# Patient Record
Sex: FEMALE | Race: BLACK OR AFRICAN AMERICAN | Employment: OTHER | RURAL
[De-identification: names, ages, dates, MRNs, and addresses within clinical notes are randomized per-mention and may not be internally consistent; named-entity substitution may affect disease eponyms.]

---

## 2017-01-12 ENCOUNTER — OFFICE VISIT (OUTPATIENT)
Dept: FAMILY MEDICINE CLINIC | Age: 82
End: 2017-01-12

## 2017-01-12 VITALS
RESPIRATION RATE: 18 BRPM | BODY MASS INDEX: 25.11 KG/M2 | WEIGHT: 160 LBS | HEART RATE: 89 BPM | SYSTOLIC BLOOD PRESSURE: 171 MMHG | DIASTOLIC BLOOD PRESSURE: 93 MMHG | TEMPERATURE: 97.9 F | OXYGEN SATURATION: 98 % | HEIGHT: 67 IN

## 2017-01-12 DIAGNOSIS — I10 ESSENTIAL HYPERTENSION: ICD-10-CM

## 2017-01-12 DIAGNOSIS — R30.0 DYSURIA: ICD-10-CM

## 2017-01-12 DIAGNOSIS — E08.21 DIABETES MELLITUS DUE TO UNDERLYING CONDITION WITH DIABETIC NEPHROPATHY, WITHOUT LONG-TERM CURRENT USE OF INSULIN (HCC): ICD-10-CM

## 2017-01-12 DIAGNOSIS — J30.89 NON-SEASONAL ALLERGIC RHINITIS DUE TO FUNGAL SPORES: Primary | ICD-10-CM

## 2017-01-12 RX ORDER — ETODOLAC 400 MG/1
TABLET, FILM COATED ORAL
Refills: 0 | COMMUNITY
Start: 2016-12-20 | End: 2017-02-01 | Stop reason: ALTCHOICE

## 2017-01-12 RX ORDER — CLONIDINE HYDROCHLORIDE 0.1 MG/1
0.1 TABLET ORAL 2 TIMES DAILY
Qty: 60 TAB | Refills: 5 | Status: SHIPPED | OUTPATIENT
Start: 2017-01-12 | End: 2017-02-01 | Stop reason: ALTCHOICE

## 2017-01-12 RX ORDER — AMLODIPINE BESYLATE 10 MG/1
TABLET ORAL
Qty: 30 TAB | Refills: 5 | Status: SHIPPED | OUTPATIENT
Start: 2017-01-12 | End: 2017-07-11 | Stop reason: SDUPTHER

## 2017-01-12 RX ORDER — FLUTICASONE PROPIONATE 50 MCG
SPRAY, SUSPENSION (ML) NASAL
Qty: 1 BOTTLE | Refills: 5 | Status: SHIPPED | OUTPATIENT
Start: 2017-01-12 | End: 2017-07-11 | Stop reason: ALTCHOICE

## 2017-01-12 NOTE — MR AVS SNAPSHOT
Visit Information Date & Time Provider Department Dept. Phone Encounter #  
 1/12/2017  3:40 PM Nora Rodriguez MD [facility] [phone] [id number] Upcoming Health Maintenance Date Due OSTEOPOROSIS SCREENING (DEXA) 6/30/2002 Pneumococcal 65+ Low/Medium Risk (2 of 2 - PPSV23) 12/19/2011 EYE EXAM RETINAL OR DILATED Q1 1/6/2018* MICROALBUMIN Q1 5/17/2017 HEMOGLOBIN A1C Q6M 6/16/2017 LIPID PANEL Q1 8/2/2017 FOOT EXAM Q1 10/10/2017 MEDICARE YEARLY EXAM 10/11/2017 GLAUCOMA SCREENING Q2Y 1/6/2019 DTaP/Tdap/Td series (2 - Td) 10/10/2026 *Topic was postponed. The date shown is not the original due date. Allergies as of 1/12/2017  Review Complete On: 12/16/2016 By: Liam Abbott MD  
 No Known Allergies Current Immunizations  Reviewed on 10/17/2014 Name Date Influenza High Dose Vaccine PF 10/10/2016  4:51 PM  
 Influenza Vaccine 10/17/2014, 12/31/2013 Pneumococcal Vaccine (Unspecified Type) 12/19/2006 Not reviewed this visit Vitals BP Pulse Temp Resp Height(growth percentile) Weight(growth percentile) (!) 171/93 (BP 1 Location: Left arm, BP Patient Position: Sitting) 89 97.9 °F (36.6 °C) 18 5' 7\" (1.702 m) 160 lb (72.6 kg) SpO2 BMI OB Status Smoking Status 98% 25.06 kg/m2 Hysterectomy Current Every Day Smoker Vitals History BMI and BSA Data Body Mass Index Body Surface Area 25.06 kg/m 2 1.85 m 2 Preferred Pharmacy Pharmacy Name Phone [id number] [address] [phone] Your Updated Medication List  
  
   
This list is accurate as of: 1/12/17  4:06 PM.  Always use your most recent med list.  
  
  
  
  
 ALLERGY PO Take  by mouth. amLODIPine 5 mg tablet Commonly known as:  Elrayray Balderramaer TAKE 1 TABLET BY MOUTH ONCE A DAY  
  
 ciprofloxacin HCl 500 mg tablet Commonly known as:  CIPRO Take 1 Tab by mouth two (2) times a day. cloNIDine HCl 0.1 mg tablet Commonly known as:  CATAPRES Take 1 Tab by mouth two (2) times a day. etodolac 400 mg tablet Commonly known as:  LODINE  
TAKE 1 TABLET BY MOUTH THREE TIMES DAILY WITH MEALS. lovastatin 20 mg tablet Commonly known as:  MEVACOR Take 1 Tab by mouth nightly. meloxicam 15 mg tablet Commonly known as:  MOBIC  
TAKE 1 TABLET BY MOUTH EVERY DAY WITH FOOD  
  
 metFORMIN 500 mg tablet Commonly known as:  GLUCOPHAGE  
TAKE 1 TABLET BY MOUTH EVERY DAY WITH BREAKFAST Introducing [facility] & [facility] SERVICES! Alva French introduces [employer]zi patient portal. Now you can access parts of your medical record, email your doctor's office, and request medication refills online. 1. In your internet browser, go to [email or web address] 2. Click on the First Time User? Click Here link in the Sign In box. You will see the New Member Sign Up page. 3. Enter your Shipzi Access Code exactly as it appears below. You will not need to use this code after youve completed the sign-up process. If you do not sign up before the expiration date, you must request a new code. · Shipzi Access Code: [email or web address] Expires: 4/12/2017  4:06 PM 
 
4. Enter the last four digits of your Social Security Number (xxxx) and Date of Birth (mm/dd/yyyy) as indicated and click Submit. You will be taken to the next sign-up page. 5. Create a Shipzi ID. This will be your Shipzi login ID and cannot be changed, so think of one that is secure and easy to remember. 6. Create a Shipzi password. You can change your password at any time. 7. Enter your Password Reset Question and Answer. This can be used at a later time if you forget your password. 8. Enter your e-mail address. You will receive e-mail notification when new information is available in [address] 9. Click Sign Up.  You can now view and download portions of your medical record. 10. Click the Download Summary menu link to download a portable copy of your medical information. If you have questions, please visit the Frequently Asked Questions section of the Rem[employer] website. Remember, Remicalm is NOT to be used for urgent needs. For medical emergencies, dial 911. Now available from your iPhone and Android! Please provide this summary of care documentation to your next provider. Your primary care clinician is listed as Diann Jane. If you have any questions after today's visit, please call [phone].

## 2017-01-12 NOTE — PROGRESS NOTES
Arielle Bass is a 80 y.o. female who presents with the following:  Chief Complaint   Patient presents with    Sinus Infection       Sinus Infection    The history is provided by the patient (Patient states her generic Zyrtec is not working and her head is congested and she has been sneezing and her nose itching). Pertinent negatives include no chills, no sweats, no congestion, no ear pain, no hoarse voice, no sinus pressure, no sore throat, no swollen glands, no cough, no shortness of breath, no rhinorrhea, no neck pain, no neck pain, no headaches and no chest pain. No Known Allergies    Current Outpatient Prescriptions   Medication Sig    DIPHENHYDRAMINE HCL (ALLERGY PO) Take  by mouth.  cloNIDine HCl (CATAPRES) 0.1 mg tablet Take 1 Tab by mouth two (2) times a day. Indications: Hypertension    amLODIPine (NORVASC) 10 mg tablet TAKE 1 TABLET BY MOUTH ONCE A DAY  Indications: Hypertension    fluticasone (FLONASE) 50 mcg/actuation nasal spray 1 spray each nostril twice daily for allergies    meloxicam (MOBIC) 15 mg tablet TAKE 1 TABLET BY MOUTH EVERY DAY WITH FOOD    ciprofloxacin HCl (CIPRO) 500 mg tablet Take 1 Tab by mouth two (2) times a day.  lovastatin (MEVACOR) 20 mg tablet Take 1 Tab by mouth nightly.  metFORMIN (GLUCOPHAGE) 500 mg tablet TAKE 1 TABLET BY MOUTH EVERY DAY WITH BREAKFAST    etodolac (LODINE) 400 mg tablet TAKE 1 TABLET BY MOUTH THREE TIMES DAILY WITH MEALS. No current facility-administered medications for this visit. Past Medical History   Diagnosis Date    Allergic rhinitis due to pollen     Change in bowel habits     Diabetes ([address]     Dysuria     Hemorrhoids     Hypertension     IBS (irritable bowel syndrome)     Other diseases of nasal cavity and sinuses(478.19)        Past Surgical History   Procedure Laterality Date    Hx colonoscopy  2013?     Hx hysterectomy      Hx cholecystectomy  2016       Family History   Problem Relation Age of Onset    No Known Problems Mother        Social History     Social History    Marital status:      Spouse name: N/A    Number of children: N/A    Years of education: N/A     Social History Main Topics    Smoking status: Current Every Day Smoker    Smokeless tobacco: Never Used    Alcohol use No    Drug use: None    Sexual activity: Not Asked     Other Topics Concern    None     Social History Narrative       Review of Systems   Constitutional: Negative for chills. HENT: Negative for congestion, ear pain, hoarse voice, rhinorrhea, sinus pressure and sore throat. Respiratory: Negative for cough and shortness of breath. Cardiovascular: Negative for chest pain. Genitourinary: Positive for dysuria. Negative for frequency, hematuria and urgency. Musculoskeletal: Negative for neck pain. Neurological: Negative for headaches. Visit Vitals    BP (!) 171/93 (BP 1 Location: Left arm, BP Patient Position: Sitting)    Pulse 89    Temp 97.9 °F (36.6 °C)    Resp 18    Ht 5' 7\" (1.702 m)    Wt 160 lb (72.6 kg)    SpO2 98%    BMI 25.06 kg/m2     Physical Exam   Constitutional: She is oriented to person, place, and time and well-developed, well-nourished, and in no distress. HENT:   Head: Normocephalic and atraumatic. Right Ear: External ear normal.   Left Ear: External ear normal.   Mouth/Throat: Oropharynx is clear and moist.   Her turbinates are boggy   Eyes: Conjunctivae and EOM are normal. Pupils are equal, round, and reactive to light. Right eye exhibits no discharge. Left eye exhibits no discharge. Neck: Normal range of motion. Neck supple. No tracheal deviation present. No thyromegaly present. Cardiovascular: Normal rate, regular rhythm, normal heart sounds and intact distal pulses. Exam reveals no gallop and no friction rub. No murmur heard. Pulmonary/Chest: Effort normal and breath sounds normal. No respiratory distress. She has no wheezes. She exhibits no tenderness. Abdominal: Soft. Bowel sounds are normal. She exhibits no distension and no mass. There is no tenderness. There is no rebound and no guarding. Musculoskeletal: She exhibits no edema or tenderness. Lymphadenopathy:     She has no cervical adenopathy. Neurological: She is alert and oriented to person, place, and time. She has normal reflexes. No cranial nerve deficit. She exhibits normal muscle tone. Gait normal. Coordination normal.   Skin: Skin is warm and dry. No rash noted. No erythema. No pallor. Psychiatric: Mood, memory, affect and judgment normal.         ICD-10-CM ICD-9-CM    1. Non-seasonal allergic rhinitis due to fungal spores J30.89 477.8    2. Dysuria R30.0 788.1 AMB POC URINALYSIS DIP STICK AUTO W/O MICRO   3. Essential hypertension I10 401.9 LIPID PANEL      METABOLIC PANEL, COMPREHENSIVE   4. Diabetes mellitus due to underlying condition with diabetic nephropathy, without long-term current use of insulin ([facility]) E08.21 249.40 MICROALBUMIN, UR, RAND W/ MICROALBUMIN/CREA RATIO     583.81 LIPID PANEL      HEMOGLOBIN A1C WITH EAG      METABOLIC PANEL, COMPREHENSIVE       Orders Placed This Encounter    MICROALBUMIN, UR, RAND W/ MICROALBUMIN/CREA RATIO     Standing Status:   Future     Standing Expiration Date:   7/12/2017    LIPID PANEL     Standing Status:   Future     Standing Expiration Date:   7/12/2017    HEMOGLOBIN A1C WITH EAG     Standing Status:   Future     Standing Expiration Date:   0/74/7239    METABOLIC PANEL, COMPREHENSIVE     Standing Status:   Future     Standing Expiration Date:   7/12/2017    AMB POC URINALYSIS DIP STICK AUTO W/O MICRO     Standing Status:   Future     Standing Expiration Date:   2/12/2017    etodolac (LODINE) 400 mg tablet     Sig: TAKE 1 TABLET BY MOUTH THREE TIMES DAILY WITH MEALS. Refill:  0    DIPHENHYDRAMINE HCL (ALLERGY PO)     Sig: Take  by mouth.  cloNIDine HCl (CATAPRES) 0.1 mg tablet     Sig: Take 1 Tab by mouth two (2) times a day. Indications: Hypertension     Dispense:  60 Tab     Refill:  5    amLODIPine (NORVASC) 10 mg tablet     Sig: TAKE 1 TABLET BY MOUTH ONCE A DAY  Indications: Hypertension     Dispense:  30 Tab     Refill:  5    fluticasone (FLONASE) 50 mcg/actuation nasal spray     Si spray each nostril twice daily for allergies     Dispense:  1 Bottle     Refill:  5       Follow-up Disposition: Not on Randall Gamble MD

## 2017-01-13 ENCOUNTER — LAB ONLY (OUTPATIENT)
Dept: FAMILY MEDICINE CLINIC | Age: 82
End: 2017-01-13

## 2017-01-13 DIAGNOSIS — I10 ESSENTIAL HYPERTENSION: ICD-10-CM

## 2017-01-13 DIAGNOSIS — R30.0 DYSURIA: ICD-10-CM

## 2017-01-13 DIAGNOSIS — E08.21 DIABETES MELLITUS DUE TO UNDERLYING CONDITION WITH DIABETIC NEPHROPATHY, WITHOUT LONG-TERM CURRENT USE OF INSULIN (HCC): ICD-10-CM

## 2017-01-13 LAB
BILIRUB UR QL STRIP: NEGATIVE
GLUCOSE UR-MCNC: NEGATIVE MG/DL
KETONES P FAST UR STRIP-MCNC: NEGATIVE MG/DL
PH UR STRIP: 6.5 [PH] (ref 4.6–8)
PROT UR QL STRIP: NEGATIVE MG/DL
SP GR UR STRIP: 1.01 (ref 1–1.03)
UA UROBILINOGEN AMB POC: NORMAL (ref 0.2–1)
URINALYSIS CLARITY POC: CLEAR
URINALYSIS COLOR POC: YELLOW
URINE BLOOD POC: NEGATIVE
URINE LEUKOCYTES POC: NEGATIVE
URINE NITRITES POC: NEGATIVE

## 2017-01-15 LAB
ALBUMIN SERPL-MCNC: 4.1 G/DL (ref 3.5–4.8)
ALBUMIN/CREAT UR: 13.8 MG/G CREAT (ref 0–30)
ALBUMIN/GLOB SERPL: 1.4 {RATIO} (ref 1.1–2.5)
ALP SERPL-CCNC: 88 IU/L (ref 39–117)
ALT SERPL-CCNC: 12 IU/L (ref 0–32)
AST SERPL-CCNC: 15 IU/L (ref 0–40)
BILIRUB SERPL-MCNC: 0.6 MG/DL (ref 0–1.2)
BUN SERPL-MCNC: 10 MG/DL (ref 8–27)
BUN/CREAT SERPL: 11 (ref 11–26)
CALCIUM SERPL-MCNC: 9.2 MG/DL (ref 8.7–10.3)
CHLORIDE SERPL-SCNC: 100 MMOL/L (ref 96–106)
CHOLEST SERPL-MCNC: 197 MG/DL (ref 100–199)
CO2 SERPL-SCNC: 27 MMOL/L (ref 18–29)
CREAT SERPL-MCNC: 0.87 MG/DL (ref 0.57–1)
CREAT UR-MCNC: 99.4 MG/DL
EST. AVERAGE GLUCOSE BLD GHB EST-MCNC: 157 MG/DL
GLOBULIN SER CALC-MCNC: 3 G/DL (ref 1.5–4.5)
GLUCOSE SERPL-MCNC: 143 MG/DL (ref 65–99)
HBA1C MFR BLD: 7.1 % (ref 4.8–5.6)
HDLC SERPL-MCNC: 71 MG/DL
INTERPRETATION, 910389: NORMAL
LDLC SERPL CALC-MCNC: 96 MG/DL (ref 0–99)
MICROALBUMIN UR-MCNC: 13.7 UG/ML
POTASSIUM SERPL-SCNC: 4.1 MMOL/L (ref 3.5–5.2)
PROT SERPL-MCNC: 7.1 G/DL (ref 6–8.5)
SODIUM SERPL-SCNC: 145 MMOL/L (ref 134–144)
TRIGL SERPL-MCNC: 150 MG/DL (ref 0–149)
VLDLC SERPL CALC-MCNC: 30 MG/DL (ref 5–40)

## 2017-01-17 ENCOUNTER — TELEPHONE (OUTPATIENT)
Dept: FAMILY MEDICINE CLINIC | Age: 82
End: 2017-01-17

## 2017-02-01 ENCOUNTER — OFFICE VISIT (OUTPATIENT)
Dept: FAMILY MEDICINE CLINIC | Age: 82
End: 2017-02-01

## 2017-02-01 VITALS
HEIGHT: 67 IN | DIASTOLIC BLOOD PRESSURE: 87 MMHG | BODY MASS INDEX: 25.27 KG/M2 | HEART RATE: 75 BPM | OXYGEN SATURATION: 99 % | SYSTOLIC BLOOD PRESSURE: 158 MMHG | TEMPERATURE: 97.2 F | WEIGHT: 161 LBS | RESPIRATION RATE: 18 BRPM

## 2017-02-01 DIAGNOSIS — I10 ESSENTIAL HYPERTENSION: Primary | ICD-10-CM

## 2017-02-01 DIAGNOSIS — E78.00 ELEVATED CHOLESTEROL: ICD-10-CM

## 2017-02-01 DIAGNOSIS — E11.9 TYPE 2 DIABETES MELLITUS WITHOUT COMPLICATION, WITHOUT LONG-TERM CURRENT USE OF INSULIN (HCC): ICD-10-CM

## 2017-02-01 RX ORDER — METFORMIN HYDROCHLORIDE 500 MG/1
500 TABLET ORAL 2 TIMES DAILY WITH MEALS
Qty: 60 TAB | Refills: 5 | Status: SHIPPED | OUTPATIENT
Start: 2017-02-01 | End: 2017-08-28 | Stop reason: ALTCHOICE

## 2017-02-01 RX ORDER — HYDROCHLOROTHIAZIDE 12.5 MG/1
12.5 TABLET ORAL DAILY
Qty: 30 TAB | Refills: 5 | Status: SHIPPED | OUTPATIENT
Start: 2017-02-01 | End: 2017-03-01 | Stop reason: SDUPTHER

## 2017-02-01 NOTE — ACP (ADVANCE CARE PLANNING)
Advance Care Planning (ACP) Provider Conversation Snapshot    Date of ACP Conversation: 02/01/17  Persons included in Conversation:  patient  Length of ACP Conversation in minutes:  <16 minutes (Non-Billable)    Authorized Decision Maker (if patient is incapable of making informed decisions): This person is: Other Legally Authorized Decision Maker (e.g. Next of Kin)          For Patients with Decision Making Capacity:   Values/Goals: Exploration of values, goals, and preferences if recovery is not expected, even with continued medical treatment in the event of:  Imminent death  Severe, permanent brain injury  \"In these circumstances, what matters most to you? \"  Care focused more on comfort or quality of life.     Conversation Outcomes / Follow-Up Plan:   Recommended completion of Advance Directive form after review of ACP materials and conversation with prospective healthcare agent

## 2017-02-01 NOTE — PROGRESS NOTES
Annie Sawyer is a 80 y.o. female who presents with the following:  Chief Complaint   Patient presents with    Diabetes    Hypertension       Diabetes   The history is provided by the patient (Patient was recently in the emergency room to have a blood sugar done because she could not get an appointment here and there was no one here to check her blood sugar). Pertinent negatives include no chest pain, no abdominal pain, no headaches and no shortness of breath. Hypertension    The history is provided by the patient (Patient's blood pressures been fairly well controlled until she started to reduce her medications and come off of some entirely although she had no chest pain or edema). Pertinent negatives include no chest pain, no headaches and no shortness of breath. Cholesterol Problem   The history is provided by the patient (The patient states she was having muscle pain on the lovastatin so she stopped it altogether and currently is on no medicine). Pertinent negatives include no chest pain, no abdominal pain, no headaches and no shortness of breath. No Known Allergies    Current Outpatient Prescriptions   Medication Sig    metFORMIN (GLUCOPHAGE) 500 mg tablet Take 1 Tab by mouth two (2) times daily (with meals). TAKE 1 TABLET BY MOUTH EVERY DAY WITH BREAKFAST    hydroCHLOROthiazide (HYDRODIURIL) 12.5 mg tablet Take 1 Tab by mouth daily.  amLODIPine (NORVASC) 10 mg tablet TAKE 1 TABLET BY MOUTH ONCE A DAY  Indications: Hypertension    fluticasone (FLONASE) 50 mcg/actuation nasal spray 1 spray each nostril twice daily for allergies     No current facility-administered medications for this visit.         Past Medical History   Diagnosis Date    Allergic rhinitis due to pollen     Change in bowel habits     Diabetes ([address]     Dysuria     Hemorrhoids     Hypertension     IBS (irritable bowel syndrome)     Other diseases of nasal cavity and sinuses(478.19)        Past Surgical History Procedure Laterality Date    Hx colonoscopy  2013?  Hx hysterectomy      Hx cholecystectomy  2016       Family History   Problem Relation Age of Onset    No Known Problems Mother        Social History     Social History    Marital status:      Spouse name: N/A    Number of children: N/A    Years of education: N/A     Social History Main Topics    Smoking status: Current Every Day Smoker    Smokeless tobacco: Never Used    Alcohol use No    Drug use: None    Sexual activity: Not Asked     Other Topics Concern    None     Social History Narrative       Review of Systems   Respiratory: Negative for shortness of breath. Cardiovascular: Negative for chest pain. Gastrointestinal: Negative for abdominal pain. Neurological: Negative for headaches. Visit Vitals    /87 (BP 1 Location: Left arm, BP Patient Position: Sitting)    Pulse 75    Temp 97.2 °F (36.2 °C) (Oral)    Resp 18    Ht 5' 7\" (1.702 m)    Wt 161 lb (73 kg)    SpO2 99%    BMI 25.22 kg/m2     Physical Exam   Constitutional: She is oriented to person, place, and time and well-developed, well-nourished, and in no distress. HENT:   Head: Normocephalic and atraumatic. Right Ear: External ear normal.   Left Ear: External ear normal.   Mouth/Throat: Oropharynx is clear and moist.   Eyes: Conjunctivae and EOM are normal. Pupils are equal, round, and reactive to light. Right eye exhibits no discharge. Left eye exhibits no discharge. Neck: Normal range of motion. Neck supple. No tracheal deviation present. No thyromegaly present. Cardiovascular: Normal rate, regular rhythm, normal heart sounds and intact distal pulses. Exam reveals no gallop and no friction rub. No murmur heard. Pulmonary/Chest: Effort normal and breath sounds normal. No respiratory distress. She has no wheezes. She exhibits no tenderness. Abdominal: Soft. Bowel sounds are normal. She exhibits no distension and no mass.  There is no tenderness. There is no rebound and no guarding. Musculoskeletal: She exhibits no edema or tenderness. Lymphadenopathy:     She has no cervical adenopathy. Neurological: She is alert and oriented to person, place, and time. She has normal reflexes. No cranial nerve deficit. She exhibits normal muscle tone. Gait normal. Coordination normal.   Skin: Skin is warm and dry. No rash noted. No erythema. No pallor. Psychiatric: Mood, memory, affect and judgment normal.         ICD-10-CM ICD-9-CM    1. Essential hypertension I10 401.9 hydroCHLOROthiazide (HYDRODIURIL) 12.5 mg tablet   2. Type 2 diabetes mellitus without complication, without long-term current use of insulin (HCC) E11.9 250.00 metFORMIN (GLUCOPHAGE) 500 mg tablet   3. Elevated cholesterol E78.00 272.0        Orders Placed This Encounter    metFORMIN (GLUCOPHAGE) 500 mg tablet     Sig: Take 1 Tab by mouth two (2) times daily (with meals). TAKE 1 TABLET BY MOUTH EVERY DAY WITH BREAKFAST     Dispense:  60 Tab     Refill:  5    hydroCHLOROthiazide (HYDRODIURIL) 12.5 mg tablet     Sig: Take 1 Tab by mouth daily.      Dispense:  30 Tab     Refill:  5    we will recheck the patient's blood pressure in 2 weeks and at that time may proceed with further blood work and trying to find a more suitable anticholesterol medicine    Follow-up Disposition: Not on Haylee Herring MD

## 2017-02-01 NOTE — MR AVS SNAPSHOT
Visit Information Date & Time Provider Department Dept. Phone Encounter #  
 2/1/2017  8:40 AM Michelle Antonio MD [facility] [phone] [id number] Upcoming Health Maintenance Date Due OSTEOPOROSIS SCREENING (DEXA) 6/30/2002 Pneumococcal 65+ Low/Medium Risk (2 of 2 - PPSV23) 12/19/2011 EYE EXAM RETINAL OR DILATED Q1 1/6/2018* HEMOGLOBIN A1C Q6M 7/13/2017 FOOT EXAM Q1 10/10/2017 MEDICARE YEARLY EXAM 10/11/2017 MICROALBUMIN Q1 1/13/2018 LIPID PANEL Q1 1/13/2018 GLAUCOMA SCREENING Q2Y 1/6/2019 DTaP/Tdap/Td series (2 - Td) 10/10/2026 *Topic was postponed. The date shown is not the original due date. Allergies as of 2/1/2017  Review Complete On: 2/1/2017 By: Michelle Antonio MD  
 No Known Allergies Current Immunizations  Reviewed on 10/17/2014 Name Date Influenza High Dose Vaccine PF 10/10/2016  4:51 PM  
 Influenza Vaccine 10/17/2014, 12/31/2013 Pneumococcal Vaccine (Unspecified Type) 12/19/2006 Not reviewed this visit Vitals BP Pulse Temp Resp Height(growth percentile) Weight(growth percentile) 158/87 (BP 1 Location: Left arm, BP Patient Position: Sitting) 75 97.2 °F (36.2 °C) (Oral) 18 5' 7\" (1.702 m) 161 lb (73 kg) SpO2 BMI OB Status Smoking Status 99% 25.22 kg/m2 Hysterectomy Current Every Day Smoker Vitals History BMI and BSA Data Body Mass Index Body Surface Area  
 25.22 kg/m 2 1.86 m 2 Preferred Pharmacy Pharmacy Name Phone [id number] [address] [phone] Your Updated Medication List  
  
   
This list is accurate as of: 2/1/17  9:25 AM.  Always use your most recent med list.  
  
  
  
  
 ALLERGY PO Take  by mouth. amLODIPine 10 mg tablet Commonly known as:  Kehinde Fanaris TAKE 1 TABLET BY MOUTH ONCE A DAY  Indications: Hypertension  
  
 ciprofloxacin HCl 500 mg tablet Commonly known as:  CIPRO Take 1 Tab by mouth two (2) times a day. cloNIDine HCl 0.1 mg tablet Commonly known as:  CATAPRES Take 1 Tab by mouth two (2) times a day. Indications: Hypertension  
  
 etodolac 400 mg tablet Commonly known as:  LODINE  
TAKE 1 TABLET BY MOUTH THREE TIMES DAILY WITH MEALS. fluticasone 50 mcg/actuation nasal spray Commonly known as:  FLONASE  
1 spray each nostril twice daily for allergies  
  
 lovastatin 20 mg tablet Commonly known as:  MEVACOR Take 1 Tab by mouth nightly. meloxicam 15 mg tablet Commonly known as:  MOBIC  
TAKE 1 TABLET BY MOUTH EVERY DAY WITH FOOD  
  
 metFORMIN 500 mg tablet Commonly known as:  GLUCOPHAGE  
TAKE 1 TABLET BY MOUTH EVERY DAY WITH BREAKFAST Introducing [facility]! Emi Malhotra introduces [employer] patient portal. Now you can access parts of your medical record, email your doctor's office, and request medication refills online. 1. In your internet browser, go to [email or web address] 2. Click on the First Time User? Click Here link in the Sign In box. You will see the New Member Sign Up page. 3. Enter your [employer] Access Code exactly as it appears below. You will not need to use this code after youve completed the sign-up process. If you do not sign up before the expiration date, you must request a new code. · [employer] Access Code: [email or web address] Expires: 4/12/2017  4:06 PM 
 
4. Enter the last four digits of your Social Security Number (xxxx) and Date of Birth (mm/dd/yyyy) as indicated and click Submit. You will be taken to the next sign-up page. 5. Create a [employer] ID. This will be your [employer] login ID and cannot be changed, so think of one that is secure and easy to remember. 6. Create a [employer] password. You can change your password at any time. 7. Enter your Password Reset Question and Answer. This can be used at a later time if you forget your password. 8. Enter your e-mail address. You will receive e-mail notification when new information is available in [address] 9. Click Sign Up. You can now view and download portions of your medical record. 10. Click the Download Summary menu link to download a portable copy of your medical information. If you have questions, please visit the Frequently Asked Questions section of the [employer] website. Remember, [employer] is NOT to be used for urgent needs. For medical emergencies, dial 911. Now available from your iPhone and Android! Please provide this summary of care documentation to your next provider. Your primary care clinician is listed as Leigh Sanchez. If you have any questions after today's visit, please call [phone].

## 2017-02-03 ENCOUNTER — TELEPHONE (OUTPATIENT)
Dept: FAMILY MEDICINE CLINIC | Age: 82
End: 2017-02-03

## 2017-03-01 ENCOUNTER — OFFICE VISIT (OUTPATIENT)
Dept: FAMILY MEDICINE CLINIC | Age: 82
End: 2017-03-01

## 2017-03-01 VITALS
OXYGEN SATURATION: 96 % | TEMPERATURE: 96.5 F | HEART RATE: 77 BPM | DIASTOLIC BLOOD PRESSURE: 79 MMHG | HEIGHT: 67 IN | WEIGHT: 158.6 LBS | BODY MASS INDEX: 24.89 KG/M2 | RESPIRATION RATE: 18 BRPM | SYSTOLIC BLOOD PRESSURE: 151 MMHG

## 2017-03-01 DIAGNOSIS — N30.90 CYSTITIS: ICD-10-CM

## 2017-03-01 DIAGNOSIS — I10 ESSENTIAL HYPERTENSION: ICD-10-CM

## 2017-03-01 DIAGNOSIS — R30.0 DYSURIA: Primary | ICD-10-CM

## 2017-03-01 LAB
BILIRUB UR QL STRIP: NEGATIVE
GLUCOSE UR-MCNC: NEGATIVE MG/DL
KETONES P FAST UR STRIP-MCNC: NORMAL MG/DL
PH UR STRIP: 6 [PH] (ref 4.6–8)
PROT UR QL STRIP: NEGATIVE MG/DL
SP GR UR STRIP: 1.02 (ref 1–1.03)
UA UROBILINOGEN AMB POC: NORMAL (ref 0.2–1)
URINALYSIS CLARITY POC: CLEAR
URINALYSIS COLOR POC: YELLOW
URINE BLOOD POC: NEGATIVE
URINE LEUKOCYTES POC: NORMAL
URINE NITRITES POC: NEGATIVE

## 2017-03-01 RX ORDER — HYDROCHLOROTHIAZIDE 25 MG/1
25 TABLET ORAL DAILY
Qty: 30 TAB | Refills: 5 | Status: SHIPPED | OUTPATIENT
Start: 2017-03-01 | End: 2017-05-02 | Stop reason: ALTCHOICE

## 2017-03-01 RX ORDER — CIPROFLOXACIN 250 MG/1
250 TABLET, FILM COATED ORAL EVERY 12 HOURS
Qty: 20 TAB | Refills: 0 | Status: SHIPPED | OUTPATIENT
Start: 2017-03-01 | End: 2017-05-02 | Stop reason: ALTCHOICE

## 2017-03-01 RX ORDER — CETIRIZINE HCL 10 MG
TABLET ORAL
COMMUNITY
End: 2017-05-02 | Stop reason: ALTCHOICE

## 2017-03-01 RX ORDER — MELOXICAM 15 MG/1
15 TABLET ORAL DAILY
COMMUNITY
End: 2017-05-02 | Stop reason: ALTCHOICE

## 2017-03-01 NOTE — PROGRESS NOTES
Arielle Bass is a 80 y.o. female who presents with the following:  Chief Complaint   Patient presents with    Bladder Infection    Dysuria       Bladder Infection    The history is provided by the patient (Patient complains of dysuria urgency frequency ×12 hours). Associated symptoms include frequency and urgency. Pertinent negatives include no chills, no sweats, no nausea, no vomiting, no hematuria, no hesitancy, no flank pain, no vaginal discharge, no abdominal pain and no back pain. Dysuria   Pertinent negatives include no chest pain, no abdominal pain, no headaches and no shortness of breath. Hypertension    The history is provided by the patient (Patient continues to take her medications correctly but her systolic blood pressure remains elevated). Pertinent negatives include no chest pain, no orthopnea, no palpitations, no PND, no malaise/fatigue, no headaches, no peripheral edema, no shortness of breath, no nausea and no vomiting. No Known Allergies    Current Outpatient Prescriptions   Medication Sig    cetirizine (ZYRTEC) 10 mg tablet Take  by mouth.  meloxicam (MOBIC) 15 mg tablet Take 15 mg by mouth daily.  ciprofloxacin HCl (CIPRO) 250 mg tablet Take 1 Tab by mouth every twelve (12) hours.  hydroCHLOROthiazide (HYDRODIURIL) 25 mg tablet Take 1 Tab by mouth daily.  metFORMIN (GLUCOPHAGE) 500 mg tablet Take 1 Tab by mouth two (2) times daily (with meals). TAKE 1 TABLET BY MOUTH EVERY DAY WITH BREAKFAST    amLODIPine (NORVASC) 10 mg tablet TAKE 1 TABLET BY MOUTH ONCE A DAY  Indications: Hypertension    fluticasone (FLONASE) 50 mcg/actuation nasal spray 1 spray each nostril twice daily for allergies     No current facility-administered medications for this visit.         Past Medical History:   Diagnosis Date    Allergic rhinitis due to pollen     Change in bowel habits     Diabetes ([facility] [address]     Dysuria     Hemorrhoids     Hypertension     IBS (irritable bowel syndrome)  Other diseases of nasal cavity and sinuses(478.19)        Past Surgical History:   Procedure Laterality Date    HX CHOLECYSTECTOMY  2016    HX COLONOSCOPY  2013?  HX HYSTERECTOMY         Family History   Problem Relation Age of Onset    No Known Problems Mother        Social History     Social History    Marital status:      Spouse name: N/A    Number of children: N/A    Years of education: N/A     Social History Main Topics    Smoking status: Current Every Day Smoker    Smokeless tobacco: Never Used    Alcohol use No    Drug use: None    Sexual activity: Not Asked     Other Topics Concern    None     Social History Narrative       Review of Systems   Constitutional: Negative for chills and malaise/fatigue. Respiratory: Negative for shortness of breath. Cardiovascular: Negative for chest pain, palpitations, orthopnea and PND. Gastrointestinal: Negative for abdominal pain, nausea and vomiting. Genitourinary: Positive for difficulty urinating, frequency and urgency. Negative for flank pain, hematuria, hesitancy and vaginal discharge. Musculoskeletal: Negative for back pain. Neurological: Negative for headaches. Visit Vitals    /79 (BP 1 Location: Left arm, BP Patient Position: Sitting)    Pulse 77    Temp 96.5 °F (35.8 °C) (Oral)    Resp 18    Ht 5' 7\" (1.702 m)    Wt 158 lb 9.6 oz (71.9 kg)    SpO2 96%    BMI 24.84 kg/m2     Physical Exam   Constitutional: She is oriented to person, place, and time and well-developed, well-nourished, and in no distress. HENT:   Head: Normocephalic and atraumatic. Right Ear: External ear normal.   Left Ear: External ear normal.   Mouth/Throat: Oropharynx is clear and moist.   Eyes: Conjunctivae and EOM are normal. Pupils are equal, round, and reactive to light. Right eye exhibits no discharge. Left eye exhibits no discharge. Neck: Normal range of motion. Neck supple. No tracheal deviation present. No thyromegaly present. Cardiovascular: Normal rate, regular rhythm, normal heart sounds and intact distal pulses. Exam reveals no gallop and no friction rub. No murmur heard. Pulmonary/Chest: Effort normal and breath sounds normal. No respiratory distress. She has no wheezes. She exhibits no tenderness. Abdominal: Soft. Bowel sounds are normal. She exhibits no distension and no mass. There is tenderness (over the bladder). There is no rebound and no guarding. Musculoskeletal: She exhibits no edema or tenderness. Lymphadenopathy:     She has no cervical adenopathy. Neurological: She is alert and oriented to person, place, and time. She has normal reflexes. No cranial nerve deficit. She exhibits normal muscle tone. Gait normal. Coordination normal.   Skin: Skin is warm and dry. No rash noted. No erythema. No pallor. Psychiatric: Mood, memory, affect and judgment normal.         ICD-10-CM ICD-9-CM    1. Dysuria R30.0 788.1 AMB POC URINALYSIS DIP STICK MANUAL W/O MICRO      ciprofloxacin HCl (CIPRO) 250 mg tablet   2. Cystitis N30.90 595.9 ciprofloxacin HCl (CIPRO) 250 mg tablet   3. Essential hypertension I10 401.9 hydroCHLOROthiazide (HYDRODIURIL) 25 mg tablet       Orders Placed This Encounter    AMB POC URINALYSIS DIP STICK MANUAL W/O MICRO    cetirizine (ZYRTEC) 10 mg tablet     Sig: Take  by mouth.  meloxicam (MOBIC) 15 mg tablet     Sig: Take 15 mg by mouth daily.  ciprofloxacin HCl (CIPRO) 250 mg tablet     Sig: Take 1 Tab by mouth every twelve (12) hours. Dispense:  20 Tab     Refill:  0    hydroCHLOROthiazide (HYDRODIURIL) 25 mg tablet     Sig: Take 1 Tab by mouth daily.      Dispense:  30 Tab     Refill:  5       Follow-up Disposition: Not on Cameron Hart MD

## 2017-03-01 NOTE — MR AVS SNAPSHOT
Visit Information Date & Time Provider Department Dept. Phone Encounter #  
 3/1/2017  1:00 PM [facility] [phone] [id number] Upcoming Health Maintenance Date Due OSTEOPOROSIS SCREENING (DEXA) 6/30/2002 Pneumococcal 65+ Low/Medium Risk (2 of 2 - PPSV23) 12/19/2011 EYE EXAM RETINAL OR DILATED Q1 1/6/2018* HEMOGLOBIN A1C Q6M 7/13/2017 FOOT EXAM Q1 10/10/2017 MEDICARE YEARLY EXAM 10/11/2017 MICROALBUMIN Q1 1/13/2018 LIPID PANEL Q1 1/13/2018 GLAUCOMA SCREENING Q2Y 1/6/2019 DTaP/Tdap/Td series (2 - Td) 10/10/2026 *Topic was postponed. The date shown is not the original due date. Allergies as of 3/1/2017  Review Complete On: 3/1/2017 By: Pau Hobson MD  
 No Known Allergies Current Immunizations  Reviewed on 10/17/2014 Name Date Influenza High Dose Vaccine PF 10/10/2016  4:51 PM  
 Influenza Vaccine 10/17/2014, 12/31/2013 Pneumococcal Vaccine (Unspecified Type) 12/19/2006 Not reviewed this visit You Were Diagnosed With   
  
 Codes Comments Dysuria    -  Primary ICD-10-CM: R30.0 ICD-9-CM: 789. 1 Cystitis     ICD-10-CM: N30.90 ICD-9-CM: 595.9 Essential hypertension     ICD-10-CM: I10 
ICD-9-CM: 401.9 Vitals BP  
  
  
  
  
  
 151/79 (BP 1 Location: Left arm, BP Patient Position: Sitting) Vitals History BMI and BSA Data Body Mass Index Body Surface Area  
 24.84 kg/m 2 1.84 m 2 Preferred Pharmacy Pharmacy Name Phone [id number] [address] [phone] Your Updated Medication List  
  
   
This list is accurate as of: 3/1/17  1:23 PM.  Always use your most recent med list. amLODIPine 10 mg tablet Commonly known as:  Rudene Post TAKE 1 TABLET BY MOUTH ONCE A DAY  Indications: Hypertension  
  
 fluticasone 50 mcg/actuation nasal spray Commonly known as:  FLONASE  
1 spray each nostril twice daily for allergies  
  
 hydroCHLOROthiazide 12.5 mg tablet Commonly known as:  HYDRODIURIL Take 1 Tab by mouth daily. meloxicam 15 mg tablet Commonly known as:  MOBIC Take 15 mg by mouth daily. metFORMIN 500 mg tablet Commonly known as:  GLUCOPHAGE Take 1 Tab by mouth two (2) times daily (with meals). TAKE 1 TABLET BY MOUTH EVERY DAY WITH BREAKFAST ZyrTEC 10 mg tablet Generic drug:  cetirizine Take  by mouth. We Performed the Following AMB POC URINALYSIS DIP STICK MANUAL W/O MICRO [37371 CPT(R)] Introducing [address]! [facility] introduces [employer] patient portal. Now you can access parts of your medical record, email your doctor's office, and request medication refills online. 1. In your internet browser, go to [email or web address] 2. Click on the First Time User? Click Here link in the Sign In box. You will see the New Member Sign Up page. 3. Enter your [employer] Access Code exactly as it appears below. You will not need to use this code after youve completed the sign-up process. If you do not sign up before the expiration date, you must request a new code. · [employer] Access Code: [email or web address] Expires: 4/12/2017  4:06 PM 
 
4. Enter the last four digits of your Social Security Number (xxxx) and Date of Birth (mm/dd/yyyy) as indicated and click Submit. You will be taken to the next sign-up page. 5. Create a [employer] ID. This will be your [employer] login ID and cannot be changed, so think of one that is secure and easy to remember. 6. Create a [employer] password. You can change your password at any time. 7. Enter your Password Reset Question and Answer. This can be used at a later time if you forget your password. 8. Enter your e-mail address. You will receive e-mail notification when new information is available in [address] 9. Click Sign Up. You can now view and download portions of your medical record. 10. Click the Download Summary menu link to download a portable copy of your medical information. If you have questions, please visit the Frequently Asked Questions section of the [employer] website. Remember, [employer] is NOT to be used for urgent needs. For medical emergencies, dial 911. Now available from your iPhone and Android! Please provide this summary of care documentation to your next provider. Your primary care clinician is listed as Meena Pacheco. If you have any questions after today's visit, please call [phone].

## 2017-03-03 LAB — BACTERIA UR CULT: NORMAL

## 2017-03-17 ENCOUNTER — OFFICE VISIT (OUTPATIENT)
Dept: FAMILY MEDICINE CLINIC | Age: 82
End: 2017-03-17

## 2017-03-17 VITALS
BODY MASS INDEX: 24.33 KG/M2 | DIASTOLIC BLOOD PRESSURE: 101 MMHG | HEIGHT: 67 IN | SYSTOLIC BLOOD PRESSURE: 191 MMHG | RESPIRATION RATE: 18 BRPM | OXYGEN SATURATION: 99 % | HEART RATE: 96 BPM | TEMPERATURE: 97.5 F | WEIGHT: 155 LBS

## 2017-03-17 DIAGNOSIS — I10 ESSENTIAL HYPERTENSION: Primary | ICD-10-CM

## 2017-03-17 DIAGNOSIS — R42 VERTIGO: ICD-10-CM

## 2017-03-17 RX ORDER — LISINOPRIL 40 MG/1
40 TABLET ORAL DAILY
Qty: 30 TAB | Refills: 5 | Status: SHIPPED | OUTPATIENT
Start: 2017-03-17 | End: 2017-07-11 | Stop reason: HOSPADM

## 2017-03-17 RX ORDER — MECLIZINE HYDROCHLORIDE 25 MG/1
25 TABLET ORAL
Qty: 30 TAB | Refills: 0 | Status: SHIPPED | OUTPATIENT
Start: 2017-03-17 | End: 2017-03-27

## 2017-03-17 NOTE — MR AVS SNAPSHOT
Visit Information Date & Time Provider Department Dept. Phone Encounter #  
 3/17/2017  2:20 PM Marcos Worthy MD [facility] Primary Care [phone] [id number] Your Appointments 3/22/2017 10:20 AM  
Follow Up with Marcos Worthy MD  
[place] [facility] Primary [facility]) Appt Note: 3 WEEK F/U  
 [address] [facility] [phone]  
  
   
 [address] [facility] [phone] Upcoming Health Maintenance Date Due OSTEOPOROSIS SCREENING (DEXA) 6/30/2002 Pneumococcal 65+ Low/Medium Risk (2 of 2 - PPSV23) 12/19/2011 EYE EXAM RETINAL OR DILATED Q1 1/6/2018* HEMOGLOBIN A1C Q6M 7/13/2017 FOOT EXAM Q1 10/10/2017 MEDICARE YEARLY EXAM 10/11/2017 MICROALBUMIN Q1 1/13/2018 LIPID PANEL Q1 1/13/2018 GLAUCOMA SCREENING Q2Y 1/6/2019 DTaP/Tdap/Td series (2 - Td) 10/10/2026 *Topic was postponed. The date shown is not the original due date. Allergies as of 3/17/2017  Review Complete On: 3/1/2017 By: Marcos Worthy MD  
 No Known Allergies Current Immunizations  Reviewed on 10/17/2014 Name Date Influenza High Dose Vaccine PF 10/10/2016  4:51 PM  
 Influenza Vaccine 10/17/2014, 12/31/2013 Pneumococcal Vaccine (Unspecified Type) 12/19/2006 Not reviewed this visit Vitals BP Pulse Temp Resp Height(growth percentile) Weight(growth percentile) (!) 191/101 (BP 1 Location: Left arm, BP Patient Position: Sitting) 96 97.5 °F (36.4 °C) 18 5' 7\" (1.702 m) 155 lb (70.3 kg) SpO2 BMI OB Status Smoking Status 99% 24.28 kg/m2 Hysterectomy Current Every Day Smoker Vitals History BMI and BSA Data Body Mass Index Body Surface Area  
 24.28 kg/m 2 1.82 m 2 Preferred Pharmacy Pharmacy Name Phone [id number] [address] [facility] [phone] Your Updated Medication List  
  
   
 This list is accurate as of: 3/17/17  2:50 PM.  Always use your most recent med list. amLODIPine 10 mg tablet Commonly known as:  Emma Baumann TAKE 1 TABLET BY MOUTH ONCE A DAY  Indications: Hypertension  
  
 ciprofloxacin HCl 250 mg tablet Commonly known as:  CIPRO Take 1 Tab by mouth every twelve (12) hours. fluticasone 50 mcg/actuation nasal spray Commonly known as:  FLONASE  
1 spray each nostril twice daily for allergies  
  
 hydroCHLOROthiazide 25 mg tablet Commonly known as:  HYDRODIURIL Take 1 Tab by mouth daily. meloxicam 15 mg tablet Commonly known as:  MOBIC Take 15 mg by mouth daily. metFORMIN 500 mg tablet Commonly known as:  GLUCOPHAGE Take 1 Tab by mouth two (2) times daily (with meals). TAKE 1 TABLET BY MOUTH EVERY DAY WITH BREAKFAST ZyrTEC 10 mg tablet Generic drug:  cetirizine Take  by mouth. Introducing [facility]! [employer] introduces [employer] patient portal. Now you can access parts of your medical record, email your doctor's office, and request medication refills online. 1. In your internet browser, go to [email or web address] 2. Click on the First Time User? Click Here link in the Sign In box. You will see the New Member Sign Up page. 3. Enter your [employer] Access Code exactly as it appears below. You will not need to use this code after youve completed the sign-up process. If you do not sign up before the expiration date, you must request a new code. · [employer] Access Code: [email or web address] Expires: 4/12/2017  5:06 PM 
 
4. Enter the last four digits of your Social Security Number (xxxx) and Date of Birth (mm/dd/yyyy) as indicated and click Submit. You will be taken to the next sign-up page. 5. Create a [employer] ID. This will be your [employer] login ID and cannot be changed, so think of one that is secure and easy to remember. 6. Create a [employer] password. You can change your password at any time. 7. Enter your Password Reset Question and Answer. This can be used at a later time if you forget your password. 8. Enter your e-mail address. You will receive e-mail notification when new information is available in [address] 9. Click Sign Up. You can now view and download portions of your medical record. 10. Click the Download Summary menu link to download a portable copy of your medical information. If you have questions, please visit the Frequently Asked Questions section of the [employer] website. Remember, [employer] is NOT to be used for urgent needs. For medical emergencies, dial 911. Now available from your iPhone and Android! Please provide this summary of care documentation to your next provider. Your primary care clinician is listed as Joy Ramirez. If you have any questions after today's visit, please call [phone].

## 2017-03-17 NOTE — PROGRESS NOTES
Bessie Ambrocio is a 80 y.o. female who presents with the following:  Chief Complaint   Patient presents with    Hypertension       Hypertension    The history is provided by the patient (Patient is coming in because her blood pressure is elevated but she never picked up the hydrochlorothiazide that was sent in for her on 3/1/2017 nor did she  the ciprofloxacin that was sitting on the same date for her urinary tract infection). Pertinent negatives include no chest pain, no orthopnea, no palpitations, no PND, no anxiety, no confusion, no malaise/fatigue, no blurred vision, no headaches, no peripheral edema, no dizziness and no shortness of breath. Associated symptoms comments: The patient has been having vertiginous episodes for the last 3 days. No Known Allergies    Current Outpatient Prescriptions   Medication Sig    lisinopril (PRINIVIL, ZESTRIL) 40 mg tablet Take 1 Tab by mouth daily. Indications: hypertension    meclizine (ANTIVERT) 25 mg tablet Take 1 Tab by mouth three (3) times daily as needed for up to 10 days. Indications: VERTIGO    cetirizine (ZYRTEC) 10 mg tablet Take  by mouth.  meloxicam (MOBIC) 15 mg tablet Take 15 mg by mouth daily.  ciprofloxacin HCl (CIPRO) 250 mg tablet Take 1 Tab by mouth every twelve (12) hours.  hydroCHLOROthiazide (HYDRODIURIL) 25 mg tablet Take 1 Tab by mouth daily.  metFORMIN (GLUCOPHAGE) 500 mg tablet Take 1 Tab by mouth two (2) times daily (with meals). TAKE 1 TABLET BY MOUTH EVERY DAY WITH BREAKFAST    amLODIPine (NORVASC) 10 mg tablet TAKE 1 TABLET BY MOUTH ONCE A DAY  Indications: Hypertension    fluticasone (FLONASE) 50 mcg/actuation nasal spray 1 spray each nostril twice daily for allergies     No current facility-administered medications for this visit.         Past Medical History:   Diagnosis Date    Allergic rhinitis due to pollen     Change in bowel habits     Diabetes ([address]     Dysuria     Hemorrhoids     Hypertension     IBS (irritable bowel syndrome)     Other diseases of nasal cavity and sinuses(478.19)        Past Surgical History:   Procedure Laterality Date    HX CHOLECYSTECTOMY  2016    HX COLONOSCOPY  2013?  HX HYSTERECTOMY         Family History   Problem Relation Age of Onset    No Known Problems Mother        Social History     Social History    Marital status:      Spouse name: N/A    Number of children: N/A    Years of education: N/A     Social History Main Topics    Smoking status: Current Every Day Smoker    Smokeless tobacco: Never Used    Alcohol use No    Drug use: Not on file    Sexual activity: Not on file     Other Topics Concern    Not on file     Social History Narrative       Review of Systems   Constitutional: Negative for malaise/fatigue. Eyes: Negative for blurred vision. Respiratory: Negative for shortness of breath. Cardiovascular: Negative for chest pain, palpitations, orthopnea and PND. Neurological: Negative for dizziness and headaches. Psychiatric/Behavioral: Negative for confusion. Visit Vitals    BP (!) 191/101 (BP 1 Location: Left arm, BP Patient Position: Sitting)    Pulse 96    Temp 97.5 °F (36.4 °C)    Resp 18    Ht 5' 7\" (1.702 m)    Wt 155 lb (70.3 kg)    SpO2 99%    BMI 24.28 kg/m2     Physical Exam   Constitutional: She is oriented to person, place, and time and well-developed, well-nourished, and in no distress. HENT:   Head: Normocephalic and atraumatic. Right Ear: External ear normal.   Left Ear: External ear normal.   Mouth/Throat: Oropharynx is clear and moist.   Eyes: Conjunctivae and EOM are normal. Pupils are equal, round, and reactive to light. Right eye exhibits no discharge. Left eye exhibits no discharge. Neck: Normal range of motion. Neck supple. No tracheal deviation present. No thyromegaly present. Cardiovascular: Normal rate, regular rhythm, normal heart sounds and intact distal pulses.   Exam reveals no gallop and no friction rub. No murmur heard. Pulmonary/Chest: Effort normal and breath sounds normal. No respiratory distress. She has no wheezes. She exhibits no tenderness. Abdominal: Soft. Bowel sounds are normal. She exhibits no distension and no mass. There is no tenderness. There is no rebound and no guarding. Musculoskeletal: She exhibits no edema or tenderness. Lymphadenopathy:     She has no cervical adenopathy. Neurological: She is alert and oriented to person, place, and time. She has normal reflexes. No cranial nerve deficit. She exhibits normal muscle tone. Gait normal. Coordination normal. GCS score is 15. Skin: Skin is warm and dry. No rash noted. No erythema. No pallor. Psychiatric: Mood, memory, affect and judgment normal.         ICD-10-CM ICD-9-CM    1. Essential hypertension I10 401.9 lisinopril (PRINIVIL, ZESTRIL) 40 mg tablet   2. Vertigo R42 780.4 meclizine (ANTIVERT) 25 mg tablet    the patient was asked to  her ciprofloxacin and hydrochlorothiazide that had been called in earlier. Patient was told I would not switch her metformin to something else at this time. Patient was asked to continue her amlodipine and if she was having trouble with her allergies to continue the fluticasone    Orders Placed This Encounter    lisinopril (PRINIVIL, ZESTRIL) 40 mg tablet     Sig: Take 1 Tab by mouth daily. Indications: hypertension     Dispense:  30 Tab     Refill:  5    meclizine (ANTIVERT) 25 mg tablet     Sig: Take 1 Tab by mouth three (3) times daily as needed for up to 10 days.  Indications: VERTIGO     Dispense:  30 Tab     Refill:  0       Follow-up Disposition: Not on Deysi Dietz MD

## 2017-05-02 ENCOUNTER — OFFICE VISIT (OUTPATIENT)
Dept: FAMILY MEDICINE CLINIC | Age: 82
End: 2017-05-02

## 2017-05-02 VITALS
WEIGHT: 151.5 LBS | DIASTOLIC BLOOD PRESSURE: 87 MMHG | RESPIRATION RATE: 18 BRPM | BODY MASS INDEX: 23.78 KG/M2 | HEIGHT: 67 IN | HEART RATE: 76 BPM | SYSTOLIC BLOOD PRESSURE: 150 MMHG | OXYGEN SATURATION: 98 %

## 2017-05-02 DIAGNOSIS — I10 ESSENTIAL HYPERTENSION: ICD-10-CM

## 2017-05-02 DIAGNOSIS — E11.9 WELL CONTROLLED TYPE 2 DIABETES MELLITUS (HCC): Primary | ICD-10-CM

## 2017-05-02 DIAGNOSIS — M15.9 GENERALIZED OA: ICD-10-CM

## 2017-05-02 DIAGNOSIS — E78.00 ELEVATED CHOLESTEROL: ICD-10-CM

## 2017-05-02 NOTE — PROGRESS NOTES
HISTORY OF PRESENT ILLNESS  Lin Nicole is a 80 y.o. female. Hypertension    Pertinent negatives include no chest pain, no palpitations, no PND, no malaise/fatigue, no blurred vision, no headaches, no dizziness and no shortness of breath. Diabetes   Pertinent negatives include no chest pain, no abdominal pain, no headaches and no shortness of breath. Cholesterol Problem   Pertinent negatives include no chest pain, no abdominal pain, no headaches and no shortness of breath. She is here for a follow up visit. She is on Amlodipine and Lisinopril. She is not taking the HCTZ. BP has gotten much better since her last visit. No CP or SOB. DM- on Metformin. Does not check her blood sugars very often. Follows a diet fairly well. Hyperlipidemia- not on a statin at this time. OA- pain in back and knee- seeing Ortho    Review of Systems   Constitutional: Negative for fever and malaise/fatigue. Eyes: Negative for blurred vision. Respiratory: Negative for cough and shortness of breath. Cardiovascular: Negative for chest pain, palpitations and PND. Gastrointestinal: Negative for abdominal pain and heartburn. Musculoskeletal: Positive for back pain and joint pain. Neurological: Negative for dizziness and headaches. Past Medical History:   Diagnosis Date    Allergic rhinitis due to pollen     Change in bowel habits     Diabetes ([address]     Dysuria     Hemorrhoids     Hypertension     IBS (irritable bowel syndrome)     Other diseases of nasal cavity and sinuses      Past Surgical History:   Procedure Laterality Date    HX CHOLECYSTECTOMY  2016    HX COLONOSCOPY  2013?  HX HYSTERECTOMY       No Known Allergies  Blood pressure 150/87, pulse 76, resp. rate 18, height 5' 7\" (1.702 m), weight 151 lb 8 oz (68.7 kg), SpO2 98 %. Physical Exam   Constitutional: She is oriented to person, place, and time. She appears well-developed and well-nourished. No distress.    HENT:   Head: Normocephalic and atraumatic. Nose: Nose normal.   Mouth/Throat: Oropharynx is clear and moist.   Eyes: Conjunctivae are normal.   Neck: Neck supple. Cardiovascular: Normal rate, regular rhythm and normal heart sounds. Pulmonary/Chest: Effort normal and breath sounds normal. No respiratory distress. Abdominal: Soft. Musculoskeletal: She exhibits no edema. Lymphadenopathy:     She has no cervical adenopathy. Neurological: She is alert and oriented to person, place, and time. Skin: Skin is warm and dry. Psychiatric: She has a normal mood and affect. Nursing note and vitals reviewed.       ASSESSMENT and PLAN  DM   Check labs   Continue Metformin   HTN   Check labs   Continue meds   RTO 3 months  Hyperlipidemia   Low fat diet  OA   Follow up with ortho as directed

## 2017-05-02 NOTE — MR AVS SNAPSHOT
Visit Information Date & Time Provider Department Dept. Phone Encounter #  
 5/2/2017  5:00 PM Mary Martin MD [place] [address] Primary Care [phone] [id number] Upcoming Health Maintenance Date Due OSTEOPOROSIS SCREENING (DEXA) 6/30/2002 Pneumococcal 65+ Low/Medium Risk (2 of 2 - PPSV23) 12/19/2011 EYE EXAM RETINAL OR DILATED Q1 1/6/2018* HEMOGLOBIN A1C Q6M 7/13/2017 INFLUENZA AGE 9 TO ADULT 8/1/2017 FOOT EXAM Q1 10/10/2017 MEDICARE YEARLY EXAM 10/11/2017 MICROALBUMIN Q1 1/13/2018 LIPID PANEL Q1 1/13/2018 GLAUCOMA SCREENING Q2Y 1/6/2019 DTaP/Tdap/Td series (2 - Td) 10/10/2026 *Topic was postponed. The date shown is not the original due date. Allergies as of 5/2/2017  Review Complete On: 5/2/2017 By: Mary Martin MD  
 No Known Allergies Current Immunizations  Reviewed on 10/17/2014 Name Date Influenza High Dose Vaccine PF 10/10/2016  4:51 PM  
 Influenza Vaccine 10/17/2014, 12/31/2013 Pneumococcal Vaccine (Unspecified Type) 12/19/2006 Not reviewed this visit You Were Diagnosed With   
  
 Codes Comments Well controlled [facility] 2 [facility] mellitus ([facility]    -  Primary ICD-10-CM: E11.9 ICD-9-CM: 250.00 Essential hypertension     ICD-10-CM: I10 
ICD-9-CM: 401.9 Elevated cholesterol     ICD-10-CM: E78.00 ICD-9-CM: 272.0 Generalized OA     ICD-10-CM: M15.9 ICD-9-CM: 715.00 Vitals BP Pulse Resp Height(growth percentile) Weight(growth percentile) SpO2  
 150/87 76 18 5' 7\" (1.702 m) 151 lb 8 oz (68.7 kg) 98% BMI OB Status Smoking Status 23.73 kg/m2 Hysterectomy Current Every Day Smoker Vitals History BMI and BSA Data Body Mass Index Body Surface Area  
 23.73 kg/m 2 1.8 m 2 Preferred Pharmacy [facility] Phone [id number] [address] [place] [phone] Your Updated Medication List  
  
   
 This list is accurate as of: 5/2/17  5:21 PM.  Always use your most recent med list. amLODIPine 10 mg tablet Commonly known as:  Corinne Lees TAKE 1 TABLET BY MOUTH ONCE A DAY  Indications: Hypertension  
  
 fluticasone 50 mcg/actuation nasal spray Commonly known as:  FLONASE  
1 spray each nostril twice daily for allergies  
  
 lisinopril 40 mg tablet Commonly known as:  Priscila Constantin Take 1 Tab by mouth daily. Indications: hypertension  
  
 metFORMIN 500 mg tablet Commonly known as:  GLUCOPHAGE Take 1 Tab by mouth two (2) times daily (with meals). TAKE 1 TABLET BY MOUTH EVERY DAY WITH BREAKFAST We Performed the Following CBC WITH AUTOMATED DIFF [78326 CPT(R)] COLLECTION VENOUS BLOOD,VENIPUNCTURE [id number] CPT(R)] HEMOGLOBIN A1C WITH EAG [41473 CPT(R)] METABOLIC PANEL, COMPREHENSIVE [32447 CPT(R)] Introducing [facility] & HEALTH SERVICES! Sayra Elliott introduces [email or web address] patient portal. Now you can access parts of your medical record, email your doctor's office, and request medication refills online. 1. In your internet browser, go to [email or web address] 2. Click on the First Time User? Click Here link in the Sign In box. You will see the New Member Sign Up page. 3. Enter your [email or web address] Access Code exactly as it appears below. You will not need to use this code after youve completed the sign-up process. If you do not sign up before the expiration date, you must request a new code. · [email or web address] Access Code: [email or web address] Expires: 7/31/2017  5:21 PM 
 
4. Enter the last four digits of your Social Security Number (xxxx) and Date of Birth (mm/dd/yyyy) as indicated and click Submit. You will be taken to the next sign-up page. 5. Create a Iunikat ID. This will be your [email or web address] login ID and cannot be changed, so think of one that is secure and easy to remember. 6. Create a Iunikat password. You can change your password at any time. 7. Enter your Password Reset Question and Answer. This can be used at a later time if you forget your password. 8. Enter your e-mail address. You will receive e-mail notification when new information is available in [address] 9. Click Sign Up. You can now view and download portions of your medical record. 10. Click the Download Summary menu link to download a portable copy of your medical information. If you have questions, please visit the Frequently Asked Questions section of the [email or web address] website. Remember, [email or web address].fm is NOT to be used for urgent needs. For medical emergencies, dial 911. Now available from your iPhone and Android! Please provide this summary of care documentation to your next provider. Your primary care clinician is listed as José Antonio Wiley. If you have any questions after today's visit, please call [phone].

## 2017-05-03 LAB
ALBUMIN SERPL-MCNC: 4.1 G/DL (ref 3.5–4.8)
ALBUMIN/GLOB SERPL: 1.4 {RATIO} (ref 1.2–2.2)
ALP SERPL-CCNC: 70 IU/L (ref 39–117)
ALT SERPL-CCNC: 9 IU/L (ref 0–32)
AST SERPL-CCNC: 14 IU/L (ref 0–40)
BASOPHILS # BLD AUTO: 0 X10E3/UL (ref 0–0.2)
BASOPHILS NFR BLD AUTO: 0 %
BILIRUB SERPL-MCNC: 0.5 MG/DL (ref 0–1.2)
BUN SERPL-MCNC: 16 MG/DL (ref 8–27)
BUN/CREAT SERPL: 16 (ref 12–28)
CALCIUM SERPL-MCNC: 9.1 MG/DL (ref 8.7–10.3)
CHLORIDE SERPL-SCNC: 101 MMOL/L (ref 96–106)
CO2 SERPL-SCNC: 27 MMOL/L (ref 18–29)
CREAT SERPL-MCNC: 1 MG/DL (ref 0.57–1)
EOSINOPHIL # BLD AUTO: 0.1 X10E3/UL (ref 0–0.4)
EOSINOPHIL NFR BLD AUTO: 1 %
ERYTHROCYTE [DISTWIDTH] IN BLOOD BY AUTOMATED COUNT: 14.4 % (ref 12.3–15.4)
EST. AVERAGE GLUCOSE BLD GHB EST-MCNC: 146 MG/DL
GLOBULIN SER CALC-MCNC: 2.9 G/DL (ref 1.5–4.5)
GLUCOSE SERPL-MCNC: 121 MG/DL (ref 65–99)
HBA1C MFR BLD: 6.7 % (ref 4.8–5.6)
HCT VFR BLD AUTO: 40.5 % (ref 34–46.6)
HGB BLD-MCNC: 12.9 G/DL (ref 11.1–15.9)
IMM GRANULOCYTES # BLD: 0 X10E3/UL (ref 0–0.1)
IMM GRANULOCYTES NFR BLD: 0 %
INTERPRETATION: NORMAL
LYMPHOCYTES # BLD AUTO: 2.4 X10E3/UL (ref 0.7–3.1)
LYMPHOCYTES NFR BLD AUTO: 33 %
MCH RBC QN AUTO: 29.7 PG (ref 26.6–33)
MCHC RBC AUTO-ENTMCNC: 31.9 G/DL (ref 31.5–35.7)
MCV RBC AUTO: 93 FL (ref 79–97)
MONOCYTES # BLD AUTO: 0.5 X10E3/UL (ref 0.1–0.9)
MONOCYTES NFR BLD AUTO: 7 %
NEUTROPHILS # BLD AUTO: 4.2 X10E3/UL (ref 1.4–7)
NEUTROPHILS NFR BLD AUTO: 59 %
PLATELET # BLD AUTO: 370 X10E3/UL (ref 150–379)
POTASSIUM SERPL-SCNC: 3.9 MMOL/L (ref 3.5–5.2)
PROT SERPL-MCNC: 7 G/DL (ref 6–8.5)
RBC # BLD AUTO: 4.34 X10E6/UL (ref 3.77–5.28)
SODIUM SERPL-SCNC: 144 MMOL/L (ref 134–144)
WBC # BLD AUTO: 7.2 X10E3/UL (ref 3.4–10.8)

## 2017-07-11 ENCOUNTER — OFFICE VISIT (OUTPATIENT)
Dept: FAMILY MEDICINE CLINIC | Age: 82
End: 2017-07-11

## 2017-07-11 VITALS
OXYGEN SATURATION: 97 % | RESPIRATION RATE: 18 BRPM | HEART RATE: 104 BPM | SYSTOLIC BLOOD PRESSURE: 139 MMHG | BODY MASS INDEX: 22.91 KG/M2 | WEIGHT: 146 LBS | TEMPERATURE: 97 F | HEIGHT: 67 IN | DIASTOLIC BLOOD PRESSURE: 80 MMHG

## 2017-07-11 DIAGNOSIS — I10 ESSENTIAL HYPERTENSION: ICD-10-CM

## 2017-07-11 DIAGNOSIS — R35.0 FREQUENT URINATION: Primary | ICD-10-CM

## 2017-07-11 LAB
BILIRUB UR QL STRIP: NEGATIVE
GLUCOSE UR-MCNC: NEGATIVE MG/DL
KETONES P FAST UR STRIP-MCNC: NEGATIVE MG/DL
PH UR STRIP: 5.5 [PH] (ref 4.6–8)
PROT UR QL STRIP: NEGATIVE MG/DL
SP GR UR STRIP: 1.01 (ref 1–1.03)
UA UROBILINOGEN AMB POC: NORMAL (ref 0.2–1)
URINALYSIS CLARITY POC: NORMAL
URINALYSIS COLOR POC: YELLOW
URINE BLOOD POC: NEGATIVE
URINE LEUKOCYTES POC: NEGATIVE
URINE NITRITES POC: NEGATIVE

## 2017-07-11 RX ORDER — AMLODIPINE BESYLATE 5 MG/1
TABLET ORAL
Qty: 30 TAB | Refills: 5 | Status: SHIPPED | OUTPATIENT
Start: 2017-07-11 | End: 2017-09-05 | Stop reason: SDUPTHER

## 2017-07-11 RX ORDER — HYDROCHLOROTHIAZIDE 25 MG/1
25 TABLET ORAL DAILY
COMMUNITY
End: 2017-08-28 | Stop reason: ALTCHOICE

## 2017-07-11 RX ORDER — CETIRIZINE HCL 10 MG
TABLET ORAL
COMMUNITY
End: 2017-08-28

## 2017-07-11 NOTE — MR AVS SNAPSHOT
Visit Information Date & Time Provider Department Dept. Phone Encounter #  
 7/11/2017  9:40 AM Rhys Whipple MD [facility] [id number] Your Appointments 7/24/2017  9:20 AM  
Follow Up with Rhys Whipple MD  
[place] [facility]) Appt Note: 3 month f/u; 3 month f/u r/s $1CP$0PB yd 07/03/17; 3 month f/u r/s $1CP$0PB yd 07/03/17  
 [address] [place] [address] [phone]  
  
   
 [address] [place] [address] [phone] Upcoming Health Maintenance Date Due OSTEOPOROSIS SCREENING (DEXA) 6/30/2002 Pneumococcal 65+ Low/Medium Risk (2 of 2 - PPSV23) 12/19/2011 EYE EXAM RETINAL OR DILATED Q1 1/6/2018* INFLUENZA AGE 9 TO ADULT 8/1/2017 FOOT EXAM Q1 10/10/2017 MEDICARE YEARLY EXAM 10/11/2017 HEMOGLOBIN A1C Q6M 11/2/2017 MICROALBUMIN Q1 1/13/2018 LIPID PANEL Q1 1/13/2018 GLAUCOMA SCREENING Q2Y 1/6/2019 DTaP/Tdap/Td series (2 - Td) 10/10/2026 *Topic was postponed. The date shown is not the original due date. Allergies as of 7/11/2017  Review Complete On: 7/11/2017 By: Rhys Whipple MD  
 No Known Allergies Current Immunizations  Reviewed on 10/17/2014 Name Date Influenza High Dose Vaccine PF 10/10/2016  4:51 PM  
 Influenza Vaccine 10/17/2014, 12/31/2013 Pneumococcal Vaccine (Unspecified Type) 12/19/2006 Not reviewed this visit You Were Diagnosed With   
  
 Codes Comments Frequent urination    -  Primary ICD-10-CM: R35.0 ICD-9-CM: 788.41 Essential hypertension     ICD-10-CM: I10 
ICD-9-CM: 401.9 Vitals BP Pulse Temp Resp Height(growth percentile) Weight(growth percentile) 139/80 (!) 104 97 °F (36.1 °C) (Oral) 18 5' 7\" (1.702 m) 146 lb (66.2 kg) SpO2 BMI OB Status Smoking Status 97% 22.87 kg/m2 Hysterectomy Current Every Day Smoker Vitals History BMI and BSA Data Body Mass Index Body Surface Area  
 22.87 kg/m 2 1.77 m 2 Preferred Pharmacy Pharmacy Name Phone [id number] [address] [place] [phone] Your Updated Medication List  
  
   
This list is accurate as of: 7/11/17 11:19 AM.  Always use your most recent med list. amLODIPine 5 mg tablet Commonly known as:  Remonia Grates TAKE 1 TABLET BY MOUTH ONCE A DAY  Indications: hypertension  
  
 hydroCHLOROthiazide 25 mg tablet Commonly known as:  HYDRODIURIL Take 25 mg by mouth daily. metFORMIN 500 mg tablet Commonly known as:  GLUCOPHAGE Take 1 Tab by mouth two (2) times daily (with meals). TAKE 1 TABLET BY MOUTH EVERY DAY WITH BREAKFAST ZyrTEC 10 mg tablet Generic drug:  cetirizine Take  by mouth. Prescriptions Sent to Pharmacy Refills  
 amLODIPine (NORVASC) 5 mg tablet 5 Sig: TAKE 1 TABLET BY MOUTH ONCE A DAY  Indications: hypertension Class: Normal  
 Pharmacy: [id number] [place] Ph #: [phone] We Performed the Following AMB POC URINALYSIS DIP STICK AUTO W/O MICRO [06671 CPT(R)] Comments: AMB POC URINALYSIS DIP STICK AUTO W/O Patient Instructions Stop Hydrochlorathiazide Start Amlodipine 5mg daily for blood pressure Return to the office in 4 weeks for blood pressure check Introducing [facility] & HEALTH SERVICES! [facility] introduces Singulex patient portal. Now you can access parts of your medical record, email your doctor's office, and request medication refills online. 1. In your internet browser, go to [email or web address] 2. Click on the First Time User? Click Here link in the Sign In box. You will see the New Member Sign Up page. 3. Enter your Singulex Access Code exactly as it appears below. You will not need to use this code after youve completed the sign-up process.  If you do not sign up before the expiration date, you must request a new code. · [employer] Access Code: [email or web address] Expires: 7/31/2017  5:21 PM 
 
4. Enter the last four digits of your Social Security Number (xxxx) and Date of Birth (mm/dd/yyyy) as indicated and click Submit. You will be taken to the next sign-up page. 5. Create a [employer] ID. This will be your [employer] login ID and cannot be changed, so think of one that is secure and easy to remember. 6. Create a [employer] password. You can change your password at any time. 7. Enter your Password Reset Question and Answer. This can be used at a later time if you forget your password. 8. Enter your e-mail address. You will receive e-mail notification when new information is available in [address] 9. Click Sign Up. You can now view and download portions of your medical record. 10. Click the Download Summary menu link to download a portable copy of your medical information. If you have questions, please visit the Frequently Asked Questions section of the [employer] website. Remember, [employer] is NOT to be used for urgent needs. For medical emergencies, dial 911. Now available from your iPhone and Android! Please provide this summary of care documentation to your next provider. Your primary care clinician is listed as Alden Mcfadden. If you have any questions after today's visit, please call [phone].

## 2017-07-11 NOTE — PATIENT INSTRUCTIONS
Stop Hydrochlorathiazide    Start Amlodipine 5mg daily for blood pressure    Return to the office in 4 weeks for blood pressure check

## 2017-07-11 NOTE — PROGRESS NOTES
HISTORY OF PRESENT ILLNESS  Renetta Fay is a [de-identified] y.o. female. HPI  She is here with c/o urinary frequency. Started about a month ago. At times will urinate every 30-60 minutes or so. No dysuria but feels some pressure after passing her urine. Getting up 6-7 times at night to urinate. She is on HCTZ. Is not taking Lisinopril or Amlodipine. Was prescribed Clonidine but did not take this. Blood sugars are running pretty good. Review of Systems   Constitutional: Negative for fever. Respiratory: Negative for cough and shortness of breath. Cardiovascular: Negative for chest pain and palpitations. Gastrointestinal: Negative for abdominal pain. Genitourinary: Positive for dysuria and frequency. Neurological: Negative for headaches. Past Medical History:   Diagnosis Date    Allergic rhinitis due to pollen     Change in bowel habits     Diabetes ([address]     Dysuria     Hemorrhoids     Hypertension     IBS (irritable bowel syndrome)     Other diseases of nasal cavity and sinuses      Past Surgical History:   Procedure Laterality Date    HX CHOLECYSTECTOMY  2016    HX COLONOSCOPY  2013?  HX HYSTERECTOMY       No Known Allergies  Blood pressure 139/80, pulse (!) 104, temperature 97 °F (36.1 °C), temperature source Oral, resp. rate 18, height 5' 7\" (1.702 m), weight 146 lb (66.2 kg), SpO2 97 %. Physical Exam   Constitutional: She is oriented to person, place, and time. She appears well-developed and well-nourished. No distress. HENT:   Head: Normocephalic and atraumatic. Mouth/Throat: Oropharynx is clear and moist.   Neck: Neck supple. Cardiovascular: Normal rate, regular rhythm and normal heart sounds. Pulmonary/Chest: Effort normal and breath sounds normal. No respiratory distress. Abdominal: Soft. Musculoskeletal: She exhibits no edema. Lymphadenopathy:     She has no cervical adenopathy. Neurological: She is alert and oriented to person, place, and time.    Skin: Skin is warm.   Psychiatric: She has a normal mood and affect. Nursing note and vitals reviewed.     UA negative  ASSESSMENT and PLAN  Urinary Frequency   Stop HCTZ   Check UA and culture= will treat if positive   Call if no improvement  HTN   Start Amlodipine 5mg daily   RTO 4 weeks for BP check

## 2017-07-24 ENCOUNTER — OFFICE VISIT (OUTPATIENT)
Dept: FAMILY MEDICINE CLINIC | Age: 82
End: 2017-07-24

## 2017-07-24 VITALS
SYSTOLIC BLOOD PRESSURE: 120 MMHG | BODY MASS INDEX: 24.17 KG/M2 | RESPIRATION RATE: 18 BRPM | HEART RATE: 70 BPM | HEIGHT: 67 IN | DIASTOLIC BLOOD PRESSURE: 70 MMHG | WEIGHT: 154 LBS | OXYGEN SATURATION: 99 %

## 2017-07-24 DIAGNOSIS — E78.00 ELEVATED CHOLESTEROL: ICD-10-CM

## 2017-07-24 DIAGNOSIS — I10 ESSENTIAL HYPERTENSION: ICD-10-CM

## 2017-07-24 DIAGNOSIS — E11.9 WELL CONTROLLED TYPE 2 DIABETES MELLITUS (HCC): Primary | ICD-10-CM

## 2017-07-24 RX ORDER — ETODOLAC 400 MG/1
TABLET, FILM COATED ORAL
COMMUNITY
End: 2017-09-05

## 2017-07-24 NOTE — PROGRESS NOTES
HISTORY OF PRESENT ILLNESS  Miquel Purcell is a [de-identified] y.o. female. Hypertension    Pertinent negatives include no chest pain, no palpitations, no PND, no malaise/fatigue, no blurred vision, no headaches, no dizziness and no shortness of breath. Diabetes   Pertinent negatives include no chest pain, no abdominal pain, no headaches and no shortness of breath. Cholesterol Problem   Pertinent negatives include no chest pain, no abdominal pain, no headaches and no shortness of breath. She is here for a follow up visit. She is on Amlodipine and Lisinopril. She is not taking the HCTZ. Maida Modi No CP or SOB. Increased stress on the way here. DM- on Metformin. Does not check her blood sugars very often. Follows a diet fairly well. Hyperlipidemia- not on a statin at this time. OA- pain in back and knee- seeing Ortho    Review of Systems   Constitutional: Negative for fever and malaise/fatigue. Eyes: Negative for blurred vision. Respiratory: Negative for cough and shortness of breath. Cardiovascular: Negative for chest pain, palpitations and PND. Gastrointestinal: Negative for abdominal pain and heartburn. Musculoskeletal: Positive for back pain and joint pain. Neurological: Negative for dizziness and headaches. Past Medical History:   Diagnosis Date    Allergic rhinitis due to pollen     Change in bowel habits     Diabetes ([address]     Dysuria     Hemorrhoids     Hypertension     IBS (irritable bowel syndrome)     Other diseases of nasal cavity and sinuses      Past Surgical History:   Procedure Laterality Date    HX CHOLECYSTECTOMY  2016    HX COLONOSCOPY  2013?  HX HYSTERECTOMY       No Known Allergies     Blood pressure 120/70, pulse 70, resp. rate 18, height 5' 7\" (1.702 m), weight 154 lb (69.9 kg), SpO2 99 %. Physical Exam   Constitutional: She is oriented to person, place, and time. She appears well-developed and well-nourished. No distress.    HENT:   Head: Normocephalic and atraumatic. Nose: Nose normal.   Mouth/Throat: Oropharynx is clear and moist.   Eyes: Conjunctivae are normal.   Neck: Neck supple. Cardiovascular: Normal rate, regular rhythm and normal heart sounds. Pulmonary/Chest: Effort normal and breath sounds normal. No respiratory distress. Abdominal: Soft. Musculoskeletal: She exhibits no edema. Lymphadenopathy:     She has no cervical adenopathy. Neurological: She is alert and oriented to person, place, and time. Skin: Skin is warm and dry. Psychiatric: She has a normal mood and affect. Nursing note and vitals reviewed.       ASSESSMENT and PLAN  DM   Check labs- future orders placed   Continue Metformin    Monitor BS at home  HTN   Check labs- future orders placed   Continue meds   RTO 3 months  Hyperlipidemia   Low fat diet   Check lipids- future orders placed

## 2017-07-24 NOTE — MR AVS SNAPSHOT
Visit Information Date & Time Provider Department Dept. Phone Encounter #  
 7/24/2017  9:20 AM Rosalva Cisse MD [facility] [phone] [id number] Follow-up Instructions Return in about 4 months (around 11/24/2017). Upcoming Health Maintenance Date Due OSTEOPOROSIS SCREENING (DEXA) 6/30/2002 Pneumococcal 65+ Low/Medium Risk (2 of 2 - PPSV23) 12/19/2011 EYE EXAM RETINAL OR DILATED Q1 1/6/2018* INFLUENZA AGE 9 TO ADULT 8/1/2017 FOOT EXAM Q1 10/10/2017 MEDICARE YEARLY EXAM 10/11/2017 HEMOGLOBIN A1C Q6M 11/2/2017 MICROALBUMIN Q1 1/13/2018 LIPID PANEL Q1 1/13/2018 GLAUCOMA SCREENING Q2Y 1/6/2019 DTaP/Tdap/Td series (2 - Td) 10/10/2026 *Topic was postponed. The date shown is not the original due date. Allergies as of 7/24/2017  Review Complete On: 7/24/2017 By: Rosalva Cisse MD  
 No Known Allergies Current Immunizations  Reviewed on 10/17/2014 Name Date Influenza High Dose Vaccine PF 10/10/2016  4:51 PM  
 Influenza Vaccine 10/17/2014, 12/31/2013 Pneumococcal Vaccine (Unspecified Type) 12/19/2006 Not reviewed this visit You Were Diagnosed With   
  
 Codes Comments Well controlled type 2 diabetes mellitus ([facility]    -  Primary ICD-10-CM: E11.9 ICD-9-CM: 250.00 Essential hypertension     ICD-10-CM: I10 
ICD-9-CM: 401.9 Elevated cholesterol     ICD-10-CM: E78.00 ICD-9-CM: 272.0 Vitals BP Pulse Resp Height(growth percentile) Weight(growth percentile) SpO2  
 120/70 70 18 5' 7\" (1.702 m) 154 lb (69.9 kg) 99% BMI OB Status Smoking Status 24.12 kg/m2 Hysterectomy Current Every Day Smoker Vitals History BMI and BSA Data Body Mass Index Body Surface Area  
 24.12 kg/m 2 1.82 m 2 Preferred Pharmacy [id number] [place] [phone] Your Updated Medication List  
  
   
This list is accurate as of: 7/24/17  9:52 AM.  Always use your most recent med list. amLODIPine 5 mg tablet Commonly known as:  Unknown [place] TAKE 1 TABLET BY MOUTH ONCE A DAY  Indications: hypertension  
  
 etodolac 400 mg tablet Commonly known as:  LODINE Take  by mouth Before breakfast, lunch, and dinner. hydroCHLOROthiazide 25 mg tablet Commonly known as:  HYDRODIURIL Take 25 mg by mouth daily. metFORMIN 500 mg tablet Commonly known as:  GLUCOPHAGE Take 1 Tab by mouth two (2) times daily (with meals). TAKE 1 TABLET BY MOUTH EVERY DAY WITH BREAKFAST ZyrTEC 10 mg tablet Generic drug:  cetirizine Take  by mouth. Follow-up Instructions Return in about 4 months (around 11/24/2017). To-Do List   
 08/02/2017 Lab:  HEMOGLOBIN A1C WITH EAG   
  
 08/02/2017 Lab:  LIPID PANEL   
  
 08/02/2017 Lab:  METABOLIC PANEL, COMPREHENSIVE   
  
 08/02/2017 Lab:  TSH 3RD GENERATION Introducing [facility] & [facility] SERVICES! Brigid Jean introduces LocaMap patient portal. Now you can access parts of your medical record, email your doctor's office, and request medication refills online. 1. In your internet browser, go to [email or web address] 2. Click on the First Time User? Click Here link in the Sign In box. You will see the New Member Sign Up page. 3. Enter your LocaMap Access Code exactly as it appears below. You will not need to use this code after youve completed the sign-up process. If you do not sign up before the expiration date, you must request a new code. · LocaMap Access Code: [email or web address] Expires: 7/31/2017  5:21 PM 
 
4. Enter the last four digits of your Social Security Number (xxxx) and Date of Birth (mm/dd/yyyy) as indicated and click Submit. You will be taken to the next sign-up page. 5. Create a LocaMap ID.  This will be your LocaMap login ID and cannot be changed, so think of one that is secure and easy to remember. 6. Create a CannaBuild password. You can change your password at any time. 7. Enter your Password Reset Question and Answer. This can be used at a later time if you forget your password. 8. Enter your e-mail address. You will receive e-mail notification when new information is available in [address] 9. Click Sign Up. You can now view and download portions of your medical record. 10. Click the Download Summary menu link to download a portable copy of your medical information. If you have questions, please visit the Frequently Asked Questions section of the CannaBuild website. Remember, CannaBuild is NOT to be used for urgent needs. For medical emergencies, dial 911. Now available from your iPhone and Android! Please provide this summary of care documentation to your next provider. Your primary care clinician is listed as Padmini Luong. If you have any questions after today's visit, please call [phone].

## 2017-08-14 ENCOUNTER — LAB ONLY (OUTPATIENT)
Dept: FAMILY MEDICINE CLINIC | Age: 82
End: 2017-08-14

## 2017-08-14 DIAGNOSIS — E78.00 ELEVATED CHOLESTEROL: ICD-10-CM

## 2017-08-14 DIAGNOSIS — I10 ESSENTIAL HYPERTENSION: ICD-10-CM

## 2017-08-14 DIAGNOSIS — E11.9 WELL CONTROLLED TYPE 2 DIABETES MELLITUS (HCC): ICD-10-CM

## 2017-08-15 LAB
ALBUMIN SERPL-MCNC: 4.2 G/DL (ref 3.5–4.7)
ALBUMIN/GLOB SERPL: 1.4 {RATIO} (ref 1.2–2.2)
ALP SERPL-CCNC: 84 IU/L (ref 39–117)
ALT SERPL-CCNC: 7 IU/L (ref 0–32)
AST SERPL-CCNC: 16 IU/L (ref 0–40)
BILIRUB SERPL-MCNC: 0.4 MG/DL (ref 0–1.2)
BUN SERPL-MCNC: 17 MG/DL (ref 8–27)
BUN/CREAT SERPL: 16 (ref 12–28)
CALCIUM SERPL-MCNC: 9.3 MG/DL (ref 8.7–10.3)
CHLORIDE SERPL-SCNC: 102 MMOL/L (ref 96–106)
CHOLEST SERPL-MCNC: 195 MG/DL (ref 100–199)
CO2 SERPL-SCNC: 28 MMOL/L (ref 18–29)
CREAT SERPL-MCNC: 1.07 MG/DL (ref 0.57–1)
EST. AVERAGE GLUCOSE BLD GHB EST-MCNC: 148 MG/DL
GLOBULIN SER CALC-MCNC: 3 G/DL (ref 1.5–4.5)
GLUCOSE SERPL-MCNC: 115 MG/DL (ref 65–99)
HBA1C MFR BLD: 6.8 % (ref 4.8–5.6)
HDLC SERPL-MCNC: 81 MG/DL
INTERPRETATION, 910389: NORMAL
INTERPRETATION: NORMAL
LDLC SERPL CALC-MCNC: 102 MG/DL (ref 0–99)
PDF IMAGE, 910387: NORMAL
POTASSIUM SERPL-SCNC: 4 MMOL/L (ref 3.5–5.2)
PROT SERPL-MCNC: 7.2 G/DL (ref 6–8.5)
SODIUM SERPL-SCNC: 145 MMOL/L (ref 134–144)
TRIGL SERPL-MCNC: 61 MG/DL (ref 0–149)
TSH SERPL DL<=0.005 MIU/L-ACNC: 0.89 UIU/ML (ref 0.45–4.5)
VLDLC SERPL CALC-MCNC: 12 MG/DL (ref 5–40)

## 2017-08-25 ENCOUNTER — OFFICE VISIT (OUTPATIENT)
Dept: FAMILY MEDICINE CLINIC | Age: 82
End: 2017-08-25

## 2017-08-25 VITALS
HEART RATE: 74 BPM | BODY MASS INDEX: 22.82 KG/M2 | SYSTOLIC BLOOD PRESSURE: 154 MMHG | WEIGHT: 145.4 LBS | DIASTOLIC BLOOD PRESSURE: 86 MMHG | TEMPERATURE: 98 F | OXYGEN SATURATION: 97 % | HEIGHT: 67 IN | RESPIRATION RATE: 18 BRPM

## 2017-08-25 DIAGNOSIS — E11.9 TYPE 2 DIABETES MELLITUS WITHOUT COMPLICATION, WITHOUT LONG-TERM CURRENT USE OF INSULIN (HCC): ICD-10-CM

## 2017-08-25 RX ORDER — METFORMIN HYDROCHLORIDE 500 MG/1
500 TABLET ORAL 2 TIMES DAILY WITH MEALS
Qty: 60 TAB | Refills: 5 | Status: CANCELLED | OUTPATIENT
Start: 2017-08-25

## 2017-08-28 ENCOUNTER — OFFICE VISIT (OUTPATIENT)
Dept: FAMILY MEDICINE CLINIC | Age: 82
End: 2017-08-28

## 2017-08-28 VITALS
BODY MASS INDEX: 24.03 KG/M2 | HEIGHT: 66 IN | TEMPERATURE: 97.2 F | SYSTOLIC BLOOD PRESSURE: 145 MMHG | RESPIRATION RATE: 18 BRPM | OXYGEN SATURATION: 98 % | HEART RATE: 82 BPM | DIASTOLIC BLOOD PRESSURE: 84 MMHG | WEIGHT: 149.5 LBS

## 2017-08-28 DIAGNOSIS — E11.9 WELL CONTROLLED TYPE 2 DIABETES MELLITUS (HCC): ICD-10-CM

## 2017-08-28 DIAGNOSIS — I10 ESSENTIAL HYPERTENSION: ICD-10-CM

## 2017-08-28 DIAGNOSIS — J01.00 ACUTE MAXILLARY SINUSITIS, RECURRENCE NOT SPECIFIED: Primary | ICD-10-CM

## 2017-08-28 LAB — GLUCOSE POC: 143 MG/DL

## 2017-08-28 RX ORDER — AMOXICILLIN AND CLAVULANATE POTASSIUM 875; 125 MG/1; MG/1
1 TABLET, FILM COATED ORAL 2 TIMES DAILY
Qty: 20 TAB | Refills: 0
Start: 2017-08-28 | End: 2017-10-28

## 2017-08-28 RX ORDER — HYDROCHLOROTHIAZIDE 25 MG/1
25 TABLET ORAL DAILY
Qty: 90 TAB | Refills: 0 | Status: SHIPPED | OUTPATIENT
Start: 2017-08-28 | End: 2017-09-05 | Stop reason: SDUPTHER

## 2017-08-28 NOTE — MR AVS SNAPSHOT
Visit Information Date & Time Provider Department Dept. Phone Encounter #  
 8/28/2017  6:00 PM Kayy Sanchez MD [email or web address] [phone] [id number] Follow-up Instructions Return if symptoms worsen or fail to improve. Upcoming Health Maintenance Date Due OSTEOPOROSIS SCREENING (DEXA) 6/30/2002 Pneumococcal 65+ Low/Medium Risk (2 of 2 - PPSV23) 12/19/2011 INFLUENZA AGE 9 TO ADULT 10/31/2017* EYE EXAM RETINAL OR DILATED Q1 1/6/2018* FOOT EXAM Q1 10/10/2017 MEDICARE YEARLY EXAM 10/11/2017 MICROALBUMIN Q1 1/13/2018 HEMOGLOBIN A1C Q6M 2/14/2018 LIPID PANEL Q1 8/14/2018 GLAUCOMA SCREENING Q2Y 1/6/2019 DTaP/Tdap/Td series (2 - Td) 10/10/2026 *Topic was postponed. The date shown is not the original due date. Allergies as of 8/28/2017  Review Complete On: 8/28/2017 By: Kayy Sanchez MD  
 No Known Allergies Current Immunizations  Reviewed on 10/17/2014 Name Date Influenza High Dose Vaccine PF 10/10/2016  4:51 PM  
 Influenza Vaccine 10/17/2014, 12/31/2013 Pneumococcal Vaccine (Unspecified Type) 12/19/2006 Not reviewed this visit You Were Diagnosed With   
  
 Codes Comments Acute maxillary sinusitis, recurrence not specified    -  Primary ICD-10-CM: J01.00 ICD-9-CM: 461.0 Essential hypertension     ICD-10-CM: I10 
ICD-9-CM: 401.9 Well controlled type 2 diabetes mellitus ([facility]     ICD-10-CM: E11.9 ICD-9-CM: 250.00 Vitals BP Pulse Temp Resp Height(growth percentile) Weight(growth percentile) 145/84 (BP 1 Location: Right arm) 82 97.2 °F (36.2 °C) (Oral) 18 5' 6\" (1.676 m) 149 lb 8 oz (67.8 kg) SpO2 BMI OB Status Smoking Status 98% 24.13 kg/m2 Hysterectomy Current Every Day Smoker Vitals History BMI and BSA Data Body Mass Index Body Surface Area  
 24.13 kg/m 2 1.78 m 2 Preferred Pharmacy Pharmacy Name Phone [facility]/PHARMACY #[id number], [address] [place] [phone] Your Updated Medication List  
  
   
This list is accurate as of: 8/28/17  6:47 PM.  Always use your most recent med list. amLODIPine 5 mg tablet Commonly known as:  Harlon Doyne TAKE 1 TABLET BY MOUTH ONCE A DAY  Indications: hypertension  
  
 amoxicillin-clavulanate 875-125 mg per tablet Commonly known as:  AUGMENTIN Take 1 Tab by mouth two (2) times a day. etodolac 400 mg tablet Commonly known as:  LODINE Take  by mouth Before breakfast, lunch, and dinner. hydroCHLOROthiazide 25 mg tablet Commonly known as:  HYDRODIURIL Take 1 Tab by mouth daily. loratadine-pseudoephedrine  mg per tablet Commonly known as:  LORATADINE-D Take 1 Tab by mouth daily. Prescriptions Sent to Pharmacy Refills  
 hydroCHLOROthiazide (HYDRODIURIL) 25 mg tablet 0 Sig: Take 1 Tab by mouth daily. Class: Normal  
 Pharmacy: [facility]/pharmacy #[id number] [address]den Espinosa, [address] [facility] [place] Ph #: [phone] Route: Oral  
  
We Performed the Following AMB POC GLUCOSE BLOOD, BY GLUCOSE MONITORING DEVICE [79315 CPT(R)] Follow-up Instructions Return if symptoms worsen or fail to improve. Introducing [facility] & HEALTH SERVICES! Margarita Wing introduces [employer] patient portal. Now you can access parts of your medical record, email your doctor's office, and request medication refills online. 1. In your internet browser, go to [email or web address] "[email or web address]grian [email or web address] 2. Click on the First Time User? Click Here link in the Sign In box. You will see the New Member Sign Up page. 3. Enter your [employer] Access Code exactly as it appears below. You will not need to use this code after youve completed the sign-up process. If you do not sign up before the expiration date, you must request a new code.  
 
· [employer] Access Code: [email or web address] 
 Expires: 11/26/2017  8:45 AM 
 
4. Enter the last four digits of your Social Security Number (xxxx) and Date of Birth (mm/dd/yyyy) as indicated and click Submit. You will be taken to the next sign-up page. 5. Create a uberlife ID. This will be your uberlife login ID and cannot be changed, so think of one that is secure and easy to remember. 6. Create a uberlife password. You can change your password at any time. 7. Enter your Password Reset Question and Answer. This can be used at a later time if you forget your password. 8. Enter your e-mail address. You will receive e-mail notification when new information is available in [address] 9. Click Sign Up. You can now view and download portions of your medical record. 10. Click the Download Summary menu link to download a portable copy of your medical information. If you have questions, please visit the Frequently Asked Questions section of the uberlife website. Remember, [employer] is NOT to be used for urgent needs. For medical emergencies, dial 911. Now available from your iPhone and Android! Please provide this summary of care documentation to your next provider. Your primary care clinician is listed as Genevieve Felipe. If you have any questions after today's visit, please call [phone].

## 2017-08-28 NOTE — PROGRESS NOTES
HISTORY OF PRESENT ILLNESS  Yo Conrad is a [de-identified] y.o. female. HPI  She is here with c/o sinus drainage and pain. Started a couple of weeks ago but worsened over the weekend. No fever. Non productive cough. She was seen in the ER this morning as she did not want to wait for her appt this evening. Prescribed Augmentin and Claritin D. She would like to have her blood sugar checked this evening. Doing well with her diet. HTN- has not been taking her HCTZ. Has only been taking Amlodipine. Review of Systems   Constitutional: Positive for malaise/fatigue. Negative for fever. HENT: Positive for congestion, sinus pain and sore throat. Eyes: Negative for blurred vision. Respiratory: Positive for cough. Negative for shortness of breath. Cardiovascular: Negative for chest pain, palpitations and PND. Gastrointestinal: Negative for abdominal pain and heartburn. Musculoskeletal: Negative for back pain and joint pain. Neurological: Negative for dizziness and headaches. Past Medical History:   Diagnosis Date    Allergic rhinitis due to pollen     Change in bowel habits     Diabetes ([address]     Dysuria     Hemorrhoids     Hypertension     IBS (irritable bowel syndrome)     Other diseases of nasal cavity and sinuses      Past Surgical History:   Procedure Laterality Date    HX CHOLECYSTECTOMY  2016    HX COLONOSCOPY  2013?  HX HYSTERECTOMY       No Known Allergies  Blood pressure 145/84, pulse 82, temperature 97.2 °F (36.2 °C), temperature source Oral, resp. rate 18, height 5' 6\" (1.676 m), weight 149 lb 8 oz (67.8 kg), SpO2 98 %. Physical Exam   Constitutional: She appears well-developed and well-nourished. No distress. HENT:   Head: Normocephalic. Right Ear: External ear normal.   Left Ear: External ear normal.   Throat red without exudate  Nose with swollen turbinates  Sinuses tender max   Neck: Neck supple.    Cardiovascular: Normal rate, regular rhythm and normal heart sounds. Pulmonary/Chest: Effort normal and breath sounds normal. No respiratory distress. Lymphadenopathy:     She has no cervical adenopathy. Neurological: She is alert. Skin: Skin is warm. Psychiatric: She has a normal mood and affect. Vitals reviewed.     Results for orders placed or performed in visit on 08/28/17   AMB POC GLUCOSE BLOOD, BY GLUCOSE MONITORING DEVICE   Result Value Ref Range    Glucose  mg/dL       ASSESSMENT and PLAN  Sinusitis max acute   Complete course of antibiotic prescribed by Dr Bonny Saavedra- she has had this filled   Claritin D as needed  HTN   Restart HCTZ   Continue Amlodipine   Low sodium diet  DM   Discussed diet   Check glucose and monitor at home   RTO as directed

## 2017-08-29 NOTE — PROGRESS NOTES
Please review result of glucose. Patient has not had UA since July. Please confirm. I have not notified patient of anything yet at this time or sent any medications.

## 2017-09-05 ENCOUNTER — OFFICE VISIT (OUTPATIENT)
Dept: FAMILY MEDICINE CLINIC | Age: 82
End: 2017-09-05

## 2017-09-05 ENCOUNTER — TELEPHONE (OUTPATIENT)
Dept: FAMILY MEDICINE CLINIC | Age: 82
End: 2017-09-05

## 2017-09-05 VITALS
BODY MASS INDEX: 25.2 KG/M2 | DIASTOLIC BLOOD PRESSURE: 82 MMHG | OXYGEN SATURATION: 96 % | HEIGHT: 64 IN | SYSTOLIC BLOOD PRESSURE: 148 MMHG | HEART RATE: 73 BPM | WEIGHT: 147.6 LBS | TEMPERATURE: 96.7 F | RESPIRATION RATE: 18 BRPM

## 2017-09-05 DIAGNOSIS — E11.9 WELL CONTROLLED TYPE 2 DIABETES MELLITUS (HCC): ICD-10-CM

## 2017-09-05 DIAGNOSIS — I10 ESSENTIAL HYPERTENSION: Primary | ICD-10-CM

## 2017-09-05 DIAGNOSIS — R04.2 HEMOPTYSIS: ICD-10-CM

## 2017-09-05 DIAGNOSIS — Z91.09 ENVIRONMENTAL ALLERGIES: ICD-10-CM

## 2017-09-05 RX ORDER — AMLODIPINE BESYLATE 5 MG/1
TABLET ORAL
Qty: 30 TAB | Refills: 5 | Status: SHIPPED | OUTPATIENT
Start: 2017-09-05 | End: 2018-05-10 | Stop reason: SDUPTHER

## 2017-09-05 RX ORDER — METFORMIN HYDROCHLORIDE 500 MG/1
TABLET ORAL
Refills: 5 | COMMUNITY
Start: 2017-09-02 | End: 2017-09-05 | Stop reason: SINTOL

## 2017-09-05 RX ORDER — METFORMIN HYDROCHLORIDE 500 MG/1
TABLET ORAL
Qty: 30 TAB | Refills: 5 | Status: SHIPPED | OUTPATIENT
Start: 2017-09-05 | End: 2017-09-05 | Stop reason: SINTOL

## 2017-09-05 RX ORDER — CETIRIZINE HCL 10 MG
10 TABLET ORAL DAILY
Qty: 30 TAB | Refills: 5 | Status: SHIPPED | OUTPATIENT
Start: 2017-09-05 | End: 2017-12-05 | Stop reason: CLARIF

## 2017-09-05 RX ORDER — HYDROCHLOROTHIAZIDE 12.5 MG/1
12.5 TABLET ORAL DAILY
Qty: 90 TAB | Refills: 0
Start: 2017-09-05 | End: 2018-06-21

## 2017-09-05 NOTE — PROGRESS NOTES
HISTORY OF PRESENT ILLNESS  Jeaneth Grace is a [de-identified] y.o. female. HPI  She was seen last week with sinusitis and placed on antibiotics. She had an episode where she coughed up some blood yesterday and this concerned her so she was seen in the ER this am. She had several medication bottles with her and was confused on what she should be taking and the ER physician called us to see if we could see her today. She is taking Metformin for diabetes but her appetite is poor since starting. No diarrhea. Would like to change medications. A1c was good at her last visit. Review of Systems   Constitutional: Positive for malaise/fatigue. Negative for fever. HENT: Positive for congestion and sinus pain. Negative for sore throat. Eyes: Negative for blurred vision. Respiratory: Positive for cough and hemoptysis. Negative for shortness of breath. Cardiovascular: Negative for chest pain, palpitations and PND. Gastrointestinal: Negative for abdominal pain and heartburn. Musculoskeletal: Negative for back pain and joint pain. Neurological: Negative for dizziness and headaches. Past Medical History:   Diagnosis Date    Allergic rhinitis due to pollen     Change in bowel habits     Diabetes ([address]     Dysuria     Hemorrhoids     Hypertension     IBS (irritable bowel syndrome)     Other diseases of nasal cavity and sinuses      Past Surgical History:   Procedure Laterality Date    HX CHOLECYSTECTOMY  2016    HX COLONOSCOPY  2013?  HX HYSTERECTOMY       No Known Allergies  Blood pressure 148/82, pulse 73, temperature 96.7 °F (35.9 °C), temperature source Oral, resp. rate 18, height 5' 4\" (1.626 m), weight 147 lb 9.6 oz (67 kg), SpO2 96 %. Physical Exam   Constitutional: She appears well-developed and well-nourished. No distress. HENT:   Head: Normocephalic.    Right Ear: External ear normal.   Left Ear: External ear normal.   Throat red without exudate  Nose with swollen turbinates     Neck: Neck supple. Cardiovascular: Normal rate, regular rhythm and normal heart sounds. Pulmonary/Chest: Effort normal and breath sounds normal. No respiratory distress. Lymphadenopathy:     She has no cervical adenopathy. Neurological: She is alert. Skin: Skin is warm. Psychiatric: She has a normal mood and affect. Vitals reviewed.       ASSESSMENT and PLAN  HTN   Continue Amlodipine 5mg daily and HCTZ 12.5mg daily  Diabetes with side effects to Metformin   Stop metformin   Start Januvia 100mg daily  Allergies with 1 episode hemoptysis   Continue Zyrtec   CXR- slip given    New med list given to patient- she will give to daughter and they will fill a pill box weekly  RTO as needed

## 2017-09-05 NOTE — TELEPHONE ENCOUNTER
Patient's daughter called trying to get her medication list together to help her out. She has a question regarding the antibiotic also.

## 2017-09-05 NOTE — MR AVS SNAPSHOT
Visit Information Date & Time Provider Department Dept. Phone Encounter #  
 9/5/2017  5:40 PM Cindy Reyes MD [facility] Primary Care [phone] [id number] Follow-up Instructions Return if symptoms worsen or fail to improve. Your Appointments 9/5/2017  5:40 PM  
Follow Up with Cindy Reyes MD  
[facility] BE[facility]IOR[facility] MEDICINE Primary Care [address] [facility]) Appt Note: f/u er  
 [address] [address] [phone]  
  
   
 [address] [place] [address] [phone] Upcoming Health Maintenance Date Due OSTEOPOROSIS SCREENING (DEXA) 6/30/2002 Pneumococcal 65+ Low/Medium Risk (2 of 2 - PPSV23) 12/19/2011 INFLUENZA AGE 9 TO ADULT 10/31/2017* EYE EXAM RETINAL OR DILATED Q1 1/6/2018* FOOT EXAM Q1 10/10/2017 MEDICARE YEARLY EXAM 10/11/2017 MICROALBUMIN Q1 1/13/2018 HEMOGLOBIN A1C Q6M 2/14/2018 LIPID PANEL Q1 8/14/2018 GLAUCOMA SCREENING Q2Y 1/6/2019 DTaP/Tdap/Td series (2 - Td) 10/10/2026 *Topic was postponed. The date shown is not the original due date. Allergies as of 9/5/2017  Review Complete On: 9/5/2017 By: Cindy Reyes MD  
 No Known Allergies Current Immunizations  Reviewed on 10/17/2014 Name Date Influenza High Dose Vaccine PF 10/10/2016  4:51 PM  
 Influenza Vaccine 10/17/2014, 12/31/2013 Pneumococcal Vaccine (Unspecified Type) 12/19/2006 Not reviewed this visit You Were Diagnosed With   
  
 Codes Comments Essential hypertension    -  Primary ICD-10-CM: I10 
ICD-9-CM: 401.9 Environmental allergies     ICD-10-CM: Z91.09 
ICD-9-CM: V15.09 Well controlled type 2 diabetes mellitus ([facility]     ICD-10-CM: E11.9 ICD-9-CM: 250.00 Hemoptysis     ICD-10-CM: R04.2 ICD-9-CM: 786.30 Vitals BP Pulse Temp Resp Height(growth percentile) Weight(growth percentile)  148/82 (BP 1 Location: Left arm, BP Patient Position: Sitting) 73 96.7 °F (35.9 °C) (Oral) 18 5' 4\" (1.626 m) 147 lb 9.6 oz (67 kg) SpO2 BMI OB Status Smoking Status 96% 25.34 kg/m2 Hysterectomy Current Every Day Smoker BMI and BSA Data Body Mass Index Body Surface Area  
 25.34 kg/m 2 1.74 m 2 Preferred Pharmacy Pharmacy Name Phone [facility]/PHARMACY [facility][id number] [place] [address] [place] [phone] Your Updated Medication List  
  
   
This list is accurate as of: 9/5/17  9:35 AM.  Always use your most recent med list. amLODIPine 5 mg tablet Commonly known as:  Sherrye Acron TAKE 1 TABLET BY MOUTH ONCE A DAY  Indications: hypertension  
  
 amoxicillin-clavulanate 875-125 mg per tablet Commonly known as:  AUGMENTIN Take 1 Tab by mouth two (2) times a day. cetirizine 10 mg tablet Commonly known as:  ZYRTEC Take 1 Tab by mouth daily. hydroCHLOROthiazide 12.5 mg tablet Commonly known as:  HYDRODIURIL Take 1 Tab by mouth daily. SITagliptin 100 mg tablet Commonly known as:  Del Couch Take 1 Tab by mouth daily. Prescriptions Sent to Pharmacy Refills  
 amLODIPine (NORVASC) 5 mg tablet 5 Sig: TAKE 1 TABLET BY MOUTH ONCE A DAY  Indications: hypertension Class: Normal  
 Pharmacy: [facility]/pharmacy #[id number] [place] [address] [place] Ph #: [phone] SITagliptin (JANUVIA) 100 mg tablet 5 Sig: Take 1 Tab by mouth daily. Class: Normal  
 Pharmacy: [facility]/pharmacy #[id number] [place] [address] [place] Ph #: [phone] Route: Oral  
 cetirizine (ZYRTEC) 10 mg tablet 5 Sig: Take 1 Tab by mouth daily. Class: Normal  
 Pharmacy: [facility]/pharmacy #[id number] [address] [place] [address] [place] Ph #: [phone] Route: Oral  
  
Follow-up Instructions Return if symptoms worsen or fail to improve. To-Do List   
 09/05/2017 Imaging:  XR CHEST PA LAT Patient Instructions New Medication List 
 
Hydrochlorothiazide 12.5mg in the morning for blood pressure Amlodpine 5mg in the evening for blood pressure Zyrtec 10mg daily for allergies Januvia 100mg daily for your blood sugar Finish antibiotic Introducing [facility] & HEALTH SERVICES! Paramon Joni introduces [employer] patient portal. Now you can access parts of your medical record, email your doctor's office, and request medication refills online. 1. In your internet browser, go to [email or web address] 2. Click on the First Time User? Click Here link in the Sign In box. You will see the New Member Sign Up page. 3. Enter your [employer] Access Code exactly as it appears below. You will not need to use this code after youve completed the sign-up process. If you do not sign up before the expiration date, you must request a new code. · [employer] Access Code: [email or web address] Expires: 11/26/2017  8:45 AM 
 
4. Enter the last four digits of your Social Security Number (xxxx) and Date of Birth (mm/dd/yyyy) as indicated and click Submit. You will be taken to the next sign-up page. 5. Create a [employer] ID. This will be your [employer] login ID and cannot be changed, so think of one that is secure and easy to remember. 6. Create a [employer] password. You can change your password at any time. 7. Enter your Password Reset Question and Answer. This can be used at a later time if you forget your password. 8. Enter your e-mail address. You will receive e-mail notification when new information is available in [address] 9. Click Sign Up. You can now view and download portions of your medical record. 10. Click the Download Summary menu link to download a portable copy of your medical information. If you have questions, please visit the Frequently Asked Questions section of the [employer] website. Remember, [employer] is NOT to be used for urgent needs. For medical emergencies, dial 911. Now available from your iPhone and Android! Please provide this summary of care documentation to your next provider. Your primary care clinician is listed as Kayy Gearing. If you have any questions after today's visit, please call [phone].

## 2017-09-05 NOTE — PATIENT INSTRUCTIONS
New Medication List    Hydrochlorothiazide 12.5mg in the morning for blood pressure  Amlodpine 5mg in the evening for blood pressure  Zyrtec 10mg daily for allergies  Januvia 100mg daily for your blood sugar    Finish antibiotic

## 2017-09-05 NOTE — TELEPHONE ENCOUNTER
Spoke with daughter Ms Scarlett Rich to let her know mother must sign paperwork to put her on disclosure

## 2017-09-11 ENCOUNTER — OFFICE VISIT (OUTPATIENT)
Dept: FAMILY MEDICINE CLINIC | Age: 82
End: 2017-09-11

## 2017-09-11 VITALS
HEART RATE: 84 BPM | HEIGHT: 64 IN | BODY MASS INDEX: 25.61 KG/M2 | RESPIRATION RATE: 18 BRPM | WEIGHT: 150 LBS | SYSTOLIC BLOOD PRESSURE: 144 MMHG | OXYGEN SATURATION: 97 % | DIASTOLIC BLOOD PRESSURE: 82 MMHG

## 2017-09-11 DIAGNOSIS — E11.9 WELL CONTROLLED TYPE 2 DIABETES MELLITUS (HCC): Primary | ICD-10-CM

## 2017-09-11 DIAGNOSIS — I10 ESSENTIAL HYPERTENSION: ICD-10-CM

## 2017-09-11 NOTE — MR AVS SNAPSHOT
Visit Information Date & Time Provider Department Dept. Phone Encounter #  
 9/11/2017  2:20 PM Johanny Almaraz MD [facility] [phone] [id number] Follow-up Instructions Return in about 2 months (around 11/11/2017). Upcoming Health Maintenance Date Due OSTEOPOROSIS SCREENING (DEXA) 6/30/2002 Pneumococcal 65+ Low/Medium Risk (2 of 2 - PPSV23) 12/19/2011 FOOT EXAM Q1 10/10/2017 EYE EXAM RETINAL OR DILATED Q1 1/6/2018* MEDICARE YEARLY EXAM 10/11/2017 MICROALBUMIN Q1 1/13/2018 HEMOGLOBIN A1C Q6M 2/14/2018 LIPID PANEL Q1 8/14/2018 GLAUCOMA SCREENING Q2Y 1/6/2019 DTaP/Tdap/Td series (2 - Td) 10/10/2026 *Topic was postponed. The date shown is not the original due date. Allergies as of 9/11/2017  Review Complete On: 9/11/2017 By: Johanny Almaraz MD  
 No Known Allergies Current Immunizations  Reviewed on 10/17/2014 Name Date Influenza High Dose Vaccine PF 10/10/2016  4:51 PM  
 Influenza Vaccine 10/17/2014, 12/31/2013 Pneumococcal Vaccine (Unspecified Type) 12/19/2006 Not reviewed this visit You Were Diagnosed With   
  
 Codes Comments Well controlled type 2 diabetes mellitus ([facility]    -  Primary ICD-10-CM: E11.9 ICD-9-CM: 250.00 Essential hypertension     ICD-10-CM: I10 
ICD-9-CM: 401.9 Vitals BP Pulse Resp Height(growth percentile) Weight(growth percentile) SpO2  
 144/82 84 18 5' 4\" (1.626 m) 150 lb (68 kg) 97% BMI OB Status Smoking Status 25.75 kg/m2 Hysterectomy Current Every Day Smoker Vitals History BMI and BSA Data Body Mass Index Body Surface Area 25.75 kg/m 2 1.75 m 2 Preferred Pharmacy Pharmacy Name Phone [id number] [address] [place] [phone] Your Updated Medication List  
  
   
This list is accurate as of: 9/11/17  3:04 PM.  Always use your most recent med list.  
 amLODIPine 5 mg tablet Commonly known as:  Remonia Grates TAKE 1 TABLET BY MOUTH ONCE A DAY  Indications: hypertension  
  
 amoxicillin-clavulanate 875-125 mg per tablet Commonly known as:  AUGMENTIN Take 1 Tab by mouth two (2) times a day. cetirizine 10 mg tablet Commonly known as:  ZYRTEC Take 1 Tab by mouth daily. hydroCHLOROthiazide 12.5 mg tablet Commonly known as:  HYDRODIURIL Take 1 Tab by mouth daily. SITagliptin 100 mg tablet Commonly known as:  [place] Take 1 Tab by mouth daily. Follow-up Instructions Return in about 2 months (around 11/11/2017). Patient Instructions Med List  9/11/17 1. HCTZ for her blood pressure 12.5mg daily 2. Amlodipine 5mg daily for blood pressure 3. Januvia 100mg daily for blood sugar 4. Zyrtec 10mg daily for allergies Introducing [facility] & HEALTH SERVICES! Samina Beasley introduces [employer] patient portal. Now you can access parts of your medical record, email your doctor's office, and request medication refills online. 1. In your internet browser, go to [email or web address] 2. Click on the First Time User? Click Here link in the Sign In box. You will see the New Member Sign Up page. 3. Enter your [employer] Access Code exactly as it appears below. You will not need to use this code after youve completed the sign-up process. If you do not sign up before the expiration date, you must request a new code. · [employer] Access Code: [email or web address] Expires: 11/26/2017  8:45 AM 
 
4. Enter the last four digits of your Social Security Number (xxxx) and Date of Birth (mm/dd/yyyy) as indicated and click Submit. You will be taken to the next sign-up page. 5. Create a [employer] ID. This will be your [employer] login ID and cannot be changed, so think of one that is secure and easy to remember. 6. Create a [employer] password. You can change your password at any time. 7. Enter your Password Reset Question and Answer. This can be used at a later time if you forget your password. 8. Enter your e-mail address. You will receive e-mail notification when new information is available in [address] 9. Click Sign Up. You can now view and download portions of your medical record. 10. Click the Download Summary menu link to download a portable copy of your medical information. If you have questions, please visit the Frequently Asked Questions section of the [employer] website. Remember, [employer] is NOT to be used for urgent needs. For medical emergencies, dial 911. Now available from your iPhone and Android! Please provide this summary of care documentation to your next provider. Your primary care clinician is listed as Jordi Hi. If you have any questions after today's visit, please call [phone].

## 2017-09-11 NOTE — PATIENT INSTRUCTIONS
Med List  9/11/17   1. HCTZ for her blood pressure 12.5mg daily   2. Amlodipine 5mg daily for blood pressure   3. Januvia 100mg daily for blood sugar   4.  Zyrtec 10mg daily for allergies

## 2017-09-11 NOTE — PROGRESS NOTES
HISTORY OF PRESENT ILLNESS  Braulio Caceres is a [de-identified] y.o. female. HPI  She is here with her daughter to review her meds. She is confused about what she is to take. Forgot to take her meds this am.   Review of Systems   Constitutional: Positive for malaise/fatigue. Negative for fever. HENT: Negative for congestion, sinus pain and sore throat. Eyes: Negative for blurred vision. Respiratory: Negative for cough, hemoptysis and shortness of breath. Cardiovascular: Negative for chest pain, palpitations and PND. Gastrointestinal: Negative for abdominal pain and heartburn. Musculoskeletal: Negative for back pain and joint pain. Neurological: Negative for dizziness and headaches. Psychiatric/Behavioral: Positive for memory loss. Past Medical History:   Diagnosis Date    Allergic rhinitis due to pollen     Change in bowel habits     Diabetes ([address]     Dysuria     Hemorrhoids     Hypertension     IBS (irritable bowel syndrome)     Other diseases of nasal cavity and sinuses      Past Surgical History:   Procedure Laterality Date    HX CHOLECYSTECTOMY  2016    HX COLONOSCOPY  2013?  HX HYSTERECTOMY       No Known Allergies  Blood pressure 144/82, pulse 84, resp. rate 18, height 5' 4\" (1.626 m), weight 150 lb (68 kg), SpO2 97 %. Physical Exam   Constitutional: She appears well-developed and well-nourished. No distress. HENT:   Head: Normocephalic. Right Ear: External ear normal.   Left Ear: External ear normal.   Nose: Nose normal.   Mouth/Throat: Oropharynx is clear and moist.   Neck: Neck supple. Cardiovascular: Normal rate, regular rhythm and normal heart sounds. Pulmonary/Chest: Effort normal and breath sounds normal. No respiratory distress. Lymphadenopathy:     She has no cervical adenopathy. Neurological: She is alert. Skin: Skin is warm. Psychiatric: She has a normal mood and affect. Vitals reviewed.       ASSESSMENT and PLAN  HTN  DM   Went over med list with patient and daughter   Her daughter will start filling a med box for her   RTO in November for labs

## 2017-09-13 ENCOUNTER — TELEPHONE (OUTPATIENT)
Dept: FAMILY MEDICINE CLINIC | Age: 82
End: 2017-09-13

## 2017-09-13 RX ORDER — TRIAMCINOLONE ACETONIDE 55 UG/1
1 SPRAY, METERED NASAL 2 TIMES DAILY
Qty: 1 BOTTLE | Refills: 2 | Status: SHIPPED | OUTPATIENT
Start: 2017-09-13 | End: 2018-10-31

## 2017-09-13 NOTE — TELEPHONE ENCOUNTER
Returned patient's phone call and she states that she takes zyrtec and feels that she needs something stronger for her allergies or to try something different. Would you like for her to try something else before sending her to allergist at this time?

## 2017-09-13 NOTE — TELEPHONE ENCOUNTER
----- Message from Terrajoule sent at 9/13/2017  9:02 AM EDT -----  Regarding: Dr. Obed Nunez telephone  Pt. Is requesting a call back regarding a possible referral for Allergies.  Pt best contact [phone]

## 2017-09-18 RX ORDER — HYDROXYZINE 25 MG/1
25 TABLET, FILM COATED ORAL
Qty: 30 TAB | Refills: 1 | Status: SHIPPED | OUTPATIENT
Start: 2017-09-18 | End: 2017-09-28

## 2017-10-11 ENCOUNTER — OFFICE VISIT (OUTPATIENT)
Dept: FAMILY MEDICINE CLINIC | Age: 82
End: 2017-10-11

## 2017-10-11 VITALS
DIASTOLIC BLOOD PRESSURE: 64 MMHG | SYSTOLIC BLOOD PRESSURE: 126 MMHG | RESPIRATION RATE: 18 BRPM | WEIGHT: 138 LBS | HEART RATE: 77 BPM | HEIGHT: 66 IN | TEMPERATURE: 97 F | BODY MASS INDEX: 22.18 KG/M2 | OXYGEN SATURATION: 98 %

## 2017-10-11 DIAGNOSIS — E87.6 HYPOKALEMIA: ICD-10-CM

## 2017-10-11 DIAGNOSIS — Z71.89 ADVANCE CARE PLANNING: ICD-10-CM

## 2017-10-11 DIAGNOSIS — F32.1 MODERATE SINGLE CURRENT EPISODE OF MAJOR DEPRESSIVE DISORDER (HCC): ICD-10-CM

## 2017-10-11 DIAGNOSIS — Z91.09 ENVIRONMENTAL ALLERGIES: ICD-10-CM

## 2017-10-11 DIAGNOSIS — R53.83 FATIGUE, UNSPECIFIED TYPE: ICD-10-CM

## 2017-10-11 DIAGNOSIS — H61.21 IMPACTED CERUMEN OF RIGHT EAR: ICD-10-CM

## 2017-10-11 DIAGNOSIS — I10 ESSENTIAL HYPERTENSION: ICD-10-CM

## 2017-10-11 DIAGNOSIS — Z23 IMMUNIZATION DUE: ICD-10-CM

## 2017-10-11 DIAGNOSIS — Z23 ENCOUNTER FOR IMMUNIZATION: ICD-10-CM

## 2017-10-11 DIAGNOSIS — E11.9 WELL CONTROLLED TYPE 2 DIABETES MELLITUS (HCC): Primary | ICD-10-CM

## 2017-10-11 RX ORDER — SERTRALINE HYDROCHLORIDE 50 MG/1
50 TABLET, FILM COATED ORAL DAILY
Qty: 30 TAB | Refills: 3 | Status: SHIPPED | OUTPATIENT
Start: 2017-10-11 | End: 2017-11-14 | Stop reason: SDUPTHER

## 2017-10-11 NOTE — PROGRESS NOTES
This is a Subsequent Medicare Annual Wellness Exam (AWV) (Performed 12 months after IPPE or effective date of Medicare Part B enrollment, Once in a lifetime)    I have reviewed the patient's medical history in detail and updated the computerized patient record. History     Past Medical History:   Diagnosis Date    Allergic rhinitis due to pollen     Change in bowel habits     Diabetes ([address]     Dysuria     Hemorrhoids     Hypertension     IBS (irritable bowel syndrome)     Other diseases of nasal cavity and sinuses       Past Surgical History:   Procedure Laterality Date    HX CHOLECYSTECTOMY  2016    HX COLONOSCOPY  2013?  HX HYSTERECTOMY       Current Outpatient Prescriptions   Medication Sig Dispense Refill    ondansetron hcl (ZOFRAN, AS HYDROCHLORIDE,) 4 mg tablet Take 1 Tab by mouth every eight (8) hours as needed for Nausea. 20 Tab 0    triamcinolone (NASACORT) 55 mcg nasal inhaler 1 [place] by Both Nostrils route two (2) times a day. Indications: ALLERGIC RHINITIS, ALLERGIC RHINITIS PREVENTION 1 Bottle 2    hydroCHLOROthiazide (HYDRODIURIL) 12.5 mg tablet Take 1 Tab by mouth daily. 90 Tab 0    amLODIPine (NORVASC) 5 mg tablet TAKE 1 TABLET BY MOUTH ONCE A DAY  Indications: hypertension 30 Tab 5    SITagliptin (JANUVIA) 100 mg tablet Take 1 Tab by mouth daily. 30 Tab 5    cetirizine (ZYRTEC) 10 mg tablet Take 1 Tab by mouth daily. 30 Tab 5    amoxicillin-clavulanate (AUGMENTIN) 875-125 mg per tablet Take 1 Tab by mouth two (2) times a day.  20 Tab 0     No Known Allergies  Family History   Problem Relation Age of Onset    No Known Problems Mother      Social History   Substance Use Topics    Smoking status: Current Every Day Smoker     Packs/day: 0.25    Smokeless tobacco: Never Used    Alcohol use No     Patient Active Problem List   Diagnosis Code    Environmental allergies Z91.09    Elevated cholesterol E78.00    Essential hypertension I5    Well controlled type 2 diabetes mellitus ([facility] E11.9       Depression Risk Factor Screening:     PHQ over the last two weeks 9/11/2017   PHQ Not Done -   Little interest or pleasure in doing things Not at all   Feeling down, depressed or hopeless Not at all   Total Score PHQ 2 0     Alcohol Risk Factor Screening: You do not drink alcohol or very rarely. Functional Ability and Level of Safety:   Hearing Loss  Hearing is good. Activities of Daily Living  The home contains: no safety equipment. Patient does total self care    Fall RiskFall Risk Assessment, last 12 mths 10/11/2017   Able to walk? Yes   Fall in past 12 months? No   Fall with injury? -   Number of falls in past 12 months -   Fall Risk Score -       Abuse Screen  Patient is not abused    Cognitive Screening   Evaluation of Cognitive Function:  Has your family/caregiver stated any concerns about your memory: no  Normal, Clock Drawing test    Patient Care Team   Patient Care Team:  Vashti Smith MD as PCP - General (Family Practice)  Sylvia Khalil MD (General Surgery)  Antoni Brand MD (Family Practice)    Assessment/Plan   Education and counseling provided:  Are appropriate based on today's review and evaluation  End-of-Life planning (with patient's consent)  Pneumococcal Vaccine  Influenza Vaccine  Screening Mammography     Advance Care Planning (ACP) Provider Note - Comprehensive     Date of ACP Conversation: 10/11/17  Persons included in Conversation:  patient and family  Length of ACP Conversation in minutes:  <16 minutes (Non-Billable)    Authorized Decision Maker (if patient is incapable of making informed decisions): This person is:   Other Legally Authorized Decision Maker (e.g. Next of Kin)          General ACP for ALL Patients with Decision Making Capacity:   Importance of advance care planning, including choosing a healthcare agent to communicate patient's healthcare decisions if patient lost the ability to make decisions, such as after a sudden illness or accident  Understanding of the healthcare agent role was assessed and information provided    Interventions Provided:  Recommended completion of Advance Directive form after review of ACP materials and conversation with prospective healthcare agent   Recommended communicating the plan and making copies for the healthcare agent, personal physician, and others as appropriate (e.g., health system)

## 2017-10-11 NOTE — PROGRESS NOTES
HISTORY OF PRESENT ILLNESS  Yahaira Sargent is a [de-identified] y.o. female. HPI  She is here for several things. HTN- taking meds and doing well. BP has been good. Hypokalemia on her last labs. Needs a recheck. Allergies- her house had a leak in the attic and mold was found. She has been at her daughters and her allergies have improved. Her daughter has used bleach and gotten most of the mold removed. She will be going back to her home soon. She has had more symptoms of depression over the last few weeks, Not sleeping well. Trouble getting to sleep and then staying asleep. Fatigued during the day. Not enjoying her usual activities. Feels more sad and feels like crying but she doesn't. Appetite has been down and she has been losing some weight. She is trying to make herself get out and go to touch and other activities. No suicidal thoughts or plans. Needs flu shot and prevnar. Having more wax in her ears. Feels clogged up. Review of Systems   Constitutional: Positive for malaise/fatigue. Negative for fever. HENT: Positive for congestion. Negative for sinus pain and sore throat. Eyes: Negative for blurred vision. Respiratory: Negative for cough, hemoptysis and shortness of breath. Cardiovascular: Negative for chest pain, palpitations and PND. Gastrointestinal: Negative for abdominal pain, heartburn and nausea. Musculoskeletal: Positive for back pain. Negative for joint pain and myalgias. Neurological: Negative for dizziness and headaches. Psychiatric/Behavioral: Positive for depression and memory loss. The patient is nervous/anxious and has insomnia.       Past Medical History:   Diagnosis Date    Allergic rhinitis due to pollen     Change in bowel habits     Diabetes ([address]     Dysuria     Hemorrhoids     Hypertension     IBS (irritable bowel syndrome)     Other diseases of nasal cavity and sinuses      Past Surgical History:   Procedure Laterality Date    HX CHOLECYSTECTOMY 2016    HX COLONOSCOPY  2013?  HX HYSTERECTOMY       No Known Allergies  Blood pressure 126/64, pulse 77, temperature 97 °F (36.1 °C), resp. rate 18, height 5' 6\" (1.676 m), weight 138 lb (62.6 kg), SpO2 98 %. Physical Exam   Constitutional: She is oriented to person, place, and time. She appears well-developed and well-nourished. No distress. HENT:   Head: Normocephalic. Left Ear: External ear normal.   Nose: Nose normal.   Mouth/Throat: Oropharynx is clear and moist.   Right TM occluded with wax. After lavage with H2O2 and H2O and cerumen spoon TM visualized with good LR and LM. Neck: Neck supple. Cardiovascular: Normal rate, regular rhythm and normal heart sounds. Pulmonary/Chest: Effort normal and breath sounds normal. No respiratory distress. Abdominal: Soft. Lymphadenopathy:     She has no cervical adenopathy. Neurological: She is alert and oriented to person, place, and time. Skin: Skin is warm. Psychiatric: She has a normal mood and affect.        ASSESSMENT and PLAN  HTN   Continue present meds   Low sodium diet  DM   Discussed diet and exercise  Hypokalemia   Check CMP  Depression   Start Zoloft 50mg daily   Discussed ways to help with depression- try to get out at least once every day   RTO 4 weeks for recheck  Allergies   Continue antihistamines as needed  Immun   Flu and Prevnar  Cerumen Impaction   Ear lavage  Impacted cerumen   Ear lavage done with good results

## 2017-10-11 NOTE — MR AVS SNAPSHOT
Visit Information Date & Time Provider Department Dept. Phone Encounter #  
 10/11/2017  1:20 PM Diann Jane MD [place] [address] Primary Care [phone] [id number] Upcoming Health Maintenance Date Due  
 EYE EXAM RETINAL OR DILATED Q1 1/6/2018* MICROALBUMIN Q1 1/13/2018 HEMOGLOBIN A1C Q6M 2/14/2018 LIPID PANEL Q1 8/14/2018 FOOT EXAM Q1 10/11/2018 MEDICARE YEARLY EXAM 10/12/2018 GLAUCOMA SCREENING Q2Y 1/6/2019 DTaP/Tdap/Td series (2 - Td) 10/10/2026 *Topic was postponed. The date shown is not the original due date. Allergies as of 10/11/2017  Review Complete On: 10/11/2017 By: Diann Jane MD  
 No Known Allergies Current Immunizations  Reviewed on 10/17/2014 Name Date Influenza High Dose Vaccine PF 10/11/2017, 10/10/2016  4:51 PM  
 Influenza Vaccine 10/17/2014, 12/31/2013 Pneumococcal Conjugate (PCV-13) 10/11/2017 Pneumococcal Vaccine (Unspecified Type) 12/19/2006 Not reviewed this visit You Were Diagnosed With   
  
 Codes Comments Well controlled type 2 diabetes mellitus ([facility]    -  Primary ICD-10-CM: E11.9 ICD-9-CM: 250.00 Essential hypertension     ICD-10-CM: I10 
ICD-9-CM: 401.9 Environmental allergies     ICD-10-CM: Z91.09 
ICD-9-CM: V15.09 Fatigue, unspecified type     ICD-10-CM: R53.83 ICD-9-CM: 780.79 Moderate single current episode of major depressive disorder (HCC)     ICD-10-CM: F32.1 ICD-9-CM: 296.22 Immunization due     ICD-10-CM: Z23 ICD-9-CM: V05.9 Hypokalemia     ICD-10-CM: E87.6 ICD-9-CM: 276.8 Encounter for immunization     ICD-10-CM: H75 ICD-9-CM: V03.89 Impacted cerumen of right ear     ICD-10-CM: H61.21 ICD-9-CM: 380.4 Vitals BP Pulse Temp Resp Height(growth percentile) Weight(growth percentile) 126/64 77 97 °F (36.1 °C) 18 5' 6\" (1.676 m) 138 lb (62.6 kg) SpO2 BMI OB Status Smoking Status 98% 22.27 kg/m2 Hysterectomy Current Every Day Smoker Vitals History BMI and BSA Data Body Mass Index Body Surface Area  
 22.27 kg/m 2 1.71 m 2 Preferred Pharmacy Pharmacy Name Phone [facility]/PHARMACY #[id number] [place] [phone] Your Updated Medication List  
  
   
This list is accurate as of: 10/11/17  2:54 PM.  Always use your most recent med list. amLODIPine 5 mg tablet Commonly known as:  Yanet Blade TAKE 1 TABLET BY MOUTH ONCE A DAY  Indications: hypertension  
  
 amoxicillin-clavulanate 875-125 mg per tablet Commonly known as:  AUGMENTIN Take 1 Tab by mouth two (2) times a day. cetirizine 10 mg tablet Commonly known as:  ZYRTEC Take 1 Tab by mouth daily. hydroCHLOROthiazide 12.5 mg tablet Commonly known as:  HYDRODIURIL Take 1 Tab by mouth daily. ondansetron hcl 4 mg tablet Commonly known as:  ZOFRAN (AS HYDROCHLORIDE) Take 1 Tab by mouth every eight (8) hours as needed for Nausea. sertraline 50 mg tablet Commonly known as:  ZOLOFT Take 1 Tab by mouth daily. SITagliptin 100 mg tablet Commonly known as:  Sonia Racer Take 1 Tab by mouth daily. triamcinolone 55 mcg nasal inhaler Commonly known as:  NASACORT  
1 [place] by Both Nostrils route two (2) times a day. Indications: ALLERGIC RHINITIS, ALLERGIC RHINITIS PREVENTION Prescriptions Sent to Pharmacy Refills  
 sertraline (ZOLOFT) 50 mg tablet 3 Sig: Take 1 Tab by mouth daily. Class: Normal  
 Pharmacy: [facility]/pharmacy #[id number] [address] [place] Ph #: [phone] Route: Oral  
  
We Performed the Following ADMIN INFLUENZA VIRUS VAC [ [facility]] ADMIN PNEUMOCOCCAL VACCINE [ [facility]] COLLECTION VENOUS BLOOD,VENIPUNCTURE [id number] CPT(R)] INFLUENZA VIRUS VACCINE, HIGH DOSE SEASONAL, PRESERVATIVE FREE [89049 CPT(R)] METABOLIC PANEL, COMPREHENSIVE [55635 CPT(R)] PNEUMOCOCCAL CONJ VACCINE 13 VALENT IM [id number] CPT(R)] REMOVE IMPACTED EAR WAX [21945 CPT(R)] Patient Instructions Medicare Wellness Visit, Female The best way to live healthy is to have a healthy lifestyle by eating a well-balanced diet, exercising regularly, limiting alcohol and stopping smoking. Regular physical exams and screening tests are another way to keep healthy. Preventive exams provided by your health care provider can find health problems before they become diseases or illnesses. Preventive services including immunizations, screening tests, monitoring and exams can help you take care of your own health. All people over age 72 should have a pneumovax  and and a prevnar shot to prevent pneumonia. These are once in a lifetime unless you and your provider decide differently. All people over 65 should have a yearly flu shot and a tetanus vaccine every 10 years. A bone mass density to screen for osteoporosis or thinning of the bones should be done every 2 years after 65. Screening for diabetes mellitus with a blood sugar test should be done every year. Glaucoma is a disease of the eye due to increased ocular pressure that can lead to blindness and it should be done every year by an eye professional. 
 
Cardiovascular screening tests that check for elevated lipids (fatty part of blood) which can lead to heart disease and strokes should be done every 5 years. Colorectal screening that evaluates for blood or polyps in your colon should be done yearly as a stool test or every five years as a flexible sigmoidoscope or every 10 years as a colonoscopy up to age 76. Breast cancer screening with a mammogram is recommended biennially  for women age 54-69.  
 
Screening for cervical cancer with a pap smear and pelvic exam is recommended for women after age 72 years every 2 years up to age 79 or when the provider and patient decide to stop. If there is a history of cervical abnormalities or other increased risk for cancer then the test is recommended yearly. Hepatitis C screening is also recommended for anyone born between [birth year] through [address]. A shingles vaccine is also recommended once in a lifetime after age 61. Your Medicare Wellness Exam is recommended annually. Here is a list of your current Health Maintenance items with a due date: There are no preventive care reminders to display for this patient. Introducing [facility] & HEALTH SERVICES! Jayleen Castellanos introduces [employer] patient portal. Now you can access parts of your medical record, email your doctor's office, and request medication refills online. 1. In your internet browser, go to [email or web address] 2. Click on the First Time User? Click Here link in the Sign In box. You will see the New Member Sign Up page. 3. Enter your Line[employer] Access Code exactly as it appears below. You will not need to use this code after youve completed the sign-up process. If you do not sign up before the expiration date, you must request a new code. · Line[employer] Access Code: [email or web address] Expires: 11/26/2017  8:45 AM 
 
4. Enter the last four digits of your Social Security Number (xxxx) and Date of Birth (mm/dd/yyyy) as indicated and click Submit. You will be taken to the next sign-up page. 5. Create a Linekong ID. This will be your Linekong login ID and cannot be changed, so think of one that is secure and easy to remember. 6. Create a Linekong password. You can change your password at any time. 7. Enter your Password Reset Question and Answer. This can be used at a later time if you forget your password. 8. Enter your e-mail address. You will receive e-mail notification when new information is available in [address] 9. Click Sign Up. You can now view and download portions of your medical record. 10. Click the Download Summary menu link to download a portable copy of your medical information. If you have questions, please visit the Frequently Asked Questions section of the [employer] website. Remember, [employer] is NOT to be used for urgent needs. For medical emergencies, dial 911. Now available from your iPhone and Android! Please provide this summary of care documentation to your next provider. Your primary care clinician is listed as Sanya Hannah. If you have any questions after today's visit, please call [phone].

## 2017-10-12 LAB
ALBUMIN SERPL-MCNC: 3.9 G/DL (ref 3.5–4.7)
ALBUMIN/GLOB SERPL: 1.3 {RATIO} (ref 1.2–2.2)
ALP SERPL-CCNC: 85 IU/L (ref 39–117)
ALT SERPL-CCNC: 12 IU/L (ref 0–32)
AST SERPL-CCNC: 19 IU/L (ref 0–40)
BILIRUB SERPL-MCNC: 0.3 MG/DL (ref 0–1.2)
BUN SERPL-MCNC: 18 MG/DL (ref 8–27)
BUN/CREAT SERPL: 16 (ref 12–28)
CALCIUM SERPL-MCNC: 9.4 MG/DL (ref 8.7–10.3)
CHLORIDE SERPL-SCNC: 100 MMOL/L (ref 96–106)
CO2 SERPL-SCNC: 29 MMOL/L (ref 18–29)
CREAT SERPL-MCNC: 1.11 MG/DL (ref 0.57–1)
GLOBULIN SER CALC-MCNC: 3 G/DL (ref 1.5–4.5)
GLUCOSE SERPL-MCNC: 170 MG/DL (ref 65–99)
INTERPRETATION: NORMAL
POTASSIUM SERPL-SCNC: 4.1 MMOL/L (ref 3.5–5.2)
PROT SERPL-MCNC: 6.9 G/DL (ref 6–8.5)
SODIUM SERPL-SCNC: 144 MMOL/L (ref 134–144)

## 2017-11-14 ENCOUNTER — OFFICE VISIT (OUTPATIENT)
Dept: FAMILY MEDICINE CLINIC | Age: 82
End: 2017-11-14

## 2017-11-14 VITALS
SYSTOLIC BLOOD PRESSURE: 133 MMHG | BODY MASS INDEX: 23.36 KG/M2 | RESPIRATION RATE: 18 BRPM | TEMPERATURE: 97.3 F | HEIGHT: 66 IN | OXYGEN SATURATION: 96 % | HEART RATE: 75 BPM | DIASTOLIC BLOOD PRESSURE: 70 MMHG | WEIGHT: 145.38 LBS

## 2017-11-14 DIAGNOSIS — F32.1 MODERATE SINGLE CURRENT EPISODE OF MAJOR DEPRESSIVE DISORDER (HCC): Primary | ICD-10-CM

## 2017-11-14 DIAGNOSIS — E11.9 WELL CONTROLLED TYPE 2 DIABETES MELLITUS (HCC): ICD-10-CM

## 2017-11-14 DIAGNOSIS — I10 ESSENTIAL HYPERTENSION: ICD-10-CM

## 2017-11-14 RX ORDER — SERTRALINE HYDROCHLORIDE 50 MG/1
50 TABLET, FILM COATED ORAL 2 TIMES DAILY
Qty: 60 TAB | Refills: 3 | Status: SHIPPED | OUTPATIENT
Start: 2017-11-14 | End: 2018-02-27 | Stop reason: ALTCHOICE

## 2017-11-14 NOTE — MR AVS SNAPSHOT
Visit Information Date & Time Provider Department Dept. Phone Encounter #  
 11/14/2017  4:20 PM Francesca Lang MD [facility] Primary Care [phone] [id number] Upcoming Health Maintenance Date Due  
 EYE EXAM RETINAL OR DILATED Q1 1/6/2018* MICROALBUMIN Q1 1/13/2018 HEMOGLOBIN A1C Q6M 2/14/2018 LIPID PANEL Q1 8/14/2018 FOOT EXAM Q1 10/11/2018 MEDICARE YEARLY EXAM 10/12/2018 GLAUCOMA SCREENING Q2Y 1/6/2019 DTaP/Tdap/Td series (2 - Td) 10/10/2026 *Topic was postponed. The date shown is not the original due date. Allergies as of 11/14/2017  Review Complete On: 11/14/2017 By: Francesca Lang MD  
 No Known Allergies Current Immunizations  Reviewed on 10/17/2014 Name Date Influenza High Dose Vaccine PF 10/11/2017, 10/10/2016  4:51 PM  
 Influenza Vaccine 10/17/2014, 12/31/2013 Pneumococcal Conjugate (PCV-13) 10/11/2017 Pneumococcal Vaccine (Unspecified Type) 12/19/2006 Not reviewed this visit You Were Diagnosed With   
  
 Codes Comments Moderate single current episode of major depressive disorder ([facility]    -  Primary ICD-10-CM: F32.1 ICD-9-CM: 296.22 Well controlled type 2 diabetes mellitus ([facility]     ICD-10-CM: E11.9 ICD-9-CM: 250.00 Essential hypertension     ICD-10-CM: I10 
ICD-9-CM: 401.9 Vitals BP Pulse Temp Resp Height(growth percentile) Weight(growth percentile) 133/70 (BP 1 Location: Left arm) 75 97.3 °F (36.3 °C) (Oral) 18 5' 6\" (1.676 m) 145 lb 6 oz (65.9 kg) SpO2 BMI OB Status Smoking Status 96% 23.46 kg/m2 Hysterectomy Current Every Day Smoker Vitals History BMI and BSA Data Body Mass Index Body Surface Area  
 23.46 kg/m 2 1.75 m 2 Preferred Pharmacy Pharmacy Name Phone [id number] Lozada [facility] [address] [place] [phone] Your Updated Medication List  
  
   
 This list is accurate as of: 11/14/17  5:10 PM.  Always use your most recent med list. amLODIPine 5 mg tablet Commonly known as:  Busby Fanti TAKE 1 TABLET BY MOUTH ONCE A DAY  Indications: hypertension  
  
 cetirizine 10 mg tablet Commonly known as:  ZYRTEC Take 1 Tab by mouth daily. hydroCHLOROthiazide 12.5 mg tablet Commonly known as:  HYDRODIURIL Take 1 Tab by mouth daily. sertraline 50 mg tablet Commonly known as:  ZOLOFT Take 1 Tab by mouth two (2) times a day. SITagliptin 100 mg tablet Commonly known as:  Hever Norlander Take 1 Tab by mouth daily. triamcinolone 55 mcg nasal inhaler Commonly known as:  NASACORT  
1 [place] by Both Nostrils route two (2) times a day. Indications: ALLERGIC RHINITIS, ALLERGIC RHINITIS PREVENTION Prescriptions Sent to Pharmacy Refills  
 sertraline (ZOLOFT) 50 mg tablet 3 Sig: Take 1 Tab by mouth two (2) times a day. Class: Normal  
 Pharmacy: [facility]/pharmacy #[id number] Amanda Aldana, [address] Ph #: [phone] Route: Oral  
  
We Performed the Following COLLECTION VENOUS BLOOD,VENIPUNCTURE [id number] CPT(R)] HEMOGLOBIN A1C WITH EAG [63510 CPT(R)] Patient Instructions Change Sertraline to 50mg twice a day (blue pill) Introducing [facility] & HEALTH SERVICES! [facility] introduces [employer] patient portal. Now you can access parts of your medical record, email your doctor's office, and request medication refills online. 1. In your internet browser, go to [email or web address] 2. Click on the First Time User? Click Here link in the Sign In box. You will see the New Member Sign Up page. 3. Enter your [employer] Access Code exactly as it appears below. You will not need to use this code after youve completed the sign-up process. If you do not sign up before the expiration date, you must request a new code.  
 
· [employer] Access Code: [email or web address] 
 Expires: 11/26/2017  7:45 AM 
 
4. Enter the last four digits of your Social Security Number (xxxx) and Date of Birth (mm/dd/yyyy) as indicated and click Submit. You will be taken to the next sign-up page. 5. Create a Signal Data ID. This will be your [employer] login ID and cannot be changed, so think of one that is secure and easy to remember. 6. Create a [employer] Data password. You can change your password at any time. 7. Enter your Password Reset Question and Answer. This can be used at a later time if you forget your password. 8. Enter your e-mail address. You will receive e-mail notification when new information is available in [address] 9. Click Sign Up. You can now view and download portions of your medical record. 10. Click the Download Summary menu link to download a portable copy of your medical information. If you have questions, please visit the Frequently Asked Questions section of the [employer] website. Remember, [employer] is NOT to be used for urgent needs. For medical emergencies, dial 911. Now available from your iPhone and Android! Please provide this summary of care documentation to your next provider. Your primary care clinician is listed as Vashti Smith. If you have any questions after today's visit, please call [phone].

## 2017-11-16 LAB
EST. AVERAGE GLUCOSE BLD GHB EST-MCNC: 134 MG/DL
HBA1C MFR BLD: 6.3 % (ref 4.8–5.6)

## 2017-12-04 ENCOUNTER — TELEPHONE (OUTPATIENT)
Dept: FAMILY MEDICINE CLINIC | Age: 82
End: 2017-12-04

## 2017-12-04 NOTE — TELEPHONE ENCOUNTER
Patient called stating she has a lot of drainage going on and when she wakes up every morning her eyes are crusted over. She takes zyrtec everyday as well as a nasal spray two times a day. She would like to know what you recommend?

## 2017-12-04 NOTE — TELEPHONE ENCOUNTER
----- Message from Royal Osei sent at 12/4/2017  1:37 PM EST -----  Regarding: Dr. Shaheed Mejia  The patient is requesting a call back to discuss allergies that she has been experiencing. (t)[phone]

## 2017-12-05 RX ORDER — MINERAL OIL
180 ENEMA (ML) RECTAL DAILY
Qty: 90 TAB | Refills: 1 | Status: SHIPPED | OUTPATIENT
Start: 2017-12-05 | End: 2018-05-21 | Stop reason: SDUPTHER

## 2017-12-18 ENCOUNTER — OFFICE VISIT (OUTPATIENT)
Dept: FAMILY MEDICINE CLINIC | Age: 82
End: 2017-12-18

## 2017-12-18 VITALS
HEART RATE: 72 BPM | WEIGHT: 145 LBS | BODY MASS INDEX: 23.3 KG/M2 | TEMPERATURE: 96.3 F | HEIGHT: 66 IN | SYSTOLIC BLOOD PRESSURE: 138 MMHG | OXYGEN SATURATION: 98 % | DIASTOLIC BLOOD PRESSURE: 81 MMHG | RESPIRATION RATE: 18 BRPM

## 2017-12-18 DIAGNOSIS — F32.1 MODERATE SINGLE CURRENT EPISODE OF MAJOR DEPRESSIVE DISORDER (HCC): Primary | ICD-10-CM

## 2017-12-18 DIAGNOSIS — E11.9 WELL CONTROLLED TYPE 2 DIABETES MELLITUS (HCC): ICD-10-CM

## 2017-12-18 DIAGNOSIS — I10 ESSENTIAL HYPERTENSION: ICD-10-CM

## 2017-12-18 DIAGNOSIS — Z91.09 ENVIRONMENTAL ALLERGIES: ICD-10-CM

## 2017-12-18 LAB — GLUCOSE POC: 135 MG/DL

## 2017-12-18 RX ORDER — IPRATROPIUM BROMIDE 42 UG/1
2 SPRAY, METERED NASAL
Qty: 15 ML | Refills: 3 | Status: SHIPPED | OUTPATIENT
Start: 2017-12-18 | End: 2019-09-16 | Stop reason: ALTCHOICE

## 2017-12-18 NOTE — PROGRESS NOTES
HISTORY OF PRESENT ILLNESS  Roma Landin is a [de-identified] y.o. female. Allergies   Pertinent negatives include no chest pain, no abdominal pain and no shortness of breath. HTN- taking meds and doing well. BP has been good. Allergies- her house had a leak in the attic and mold was found. She was  at her daughters and her allergies had improved. Her daughter has used bleach and gotten most of the mold removed. She has gone back to her home and has been there for a month. She is c/o nasal stuffiness alternating with clear drainage, has to clear her throat a lot, and facial pressure. Occasionally will have a cough. We sent in Rx for Allegra which she has been taking for the last 2 weeks. She is also using Nasacort as needed. She had more symptoms of depression and has been taking Zoloft. Feeling better. DM- taking Januvia. No hypoglycemia. A1c in Nov was 6.3      Review of Systems   Constitutional: Positive for malaise/fatigue. Negative for fever. HENT: Positive for congestion and sore throat. Negative for ear pain and sinus pain. Eyes: Negative for blurred vision. Respiratory: Negative for cough, hemoptysis, sputum production and shortness of breath. Cardiovascular: Negative for chest pain, palpitations and PND. Gastrointestinal: Negative for abdominal pain, heartburn and nausea. Musculoskeletal: Positive for back pain. Negative for joint pain and myalgias. Neurological: Negative for dizziness. Psychiatric/Behavioral: Positive for memory loss. Negative for depression. The patient is not nervous/anxious and does not have insomnia.       Past Medical History:   Diagnosis Date    Allergic rhinitis due to pollen     Change in bowel habits     Diabetes ([address]     Dysuria     Hemorrhoids     Hypertension     IBS (irritable bowel syndrome)     Other diseases of nasal cavity and sinuses(478.19)      Past Surgical History:   Procedure Laterality Date    HX CHOLECYSTECTOMY  2016    HX COLONOSCOPY  2013?  HX HYSTERECTOMY       No Known Allergies    Blood pressure 138/81, pulse 72, temperature 96.3 °F (35.7 °C), temperature source Oral, resp. rate 18, height 5' 6\" (1.676 m), weight 145 lb (65.8 kg), SpO2 98 %. Physical Exam   Constitutional: She is oriented to person, place, and time. She appears well-developed and well-nourished. No distress. HENT:   Head: Normocephalic. Right Ear: External ear normal.   Left Ear: External ear normal.   Mouth/Throat: Oropharynx is clear and moist.   Nose with clear mucous and swollen turbinates   Eyes: Conjunctivae are normal.   Neck: Neck supple. Cardiovascular: Normal rate, regular rhythm and normal heart sounds. Pulmonary/Chest: Effort normal and breath sounds normal. No respiratory distress. Abdominal: Soft. Lymphadenopathy:     She has no cervical adenopathy. Neurological: She is alert and oriented to person, place, and time. Skin: Skin is warm. Psychiatric: She has a normal mood and affect.      Glucose 135  ASSESSMENT and PLAN  HTN   Continue present meds   Low sodium diet   Continue meds  Depression   Continue Zoloft 50mg daily   RTO 2 months  Allergies   Continue Allegra 180mg daily   Add Atrovent nasal spray  DM   Continue januvia   Blood sugar 135 this am

## 2017-12-18 NOTE — MR AVS SNAPSHOT
Visit Information Date & Time Provider Department Dept. Phone Encounter #  
 12/18/2017  7:20 AM Corrine Rowe MD [facility] BEHAVIORAL MEDICINE Primary Care [phone] [id number] Follow-up Instructions Return in about 2 months (around 2/18/2018). Your Appointments 2/19/2018  7:40 AM  
Follow Up with Corrine Rowe MD  
[facility] BEHAVIORAL MEDICINE Primary Care [facility]) Appt Note: 2 month f/u; 2 month f/u  
 [facility] [place] [id number] [phone]  
  
   
 [address] [place] [id number] Upcoming Health Maintenance Date Due MICROALBUMIN Q1 1/13/2018 EYE EXAM RETINAL OR DILATED Q1 1/6/2018* HEMOGLOBIN A1C Q6M 5/14/2018 LIPID PANEL Q1 8/14/2018 FOOT EXAM Q1 10/11/2018 MEDICARE YEARLY EXAM 10/12/2018 GLAUCOMA SCREENING Q2Y 1/6/2019 DTaP/Tdap/Td series (2 - Td) 10/10/2026 *Topic was postponed. The date shown is not the original due date. Allergies as of 12/18/2017  Review Complete On: 12/18/2017 By: Corrine Rowe MD  
 No Known Allergies Current Immunizations  Reviewed on 10/17/2014 Name Date Influenza High Dose Vaccine PF 10/11/2017, 10/10/2016  4:51 PM  
 Influenza Vaccine 10/17/2014, 12/31/2013 Pneumococcal Conjugate (PCV-13) 10/11/2017 Pneumococcal Vaccine (Unspecified Type) 12/19/2006 Not reviewed this visit You Were Diagnosed With   
  
 Codes Comments Moderate single current episode of major depressive disorder ([facility]    -  Primary ICD-10-CM: F32.1 ICD-9-CM: 296.22 Essential hypertension     ICD-10-CM: I10 
ICD-9-CM: 401.9 Environmental allergies     ICD-10-CM: Z91.09 
ICD-9-CM: V15.09 Well controlled type 2 diabetes mellitus ([facility]     ICD-10-CM: E11.9 ICD-9-CM: 250.00 Vitals BP Pulse Temp Resp Height(growth percentile) Weight(growth percentile)  138/81 (BP 1 Location: Left arm, BP Patient Position: Sitting) 72 96.3 °F (35.7 °C) (Oral) 18 5' 6\" (1.676 m) 145 lb (65.8 kg) SpO2 BMI OB Status Smoking Status 98% 23.4 kg/m2 Hysterectomy Current Every Day Smoker Vitals History BMI and BSA Data Body Mass Index Body Surface Area  
 23.4 kg/m 2 1.75 m 2 Preferred Pharmacy Pharmacy Name Phone [facility]/PHARMACY #[id number] [address] [place] [phone] Your Updated Medication List  
  
   
This list is accurate as of: 17  7:47 AM.  Always use your most recent med list. amLODIPine 5 mg tablet Commonly known as:  Homer Blade TAKE 1 TABLET BY MOUTH ONCE A DAY  Indications: hypertension  
  
 fexofenadine 180 mg tablet Commonly known as:  Donya Neighbors Take 1 Tab by mouth daily. hydroCHLOROthiazide 12.5 mg tablet Commonly known as:  HYDRODIURIL Take 1 Tab by mouth daily. ipratropium 42 mcg (0.06 %) nasal spray Commonly known as:  ATROVENT  
2 Sprays by Both Nostrils route four (4) times daily as needed for Rhinitis. sertraline 50 mg tablet Commonly known as:  ZOLOFT Take 1 Tab by mouth two (2) times a day. SITagliptin 100 mg tablet Commonly known as:  Sonia Racer Take 1 Tab by mouth daily. triamcinolone 55 mcg nasal inhaler Commonly known as:  NASACORT  
1 [place] by Both Nostrils route two (2) times a day. Indications: ALLERGIC RHINITIS, ALLERGIC RHINITIS PREVENTION Prescriptions Sent to Pharmacy Refills  
 ipratropium (ATROVENT) 42 mcg (0.06 %) nasal spray 3 Si Sprays by Both Nostrils route four (4) times daily as needed for Rhinitis. Class: Normal  
 Pharmacy: [facility]/pharmacy #[id number] Serge[address] Davies [address] [place] Ph #: [phone] Route: Both Nostrils We Performed the Following AMB POC GLUCOSE BLOOD, BY GLUCOSE MONITORING DEVICE [55341 CPT(R)] COLLECTION CAPILLARY BLOOD SPECIMEN [69820 CPT(R)] Follow-up Instructions Return in about 2 months (around 2/18/2018). Introducing [facility] & HEALTH SERVICES! Jayleen Castellanos introduces [employer] patient portal. Now you can access parts of your medical record, email your doctor's office, and request medication refills online. 1. In your internet browser, go to [email or web address] 2. Click on the First Time User? Click Here link in the Sign In box. You will see the New Member Sign Up page. 3. Enter your [employer] Access Code exactly as it appears below. You will not need to use this code after youve completed the sign-up process. If you do not sign up before the expiration date, you must request a new code. · [employer] Access Code: [email or web address] Expires: 3/18/2018  7:47 AM 
 
4. Enter the last four digits of your Social Security Number (xxxx) and Date of Birth (mm/dd/yyyy) as indicated and click Submit. You will be taken to the next sign-up page. 5. Create a [employer] ID. This will be your [employer] login ID and cannot be changed, so think of one that is secure and easy to remember. 6. Create a [employer] password. You can change your password at any time. 7. Enter your Password Reset Question and Answer. This can be used at a later time if you forget your password. 8. Enter your e-mail address. You will receive e-mail notification when new information is available in [address] 9. Click Sign Up. You can now view and download portions of your medical record. 10. Click the Download Summary menu link to download a portable copy of your medical information. If you have questions, please visit the Frequently Asked Questions section of the [employer] website. Remember, [employer] is NOT to be used for urgent needs. For medical emergencies, dial 911. Now available from your iPhone and Android! Please provide this summary of care documentation to your next provider. Your primary care clinician is listed as Marine Prayer.  If you have any questions after today's visit, please call [phone].

## 2017-12-29 NOTE — PROGRESS NOTES
HISTORY OF PRESENT ILLNESS  Tien Manzanares is a [de-identified] y.o. female. HPI  She is having more symptoms of depression. Not sleeping well. Trouble getting to sleep and then staying asleep. Fatigued during the day. Not enjoying her usual activities. Feels more sad and feels like crying but she doesn't. Appetite has been down and she has been losing some weight. She is trying to make herself get out and go to touch and other activities. No suicidal thoughts or plans. She has been taking Sertraline 50mg for the past 4 weeks. Upset with her daughters as they are not getting along. Occasional thoughts of harming herself but no plan. Would not do that to her family. DM- on Januvia now. No hypoglycemia. Tolerating medications. HTN- stable- taking meds. Avoids sodium. Review of Systems   Constitutional: Positive for malaise/fatigue. Negative for fever. HENT: Negative for congestion, sinus pain and sore throat. Eyes: Negative for blurred vision. Respiratory: Negative for cough, hemoptysis and shortness of breath. Cardiovascular: Negative for chest pain, palpitations and PND. Gastrointestinal: Negative for abdominal pain, heartburn and nausea. Musculoskeletal: Negative for back pain, joint pain and myalgias. Neurological: Negative for dizziness and headaches. Psychiatric/Behavioral: Positive for depression and memory loss. The patient is nervous/anxious and has insomnia. Past Medical History:   Diagnosis Date    Allergic rhinitis due to pollen     Change in bowel habits     Diabetes ([address]     Dysuria     Hemorrhoids     Hypertension     IBS (irritable bowel syndrome)     Other diseases of nasal cavity and sinuses(478.19)      Past Surgical History:   Procedure Laterality Date    HX CHOLECYSTECTOMY  2016    HX COLONOSCOPY  2013?  HX HYSTERECTOMY       No Known Allergies  Blood pressure 133/70, pulse 75, temperature 97.3 °F (36.3 °C), temperature source Oral, resp.  rate 18, height In the next few weeks you may receive a Press Ganey survey regarding your most recent clinic visit with us.  Please take a few moments out of your day to accurately evaluate your visit.  We strive to provide you with the best medical care and hope you are happy with our services 100% of the time.  We consider any rating lower than a 9/10 unacceptable. If you believe you would rate our clinic less than this please let us know how we can improve.  Again, thank you for your time and we look forward to your next visit.           We want to give the best care possible. If you receive a Press Ganey survey from our office, please take the time to fill out the survey and return it in the envelope provided. Your feedback helps us know how we are doing and we really appreciate it. Thank you.     5' 6\" (1.676 m), weight 145 lb 6 oz (65.9 kg), SpO2 96 %. Physical Exam   Constitutional: She is oriented to person, place, and time. She appears well-developed and well-nourished. No distress. HENT:   Head: Normocephalic. Nose: Nose normal.   Mouth/Throat: Oropharynx is clear and moist.   Neck: Neck supple. Cardiovascular: Normal rate, regular rhythm and normal heart sounds. Pulmonary/Chest: Effort normal and breath sounds normal. No respiratory distress. Lymphadenopathy:     She has no cervical adenopathy. Neurological: She is alert and oriented to person, place, and time. Skin: Skin is warm. Psychiatric:   Good eye contact. Converses well.         ASSESSMENT and PLAN  Depression   Increase Sertraline to 50mg BID   Discussed ways to deal with family dynamics   RTO 2 months  DM   Check A1c   Discussed diet and exercise  HTN   Avoid sodium   Continue present meds

## 2018-02-27 ENCOUNTER — OFFICE VISIT (OUTPATIENT)
Dept: FAMILY MEDICINE CLINIC | Age: 83
End: 2018-02-27

## 2018-02-27 VITALS
SYSTOLIC BLOOD PRESSURE: 140 MMHG | RESPIRATION RATE: 18 BRPM | DIASTOLIC BLOOD PRESSURE: 82 MMHG | OXYGEN SATURATION: 98 % | HEART RATE: 76 BPM | WEIGHT: 161.5 LBS | HEIGHT: 66 IN | BODY MASS INDEX: 25.96 KG/M2

## 2018-02-27 DIAGNOSIS — E11.9 WELL CONTROLLED TYPE 2 DIABETES MELLITUS (HCC): ICD-10-CM

## 2018-02-27 DIAGNOSIS — E11.21 TYPE 2 DIABETES WITH NEPHROPATHY (HCC): Primary | ICD-10-CM

## 2018-02-27 DIAGNOSIS — F32.1 MODERATE SINGLE CURRENT EPISODE OF MAJOR DEPRESSIVE DISORDER (HCC): ICD-10-CM

## 2018-02-27 DIAGNOSIS — I10 ESSENTIAL HYPERTENSION: ICD-10-CM

## 2018-02-27 DIAGNOSIS — Z91.09 ENVIRONMENTAL ALLERGIES: ICD-10-CM

## 2018-02-27 NOTE — MR AVS SNAPSHOT
[address] [phone] Patient: Marc Galo MRN: [id number] Visit Information Date & Time Provider Department Dept. Phone Encounter #  
 2/27/2018  2:20 PM Silva De La Fuente MD [address] [place] [address] [id number] Follow-up Instructions Return in about 3 months (around 5/27/2018). Follow-up and Disposition History Upcoming Health Maintenance Date Due  
 EYE EXAM RETINAL OR DILATED Q1 6/30/1945 MICROALBUMIN Q1 1/13/2018 HEMOGLOBIN A1C Q6M 5/14/2018 LIPID PANEL Q1 8/14/2018 FOOT EXAM Q1 10/11/2018 MEDICARE YEARLY EXAM 10/12/2018 GLAUCOMA SCREENING Q2Y 1/6/2019 DTaP/Tdap/Td series (2 - Td) 10/10/2026 Allergies as of 2/27/2018  Review Complete On: 2/27/2018 By: Silva De La Fuente MD  
 No Known Allergies Current Immunizations  Reviewed on 10/17/2014 Name Date Influenza High Dose Vaccine PF 10/11/2017, 10/10/2016  4:51 PM  
 Influenza Vaccine 10/17/2014, 12/31/2013 Pneumococcal Conjugate (PCV-13) 10/11/2017 Pneumococcal Vaccine (Unspecified Type) 12/19/2006 Not reviewed this visit You Were Diagnosed With   
  
 Codes Comments Type 2 diabetes with nephropathy (HCC)    -  Primary ICD-10-CM: E11.21 
ICD-9-CM: 250.40, 583.81 Moderate single current episode of major depressive disorder (HCC)     ICD-10-CM: F32.1 ICD-9-CM: 296.22 Well controlled type 2 diabetes mellitus ([facility]     ICD-10-CM: E11.9 ICD-9-CM: 250.00 Essential hypertension     ICD-10-CM: I10 
ICD-9-CM: 401.9 Environmental allergies     ICD-10-CM: Z91.09 
ICD-9-CM: V15.09 Vitals BP Pulse Resp Height(growth percentile) Weight(growth percentile) SpO2  
 140/82 (BP 1 Location: Left arm) 76 18 5' 6\" (1.676 m) 161 lb 8 oz (73.3 kg) 98% BMI OB Status Smoking Status 26.07 kg/m2 Hysterectomy Current Every Day Smoker Vitals History BMI and BSA Data Body Mass Index Body Surface Area 26.07 kg/m 2 1.85 m 2 Preferred Pharmacy Pharmacy Name Phone Gabrielle Lynch, [facility] [phone] Your Updated Medication List  
  
   
This list is accurate as of 2/27/18  2:40 PM.  Always use your most recent med list. amLODIPine 5 mg tablet Commonly known as:  Rosalina Ndiaye TAKE 1 TABLET BY MOUTH ONCE A DAY  Indications: hypertension  
  
 fexofenadine 180 mg tablet Commonly known as:  Clarene Stalls Take 1 Tab by mouth daily. hydroCHLOROthiazide 12.5 mg tablet Commonly known as:  HYDRODIURIL Take 1 Tab by mouth daily. ipratropium 42 mcg (0.06 %) nasal spray Commonly known as:  ATROVENT  
2 Sprays by Both Nostrils route four (4) times daily as needed for Rhinitis. SITagliptin 100 mg tablet Commonly known as:  Marrianne Orange Take 1 Tab by mouth daily. triamcinolone 55 mcg nasal inhaler Commonly known as:  NASACORT  
1 [place] by Both Nostrils route two (2) times a day. Indications: ALLERGIC RHINITIS, ALLERGIC RHINITIS PREVENTION We Performed the Following CBC WITH AUTOMATED DIFF [15680 CPT(R)] COLLECTION VENOUS BLOOD,VENIPUNCTURE [id number] CPT(R)] HEMOGLOBIN A1C WITH EAG [06612 CPT(R)] METABOLIC PANEL, COMPREHENSIVE [42420 CPT(R)] MICROALB/CREAT RATIO, TIMED UR [id number] CPT(R)] Follow-up Instructions Return in about 3 months (around 5/27/2018). Introducing [facility] & HEALTH SERVICES! [employer] introduces [employer] patient portal. Now you can access parts of your medical record, email your doctor's office, and request medication refills online. 1. In your internet browser, go to [email or web address] 2. Click on the First Time User? Click Here link in the Sign In box. You will see the New Member Sign Up page. 3. Enter your [employer] Access Code exactly as it appears below. You will not need to use this code after youve completed the sign-up process. If you do not sign up before the expiration date, you must request a new code. · [employer] Access Code: [email or web address] Expires: 3/18/2018  7:47 AM 
 
4. Enter the last four digits of your Social Security Number (xxxx) and Date of Birth (mm/dd/yyyy) as indicated and click Submit. You will be taken to the next sign-up page. 5. Create a [employer] ID. This will be your [employer] login ID and cannot be changed, so think of one that is secure and easy to remember. 6. Create a [employer] password. You can change your password at any time. 7. Enter your Password Reset Question and Answer. This can be used at a later time if you forget your password. 8. Enter your e-mail address. You will receive e-mail notification when new information is available in [address] 9. Click Sign Up. You can now view and download portions of your medical record. 10. Click the Download Summary menu link to download a portable copy of your medical information. If you have questions, please visit the Frequently Asked Questions section of the [employer] website. Remember, [employer] is NOT to be used for urgent needs. For medical emergencies, dial 911. Now available from your iPhone and Android! Please provide this summary of care documentation to your next provider. Your primary care clinician is listed as Nicolasa Miller. If you have any questions after today's visit, please call [phone].

## 2018-02-27 NOTE — PROGRESS NOTES
HISTORY OF PRESENT ILLNESS  Saranya Worthington is a [de-identified] y.o. female. Diabetes   Pertinent negatives include no chest pain, no abdominal pain and no shortness of breath. Hypertension    Associated symptoms include malaise/fatigue. Pertinent negatives include no chest pain, no palpitations, no PND, no blurred vision, no dizziness, no shortness of breath and no nausea. Cholesterol Problem   Pertinent negatives include no chest pain, no abdominal pain and no shortness of breath. Allergies   Pertinent negatives include no chest pain, no abdominal pain and no shortness of breath. HTN- taking meds and doing well. BP has been good. Avoids sodium. Allergies- she is feeling well on the Allegra. She had more symptoms of depression and had been taking Zoloft. Feeling better. Stopped taking it a month ago. Getting out with friends more. DM- taking Januvia. No hypoglycemia. A1c in Nov was 6.3      Review of Systems   Constitutional: Positive for malaise/fatigue. Negative for fever. HENT: Negative for congestion, ear pain, sinus pain and sore throat. Eyes: Negative for blurred vision. Respiratory: Negative for cough, hemoptysis, sputum production and shortness of breath. Cardiovascular: Negative for chest pain, palpitations and PND. Gastrointestinal: Negative for abdominal pain, heartburn and nausea. Musculoskeletal: Positive for back pain. Negative for joint pain and myalgias. Neurological: Negative for dizziness. Psychiatric/Behavioral: Positive for memory loss. Negative for depression. The patient is not nervous/anxious and does not have insomnia.       Past Medical History:   Diagnosis Date    Allergic rhinitis due to pollen     Change in bowel habits     Diabetes ([address]     Dysuria     Hemorrhoids     Hypertension     IBS (irritable bowel syndrome)     Other diseases of nasal cavity and sinuses(478.19)      Past Surgical History:   Procedure Laterality Date    HX CHOLECYSTECTOMY 2016    HX COLONOSCOPY  2013?  HX HYSTERECTOMY       No Known Allergies    Blood pressure 140/82, pulse 76, resp. rate 18, height 5' 6\" (1.676 m), weight 161 lb 8 oz (73.3 kg), SpO2 98 %. Physical Exam   Constitutional: She is oriented to person, place, and time. She appears well-developed and well-nourished. No distress. HENT:   Head: Normocephalic. Right Ear: External ear normal.   Left Ear: External ear normal.   Mouth/Throat: Oropharynx is clear and moist.   Nose with clear mucous   Eyes: Conjunctivae are normal.   Neck: Neck supple. Cardiovascular: Normal rate, regular rhythm and normal heart sounds. Pulmonary/Chest: Effort normal and breath sounds normal. No respiratory distress. Abdominal: Soft. Lymphadenopathy:     She has no cervical adenopathy. Neurological: She is alert and oriented to person, place, and time. Skin: Skin is warm. Psychiatric: She has a normal mood and affect.        ASSESSMENT and PLAN  HTN   Continue present meds   Low sodium diet   Continue meds  Depression-stable   Will stay off meds for now   RTO if any problems  Allergies   Continue Allegra 180mg daily  DM   Continue januvia   Check labs and microalb

## 2018-03-01 LAB
ALBUMIN 24H UR-MRATE: NORMAL MG/DAY
ALBUMIN ?TM UR-MRATE: NORMAL UG/MIN (ref 0–20)
ALBUMIN SERPL-MCNC: 4.3 G/DL (ref 3.5–4.7)
ALBUMIN/CREAT UR: 4.1 UG/MG CREAT (ref 0–30)
ALBUMIN/GLOB SERPL: 1.5 {RATIO} (ref 1.2–2.2)
ALP SERPL-CCNC: 81 IU/L (ref 39–117)
ALT SERPL-CCNC: 9 IU/L (ref 0–32)
AST SERPL-CCNC: 16 IU/L (ref 0–40)
BASOPHILS # BLD AUTO: 0 X10E3/UL (ref 0–0.2)
BASOPHILS NFR BLD AUTO: 0 %
BILIRUB SERPL-MCNC: 0.5 MG/DL (ref 0–1.2)
BUN SERPL-MCNC: 19 MG/DL (ref 8–27)
BUN/CREAT SERPL: 21 (ref 12–28)
CALCIUM SERPL-MCNC: 9.6 MG/DL (ref 8.7–10.3)
CHLORIDE SERPL-SCNC: 99 MMOL/L (ref 96–106)
CO2 SERPL-SCNC: 29 MMOL/L (ref 18–29)
CREAT SERPL-MCNC: 0.91 MG/DL (ref 0.57–1)
CREAT UR-MCNC: 135.3 MG/DL
EOSINOPHIL # BLD AUTO: 0.1 X10E3/UL (ref 0–0.4)
EOSINOPHIL NFR BLD AUTO: 1 %
ERYTHROCYTE [DISTWIDTH] IN BLOOD BY AUTOMATED COUNT: 13.6 % (ref 12.3–15.4)
EST. AVERAGE GLUCOSE BLD GHB EST-MCNC: 134 MG/DL
GFR SERPLBLD CREATININE-BSD FMLA CKD-EPI: 60 ML/MIN/1.73
GFR SERPLBLD CREATININE-BSD FMLA CKD-EPI: 69 ML/MIN/1.73
GLOBULIN SER CALC-MCNC: 2.8 G/DL (ref 1.5–4.5)
GLUCOSE SERPL-MCNC: 79 MG/DL (ref 65–99)
HBA1C MFR BLD: 6.3 % (ref 4.8–5.6)
HCT VFR BLD AUTO: 40.3 % (ref 34–46.6)
HGB BLD-MCNC: 13.4 G/DL (ref 11.1–15.9)
IMM GRANULOCYTES # BLD: 0 X10E3/UL (ref 0–0.1)
IMM GRANULOCYTES NFR BLD: 0 %
LYMPHOCYTES # BLD AUTO: 2.8 X10E3/UL (ref 0.7–3.1)
LYMPHOCYTES NFR BLD AUTO: 34 %
MCH RBC QN AUTO: 30.4 PG (ref 26.6–33)
MCHC RBC AUTO-ENTMCNC: 33.3 G/DL (ref 31.5–35.7)
MCV RBC AUTO: 91 FL (ref 79–97)
MICROALBUMIN UR-MCNC: 5.5 UG/ML
MONOCYTES # BLD AUTO: 0.5 X10E3/UL (ref 0.1–0.9)
MONOCYTES NFR BLD AUTO: 6 %
NEUTROPHILS # BLD AUTO: 4.8 X10E3/UL (ref 1.4–7)
NEUTROPHILS NFR BLD AUTO: 59 %
PLATELET # BLD AUTO: 299 X10E3/UL (ref 150–379)
POTASSIUM SERPL-SCNC: 3.9 MMOL/L (ref 3.5–5.2)
PROT SERPL-MCNC: 7.1 G/DL (ref 6–8.5)
RBC # BLD AUTO: 4.41 X10E6/UL (ref 3.77–5.28)
SODIUM SERPL-SCNC: 143 MMOL/L (ref 134–144)
WBC # BLD AUTO: 8.1 X10E3/UL (ref 3.4–10.8)

## 2018-03-11 RX ORDER — SITAGLIPTIN 100 MG/1
TABLET, FILM COATED ORAL
Qty: 30 TAB | Refills: 5 | Status: SHIPPED | OUTPATIENT
Start: 2018-03-11 | End: 2018-05-10 | Stop reason: SDUPTHER

## 2018-03-27 ENCOUNTER — OFFICE VISIT (OUTPATIENT)
Dept: FAMILY MEDICINE CLINIC | Age: 83
End: 2018-03-27

## 2018-03-27 VITALS
TEMPERATURE: 97.9 F | HEART RATE: 86 BPM | OXYGEN SATURATION: 97 % | HEIGHT: 66 IN | WEIGHT: 159.13 LBS | DIASTOLIC BLOOD PRESSURE: 85 MMHG | SYSTOLIC BLOOD PRESSURE: 144 MMHG | RESPIRATION RATE: 18 BRPM | BODY MASS INDEX: 25.57 KG/M2

## 2018-03-27 DIAGNOSIS — J32.1 SINUSITIS CHRONIC, FRONTAL: Primary | ICD-10-CM

## 2018-03-27 DIAGNOSIS — M15.9 GENERALIZED OA: ICD-10-CM

## 2018-03-27 RX ORDER — CEFDINIR 300 MG/1
300 CAPSULE ORAL 2 TIMES DAILY
Qty: 20 CAP | Refills: 0 | Status: SHIPPED | OUTPATIENT
Start: 2018-03-27 | End: 2018-04-06

## 2018-03-27 RX ORDER — PETROLATUM,WHITE/LANOLIN
500 OINTMENT (GRAM) TOPICAL 3 TIMES DAILY
COMMUNITY
End: 2018-06-11

## 2018-03-27 NOTE — PROGRESS NOTES
Subjective:     Chief Complaint   Patient presents with    Medication Evaluation       Nithin Garrett is a [de-identified] y.o. female who is here today to ask if she can start taking OTC Glucosamine for her OA along with an OTC Dariel, Skin, and Nails supplement. She was advised about a potential interaction between the Zoloft and Glucosamine that can cause anti-platelet activity but overall this is a very small risk and it is ok for her to take it. Otherwise, her only complaint is some sinus pain with thick yellow mucus and chills she's had for almost 2 weeks now. Says she gets sinus infections frequently, especially in March. Has not taken anything for it. Denies SOB, fatigue, or CP. Afebrile today. Patient Active Problem List   Diagnosis Code    Environmental allergies Z91.09    Elevated cholesterol E78.00    Essential hypertension I5    Well controlled type 2 diabetes mellitus ([address] E11.9    Moderate single current episode of major depressive disorder ([address] F32.1    Type 2 diabetes with nephropathy ([address] E11.21       Past Medical History:   Diagnosis Date    Allergic rhinitis due to pollen     Change in bowel habits     Diabetes ([address]     Dysuria     Hemorrhoids     Hypertension     IBS (irritable bowel syndrome)     Other diseases of nasal cavity and sinuses(478.19)          Current Outpatient Prescriptions:     cefdinir (OMNICEF) 300 mg capsule, Take 1 Cap by mouth two (2) times a day for 10 days. Indications: sinusitis, Disp: 20 Cap, Rfl: 0    JANUVIA 100 mg tablet, TAKE 1 TABLET BY MOUTH EVERY DAY, Disp: 30 Tab, Rfl: 5    ipratropium (ATROVENT) 42 mcg (0.06 %) nasal spray, 2 Sprays by Both Nostrils route four (4) times daily as needed for Rhinitis., Disp: 15 mL, Rfl: 3    fexofenadine (ALLEGRA) 180 mg tablet, Take 1 Tab by mouth daily. , Disp: 90 Tab, Rfl: 1    triamcinolone (NASACORT) 55 mcg nasal inhaler, 1 [place] by Both Nostrils route two (2) times a day.  Indications: ALLERGIC RHINITIS, ALLERGIC RHINITIS PREVENTION, Disp: 1 Bottle, Rfl: 2    hydroCHLOROthiazide (HYDRODIURIL) 12.5 mg tablet, Take 1 Tab by mouth daily. , Disp: 90 Tab, Rfl: 0    amLODIPine (NORVASC) 5 mg tablet, TAKE 1 TABLET BY MOUTH ONCE A DAY  Indications: hypertension, Disp: 30 Tab, Rfl: 5    No Known Allergies    Past Surgical History:   Procedure Laterality Date    HX CHOLECYSTECTOMY  2016    HX COLONOSCOPY  2013?  HX HYSTERECTOMY         History   Smoking Status    Current Every Day Smoker    Packs/day: 0.25   Smokeless Tobacco    Never Used       Social History     Social History    Marital status:      Spouse name: N/A    Number of children: N/A    Years of education: N/A     Social History Main Topics    Smoking status: Current Every Day Smoker     Packs/day: 0.25    Smokeless tobacco: Never Used    Alcohol use No    Drug use: No    Sexual activity: Not Asked     Other Topics Concern    None     Social History Narrative       Family History   Problem Relation Age of Onset    No Known Problems Mother        ROS:  Gen: reports chills, denies fever or fatigue  HEENT:reports nasal congestion and rhinorrhea with thick, yellow mucus. Denies H/A, sneezing, or sore throat  Resp: denies dypsnea, cough, wheezing  CV: denies chest pain, pressure, or palpitations  Extremeties: denies edema, pallor, or cyanosis  Musculoskeletal: reports generalized achy joints   Skin: denies rashes or new lesions   Psych: denies anxiety, depression, brad, or other changes in mood      Objective:     Visit Vitals    /85 (BP 1 Location: Left arm)    Pulse 86    Temp 97.9 °F (36.6 °C) (Oral)    Resp 18    Ht 5' 6\" (1.676 m)    Wt 159 lb 2 oz (72.2 kg)    SpO2 97%    BMI 25.68 kg/m2     Body mass index is 25.68 kg/(m^2). General: Alert and oriented. No acute distress. Well nourished. HEENT :  Ears:TMs normal. Canals clear. Eyes: Pupils equal, round, reactive to light and accommodation.  Extra ocular movements intact. Nose: Patent. Nasal mucosa pink, edematous, without yellow drainage in her tissue. Mouth: Buccal mucosa, tongue, and pharynx are without lesions, erythema, or drainage   Neck: Supple with FROM. No lymphadenopathy  Lungs: All lobes clear to auscultation bilaterally   Heart :RRR, S1 and S2 normal intensity, no extra heart sounds  Extremities: Non-edematous, no pallor or cyanosis. Bilat. radial pulses 2+  Musculoskeletal: No acute joint pain, heat, erythema, or swelling. Neuro: Cranial nerves grossly normal.  MPsych: Mood and thought content appropriate for situation. Dressed appropriately and with good hygiene. Skin: Warm, dry, and intact. No lesions or discoloration. No results found for this visit on 03/27/18. Assessment/ Plan:     1. Sinusitis chronic, frontal  -Start cefdinir (OMNICEF) 300 mg capsule; Take 1 Cap by mouth two (2) times a day for 10 days. Indications: sinusitis  Dispense: 20 Cap; Refill: 0  -Be sure to complete your antibiotic, even if you are feeling well.  -Contact healthcare provider for any unwanted side effects.  -Get plenty of rest and drink plenty of fluids. Return to office for any worsening symptoms or if symptoms persist.      2. Generalized OA  -OK to take Glucosamine as directed   -Monitor for bruising, bleeding gums, or other abnormal s/s bleeding.  -May take Tylenol for additional relief. -RTO if pain or stiffness worsens         Orders Placed This Encounter    cefdinir (OMNICEF) 300 mg capsule     Sig: Take 1 Cap by mouth two (2) times a day for 10 days. Indications: sinusitis     Dispense:  20 Cap     Refill:  0         Verbal and written instructions (see AVS) provided.  Patient expresses understanding of diagnosis and treatment plan. Health Maintenance Due   Topic Date Due    EYE EXAM RETINAL OR DILATED Q1  06/30/1945           Abimael Delgado.  Kevin Yancey MD

## 2018-04-17 ENCOUNTER — TELEPHONE (OUTPATIENT)
Dept: FAMILY MEDICINE CLINIC | Age: 83
End: 2018-04-17

## 2018-04-17 NOTE — TELEPHONE ENCOUNTER
----- Message from Jeanna Goddard sent at 4/17/2018  7:19 AM EDT -----  Regarding: DR HOYOS/ TELEPHONE  Pt would like to speak with Provider or Nurse regarding Rx she has been taking.   Best Contact: [phone]

## 2018-04-17 NOTE — TELEPHONE ENCOUNTER
Called and spoke with patient. She states that she was not feeling too good and was resting and would call back if she had questions at a later time.

## 2018-05-11 RX ORDER — MECLIZINE HYDROCHLORIDE 25 MG/1
25 TABLET ORAL
Qty: 20 TAB | Refills: 0 | Status: SHIPPED | OUTPATIENT
Start: 2018-05-11 | End: 2018-06-11

## 2018-05-11 RX ORDER — AMLODIPINE BESYLATE 5 MG/1
TABLET ORAL
Qty: 30 TAB | Refills: 5 | Status: SHIPPED | OUTPATIENT
Start: 2018-05-11 | End: 2018-05-15 | Stop reason: SDUPTHER

## 2018-05-15 RX ORDER — AMLODIPINE BESYLATE 5 MG/1
TABLET ORAL
Qty: 90 TAB | Refills: 1 | Status: SHIPPED | OUTPATIENT
Start: 2018-05-15 | End: 2018-09-10 | Stop reason: SDUPTHER

## 2018-05-21 RX ORDER — MINERAL OIL
180 ENEMA (ML) RECTAL DAILY
Qty: 90 TAB | Refills: 1 | Status: SHIPPED | OUTPATIENT
Start: 2018-05-21 | End: 2018-06-11

## 2018-06-11 ENCOUNTER — OFFICE VISIT (OUTPATIENT)
Dept: FAMILY MEDICINE CLINIC | Age: 83
End: 2018-06-11

## 2018-06-11 VITALS
OXYGEN SATURATION: 98 % | DIASTOLIC BLOOD PRESSURE: 89 MMHG | BODY MASS INDEX: 25.61 KG/M2 | HEART RATE: 79 BPM | HEIGHT: 66 IN | WEIGHT: 159.38 LBS | RESPIRATION RATE: 18 BRPM | SYSTOLIC BLOOD PRESSURE: 168 MMHG

## 2018-06-11 DIAGNOSIS — Z91.09 ENVIRONMENTAL ALLERGIES: ICD-10-CM

## 2018-06-11 DIAGNOSIS — I10 ESSENTIAL HYPERTENSION: ICD-10-CM

## 2018-06-11 DIAGNOSIS — E11.9 WELL CONTROLLED TYPE 2 DIABETES MELLITUS (HCC): Primary | ICD-10-CM

## 2018-06-11 DIAGNOSIS — E78.00 ELEVATED CHOLESTEROL: ICD-10-CM

## 2018-06-11 DIAGNOSIS — E87.6 HYPOKALEMIA: ICD-10-CM

## 2018-06-11 RX ORDER — MINERAL OIL
180 ENEMA (ML) RECTAL DAILY
Qty: 90 TAB | Refills: 1 | Status: SHIPPED | OUTPATIENT
Start: 2018-06-11 | End: 2018-07-24 | Stop reason: SDUPTHER

## 2018-06-11 NOTE — PROGRESS NOTES
HISTORY OF PRESENT ILLNESS  Radha Smart is a [de-identified] y.o. female. HPI  She was seen in the ER on 4/18 after a fall. She hit her head. No LOC. A CT of the head was negative. She has not had any further episodes of dizziness and has not had any falls. She moved out of her house due to mold. Had this re mediated and she has been back in her home about 3 months. Feels better than she has in a long time. She is on Allegra. Taking Amlodipine for her BP. Tolerating well . Her potassium was down in the ER. Has not had rechecked. Has history of high cholesterol. Not on meds. On Januvia for her DM. Not checking her sugars at home. No hypoglycemia. Does not want to check her sugar at home. Review of Systems   Constitutional: Positive for malaise/fatigue. Negative for fever. HENT: Negative for congestion, ear pain, sinus pain and sore throat. Eyes: Negative for blurred vision. Respiratory: Negative for cough, sputum production, shortness of breath and wheezing. Cardiovascular: Negative for chest pain, palpitations and PND. Gastrointestinal: Negative for abdominal pain, heartburn and nausea. Musculoskeletal: Positive for back pain. Negative for joint pain and myalgias. Neurological: Negative for dizziness and headaches. Psychiatric/Behavioral: Positive for memory loss. Negative for depression. The patient is not nervous/anxious and does not have insomnia. Past Medical History:   Diagnosis Date    Allergic rhinitis due to pollen     Change in bowel habits     Diabetes ([address]     Dysuria     Hemorrhoids     Hypertension     IBS (irritable bowel syndrome)     Other diseases of nasal cavity and sinuses(478.19)      Past Surgical History:   Procedure Laterality Date    HX CHOLECYSTECTOMY  2016    HX COLONOSCOPY  2013?  HX HYSTERECTOMY       No Known Allergies  Blood pressure 168/89, pulse 79, resp.  rate 18, height 5' 6\" (1.676 m), weight 159 lb 6 oz (72.3 kg), SpO2 98 %.    Physical Exam   Constitutional: She appears well-developed and well-nourished. No distress. HENT:   Head: Normocephalic. Right Ear: External ear normal.   Left Ear: External ear normal.   Nose: Nose normal.   Mouth/Throat: Oropharynx is clear and moist.   Eyes: Conjunctivae are normal.   Neck: Neck supple. Cardiovascular: Normal rate, regular rhythm and normal heart sounds. Pulmonary/Chest: Effort normal and breath sounds normal. No respiratory distress. Lymphadenopathy:     She has no cervical adenopathy. Neurological: She is alert. Skin: Skin is warm. Psychiatric: She has a normal mood and affect. ASSESSMENT and PLAN  Diagnoses and all orders for this visit:    1. Well controlled type 2 diabetes mellitus ([facility]  -     METABOLIC PANEL, COMPREHENSIVE  -     HEMOGLOBIN A1C WITH EAG  -     TSH 3RD GENERATION    2. Environmental allergies    3. Essential hypertension  -     METABOLIC PANEL, COMPREHENSIVE  -     MAGNESIUM    4. Hypokalemia  -     METABOLIC PANEL, COMPREHENSIVE  -     MAGNESIUM    5. Elevated cholesterol  -     TSH 3RD GENERATION    Other orders  -     fexofenadine (ALLEGRA) 180 mg tablet; Take 1 Tab by mouth daily. Low sodium diet  Will call with labs later this week.    Prasanth Mckeon sent in to [facility]

## 2018-06-11 NOTE — MR AVS SNAPSHOT
[address] [phone] Patient: Jazmine Luna MRN: [id number] Visit Information Date & Time Provider Department Dept. Phone Encounter #  
 6/11/2018  3:20 PM Jimmy Palm MD [address] [place] [address] [id number] Follow-up Instructions Return in about 4 months (around 10/11/2018). Upcoming Health Maintenance Date Due  
 EYE EXAM RETINAL OR DILATED Q1 6/30/1945 Influenza Age 5 to Adult 8/1/2018 LIPID PANEL Q1 8/14/2018 HEMOGLOBIN A1C Q6M 8/27/2018 FOOT EXAM Q1 10/11/2018 MEDICARE YEARLY EXAM 10/12/2018 GLAUCOMA SCREENING Q2Y 1/6/2019 MICROALBUMIN Q1 2/27/2019 DTaP/Tdap/Td series (2 - Td) 10/10/2026 Allergies as of 6/11/2018  Review Complete On: 6/11/2018 By: Jimmy Palm MD  
 No Known Allergies Current Immunizations  Reviewed on 10/17/2014 Name Date Influenza High Dose Vaccine PF 10/11/2017, 10/10/2016  4:51 PM  
 Influenza Vaccine 10/17/2014, 12/31/2013 Pneumococcal Conjugate (PCV-13) 10/11/2017 Pneumococcal Vaccine (Unspecified Type) 12/19/2006 Not reviewed this visit You Were Diagnosed With   
  
 Codes Comments Well controlled type 2 diabetes mellitus ([facility]    -  Primary ICD-10-CM: E11.9 ICD-9-CM: 250.00 Environmental allergies     ICD-10-CM: Z91.09 
ICD-9-CM: V15.09 Essential hypertension     ICD-10-CM: I10 
ICD-9-CM: 401.9 Hypokalemia     ICD-10-CM: E87.6 ICD-9-CM: 276.8 Elevated cholesterol     ICD-10-CM: E78.00 ICD-9-CM: 272.0 Vitals BP Pulse Resp Height(growth percentile) Weight(growth percentile) SpO2  
 168/89 (BP 1 Location: Left arm) 79 18 5' 6\" (1.676 m) 159 lb 6 oz (72.3 kg) 98% BMI OB Status Smoking Status 25.72 kg/m2 Hysterectomy Current Every Day Smoker Vitals History BMI and BSA Data Body Mass Index Body Surface Area 25.72 kg/m 2 1.83 m 2 Preferred Pharmacy Pharmacy Name Phone [address] [facility], [address] [address] [place] [address] [address] Etta Romo Your Updated Medication List  
  
   
This list is accurate as of 6/11/18  4:09 PM.  Always use your most recent med list. amLODIPine 5 mg tablet Commonly known as:  [place] Alexa TAKE 1 TABLET BY MOUTH ONCE A DAY  Indications: hypertension  
  
 fexofenadine 180 mg tablet Commonly known as:  [place] Елена Take 1 Tab by mouth daily. hydroCHLOROthiazide 12.5 mg tablet Commonly known as:  HYDRODIURIL Take 1 Tab by mouth daily. ipratropium 42 mcg (0.06 %) nasal spray Commonly known as:  ATROVENT  
2 Sprays by Both Nostrils route four (4) times daily as needed for Rhinitis. SITagliptin 100 mg tablet Commonly known as:  [place] Peasant TAKE 1 TABLET BY MOUTH EVERY DAY  
  
 triamcinolone 55 mcg nasal inhaler Commonly known as:  NASACORT  
1 [place] by Both Nostrils route two (2) times a day. Indications: ALLERGIC RHINITIS, ALLERGIC RHINITIS PREVENTION Prescriptions Sent to Pharmacy Refills  
 fexofenadine (ALLEGRA) 180 mg tablet 1 Sig: Take 1 Tab by mouth daily. Class: Normal  
 Pharmacy: [facility]  #: [phone] Route: Oral  
  
We Performed the Following COLLECTION VENOUS BLOOD,VENIPUNCTURE K5438152 CPT(R)] HEMOGLOBIN A1C WITH EAG [93592 CPT(R)] MAGNESIUM [id number] CPT(R)] METABOLIC PANEL, COMPREHENSIVE [94632 CPT(R)] TSH 3RD GENERATION [31017 CPT(R)] Follow-up Instructions Return in about 4 months (around 10/11/2018). To-Do List   
 06/29/2018 9:30 AM  
  Appointment with [address] [place] [address] at [address] [place] [address] ([phone]) EXAM INSTRUCTIONS Do not wear deodorant, lotion, or powders to your appointment. GENERAL INSTRUCTIONS - Bring a list of all medications you are currently taking, including over the counter medications. - Only patients will be allowed in the exam room. This includes children. - Children under the age of 15 may not be left unattended. - [facility] patients must have an armband and be accompanied by a caregiver or family member. - If you have a hand-written prescription for this exam, you must bring it with you on the day of your exam. - Bring all relevant prior images from any facility outside of [address] [place] [address] with you on the day of your exam.  Failure to provide images may delay reading by Radiologist.  If you have questions or need to reschedule or cancel your appointment, call [phone].  
  
 10/08/2018 9:40 AM  
  Appointment with Kaylene Nino MD at [address] [address] ([phone]) Introducing [facility] SERVICES! [address] [place] [address] introduces [employer] patient portal. Now you can access parts of your medical record, email your doctor's office, and request medication refills online. 1. In your internet browser, go to [email or web address]-[email or web address] [email or web address] Groupt 2. Click on the First Time User? Click Here link in the Sign In box. You will see the New Member Sign Up page. 3. Enter your [employer] Access Code exactly as it appears below. You will not need to use this code after youve completed the sign-up process. If you do not sign up before the expiration date, you must request a new code. · [employer] Access Code: [email or web address] Expires: 6/25/2018  2:53 PM 
 
4. Enter the last four digits of your Social Security Number (xxxx) and Date of Birth (mm/dd/yyyy) as indicated and click Submit. You will be taken to the next sign-up page. 5. Create a [employer] ID. This will be your [employer] login ID and cannot be changed, so think of one that is secure and easy to remember. 6. Create a [facility] password. You can change your password at any time. 7. Enter your Password Reset Question and Answer. This can be used at a later time if you forget your password. 8. Enter your e-mail address. You will receive e-mail notification when new information is available in [address] 9. Click Sign Up. You can now view and download portions of your medical record. 10. Click the Download Summary menu link to download a portable copy of your medical information. If you have questions, please visit the Frequently Asked Questions section of the [employer] website. Remember, [facility] is NOT to be used for urgent needs. For medical emergencies, dial 911. Now available from your iPhone and Android! Please provide this summary of care documentation to your next provider. Your primary care clinician is listed as Rashid Ceballos. If you have any questions after today's visit, please call [phone].

## 2018-06-11 NOTE — PROGRESS NOTES
1. Have you been to the ER, urgent care clinic since your last visit? Hospitalized since your last visit? 2. Have you seen or consulted any other health care providers outside of the [facility] since your last visit? Include any pap smears or colon screening.  No

## 2018-06-12 LAB
ALBUMIN SERPL-MCNC: 4.1 G/DL (ref 3.5–4.7)
ALBUMIN/GLOB SERPL: 1.4 {RATIO} (ref 1.2–2.2)
ALP SERPL-CCNC: 86 IU/L (ref 39–117)
ALT SERPL-CCNC: 8 IU/L (ref 0–32)
AST SERPL-CCNC: 15 IU/L (ref 0–40)
BILIRUB SERPL-MCNC: 0.5 MG/DL (ref 0–1.2)
BUN SERPL-MCNC: 16 MG/DL (ref 8–27)
BUN/CREAT SERPL: 13 (ref 12–28)
CALCIUM SERPL-MCNC: 9.3 MG/DL (ref 8.7–10.3)
CHLORIDE SERPL-SCNC: 104 MMOL/L (ref 96–106)
CO2 SERPL-SCNC: 29 MMOL/L (ref 20–29)
CREAT SERPL-MCNC: 1.22 MG/DL (ref 0.57–1)
EST. AVERAGE GLUCOSE BLD GHB EST-MCNC: 134 MG/DL
GFR SERPLBLD CREATININE-BSD FMLA CKD-EPI: 42 ML/MIN/1.73
GFR SERPLBLD CREATININE-BSD FMLA CKD-EPI: 48 ML/MIN/1.73
GLOBULIN SER CALC-MCNC: 2.9 G/DL (ref 1.5–4.5)
GLUCOSE SERPL-MCNC: 113 MG/DL (ref 65–99)
HBA1C MFR BLD: 6.3 % (ref 4.8–5.6)
INTERPRETATION: NORMAL
MAGNESIUM SERPL-MCNC: 2 MG/DL (ref 1.6–2.3)
POTASSIUM SERPL-SCNC: 3.7 MMOL/L (ref 3.5–5.2)
PROT SERPL-MCNC: 7 G/DL (ref 6–8.5)
SODIUM SERPL-SCNC: 146 MMOL/L (ref 134–144)
TSH SERPL DL<=0.005 MIU/L-ACNC: 0.93 UIU/ML (ref 0.45–4.5)

## 2018-07-16 RX ORDER — OMEGA-3-ACID ETHYL ESTERS 1 G/1
2 CAPSULE, LIQUID FILLED ORAL 2 TIMES DAILY
COMMUNITY
Start: 2018-06-13 | End: 2018-07-16 | Stop reason: SDUPTHER

## 2018-07-16 RX ORDER — OMEGA-3-ACID ETHYL ESTERS 1 G/1
2 CAPSULE, LIQUID FILLED ORAL 2 TIMES DAILY
Qty: 360 CAP | Refills: 1 | Status: SHIPPED | OUTPATIENT
Start: 2018-07-16 | End: 2018-12-31 | Stop reason: SDUPTHER

## 2018-07-24 ENCOUNTER — OFFICE VISIT (OUTPATIENT)
Dept: FAMILY MEDICINE CLINIC | Age: 83
End: 2018-07-24

## 2018-07-24 VITALS
DIASTOLIC BLOOD PRESSURE: 92 MMHG | TEMPERATURE: 97.3 F | SYSTOLIC BLOOD PRESSURE: 159 MMHG | BODY MASS INDEX: 25.13 KG/M2 | OXYGEN SATURATION: 96 % | HEIGHT: 66 IN | HEART RATE: 85 BPM | WEIGHT: 156.38 LBS

## 2018-07-24 DIAGNOSIS — R19.7 DIARRHEA, UNSPECIFIED TYPE: ICD-10-CM

## 2018-07-24 DIAGNOSIS — R35.0 URINARY FREQUENCY: ICD-10-CM

## 2018-07-24 DIAGNOSIS — R10.32 LLQ ABDOMINAL PAIN: Primary | ICD-10-CM

## 2018-07-24 DIAGNOSIS — Z91.09 ENVIRONMENTAL ALLERGIES: ICD-10-CM

## 2018-07-24 LAB
BILIRUB UR QL STRIP: NEGATIVE
GLUCOSE UR-MCNC: NEGATIVE MG/DL
KETONES P FAST UR STRIP-MCNC: NEGATIVE MG/DL
PH UR STRIP: 5.5 [PH] (ref 4.6–8)
PROT UR QL STRIP: NEGATIVE
SP GR UR STRIP: 1.02 (ref 1–1.03)
UA UROBILINOGEN AMB POC: NORMAL (ref 0.2–1)
URINALYSIS CLARITY POC: CLEAR
URINALYSIS COLOR POC: NORMAL
URINE BLOOD POC: NEGATIVE
URINE LEUKOCYTES POC: NEGATIVE
URINE NITRITES POC: NEGATIVE

## 2018-07-24 RX ORDER — MINERAL OIL
180 ENEMA (ML) RECTAL DAILY
Qty: 30 TAB | Refills: 3 | Status: SHIPPED | OUTPATIENT
Start: 2018-07-24 | End: 2018-08-13 | Stop reason: ALTCHOICE

## 2018-07-24 NOTE — PROGRESS NOTES
Subjective:     Chief Complaint   Patient presents with    Abdominal Pain     LLQ x 3 days     Urinary Frequency     Intermittent     Urinary Odor    Nasal Congestion     Clear mucous        Christa Gordon is a 82 y.o. female who presents today with c/o LLQ abdominal pain that started 3 days ago after she ate some fresh tomatoes with seeds. Se has a hx of diverticulosis/diverticulitis and knows she is not supposed to eat seeds but thinks this may have caused the pain. Has also had some gas and bloating. She took 2 tylenol pills today and now the pain is gone. Was also having some urinary frequency but denies hematuria or dysuria. No vaginal discharge or bleeding. No N/V or fever/chills. Had some mild diarrhea over the weekend, no blood. She is also requesting that her Allegra be called into the [facility]. She takes this for her allergies. Was taking Zyrtec but says it is too expensive. BP is high today but pt says she forgot to take her BP medication. She took it just now with a glass of water. Patient Active Problem List   Diagnosis Code    Environmental allergies Z91.09    Elevated cholesterol E78.00    Essential hypertension I5    Well controlled type 2 diabetes mellitus ([address] E11.9    Moderate single current episode of major depressive disorder ([address] F32.1    Type 2 diabetes with nephropathy ([address] E11.21       Past Medical History:   Diagnosis Date    Allergic rhinitis due to pollen     Change in bowel habits     Diabetes ([facility]     Dysuria     Hemorrhoids     Hypertension     IBS (irritable bowel syndrome)     Menopause     Other diseases of nasal cavity and sinuses(478.19)          Current Outpatient Prescriptions:     omega-3 acid ethyl esters (LOVAZA) 1 gram capsule, Take 2 Caps by mouth two (2) times a day., Disp: 360 Cap, Rfl: 1    guaiFENesin-codeine (ROBITUSSIN AC) 100-10 mg/5 mL solution, Take 5 mL by mouth three (3) times daily as needed for Cough. Max Daily Amount: 15 mL., Disp: 60 mL, Rfl: 0    fexofenadine (ALLEGRA) 180 mg tablet, Take 1 Tab by mouth daily. , Disp: 90 Tab, Rfl: 1    SITagliptin (JANUVIA) 100 mg tablet, TAKE 1 TABLET BY MOUTH EVERY DAY, Disp: 90 Tab, Rfl: 1    amLODIPine (NORVASC) 5 mg tablet, TAKE 1 TABLET BY MOUTH ONCE A DAY  Indications: hypertension, Disp: 90 Tab, Rfl: 1    ipratropium (ATROVENT) 42 mcg (0.06 %) nasal spray, 2 Sprays by Both Nostrils route four (4) times daily as needed for Rhinitis., Disp: 15 mL, Rfl: 3    triamcinolone (NASACORT) 55 mcg nasal inhaler, 1 [place] by Both Nostrils route two (2) times a day. Indications: ALLERGIC RHINITIS, ALLERGIC RHINITIS PREVENTION, Disp: 1 Bottle, Rfl: 2    No Known Allergies    Past Surgical History:   Procedure Laterality Date    HX CHOLECYSTECTOMY  2016    HX COLONOSCOPY  2013?  HX HYSTERECTOMY         History   Smoking Status    Current Every Day Smoker    Packs/day: 0.25   Smokeless Tobacco    Never Used       Social History     Social History    Marital status:      Spouse name: N/A    Number of children: N/A    Years of education: N/A     Social History Main Topics    Smoking status: Current Every Day Smoker     Packs/day: 0.25    Smokeless tobacco: Never Used    Alcohol use No    Drug use: No    Sexual activity: Not Asked     Other Topics Concern    None     Social History Narrative       Family History   Problem Relation Age of Onset    No Known Problems Mother        ROS:  Gen: denies fever, chills, or fatigue  HEENT:denies H/A, nasal congestion, runny nose, or sore throat  Resp: denies dyspnea, cough, or wheezing  CV: denies chest pain, pressure, or palpitations  Extremeties: denies edema, pallor, or cyanosis  GI[de-identified] + LLQ abdominal pain, gas, bloating, and diarrhea over the weekend (has now resolved) No nausea, vomiting, or constipation. No melena or hematochezia. : + urinary frequency. denies dysuria, hematuria, or urinary urgency.  No vaginal discharge or bleeding  Musculoskeletal: no joint pain, stiffness, or muscle cramps  Skin: denies rashes or new lesions     Objective:     Visit Vitals    BP (!) 159/92 (BP 1 Location: Left arm, BP Patient Position: Sitting)  Comment: States has not tooked her BP Meds today    Pulse 85    Temp 97.3 °F (36.3 °C) (Oral)    Ht 5' 6\" (1.676 m)    Wt 156 lb 6 oz (70.9 kg)    SpO2 96%    BMI 25.24 kg/m2     Body mass index is 25.24 kg/(m^2). General: Alert and oriented. No acute distress. Well nourished. Lungs: Breathing even and unlabored. All lobes clear to auscultation bilaterally   Heart :RRR, S1 and S2 normal intensity, no extra heart sounds  Extremities: Non-edematous  Abdomen: Soft and non-distended. Bowel sounds active. No tenderness to palpation, no masses. Back: FROM, no tenderness to palpation. No CVA tenderness. Neuro: Cranial nerves grossly normal.  Mental status: Cognition, concentration, memory, and speech intact. Skin: Warm, dry, and intact. No lesions or discoloration. Results for orders placed or performed in visit on 07/24/18   AMB POC URINALYSIS DIP STICK AUTO W/O MICRO   Result Value Ref Range    Color (UA POC) Dark Yellow     Clarity (UA POC) Clear     Glucose (UA POC) Negative Negative    Bilirubin (UA POC) Negative Negative    Ketones (UA POC) Negative Negative    Specific gravity (UA POC) 1.020 1.001 - 1.035    Blood (UA POC) Negative Negative    pH (UA POC) 5.5 4.6 - 8.0    Protein (UA POC) Negative Negative    Urobilinogen (UA POC) 0.2 mg/dL 0.2 - 1    Nitrites (UA POC) Negative Negative    Leukocyte esterase (UA POC) Negative Negative       Assessment/ Plan:     1. Urinary frequency  - AMB POC URINALYSIS DIP STICK AUTO W/O MICRO- negative    2. LLQ abdominal pain/ diarrhea  Says pain and diarrhea are gone. Instructed to call me if abdominal pain returns or if fever/chills develop- Will order a CT scan to check for diverticulitis. Avoid nuts and seeds in diet.   Drink plenty of water  F/U prn    3. Environmental Allergies  Allegra 180mg po daily- refilled, sent to [facility] delivery pharmacy      Orders Placed This Encounter    AMB POC URINALYSIS DIP STICK AUTO W/O MICRO     AMB POC URINALYSIS DIP STICK AUTO W/O         Verbal and written instructions (see AVS) provided.  Patient expresses understanding of diagnosis and treatment plan. Health Maintenance Due   Topic Date Due    EYE EXAM RETINAL OR DILATED Q1  06/30/1945    LIPID PANEL Q1  08/14/2018         Follow-up Disposition: Not on File      Lillian Suarez NP

## 2018-08-13 ENCOUNTER — OFFICE VISIT (OUTPATIENT)
Dept: FAMILY MEDICINE CLINIC | Age: 83
End: 2018-08-13

## 2018-08-13 VITALS
TEMPERATURE: 97.7 F | RESPIRATION RATE: 18 BRPM | WEIGHT: 155.38 LBS | BODY MASS INDEX: 24.97 KG/M2 | DIASTOLIC BLOOD PRESSURE: 88 MMHG | HEART RATE: 110 BPM | OXYGEN SATURATION: 97 % | HEIGHT: 66 IN | SYSTOLIC BLOOD PRESSURE: 140 MMHG

## 2018-08-13 DIAGNOSIS — Z91.09 ENVIRONMENTAL ALLERGIES: ICD-10-CM

## 2018-08-13 DIAGNOSIS — E11.21 TYPE 2 DIABETES WITH NEPHROPATHY (HCC): ICD-10-CM

## 2018-08-13 DIAGNOSIS — K57.32 DIVERTICULITIS OF LARGE INTESTINE WITHOUT PERFORATION OR ABSCESS WITHOUT BLEEDING: Primary | ICD-10-CM

## 2018-08-13 DIAGNOSIS — I10 ESSENTIAL HYPERTENSION: ICD-10-CM

## 2018-08-13 RX ORDER — LEVOCETIRIZINE DIHYDROCHLORIDE 5 MG/1
5 TABLET, FILM COATED ORAL DAILY
Qty: 30 TAB | Refills: 3 | Status: SHIPPED | OUTPATIENT
Start: 2018-08-13 | End: 2018-10-31

## 2018-08-13 RX ORDER — CETIRIZINE HCL 10 MG
10 TABLET ORAL DAILY
Qty: 90 TAB | Refills: 1 | Status: SHIPPED | OUTPATIENT
Start: 2018-08-13 | End: 2018-08-13 | Stop reason: ALTCHOICE

## 2018-08-13 NOTE — PROGRESS NOTES
Elly Grissom is a 82 y.o. female who presents to the office today with the following:  Chief Complaint   Patient presents with   [facility] Follow Up     abd pain       HPI:  She was seen in the ER yesterday with c/o LLQ abd pain. Labs were normal. CT scan ok. Started on Augmentin. She is c/o poor appetite with some nausea. No fever or chills. Feeling a little better today. Allergy meds not working any longer. Would like to try something other than Zyrtec. Patient Active Problem List   Diagnosis Code    Environmental allergies Z91.09    Elevated cholesterol E78.00    Essential hypertension I5    Well controlled type 2 diabetes mellitus ([address] E11.9    Moderate single current episode of major depressive disorder ([address] F32.1    Type 2 diabetes with nephropathy (HCC) E11.21       No Known Allergies    Current Outpatient Prescriptions   Medication Sig    cetirizine (ZYRTEC) 10 mg tablet Take 1 Tab by mouth daily.  amoxicillin-clavulanate (AUGMENTIN) 875-125 mg per tablet Take 1 Tab by mouth two (2) times a day for 7 days.  omega-3 acid ethyl esters (LOVAZA) 1 gram capsule Take 2 Caps by mouth two (2) times a day.  SITagliptin (JANUVIA) 100 mg tablet TAKE 1 TABLET BY MOUTH EVERY DAY    amLODIPine (NORVASC) 5 mg tablet TAKE 1 TABLET BY MOUTH ONCE A DAY  Indications: hypertension    ipratropium (ATROVENT) 42 mcg (0.06 %) nasal spray 2 Sprays by Both Nostrils route four (4) times daily as needed for Rhinitis.  triamcinolone (NASACORT) 55 mcg nasal inhaler 1 Joseph by Both Nostrils route two (2) times a day. Indications: ALLERGIC RHINITIS, ALLERGIC RHINITIS PREVENTION     No current facility-administered medications for this visit.         Past Medical History:   Diagnosis Date    Allergic rhinitis due to pollen     Change in bowel habits     Diabetes ([address]     Dysuria     Hemorrhoids     Hypertension     IBS (irritable bowel syndrome)     Menopause     Other diseases of nasal cavity and sinuses(478.19)        Past Surgical History:   Procedure Laterality Date    HX CHOLECYSTECTOMY  2016    HX COLONOSCOPY  2013?     HX HYSTERECTOMY         Social History     Social History    Marital status:      Spouse name: N/A    Number of children: N/A    Years of education: N/A     Social History Main Topics    Smoking status: Current Every Day Smoker     Packs/day: 0.25    Smokeless tobacco: Never Used    Alcohol use No    Drug use: No    Sexual activity: Not Asked     Other Topics Concern    None     Social History Narrative       History   Smoking Status    Current Every Day Smoker    Packs/day: 0.25   Smokeless Tobacco    Never Used       Family History   Problem Relation Age of Onset    No Known Problems Mother        Vitals:    08/13/18 1527   BP: 140/88   BP 1 Location: Left arm   Pulse: (!) 110   Resp: 18   Temp: 97.7 °F (36.5 °C)   TempSrc: Oral   SpO2: 97%   Weight: 155 lb 6 oz (70.5 kg)   Height: 5' 6\" (1.676 m)       PHQ over the last two weeks 11/14/2017   PHQ Not Done -   Little interest or pleasure in doing things Several days   Feeling down, depressed, irritable, or hopeless More than half the days   Total Score PHQ 2 3   Trouble falling or staying asleep, or sleeping too much Several days   Feeling tired or having little energy Several days   Poor appetite, weight loss, or overeating More than half the days   Feeling bad about yourself - or that you are a failure or have let yourself or your family down Several days   Trouble concentrating on things such as school, work, reading, or watching TV Several days   Moving or speaking so slowly that other people could have noticed; or the opposite being so fidgety that others notice More than half the days   Thoughts of being better off dead, or hurting yourself in some way Several days   PHQ 9 Score 12   How difficult have these problems made it for you to do your work, take care of your home and get along with others Somewhat difficult       ROS:  Review of Systems   Constitutional: Positive for malaise/fatigue and weight loss. Negative for fever. HENT: Negative for congestion, ear pain, sinus pain and sore throat. Eyes: Negative for blurred vision. Respiratory: Negative for cough, sputum production, shortness of breath and wheezing. Cardiovascular: Negative for chest pain, palpitations and PND. Gastrointestinal: Positive for abdominal pain and nausea. Negative for constipation, diarrhea and heartburn. Musculoskeletal: Positive for back pain. Negative for joint pain and myalgias. Neurological: Negative for dizziness and headaches. Psychiatric/Behavioral: Positive for memory loss. Negative for depression. The patient is not nervous/anxious and does not have insomnia. Physical Exam:  Visit Vitals    /88 (BP 1 Location: Left arm)    Pulse (!) 110    Temp 97.7 °F (36.5 °C) (Oral)    Resp 18    Ht 5' 6\" (1.676 m)    Wt 155 lb 6 oz (70.5 kg)    SpO2 97%    BMI 25.08 kg/m2     Physical Exam   Constitutional: She is oriented to person, place, and time and well-developed, well-nourished, and in no distress. HENT:   Head: Normocephalic and atraumatic. Nose: Nose normal.   Mouth/Throat: Oropharynx is clear and moist.   Eyes: Conjunctivae are normal.   Neck: Neck supple. Cardiovascular: Normal rate and regular rhythm. Exam reveals friction rub. Pulmonary/Chest: Effort normal and breath sounds normal. No respiratory distress. Abdominal: Soft. Bowel sounds are normal. There is no tenderness. There is no rebound and no guarding. Musculoskeletal: She exhibits no edema. Lymphadenopathy:     She has no cervical adenopathy. Neurological: She is alert and oriented to person, place, and time. Gait normal.   Skin: Skin is warm and dry.    Psychiatric: Affect normal.       Assessment/Plan:  Results for orders placed or performed during the hospital encounter of 08/12/18   CBC WITH AUTOMATED DIFF   Result Value Ref Range    WBC 7.2 3.6 - 11.0 K/uL    RBC 4.47 3.80 - 5.20 M/uL    HGB 13.6 11.5 - 16.0 g/dL    HCT 41.5 35.0 - 47.0 %    MCV 92.8 80.0 - 99.0 FL    MCH 30.4 26.0 - 34.0 PG    MCHC 32.8 30.0 - 36.5 g/dL    RDW 12.7 11.5 - 14.5 %    PLATELET 373 921 - 725 K/uL    MPV 9.4 8.9 - 12.9 FL    NRBC 0.0 0  WBC    ABSOLUTE NRBC 0.00 0.00 - 0.01 K/uL    NEUTROPHILS 60 32 - 75 %    LYMPHOCYTES 28 12 - 49 %    MONOCYTES 8 5 - 13 %    EOSINOPHILS 2 0 - 7 %    BASOPHILS 1 0 - 1 %    IMMATURE GRANULOCYTES 0 0.0 - 0.5 %    ABS. NEUTROPHILS 4.4 1.8 - 8.0 K/UL    ABS. LYMPHOCYTES 2.1 0.8 - 3.5 K/UL    ABS. MONOCYTES 0.6 0.0 - 1.0 K/UL    ABS. EOSINOPHILS 0.2 0.0 - 0.4 K/UL    ABS. BASOPHILS 0.1 0.0 - 0.1 K/UL    ABS. IMM. GRANS. 0.0 0.00 - 0.04 K/UL    DF AUTOMATED     METABOLIC PANEL, COMPREHENSIVE   Result Value Ref Range    Sodium 143 136 - 145 mmol/L    Potassium 3.7 3.5 - 5.1 mmol/L    Chloride 105 97 - 108 mmol/L    CO2 34 (H) 21 - 32 mmol/L    Anion gap 4 (L) 5 - 15 mmol/L    Glucose 105 (H) 65 - 100 mg/dL    BUN 12 6 - 20 MG/DL    Creatinine 1.21 (H) 0.55 - 1.02 MG/DL    BUN/Creatinine ratio 10 (L) 12 - 20      GFR est AA 52 (L) >60 ml/min/1.73m2    GFR est non-AA 43 (L) >60 ml/min/1.73m2    Calcium 8.8 8.5 - 10.1 MG/DL    Bilirubin, total 0.9 0.2 - 1.0 MG/DL    ALT (SGPT) 18 12 - 78 U/L    AST (SGOT) 20 15 - 37 U/L    Alk.  phosphatase 81 45 - 117 U/L    Protein, total 7.3 6.4 - 8.2 g/dL    Albumin 3.4 (L) 3.5 - 5.0 g/dL    Globulin 3.9 2.0 - 4.0 g/dL    A-G Ratio 0.9 (L) 1.1 - 2.2     URINALYSIS W/ RFLX MICROSCOPIC   Result Value Ref Range    Color YELLOW/STRAW      Appearance CLEAR CLEAR      Specific gravity 1.015 1.003 - 1.030      pH (UA) 7.5 5.0 - 8.0      Protein NEGATIVE  NEG mg/dL    Glucose NEGATIVE  NEG mg/dL    Ketone NEGATIVE  NEG mg/dL    Bilirubin NEGATIVE  NEG      Blood NEGATIVE  NEG      Urobilinogen 0.2 0.2 - 1.0 EU/dL    Nitrites NEGATIVE  NEG      Leukocyte Esterase NEGATIVE NEG     LIPASE   Result Value Ref Range    Lipase 128 73 - 393 U/L       1. Diverticulitis of large intestine without perforation or abscess without bleeding  - Complete course of Augmentin  - Carroll soft diet  - RTO if no improvement or worsening symptoms  - Reviewed labs, CT results, and ER notes from yesterday    2. Type 2 diabetes with nephropathy (HCC)  - Continue Januvia  - Monitor blood sugars at home    3. Essential hypertension  - Continue present meds  - Avoid sodium    4. Allergies  - Stop Zyrtec and change to Xyzal daily       Follow-up Disposition: Not on File    Patient has been advised to contact practice or seek care if condition persists or worsens.      Lindsey Pan MD

## 2018-08-13 NOTE — PROGRESS NOTES
1. Have you been to the ER, urgent care clinic since your last visit? Hospitalized since your last visit? Yes When: 8-12-18 [facility] ER    2. Have you seen or consulted any other health care providers outside of the [address] [place] [address] since your last visit? Include any pap smears or colon screening.  No

## 2018-08-20 ENCOUNTER — TELEPHONE (OUTPATIENT)
Dept: FAMILY MEDICINE CLINIC | Age: 83
End: 2018-08-20

## 2018-08-20 NOTE — TELEPHONE ENCOUNTER
----- Message from Sapphire Collins sent at 8/20/2018  3:22 PM EDT -----  Regarding: Dr. Pauline Han  Pt is requesting a call back regarding, a referral needed for an ongoing issue pt is having with her bowel movements. Please call pt to discuss options.   Best contact number is [phone]

## 2018-08-20 NOTE — TELEPHONE ENCOUNTER
Pt would like referral for itching/burning when she has bowel movement and gas?  It is not getting any better she was just in the ER last week she says

## 2018-09-04 ENCOUNTER — DOCUMENTATION ONLY (OUTPATIENT)
Dept: FAMILY MEDICINE CLINIC | Age: 83
End: 2018-09-04

## 2018-09-28 PROBLEM — F32.9 MDD (MAJOR DEPRESSIVE DISORDER): Status: ACTIVE | Noted: 2018-09-28

## 2018-10-23 PROBLEM — F33.2 MAJOR DEPRESSIVE DISORDER, RECURRENT EPISODE, SEVERE (HCC): Status: ACTIVE | Noted: 2018-09-28

## 2019-01-30 RX ORDER — LEVOCETIRIZINE DIHYDROCHLORIDE 5 MG/1
TABLET, FILM COATED ORAL
Qty: 30 TAB | Refills: 3 | Status: SHIPPED | OUTPATIENT
Start: 2019-01-30 | End: 2019-03-25 | Stop reason: SDUPTHER

## 2019-02-25 RX ORDER — HYDROCHLOROTHIAZIDE 25 MG/1
TABLET ORAL
Qty: 90 TAB | Refills: 0 | Status: SHIPPED | OUTPATIENT
Start: 2019-02-25 | End: 2019-03-05 | Stop reason: ALTCHOICE

## 2019-04-09 PROBLEM — F32.A DEPRESSION: Status: ACTIVE | Noted: 2019-04-09

## 2019-04-10 PROBLEM — F32.A DEPRESSION: Status: RESOLVED | Noted: 2019-04-09 | Resolved: 2019-04-10

## 2019-05-22 RX ORDER — HYDROCHLOROTHIAZIDE 25 MG/1
TABLET ORAL
Qty: 90 TAB | Refills: 0 | Status: SHIPPED | OUTPATIENT
Start: 2019-05-22 | End: 2019-09-16 | Stop reason: SDUPTHER

## 2019-09-08 PROBLEM — R10.13 EPIGASTRIC PAIN: Status: ACTIVE | Noted: 2019-09-08

## 2019-09-08 PROBLEM — E87.20 LACTIC ACIDEMIA: Status: ACTIVE | Noted: 2019-09-08

## 2019-09-08 PROBLEM — R11.2 NAUSEA AND VOMITING: Status: ACTIVE | Noted: 2019-09-08

## 2019-09-09 PROBLEM — E11.21 TYPE 2 DIABETES WITH NEPHROPATHY (HCC): Chronic | Status: ACTIVE | Noted: 2018-02-27

## 2019-09-09 PROBLEM — I10 ESSENTIAL HYPERTENSION: Chronic | Status: ACTIVE | Noted: 2017-03-17

## 2020-02-20 PROBLEM — Z72.0 TOBACCO USE: Status: ACTIVE | Noted: 2020-02-20

## 2020-02-20 PROBLEM — M15.9 PRIMARY OSTEOARTHRITIS INVOLVING MULTIPLE JOINTS: Status: ACTIVE | Noted: 2020-02-20

## 2020-02-23 PROBLEM — N18.30 STAGE 3 CHRONIC KIDNEY DISEASE (HCC): Status: ACTIVE | Noted: 2020-02-23

## 2020-04-06 ENCOUNTER — PATIENT OUTREACH (OUTPATIENT)
Dept: CARDIOLOGY CLINIC | Age: 85
End: 2020-04-06

## 2020-04-06 NOTE — PROGRESS NOTES
Called pt to follow up on ED visit to [facility] on 4/3/2020. Unable to reach pt but spoke with pt's daughter Elías Bullard who reports that pt is doing okay and has scheduled a follow up appt with PCP. Patient contacted regarding recent discharge and COVID-19 risk   Care Transition Nurse/ Ambulatory Care Manager contacted the family by telephone to perform post discharge assessment. Verified name and  with family as identifiers. Patient has following risk factors of: ED visit to [facility] on 4/3/2020. CTN/ACM reviewed discharge instructions, medical action plan and red flags related to discharge diagnosis. Reviewed and educated them on any new and changed medications related to discharge diagnosis. Advised obtaining a 90-day supply of all daily and as-needed medications. Education provided regarding infection prevention, and signs and symptoms of COVID-19 and when to seek medical attention with family who verbalized understanding. Discussed exposure protocols and quarantine from [address] you at higher risk for severe illness  and given an opportunity for questions and concerns. The family agrees to contact the COVID-19 hotline [phone] or PCP office for questions related to their healthcare. CTN/ACM provided contact information for future reference. From CDC: Are you at higher risk for severe illness?  Wash your hands often.  Avoid close contact (6 feet, which is about two arm lengths) with people who are sick.  Put distance between yourself and other people if COVID-19 is spreading in your community.  Clean and disinfect frequently touched surfaces.  Avoid all cruise travel and non-essential air travel.  Call your healthcare professional if you have concerns about COVID-19 and your underlying condition or if you are sick.     For more information on steps you can take to protect yourself, see CDC's How to Protect Yourself     Patient/family/caregiver given information for [employer] and agrees to enroll not offered today  Patient's preferred e-mail:    Based on Loop alert triggers, patient will be contacted by nurse care manager for worsening symptoms. Plan for follow-up call in 14 days based on severity of symptoms and risk factors    Unable to provide education to pt but provided information to pt's daughter who states that she will contact pt concerning full VM. Pt's daughter states appreciation for the information.

## 2020-04-20 ENCOUNTER — PATIENT OUTREACH (OUTPATIENT)
Dept: CASE MANAGEMENT | Age: 85
End: 2020-04-20

## 2020-04-20 NOTE — PROGRESS NOTES
Patient resolved from Transition of Care episode on 4/20/2020  Patient/family has been provided the following resources and education related to COVID-19:                         Signs, symptoms and red flags related to COVID-19            CDC exposure and quarantine guidelines            [employer] exposure contact - [phone]            Contact for their local Department of Health                 Patient currently reports that the following symptoms have improved:  no new/worsening symptoms per patient's daughter. No further outreach scheduled with this CTN/ACM. Episode of Care resolved. Patient has this CTN/ACM contact information if future needs arise.

## 2020-05-05 PROBLEM — H25.9 AGE-RELATED CATARACT OF BOTH EYES: Chronic | Status: ACTIVE | Noted: 2020-05-05

## 2020-06-30 PROBLEM — I63.9 CVA (CEREBRAL VASCULAR ACCIDENT) (HCC): Status: ACTIVE | Noted: 2020-06-30

## 2020-06-30 PROBLEM — F17.210 CIGARETTE SMOKER: Chronic | Status: ACTIVE | Noted: 2020-06-30

## 2020-06-30 PROBLEM — N30.90 CYSTITIS: Status: ACTIVE | Noted: 2020-06-30

## 2020-07-03 ENCOUNTER — PATIENT OUTREACH (OUTPATIENT)
Dept: CASE MANAGEMENT | Age: 85
End: 2020-07-03

## 2020-07-03 NOTE — PROGRESS NOTES
Called pt who states \"I am not feeling well today, could you call back later today? \"  I offered to try to call her later today or tomorrow and pt states that is fine. Verified  with pt. Will call back.

## 2020-07-14 ENCOUNTER — HOSPITAL ENCOUNTER (INPATIENT)
Age: 85
LOS: 3 days | Discharge: HOME | DRG: 378 | End: 2020-07-17
Attending: GENERAL ACUTE CARE HOSPITAL | Admitting: INTERNAL MEDICINE
Payer: MEDICARE

## 2020-07-14 ENCOUNTER — APPOINTMENT (OUTPATIENT)
Dept: CT IMAGING | Age: 85
DRG: 378 | End: 2020-07-14
Attending: INTERNAL MEDICINE
Payer: MEDICARE

## 2020-07-14 PROBLEM — K92.2 GIB (GASTROINTESTINAL BLEEDING): Status: ACTIVE | Noted: 2020-07-14

## 2020-07-14 LAB
ANION GAP SERPL CALC-SCNC: 3 MMOL/L (ref 5–15)
BUN SERPL-MCNC: 13 MG/DL (ref 6–20)
BUN/CREAT SERPL: 13 (ref 12–20)
CALCIUM SERPL-MCNC: 7.9 MG/DL (ref 8.5–10.1)
CHLORIDE SERPL-SCNC: 114 MMOL/L (ref 97–108)
CO2 SERPL-SCNC: 26 MMOL/L (ref 21–32)
CREAT SERPL-MCNC: 1 MG/DL (ref 0.55–1.02)
ERYTHROCYTE [DISTWIDTH] IN BLOOD BY AUTOMATED COUNT: 12.6 % (ref 11.5–14.5)
GLUCOSE BLD STRIP.AUTO-MCNC: 120 MG/DL (ref 65–100)
GLUCOSE BLD STRIP.AUTO-MCNC: 128 MG/DL (ref 65–100)
GLUCOSE BLD STRIP.AUTO-MCNC: 89 MG/DL (ref 65–100)
GLUCOSE SERPL-MCNC: 124 MG/DL (ref 65–100)
HCT VFR BLD AUTO: 28.9 % (ref 35–47)
HCT VFR BLD AUTO: 29 % (ref 35–47)
HCT VFR BLD AUTO: 30.9 % (ref 35–47)
HCT VFR BLD AUTO: 32.6 % (ref 35–47)
HGB BLD-MCNC: 10 G/DL (ref 11.5–16)
HGB BLD-MCNC: 10.7 G/DL (ref 11.5–16)
HGB BLD-MCNC: 9.2 G/DL (ref 11.5–16)
HGB BLD-MCNC: 9.3 G/DL (ref 11.5–16)
MCH RBC QN AUTO: 31 PG (ref 26–34)
MCHC RBC AUTO-ENTMCNC: 32.8 G/DL (ref 30–36.5)
MCV RBC AUTO: 94.5 FL (ref 80–99)
NRBC # BLD: 0 K/UL (ref 0–0.01)
NRBC BLD-RTO: 0 PER 100 WBC
PLATELET # BLD AUTO: 249 K/UL (ref 150–400)
PMV BLD AUTO: 10.5 FL (ref 8.9–12.9)
POTASSIUM SERPL-SCNC: 4.2 MMOL/L (ref 3.5–5.1)
RBC # BLD AUTO: 3.45 M/UL (ref 3.8–5.2)
SERVICE CMNT-IMP: ABNORMAL
SERVICE CMNT-IMP: ABNORMAL
SERVICE CMNT-IMP: NORMAL
SODIUM SERPL-SCNC: 143 MMOL/L (ref 136–145)
WBC # BLD AUTO: 11.5 K/UL (ref 3.6–11)

## 2020-07-14 PROCEDURE — 74011000250 HC RX REV CODE- 250: Performed by: PHYSICIAN ASSISTANT

## 2020-07-14 PROCEDURE — 80048 BASIC METABOLIC PNL TOTAL CA: CPT

## 2020-07-14 PROCEDURE — 85027 COMPLETE CBC AUTOMATED: CPT

## 2020-07-14 PROCEDURE — 74011250636 HC RX REV CODE- 250/636: Performed by: INTERNAL MEDICINE

## 2020-07-14 PROCEDURE — 74176 CT ABD & PELVIS W/O CONTRAST: CPT

## 2020-07-14 PROCEDURE — 74011000258 HC RX REV CODE- 258: Performed by: INTERNAL MEDICINE

## 2020-07-14 PROCEDURE — 36415 COLL VENOUS BLD VENIPUNCTURE: CPT

## 2020-07-14 PROCEDURE — 85018 HEMOGLOBIN: CPT

## 2020-07-14 PROCEDURE — 65660000000 HC RM CCU STEPDOWN

## 2020-07-14 PROCEDURE — 82962 GLUCOSE BLOOD TEST: CPT

## 2020-07-14 PROCEDURE — C9113 INJ PANTOPRAZOLE SODIUM, VIA: HCPCS | Performed by: INTERNAL MEDICINE

## 2020-07-14 RX ORDER — ACETAMINOPHEN 650 MG/1
650 SUPPOSITORY RECTAL
Status: DISCONTINUED | OUTPATIENT
Start: 2020-07-14 | End: 2020-07-17 | Stop reason: HOSPADM

## 2020-07-14 RX ORDER — INSULIN LISPRO 100 [IU]/ML
INJECTION, SOLUTION INTRAVENOUS; SUBCUTANEOUS
Status: DISCONTINUED | OUTPATIENT
Start: 2020-07-14 | End: 2020-07-17 | Stop reason: HOSPADM

## 2020-07-14 RX ORDER — SODIUM CHLORIDE 0.9 % (FLUSH) 0.9 %
5-40 SYRINGE (ML) INJECTION EVERY 8 HOURS
Status: DISCONTINUED | OUTPATIENT
Start: 2020-07-14 | End: 2020-07-17 | Stop reason: HOSPADM

## 2020-07-14 RX ORDER — ACETAMINOPHEN 325 MG/1
650 TABLET ORAL
Status: DISCONTINUED | OUTPATIENT
Start: 2020-07-14 | End: 2020-07-17 | Stop reason: HOSPADM

## 2020-07-14 RX ORDER — ONDANSETRON 2 MG/ML
4 INJECTION INTRAMUSCULAR; INTRAVENOUS
Status: DISCONTINUED | OUTPATIENT
Start: 2020-07-14 | End: 2020-07-17 | Stop reason: HOSPADM

## 2020-07-14 RX ORDER — PROMETHAZINE HYDROCHLORIDE 25 MG/1
12.5 TABLET ORAL
Status: DISCONTINUED | OUTPATIENT
Start: 2020-07-14 | End: 2020-07-17 | Stop reason: HOSPADM

## 2020-07-14 RX ORDER — MAGNESIUM SULFATE 100 %
4 CRYSTALS MISCELLANEOUS AS NEEDED
Status: DISCONTINUED | OUTPATIENT
Start: 2020-07-14 | End: 2020-07-17 | Stop reason: HOSPADM

## 2020-07-14 RX ORDER — DEXTROSE MONOHYDRATE AND SODIUM CHLORIDE 5; .9 G/100ML; G/100ML
75 INJECTION, SOLUTION INTRAVENOUS CONTINUOUS
Status: DISCONTINUED | OUTPATIENT
Start: 2020-07-14 | End: 2020-07-15

## 2020-07-14 RX ORDER — DEXTROSE 50 % IN WATER (D50W) INTRAVENOUS SYRINGE
25-50 AS NEEDED
Status: DISCONTINUED | OUTPATIENT
Start: 2020-07-14 | End: 2020-07-17 | Stop reason: HOSPADM

## 2020-07-14 RX ORDER — POLYETHYLENE GLYCOL 3350, SODIUM SULFATE ANHYDROUS, SODIUM BICARBONATE, SODIUM CHLORIDE, POTASSIUM CHLORIDE 236; 22.74; 6.74; 5.86; 2.97 G/4L; G/4L; G/4L; G/4L; G/4L
4000 POWDER, FOR SOLUTION ORAL ONCE
Status: DISCONTINUED | OUTPATIENT
Start: 2020-07-14 | End: 2020-07-14 | Stop reason: CLARIF

## 2020-07-14 RX ORDER — SODIUM CHLORIDE 0.9 % (FLUSH) 0.9 %
5-40 SYRINGE (ML) INJECTION AS NEEDED
Status: DISCONTINUED | OUTPATIENT
Start: 2020-07-14 | End: 2020-07-17 | Stop reason: HOSPADM

## 2020-07-14 RX ORDER — POLYETHYLENE GLYCOL 3350 17 G/17G
17 POWDER, FOR SOLUTION ORAL DAILY PRN
Status: DISCONTINUED | OUTPATIENT
Start: 2020-07-14 | End: 2020-07-17 | Stop reason: HOSPADM

## 2020-07-14 RX ORDER — PANTOPRAZOLE SODIUM 40 MG/10ML
40 INJECTION, POWDER, LYOPHILIZED, FOR SOLUTION INTRAVENOUS EVERY 12 HOURS
Status: DISCONTINUED | OUTPATIENT
Start: 2020-07-14 | End: 2020-07-17 | Stop reason: HOSPADM

## 2020-07-14 RX ADMIN — PIPERACILLIN AND TAZOBACTAM 3.38 G: 3; .375 INJECTION, POWDER, LYOPHILIZED, FOR SOLUTION INTRAVENOUS at 21:33

## 2020-07-14 RX ADMIN — Medication 10 ML: at 21:32

## 2020-07-14 RX ADMIN — PIPERACILLIN AND TAZOBACTAM 3.38 G: 3; .375 INJECTION, POWDER, LYOPHILIZED, FOR SOLUTION INTRAVENOUS at 06:17

## 2020-07-14 RX ADMIN — POLYETHYLENE GLYCOL-3350 AND ELECTROLYTES 4000 ML: 236; 6.74; 5.86; 2.97; 22.74 POWDER, FOR SOLUTION ORAL at 18:00

## 2020-07-14 RX ADMIN — DEXTROSE MONOHYDRATE AND SODIUM CHLORIDE 75 ML/HR: 5; .9 INJECTION, SOLUTION INTRAVENOUS at 04:32

## 2020-07-14 RX ADMIN — Medication 10 ML: at 06:17

## 2020-07-14 RX ADMIN — PANTOPRAZOLE SODIUM 40 MG: 40 INJECTION, POWDER, FOR SOLUTION INTRAVENOUS at 10:13

## 2020-07-14 RX ADMIN — Medication 10 ML: at 14:00

## 2020-07-14 RX ADMIN — PIPERACILLIN AND TAZOBACTAM 3.38 G: 3; .375 INJECTION, POWDER, LYOPHILIZED, FOR SOLUTION INTRAVENOUS at 16:13

## 2020-07-14 RX ADMIN — PANTOPRAZOLE SODIUM 40 MG: 40 INJECTION, POWDER, FOR SOLUTION INTRAVENOUS at 21:34

## 2020-07-14 NOTE — CONSULTS
Gastroenterology Consult  Binh Yan PA-C for Dr. Manjit Abernathy)     Referring Physician: Dr. Adore Harris Date: 7/14/2020     Subjective:     Chief Complaint: bleeding    History of Present Illness: Srikanth Parra is a 82 y.o. female who is seen in consultation for GIB. She was transferred here from an outside hospital.  The bleeding started last night. She had at least 4 large maroon BM's. Her last one was at 0100 this AM.  She denies abd pain but has tenderness across her lower abd. CT abd/pelvis without contrast was notable for diverticulosis without diverticulitis. Lactic acid level was normal.  Hgb dropped from 13.4 to 10.0. She denies light headedness or dizziness or SOB. Bun 13, crt 1.00. She was started on full dose ASA recently following hospitalization for possible stroke. No other blood thinners or NSAIDs. Has been taking famotidine since 7/3 as prescribed by the ER when CT was negative for diverticulitis but not on a PPI. She reports a remote colonoscopy in [facility] but doesn't remember the details and the report is not available. No prior EGD. No significant weight loss. No significant GI FH. Patient provides the history and is unsure about some of the details. Past Medical History:   Diagnosis Date    Allergic rhinitis due to pollen     BPV (benign positional vertigo)     Change in bowel habits     Depression     Diabetes ([address]     Dysuria     Hemorrhoids     Hypertension     IBS (irritable bowel syndrome)     Menopause     Other diseases of nasal cavity and sinuses(478.19)     Vertigo      Past Surgical History:   Procedure Laterality Date    HX CHOLECYSTECTOMY  2016    HX COLONOSCOPY  2013?     HX HYSTERECTOMY        Family History   Problem Relation Age of Onset    No Known Problems Mother      Social History     Tobacco Use    Smoking status: Current Every Day Smoker     Packs/day: 0.25    Smokeless tobacco: Never Used   Substance Use Topics    Alcohol use: No      No Known Allergies  Current Facility-Administered Medications   Medication Dose Route Frequency    sodium chloride (NS) flush 5-40 mL  5-40 mL IntraVENous Q8H    sodium chloride (NS) flush 5-40 mL  5-40 mL IntraVENous PRN    acetaminophen (TYLENOL) tablet 650 mg  650 mg Oral Q6H PRN    Or    acetaminophen (TYLENOL) suppository 650 mg  650 mg Rectal Q6H PRN    polyethylene glycol (MIRALAX) packet 17 g  17 g Oral DAILY PRN    promethazine (PHENERGAN) tablet 12.5 mg  12.5 mg Oral Q6H PRN    Or    ondansetron (ZOFRAN) injection 4 mg  4 mg IntraVENous Q6H PRN    pantoprazole (PROTONIX) injection 40 mg  40 mg IntraVENous Q12H    dextrose 5% and 0.9% NaCl infusion  75 mL/hr IntraVENous CONTINUOUS    piperacillin-tazobactam (ZOSYN) 3.375 g in 0.9% sodium chloride (MBP/ADV) 100 mL  3.375 g IntraVENous Q8H    glucose chewable tablet 16 g  4 Tab Oral PRN    dextrose (D50W) injection syrg 12.5-25 g  25-50 mL IntraVENous PRN    glucagon (GLUCAGEN) injection 1 mg  1 mg IntraMUSCular PRN    insulin lispro (HUMALOG) injection   SubCUTAneous AC&HS    peg 3350-electrolytes (COLYTE) 4000 mL  4,000 mL Oral ONCE        Review of Systems:  A detailed review of systems was performed as follows:  Constitutional:  Negative  Eyes:  No ocular sensitivity to the sun, blurred vision or double vision. ENMT:  No nose or sinus problems. Respiratory: No coughing, wheezing or sob  Cardiac:  No chest pain, exertional chest pain or palpitations  Gastrointestinal:  See history of the present illness  :   No pain with urination or hematuria  Musculoskeletal:  +arthritis   Endocrine:  No thyroid disease or diabetes  Psychiatric: No depression or feeling blue  Integumentary:  No skin rash or sensitivity to the sun.   Neurologic:  +possible stroke  Heme-Lymphatic:  No history of anemia, no unexplained lumps or bumps      Objective:     Physical Exam:  Visit Vitals  /66   Pulse 60   Temp 98.8 °F (37.1 °C) Resp 16   SpO2 98%        Gen: pleasant elderly bf in nad  Skin:  Extremities and face reveal no rashes. HEENT: Sclerae anicteric. Cardiovascular: Regular rate and rhythm. No murmurs, gallops, or rubs. Respiratory:  Comfortable breathing with no accessory muscle use. Clear breath sounds with no wheezes, rales, or rhonchi. GI:  Abdomen nondistended. +BS, soft, Moderate tenderness across lower abd most notable in RLQ  Rectal:  Deferred  Musculoskeletal:  No pitting edema of the lower legs. Neurological:  Gross memory appears impaired. Patient is alert and oriented. Psychiatric:  Mood appears appropriate with judgement intact. Lymphatic:  No cervical or supraclavicular adenopathy. Lab/Data Review:  CT Results (most recent):  Results from Hospital Encounter encounter on 07/14/20   CT ABD PELV WO CONT    Narrative INDICATION: pain    COMPARISON: July 3, 2020    TECHNIQUE:   Noncontrast thin axial images were obtained through the abdomen and pelvis. Coronal and sagittal reconstructions were generated. CT dose reduction was  achieved through use of a standardized protocol tailored for this examination  and automatic exposure control for dose modulation. FINDINGS:   LUNG BASES: No abnormality. LIVER: No mass or biliary dilatation. GALLBLADDER: Surgically absent. SPLEEN: No enlargement or lesion. PANCREAS: No mass or ductal dilatation. ADRENALS: No mass. KIDNEYS: No nephrolithiasis or hydronephrosis. Unchanged appearance of the  kidneys. GI TRACT:  No bowel obstruction. Colonic diverticulosis without diverticulitis. PERITONEUM: No free air or free fluid. APPENDIX: Unremarkable. RETROPERITONEUM: No aortic aneurysm. LYMPH NODES:  None enlarged. ADDITIONAL COMMENTS: N/A.    URINARY BLADDER: Unremarkable. REPRODUCTIVE ORGANS: Hysterectomy. LYMPH NODES:  None enlarged. FREE FLUID:  None.   BONES: Degenerative changes throughout the lumbar spine greatest at L4-5 where  there is severe spinal canal stenosis. There is also significant spinal canal  stenosis T12-L1 and L1-2. No destructive bone lesion. ADDITIONAL COMMENTS: N/A. Impression IMPRESSION:    1. No nephrolithiasis or hydronephrosis. Unchanged appearance of the kidneys. 2. Normal appendix. 3. Colonic diverticulosis without diverticulitis. Results for Praveen Catalan (MRN [id number]) as of 7/14/2020 16:06   Ref. Range 7/3/2020 13:43 7/13/2020 21:20 7/14/2020 04:36 7/14/2020 11:43   HGB Latest Ref Range: 11.5 - 16.0 g/dL 14.0 13.4 10.7 (L) 10.0 (L)   HCT Latest Ref Range: 35.0 - 47.0 % 42.6 41.2 32.6 (L) 30.9 (L)     Lab Results   Component Value Date/Time    Sodium 143 07/14/2020 04:36 AM    Potassium 4.2 07/14/2020 04:36 AM    Chloride 114 (H) 07/14/2020 04:36 AM    CO2 26 07/14/2020 04:36 AM    Anion gap 3 (L) 07/14/2020 04:36 AM    Glucose 124 (H) 07/14/2020 04:36 AM    Glucose 104 (H) 04/10/2019 06:52 AM    BUN 13 07/14/2020 04:36 AM    Creatinine 1.00 07/14/2020 04:36 AM    BUN/Creatinine ratio 13 07/14/2020 04:36 AM    GFR est AA >60 07/14/2020 04:36 AM    GFR est non-AA 53 (L) 07/14/2020 04:36 AM    Calcium 7.9 (L) 07/14/2020 04:36 AM    Bilirubin, total 0.5 07/13/2020 09:20 PM    Alk. phosphatase 104 07/13/2020 09:20 PM    Protein, total 7.6 07/13/2020 09:20 PM    Albumin 3.5 07/13/2020 09:20 PM    Globulin 4.1 (H) 07/13/2020 09:20 PM    A-G Ratio 0.9 (L) 07/13/2020 09:20 PM    ALT (SGPT) 19 07/13/2020 09:20 PM    AST (SGOT) 19 07/13/2020 09:20 PM         Assessment/Plan:     Active Problems:    GIB (gastrointestinal bleeding) (7/14/2020)     It is unclear from the history whether this is an UGIB or a LGIB. Fortunately she has not had further clinical bleeding since early this am at around 0100. She is stable and hgb=10.0. I recommend we proceed with EGD/Colon tomorrow to further evaluate and patient is in agreement. Continue BID PPI and agree with empiric antibiotics due to lower abd tenderness.   Low threshold to repeat CT WITH contrast.  I reviewed risks/benefits of procedures with patient and she is willing to proceed. CLD now and NPO after MN. Golytely bowel prep this afternoon. The patient was counseled at length about the risks of emiliano Covid-19 during their perioperative period and any recovery window from their procedure. The patient was made aware that emiliano Covid-19  may worsen their prognosis for recovering from their procedure and lend to a higher morbidity and/or mortality risk. All material risks, benefits, and reasonable alternatives including postponing the procedure were discussed. The patient does  wish to proceed with the procedure at this time.       Delwin Opitz, PA  07/14/20  4:16 PM

## 2020-07-14 NOTE — CDMP QUERY
Dr Adrienne Tracey:      Patient admitted with GI bleed, noted to have 'Renal insufficiency.' If possible, please clarify & document the acuity of renal insufficiency in progress notes and discharge summary if you are evaluating and/or treating any of the following:      »  Acute     »  Chronic CKD (please document stage if known)     »  Acute on chronic     »  Other (please specify)____________________     »  Clinically unable to determine     »  Unknown     Risk Factors:85yoF w/ DM, HTN, Gi bleed w/ vomiting  Clinical Indicators: BUN/Creat of  14/1.34  GFR 46  - 13/1.0 GFR 60   7/2/20 BL 7/3 1,12  GFR 56; [facility] ED - pt orthostatic  given 1L IVF bolus then 100/hr      Treatment: IVF bolus, IVF @ 75ml/hr, monitor renal function     Thank you,   [facility], [address] [facility], [address]   [id number]

## 2020-07-14 NOTE — PROGRESS NOTES
Reason for Readmission:    GIB- previous admission was due to a stroke. RUR Score/Risk Level:  18      PCP: First and Last name:  Markell Issa NP   Name of Practice: [facility]   Are you a current patient: Yes/No: Yes   Approximate date of last visit: Last week   Can you participate in a virtual visit with your PCP:     Is a Care Conference indicated:       Did you attend your follow up appointment (s): If not, why not:         Resources/supports as identified by patient/family:  2 daughters, son, and [address] facing patient (as identified by patient/family and CM): Finances/Medication cost?  No issues identified     Transportation   No issues indentified     Support system or lack thereof? Living arrangements? Lives alone in a one story home w/2 ANT         Self-care/ADLs/Cognition? Independent         Current Advanced Directive/Advance Care Plan:             Plan for utilizing home health:  Not at this time              Transition of Care Plan:    Based on readmission, the patient's previous Plan of Care   has been evaluated and/or modified. The current Transition of Care Plan is:   Prior to admission pt was independent with ADL/IADL. Patient depends on daughters and [address] for transportation. Patient denies DME use and past HH/Rehab. Patients states that her son lives down the road and checks on her daily. Patient also states that [address] also brings her dinner every Tuesday and Thursday. Patient does not have any needs or concerns at this time. CM will continue to follow. PCP- Markell Issa NP  Pharmacy-[facility] in [address] Management Interventions  PCP Verified by CM: Yes(Lillian Mcintosh NP)  Mode of Transport at Discharge:  Other (see comment)(daughter)  Transition of Care Consult (CM Consult): Discharge Planning  Discharge Durable Medical Equipment: No(No DME use)  Physical Therapy Consult: No  Occupational Therapy Consult: No  Speech Therapy Consult: No  Current Support Network: Lives Alone, Family Lives Nearby(Lives in a one story home with 2 ANT)  Confirm Follow Up Transport: Other (see comment)([employer] agency and daughters)  Discharge Location  Discharge Placement: (Anticipate home)      Corrinne Butler  Ext [phone]    RUDY Plan:     *home   *daughter to transport at d/c

## 2020-07-14 NOTE — PROGRESS NOTES
General Surgery End of Shift Nursing Note    Bedside shift change report given to Linh Dalal  (oncoming nurse) by Kenroy Verma (offgoing nurse). Report included the following information SBAR, Kardex, Intake/Output, MAR and Recent Results.       Darylene Lenis

## 2020-07-14 NOTE — PROGRESS NOTES
Chart reviewed , pt seen and examined   Admitted for lower GI bleed   Agree with H&P   Plan for EGD/colo tomorrow am  Non billable round

## 2020-07-14 NOTE — PROGRESS NOTES
GI consult called to [facility] [address], spoke with Yifan Trent who will inform Dr Penny Powell and Satinder Early.

## 2020-07-14 NOTE — H&P
Hospitalist Admission Note    NAME: Victor Manuel Brandt   :  1935   MRN:  [id number]     Date/Time:  2020 3:50 AM    Patient PCP: Winsome Duran, NP  ______________________________________________________________________  Given the patient's current clinical presentation, I have a high level of concern for decompensation if discharged from the emergency department. Complex decision making was performed, which includes reviewing the patient's available past medical records, laboratory results, and x-ray films. My assessment of this patient's clinical condition and my plan of care is as follows. Assessment / Plan:  GI bleed most likely diverticular bleed with possible diverticulitis  -Admit patient to telemetry-keep patient n.p.o.  -IV fluid  -Start patient on IV antibiotic Zosyn  -Follow-up abdominal CT scan  - GI consultation    Renal insufficiency  -Start patient on IV fluids  -Monitoring closely    DM  -Hold oral hypoglycemic agent-start patient on scheduled insulin    Hypertension  - Hold antihypertensive medication    Hyperlipidemia  - Continue Lipitor    Code Status: FULl   Surrogate Decision Maker:    DVT Prophylaxis: SCD  GI Prophylaxis: not indicated          Subjective:   CHIEF COMPLAINT: rectal bleeding     HISTORY OF PRESENT ILLNESS:     82years old female with past medical history significant for diverticulosis, depression, DM, hypertension, hyperlipidemia was transferred from [facility] for evaluation of bloody vomiting associated with left lower quadrant abdominal pain, patient was on oral antibiotic for treatment of diverticulitis for the past 10 days patient denies any fever any chills denies any nausea any vomiting, blood work was done and show elevated white blood cell count 11.2. We were asked to admit for work up and evaluation of the above problems.      Past Medical History:   Diagnosis Date    Allergic rhinitis due to pollen     BPV (benign positional vertigo)     Change in bowel habits     Depression     Diabetes ([address]     Dysuria     Hemorrhoids     Hypertension     IBS (irritable bowel syndrome)     Menopause     Other diseases of nasal cavity and sinuses(478.19)     Vertigo         Past Surgical History:   Procedure Laterality Date    HX CHOLECYSTECTOMY  2016    HX COLONOSCOPY  2013?  HX HYSTERECTOMY         Social History     Tobacco Use    Smoking status: Current Every Day Smoker     Packs/day: 0.25    Smokeless tobacco: Never Used   Substance Use Topics    Alcohol use: No        Family History   Problem Relation Age of Onset    No Known Problems Mother      No Known Allergies     Prior to Admission medications    Medication Sig Start Date End Date Taking? Authorizing Provider   lisinopriL (PRINIVIL, ZESTRIL) 10 mg tablet Take 1 Tab by mouth daily. 7/9/20   Marissa VILLA NP   Januvia 100 mg tablet TAKE 1 TABLET BY MOUTH EVERY DAY 7/9/20   Marissa VILLA NP   famotidine (Pepcid) 20 mg tablet Take 1 Tab by mouth two (2) times a day for 10 days. 7/3/20 7/13/20  Domenic Batres MD   aspirin (ASPIRIN) 325 mg tablet Take 1 Tab by mouth daily. 7/2/20   Boris Heimlich, MD   atorvastatin (LIPITOR) 40 mg tablet Take 1 Tab by mouth nightly. 7/2/20   Boris Heimlich, MD   metoprolol succinate (TOPROL-XL) 25 mg XL tablet TAKE 1 TABLET BY MOUTH EVERY DAY 6/7/20   Marissa VILLA NP   meclizine (ANTIVERT) 25 mg tablet TAKE 1 TAB BY MOUTH THREE (3) TIMES DAILY AS NEEDED FOR DIZZINESS. 3/27/20   Marissa VILLA NP   guaiFENesin ER (MUCINEX) 600 mg ER tablet Take 1 Tab by mouth two (2) times daily as needed for Congestion. 2/20/20   Marissa VILLA NP   mirtazapine (REMERON) 7.5 mg tablet Take 1 Tab by mouth nightly. 2/11/20   Reynold Velasco MD   venlafaxine-SR [facility] 150 mg capsule Take 1 Cap by mouth daily (with breakfast).  11/8/19   Reynold Velasco MD   levocetirizine (XYZAL) 5 mg tablet Take 1 pill daily as needed for allergy symptoms 9/16/19   Jovi VILLA NP       REVIEW OF SYSTEMS:     I am not able to complete the review of systems because: The patient is intubated and sedated    The patient has altered mental status due to his acute medical problems    The patient has baseline aphasia from prior stroke(s)    The patient has baseline dementia and is not reliable historian    The patient is in acute medical distress and unable to provide information           Total of 12 systems reviewed as follows:       POSITIVE= underlined text  Negative = text not underlined  General:  fever, chills, sweats, generalized weakness, weight loss/gain,      loss of appetite   Eyes:    blurred vision, eye pain, loss of vision, double vision  ENT:    rhinorrhea, pharyngitis   Respiratory:   cough, sputum production, SOB, BARRON, wheezing, pleuritic pain   Cardiology:   chest pain, palpitations, orthopnea, PND, edema, syncope   Gastrointestinal:  abdominal pain , N/V, diarrhea, dysphagia, constipation, bleeding   Genitourinary:  frequency, urgency, dysuria, hematuria, incontinence   Muskuloskeletal :  arthralgia, myalgia, back pain  Hematology:  easy bruising, nose or gum bleeding, lymphadenopathy   Dermatological: rash, ulceration, pruritis, color change / jaundice  Endocrine:   hot flashes or polydipsia   Neurological:  headache, dizziness, confusion, focal weakness, paresthesia,     Speech difficulties, memory loss, gait difficulty  Psychological: Feelings of anxiety, depression, agitation    Objective:   VITALS:    Visit Vitals  /50   Pulse 74   Temp 98.1 °F (36.7 °C)   Resp 20   SpO2 100%       PHYSICAL EXAM:    General:    Alert, cooperative, no distress, appears stated age. HEENT: Atraumatic, anicteric sclerae, pink conjunctivae     No oral ulcers, mucosa moist, throat clear, dentition fair  Neck:  Supple, symmetrical,  thyroid: non tender  Lungs:   Clear to auscultation bilaterally. No Wheezing or Rhonchi.  No rales.  Chest wall:  No tenderness  No Accessory muscle use. Heart:   Regular  rhythm,  No  murmur   No edema  Abdomen:   Soft, LL tenderness . Not distended. Bowel sounds normal  Extremities: No cyanosis. No clubbing,      Skin turgor normal, Capillary refill normal, Radial dial pulse 2+  Skin:     Not pale. Not Jaundiced  No rashes   Psych:  Good insight. Not depressed. Not anxious or agitated. Neurologic: EOMs intact. No facial asymmetry. No aphasia or slurred speech. Symmetrical strength, Sensation grossly intact. Alert and oriented X 4.     _______________________________________________________________________  Care Plan discussed with:    Comments   Patient y    Family      RN y    Care Manager                    Consultant:      _______________________________________________________________________  Expected  Disposition:   Home with Family y   HH/PT/OT/RN    SNF/LTC    [facility]    ________________________________________________________________________  TOTAL TIME:    61 Minutes    Critical Care Provided     Minutes non procedure based      Comments    y Reviewed previous records   >50% of visit spent in counseling and coordination of care y Discussion with patient and/or family and questions answered       ________________________________________________________________________  Signed: Jian Chandler MD    Procedures: see electronic medical records for all procedures/Xrays and details which were not copied into this note but were reviewed prior to creation of Plan.     LAB DATA REVIEWED:    Recent Results (from the past 24 hour(s))   CBC WITH AUTOMATED DIFF    Collection Time: 07/13/20  9:20 PM   Result Value Ref Range    WBC 11.2 (H) 3.6 - 11.0 K/uL    RBC 4.38 3.80 - 5.20 M/uL    HGB 13.4 11.5 - 16.0 g/dL    HCT 41.2 35.0 - 47.0 %    MCV 94.1 80.0 - 99.0 FL    MCH 30.6 26.0 - 34.0 PG    MCHC 32.5 30.0 - 36.5 g/dL    RDW 12.4 11.5 - 14.5 %    PLATELET 470 432 - 513 K/uL    MPV 9.8 8.9 - 12.9 FL NRBC 0.0 0  WBC    ABSOLUTE NRBC 0.00 0.00 - 0.01 K/uL    NEUTROPHILS 63 32 - 75 %    LYMPHOCYTES 28 12 - 49 %    MONOCYTES 6 5 - 13 %    EOSINOPHILS 2 0 - 7 %    BASOPHILS 0 0 - 1 %    IMMATURE GRANULOCYTES 1 (H) 0.0 - 0.5 %    ABS. NEUTROPHILS 7.1 1.8 - 8.0 K/UL    ABS. LYMPHOCYTES 3.1 0.8 - 3.5 K/UL    ABS. MONOCYTES 0.7 0.0 - 1.0 K/UL    ABS. EOSINOPHILS 0.2 0.0 - 0.4 K/UL    ABS. BASOPHILS 0.0 0.0 - 0.1 K/UL    ABS. IMM. GRANS. 0.1 (H) 0.00 - 0.04 K/UL    DF AUTOMATED     METABOLIC PANEL, COMPREHENSIVE    Collection Time: 07/13/20  9:20 PM   Result Value Ref Range    Sodium 145 136 - 145 mmol/L    Potassium 4.2 3.5 - 5.1 mmol/L    Chloride 107 97 - 108 mmol/L    CO2 30 21 - 32 mmol/L    Anion gap 8 5 - 15 mmol/L    Glucose 135 (H) 65 - 100 mg/dL    BUN 14 6 - 20 MG/DL    Creatinine 1.34 (H) 0.55 - 1.02 MG/DL    BUN/Creatinine ratio 10 (L) 12 - 20      GFR est AA 46 (L) >60 ml/min/1.73m2    GFR est non-AA 38 (L) >60 ml/min/1.73m2    Calcium 9.1 8.5 - 10.1 MG/DL    Bilirubin, total 0.5 0.2 - 1.0 MG/DL    ALT (SGPT) 19 12 - 78 U/L    AST (SGOT) 19 15 - 37 U/L    Alk.  phosphatase 104 45 - 117 U/L    Protein, total 7.6 6.4 - 8.2 g/dL    Albumin 3.5 3.5 - 5.0 g/dL    Globulin 4.1 (H) 2.0 - 4.0 g/dL    A-G Ratio 0.9 (L) 1.1 - 2.2     LACTIC ACID    Collection Time: 07/13/20  9:55 PM   Result Value Ref Range    Lactic acid 0.6 0.4 - 2.0 MMOL/L

## 2020-07-14 NOTE — PROGRESS NOTES
Spiritual Care Assessment/Progress Note  [facility]      NAME: Amara Lawrence      MRN: [id number]  AGE: 82 y.o.  SEX: female  Latter-day Affiliation: Rastafari   Language: English     7/14/2020     Total Time (in minutes): 30     Spiritual Assessment begun in [facility] through conversation with:         [x]Patient        [] Family    [] Friend(s)        Reason for Consult: Initial/Spiritual assessment, patient floor     Spiritual beliefs: (Please include comment if needed)     [x] Identifies with a ry tradition:         [x] Supported by a ry community:            [] Claims no spiritual orientation:           [] Seeking spiritual identity:                [] Adheres to an individual form of spirituality:           [] Not able to assess:                           Identified resources for coping:      [x] Prayer                               [] Music                  [] Guided Imagery     [x] Family/friends                 [] Pet visits     [] Devotional reading                         [] Unknown     [] Other:                                               Interventions offered during this visit: (See comments for more details)    Patient Interventions: Affirmation of emotions/emotional suffering, Affirmation of ry, Iconic (affirming the presence of God/Higher Power), Prayer (actual), Prayer (assurance of)           Plan of Care:     [] Support spiritual and/or cultural needs    [] Support AMD and/or advance care planning process      [] Support grieving process   [] Coordinate Rites and/or Rituals    [] Coordination with community clergy   [x] No spiritual needs identified at this time   [] Detailed Plan of Care below (See Comments)  [] Make referral to Music Therapy  [] Make referral to Pet Therapy     [] Make referral to Addiction services  [] Make referral to [facility]  [] Make referral to Spiritual Care Partner  [] No future visits requested        [] Follow up visits as needed     Comments: The patient was visited on the [facility] unit to make a spiritual assessment. The patient did not have visitors present. The patient was resting in bed and after introduction patient was receptive to the visit. The patient shared information about her family history. She [place] The patient shared information about the culture of the environment that she experienced. The patient discussed her life after high school and the experience of living in [place]. The patient spent a considerable amount of time discussing her ry, beliefs, and spiritual life. Provided a listening presence and encouraged the patient to share her story. The patient shared more than once that she is at peace and that she is constantly speaking Peace Be Still into her life. The patient agreed to have prayer together. The patient showed no indication of having any spiritual stress. Advised of  availability. Chaplains will follow as able and/or needed. Rev.  Cayetano Guillory MDiv   For  Assistance Page [phone] ([phone])

## 2020-07-14 NOTE — PROGRESS NOTES
Pharmacy Automatic Renal Dosing Protocol - Antimicrobials    Indication for Antimicrobials: possible diverticulitis     Current Regimen of Each Antimicrobial:   Piperacillin/tazobactam 3.375g IV q8h, start ; day 1     Previous Antimicrobial Therapy:   Per H&P, was on oral antibiotic x10 days prior to admission, no record in [facility]      Significant Cultures:   none    Radiology / Imaging results: (X-ray, CT scan or MRI):    CT-scan of abdomen and pelvis   1. No nephrolithiasis or hydronephrosis. Unchanged appearance of the kidneys. 2. Normal appendix. 3. Colonic diverticulosis without diverticulitis. Paralysis, amputations, malnutrition: none noted    Labs:  Recent Labs     20  0436 20  2120   CREA 1.00 1.34*   BUN 13 14   WBC 11.5* 11.2*     Temp (24hrs), Av.6 °F (37 °C), Min:98.1 °F (36.7 °C), Max:98.8 °F (37.1 °C)    Creatinine Clearance (mL/min) or Dialysis: 39.9 mL/min (IBW)    Impression/Plan:   Scr improved from admission  Continue current dose of zosyn; appropriate for indication/renal function   Antimicrobial stop date pending     Pharmacy will follow daily and adjust medications as appropriate for renal function and/or serum levels.     Thank you,  DANNY Michael

## 2020-07-15 ENCOUNTER — ANESTHESIA EVENT (OUTPATIENT)
Dept: ENDOSCOPY | Age: 85
DRG: 378 | End: 2020-07-15
Payer: MEDICARE

## 2020-07-15 ENCOUNTER — ANESTHESIA (OUTPATIENT)
Dept: ENDOSCOPY | Age: 85
DRG: 378 | End: 2020-07-15
Payer: MEDICARE

## 2020-07-15 LAB
ANION GAP SERPL CALC-SCNC: 5 MMOL/L (ref 5–15)
BUN SERPL-MCNC: 8 MG/DL (ref 6–20)
BUN/CREAT SERPL: 8 (ref 12–20)
CALCIUM SERPL-MCNC: 7.4 MG/DL (ref 8.5–10.1)
CHLORIDE SERPL-SCNC: 112 MMOL/L (ref 97–108)
CO2 SERPL-SCNC: 28 MMOL/L (ref 21–32)
CREAT SERPL-MCNC: 1.02 MG/DL (ref 0.55–1.02)
ERYTHROCYTE [DISTWIDTH] IN BLOOD BY AUTOMATED COUNT: 12.7 % (ref 11.5–14.5)
GLUCOSE BLD STRIP.AUTO-MCNC: 106 MG/DL (ref 65–100)
GLUCOSE BLD STRIP.AUTO-MCNC: 109 MG/DL (ref 65–100)
GLUCOSE BLD STRIP.AUTO-MCNC: 126 MG/DL (ref 65–100)
GLUCOSE BLD STRIP.AUTO-MCNC: 141 MG/DL (ref 65–100)
GLUCOSE BLD STRIP.AUTO-MCNC: 148 MG/DL (ref 65–100)
GLUCOSE SERPL-MCNC: 109 MG/DL (ref 65–100)
HCT VFR BLD AUTO: 25.3 % (ref 35–47)
HCT VFR BLD AUTO: 27.1 % (ref 35–47)
HGB BLD-MCNC: 8.4 G/DL (ref 11.5–16)
HGB BLD-MCNC: 9 G/DL (ref 11.5–16)
MCH RBC QN AUTO: 31.3 PG (ref 26–34)
MCHC RBC AUTO-ENTMCNC: 33.2 G/DL (ref 30–36.5)
MCV RBC AUTO: 94.4 FL (ref 80–99)
NRBC # BLD: 0 K/UL (ref 0–0.01)
NRBC BLD-RTO: 0 PER 100 WBC
PLATELET # BLD AUTO: 218 K/UL (ref 150–400)
PMV BLD AUTO: 10.2 FL (ref 8.9–12.9)
POTASSIUM SERPL-SCNC: 3.5 MMOL/L (ref 3.5–5.1)
RBC # BLD AUTO: 2.68 M/UL (ref 3.8–5.2)
SERVICE CMNT-IMP: ABNORMAL
SODIUM SERPL-SCNC: 145 MMOL/L (ref 136–145)
WBC # BLD AUTO: 8.7 K/UL (ref 3.6–11)

## 2020-07-15 PROCEDURE — 74011000250 HC RX REV CODE- 250: Performed by: ANESTHESIOLOGY

## 2020-07-15 PROCEDURE — 74011000258 HC RX REV CODE- 258: Performed by: INTERNAL MEDICINE

## 2020-07-15 PROCEDURE — 80048 BASIC METABOLIC PNL TOTAL CA: CPT

## 2020-07-15 PROCEDURE — 76060000031 HC ANESTHESIA FIRST 0.5 HR: Performed by: SPECIALIST

## 2020-07-15 PROCEDURE — 0DBN8ZX EXCISION OF SIGMOID COLON, VIA NATURAL OR ARTIFICIAL OPENING ENDOSCOPIC, DIAGNOSTIC: ICD-10-PCS | Performed by: SPECIALIST

## 2020-07-15 PROCEDURE — 77030013992 HC SNR POLYP ENDOSC BSC -B: Performed by: SPECIALIST

## 2020-07-15 PROCEDURE — 88305 TISSUE EXAM BY PATHOLOGIST: CPT

## 2020-07-15 PROCEDURE — 36415 COLL VENOUS BLD VENIPUNCTURE: CPT

## 2020-07-15 PROCEDURE — C9113 INJ PANTOPRAZOLE SODIUM, VIA: HCPCS | Performed by: INTERNAL MEDICINE

## 2020-07-15 PROCEDURE — 74011250636 HC RX REV CODE- 250/636: Performed by: ANESTHESIOLOGY

## 2020-07-15 PROCEDURE — 74011250637 HC RX REV CODE- 250/637: Performed by: INTERNAL MEDICINE

## 2020-07-15 PROCEDURE — 76040000019: Performed by: SPECIALIST

## 2020-07-15 PROCEDURE — 0DJ08ZZ INSPECTION OF UPPER INTESTINAL TRACT, VIA NATURAL OR ARTIFICIAL OPENING ENDOSCOPIC: ICD-10-PCS | Performed by: SPECIALIST

## 2020-07-15 PROCEDURE — 85018 HEMOGLOBIN: CPT

## 2020-07-15 PROCEDURE — 85027 COMPLETE CBC AUTOMATED: CPT

## 2020-07-15 PROCEDURE — 65660000000 HC RM CCU STEPDOWN

## 2020-07-15 PROCEDURE — 74011250636 HC RX REV CODE- 250/636: Performed by: SPECIALIST

## 2020-07-15 PROCEDURE — 74011250636 HC RX REV CODE- 250/636: Performed by: INTERNAL MEDICINE

## 2020-07-15 PROCEDURE — 82962 GLUCOSE BLOOD TEST: CPT

## 2020-07-15 RX ORDER — METOPROLOL SUCCINATE 25 MG/1
25 TABLET, EXTENDED RELEASE ORAL DAILY
Status: DISCONTINUED | OUTPATIENT
Start: 2020-07-15 | End: 2020-07-17 | Stop reason: HOSPADM

## 2020-07-15 RX ORDER — LIDOCAINE HYDROCHLORIDE 20 MG/ML
INJECTION, SOLUTION EPIDURAL; INFILTRATION; INTRACAUDAL; PERINEURAL AS NEEDED
Status: DISCONTINUED | OUTPATIENT
Start: 2020-07-15 | End: 2020-07-15 | Stop reason: HOSPADM

## 2020-07-15 RX ORDER — SODIUM CHLORIDE 0.9 % (FLUSH) 0.9 %
5-40 SYRINGE (ML) INJECTION EVERY 8 HOURS
Status: DISCONTINUED | OUTPATIENT
Start: 2020-07-15 | End: 2020-07-17 | Stop reason: HOSPADM

## 2020-07-15 RX ORDER — DEXTROMETHORPHAN/PSEUDOEPHED 2.5-7.5/.8
1.2 DROPS ORAL
Status: DISCONTINUED | OUTPATIENT
Start: 2020-07-15 | End: 2020-07-15 | Stop reason: HOSPADM

## 2020-07-15 RX ORDER — ATROPINE SULFATE 0.1 MG/ML
0.5 INJECTION INTRAVENOUS
Status: DISCONTINUED | OUTPATIENT
Start: 2020-07-15 | End: 2020-07-15 | Stop reason: HOSPADM

## 2020-07-15 RX ORDER — SODIUM CHLORIDE 9 MG/ML
100 INJECTION, SOLUTION INTRAVENOUS CONTINUOUS
Status: DISPENSED | OUTPATIENT
Start: 2020-07-15 | End: 2020-07-15

## 2020-07-15 RX ORDER — PROPOFOL 10 MG/ML
INJECTION, EMULSION INTRAVENOUS AS NEEDED
Status: DISCONTINUED | OUTPATIENT
Start: 2020-07-15 | End: 2020-07-15 | Stop reason: HOSPADM

## 2020-07-15 RX ORDER — MIDAZOLAM HYDROCHLORIDE 1 MG/ML
.25-5 INJECTION, SOLUTION INTRAMUSCULAR; INTRAVENOUS
Status: DISCONTINUED | OUTPATIENT
Start: 2020-07-15 | End: 2020-07-15 | Stop reason: HOSPADM

## 2020-07-15 RX ORDER — ATORVASTATIN CALCIUM 40 MG/1
40 TABLET, FILM COATED ORAL
Status: DISCONTINUED | OUTPATIENT
Start: 2020-07-15 | End: 2020-07-17 | Stop reason: HOSPADM

## 2020-07-15 RX ORDER — MIRTAZAPINE 15 MG/1
7.5 TABLET, FILM COATED ORAL
Status: DISCONTINUED | OUTPATIENT
Start: 2020-07-15 | End: 2020-07-17 | Stop reason: HOSPADM

## 2020-07-15 RX ORDER — FLUMAZENIL 0.1 MG/ML
0.2 INJECTION INTRAVENOUS
Status: DISCONTINUED | OUTPATIENT
Start: 2020-07-15 | End: 2020-07-15 | Stop reason: HOSPADM

## 2020-07-15 RX ORDER — SODIUM CHLORIDE 0.9 % (FLUSH) 0.9 %
5-40 SYRINGE (ML) INJECTION AS NEEDED
Status: DISCONTINUED | OUTPATIENT
Start: 2020-07-15 | End: 2020-07-17 | Stop reason: HOSPADM

## 2020-07-15 RX ORDER — EPHEDRINE SULFATE/0.9% NACL/PF 50 MG/5 ML
SYRINGE (ML) INTRAVENOUS AS NEEDED
Status: DISCONTINUED | OUTPATIENT
Start: 2020-07-15 | End: 2020-07-15 | Stop reason: HOSPADM

## 2020-07-15 RX ORDER — NALOXONE HYDROCHLORIDE 0.4 MG/ML
0.4 INJECTION, SOLUTION INTRAMUSCULAR; INTRAVENOUS; SUBCUTANEOUS
Status: DISCONTINUED | OUTPATIENT
Start: 2020-07-15 | End: 2020-07-15 | Stop reason: HOSPADM

## 2020-07-15 RX ADMIN — DEXTROSE MONOHYDRATE AND SODIUM CHLORIDE 75 ML/HR: 5; .9 INJECTION, SOLUTION INTRAVENOUS at 01:56

## 2020-07-15 RX ADMIN — Medication 10 ML: at 14:05

## 2020-07-15 RX ADMIN — SODIUM CHLORIDE 100 ML/HR: 900 INJECTION, SOLUTION INTRAVENOUS at 12:33

## 2020-07-15 RX ADMIN — PIPERACILLIN AND TAZOBACTAM 3.38 G: 3; .375 INJECTION, POWDER, LYOPHILIZED, FOR SOLUTION INTRAVENOUS at 15:21

## 2020-07-15 RX ADMIN — PIPERACILLIN AND TAZOBACTAM 3.38 G: 3; .375 INJECTION, POWDER, LYOPHILIZED, FOR SOLUTION INTRAVENOUS at 05:12

## 2020-07-15 RX ADMIN — PANTOPRAZOLE SODIUM 40 MG: 40 INJECTION, POWDER, FOR SOLUTION INTRAVENOUS at 21:38

## 2020-07-15 RX ADMIN — PIPERACILLIN AND TAZOBACTAM 3.38 G: 3; .375 INJECTION, POWDER, LYOPHILIZED, FOR SOLUTION INTRAVENOUS at 21:43

## 2020-07-15 RX ADMIN — PANTOPRAZOLE SODIUM 40 MG: 40 INJECTION, POWDER, FOR SOLUTION INTRAVENOUS at 10:17

## 2020-07-15 RX ADMIN — PROPOFOL 200 MG: 10 INJECTION, EMULSION INTRAVENOUS at 12:59

## 2020-07-15 RX ADMIN — LIDOCAINE HYDROCHLORIDE 100 MG: 20 INJECTION, SOLUTION EPIDURAL; INFILTRATION; INTRACAUDAL; PERINEURAL at 12:36

## 2020-07-15 RX ADMIN — MIRTAZAPINE 7.5 MG: 15 TABLET, FILM COATED ORAL at 21:39

## 2020-07-15 RX ADMIN — ATORVASTATIN CALCIUM 40 MG: 40 TABLET, FILM COATED ORAL at 21:39

## 2020-07-15 RX ADMIN — MIRTAZAPINE 7.5 MG: 15 TABLET, FILM COATED ORAL at 01:46

## 2020-07-15 RX ADMIN — ATORVASTATIN CALCIUM 40 MG: 40 TABLET, FILM COATED ORAL at 01:46

## 2020-07-15 RX ADMIN — Medication 10 ML: at 21:38

## 2020-07-15 RX ADMIN — Medication 10 MG: at 12:43

## 2020-07-15 RX ADMIN — METOPROLOL SUCCINATE 25 MG: 25 TABLET, FILM COATED, EXTENDED RELEASE ORAL at 18:25

## 2020-07-15 RX ADMIN — Medication 10 ML: at 05:12

## 2020-07-15 NOTE — PROGRESS NOTES
General Surgery End of Shift Nursing Note    Bedside shift change report given to Adrian Arana  (oncoming nurse) by Demarcus Puente  (offgoing nurse). Report included the following information SBAR, Kardex, Intake/Output, MAR and Recent Results.         Rosita Correa

## 2020-07-15 NOTE — PROGRESS NOTES
[address], RN called for report. They will get consent from pt    1115- pt transported to endoscopy    1326 - Spoke to [address] to received report. Pt will return from PACU soon. 1400 - pt back on unit, vitals obtained, dual skin assessment completed    1525- left message for PICC team to place PIV in pt secondary to having continuous fluids that are running and antibiotics. General Surgery End of Shift Nursing Note    Bedside shift change report given to Radha Kellogg RN (oncoming nurse) by Nithya Diallo RN (offgoing nurse). Report included the following information SBAR, Kardex and MAR. Shift worked:   [id number]   Summary of shift:    Pt had an EGD/colonocopy today and did well during procedure. Pt has been resting and sleeping since coming back to unit. She has not need insulin coverage this shift. Pt's daughter called several times for updates. Spoke briefly with her; however, due to being in the middle of pt care was unable to update her completely. Called daughter and left a message around 80. Issues for physician to address:   None at this time. Number times ambulated in hallway past shift: 0    Number of times OOB to chair past shift: 0    Pain Management:  Current medication: See MAR  Patient states pain is manageable on current pain medication: YES    GI:    Current diet:  DIET GI LITE (POST SURGICAL)    Tolerating current diet: YES  Passing flatus: YES  Last Bowel Movement: yesterday    Respiratory:    Incentive Spirometer at bedside: YES  Head of bed elevated    Patient Safety:    Falls Score: 1  Bed Alarm On? No  Sitter?  No    Yossi Obrien

## 2020-07-15 NOTE — PROCEDURES
Esophagogastroduodenoscopy    Indications:  Hematochezia  Blood loss anemia    Medications:  See anesthesia form    Assistants:  Andre Fernandez      Post procedure diagnosis:    Hiatal hernia    Description of Procedure:    Prior to the procedure its objectives, risks, consequences and alternatives were discussed with the patient who then elected to proceed. The Olympus video endoscope was inserted under direct vision into the mouth and then into the esophagus. The esophagus looked normal.    The z-line was located at 39 cm. A one cm hiatal hernia was present with the diaphragm pinch at 40 cm. There were no diagnostic abnormalities of the body, fundus, antrum, cardia and incisura of the stomach. This included direct and retroflexion examination. The first and second portion of the duodenum appeared normal.        Complications: There were no apparent complications and the patient tolerated the procedure well.         Implants:  none    Estimated Blood Loss:  none  Specimens Removed:  none  Impressions:  Negative exam except small hiatal hernia  No blood in the upper gut and no source of bleeding seen      Signed By: Mary So MD                        July 15, 2020     1:01 PM

## 2020-07-15 NOTE — PROGRESS NOTES
General Surgery End of Shift Nursing Note    Bedside shift change report given to Faye (oncoming nurse) by Ki Tatum (offgoing nurse). Report included the following information SBAR, Kardex and MAR. Shift worked:   7a-7p   Summary of shift:    Uneventful shift. Pt started go litely this afternoon in preparation for colonoscopy tomorrow. Pt is having dark red liquid stools. Issues for physician to address:   none     Number times ambulated in hallway past shift: 0    Number of times OOB to chair past shift: 0    Pain Management:  Current medication: see mar  Patient states pain is manageable on current pain medication: YES    GI:    Current diet:  DIET CLEAR LIQUID  DIET NPO    Tolerating current diet: YES  Passing flatus: YES  Last Bowel Movement: today   Appearance: liquid dark red    Respiratory:    Incentive Spirometer at bedside: YES  Patient instructed on use: YES    Patient Safety:    Falls Score: 1  Bed Alarm On? No  Sitter?  No    Corean Medicus

## 2020-07-15 NOTE — PROGRESS NOTES
Hospitalist Progress Note    NAME: Indy Mejias   :  1935   MRN:  [id number]       Assessment / Plan:  GI bleed most likely diverticular bleed with possible diverticulitis  S/p EGD/colo on 07/15  CT scan showed :   . No nephrolithiasis or hydronephrosis. Unchanged appearance of the kidneys. 2. Normal appendix. 3. Colonic diverticulosis without diverticulitis. Started on zosyn , H&H showed dropped in hb by 3 pts  EGD wnl , colonoscopy showed diverticulosis and sigmoid polyp     yoko resolved   -S/p IVF      DM  -Hold oral hypoglycemic agent-c/w SSI     Hypertension  - resume BB , hold lisinopril    Hyperlipidemia  - Continue Lipitor     Code Status: FULL    Surrogate Decision Maker:     DVT Prophylaxis: SCD  GI Prophylaxis: not indicated     There is no height or weight on file to calculate BMI. Subjective:     Chief Complaint / Reason for Physician Visit  FU GI bleed . No further bleeding . Seen before EGD/colonoscopy . Discussed with RN events overnight. Review of Systems:  Symptom Y/N Comments  Symptom Y/N Comments   Fever/Chills n   Chest Pain n    Poor Appetite    Edema     Cough    Abdominal Pain     Sputum    Joint Pain     SOB/BARRON n   Pruritis/Rash     Nausea/vomit n   Tolerating PT/OT     Diarrhea    Tolerating Diet  npo   Constipation    Other       Could NOT obtain due to:      Objective:     VITALS:   Last 24hrs VS reviewed since prior progress note.  Most recent are:  Patient Vitals for the past 24 hrs:   Temp Pulse Resp BP SpO2   07/15/20 1613 97.7 °F (36.5 °C) 64 16 152/70 100 %   07/15/20 1449 97.4 °F (36.3 °C) 67 16 145/64 97 %   07/15/20 1403 97.8 °F (36.6 °C) 64 18 141/76 98 %   07/15/20 1336 -- 67 18 141/83 97 %   07/15/20 1333 -- 66 12 131/83 97 %   07/15/20 1330 -- 64 14 133/69 98 %   07/15/20 1325 -- 73 18 132/80 98 %   07/15/20 1323 -- 75 21 118/57 97 %   07/15/20 1320 -- 70 20 127/68 --   07/15/20 1317 -- 80 13 140/69 97 %   07/15/20 1314 97.9 °F (36.6 °C) 77 18 124/50 97 %   07/15/20 1305 -- 73 20 117/44 99 %   07/15/20 1259 -- 72 21 117/53 100 %   07/15/20 1210 -- 61 21 155/76 96 %   07/15/20 1134 97.8 °F (36.6 °C) 66 18 153/67 99 %   07/15/20 0738 98.1 °F (36.7 °C) (!) 52 16 124/53 99 %   07/15/20 0242 98.6 °F (37 °C) 61 16 139/56 100 %   07/14/20 2347 98.4 °F (36.9 °C) 66 16 158/70 98 %   07/14/20 2004 98.9 °F (37.2 °C) 72 16 158/83 96 %       Intake/Output Summary (Last 24 hours) at 7/15/2020 1650  Last data filed at 7/15/2020 1548  Gross per 24 hour   Intake 3955 ml   Output --   Net 3955 ml        PHYSICAL EXAM:  General: WD, WN. Alert, cooperative, no acute distress    EENT:  EOMI. Anicteric sclerae. MMM  Resp:  CTA bilaterally, no wheezing or rales. No accessory muscle use  CV:  Regular  rhythm,  No edema  GI:  Soft, Non distended, Non tender.  +Bowel sounds  Neurologic:  Alert and oriented X 3, normal speech,   Psych:   Good insight. Not anxious nor agitated  Skin:  No rashes. No jaundice    Reviewed most current lab test results and cultures  YES  Reviewed most current radiology test results   YES  Review and summation of old records today    NO  Reviewed patient's current orders and MAR    YES  PMH/ reviewed - no change compared to H&P  ________________________________________________________________________  Care Plan discussed with:    Comments   Patient x    Family      RN x    Care Manager     Consultant                        Multidiciplinary team rounds were held today with , nursing, pharmacist and clinical coordinator. Patient's plan of care was discussed; medications were reviewed and discharge planning was addressed.      ________________________________________________________________________  Total NON critical care TIME:  35  Minutes    Total CRITICAL CARE TIME Spent:   Minutes non procedure based      Comments   >50% of visit spent in counseling and coordination of care x ________________________________________________________________________  Maicol Gomes MD     Procedures: see electronic medical records for all procedures/Xrays and details which were not copied into this note but were reviewed prior to creation of Plan. LABS:  I reviewed today's most current labs and imaging studies. Pertinent labs include:  Recent Labs     07/15/20  1023 07/15/20  0508 07/14/20  2319  07/14/20  0436 07/13/20 2120   WBC  --  8.7  --   --  11.5* 11.2*   HGB 9.0* 8.4* 9.3*   < > 10.7* 13.4   HCT 27.1* 25.3* 29.0*   < > 32.6* 41.2   PLT  --  218  --   --  249 306    < > = values in this interval not displayed.      Recent Labs     07/15/20  0508 07/14/20  0436 07/13/20 2120    143 145   K 3.5 4.2 4.2   * 114* 107   CO2 28 26 30   * 124* 135*   BUN 8 13 14   CREA 1.02 1.00 1.34*   CA 7.4* 7.9* 9.1   ALB  --   --  3.5   TBILI  --   --  0.5   ALT  --   --  19       Signed: Maicol Gomes MD

## 2020-07-15 NOTE — ROUTINE PROCESS
Janice Allan  1935  [id number]    Situation:  Verbal report received from: Evelio Walters RN  Procedure: Procedure(s):  ESOPHAGOGASTRODUODENOSCOPY (EGD)  COLONOSCOPY  ENDOSCOPIC POLYPECTOMY    Background:    Preoperative diagnosis: Acute blood loss anemia [D62]  Postoperative diagnosis: EGD -  hiatal hernia  colon - diverticulosis, sigmoid polyp, rectal prolapse    :  Dr. Robles Jeffers  Assistant(s): Endoscopy Technician-1: Dania Navarro  Endoscopy RN-1: Sima Dewitt RN    Specimens:   ID Type Source Tests Collected by Time Destination   1 : Sigmoid polyp Preservative Sigmoid  Danis Jarquin MD 7/15/2020 1254 Pathology     H. Pylori  no    Assessment:  Intra-procedure medications   Anesthesia gave intra-procedure sedation and medications, see anesthesia flow sheet yes    Intravenous fluids: NS@ KVO     Vital signs stable    Abdominal assessment: round and soft     Recommendation:  Return to floor  Family or Friend   Permission to share finding with family or friend

## 2020-07-15 NOTE — PROGRESS NOTES
Pain decreased  Bleeding gone  Prep went well    Bs+ abdomen tender llq  Lab Results   Component Value Date/Time    WBC 8.7 07/15/2020 05:08 AM    HGB 9.0 (L) 07/15/2020 10:23 AM    HCT 27.1 (L) 07/15/2020 10:23 AM    PLATELET 780 49/29/6735 05:08 AM    MCV 94.4 07/15/2020 05:08 AM     Lab Results   Component Value Date/Time    Sodium 145 07/15/2020 05:08 AM    Potassium 3.5 07/15/2020 05:08 AM    Chloride 112 (H) 07/15/2020 05:08 AM    CO2 28 07/15/2020 05:08 AM    Anion gap 5 07/15/2020 05:08 AM    Glucose 109 (H) 07/15/2020 05:08 AM    Glucose 104 (H) 04/10/2019 06:52 AM    BUN 8 07/15/2020 05:08 AM    Creatinine 1.02 07/15/2020 05:08 AM    BUN/Creatinine ratio 8 (L) 07/15/2020 05:08 AM    GFR est AA >60 07/15/2020 05:08 AM    GFR est non-AA 52 (L) 07/15/2020 05:08 AM    Calcium 7.4 (L) 07/15/2020 05:08 AM    Bilirubin, total 0.5 07/13/2020 09:20 PM    Alk.  phosphatase 104 07/13/2020 09:20 PM    Protein, total 7.6 07/13/2020 09:20 PM    Albumin 3.5 07/13/2020 09:20 PM    Globulin 4.1 (H) 07/13/2020 09:20 PM    A-G Ratio 0.9 (L) 07/13/2020 09:20 PM    ALT (SGPT) 19 07/13/2020 09:20 PM    AST (SGOT) 19 07/13/2020 09:20 PM       Proceed with egd colonoscopy     Stacy Degroot MD  7/15/2020

## 2020-07-15 NOTE — PROGRESS NOTES
Endoscope was pre-cleaned at the bedside immediately following procedure by Katrinka Curling    Medications     Medication Rate/Dose/Volume Action Route Date Time   Administering User Audit    lidocaine (PF) 2% (mg) 100 mg Given IntraVENous 07/15/20  1236  Ben Ha DO     propofol 10 mg/mL (mg) 200 mg Given IntraVENous 07/15/20  1259  Ben Jurado DO      Comment:  titrated over case        ePHEDrine (MISTOLE) 50 mg/mL injection (mg) 10 mg Given IntraVENous 07/15/20  1243  Julio Cesar Ha 30, DO     0.9% sodium chloride infusion (mL) 100 mL/hr New Bag IntraVENous 07/15/20  1233  Fabián Phipps, RN edited    Dosing weight:  73.1 kg 300 mL Anesthesia Volume Adjustment IntraVENous  1249  Patsy Ha DO      50 mL Anesthesia Volume Adjustment IntraVENous  1256  Talia MOTA [place]

## 2020-07-15 NOTE — H&P
Pre-endoscopy H and P    The patient was seen and examined in the endoscopy suite. The airway was assessed and docuemented. The problem list, past medical history, and medications were reviewed.      Patient Active Problem List   Diagnosis Code    Environmental allergies Z91.09    Elevated cholesterol E78.00    Essential hypertension I5    Well controlled type 2 diabetes mellitus ([facility] E11.9    Type 2 diabetes with nephropathy ([facility] E11.21    Major depressive disorder, recurrent episode, severe ([facility] F33.2    Intractable nausea and vomiting R11.2    Epigastric pain R10.13    Lactic acidemia E87.2    Tobacco use Z72.0    Primary osteoarthritis involving multiple joints M89.49    Stage 3 chronic kidney disease (HCC) N18.3    Age-related cataract of both eyes H25.9    CVA (cerebral vascular accident) ([facility] I63.9    Cystitis N30.90    Cigarette smoker F17.210    GIB (gastrointestinal bleeding) K92.2     Social History     Socioeconomic History    Marital status:      Spouse name: Not on file    Number of children: Not on file    Years of education: Not on file    Highest education level: Not on file   Occupational History    Not on file   Social Needs    Financial resource strain: Not on file    Food insecurity     Worry: Not on file     Inability: Not on file    Transportation needs     Medical: Not on file     Non-medical: Not on file   Tobacco Use    Smoking status: Current Every Day Smoker     Packs/day: 0.25    Smokeless tobacco: Never Used   Substance and Sexual Activity    Alcohol use: No    Drug use: No    Sexual activity: Not on file   Lifestyle    Physical activity     Days per week: Not on file     Minutes per session: Not on file    Stress: Not on file   Relationships    Social connections     Talks on phone: Not on file     Gets together: Not on file     Attends Taoist service: Not on file     Active member of club or organization: Not on file     Attends meetings of clubs or organizations: Not on file     Relationship status: Not on file    Intimate partner violence     Fear of current or ex partner: Not on file     Emotionally abused: Not on file     Physically abused: Not on file     Forced sexual activity: Not on file   Other Topics Concern    Not on file   Social History Narrative    Not on file     Past Medical History:   Diagnosis Date    Allergic rhinitis due to pollen     BPV (benign positional vertigo)     Change in bowel habits     Depression     Diabetes ([address]     Dysuria     Hemorrhoids     Hypertension     IBS (irritable bowel syndrome)     Menopause     Other diseases of nasal cavity and sinuses(478.19)     Vertigo      The patient has a family history of na    Prior to Admission Medications   Prescriptions Last Dose Informant Patient Reported? Taking? Januvia 100 mg tablet   No No   Sig: TAKE 1 TABLET BY MOUTH EVERY DAY   aspirin (ASPIRIN) 325 mg tablet   No No   Sig: Take 1 Tab by mouth daily. atorvastatin (LIPITOR) 40 mg tablet   No No   Sig: Take 1 Tab by mouth nightly. famotidine (Pepcid) 20 mg tablet   No No   Sig: Take 1 Tab by mouth two (2) times a day for 10 days. guaiFENesin ER (MUCINEX) 600 mg ER tablet   No No   Sig: Take 1 Tab by mouth two (2) times daily as needed for Congestion. levocetirizine (XYZAL) 5 mg tablet   No No   Sig: Take 1 pill daily as needed for allergy symptoms   lisinopriL (PRINIVIL, ZESTRIL) 10 mg tablet   No No   Sig: Take 1 Tab by mouth daily. meclizine (ANTIVERT) 25 mg tablet   No No   Sig: TAKE 1 TAB BY MOUTH THREE (3) TIMES DAILY AS NEEDED FOR DIZZINESS.   metoprolol succinate (TOPROL-XL) 25 mg XL tablet   No No   Sig: TAKE 1 TABLET BY MOUTH EVERY DAY   mirtazapine (REMERON) 7.5 mg tablet   No No   Sig: Take 1 Tab by mouth nightly. venlafaxine-SR (EFFEXOR-XR) 150 mg capsule   No No   Sig: Take 1 Cap by mouth daily (with breakfast).       Facility-Administered Medications: None The review of systems is:  negative for shortness of breath or chest pain      The heart, lungs, and mental status were satisfactory for the administration of anesthesia sedation and for the procedure. I discussed with the patient the objectives, risks, consequences and alternatives to the procedure. The patient was counseled at length about the risks of emiliano Covid-19 during their perioperative period and any recovery window from their procedure. The patient was made aware that emiliano Covid-19  may worsen their prognosis for recovering from their procedure and lend to a higher morbidity and/or mortality risk. All material risks, benefits, and reasonable alternatives including postponing the procedure were discussed. The patient does  wish to proceed with the procedure at this time.         Clifford Cleveland MD  7/15/2020  12:35 PM

## 2020-07-15 NOTE — PERIOP NOTES
TRANSFER - OUT REPORT:    Verbal report given to [facility] on Naomie Graft  being transferred to 213(unit) for routine progression of care       Report consisted of patients Situation, Background, Assessment and   Recommendations(SBAR). Information from the following report(s) Procedure Summary was reviewed with the receiving nurse. Opportunity for questions and clarification was provided.

## 2020-07-15 NOTE — PHYSICIAN ADVISORY
Letter of Status Determination:   Recommend hospitalization status upgraded from   INPATIENT  to INPATIENT  Status     Pt Name:  Cyndi Edmondson   MR#   [facility] # [id number] /  [id number]  Payor: [facility] / Plan: Fabrice Pak [facility] / Product Type: Medicare /    [facility]#  [id number]   Room and Hospital  EPST/01  @ [facility]   Hospitalization date  7/14/2020  2:39 AM   Current Attending Physician  Tamika Correa MD   Principal diagnosis  GIB (gastrointestinal bleeding) [K92.2]     Clinicals  82 y.o. y.o  female hospitalized with above diagnosis   Assessment / Plan:  GI bleed most likely diverticular bleed with possible diverticulitis  -Admit patient to telemetry-keep patient n.p.o.  -IV fluid  -Start patient on IV antibiotic Zosyn  -Follow-up abdominal CT scan  - GI consultation       82years old female with past medical history significant for diverticulosis, depression, DM, hypertension, hyperlipidemia was transferred from [facility] for evaluation of bloody vomiting associated with left lower quadrant abdominal pain, patient was on oral antibiotic for treatment of diverticulitis for the past 10 days    [place] (MCG) criteria   Does  NOT apply    STATUS DETERMINATION      This patient is at high risk of adverse events and deterioration based on documented clinical data, comorbid conditions and current acute care course. Ms. Cyndi Edmondson is expected to meet Inpatient Admission status criteria in accordance with CMS regulation Section 43 .3. Specifically, due to medical necessity the patient's stay is expected to exceed Two Midnights. It is our recommendation that this patient's hospitalization status should be upgraded from  INPATIENT to INPATIENT status. The final decision of the patient's hospitalization status depends on the attending physician's judgment.     Additional comments     Payor: VA MEDICARE / Plan: VA MEDICARE PART A & B / Product Type: Medicare /           Palmira Dunn [facility]  T: [phone]    [email or web address]

## 2020-07-15 NOTE — PROGRESS NOTES
Comprehensive Nutrition Assessment    Type and Reason for Visit: Initial, Positive nutrition screen    Nutrition Recommendations/Plan:   Advance to a Consistent carb diet as tolerated     Nutrition Assessment:     Patient medically noted for GI Bleed; s/p EGD and colonoscopy today. PMH HTN, DM, depression, CKD, CVA, IBS, and diverticulosis. Patient had recently returned to her room and had just made a phone call at time of attempted visit. Chart review shows no significant weight changes recently; patient denied weight loss per GI note. Diet advanced to GI lite. Will monitor diet tolerance and PO intake. Estimated Daily Nutrient Needs:  Energy (kcal):  1562 kcal (BMR (1202) x 1.3AF  Protein (g):  73g (1.0 g/kg bw)       Fluid (ml/day):  1550    Nutrition Related Findings:  BM 7/15, [phone]    Medications: Atorvastatin, Humalog, Remeron, Protonix    Wounds:    None       Current Nutrition Therapies:   DIET GI LITE (POST SURGICAL)    Anthropometric Measures:  · Height: 66\"    · Current Body Wt:  73.1 kg (161 lb 2.5 oz)   · BMI Categories:  Overweight (BMI 25.0-29. 9)       Nutrition Diagnosis:   No nutritional diagnosis at this time    Nutrition Interventions:   Food and/or Nutrient Delivery: Continue current diet  Nutrition Education and Counseling: No recommendations at this time  Coordination of Nutrition Care: No recommendation at this time    Goals:  PO intake >50% of meals next 3-5 days       Nutrition Monitoring and Evaluation:   Behavioral-Environmental Outcomes:    Food/Nutrient Intake Outcomes: Diet advancement/tolerance, Food and nutrient intake  Physical Signs/Symptoms Outcomes: GI status, Weight    Discharge Planning:    (Consistent carb diet)     Electronically signed by Karen Yap RD on 7/15/2020 at 2:32 PM    Contact: Ext [phone], Pager [phone]

## 2020-07-15 NOTE — ANESTHESIA POSTPROCEDURE EVALUATION
Procedure(s):  ESOPHAGOGASTRODUODENOSCOPY (EGD)  COLONOSCOPY  ENDOSCOPIC POLYPECTOMY. total IV anesthesia    Anesthesia Post Evaluation        Patient location during evaluation: PACU  Note status: Adequate. Level of consciousness: responsive to verbal stimuli and sleepy but conscious  Pain management: satisfactory to patient  Airway patency: patent  Anesthetic complications: no  Cardiovascular status: acceptable  Respiratory status: acceptable  Hydration status: acceptable  Comments: +Post-Anesthesia Evaluation and Assessment    Patient: Maria Del Carmen Das MRN: [id number]  SSN: [id number]   YOB: 1935  Age: 82 y.o. Sex: female      Cardiovascular Function/Vital Signs    /76   Pulse 64   Temp 36.6 °C (97.8 °F)   Resp 18   SpO2 98%   Breastfeeding No     Patient is status post Procedure(s):  ESOPHAGOGASTRODUODENOSCOPY (EGD)  COLONOSCOPY  ENDOSCOPIC POLYPECTOMY. Nausea/Vomiting: Controlled. Postoperative hydration reviewed and adequate. Pain:  Pain Scale 1: Numeric (0 - 10) (07/15/20 1330)  Pain Intensity 1: 0 (07/15/20 1330)   Managed. Neurological Status: At baseline. Mental Status and Level of Consciousness: Arousable. Pulmonary Status:   O2 Device: Room air (07/15/20 1336)   Adequate oxygenation and airway patent. Complications related to anesthesia: None    Post-anesthesia assessment completed. No concerns.     Signed By: Charles Rose DO    7/15/2020  Post anesthesia nausea and vomiting:  controlled      INITIAL Post-op Vital signs:   Vitals Value Taken Time   /76 7/15/2020  2:03 PM   Temp 36.6 °C (97.8 °F) 7/15/2020  2:03 PM   Pulse 64 7/15/2020  2:03 PM   Resp 18 7/15/2020  2:03 PM   SpO2 98 % 7/15/2020  2:03 PM

## 2020-07-15 NOTE — PROCEDURES
Colonoscopy    Indications: acute blood loss anemia  hematochezia    Pre-operative Diagnosis: hematochezia  Acute blood loss anemia    Assistant(s):  see chart   [address] [facility]    Medications:  See anesthesia form    Post-operative Diagnosis:   colon - diverticulosis, sigmoid polyp            Procedure Details   Prior to the procedure its objectives, risks, consequences and alternatives were discussed with the patient who then elected to proceed. All questions were answered. Digital Rectal Exam:  Decreased anal tone     The Olympus videocolonoscope was inserted in the rectum and advanced to the cecum. The cecum was identified by typical landmarks. The colonoscope was slowly and carefully withdrawn as the mucosa was inspected. Diverticula were present throughout. No other abnormalities were noted. Retroflexion in the rectum was negative. Upon removal of the scope there was evidence of prolapsing rectal mucosa. Photos to document the ileocecal valve, appendiceal orifice and retroflexion exam were obtained.       The preparation was adequate      Estimated Blood Loss:  none    Specimens:  none    Findings:  No blood, no bleeding source seen  Diverticula  Rectal prolapse  Decreased anal tone    Complications:  none    Implants:  none    Repeat colonoscopy is recommended in:  Jhonatan Taylor MD  1:03 PM  7/15/2020

## 2020-07-15 NOTE — ANESTHESIA PREPROCEDURE EVALUATION
Relevant Problems   No relevant active problems       Anesthetic History     PONV          Review of Systems / Medical History  Patient summary reviewed, nursing notes reviewed and pertinent labs reviewed    Pulmonary          Smoker      Comments: Last smoked 2 days ago   Neuro/Psych       CVA: no residual symptoms  Psychiatric history     Cardiovascular    Hypertension: well controlled            Pertinent negatives: No past MI, CAD, angina and CHF    Comments: Echo 2020 Normal cavity size and systolic function (ejection fraction normal). Upper normal wall thickness. Estimated left ventricular ejection fraction is 55 - 60%. Visually measured ejection fraction. Mild (grade 1) left ventricular diastolic dysfunction. · Trace mitral valve regurgitation is present.    GI/Hepatic/Renal             Pertinent negatives: No liver disease and renal disease   Endo/Other    Diabetes: well controlled, type 2    Arthritis     Other Findings            Physical Exam    Airway  Mallampati: III  TM Distance: 4 - 6 cm  Neck ROM: normal range of motion   Mouth opening: Normal     Cardiovascular  Regular rate and rhythm,  S1 and S2 normal,  no murmur, click, rub, or gallop  Rhythm: regular  Rate: normal         Dental         Pulmonary  Breath sounds clear to auscultation               Abdominal  GI exam deferred       Other Findings            Anesthetic Plan    ASA: 3  Anesthesia type: total IV anesthesia          Induction: Intravenous  Anesthetic plan and risks discussed with: Patient

## 2020-07-15 NOTE — PROGRESS NOTES
I called Yordan Zeng at 5 0  (daughter) and got no answer    See egd and colonoscopy reports    Ok to eat      Suspect diverticular hemorrhage.       Dc plans per hospitalist    Marleny Ortiz MD  1:00 PM  7/15/2020

## 2020-07-16 ENCOUNTER — PATIENT OUTREACH (OUTPATIENT)
Dept: CASE MANAGEMENT | Age: 85
End: 2020-07-16

## 2020-07-16 LAB
ANION GAP SERPL CALC-SCNC: 6 MMOL/L (ref 5–15)
BUN SERPL-MCNC: 6 MG/DL (ref 6–20)
BUN/CREAT SERPL: 7 (ref 12–20)
CALCIUM SERPL-MCNC: 8 MG/DL (ref 8.5–10.1)
CHLORIDE SERPL-SCNC: 111 MMOL/L (ref 97–108)
CO2 SERPL-SCNC: 27 MMOL/L (ref 21–32)
CREAT SERPL-MCNC: 0.92 MG/DL (ref 0.55–1.02)
ERYTHROCYTE [DISTWIDTH] IN BLOOD BY AUTOMATED COUNT: 12.6 % (ref 11.5–14.5)
GLUCOSE BLD STRIP.AUTO-MCNC: 102 MG/DL (ref 65–100)
GLUCOSE BLD STRIP.AUTO-MCNC: 114 MG/DL (ref 65–100)
GLUCOSE BLD STRIP.AUTO-MCNC: 115 MG/DL (ref 65–100)
GLUCOSE BLD STRIP.AUTO-MCNC: 131 MG/DL (ref 65–100)
GLUCOSE BLD STRIP.AUTO-MCNC: 138 MG/DL (ref 65–100)
GLUCOSE SERPL-MCNC: 95 MG/DL (ref 65–100)
HCT VFR BLD AUTO: 26.3 % (ref 35–47)
HCT VFR BLD AUTO: 26.8 % (ref 35–47)
HGB BLD-MCNC: 8.5 G/DL (ref 11.5–16)
HGB BLD-MCNC: 8.9 G/DL (ref 11.5–16)
MCH RBC QN AUTO: 31.1 PG (ref 26–34)
MCHC RBC AUTO-ENTMCNC: 32.3 G/DL (ref 30–36.5)
MCV RBC AUTO: 96.3 FL (ref 80–99)
NRBC # BLD: 0 K/UL (ref 0–0.01)
NRBC BLD-RTO: 0 PER 100 WBC
PLATELET # BLD AUTO: 225 K/UL (ref 150–400)
PMV BLD AUTO: 10 FL (ref 8.9–12.9)
POTASSIUM SERPL-SCNC: 3.4 MMOL/L (ref 3.5–5.1)
RBC # BLD AUTO: 2.73 M/UL (ref 3.8–5.2)
SERVICE CMNT-IMP: ABNORMAL
SODIUM SERPL-SCNC: 144 MMOL/L (ref 136–145)
WBC # BLD AUTO: 7.6 K/UL (ref 3.6–11)

## 2020-07-16 PROCEDURE — C9113 INJ PANTOPRAZOLE SODIUM, VIA: HCPCS | Performed by: INTERNAL MEDICINE

## 2020-07-16 PROCEDURE — 74011250636 HC RX REV CODE- 250/636: Performed by: INTERNAL MEDICINE

## 2020-07-16 PROCEDURE — 74011000258 HC RX REV CODE- 258: Performed by: INTERNAL MEDICINE

## 2020-07-16 PROCEDURE — 85018 HEMOGLOBIN: CPT

## 2020-07-16 PROCEDURE — 36415 COLL VENOUS BLD VENIPUNCTURE: CPT

## 2020-07-16 PROCEDURE — 74011250637 HC RX REV CODE- 250/637: Performed by: INTERNAL MEDICINE

## 2020-07-16 PROCEDURE — 85027 COMPLETE CBC AUTOMATED: CPT

## 2020-07-16 PROCEDURE — 82962 GLUCOSE BLOOD TEST: CPT

## 2020-07-16 PROCEDURE — 65660000000 HC RM CCU STEPDOWN

## 2020-07-16 PROCEDURE — 94760 N-INVAS EAR/PLS OXIMETRY 1: CPT

## 2020-07-16 PROCEDURE — 80048 BASIC METABOLIC PNL TOTAL CA: CPT

## 2020-07-16 RX ORDER — POTASSIUM CHLORIDE 750 MG/1
40 TABLET, FILM COATED, EXTENDED RELEASE ORAL ONCE
Status: COMPLETED | OUTPATIENT
Start: 2020-07-16 | End: 2020-07-16

## 2020-07-16 RX ORDER — LISINOPRIL 10 MG/1
10 TABLET ORAL DAILY
Status: DISCONTINUED | OUTPATIENT
Start: 2020-07-17 | End: 2020-07-17 | Stop reason: HOSPADM

## 2020-07-16 RX ADMIN — METOPROLOL SUCCINATE 25 MG: 25 TABLET, FILM COATED, EXTENDED RELEASE ORAL at 09:32

## 2020-07-16 RX ADMIN — Medication 10 ML: at 22:08

## 2020-07-16 RX ADMIN — Medication 10 ML: at 22:09

## 2020-07-16 RX ADMIN — Medication 10 ML: at 05:22

## 2020-07-16 RX ADMIN — PANTOPRAZOLE SODIUM 40 MG: 40 INJECTION, POWDER, FOR SOLUTION INTRAVENOUS at 09:32

## 2020-07-16 RX ADMIN — PIPERACILLIN AND TAZOBACTAM 3.38 G: 3; .375 INJECTION, POWDER, LYOPHILIZED, FOR SOLUTION INTRAVENOUS at 05:20

## 2020-07-16 RX ADMIN — PANTOPRAZOLE SODIUM 40 MG: 40 INJECTION, POWDER, FOR SOLUTION INTRAVENOUS at 22:08

## 2020-07-16 RX ADMIN — PIPERACILLIN AND TAZOBACTAM 3.38 G: 3; .375 INJECTION, POWDER, LYOPHILIZED, FOR SOLUTION INTRAVENOUS at 15:00

## 2020-07-16 RX ADMIN — POTASSIUM CHLORIDE 40 MEQ: 750 TABLET, FILM COATED, EXTENDED RELEASE ORAL at 15:00

## 2020-07-16 RX ADMIN — ATORVASTATIN CALCIUM 40 MG: 40 TABLET, FILM COATED ORAL at 22:06

## 2020-07-16 RX ADMIN — Medication 10 ML: at 14:00

## 2020-07-16 RX ADMIN — MIRTAZAPINE 7.5 MG: 15 TABLET, FILM COATED ORAL at 22:06

## 2020-07-16 RX ADMIN — PIPERACILLIN AND TAZOBACTAM 3.38 G: 3; .375 INJECTION, POWDER, LYOPHILIZED, FOR SOLUTION INTRAVENOUS at 22:05

## 2020-07-16 NOTE — PROGRESS NOTES
Gastroenterology Progress Note    7/16/2020    Admit Date: 7/14/2020    Subjective: Follow up for: GI bleed      No blood SD today but said there was some after procedures yesterday. Patient was seen in rounds by me today. At this time, the patient is resting comfortably. There is no abdominal pain.   hgb is    Ref.  Range 7/14/2020 17:14 7/14/2020 23:19 7/15/2020 05:08 7/15/2020 10:23 7/16/2020 05:42   HGB Latest Ref Range: 11.5 - 16.0 g/dL 9.2 (L) 9.3 (L) 8.4 (L) 9.0 (L) 8.5 (L)   HCT Latest Ref Range: 35.0 - 47.0 % 28.9 (L) 29.0 (L) 25.3 (L) 27.1 (L) 26.3 (L)           Current Facility-Administered Medications   Medication Dose Route Frequency    mirtazapine (REMERON) tablet 7.5 mg  7.5 mg Oral QHS    atorvastatin (LIPITOR) tablet 40 mg  40 mg Oral QHS    sodium chloride (NS) flush 5-40 mL  5-40 mL IntraVENous Q8H    sodium chloride (NS) flush 5-40 mL  5-40 mL IntraVENous PRN    metoprolol succinate (TOPROL-XL) XL tablet 25 mg  25 mg Oral DAILY    sodium chloride (NS) flush 5-40 mL  5-40 mL IntraVENous Q8H    sodium chloride (NS) flush 5-40 mL  5-40 mL IntraVENous PRN    acetaminophen (TYLENOL) tablet 650 mg  650 mg Oral Q6H PRN    Or    acetaminophen (TYLENOL) suppository 650 mg  650 mg Rectal Q6H PRN    polyethylene glycol (MIRALAX) packet 17 g  17 g Oral DAILY PRN    promethazine (PHENERGAN) tablet 12.5 mg  12.5 mg Oral Q6H PRN    Or    ondansetron (ZOFRAN) injection 4 mg  4 mg IntraVENous Q6H PRN    pantoprazole (PROTONIX) injection 40 mg  40 mg IntraVENous Q12H    piperacillin-tazobactam (ZOSYN) 3.375 g in 0.9% sodium chloride (MBP/ADV) 100 mL  3.375 g IntraVENous Q8H    glucose chewable tablet 16 g  4 Tab Oral PRN    dextrose (D50W) injection syrg 12.5-25 g  25-50 mL IntraVENous PRN    glucagon (GLUCAGEN) injection 1 mg  1 mg IntraMUSCular PRN    insulin lispro (HUMALOG) injection   SubCUTAneous AC&HS        Objective:     Blood pressure 170/82, pulse 60, temperature 98.4 °F (36.9 °C), resp. rate 16, SpO2 98 %, not currently breastfeeding. No intake/output data recorded.     07/14 1901 - 07/16 0700  In: 3955 [P.O.:2060; I.V.:1895]  Out: -         Physical Examination:       General:AAO x 3,   HEENT:  EOMI, MMM  Chest:  CTA,   Heart: S1, S2, RRR  GI: Soft, NT, ND + bowel sounds  CNS: CNs grossly normal.    Data Review    Recent Results (from the past 24 hour(s))   GLUCOSE, POC    Collection Time: 07/15/20  8:40 AM   Result Value Ref Range    Glucose (POC) 106 (H) 65 - 100 mg/dL    Performed by [facility] PCT    HGB & HCT    Collection Time: 07/15/20 10:23 AM   Result Value Ref Range    HGB 9.0 (L) 11.5 - 16.0 g/dL    HCT 27.1 (L) 35.0 - 47.0 %   GLUCOSE, POC    Collection Time: 07/15/20 11:37 AM   Result Value Ref Range    Glucose (POC) 109 (H) 65 - 100 mg/dL    Performed by [facility] PCT    GLUCOSE, POC    Collection Time: 07/15/20  2:55 PM   Result Value Ref Range    Glucose (POC) 126 (H) 65 - 100 mg/dL    Performed by [facility] PCT    GLUCOSE, POC    Collection Time: 07/15/20  5:37 PM   Result Value Ref Range    Glucose (POC) 141 (H) 65 - 100 mg/dL    Performed by [facility] PCT    GLUCOSE, POC    Collection Time: 07/15/20  7:20 PM   Result Value Ref Range    Glucose (POC) 148 (H) 65 - 100 mg/dL    Performed by [facility] PCT    METABOLIC PANEL, BASIC    Collection Time: 07/16/20  5:42 AM   Result Value Ref Range    Sodium 144 136 - 145 mmol/L    Potassium 3.4 (L) 3.5 - 5.1 mmol/L    Chloride 111 (H) 97 - 108 mmol/L    CO2 27 21 - 32 mmol/L    Anion gap 6 5 - 15 mmol/L    Glucose 95 65 - 100 mg/dL    BUN 6 6 - 20 MG/DL    Creatinine 0.92 0.55 - 1.02 MG/DL    BUN/Creatinine ratio 7 (L) 12 - 20      GFR est AA >60 >60 ml/min/1.73m2    GFR est non-AA 58 (L) >60 ml/min/1.73m2    Calcium 8.0 (L) 8.5 - 10.1 MG/DL   CBC W/O DIFF    Collection Time: 07/16/20  5:42 AM   Result Value Ref Range    WBC 7.6 3.6 - 11.0 K/uL    RBC 2.73 (L) 3.80 - 5.20 M/uL    HGB 8.5 (L) 11.5 - 16.0 g/dL    HCT 26.3 (L) 35.0 - 47.0 %    MCV 96.3 80.0 - 99.0 FL    MCH 31.1 26.0 - 34.0 PG    MCHC 32.3 30.0 - 36.5 g/dL    RDW 12.6 11.5 - 14.5 %    PLATELET 863 900 - 383 K/uL    MPV 10.0 8.9 - 12.9 FL    NRBC 0.0 0  WBC    ABSOLUTE NRBC 0.00 0.00 - 0.01 K/uL   GLUCOSE, POC    Collection Time: 07/16/20  7:02 AM   Result Value Ref Range    Glucose (POC) 102 (H) 65 - 100 mg/dL    Performed by Weston Anthony      Recent Labs     07/16/20  0542 07/15/20  1023 07/15/20  0508   WBC 7.6  --  8.7   HGB 8.5* 9.0* 8.4*   HCT 26.3* 27.1* 25.3*     --  218     Recent Labs     07/16/20  0542 07/15/20  0508 07/14/20  0436    145 143   K 3.4* 3.5 4.2   * 112* 114*   CO2 27 28 26   BUN 6 8 13   CREA 0.92 1.02 1.00   GLU 95 109* 124*   CA 8.0* 7.4* 7.9*     Recent Labs     07/13/20  2120      TP 7.6   ALB 3.5   GLOB 4.1*     No results for input(s): INR, PTP, APTT, INREXT in the last 72 hours. No results for input(s): FE, TIBC, PSAT, FERR in the last 72 hours. Lab Results   Component Value Date/Time    Folate 23.4 (H) 09/29/2018 09:00 AM      No results for input(s): PH, PCO2, PO2 in the last 72 hours. No results for input(s): CPK, CKNDX, TROIQ in the last 72 hours.     No lab exists for component: CPKMB  Lab Results   Component Value Date/Time    Cholesterol, total 162 07/01/2020 05:00 AM    HDL Cholesterol 54 07/01/2020 05:00 AM    LDL, calculated 86.6 07/01/2020 05:00 AM    Triglyceride 107 07/01/2020 05:00 AM    CHOL/HDL Ratio 3.0 07/01/2020 05:00 AM     No components found for: [place]  Lab Results   Component Value Date/Time    Color YELLOW/STRAW 07/03/2020 01:53 PM    Appearance CLEAR 07/03/2020 01:53 PM    Specific gravity 1.007 07/03/2020 01:53 PM    Specific gravity 1.025 09/07/2019 04:03 PM    pH (UA) 6.0 07/03/2020 01:53 PM    Protein Negative 07/03/2020 01:53 PM    Glucose Negative 07/03/2020 01:53 PM    Ketone Negative 07/03/2020 01:53 PM    Bilirubin Negative 07/03/2020 01:53 PM    Urobilinogen 0.2 07/03/2020 01:53 PM    Nitrites Negative 07/03/2020 01:53 PM    Leukocyte Esterase Negative 07/03/2020 01:53 PM    Epithelial cells MODERATE (A) 07/03/2020 01:53 PM    Bacteria Negative 07/03/2020 01:53 PM    WBC 0-4 07/03/2020 01:53 PM    RBC 0-5 07/03/2020 01:53 PM        ROS: -CP, SOB, Dysuria, palpitations, cough. Assessment:    Active Problems:    GIB (gastrointestinal bleeding) (7/14/2020)             Plan/Discussion:     1. Keep her on a GI lite diet and follow hgb. 2. If there is recurring bleed get CTA and make her go on a CLD/NPO. 3. Bleeding is thought to be diverticular related. 4. If her hgb is stable and BMs become normal she can hopefully go home tomorrow. Signed By: Shea Stokes.  Taran Lees MD    7/16/2020  8:09 AM

## 2020-07-16 NOTE — PROGRESS NOTES
Pharmacy Automatic Renal Dosing Protocol - Antimicrobials    Indication for Antimicrobials: possible diverticulitis     Current Regimen of Each Antimicrobial:   Piperacillin/tazobactam 3.375g IV q8h, start ; day 3    Previous Antimicrobial Therapy:   Per H&P, was on oral antibiotic x10 days prior to admission, no record in [facility]    Significant Cultures:   none    Radiology / Imaging results: (X-ray, CT scan or MRI):    CT-scan of abdomen and pelvis   1. No nephrolithiasis or hydronephrosis. Unchanged appearance of the kidneys. 2. Normal appendix. 3. Colonic diverticulosis without diverticulitis. Paralysis, amputations, malnutrition: none noted    Labs:  Recent Labs     20  0542 07/15/20  0508 20  0436   CREA 0.92 1.02 1.00   BUN 6 8 13   WBC 7.6 8.7 11.5*     Temp (24hrs), Av.1 °F (36.7 °C), Min:97.4 °F (36.3 °C), Max:98.4 °F (36.9 °C)    Creatinine Clearance (mL/min) or Dialysis: 43.4 mL/min (IBW)    Impression/Plan:   Scr stable  Continue current dose of zosyn; appropriate for indication/renal function   Antimicrobial stop date pending     Pharmacy will follow daily and adjust medications as appropriate for renal function and/or serum levels.     Thank you,  DANNY Echeverria

## 2020-07-16 NOTE — PROGRESS NOTES
Hospitalist Progress Note    NAME: Srikanth Parra   :  1935   MRN:  [id number]       Assessment / Plan:  GI bleed most likely diverticular bleed with possible diverticulitis  S/p EGD/colo on 07/15  CT scan showed :   . No nephrolithiasis or hydronephrosis. Unchanged appearance of the kidneys. 2. Normal appendix. 3. Colonic diverticulosis without diverticulitis. Started on zosyn , H&H showed dropped in hb by 3 pts  EGD wnl , colonoscopy showed diverticulosis and sigmoid polyp   Pt reported an episode of bloody BM after colonoscopy , stools still soft     yoko resolved   -S/p IVF      DM  -Hold oral hypoglycemic agent-c/w SSI     Hypertension  - resume BB , and  lisinopril    Hyperlipidemia  - Continue Lipitor     Code Status: FULL    Surrogate Decision Maker:     DVT Prophylaxis: SCD  GI Prophylaxis: not indicated     There is no height or weight on file to calculate BMI. Subjective:     Chief Complaint / Reason for Physician Visit  FU GI bleed . Reported blloddy stools after colonoscopy  Discussed with RN events overnight. Review of Systems:  Symptom Y/N Comments  Symptom Y/N Comments   Fever/Chills n   Chest Pain n    Poor Appetite    Edema     Cough    Abdominal Pain     Sputum    Joint Pain     SOB/BARRON n   Pruritis/Rash     Nausea/vomit n   Tolerating PT/OT     Diarrhea    Tolerating Diet y    Constipation    Other       Could NOT obtain due to:      Objective:     VITALS:   Last 24hrs VS reviewed since prior progress note.  Most recent are:  Patient Vitals for the past 24 hrs:   Temp Pulse Resp BP SpO2   20 1158 98.3 °F (36.8 °C) (!) 52 16 132/67 99 %   20 0725 98.4 °F (36.9 °C) 60 16 170/82 98 %   20 0318 98.4 °F (36.9 °C) (!) 59 16 155/69 99 %   07/15/20 2331 98.3 °F (36.8 °C) 70 17 125/65 95 %   07/15/20 1914 98 °F (36.7 °C) 68 16 166/72 100 %   07/15/20 1807 98 °F (36.7 °C) 64 16 147/68 100 %   07/15/20 1701 98.4 °F (36.9 °C) 66 16 158/72 99 %   07/15/20 1613 97.7 °F (36.5 °C) 64 16 152/70 100 %   07/15/20 1449 97.4 °F (36.3 °C) 67 16 145/64 97 %       Intake/Output Summary (Last 24 hours) at 7/16/2020 1419  Last data filed at 7/16/2020 1342  Gross per 24 hour   Intake 686.25 ml   Output --   Net 686.25 ml        PHYSICAL EXAM:  General: WD, WN. Alert, cooperative, no acute distress    EENT:  EOMI. Anicteric sclerae. MMM  Resp:  CTA bilaterally, no wheezing or rales. No accessory muscle use  CV:  Regular  rhythm,  No edema  GI:  Soft, Non distended, Non tender.  +Bowel sounds  Neurologic:  Alert and oriented X 3, normal speech,   Psych:   Good insight. Not anxious nor agitated  Skin:  No rashes. No jaundice    Reviewed most current lab test results and cultures  YES  Reviewed most current radiology test results   YES  Review and summation of old records today    NO  Reviewed patient's current orders and MAR    YES  PMH/ reviewed - no change compared to H&P  ________________________________________________________________________  Care Plan discussed with:    Comments   Patient x    Family      RN x    Care Manager     Consultant                        Multidiciplinary team rounds were held today with , nursing, pharmacist and clinical coordinator. Patient's plan of care was discussed; medications were reviewed and discharge planning was addressed. ________________________________________________________________________  Total NON critical care TIME:  35  Minutes    Total CRITICAL CARE TIME Spent:   Minutes non procedure based      Comments   >50% of visit spent in counseling and coordination of care x    ________________________________________________________________________  Bernadine Ramirez MD     Procedures: see electronic medical records for all procedures/Xrays and details which were not copied into this note but were reviewed prior to creation of Plan. LABS:  I reviewed today's most current labs and imaging studies.   Pertinent labs include:  Recent Labs     07/16/20  0542 07/15/20  1023 07/15/20  0508  07/14/20  0436   WBC 7.6  --  8.7  --  11.5*   HGB 8.5* 9.0* 8.4*   < > 10.7*   HCT 26.3* 27.1* 25.3*   < > 32.6*     --  218  --  249    < > = values in this interval not displayed.      Recent Labs     07/16/20  0542 07/15/20  0508 07/14/20 0436 07/13/20 2120    145 143 145   K 3.4* 3.5 4.2 4.2   * 112* 114* 107   CO2 27 28 26 30   GLU 95 109* 124* 135*   BUN 6 8 13 14   CREA 0.92 1.02 1.00 1.34*   CA 8.0* 7.4* 7.9* 9.1   ALB  --   --   --  3.5   TBILI  --   --   --  0.5   ALT  --   --   --  19       Signed: Kyra Morley MD

## 2020-07-16 NOTE — PROGRESS NOTES
1344 - Offered to get pt in the chair and she refused    1500 - Offered to get pt in the chair and she refused    General Surgery End of Shift Nursing Note    Bedside shift change report given to Deyanira George RN (oncoming nurse) by Tricia Baeza RN (offgoing nurse). Report included the following information SBAR, Kardex and MAR. Shift worked:   [id number]     Summary of shift:    Pt has been sleeping. She refused to get out of bed in into the chair. She did ambulate to the bedside commode. Several of pt's family member called. They were reminded that nursing did not have permission to discuss pt's status and that the pt would be informed of their phone calls. Pt was informed that she needed to update the family members that she wanted to know her status. Issues for physician to address:   None at this time     Number times ambulated in hallway past shift: 0    Number of times OOB to chair past shift: 0    Pain Management:  Current medication: See MAR  Patient states pain is manageable on current pain medication: YES    GI:    Current diet:  DIET GI LITE (POST SURGICAL)    Tolerating current diet: YES  Passing flatus: YES  Last Bowel Movement: today   Appearance: loose    Respiratory:  Head of bed elevated    Patient Safety:    Falls Score: 1  Bed Alarm On? No  Sitter?  No    [place]

## 2020-07-17 VITALS
RESPIRATION RATE: 16 BRPM | HEART RATE: 75 BPM | TEMPERATURE: 98.7 F | SYSTOLIC BLOOD PRESSURE: 127 MMHG | OXYGEN SATURATION: 100 % | DIASTOLIC BLOOD PRESSURE: 53 MMHG

## 2020-07-17 LAB
GLUCOSE BLD STRIP.AUTO-MCNC: 141 MG/DL (ref 65–100)
GLUCOSE BLD STRIP.AUTO-MCNC: 86 MG/DL (ref 65–100)
HCT VFR BLD AUTO: 26.9 % (ref 35–47)
HGB BLD-MCNC: 8.8 G/DL (ref 11.5–16)
SERVICE CMNT-IMP: ABNORMAL
SERVICE CMNT-IMP: NORMAL

## 2020-07-17 PROCEDURE — 82962 GLUCOSE BLOOD TEST: CPT

## 2020-07-17 PROCEDURE — 36415 COLL VENOUS BLD VENIPUNCTURE: CPT

## 2020-07-17 PROCEDURE — C9113 INJ PANTOPRAZOLE SODIUM, VIA: HCPCS | Performed by: INTERNAL MEDICINE

## 2020-07-17 PROCEDURE — 74011250636 HC RX REV CODE- 250/636: Performed by: INTERNAL MEDICINE

## 2020-07-17 PROCEDURE — 85018 HEMOGLOBIN: CPT

## 2020-07-17 PROCEDURE — 74011000258 HC RX REV CODE- 258: Performed by: INTERNAL MEDICINE

## 2020-07-17 PROCEDURE — 94760 N-INVAS EAR/PLS OXIMETRY 1: CPT

## 2020-07-17 PROCEDURE — 74011250637 HC RX REV CODE- 250/637: Performed by: INTERNAL MEDICINE

## 2020-07-17 RX ORDER — PANTOPRAZOLE SODIUM 40 MG/1
40 TABLET, DELAYED RELEASE ORAL 2 TIMES DAILY
Qty: 60 TAB | Refills: 0 | Status: SHIPPED | OUTPATIENT
Start: 2020-07-17 | End: 2020-07-23 | Stop reason: SDUPTHER

## 2020-07-17 RX ORDER — AMOXICILLIN AND CLAVULANATE POTASSIUM 875; 125 MG/1; MG/1
1 TABLET, FILM COATED ORAL EVERY 12 HOURS
Qty: 10 TAB | Refills: 0 | Status: SHIPPED | OUTPATIENT
Start: 2020-07-17 | End: 2020-07-17

## 2020-07-17 RX ADMIN — PIPERACILLIN AND TAZOBACTAM 3.38 G: 3; .375 INJECTION, POWDER, LYOPHILIZED, FOR SOLUTION INTRAVENOUS at 06:16

## 2020-07-17 RX ADMIN — METOPROLOL SUCCINATE 25 MG: 25 TABLET, FILM COATED, EXTENDED RELEASE ORAL at 09:18

## 2020-07-17 RX ADMIN — PANTOPRAZOLE SODIUM 40 MG: 40 INJECTION, POWDER, FOR SOLUTION INTRAVENOUS at 09:18

## 2020-07-17 RX ADMIN — Medication 10 ML: at 06:16

## 2020-07-17 RX ADMIN — Medication 10 ML: at 06:15

## 2020-07-17 RX ADMIN — LISINOPRIL 10 MG: 10 TABLET ORAL at 09:18

## 2020-07-17 NOTE — DISCHARGE SUMMARY
Hospitalist Discharge Summary     Patient ID:  Og Lopez  [id number]  48 y.o.  1935 7/14/2020    PCP on record: Fany Lee NP    Admit date: 7/14/2020  Discharge date and time: 7/17/2020    DISCHARGE DIAGNOSIS:  GI bleed most likely diverticular bleed with possible diverticulitis  S/p EGD/colo on 07/15  yoko resolved   DM  Hypertension  Hyperlipidemia      CONSULTATIONS:  IP CONSULT TO GASTROENTEROLOGY    Excerpted HPI from H&P of Jessica Dsouza MD:    82years old female with past medical history significant for diverticulosis, depression, DM, hypertension, hyperlipidemia was transferred from [facility] for evaluation of bloody vomiting associated with left lower quadrant abdominal pain, patient was on oral antibiotic for treatment of diverticulitis for the past 10 days patient denies any fever any chills denies any nausea any vomiting, blood work was done and show elevated white blood cell count 11.2. We were asked to admit for work up and evaluation of the above problems  ______________________________________________________________________  DISCHARGE SUMMARY/HOSPITAL COURSE:  for full details see H&P, daily progress notes, labs, consult notes. GI bleed most likely diverticular bleed with possible diverticulitis  S/p EGD/colo on 07/15  CT scan showed :   . No nephrolithiasis or hydronephrosis. Unchanged appearance of the kidneys. 2. Normal appendix.   3. Colonic diverticulosis without diverticulitis.     S/p  zosyn , H&H showed dropped in hb by 3 pts  EGD wnl , colonoscopy showed diverticulosis and sigmoid polyp   Pt reported an episode of bloody BM after colonoscopy , stools still soft   No further bleeding , hb stable around 8.8g     yoko resolved   -S/p IVF      DM  Resume home meds      Hypertension  - resume BB , and  lisinopril    Hyperlipidemia  - Continue Lipitor     ______________________________________________________________________  Patient seen and examined by me on discharge day. Pertinent Findings:  Gen:    Not in distress  Chest: Clear lungs  CVS:   Regular rhythm. No edema  Abd:  Soft, not distended, not tender  Neuro:  Alert, orientedx4  _______________________________________________________________________  DISCHARGE MEDICATIONS:   Current Discharge Medication List      START taking these medications    Details   pantoprazole (Protonix) 40 mg tablet Take 1 Tab by mouth two (2) times a day for 30 days. Qty: 60 Tab, Refills: 0         CONTINUE these medications which have NOT CHANGED    Details   lisinopriL (PRINIVIL, ZESTRIL) 10 mg tablet Take 1 Tab by mouth daily. Qty: 30 Tab, Refills: 1      Januvia 100 mg tablet TAKE 1 TABLET BY MOUTH EVERY DAY  Qty: 90 Tab, Refills: 0      atorvastatin (LIPITOR) 40 mg tablet Take 1 Tab by mouth nightly. Qty: 90 Tab, Refills: 1      metoprolol succinate (TOPROL-XL) 25 mg XL tablet TAKE 1 TABLET BY MOUTH EVERY DAY  Qty: 90 Tab, Refills: 1      meclizine (ANTIVERT) 25 mg tablet TAKE 1 TAB BY MOUTH THREE (3) TIMES DAILY AS NEEDED FOR DIZZINESS. Qty: 20 Tab, Refills: 0      guaiFENesin ER (MUCINEX) 600 mg ER tablet Take 1 Tab by mouth two (2) times daily as needed for Congestion. Qty: 60 Tab, Refills: 0    Associated Diagnoses: Chest congestion      mirtazapine (REMERON) 7.5 mg tablet Take 1 Tab by mouth nightly. Qty: 90 Tab, Refills: 1      venlafaxine-SR (EFFEXOR-XR) 150 mg capsule Take 1 Cap by mouth daily (with breakfast). Qty: 30 Cap, Refills: 5      levocetirizine (XYZAL) 5 mg tablet Take 1 pill daily as needed for allergy symptoms  Qty: 30 Tab, Refills: 2    Associated Diagnoses: Environmental and seasonal allergies         STOP taking these medications       aspirin (ASPIRIN) 325 mg tablet Comments:   Reason for Stopping:                 Patient Follow Up Instructions: Activity: Activity as tolerated  Diet: Cardiac Diet  Wound Care: None needed    Follow-up with PCP in 7 days.   Follow-up tests/labs Follow-up Information     Follow up With Specialties Details Why Contact Rochelle Barbosa NP Nurse Practitioner Schedule an appointment as soon as possible for a visit  [address] [facility]  [phone]          ________________________________________________________________    Risk of deterioration: Low    Condition at Discharge:  Stable  __________________________________________________________________    Disposition  Home with family, no needs    ____________________________________________________________________    Code Status: Full Code  ___________________________________________________________________      Total time in minutes spent coordinating this discharge (includes going over instructions, follow-up, prescriptions, and preparing report for sign off to her PCP) :  40 minutes    Signed:  Jair Pereira MD

## 2020-07-17 NOTE — PROGRESS NOTES
General Surgery End of Shift Nursing Note    Bedside shift change report given to Hue Nickerson (oncoming nurse) by Marianna Gómez (offgoing nurse). Report included the following information SBAR, Kardex, Intake/Output, MAR and Cardiac Rhythm NSR. Shift worked:   [id number]   Summary of shift:    Patient denied any pain. Stable overnight. BS wnl. No insulin coverage. IV antibiotics given   Issues for physician to address:        Number times ambulated in hallway past shift: 0    Number of times OOB to chair past shift: 1    Pain Management:  Current medication : patient denied any pain. GI:    Current diet:  DIET GI LITE (POST SURGICAL)    Tolerating current diet: YES  Passing flatus: YES  Last Bowel Movement: yesterday   Appearance:     Respiratory:    Incentive Spirometer at bedside: NO  Patient instructed on use: NO    Patient Safety:    Falls Score: 1  Bed Alarm On? No  Sitter?  No    Satinder Eastman

## 2020-07-17 NOTE — PROGRESS NOTES
RUDY Plan:                 *home              *daughter to transport at d/c      *IM letter signed & placed on chart     Patient is discharging home today without any needs or concerns. Follow-up appointment is on the AVS    Care Management Interventions  PCP Verified by CM: Yes(Lillian Mcintosh NP)  Mode of Transport at Discharge:  Other (see comment)(daughter)  Transition of Care Consult (CM Consult): Discharge Planning  Discharge Durable Medical Equipment: No(No DME use)  Physical Therapy Consult: No  Occupational Therapy Consult: No  Speech Therapy Consult: No  Current Support Network: Lives Alone, Family Lives Nearby(Lives in a one story home with 2 ANT)  Confirm Follow Up Transport: Other (see comment)([place] agency and daughters)  Discharge Location  Discharge Placement: Home      Mary Anne Even  Ext [phone]

## 2020-07-17 NOTE — ROUTINE PROCESS
The following appointments have been successfully scheduled:    Date/time Thursday, July 23, 2020 11:30 AM  Patient Joey Sevilla 1935 (14YP F) #[id number]  Department [facility] SPECIALTY [facility] OFFICE  Appointment type Transitional Care  Provider Erna Solis

## 2020-07-17 NOTE — DISCHARGE INSTRUCTIONS
DISCHARGE DIAGNOSIS:  GI bleed most likely diverticular bleed with possible diverticulitis  S/p EGD/colo on 07/15  yoko resolved   DM  Hypertension  Hyperlipidemia    MEDICATIONS:  · It is important that you take the medication exactly as they are prescribed. · Keep your medication in the bottles provided by the pharmacist and keep a list of the medication names, dosages, and times to be taken in your wallet. · Do not take other medications without consulting your doctor. Pain Management: per above medications    What to do at [address]:  Cardiac Diet    Recommended activity: Activity as tolerated    If you have questions regarding the hospital related prescriptions or hospital related issues please call [address] [place] [address] at . You can always direct your questions to your primary care doctor if you are unable to reach your hospital physician; your PCP works as an extension of your hospital doctor just like your hospital doctor is an extension of your PCP for your time at the hospital [facility], [facility]). If you experience any of the following symptoms then please call your primary care physician or return to the emergency room if you cannot get hold of your doctor:  Fever, chills, nausea, vomiting, diarrhea, change in mentation, falling, bleeding, shortness of breath  Patient Education        Learning About Diverticulosis and Diverticulitis  What are diverticulosis and diverticulitis? In diverticulosis and diverticulitis, pouches called diverticula form in the wall of the large intestine, or colon. · In diverticulosis, the pouches do not cause any pain or other symptoms. · In diverticulitis, the pouches get inflamed or infected and cause symptoms. Doctors aren't sure what causes these pouches in the colon. But they think that a low-fiber diet may play a role. Without fiber to add bulk to the stool, the colon has to work harder than normal to push the stool forward.  The pressure from this may cause pouches to form in weak spots along the colon. Some people with diverticulosis get diverticulitis. But experts don't know why this happens. What are the symptoms? · In diverticulosis, most people don't have symptoms. But pouches sometimes bleed. · In diverticulitis, symptoms may last from a few hours to a week or more. They include:  ? Belly pain. This is usually in the lower left side. It is sometimes worse when you move. This is the most common symptom. ? Fever and chills. ? Bloating and gas. ? Diarrhea or constipation. ? Nausea and sometimes vomiting.  ? Not feeling like eating. How can you prevent diverticulitis? You may be able to lower your chance of getting diverticulitis. You can do this by taking steps to prevent constipation. · Eat fruits, vegetables, beans, and whole grains every day. These foods are high in fiber. · Drink plenty of fluids. (Drink enough so that your urine is light yellow or clear like water.) If you have kidney, heart, or liver disease and have to limit fluids, talk with your doctor before you increase the amount of fluids you drink. · Get at least 30 minutes of exercise on most days of the week. Walking is a good choice. You also may want to do other activities, such as running, swimming, cycling, or playing tennis or team sports. · Take a fiber supplement, such as Citrucel or Metamucil, every day if needed. Read and follow all instructions on the label. · Schedule time each day for a bowel movement. Having a daily routine may help. Take your time and do not strain when having a bowel movement. Some people avoid nuts, seeds, berries, and popcorn. They believe that these foods might get trapped in the diverticula and cause pain. But there is no proof that these foods cause diverticulitis or make it worse. How are these problems treated? · The best way to treat diverticulosis is to avoid constipation.   · Treatment for diverticulitis includes antibiotics. It often includes a change in your diet. You may need only liquids at first. Your doctor may suggest pain medicines for pain or belly cramps. In some cases, surgery may be needed. Follow-up care is a key part of your treatment and safety. Be sure to make and go to all appointments, and call your doctor if you are having problems. It's also a good idea to know your test results and keep a list of the medicines you take. Where can you learn more? Go to http://www.gray.com/  Enter O784 in the search box to learn more about \"Learning About Diverticulosis and Diverticulitis. \"  Current as of: August 12, 2019               Content Version: 12.5  © 1656-4064 Healthwise, Incorporated. Care instructions adapted under license by [employer] (which disclaims liability or warranty for this information). If you have questions about a medical condition or this instruction, always ask your healthcare professional. [address] any warranty or liability for your use of this information.

## 2020-07-17 NOTE — PROGRESS NOTES
Gastroenterology Progress Note    7/17/2020    Admit Date: 7/14/2020    Subjective: Follow up for: GI bleed      Had 1 episode of blood per rectum after colonoscopy but none since. Had a brown semi formed BM this AM.    Feeling better and tolerating GI lite diet. Is to be discharged home today.  hgb is   Results for Ilia Smalls (MRN [id number]) as of 7/17/2020 09:11   Ref.  Range 7/15/2020 05:08 7/15/2020 10:23 7/16/2020 05:42 7/16/2020 18:02 7/17/2020 01:18   HGB Latest Ref Range: 11.5 - 16.0 g/dL 8.4 (L) 9.0 (L) 8.5 (L) 8.9 (L) 8.8 (L)   HCT Latest Ref Range: 35.0 - 47.0 % 25.3 (L) 27.1 (L) 26.3 (L) 26.8 (L) 26.9 (L)           Current Facility-Administered Medications   Medication Dose Route Frequency    lisinopriL (PRINIVIL, ZESTRIL) tablet 10 mg  10 mg Oral DAILY    mirtazapine (REMERON) tablet 7.5 mg  7.5 mg Oral QHS    atorvastatin (LIPITOR) tablet 40 mg  40 mg Oral QHS    sodium chloride (NS) flush 5-40 mL  5-40 mL IntraVENous Q8H    sodium chloride (NS) flush 5-40 mL  5-40 mL IntraVENous PRN    metoprolol succinate (TOPROL-XL) XL tablet 25 mg  25 mg Oral DAILY    sodium chloride (NS) flush 5-40 mL  5-40 mL IntraVENous Q8H    sodium chloride (NS) flush 5-40 mL  5-40 mL IntraVENous PRN    acetaminophen (TYLENOL) tablet 650 mg  650 mg Oral Q6H PRN    Or    acetaminophen (TYLENOL) suppository 650 mg  650 mg Rectal Q6H PRN    polyethylene glycol (MIRALAX) packet 17 g  17 g Oral DAILY PRN    promethazine (PHENERGAN) tablet 12.5 mg  12.5 mg Oral Q6H PRN    Or    ondansetron (ZOFRAN) injection 4 mg  4 mg IntraVENous Q6H PRN    pantoprazole (PROTONIX) injection 40 mg  40 mg IntraVENous Q12H    piperacillin-tazobactam (ZOSYN) 3.375 g in 0.9% sodium chloride (MBP/ADV) 100 mL  3.375 g IntraVENous Q8H    glucose chewable tablet 16 g  4 Tab Oral PRN    dextrose (D50W) injection syrg 12.5-25 g  25-50 mL IntraVENous PRN    glucagon (GLUCAGEN) injection 1 mg  1 mg IntraMUSCular PRN    insulin lispro (HUMALOG) injection   SubCUTAneous AC&HS        Objective:     Blood pressure 131/63, pulse 62, temperature 98.1 °F (36.7 °C), resp. rate 16, SpO2 99 %, not currently breastfeeding.    07/17 0701 - 07/17 1900  In: 240 [P.O.:240]  Out: -     07/15 1901 - 07/17 0700  In: 360 [P.O.:260; I.V.:100]  Out: -         Physical Examination:       General:AAO x 3,   HEENT:  EOMI, MMM  Chest:  CTA,   Heart: S1, S2, RRR  GI: Soft, mild RLQ tenderness no guarding or rebounding, ND + bowel sounds  CNS: CNs grossly normal.    Data Review    Recent Results (from the past 24 hour(s))   GLUCOSE, POC    Collection Time: 07/16/20 12:02 PM   Result Value Ref Range    Glucose (POC) 115 (H) 65 - 100 mg/dL    Performed by [facility] PCT    GLUCOSE, POC    Collection Time: 07/16/20  3:20 PM   Result Value Ref Range    Glucose (POC) 131 (H) 65 - 100 mg/dL    Performed by [email or web address]    HGB & HCT    Collection Time: 07/16/20  6:02 PM   Result Value Ref Range    HGB 8.9 (L) 11.5 - 16.0 g/dL    HCT 26.8 (L) 35.0 - 47.0 %   GLUCOSE, POC    Collection Time: 07/16/20  9:00 PM   Result Value Ref Range    Glucose (POC) 138 (H) 65 - 100 mg/dL    Performed by Miladis Arrington (PCT)    GLUCOSE, POC    Collection Time: 07/16/20 10:18 PM   Result Value Ref Range    Glucose (POC) 114 (H) 65 - 100 mg/dL    Performed by Kirsten Smith (IRASEMA RN)    HGB & HCT    Collection Time: 07/17/20  1:18 AM   Result Value Ref Range    HGB 8.8 (L) 11.5 - 16.0 g/dL    HCT 26.9 (L) 35.0 - 47.0 %   GLUCOSE, POC    Collection Time: 07/17/20  6:48 AM   Result Value Ref Range    Glucose (POC) 86 65 - 100 mg/dL    Performed by Miladis Arrington (PCT)      Recent Labs     07/17/20  0118 07/16/20  1802 07/16/20  0542  07/15/20  0508   WBC  --   --  7.6  --  8.7   HGB 8.8* 8.9* 8.5*   < > 8.4*   HCT 26.9* 26.8* 26.3*   < > 25.3*   PLT  --   --  225  --  218    < > = values in this interval not displayed.      Recent Labs     07/16/20  0542 07/15/20  0508    145   K 3.4* 3.5   * 112*   CO2 27 28   BUN 6 8   CREA 0.92 1.02   GLU 95 109*   CA 8.0* 7.4*     No results for input(s): AP, TBIL, TP, ALB, GLOB, GGT, AML, LPSE in the last 72 hours. No lab exists for component: SGOT, GPT, AMYP, HLPSE  No results for input(s): INR, PTP, APTT, INREXT, INREXT in the last 72 hours. No results for input(s): FE, TIBC, PSAT, FERR in the last 72 hours. Lab Results   Component Value Date/Time    Folate 23.4 (H) 09/29/2018 09:00 AM      No results for input(s): PH, PCO2, PO2 in the last 72 hours. No results for input(s): CPK, CKNDX, TROIQ in the last 72 hours. No lab exists for component: CPKMB  Lab Results   Component Value Date/Time    Cholesterol, total 162 07/01/2020 05:00 AM    HDL Cholesterol 54 07/01/2020 05:00 AM    LDL, calculated 86.6 07/01/2020 05:00 AM    Triglyceride 107 07/01/2020 05:00 AM    CHOL/HDL Ratio 3.0 07/01/2020 05:00 AM     No components found for: [place]  Lab Results   Component Value Date/Time    Color YELLOW/STRAW 07/03/2020 01:53 PM    Appearance CLEAR 07/03/2020 01:53 PM    Specific gravity 1.007 07/03/2020 01:53 PM    Specific gravity 1.025 09/07/2019 04:03 PM    pH (UA) 6.0 07/03/2020 01:53 PM    Protein Negative 07/03/2020 01:53 PM    Glucose Negative 07/03/2020 01:53 PM    Ketone Negative 07/03/2020 01:53 PM    Bilirubin Negative 07/03/2020 01:53 PM    Urobilinogen 0.2 07/03/2020 01:53 PM    Nitrites Negative 07/03/2020 01:53 PM    Leukocyte Esterase Negative 07/03/2020 01:53 PM    Epithelial cells MODERATE (A) 07/03/2020 01:53 PM    Bacteria Negative 07/03/2020 01:53 PM    WBC 0-4 07/03/2020 01:53 PM    RBC 0-5 07/03/2020 01:53 PM        ROS: -CP, SOB, Dysuria, palpitations, cough. Assessment:    Active Problems:    GIB (gastrointestinal bleeding) (7/14/2020)             Plan/Discussion:     1. Bleeding is thought to be diverticular related and seems to have resolved  2.  Agree with discharge plans per hospitalist.  3.  She lives in [place] and prefers to follow up with her PCP. Call with questions or concerns.          7/17/2020    Santa Boast, PA  9:19 AM

## 2020-07-17 NOTE — PROGRESS NOTES
Problem: Falls - Risk of  Goal: *Absence of Falls  Description: Document [facility] Fall Risk and appropriate interventions in the flowsheet.   Outcome: Progressing Towards Goal  Note: Fall Risk Interventions:            Medication Interventions: Patient to call before getting OOB, Teach patient to arise slowly    Elimination Interventions: Call light in reach

## 2020-07-17 NOTE — PROGRESS NOTES
Neurology Note    Patient ID:  Viviane Manzanares  [id number]  79 y.o.  1935      Date of Consultation:  July 21, 2020    Referring Physician: Dr. Naty Bremudez    Reason for Consultation: Stroke care    Subjective: I have been hospitalized       History of Present Illness:   Viviane Manzanares is a 82 y.o. female with a history of diabetes, hypertension, irritable bowel syndrome who developed with right lower extremity weakness. There I\was also a concern of a urinary tract infection, and unsteady gait. She went to [facility] and was found evidence of an old stroke but no acute stroke. Nirmala Avila She was started on aspirin. After discharge in early July, she was readmitted to the hospital with a GI bleed which was most likely felt to be related to diverticulitis. Her aspirin was stopped. Since that time, she does notice GI discomfort. She has not noticed any new numbness, tingling, or weakness. She has been stable in regards to her neurological health and has been taking her medication. Past Medical History:   Diagnosis Date    Allergic rhinitis due to pollen     BPV (benign positional vertigo)     Change in bowel habits     Depression     Diabetes ([address]     Dysuria     Hemorrhoids     Hypertension     IBS (irritable bowel syndrome)     Menopause     Other diseases of nasal cavity and sinuses(478.19)     Vertigo         Past Surgical History:   Procedure Laterality Date    COLONOSCOPY N/A 7/15/2020    COLONOSCOPY performed by Loni [facility] at [facility] ENDOSCOPY    HX CHOLECYSTECTOMY  2016    HX COLONOSCOPY  2013?     HX HYSTERECTOMY      UPPER GI ENDOSCOPY,DIAGNOSIS  7/15/2020             Family History   Problem Relation Age of Onset    No Known Problems Mother         Social History     Tobacco Use    Smoking status: Current Every Day Smoker     Packs/day: 0.25    Smokeless tobacco: Never Used   Substance Use Topics    Alcohol use: No        No Known Allergies Prior to Admission medications    Medication Sig Start Date End Date Taking? Authorizing Provider   pantoprazole (Protonix) 40 mg tablet Take 1 Tab by mouth two (2) times a day for 30 days. 7/17/20 8/16/20 Yes Ca Pizano MD   lisinopriL (PRINIVIL, ZESTRIL) 10 mg tablet Take 1 Tab by mouth daily. 7/9/20  Yes hCidi VILLA NP   Januvia 100 mg tablet TAKE 1 TABLET BY MOUTH EVERY DAY 7/9/20  Yes Chidi VILLA NP   atorvastatin (LIPITOR) 40 mg tablet Take 1 Tab by mouth nightly. 7/2/20  Yes Lenin Daivdson MD   metoprolol succinate (TOPROL-XL) 25 mg XL tablet TAKE 1 TABLET BY MOUTH EVERY DAY 6/7/20  Yes Chidi VILLA NP   meclizine (ANTIVERT) 25 mg tablet TAKE 1 TAB BY MOUTH THREE (3) TIMES DAILY AS NEEDED FOR DIZZINESS. 3/27/20  Yes Chidi VILLA NP   guaiFENesin ER (MUCINEX) 600 mg ER tablet Take 1 Tab by mouth two (2) times daily as needed for Congestion. 2/20/20  Yes Chidi VILLA NP   mirtazapine (REMERON) 7.5 mg tablet Take 1 Tab by mouth nightly. 2/11/20  Yes Diann Alonso MD   venlafaxine-SR [facility]) 150 mg capsule Take 1 Cap by mouth daily (with breakfast).  11/8/19  Yes Diann Alonso MD   levocetirizine (XYZAL) 5 mg tablet Take 1 pill daily as needed for allergy symptoms 9/16/19  Yes Nathaniel Winchester NP       Review of Systems:    General, constitutional: muscle pain  Eyes, vision: negative  Ears, nose, throat: negative  Cardiovascular, heart: negative  Respiratory: negative  Gastrointestinal: stomach pain  Genitourinary: negative  Musculoskeletal: negative  Skin and integumentary: negative  Psychiatric: depressoin, fatigue  Endocrine: negative  Neurological: negative, except for HPI  Hematologic/lymphatic: negative  Allergy/immunology: negative      Objective:     Visit Vitals  /60   Pulse 72   Ht 5' 6\" (1.676 m)   Wt 161 lb (73 kg)   SpO2 98%   BMI 25.99 kg/m²       Physical Exam:    General:  appears well nourished in no acute distress  Neck: no carotid bruits  Lungs: clear to auscultation  Heart:  no murmurs, regular rate  Lower extremity: peripheral pulses palpable and no edema  Skin: intact. Mildly arthritic joints    Neurological exam:    Awake, alert, oriented to person, place and time. Slow to respond at times, but appropriate. Recent and remote memory were normal  Attention and concentration were intact  Language was intact. There was no aphasia  Speech: no dysarthria  Fund of knowledge was preserved    Cranial nerves:   II-XII were tested    Perrrla  Fundoscopic examination revealed venous pulsations and no clear abnormalities  Visual fields were full  Eomi, no evidence of nystagmus  Facial sensation:  normal and symmetric  Facial motor: normal and symmetric  Hearing intact  SCM strength intact  Tongue: midline without fasciculations    Motor: Tone normal    Pronator drift was absent  No evidence of fasciculations    Strength testing:   deltoid triceps biceps Wrist ext. Wrist flex. intrinsics Hip flex. Hip ext. Knee ext. Knee flex Dorsi flex Plantar flex   Right 5 5 5 5 5 5 5 5 5 5 5 5   Left 5 5 5 5 5 5 5 5 5 5 5 5         Sensory:  Upper extremity: intact to pp, light touch, and vibration > 10 seconds  Lower extremity: intact to pp, light touch. Vibration was 5 seconds in her toes and 7 seconds at her ankles    Reflexes:    Right Left  Biceps  2 2  Triceps 2 2  Brachiorad. 2 2  Patella  2 2  Achilles 1 1    Plantar response:  flexor bilaterally      Cerebellar testing:  no tremor apparent, finger/nose and nora were intact    Romberg: absent    Gait: steady.   Heel, toe, and tandem gait were normal    Labs:     Lab Results   Component Value Date/Time    Hemoglobin A1c 6.8 (H) 07/01/2020 05:00 AM    Sodium 144 07/16/2020 05:42 AM    Potassium 3.4 (L) 07/16/2020 05:42 AM    Chloride 111 (H) 07/16/2020 05:42 AM    Glucose 95 07/16/2020 05:42 AM    Glucose 104 (H) 04/10/2019 06:52 AM    BUN 6 07/16/2020 05:42 AM    Creatinine 0.92 07/16/2020 05:42 AM Calcium 8.0 (L) 07/16/2020 05:42 AM    WBC 7.6 07/16/2020 05:42 AM    HCT 26.9 (L) 07/17/2020 01:18 AM    HGB 8.8 (L) 07/17/2020 01:18 AM    PLATELET 527 29/23/8141 05:42 AM       Imaging:    Results from Hospital Encounter encounter on 06/30/20   MRI BRAIN WO CONT    Narrative EXAM:  MRI BRAIN WO CONT    INDICATION:    CVA. Vision distortion. COMPARISON:  CT head 6/30/2020. CONTRAST: None. TECHNIQUE:    Multiplanar multisequence acquisition without contrast of the brain. FINDINGS:  The ventricles are normal in size and position. Scattered periventricular deep  white matter T2/FLAIR hyperintensities, confluent in regions, consistent with  moderate chronic microvascular ischemic disease. Chronic infarct in the right  posterior cerebellum. Small chronic microhemorrhage in the right basal ganglia. There is no acute infarct, hemorrhage, extra-axial fluid collection, or mass  effect. There is no cerebellar tonsillar herniation. Expected arterial  flow-voids are present. The paranasal sinuses, mastoid air cells, and middle ears are clear. The orbital  contents are within normal limits. No significant osseous or scalp lesions are  identified. Advanced degenerative disc disease at C3-C4. Impression IMPRESSION:   1. No acute intracranial abnormality. Chronic infarct in the right posterior  cerebellum. 2. Moderate chronic microvascular ischemic disease. Results from [place] encounter on 07/14/20   CT ABD PELV WO CONT    Narrative INDICATION: pain    COMPARISON: July 3, 2020    TECHNIQUE:   Noncontrast thin axial images were obtained through the abdomen and pelvis. Coronal and sagittal reconstructions were generated. CT dose reduction was  achieved through use of a standardized protocol tailored for this examination  and automatic exposure control for dose modulation. FINDINGS:   LUNG BASES: No abnormality. LIVER: No mass or biliary dilatation.   GALLBLADDER: Surgically absent. SPLEEN: No enlargement or lesion. PANCREAS: No mass or ductal dilatation. ADRENALS: No mass. KIDNEYS: No nephrolithiasis or hydronephrosis. Unchanged appearance of the  kidneys. GI TRACT:  No bowel obstruction. Colonic diverticulosis without diverticulitis. PERITONEUM: No free air or free fluid. APPENDIX: Unremarkable. RETROPERITONEUM: No aortic aneurysm. LYMPH NODES:  None enlarged. ADDITIONAL COMMENTS: N/A.    URINARY BLADDER: Unremarkable. REPRODUCTIVE ORGANS: Hysterectomy. LYMPH NODES:  None enlarged. FREE FLUID:  None. BONES: Degenerative changes throughout the lumbar spine greatest at L4-5 where  there is severe spinal canal stenosis. There is also significant spinal canal  stenosis T12-L1 and L1-2. No destructive bone lesion. ADDITIONAL COMMENTS: N/A. Impression IMPRESSION:    1. No nephrolithiasis or hydronephrosis. Unchanged appearance of the kidneys. 2. Normal appendix. 3. Colonic diverticulosis without diverticulitis. LDL 86  Hemoglobin A1c 6.8    I did independently review her brain MRI from July 1, 2020. There was mild atrophy and chronic micro-ischemic changes. There was an old stroke in the right posterior cerebellum. There is also small chronic microhemorrhages in the right basal ganglia. Carotid Doppler from July 2020 revealed no flow-limiting stenosis. Assessment and Plan:    The patient is a pleasant 26-year-old female with multiple medical problems as noted below who presents to the neurology office for consultation of her recent stroke. Stroke care:  Risk factors include hypertension, dyslipidemia, diabetes, smoking. Patient currently cannot take an aspirin due to a recent GI bleed and has not been cleared for antiplatelet therapy yet. She will follow up with GI in regards to when antiplatelets can be resumed. Hypertension: Continue aggressive control with goal systolic blood pressure under 140    dyslipidemia: Updated LDL was 86.   Continue aggressive control with atorvastatin    Diabetes: Update hemoglobin A1c 6.8. Continue aggressive control. Smoking: We did spend a good bit of time discussing the importance of smoking cessation. Especially in light of her not being able to take an aspirin. This is a risk factor which she can work on modifying. I provided stroke education today in regards to risk factors for stroke and lifestyle modifications to help minimize the risk of future stroke. This included medication compliance, regular follow up with primary care physician,  and healthy lifestyle habits (nutrition/exercise)      Coronavirus pandemic:  I did discuss with the patient at length the importance of social distancing and proper hygiene especially during these times. The patient is at a higher risk of having a more severe course of the disease and needs to follow all CDC recommendations. The patient acknowledges. Patient Active Problem List   Diagnosis Code    Environmental allergies Z91.09    Elevated cholesterol E78.00    Essential hypertension I5    Well controlled type 2 diabetes mellitus ([address] E11.9    Type 2 diabetes with nephropathy ([address] E11.21    Major depressive disorder, recurrent episode, severe ([address] F33.2    Intractable nausea and vomiting R11.2    Epigastric pain R10.13    Lactic acidemia E87.2    Tobacco use Z72.0    Primary osteoarthritis involving multiple joints M89.49    Stage 3 chronic kidney disease (HCC) N18.3    Age-related cataract of both eyes H25.9    CVA (cerebral vascular accident) ([address] I63.9    Cystitis N30.90    Cigarette smoker F17.210    GIB (gastrointestinal bleeding) K92.2               The patient should return to clinic in one year    Renewed medication: none today    I spent  60   minutes with the patient and her daughter  with over 50 % of the time counseling and coordinating the care plan in regards to the diagnosis, diagnostic testing, and treatment plan.    The patient had the ability to ask questions and all questions were answered.          Signed By:  Brandy Mojica DO FAAN    July 21, 2020

## 2020-07-17 NOTE — PROGRESS NOTES
1430 - Reviewed discharged paperwork with pt and provided the opportunity to ask questions. Pt was given prescription from her provider to get filled at her pharmacy. Pt is waiting on her ride. 0 - Pt's family member arrived and pt was taken down to the front entrance of the hospital where a family member was waiting.

## 2020-07-21 ENCOUNTER — OFFICE VISIT (OUTPATIENT)
Dept: NEUROLOGY | Age: 85
End: 2020-07-21

## 2020-07-21 VITALS
HEART RATE: 72 BPM | HEIGHT: 66 IN | BODY MASS INDEX: 25.88 KG/M2 | WEIGHT: 161 LBS | OXYGEN SATURATION: 98 % | DIASTOLIC BLOOD PRESSURE: 60 MMHG | SYSTOLIC BLOOD PRESSURE: 120 MMHG

## 2020-07-21 DIAGNOSIS — I63.312 CEREBROVASCULAR ACCIDENT (CVA) DUE TO THROMBOSIS OF LEFT MIDDLE CEREBRAL ARTERY (HCC): Primary | ICD-10-CM

## 2020-08-17 PROBLEM — F32.A DEPRESSION: Status: ACTIVE | Noted: 2020-08-17

## 2020-08-18 PROBLEM — F32.A DEPRESSION: Status: RESOLVED | Noted: 2020-08-17 | Resolved: 2020-08-18

## 2020-09-14 PROBLEM — E11.22 TYPE 2 DIABETES MELLITUS WITH STAGE 3 CHRONIC KIDNEY DISEASE, WITHOUT LONG-TERM CURRENT USE OF INSULIN (HCC): Status: ACTIVE | Noted: 2018-02-27

## 2020-09-14 PROBLEM — N18.30 TYPE 2 DIABETES MELLITUS WITH STAGE 3 CHRONIC KIDNEY DISEASE, WITHOUT LONG-TERM CURRENT USE OF INSULIN (HCC): Status: ACTIVE | Noted: 2018-02-27

## 2020-10-24 PROBLEM — Z87.19 HISTORY OF GI BLEED: Status: ACTIVE | Noted: 2020-10-24

## 2020-10-24 PROBLEM — Z86.73 HISTORY OF CVA (CEREBROVASCULAR ACCIDENT): Status: ACTIVE | Noted: 2020-10-24

## 2020-10-24 PROBLEM — F32.1 MODERATE SINGLE CURRENT EPISODE OF MAJOR DEPRESSIVE DISORDER (HCC): Status: ACTIVE | Noted: 2020-10-24

## 2020-10-25 PROBLEM — R79.89 ELEVATED SERUM CREATININE: Status: ACTIVE | Noted: 2020-10-25

## 2020-11-23 ENCOUNTER — OFFICE VISIT (OUTPATIENT)
Dept: PRIMARY CARE CLINIC | Age: 85
End: 2020-11-23

## 2020-11-23 DIAGNOSIS — Z20.822 ENCOUNTER FOR LABORATORY TESTING FOR COVID-19 VIRUS: Primary | ICD-10-CM

## 2020-11-25 LAB — SARS-COV-2, NAA: NOT DETECTED

## 2021-02-02 ENCOUNTER — OFFICE VISIT (OUTPATIENT)
Dept: CARDIOLOGY CLINIC | Age: 86
End: 2021-02-02
Payer: MEDICARE

## 2021-02-02 ENCOUNTER — PATIENT OUTREACH (OUTPATIENT)
Dept: CASE MANAGEMENT | Age: 86
End: 2021-02-02

## 2021-02-02 VITALS
TEMPERATURE: 98.1 F | BODY MASS INDEX: 26.03 KG/M2 | WEIGHT: 162 LBS | HEART RATE: 74 BPM | RESPIRATION RATE: 16 BRPM | OXYGEN SATURATION: 96 % | SYSTOLIC BLOOD PRESSURE: 152 MMHG | HEIGHT: 66 IN | DIASTOLIC BLOOD PRESSURE: 92 MMHG

## 2021-02-02 DIAGNOSIS — Z86.73 HISTORY OF CVA (CEREBROVASCULAR ACCIDENT): ICD-10-CM

## 2021-02-02 DIAGNOSIS — N18.30 STAGE 3 CHRONIC KIDNEY DISEASE, UNSPECIFIED WHETHER STAGE 3A OR 3B CKD (HCC): ICD-10-CM

## 2021-02-02 DIAGNOSIS — N18.31 TYPE 2 DIABETES MELLITUS WITH STAGE 3A CHRONIC KIDNEY DISEASE, WITHOUT LONG-TERM CURRENT USE OF INSULIN (HCC): ICD-10-CM

## 2021-02-02 DIAGNOSIS — E11.22 TYPE 2 DIABETES MELLITUS WITH STAGE 3A CHRONIC KIDNEY DISEASE, WITHOUT LONG-TERM CURRENT USE OF INSULIN (HCC): ICD-10-CM

## 2021-02-02 DIAGNOSIS — Z72.0 TOBACCO USE: ICD-10-CM

## 2021-02-02 DIAGNOSIS — I10 ESSENTIAL HYPERTENSION: ICD-10-CM

## 2021-02-02 DIAGNOSIS — Z87.19 HISTORY OF GI BLEED: ICD-10-CM

## 2021-02-02 DIAGNOSIS — R07.2 PRECORDIAL CHEST PAIN: Primary | ICD-10-CM

## 2021-02-02 DIAGNOSIS — M15.9 PRIMARY OSTEOARTHRITIS INVOLVING MULTIPLE JOINTS: ICD-10-CM

## 2021-02-02 DIAGNOSIS — R10.13 EPIGASTRIC PAIN: ICD-10-CM

## 2021-02-02 PROCEDURE — 99204 OFFICE O/P NEW MOD 45 MIN: CPT | Performed by: INTERNAL MEDICINE

## 2021-02-02 PROCEDURE — 1090F PRES/ABSN URINE INCON ASSESS: CPT | Performed by: INTERNAL MEDICINE

## 2021-02-02 PROCEDURE — G8753 SYS BP > OR = 140: HCPCS | Performed by: INTERNAL MEDICINE

## 2021-02-02 PROCEDURE — G8755 DIAS BP > OR = 90: HCPCS | Performed by: INTERNAL MEDICINE

## 2021-02-02 PROCEDURE — G8400 PT W/DXA NO RESULTS DOC: HCPCS | Performed by: INTERNAL MEDICINE

## 2021-02-02 PROCEDURE — G8427 DOCREV CUR MEDS BY ELIG CLIN: HCPCS | Performed by: INTERNAL MEDICINE

## 2021-02-02 PROCEDURE — G9717 DOC PT DX DEP/BP F/U NT REQ: HCPCS | Performed by: INTERNAL MEDICINE

## 2021-02-02 PROCEDURE — G8536 NO DOC ELDER MAL SCRN: HCPCS | Performed by: INTERNAL MEDICINE

## 2021-02-02 PROCEDURE — G8419 CALC BMI OUT NRM PARAM NOF/U: HCPCS | Performed by: INTERNAL MEDICINE

## 2021-02-02 PROCEDURE — 1101F PT FALLS ASSESS-DOCD LE1/YR: CPT | Performed by: INTERNAL MEDICINE

## 2021-02-02 RX ORDER — LISINOPRIL 20 MG/1
20 TABLET ORAL 2 TIMES DAILY
Qty: 180 TAB | Refills: 1 | Status: SHIPPED | OUTPATIENT
Start: 2021-02-02 | End: 2021-03-29

## 2021-02-02 RX ORDER — ASPIRIN 81 MG/1
81 TABLET ORAL DAILY
Qty: 90 TAB | Refills: 1
Start: 2021-02-02 | End: 2021-07-01

## 2021-02-02 RX ORDER — NITROGLYCERIN 0.4 MG/1
0.4 TABLET SUBLINGUAL
Qty: 1 BOTTLE | Refills: 1 | Status: ON HOLD | OUTPATIENT
Start: 2021-02-02 | End: 2022-02-11

## 2021-02-02 NOTE — ACP (ADVANCE CARE PLANNING)
Does patient have an Advance Directive: not on file [employer] and Curly Cruz, daughters are on her medical agent and does not want to make changes.

## 2021-02-02 NOTE — PROGRESS NOTES
Tessa Markham is a 82 y.o. female is here for ER f/u/new patient evaluation. Hx DM, hypertension, prior CVA, prior GIB 7/20--likely diverticular), major depression (s/p hospitalization in past, last 8/20 at [facility]),dyslipidemia, CKD III followed at [facility]. To ER yesterday 3:00 am with SSCP, dyspnea--resolved. Intermittent palpitations, some dizziness. No exertional sx. W/u in ER neg with trop x 2. Considered high risk given multiple co-morbidities and contact by ERMD--here for f/u and stress test scheduling. Echo 7/1/20 with LVEF 73-66, grade I diastolic, valves ok. Carotid dopplers 7/20 with mild bilat ICA plaque. .  The patient denies orthopnea, PND, LE edema, syncope, presyncope or fatigue.        Patient Active Problem List    Diagnosis Date Noted    Intractable nausea and vomiting 09/08/2019     Priority: 1 - One    Epigastric pain 09/08/2019     Priority: 2 - Two    Lactic acidemia 09/08/2019     Priority: 3 - Three    Elevated serum creatinine 10/25/2020    History of GI bleed 10/24/2020    History of CVA (cerebrovascular accident) 10/24/2020    Moderate single current episode of major depressive disorder ([address] 10/24/2020    GIB (gastrointestinal bleeding) 07/14/2020    CVA (cerebral vascular accident) ([address] 06/30/2020    Cystitis 06/30/2020    Cigarette smoker 06/30/2020    Age-related cataract of both eyes 05/05/2020    Stage 3 chronic kidney disease 02/23/2020    Tobacco use 02/20/2020    Primary osteoarthritis involving multiple joints 02/20/2020    Major depressive disorder, recurrent episode, severe ([address] 09/28/2018    Type 2 diabetes mellitus with stage 3 chronic kidney disease, without long-term current use of insulin ([address] 02/27/2018    Well controlled type 2 diabetes mellitus ([address] 07/24/2017    Essential hypertension 03/17/2017    Elevated cholesterol 08/04/2016    Environmental allergies 01/02/2014      Colleen Almonte NP  Past Medical History: Diagnosis Date    Allergic rhinitis due to pollen     BPV (benign positional vertigo)     Change in bowel habits     Depression     Diabetes ([address]     Dysuria     Hemorrhoids     Hypertension     IBS (irritable bowel syndrome)     Menopause     Other diseases of nasal cavity and sinuses(478.19)     Vertigo       Past Surgical History:   Procedure Laterality Date    COLONOSCOPY N/A 7/15/2020    COLONOSCOPY performed by Deja Colunga MD at [facility]    HX CHOLECYSTECTOMY  2016    HX COLONOSCOPY  2013?     HX HYSTERECTOMY      UPPER GI ENDOSCOPY,DIAGNOSIS  7/15/2020          No Known Allergies   Family History   Problem Relation Age of Onset    No Known Problems Mother       Social History     Socioeconomic History    Marital status:      Spouse name: Not on file    Number of children: Not on file    Years of education: Not on file    Highest education level: Not on file   Occupational History    Not on file   Social Needs    Financial resource strain: Not on file    Food insecurity     Worry: Not on file     Inability: Not on file    Transportation needs     Medical: Not on file     Non-medical: Not on file   Tobacco Use    Smoking status: Current Every Day Smoker     Packs/day: 0.25    Smokeless tobacco: Never Used   Substance and Sexual Activity    Alcohol use: No    Drug use: No    Sexual activity: Not on file   Lifestyle    Physical activity     Days per week: Not on file     Minutes per session: Not on file    Stress: Not on file   Relationships    Social connections     Talks on phone: Not on file     Gets together: Not on file     Attends Baptism service: Not on file     Active member of club or organization: Not on file     Attends meetings of clubs or organizations: Not on file     Relationship status: Not on file    Intimate partner violence     Fear of current or ex partner: Not on file     Emotionally abused: Not on file     Physically abused: Not on file Forced sexual activity: Not on file   Other Topics Concern    Not on file   Social History Narrative    Not on file      Current Outpatient Medications   Medication Sig    atorvastatin (LIPITOR) 40 mg tablet Take 1.5 Tabs by mouth nightly.  polyethylene glycol (Miralax) 17 gram/dose powder Take 17 g by mouth daily. 1 tablespoon with 8 oz of water daily    simethicone (MYLICON) 80 mg chewable tablet Take 1 Tab by mouth every six (6) hours as needed for Flatulence.  meclizine (ANTIVERT) 25 mg tablet Take  by mouth three (3) times daily as needed for Dizziness.  venlafaxine-SR (EFFEXOR-XR) 150 mg capsule Take 1 Cap by mouth daily (with breakfast). Indications: major depressive disorder    Blood-Glucose Meter monitoring kit Check glucose once daily. DX: E11.9    glucose blood VI test strips (ASCENSIA AUTODISC VI, ONE TOUCH ULTRA TEST VI) strip Check glucose once daily. DX: E11.9    lancets misc Check glucose once daily. DX: E11.9    Januvia 100 mg tablet TAKE 1 TABLET BY MOUTH EVERY DAY    montelukast (SINGULAIR) 10 mg tablet Take 1 Tab by mouth daily.  acetaminophen (TylenoL) 325 mg tablet Take  by mouth every four (4) hours as needed for Pain.  levocetirizine (XYZAL) 5 mg tablet Take 1 pill daily as needed for allergy symptoms    lisinopriL (PRINIVIL, ZESTRIL) 20 mg tablet Take 1 Tab by mouth daily.  metoprolol succinate (TOPROL-XL) 25 mg XL tablet Take 1 Tab by mouth daily. Indications: high blood pressure     No current facility-administered medications for this visit. Review of Symptoms:    CONST  No weight change. No fever, chills, sweats    ENT No visual changes, URI sx, sore throat    CV  See HPI   RESP  No cough, or sputum, wheezing. Also see HPI   GI  No abdominal pain or change in bowel habits. No heartburn or dysphagia. No melena or rectal bleeding.   No dysuria, urgency, frequency, hematuria   MSKEL  No joint pain, swelling. No muscle pain.     SKIN  No rash or lesions. NEURO  No headache, syncope, or seizure. No weakness, loss of sensation, or paresthesias. PSYCH  No low mood or depression  No anxiety. HE/LYMPH  No easy bruising, abnormal bleeding, or enlarged glands. Physical ExamPhysical Exam:    There were no vitals taken for this visit. Gen: NAD  HEENT:  PERRL, throat clear  Neck: no adenopathy, no thyromegaly, no JVD   Heart:  Regular,Nl S1S2,  no murmur, gallop or rub. Lungs:  clear  Abdomen:   Soft, non-tender, bowel sounds are active.    Extremities:  No edema  Pulse: symmetric  Neuro: A&O times 3, No focal neuro deficits    Cardiographics    ECG: from yesterday--NSR, NSST      Labs:   Lab Results   Component Value Date/Time    Sodium 141 02/01/2021 05:40 AM    Sodium 137 02/01/2021 04:45 AM    Sodium 142 01/21/2021 03:07 PM    Sodium 141 10/23/2020 04:33 PM    Sodium 142 08/17/2020 03:16 PM    Potassium 3.6 02/01/2021 05:40 AM    Potassium 6.5 (H) 02/01/2021 04:45 AM    Potassium 4.3 01/21/2021 03:07 PM    Potassium 4.2 10/23/2020 04:33 PM    Potassium 3.7 08/17/2020 03:16 PM    Chloride 102 02/01/2021 05:40 AM    Chloride 101 02/01/2021 04:45 AM    Chloride 102 01/21/2021 03:07 PM    Chloride 101 10/23/2020 04:33 PM    Chloride 104 08/17/2020 03:16 PM    CO2 30 02/01/2021 05:40 AM    CO2 28 02/01/2021 04:45 AM    CO2 25 01/21/2021 03:07 PM    CO2 24 10/23/2020 04:33 PM    CO2 27 08/17/2020 03:16 PM    Anion gap 9 02/01/2021 05:40 AM    Anion gap 8 02/01/2021 04:45 AM    Anion gap 11 08/17/2020 03:16 PM    Anion gap 6 07/16/2020 05:42 AM    Anion gap 5 07/15/2020 05:08 AM    Glucose 109 (H) 02/01/2021 05:40 AM    Glucose 104 (H) 02/01/2021 04:45 AM    Glucose 94 01/21/2021 03:07 PM    Glucose 179 (H) 10/23/2020 04:33 PM    Glucose 132 (H) 08/17/2020 03:16 PM    Glucose 104 (H) 04/10/2019 06:52 AM    BUN 11 02/01/2021 05:40 AM    BUN 11 02/01/2021 04:45 AM    BUN 15 01/21/2021 03:07 PM    BUN 16 10/23/2020 04:33 PM    BUN 10 08/17/2020 03:16 PM Creatinine 1.12 (H) 02/01/2021 05:40 AM    Creatinine 1.05 (H) 02/01/2021 04:45 AM    Creatinine 1.02 (H) 01/21/2021 03:07 PM    Creatinine 1.19 (H) 10/23/2020 04:33 PM    Creatinine 1.08 (H) 08/17/2020 03:16 PM    BUN/Creatinine ratio 10 (L) 02/01/2021 05:40 AM    BUN/Creatinine ratio 10 (L) 02/01/2021 04:45 AM    BUN/Creatinine ratio 15 01/21/2021 03:07 PM    BUN/Creatinine ratio 13 10/23/2020 04:33 PM    BUN/Creatinine ratio 9 (L) 08/17/2020 03:16 PM    GFR est AA 56 (L) 02/01/2021 05:40 AM    GFR est AA >60 02/01/2021 04:45 AM    GFR est AA 59 (L) 01/21/2021 03:07 PM    GFR est AA 49 (L) 10/23/2020 04:33 PM    GFR est AA 59 (L) 08/17/2020 03:16 PM    GFR est non-AA 46 (L) 02/01/2021 05:40 AM    GFR est non-AA 50 (L) 02/01/2021 04:45 AM    GFR est non-AA 51 (L) 01/21/2021 03:07 PM    GFR est non-AA 42 (L) 10/23/2020 04:33 PM    GFR est non-AA 48 (L) 08/17/2020 03:16 PM    Calcium 8.9 02/01/2021 05:40 AM    Calcium 8.8 02/01/2021 04:45 AM    Calcium 9.3 01/21/2021 03:07 PM    Calcium 9.5 10/23/2020 04:33 PM    Calcium 9.0 08/17/2020 03:16 PM    Bilirubin, total 0.9 02/01/2021 04:45 AM    Bilirubin, total 0.6 01/21/2021 03:07 PM    Bilirubin, total 0.5 10/23/2020 04:33 PM    Bilirubin, total 0.7 08/17/2020 03:16 PM    Bilirubin, total 0.4 07/23/2020 01:08 PM    Alk. phosphatase 104 02/01/2021 04:45 AM    Alk. phosphatase 94 01/21/2021 03:07 PM    Alk. phosphatase 93 10/23/2020 04:33 PM    Alk. phosphatase 82 08/17/2020 03:16 PM    Alk.  phosphatase 75 07/23/2020 01:08 PM    Protein, total 8.2 02/01/2021 04:45 AM    Protein, total 7.5 01/21/2021 03:07 PM    Protein, total 7.4 10/23/2020 04:33 PM    Protein, total 7.4 08/17/2020 03:16 PM    Protein, total 7.1 07/23/2020 01:08 PM    Albumin 3.5 02/01/2021 04:45 AM    Albumin 4.2 01/21/2021 03:07 PM    Albumin 4.2 10/23/2020 04:33 PM    Albumin 3.6 08/17/2020 03:16 PM    Albumin 4.2 07/23/2020 01:08 PM    Globulin 4.7 (H) 02/01/2021 04:45 AM    Globulin 3.8 08/17/2020 03:16 PM    Globulin 4.1 (H) 07/13/2020 09:20 PM    Globulin 4.2 (H) 07/03/2020 01:43 PM    Globulin 4.2 (H) 06/30/2020 04:00 PM    A-G Ratio 0.7 (L) 02/01/2021 04:45 AM    A-G Ratio 1.3 01/21/2021 03:07 PM    A-G Ratio 1.3 10/23/2020 04:33 PM    A-G Ratio 0.9 (L) 08/17/2020 03:16 PM    A-G Ratio 1.4 07/23/2020 01:08 PM    ALT (SGPT) 33 02/01/2021 04:45 AM    ALT (SGPT) 7 01/21/2021 03:07 PM    ALT (SGPT) 14 10/23/2020 04:33 PM    ALT (SGPT) 15 08/17/2020 03:16 PM    ALT (SGPT) 9 07/23/2020 01:08 PM     Lab Results   Component Value Date/Time     09/07/2019 11:20 PM     Lab Results   Component Value Date/Time    Cholesterol, total 213 (H) 01/21/2021 03:07 PM    Cholesterol, total 162 07/01/2020 05:00 AM    Cholesterol, total 193 04/10/2019 06:52 AM    Cholesterol, total 203 (H) 09/29/2018 06:45 AM    Cholesterol, total 195 08/14/2017 09:21 AM    HDL Cholesterol 64 01/21/2021 03:07 PM    HDL Cholesterol 54 07/01/2020 05:00 AM    HDL Cholesterol 62 04/10/2019 06:52 AM    HDL Cholesterol 66 09/29/2018 06:45 AM    HDL Cholesterol 81 08/14/2017 09:21 AM    LDL, calculated 124 (H) 01/21/2021 03:07 PM    LDL, calculated 86.6 07/01/2020 05:00 AM    LDL, calculated 110 (H) 04/10/2019 06:52 AM    LDL, calculated 115.4 (H) 09/29/2018 06:45 AM    LDL, calculated 102 (H) 08/14/2017 09:21 AM    LDL, calculated 96 01/13/2017 08:46 AM    Triglyceride 143 01/21/2021 03:07 PM    Triglyceride 107 07/01/2020 05:00 AM    Triglyceride 105 04/10/2019 06:52 AM    Triglyceride 108 09/29/2018 06:45 AM    Triglyceride 61 08/14/2017 09:21 AM    CHOL/HDL Ratio 3.0 07/01/2020 05:00 AM    CHOL/HDL Ratio 3.1 04/10/2019 06:52 AM    CHOL/HDL Ratio 3.1 09/29/2018 06:45 AM     No results found for this or any previous visit.     Assessment:         Patient Active Problem List    Diagnosis Date Noted    Intractable nausea and vomiting 09/08/2019     Priority: 1 - One    Epigastric pain 09/08/2019     Priority: 2 - Two    Lactic acidemia 09/08/2019     Priority: 3 - Three    Elevated serum creatinine 10/25/2020    History of GI bleed 10/24/2020    History of CVA (cerebrovascular accident) 10/24/2020    Moderate single current episode of major depressive disorder ([facility] 10/24/2020    GIB (gastrointestinal bleeding) 07/14/2020    CVA (cerebral vascular accident) ([facility] 06/30/2020    Cystitis 06/30/2020    Cigarette smoker 06/30/2020    Age-related cataract of both eyes 05/05/2020    Stage 3 chronic kidney disease 02/23/2020    Tobacco use 02/20/2020    Primary osteoarthritis involving multiple joints 02/20/2020    Major depressive disorder, recurrent episode, severe ([address] 09/28/2018    Type 2 diabetes mellitus with stage 3 chronic kidney disease, without long-term current use of insulin ([facility] 02/27/2018    Well controlled type 2 diabetes mellitus ([facility] 07/24/2017    Essential hypertension 03/17/2017    Elevated cholesterol 08/04/2016    Environmental allergies 01/02/2014      82 y.o. female is here for ER f/u/new patient evaluation. Hx DM, hypertension, prior CVA, prior GIB 7/20--likely diverticular), major depression (s/p hospitalization in past, last 8/20 at [facility]),dyslipidemia, CKD III followed at [facility]. To ER yesterday 3:00 am with SSCP, dyspnea--resolved. Intermittent palpitations, some dizziness. No exertional sx. W/u in ER neg with trop x 2. Considered high risk given multiple co-morbidities and contact by ERMD--here for f/u and stress test scheduling. Echo 7/1/20 with LVEF 80-41, grade I diastolic, valves ok. Carotid dopplers 7/20 with mild bilat ICA plaque.       Plan:     Continue current meds including metoprolol, lisinopril but INCREASE the Lisinopril to 20mg BID (BP)  ASA 81  DM meds  Statin  Sl NTG prn  To ER if acute sx  Lexiscan MPI to be done 2/4/21 6:45 am (scheduled/spoke to Jasper Palmer RN)    Svetlana Mckeon MD

## 2021-02-02 NOTE — PROGRESS NOTES
Chief Complaint   Patient presents with    New Patient     follow up from [facility]     Verified patient with two patient identifiers. Medications reviewed/approved by Dr. Moore. 1. Have you been to the ER, urgent care clinic since your last visit? Hospitalized since your last visit? new pt    2. Have you seen or consulted any other health care providers outside of the [address] [place] [address] since your last visit? Include any pap smears or colon screening.  New pt     Daughter temp 97.8

## 2021-02-08 ENCOUNTER — TELEPHONE (OUTPATIENT)
Dept: CARDIOLOGY CLINIC | Age: 86
End: 2021-02-08

## 2021-02-08 NOTE — TELEPHONE ENCOUNTER
----- Message from Hector Montano LPN sent at 7/0/5124  4:23 PM EST -----  Regarding: FW: Harsh SHANKS    ----- Message -----  From: Caitlin Roberson MD  Sent: 2/5/2021   8:41 AM EST  To: Hector Montano LPN  Subject: Harsh Palmer MPI                                     Advise stress nuclear scan normal--no blockages or ischemia, RTC 6 mos.   Thanks [facility]

## 2021-02-18 ENCOUNTER — PATIENT OUTREACH (OUTPATIENT)
Dept: CASE MANAGEMENT | Age: 86
End: 2021-02-18

## 2021-03-08 ENCOUNTER — HOSPITAL ENCOUNTER (OUTPATIENT)
Dept: BEHAVIORAL/MENTAL HEALTH | Age: 86
Discharge: HOME | End: 2021-03-08
Payer: MEDICARE

## 2021-03-08 PROCEDURE — 90853 GROUP PSYCHOTHERAPY: CPT

## 2021-03-08 NOTE — BH NOTES
This note will not be viewable in [employer] for the following reason(s). This is a Psychotherapy Note. ([address] [facility] [address] Providers Only)   [facility] Structured Outpatient Program  Group Therapy  Date of service: 3/8/2021  Start time: 1200  Stop time: 1250  Type of session: Therapy Group  Problem number: 1. Dep F 33.0    Short term goal (STG): L.  Pt will express hope for her future with the limitations and strengths that she currently has.       Intervention/techniques: Informed, Validated/Supported, Prompted/Cued, Listened/Empathized, Promoted Peer Support and Provided Feedback    Patient mental status/affect: Anxious, Congruent and Preoccupied    Patient behavior/appearance: Neatly Groomed, Attentive, Cooperative, Needed Prompting and Needy    Special patient treatment accommodations provided (describe): none    Patient response and progress towards goals: Focus of session was on self-sabotage behaviors and the need to figure out why we continue to engage in them. We spoke about reasons why people in general self-sabotage their successes and their recovery and they can include feeling fear of success and change, feeling overwhelmed, fear of the unknown, it is easier to be where we are, its too much work to change, we cave into others demands, it takes a lot of effort to care for self when we have low self-esteem, and a lack of ry in our own abilities. We spoke about how hating ourselves and who we are feels this cycle of self-sabotage and unhealthy behaviors. I shared with group the need to develop tolerance for themselves and the time it takes to change and develop alternatives to self-sabotage and what those could be. Tino Willis participated in group with prompting. She spoke about how she has recognized past behaviors of how she has gotten in her own way and how to manage these choices. She spoke about how she can see the ways to push herself around the choices that just keep her stuck.

## 2021-03-08 NOTE — BH NOTES
This note will not be viewable in [employer] for the following reason(s). This is a Psychotherapy Note. ([address] [facility] [address] Providers Only)   [facility] Outpatient Program  Group Therapy      Date of service: 3/8/2021    Start time: 1100  Stop time: 1150    Type of session: Therapy Group    Problem number: F33.0    Short term goal (STG):K Pt will identify an area (fun, family, learning, spirituality, goals, health) each week in which she made an effort to be mindful and feel connected in some way.     Intervention/techniques: Informed, Validated/Supported, Guided, Listened/Empathized and Provided Feedback    Patient mental status/affect: Calm and Congruent    Patient behavior/appearance: Neatly Groomed, Attentive, Cooperative and Enthusiastic    Special patient treatment accommodations provided (describe): none    Patient response and progress towards goals: Focus of session was based on How to [employer] from [employer]. We spoke about the different strategies and they  include; Stop all Criticism, Dont Scare Yourself, Be Gentle and Kind and Patient, Be Kind to Your Mind, Praise Yourself, Support Yourself, Be Weakley to your Negative, Take Care of Your Body, Mirror work, and Love Yourself and Do it Now.   We spoke about what strategies we can use to help build up this important skill and how to have self love. Pt was attentive today and participated in the group activity. She stated her goal for the week was to work on loving herself more instead of always giving of herself.   We discussed strategies to assist her in making progress toward this goal.  When discussing the concept of\" Support Yourself\"  pt stated that she does not have any trouble \"asking for help\"  Her children at times will say something to her about it but she is not embarassed and likes talking with others

## 2021-03-08 NOTE — BH NOTES
This note will not be viewable in [employer] for the following reason(s). This is a Psychotherapy Note. ([facility] Providers Only)   [facility]ured Outpatient Program  Group Therapy      Date of service: 3/8/2021    Start time: 1000  Stop time: 1050    Type of session: Therapy Group    Problem number: 1. Dep F 33.0    Short term goal (STG): H.  Pt will share one emotion she has been feeling over the past week, other than sadness or loneliness, and work to identify a specific trigger and well as a positive coping strategy for that emotional state.     Intervention/techniques: Informed, Validated/Supported, Modeled/Rehearsed, Prompted/Cued, Promoted Peer Support and Provided Feedback    Patient mental status/affect: Anxious, Congruent and Preoccupied    Patient behavior/appearance: Neatly Groomed, Attentive, Cooperative, Needed Prompting and Needy    Special patient treatment accommodations provided (describe): none    Patient response and progress towards goals: Focus of session was on how stress affects the body physically and how much that can impact our mood, functioning, and wellbeing based on information from [facility]. We discussed the symptoms group members experience and how it then affects how and what they do. We went over some facts about stress including how our body does not care if the stress is big or little, our body reacts the same. Stress can make smart people do stupid things, we can develop numbness to our stress, we can control how we react to stress, and the best time to react to stress is in the moment we are experiencing it. Seb Ocasio participated in group with prompting. She spoke about how she knows that she has been having a harder time maintaining her emotions as she reports that she keeps hearing that she has lost family members to the virus. We spoke about what she is feeling and how she is trying to cope.   She spoke about how she does know she has support and needs to talk about her thoughts and feelings in group.

## 2021-03-10 ENCOUNTER — TELEPHONE (OUTPATIENT)
Dept: FAMILY MEDICINE CLINIC | Age: 86
End: 2021-03-10

## 2021-03-12 ENCOUNTER — HOSPITAL ENCOUNTER (OUTPATIENT)
Dept: BEHAVIORAL/MENTAL HEALTH | Age: 86
Discharge: HOME | End: 2021-03-12
Payer: MEDICARE

## 2021-03-12 PROCEDURE — 90853 GROUP PSYCHOTHERAPY: CPT

## 2021-03-12 NOTE — BH NOTES
This note will not be viewable in [employer] for the following reason(s). This is a Psychotherapy Note. ([address] [facility] [address] Providers Only)   [facility] Outpatient Program  Group Therapy  Date of service: 3/12/2021  Start time: 1100  Stop time: 1150  Type of session: Therapy Group  Problem number: 1. Dep F 33.0    Short term goal (STG): M.  Pt will articulate and or put in writing a plan of action for coping with uncomfortable feelings that get unbalanced for her to help gain confidence for discharge planning    Intervention/techniques: Informed, Validated/Supported, Reframed, Modeled/Rehearsed, Promoted Peer Support and Provided Feedback    Patient mental status/affect: Anxious, Congruent and Preoccupied    Patient behavior/appearance: Neatly Groomed, Attentive, Cooperative and Needed Prompting    Special patient treatment accommodations provided (describe): none    Patient response and progress towards goals: Focus of session was on article 5 Steps for Managing your Emotional Triggers.   We spoke about the importance of understanding the difference between reactions and responses to emotions. I shared with group members the steps which include: accepting responsibility for your reactions, to recognize that you are having an emotional reaction as soon as it appears, determine what triggered the emotion, choose what you want to feel and what you want to do, and to actively shift your emotional state. We spoke about what kind of differences that they may notice in practicing these steps and using these ideas in their emotional states. Tiffanie Magaña participated in group with prompting. She spoke about how she has been overwhelmed with thoughts and feelings mostly due to her not feeling well physically. She spoke about how arthritis and now the allergies she experiences every year are really starting to affect her.  We spoke about how she continues to need some help and support with working to choice her reactions to her feelings.

## 2021-03-12 NOTE — BH NOTES
This note will not be viewable in [facility] for the following reason(s). This is a Psychotherapy Note. ([address], [facility] [address] Providers Only)   [facility]  Group Therapy      Date of service: 3/12/2021    Start time: 1200  Stop time: 1250    Type of session: Therapy Group    Problem number: 1 Dep. F33.0    Short term goal (STG):I .  Pt will identify her most common automatic negative thought (ANT) that she is experiencing right now that leads to increased depression and state alternative thoughts she can use.      Intervention/techniques: Informed, Validated/Supported, Guided, Listened/Empathized, Reinforced and Provided Feedback    Patient mental status/affect: Calm and Congruent    Patient behavior/appearance: Neatly Groomed, Attentive, Cooperative, Motivated, Caretaking and Enthusiastic    Special patient treatment accommodations provided (describe): none    Patient response and progress towards goals: Focus of session was based on handout of The Not To Do List.  We spoke about how we can get overwhelmed with what we have going on in our lives and then we add to it other peoples responsibilities and problems, thinking about the things that are out of our control, the stuff that drains us, and the stuff that doesnt need to get done at all or, at least, for right now. We spoke about how it is helpful to get the priorities in place for the things that we have to get done. Group members were asked to share their thoughts on their ability to be balanced and are their priorities in place right now. Patient participated in the group activity and shared her list of \"everything on my plate\". She discussed items that were out of her control. The group was in agreement about needing to \"let things go\" . They all shared ideas of how to help them all  let  go and not always feel responsible.

## 2021-03-12 NOTE — BH NOTES
This note will not be viewable in [employer] for the following reason(s). This is a Psychotherapy Note. ([address] Providers Only)   [facility]  Group Therapy      Date of service: 3/12/2021    Start time: 1000  Stop time: 1050    Type of session: Therapy Group    Problem number: 1 Dep F33.0    Short term goal (STG):N . Pt will identify one adaptive strategy (what she can change in the environment) to help support her memory issues and report to group consistent use of the strategy chosen.   Intervention/techniques: Informed, Validated/Supported, Reinforced and Provided Feedback    Patient mental status/affect: Calm and Congruent    Patient behavior/appearance: Neatly Groomed, Attentive, Cooperative, Needed Prompting and Withdrawn/Quiet    Special patient treatment accommodations provided (describe): none    Patient response and progress towards goals: Focus of session was on conflict resolution and strategies to help us keep calm in a conflict with others. I shared how conflicts can increase the production of cortisol and adrenaline and how that prepares us to either take flight or fight. We spoke about how conflicts can be over something pretty minor to values and very important issues in our lives. I shared how the increase in hormones affects us when we need to be clear minded and aware but those hormones can create a disorientation and confusion. I shared with group some strategies to help us stay calm in a conflict and they included taking deep breaths which actually interferes with the production of these hormones, focusing on body and where we can work to relax it, listen carefully and ask open ended questions, lower our voices, and work to let go and agree to disagree with others. We spoke about with whom group members can practice these strategies. Pt was attentive in group but stated she did not feel well today.   She did engage with the group and participated in the activity, She stated she feels at times she will continue to \"repeat, repeat,repeat \" with her children things that she wants them to agree with her on that she is right. She acknowledged she thinks at times they [place]" with her just so she will stop. Pt wants to work on this behavior and we discussed strategies to assist  her in more positively communicating with her children.

## 2021-03-15 ENCOUNTER — HOSPITAL ENCOUNTER (OUTPATIENT)
Dept: BEHAVIORAL/MENTAL HEALTH | Age: 86
Discharge: HOME | End: 2021-03-15
Payer: MEDICARE

## 2021-03-15 PROCEDURE — 90853 GROUP PSYCHOTHERAPY: CPT

## 2021-03-15 NOTE — BH NOTES
This note will not be viewable in [place] for the following reason(s). This is a Psychotherapy Note. ([address] [facility] [address] Providers Only)   [facility]  Group Therapy  Date of service: 3/15/2021  Start time: 1200  Stop time: 1250  Type of session: Therapy Group  Problem number: 1. Dep F 33.0    Short term goal (STG): M.  Pt will articulate and or put in writing a plan of action for coping with uncomfortable feelings that get unbalanced for her to help gain confidence for discharge planning.       Intervention/techniques: Informed, Validated/Supported, Modeled/Rehearsed, Prompted/Cued, Facilitated Disclosure and Provided Feedback    Patient mental status/affect: Anxious, Congruent, Preoccupied and Worried    Patient behavior/appearance: Neatly Groomed, Attentive, Cooperative, Needed Prompting and Needy    Special patient treatment accommodations provided (describe): none    Patient response and progress towards goals: Focus of session was based on article How to Drop the Extra (Mental) Weight) and Set Yourself Free by Dr. Kenroy Verma. We spoke about the weight we carry around in our minds, in our habits and their consequences, and in the expectations we have for ourselves and for others. We spoke about the visualization from the article of having a back pack loaded with bricks and how if that back pack is full how does it slow us down and affect our mood and attitude. We spoke about being aware of our expectations, habits, and thought processes and decide what we can take out of our back pack today to lighten our load and how this can affect our daily functioning in such a positive way. Tl Lewis participated well in group with prompting. She spoke about how she still thinks about times in the past when people were upset with her or had wrong opinions of her. She spoke about how she has \"prayed for others and I try to let it go. \"  She is able to share her thoughts and get some feedback and support from group.

## 2021-03-15 NOTE — BH NOTES
This note will not be viewable in [facility] for the following reason(s). This is a Psychotherapy Note. ([address] [facility] [address] Providers Only)   [facility]  Group Therapy  Date of service: 3/15/2021  Start time: 1000  Stop time: 1050  Type of session: Therapy Group  Problem number: 1. Dep F 33.0    Short term goal (STG): H.  Pt will share one emotion she has been feeling over the past week, other than sadness or loneliness, and work to identify a specific trigger and well as a positive coping strategy for that emotional state.     Intervention/techniques: Informed, Validated/Supported, Challenged, Reframed, Modeled/Rehearsed, Listened/Empathized, Promoted Peer Support and Provided Feedback    Patient mental status/affect: Anxious, Congruent and Preoccupied    Patient behavior/appearance: Neatly Groomed, Attentive, Cooperative and Needy    Special patient treatment accommodations provided (describe): none    Patient response and progress towards goals: Focus of session was based on information from the book The Feeling Good Handbook by Radha Camargo about the habit of procrastination. We spoke about what the benefits are of procrastinating and avoiding that lead us to keep doing it and what are the possible advantages of getting things done. We spoke about habits group members are putting of starting or stopping or simple tasks that need to get done. I shared Dr. Jessy Schmid steps to beat procrastination and the steps include: 1. A cost benefit analysis, 2. Making a plan, 3. Make the job easy, 4. Think positively, and 5. Give yourself credit. We spoke about what group members are willing to work on tackling today. Colton Schmidt participated in group with prompting. She spoke about how she had some struggles with her mood over the weekend and she continues to struggle physically.   She spoke about how she is trying to cope with it and she agreed it will help her to set some small achievable goals daily to help her improve her mood and feelings about herself.

## 2021-03-15 NOTE — BH NOTES
This [facility] will [facility] be [facility]able [facility] for the following reason(s). This is a Psychotherapy Note. ([address], [facility] [address] Providers Only)   [facility]  Group Therapy      Date of service: 3/15/2021    Start time: 1100  Stop time: 1150    Type of session: Therapy Group    Problem number: 1 Dep. F33.0    Short term goal (STG): K Pt will identify an area (fun, family, learning, spirituality, goals, health) each week in which she made an effort to be mindful and feel connected in some way.       Intervention/techniques: Informed, Listened/Empathized, Observed/Monitored and Provided Feedback    Patient mental status/affect: Anxious, Calm, Congruent, Flat and Preoccupied    Patient behavior/appearance: Neatly Groomed, Attentive, Cooperative and Withdrawn/Quiet    Special patient treatment accommodations provided (describe): none    Patient response and progress towards goals: Focus of session was based on the 4 agreements by Toby Ormond. The 4 agreements are: be impeccable with your word, dont take anything personally, dont make assumptions, and always do your best.  We spoke about these ideas and how they can impact our mental health recovery in a positive way and help us take steps forward. I asked group members to share which one they will focus on and how they think it can and will impact their daily life. Pt was attentive in group today but stated that she was not feeling well . She stated that she feels \"down in the dumps\"  She was wondering if her blood sugar was imbalanced and was going to have it checked after group. She states when she gets this way she does not like it, feels lonely and also becomes more quiet. In reference to the group lesson, she also disclosed that as she has gotten older she will take the time to \"think things over\" before she speaks.

## 2021-03-19 ENCOUNTER — HOSPITAL ENCOUNTER (OUTPATIENT)
Dept: BEHAVIORAL/MENTAL HEALTH | Age: 86
Discharge: HOME | End: 2021-03-19
Payer: MEDICARE

## 2021-03-19 PROCEDURE — 90853 GROUP PSYCHOTHERAPY: CPT

## 2021-03-19 NOTE — BH NOTES
This [facility] be viewable in [facility] for the following reason(s). This is a Psychotherapy Note. ([address] [facility] [address] Providers Only)   [facility]  Group Therapy      Date of service: 3/19/2021    Start time: 1000  Stop time: 1050    Type of session: Therapy Group    Problem number: 1 dep. F33.0    Short Term Goal: L Pt will express hope for her future with the limitations and strengths that she currently has.         Intervention/techniques: Informed, Refocused, Guided, Listened/Empathized, Clarified, Reinforced, Provided Feedback and Problem Solved    Patient mental status/affect: Anxious, Congruent, Preoccupied and Worried    Patient behavior/appearance: Neatly Groomed, Cooperative, Caretaking, Withdrawn/Quiet, Needy and Attention Seeking    Special patient treatment accommodations provided (describe): none    Patient response and progress towards goals: Focus of session was from [employer] and his tips for a happier life. He spoke about the following; live and let live and move forward and let others do the same, proceed calmly in life, Sundays should be holidays, respect and take care of nature, respect others beliefs, be giving to yourself and others, develop a sense of leisure and play, stop being negative and dont be negative about others, and work for peace. We spoke about how group members can impact their emotional well being by trying to incorporate some of these tips. Pt was attentive in group today but by the end of the group she was not feeling well. She stated that she likes to talk to people everyday as that brings her nandini. The group discussed with her how they thought she must \"give\" to to others in a special way as she has many people in her life that contact her on a daily basis. She tries to make an effort to not be negative and choose positive activities for herself.

## 2021-03-19 NOTE — BH NOTES
This note will not be viewable in [facility] for the following reason(s). This is a Psychotherapy Note. ([address], [facility] Providers Only)   [facility]  Group Therapy      Date of service: 3/19/2021    Pt did not feel well so her sister picked her up and she did not stay for 11:00 group.

## 2021-03-22 ENCOUNTER — HOSPITAL ENCOUNTER (EMERGENCY)
Age: 86
Discharge: HOME | End: 2021-03-22
Attending: EMERGENCY MEDICINE
Payer: MEDICARE

## 2021-03-22 ENCOUNTER — APPOINTMENT (OUTPATIENT)
Dept: CT IMAGING | Age: 86
End: 2021-03-22
Attending: EMERGENCY MEDICINE
Payer: MEDICARE

## 2021-03-22 VITALS
OXYGEN SATURATION: 100 % | WEIGHT: 158 LBS | DIASTOLIC BLOOD PRESSURE: 85 MMHG | HEART RATE: 57 BPM | SYSTOLIC BLOOD PRESSURE: 152 MMHG | BODY MASS INDEX: 25.5 KG/M2 | TEMPERATURE: 98.3 F | RESPIRATION RATE: 17 BRPM

## 2021-03-22 DIAGNOSIS — I10 ESSENTIAL HYPERTENSION: Primary | ICD-10-CM

## 2021-03-22 DIAGNOSIS — R42 VERTIGO: ICD-10-CM

## 2021-03-22 LAB
ALBUMIN SERPL-MCNC: 3.4 G/DL (ref 3.5–5)
ALBUMIN/GLOB SERPL: 0.9 {RATIO} (ref 1.1–2.2)
ALP SERPL-CCNC: 106 U/L (ref 45–117)
ALT SERPL-CCNC: 27 U/L (ref 12–78)
ANION GAP SERPL CALC-SCNC: 8 MMOL/L (ref 5–15)
APPEARANCE UR: CLEAR
AST SERPL-CCNC: 20 U/L (ref 15–37)
ATRIAL RATE: 55 BPM
BACTERIA URNS QL MICRO: NEGATIVE /HPF
BASOPHILS # BLD: 0 K/UL (ref 0–0.1)
BASOPHILS NFR BLD: 1 % (ref 0–1)
BILIRUB SERPL-MCNC: 0.4 MG/DL (ref 0.2–1)
BILIRUB UR QL: NEGATIVE
BUN SERPL-MCNC: 15 MG/DL (ref 6–20)
BUN/CREAT SERPL: 13 (ref 12–20)
CALCIUM SERPL-MCNC: 8.7 MG/DL (ref 8.5–10.1)
CALCULATED P AXIS, ECG09: 71 DEGREES
CALCULATED R AXIS, ECG10: 23 DEGREES
CALCULATED T AXIS, ECG11: 108 DEGREES
CHLORIDE SERPL-SCNC: 106 MMOL/L (ref 97–108)
CO2 SERPL-SCNC: 31 MMOL/L (ref 21–32)
COLOR UR: NORMAL
COMMENT, HOLDF: NORMAL
CREAT SERPL-MCNC: 1.18 MG/DL (ref 0.55–1.02)
DIAGNOSIS, 93000: NORMAL
DIFFERENTIAL METHOD BLD: ABNORMAL
EOSINOPHIL # BLD: 0.1 K/UL (ref 0–0.4)
EOSINOPHIL NFR BLD: 1 % (ref 0–7)
EPITH CASTS URNS QL MICRO: NORMAL /LPF
ERYTHROCYTE [DISTWIDTH] IN BLOOD BY AUTOMATED COUNT: 12.4 % (ref 11.5–14.5)
GLOBULIN SER CALC-MCNC: 4 G/DL (ref 2–4)
GLUCOSE SERPL-MCNC: 116 MG/DL (ref 65–100)
GLUCOSE UR STRIP.AUTO-MCNC: NEGATIVE MG/DL
HCT VFR BLD AUTO: 42.3 % (ref 35–47)
HGB BLD-MCNC: 13.8 G/DL (ref 11.5–16)
HGB UR QL STRIP: NEGATIVE
IMM GRANULOCYTES # BLD AUTO: 0.1 K/UL (ref 0–0.04)
IMM GRANULOCYTES NFR BLD AUTO: 1 % (ref 0–0.5)
KETONES UR QL STRIP.AUTO: NEGATIVE MG/DL
LEUKOCYTE ESTERASE UR QL STRIP.AUTO: NEGATIVE
LYMPHOCYTES # BLD: 2.3 K/UL (ref 0.8–3.5)
LYMPHOCYTES NFR BLD: 26 % (ref 12–49)
MCH RBC QN AUTO: 30.4 PG (ref 26–34)
MCHC RBC AUTO-ENTMCNC: 32.6 G/DL (ref 30–36.5)
MCV RBC AUTO: 93.2 FL (ref 80–99)
MONOCYTES # BLD: 0.6 K/UL (ref 0–1)
MONOCYTES NFR BLD: 7 % (ref 5–13)
NEUTS SEG # BLD: 5.6 K/UL (ref 1.8–8)
NEUTS SEG NFR BLD: 64 % (ref 32–75)
NITRITE UR QL STRIP.AUTO: NEGATIVE
NRBC # BLD: 0 K/UL (ref 0–0.01)
NRBC BLD-RTO: 0 PER 100 WBC
P-R INTERVAL, ECG05: 146 MS
PH UR STRIP: 6 [PH] (ref 5–8)
PLATELET # BLD AUTO: 294 K/UL (ref 150–400)
PMV BLD AUTO: 9.7 FL (ref 8.9–12.9)
POTASSIUM SERPL-SCNC: 3.9 MMOL/L (ref 3.5–5.1)
PROT SERPL-MCNC: 7.4 G/DL (ref 6.4–8.2)
PROT UR STRIP-MCNC: NEGATIVE MG/DL
Q-T INTERVAL, ECG07: 418 MS
QRS DURATION, ECG06: 84 MS
QTC CALCULATION (BEZET), ECG08: 399 MS
RBC # BLD AUTO: 4.54 M/UL (ref 3.8–5.2)
RBC #/AREA URNS HPF: NORMAL /HPF (ref 0–5)
SAMPLES BEING HELD,HOLD: NORMAL
SODIUM SERPL-SCNC: 145 MMOL/L (ref 136–145)
SP GR UR REFRACTOMETRY: 1.01 (ref 1–1.03)
TROPONIN I SERPL-MCNC: <0.05 NG/ML
UROBILINOGEN UR QL STRIP.AUTO: 0.2 EU/DL (ref 0.2–1)
VENTRICULAR RATE, ECG03: 55 BPM
WBC # BLD AUTO: 8.6 K/UL (ref 3.6–11)
WBC URNS QL MICRO: NORMAL /HPF (ref 0–4)

## 2021-03-22 PROCEDURE — 70450 CT HEAD/BRAIN W/O DYE: CPT

## 2021-03-22 PROCEDURE — 85025 COMPLETE CBC W/AUTO DIFF WBC: CPT

## 2021-03-22 PROCEDURE — 36415 COLL VENOUS BLD VENIPUNCTURE: CPT

## 2021-03-22 PROCEDURE — 81001 URINALYSIS AUTO W/SCOPE: CPT

## 2021-03-22 PROCEDURE — 74011250636 HC RX REV CODE- 250/636: Performed by: EMERGENCY MEDICINE

## 2021-03-22 PROCEDURE — 93005 ELECTROCARDIOGRAM TRACING: CPT

## 2021-03-22 PROCEDURE — 99284 EMERGENCY DEPT VISIT MOD MDM: CPT

## 2021-03-22 PROCEDURE — 84484 ASSAY OF TROPONIN QUANT: CPT

## 2021-03-22 PROCEDURE — 80053 COMPREHEN METABOLIC PANEL: CPT

## 2021-03-22 RX ORDER — MECLIZINE HYDROCHLORIDE 25 MG/1
25 TABLET ORAL
Status: DISCONTINUED | OUTPATIENT
Start: 2021-03-22 | End: 2021-03-22 | Stop reason: HOSPADM

## 2021-03-22 RX ORDER — METOPROLOL SUCCINATE 50 MG/1
25 TABLET, EXTENDED RELEASE ORAL DAILY
Status: DISCONTINUED | OUTPATIENT
Start: 2021-03-22 | End: 2021-03-22 | Stop reason: HOSPADM

## 2021-03-22 RX ORDER — LISINOPRIL 20 MG/1
20 TABLET ORAL
Status: DISCONTINUED | OUTPATIENT
Start: 2021-03-22 | End: 2021-03-22 | Stop reason: HOSPADM

## 2021-03-22 RX ORDER — MECLIZINE HYDROCHLORIDE 25 MG/1
25 TABLET ORAL
Status: COMPLETED | OUTPATIENT
Start: 2021-03-22 | End: 2021-03-22

## 2021-03-22 RX ADMIN — MECLIZINE HYDROCHLORIDE 25 MG: 25 TABLET ORAL at 06:57

## 2021-03-22 NOTE — ED PROVIDER NOTES
EMERGENCY DEPARTMENT HISTORY AND PHYSICAL EXAM      Date: 3/22/2021  Patient Name: Monica Robert    History of Presenting Illness     Chief Complaint   Patient presents with    Hypertension       History Provided By: Patient    HPI: Monica Robert, 82 y.o. female with PMHx significant for hypertension, depression, DM, vertigo presents ambulatory to the ED with cc of elevated blood pressure and dizziness. Patient reports she woke up early this morning and had some dizziness. She reports her blood pressure was elevated on home monitor. She does not remember the numbers. She does have a history of vertigo. She denies any chest pain or shortness of breath. She reports a room spinning sensation. She denies any vision changes. She denies any extremity weakness numbness or tingling. She denies any abdominal pain, nausea or vomiting. Currently feels better than when she woke up this morning. PMHx: Significant for vertigo, hypertension, depression, DM  PSHx: Significant for hysterectomy, cholecystectomy, colonoscopy  Social Hx: Pack-a-day smoker. Denies alcohol or drug use. There are no other complaints, changes, or physical findings at this time. PCP: Alphonso Neal NP    No current facility-administered medications on file prior to encounter. Current Outpatient Medications on File Prior to Encounter   Medication Sig Dispense Refill    scopolamine (TRANSDERM-SCOP) 1 mg over 3 days pt3d 1 Patch by TransDERmal route every seventy-two (72) hours. 10 Patch 0    atorvastatin (LIPITOR) 40 mg tablet Take 1.5 Tabs by mouth nightly. 120 Tab 1    lisinopriL (PRINIVIL, ZESTRIL) 20 mg tablet Take 1 Tab by mouth two (2) times a day. 180 Tab 1    aspirin delayed-release 81 mg tablet Take 1 Tab by mouth daily. 90 Tab 1    nitroglycerin (NITROSTAT) 0.4 mg SL tablet 1 Tab by SubLINGual route every five (5) minutes as needed for Chest Pain. Up to 3 doses.  1 Bottle 1    polyethylene glycol (Miralax) 17 gram/dose powder Take 17 g by mouth daily. 1 tablespoon with 8 oz of water daily 235 g 0    simethicone (MYLICON) 80 mg chewable tablet Take 1 Tab by mouth every six (6) hours as needed for Flatulence. 24 Tab 0    meclizine (ANTIVERT) 25 mg tablet Take  by mouth three (3) times daily as needed for Dizziness.  venlafaxine-SR (EFFEXOR-XR) 150 mg capsule Take 1 Cap by mouth daily (with breakfast). Indications: major depressive disorder 90 Cap 1    Blood-Glucose Meter monitoring kit Check glucose once daily. DX: E11.9 1 Kit 0    glucose blood VI test strips (ASCENSIA AUTODISC VI, ONE TOUCH ULTRA TEST VI) strip Check glucose once daily. DX: E11.9 100 Strip 3    lancets misc Check glucose once daily. DX: E11.9 100 Each 3    Januvia 100 mg tablet TAKE 1 TABLET BY MOUTH EVERY DAY 90 Tab 0    montelukast (SINGULAIR) 10 mg tablet Take 1 Tab by mouth daily. 90 Tab 0    acetaminophen (TylenoL) 325 mg tablet Take  by mouth every four (4) hours as needed for Pain.  levocetirizine (XYZAL) 5 mg tablet Take 1 pill daily as needed for allergy symptoms 90 Tab 2    metoprolol succinate (TOPROL-XL) 25 mg XL tablet Take 1 Tab by mouth daily. Indications: high blood pressure 90 Tab 0       Past History     Past Medical History:  Past Medical History:   Diagnosis Date    Allergic rhinitis due to pollen     BPV (benign positional vertigo)     Change in bowel habits     Depression     Diabetes ([address]     Dysuria     Hemorrhoids     Hypertension     IBS (irritable bowel syndrome)     Menopause     Other diseases of nasal cavity and sinuses(478.19)     Vertigo        Past Surgical History:  Past Surgical History:   Procedure Laterality Date    COLONOSCOPY N/A 7/15/2020    COLONOSCOPY performed by Xavi Lopez MD at [facility] ENDOSCOPY    HX CHOLECYSTECTOMY  2016    HX COLONOSCOPY  2013?     HX HYSTERECTOMY      UPPER GI ENDOSCOPY,DIAGNOSIS  7/15/2020            Family History:  Family History   Problem Relation Age of Onset    No Known Problems Mother        Social History:  Social History     Tobacco Use    Smoking status: Current Every Day Smoker     Packs/day: 0.25    Smokeless tobacco: Never Used   Substance Use Topics    Alcohol use: No    Drug use: No       Allergies:  No Known Allergies      Review of Systems   Review of Systems   Constitutional: Negative for activity change, chills and fever. HENT: Negative for congestion and sore throat. Eyes: Negative for pain and redness. Respiratory: Negative for cough, chest tightness and shortness of breath. Cardiovascular: Negative for chest pain and palpitations. Gastrointestinal: Negative for abdominal pain, diarrhea, nausea and vomiting. Genitourinary: Negative for dysuria, frequency and urgency. Musculoskeletal: Negative for back pain and neck pain. Skin: Negative for rash. Neurological: Positive for dizziness and light-headedness. Negative for syncope and headaches. Psychiatric/Behavioral: Negative for confusion. All other systems reviewed and are negative. Physical Exam   Physical Exam  Vitals signs and nursing note reviewed. Constitutional:       General: She is not in acute distress. Appearance: Normal appearance. She is well-developed. She is not diaphoretic. Comments: Calm and pleasant elderly black female. HENT:      Head: Normocephalic and atraumatic. Nose: Nose normal.   Eyes:      General: No scleral icterus. Conjunctiva/sclera: Conjunctivae normal.      Pupils: Pupils are equal, round, and reactive to light. Comments: Conjunctiva clear bilaterally; Neck:      Musculoskeletal: Normal range of motion and neck supple. Thyroid: No thyromegaly. Vascular: No JVD. Trachea: No tracheal deviation. Cardiovascular:      Rate and Rhythm: Normal rate and regular rhythm. Heart sounds: No murmur. No friction rub. No gallop.     Pulmonary:      Effort: Pulmonary effort is normal. No respiratory distress. Breath sounds: Normal breath sounds. No stridor. No wheezing or rales. Chest:      Chest wall: No tenderness. Abdominal:      General: Bowel sounds are normal. There is no distension. Palpations: Abdomen is soft. Tenderness: There is no abdominal tenderness. There is no guarding or rebound. Musculoskeletal: Normal range of motion. General: No tenderness. Skin:     General: Skin is warm and dry. Capillary Refill: Capillary refill takes less than 2 seconds. Neurological:      General: No focal deficit present. Mental Status: She is alert and oriented to person, place, and time. Cranial Nerves: No cranial nerve deficit. Motor: No abnormal muscle tone. Coordination: Coordination normal.      Deep Tendon Reflexes: Reflexes normal.      Comments: 5/5 strength throughout; no past pointing; good heel to shin; negative romberg; holds both arms extended in front of them for 10 seconds without difficulty or drift; lifts legs off of bed without difficulty; no pronator drift; good equal  strength bilaterally; motor and sensory grossly intact; no facial asymmetry; she ambulated into the treatment area with normal gait. Diagnostic Study Results     Labs -   No results found for this or any previous visit (from the past 12 hour(s)). Radiologic Studies -   CT HEAD WO CONT   Final Result         No interval change or acute abnormality        CT Results  (Last 48 hours)               03/22/21 0759  CT HEAD WO CONT Final result    Impression:          No interval change or acute abnormality       Narrative:  EXAM: CT HEAD WO CONT       INDICATION: dizziness       COMPARISON: 2/20/2021. CONTRAST: None. TECHNIQUE: Unenhanced CT of the head was performed using 5 mm images. Brain and   bone windows were generated. Coronal and sagittal reformats.  CT dose reduction   was achieved through use of a standardized protocol tailored for this   examination and automatic exposure control for dose modulation. FINDINGS:   The ventricles and sulci are normal in size, shape and configuration. . Mild low   density in the periventricular white matter with chronic encephalomalacia   inferior medial right cerebellum, unchanged. There is no intracranial   hemorrhage, extra-axial collection, or mass effect. The basilar cisterns are   open. No CT evidence of acute infarct. The bone windows demonstrate no abnormalities. The visualized portions of the   paranasal sinuses and mastoid air cells are clear. CXR Results  (Last 48 hours)    None            Medical Decision Making   I am the first provider for this patient. I reviewed the vital signs, available nursing notes, past medical history, past surgical history, family history and social history. Vital Signs-Reviewed the patient's vital signs. Patient Vitals for the past 12 hrs:   BP SpO2   03/22/21 0831 (!) 152/85 100 %   03/22/21 0826 (!) 137/59 100 %       Pulse Oximetry Analysis - 100% on RA    Cardiac Monitor:   Rate: 57 bpm  Rhythm: Sinus bradycardia      ED EKG interpretation:06:27  Rhythm: sinus bradycardia; and regular . Rate (approx.): 55; Axis: normal; MD Interval: normal; QRS interval: normal ; ST/T wave: non-specific changes; EKG grossly unchanged from February 1, 2021. Biphasic T waves present in V4 through V6. This EKG was interpreted by Larry Phillip MD,ED Provider. Records Reviewed: Nursing Notes and Old Medical Records    Provider Notes (Medical Decision Making):   DDx: Peripheral vertigo, ACS, metabolic abnormality, UTI. ED Course:   Initial assessment performed. The patients presenting problems have been discussed, and they are in agreement with the care plan formulated and outlined with them. I have encouraged them to ask questions as they arise throughout their visit.     ED Course as of Mar 22 1945   Mon Mar 22, 2021   7198 Care signed out to  [employer]. Labs pending.    [CH]   4057 Patient received in signout. On repeat evaluation she is complaining of blurry vision and dizziness with a blood pressure currently at 834 systolic. She states her normal pressures at home are in the 130s. Labs reviewed without any concerning abnormalities. Patient will go over for CT and we will give her a dose of meclizine. [JT]      ED Course User Index  [CH] Junior Jackman MD  [JT] Kenji Cerda MD     Records reviewed. Patient had a negative nuclear stress test on February 4, 2021. PROGRESS NOTE    No neuro deficits. CT head without acute findings. CBC, CMP, troponin unremarkable. EKG unchanged. Discharge home. Written by Antoinette Greenberg MD     Progress note:    Pt noted to be feeling better , ready for discharge. Updated pt and/or family on all final lab and imaging findings. Will follow up as instructed. All questions have been answered, pt voiced understanding and agreement with plan. Specific return precautions provided as well as instructions to return to the ED should sx worsen at any time. Vital signs stable for discharge. I have also put together some discharge instructions for him that include: 1) educational information regarding their diagnosis, 2) how to care for their diagnosis at home, as well a 3) list of reasons why they would want to return to the ED prior to their follow-up appointment, should their condition change. Written by Antoinette Greenberg MD        Critical Care Time:   0    Disposition:  Discharge    PLAN:  1. Discharge Medication List as of 3/22/2021  8:35 AM        2.    Follow-up Information     Follow up With Specialties Details Why Contact Info    Emeka Wells MD Cardiology, Internal Medicine Schedule an appointment as soon as possible for a visit  FOr blood pressure, exam re-check [address] Emergency Medicine  If symptoms [address]  [phone]        Return to ED if worse     Diagnosis     Clinical Impression:   1. Essential hypertension    2. Vertigo              Please note that this dictation was completed with [employer], the computer voice recognition software. Quite often unanticipated grammatical, syntax, homophones, and other interpretive errors are inadvertently transcribed by the computer software. Please disregard these errors. Please excuse any errors that have escaped final proofreading.

## 2021-03-22 NOTE — ED TRIAGE NOTES
Patient states she believes her blood pressure is elevated and wanted to come have it check.  Patient ambulatory at the time of arrival.

## 2021-03-22 NOTE — ED NOTES
I have reviewed discharge instructions with the patient. The patient verbalized understanding. Discharge medications discussed with patient. No questions at this time. Eating breakfast called son for  ride home. Discussed follow up with Dr Sergio Alfred.

## 2021-03-22 NOTE — DISCHARGE INSTRUCTIONS
You were seen in the ER for symptoms related to your elevated blood pressure. According to chart, Dr. Sherita Patrick would like you to take 20 mg of lisinopril in the morning and 20 mg of lisinopril at night and continue your 25 mg of metoprolol every morning. I would recommend taking the medication as prescribed to try to prevent elevated blood pressure events in the middle of the night.

## 2021-03-29 ENCOUNTER — HOSPITAL ENCOUNTER (OUTPATIENT)
Dept: BEHAVIORAL/MENTAL HEALTH | Age: 86
Discharge: HOME | End: 2021-03-29
Payer: MEDICARE

## 2021-03-29 PROCEDURE — 90853 GROUP PSYCHOTHERAPY: CPT

## 2021-03-29 NOTE — BH NOTES
This note will not be viewable in [facility] for the following reason(s). This is a Psychotherapy Note. ([address] [facility] [address] Providers Only)   [facility] Outpatient Program  Group Therapy  Date of service: 3/29/2021  Start time: 1200  Stop time: 1250  Type of session: Therapy Group  Problem number: 1. 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  Pt will express understanding of the need to take all medications as prescribed and explore reasons why she has not done this in the past    Intervention/techniques: Informed, Validated/Supported, Modeled/Rehearsed, Prompted/Cued, Observed/Monitored, Promoted Peer Support and Provided Feedback    Patient mental status/affect: Anxious, Congruent and Preoccupied    Patient behavior/appearance: Neatly Groomed, Attentive, Cooperative, Needed Prompting and Needy    Special patient treatment accommodations provided (describe): none    Patient response and progress towards goals: Focus of session was on article 5 Steps for Managing your Emotional Triggers.   We spoke about the importance of understanding the difference between reactions and responses to emotions. I shared with group members the steps which include: accepting responsibility for your reactions, to recognize that you are having an emotional reaction as soon as it appears, determine what triggered the emotion, choose what you want to feel and what you want to do, and to actively shift your emotional state. We spoke about what kind of differences that they may notice in practicing these steps and using these ideas in their emotional states. Isabelle Ray participated in group with prompting. She spoke about how she has always struggled with emotions and that things right now seem to be focused on her struggle with her memory and how she has been noticing things more.   We spoke about the fact that since she notices things and are bothered by her memory issues, this is an oddly good sign since her memory is functioning to a certain degree. She could see how that is a positive sign.

## 2021-03-29 NOTE — BH NOTES
This note will not be viewable in [employer] for the following reason(s). This is a Psychotherapy Note. ([address] [facility] [address] Providers Only)   [facility] Structured Outpatient Program  Group Therapy  Date of service: 3/29/2021  Start time: 1000  Stop time: 1050  Type of session: Therapy Group  Problem number: 1. Dep F 33.0    Short term goal (STG): H.  Pt will share one emotion she has been feeling over the past week, other than sadness or loneliness, and work to identify a specific trigger and well as a positive coping strategy for that emotional state.      Intervention/techniques: Informed, Validated/Supported, Modeled/Rehearsed, Listened/Empathized, Promoted Peer Support and Provided Feedback    Patient mental status/affect: Calm, Congruent and Preoccupied    Patient behavior/appearance: Neatly Groomed, Attentive, Cooperative, Needed Prompting and Withdrawn/Quiet    Special patient treatment accommodations provided (describe): none    Patient response and progress towards goals: Focus of session was on how to stop the cycle of depression. We spoke about the three parts of depression are negative or self critical thoughts, physical symptoms of fatigue, poor concentration, irritability, nervousness, and behavioral isolation or passivity or inactivity. We discussed 4 components of stopping depression which include practice alternative or balanced thinking, get involved with activities, have contact with other people, and take care of our health (rest, eat right, exercise). We spoke about a simple plan to follow to develop a better mood which includes talking back to negative thoughts, initiating pleasant activities, and having purposeful people contact. Makenzie Ybarra participated in group with prompting. She spoke about how she has been fighting feeling bad physically for over a week and she even went to the ER but she was sent home.   She spoke about how she knows that she has to keep pushing herself as much as she can and she agreed that she \"listens to the voices that tell me to keep going. \"  She spoke about how she knows the impact of being in bed all day and how that impacts her.

## 2021-03-29 NOTE — BH NOTES
This note will not be viewable in [employer] for the following reason(s). This is a Psychotherapy Note. ([address], [facility] [address] Providers Only)   [facility] Outpatient Program  Group Therapy      Date of service: 3/29/2021    Start time: 1100  Stop time: 1150    Type of session: Therapy Group    Problem number: 1Dep. F33.0    Short term goal (STG): K Pt will identify an area (fun, family, learning, spirituality, goals, health) each week in which she made an effort to be mindful and feel connected in some way.      Intervention/techniques: Informed, Validated/Supported, Refocused, Listened/Empathized, Observed/Monitored, Reinforced and Provided Feedback    Patient mental status/affect: Anxious, Congruent, Preoccupied and Worried    Patient behavior/appearance: Neatly Groomed, Attentive, Cooperative, Withdrawn/Quiet and Needy    Special patient treatment accommodations provided (describe): none    Patient response and progress towards goals: Focus of session was on identifying the difference between mindless responses and mindful responses. We spoke about the impact that either has on our moods and our functioning. We spoke about the benefits of mindfulness which included being non judging, based on facts, can distance self from thoughts, being in an approach mode, can let go, and are more calm and effective in any given moment. Pt stated that she was glad to be back to group sessions. She did go to the ER and they increased her blood pressure medication. She stated her daughter helps to give her medication. She appeared to be depressed and anxious today acknowledging that \"she guesses she will just have to deal with things everyday. \"  She did engage with other group members and the activity.

## 2021-04-02 ENCOUNTER — HOSPITAL ENCOUNTER (OUTPATIENT)
Dept: BEHAVIORAL/MENTAL HEALTH | Age: 86
Discharge: HOME | End: 2021-04-02
Payer: MEDICARE

## 2021-04-02 PROCEDURE — 90853 GROUP PSYCHOTHERAPY: CPT

## 2021-04-02 NOTE — BH NOTES
This note will not be viewable in [employer] for the following reason(s). This is a Psychotherapy Note. ([address] [facility] [address] Providers Only)   [facility] Outpatient Program  Group Therapy  Date of service: 4/2/2021  Start time: 1000  Stop time: 1050  Type of session: Therapy Group  Problem number: 1. 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 Pt will express understanding of the need to take all medications as prescribed and explore reasons why she has not done this in the past    Intervention/techniques: Informed, Validated/Supported, Challenged, Prompted/Cued, Listened/Empathized, Promoted Peer Support and Provided Feedback    Patient mental status/affect: Anxious, Congruent and Preoccupied    Patient behavior/appearance: Neatly Groomed, Attentive, Cooperative and Needed Prompting    Special patient treatment accommodations provided (describe): none    Patient response and progress towards goals: Focus of session was based on information in the book [facility] Overthinking by [email or web address] Donnelly and Bart Finley. I shared with group members the signs that a person is an overthinker which includes: insomnia, living anxiously (have to plan out and be prepared for everything), overanalyzing everything around you, fear of failure (perfectionism), second guessing self, headaches, sore muscles and stiff joints, fatigue, and cannot be present in the moment. We spoke about how these above signs then affect our day to day living and decreases our overall well-being. I shared strategies from the book that included: being self-aware and noting your thoughts, challenging your thoughts as necessary, focus on problem solving, increasing mindfulness practice, and learn how to change the channel. Colton Schmidt participated in group with prompting. She spoke about how she knows that she is an over thinker after hearing the signs and she \"has all of them. \"  She spoke about how she thinks a lot about the past and the future and continues to struggle with staying present minded focused. She spoke about how she will continue to work on her strategies for self care and she knows she needs to find ways to help distract her from the negative thoughts that still easily plague her.

## 2021-04-02 NOTE — BH NOTES
This note will not be viewable in [employer] for the following reason(s). This is a Psychotherapy Note. ([address] [facility] [address] Providers Only)   [facility] Structured Outpatient Program  Group Therapy  Date of service: 4/2/2021  Start time: 1100  Stop time: 1150  Type of session: Therapy Group    Problem number: 1. Dep F 33.0    Short term goal (STG): N.  Pt will identify one adaptive strategy (what she can change in the environment) to help support her memory issues and report to group consistent use of the strategy chosen. Intervention/techniques: Informed, Validated/Supported, Challenged, Reframed, Prompted/Cued, Listened/Empathized and Promoted Peer Support    Patient mental status/affect: Anxious, Congruent and Preoccupied    Patient behavior/appearance: Neatly Groomed, Attentive, Cooperative and Needed Prompting    Special patient treatment accommodations provided (describe): none    Patient response and progress towards goals: Focus of session was based on information from the book Life is Short. Wear your Party Pants by Luiz Montilla. I shared the information about saying yes to stress and building up resilience in order to manage and even welcome stress. We spoke about how stress can be a positive aspect of our lives and it all comes down to how we choose to manage it. I shared the common symptoms of stress, and the three tiers of managing stress. Group members were asked to share how they can view stress differently and how coping can change and have a positive impact. Maricruz Shirley participated in group with prompting. She spoke about how she has been trying to build up her health and she is currently thinking a lot about what she is eating and how much. She continues to struggle with focusing on good choices and she has been feeling more overwhelmed by her options. She was given a lot of feedback about options and her daughters do help her a great deal but she also has ideas for snacks.   She knows how important it is to manage her diabetes as well as depression through choosing the right foods.

## 2021-04-05 ENCOUNTER — HOSPITAL ENCOUNTER (OUTPATIENT)
Dept: BEHAVIORAL/MENTAL HEALTH | Age: 86
Discharge: HOME | End: 2021-04-05
Payer: MEDICARE

## 2021-04-05 PROCEDURE — 90853 GROUP PSYCHOTHERAPY: CPT

## 2021-04-05 NOTE — BH NOTES
This note will not be viewable in [email or web address] for the following reason(s). This is a Psychotherapy Note. 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 Pt will express hope for her future with the limitations and strengths that she currently has.       Intervention/techniques: Informed, Validated/Supported, Modeled/Rehearsed, Prompted/Cued, Promoted Peer Support and Provided Feedback    Patient mental status/affect: Anxious, Congruent and Preoccupied    Patient behavior/appearance: Neatly Groomed, Attentive, Cooperative and Needed Prompting    Special patient treatment accommodations provided (describe): none  Patient response and progress towards goals: Focus of session was on information about being a highly sensitive person (HSP). We discussed how a HSP reacts to minor stressors and triggers to the same degree they would react in a very dangerous situation. I shared how if the amygdala in the brain detects a threat of any kind, the hormones are released and we are feeling it both physically and mentally. We spoke about how the HSP will add negative thoughts about self and our abilities and thoughts about the past and the future in there as well. I shared 4 steps from the [address] of how to manage episodes of stress and they include staying in the moment, practice being a witness, tuning into the body, and focusing on the breath. We spoke about regulating our breathing can help us return our bodies and mind to a pre triggered state. Kourtney Harden participated in group with prompting. She spoke about how she will continue to practice these skills and work to establish healthy outlets for her moments of feeling overwhelmed with stress.

## 2021-04-05 NOTE — BH NOTES
This note will not be viewable in Appdrat for the following reason(s). This is a Psychotherapy Note. ([address], [facility] [address] Providers Only)   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  Pt will share one emotion she has been feeling over the past week, other than sadness or loneliness, and work to identify a specific trigger and well as a positive coping strategy for that emotional state. Intervention/techniques: Informed, Validated/Supported, Prompted/Cued, Listened/Empathized, Promoted Peer Support and Provided Feedback    Patient mental status/affect: Anxious, Congruent and Preoccupied    Patient behavior/appearance: Neatly Groomed, Attentive, Cooperative and Needed Prompting    Special patient treatment accommodations provided (describe): none    Patient response and progress towards goals: Focus of session was on identifying what we are doing well and what we can identify that we will benefit from continuing to do in regards to coping with our emotions and responding to them. We also spoke about what we are doing that we know is not helping in our process of recovery and is even setting us back. We spoke how we feel when we are successful in implementing a change and how we can tap into that to try and increase our motivation to continue to change. Faviola Bright participated in group with prompting. She spoke about how she has been reaching out for help more and she went over to her daughters house when \"I found myself getting up and up in emotions. \"  She spoke about how she will continue to work on eating better because she does recognize how much this has been affecting her health.

## 2021-04-05 NOTE — BH NOTES
This note will not be viewable in OpenSparkt for the following reason(s). This is a Psychotherapy Note. ([address], [facility] [address] Providers Only)   [facility] Outpatient Program  Group Therapy      Date of service: 4/5/2021    Start time: 1100  Stop time: 1150    Type of session: Therapy Group    Problem number: 1Dep. F33.0    Short term goal (STG):K  Pt will identify an area (fun, family, learning, spirituality, goals, health) each week in which she made an effort to be mindful and feel connected in some way.      Intervention/techniques: Informed, Validated/Supported, Listened/Empathized, Reinforced and Provided Feedback    Patient mental status/affect: Calm, Congruent and Elevated    Patient behavior/appearance: Neatly Groomed, Attentive, Cooperative, Needed Prompting and Enthusiastic    Special patient treatment accommodations provided (describe): none    Patient response and progress towards goals: Focus of session was on issues and emotional baggage from the past and how it may continue to be impacting our functioning today. We discussed how we can have anger towards family members and that may have been what has protected us from getting hurt again. I spoke with group about the need to accept other peoples limitations and how if we dont accept that, then we get continuously hurt and angry towards others. We spoke about how we have to move forward and that may include accepting a need to forgive others and to accept them as they are. If we cannot be around them much, then that is how they take care of themselves. Group members were asked to share something that they can let go of today from the past so they can lighten their burden one step at a time. Patient appeared to be more lively today. She stated she was feeling good and glad to be at group. She engaged with the other group members and the activity. She had a little bit of trouble understanding what is Emotional Baggage.   We discussed this further and explored a couple of her examples related to herself and also her concerns regarding her daughter.

## 2021-04-09 ENCOUNTER — HOSPITAL ENCOUNTER (OUTPATIENT)
Dept: BEHAVIORAL/MENTAL HEALTH | Age: 86
Discharge: HOME | End: 2021-04-09
Payer: MEDICARE

## 2021-04-09 PROCEDURE — 90853 GROUP PSYCHOTHERAPY: CPT

## 2021-04-09 NOTE — BH NOTES
This note will not be viewable in OrderAheadt for the following reason(s). This is a Psychotherapy Note. ([address] [facility] [address] Providers Only)   [facility] Out[facility]  Group Therapy  Date of service: 4/9/2021    Start time: 1100  Stop time: 1150    Type of session: Therapy Group    Problem number: 1Dep F33.0    Short term goal (STG): O  Pt will express understanding of the need to take all medications as prescribed and explore reasons why she has not done this in the past.      Intervention/techniques: Validated/Supported, Reflected, Reinforced and Provided Feedback    Patient mental status/affect: Calm, Congruent and Happy    Patient behavior/appearance: Neatly Groomed, Attentive, Cooperative, Motivated and Enthusiastic    Special patient treatment accommodations provided (describe): none    Patient response and progress towards goals: Focus of session was on identifying core beliefs that we may have that lead us to increased feelings of depression, anxiety, anger, and low self esteem. We spoke about the need to understand our beliefs that lead us to these painful emotions and feeling states. We spoke about how these can be challenged by working to provide pieces of evidence to the contrary to our negative core beliefs. We also spoke about how we can put this into practice when we are out in the world being challenged with negative thinking patterns. Pt was attentive and engaged with the other group members and the activity. Pt shared today a lot  about her family and reflected to fond family memories. She stated that she is having a hard time with the loneliness she experiences now on a daily basis. She has always had company in her life but now she is alone at home. She tries to push away her negative thoughts and keep positive by smiling and talking to others.   She might consider writing down her memories as she like to share them with others and she is the [facility] left\"

## 2021-04-09 NOTE — BH NOTES
This note will not be viewable in [email or web address].comt for the following reason(s). This is a Psychotherapy Note. 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 Pt will identify one adaptive strategy (what she can change in the environment) to help support her memory issues and report to group consistent use of the strategy chosen.     Intervention/techniques: Informed, Validated/Supported, Modeled/Rehearsed, Prompted/Cued, Listened/Empathized, Provided Feedback and Evaluated/Interpreted    Patient mental status/affect: Anxious, Congruent and Preoccupied    Patient behavior/appearance: Neatly Groomed, Attentive, Cooperative and Needed Prompting    Special patient treatment accommodations provided (describe): none    Patient response and progress towards goals: Focus of session was on ideas presented in the book The Language of Letting Go by Carroll. The focus was on the difference between being responsible to someone versus for someone. We discussed how we are responsible for ourselves and how we often get tangled up emotionally when we think we are responsible for others in some way. We discussed how we have to untangle our own self care from others dependent on us unnecessarily for care. We discussed the freedom that can come when we decide to only be responsible to others and how that can impact our own well being and health. I asked group members to think about who they can let of and how they can stop being the caretaker for others, when it is not necessary or appropriate, or how they can better take care of their own needs. Tino Alba participated in group with prompting. She spoke about how she knows that she has had to have boundaries with her grown kids and grand kids.   She spoke about how she does try to stay focused on the here and now. She is pursuing her options for self care and setting needed boundaries.

## 2021-04-09 NOTE — BH NOTES
This note will not be viewable in Dynamic Signalt for the following reason(s). This is a Psychotherapy Note. 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 Pt will share with group one thought per group, without prompting, that she is unsure about its effect on her or if it is logical and ask for feedback  Intervention/techniques: Informed, Validated/Supported, Modeled/Rehearsed, Prompted/Cued, Promoted Peer Support and Provided Feedback    Patient mental status/affect: Calm, Congruent and Preoccupied    Patient behavior/appearance: Neatly Groomed, Attentive and Cooperative    Special patient treatment accommodations provided (describe): none    Patient response and progress towards goals: Focus of session was on The Symptoms of Inner Peace by Frank Fraser. I shared the idea with group and that it includes the ideas of how inner peace is a disease and here is what it looks like. The symptoms included a loss of ability to worry, a loss of interest in conflict, an unmistakable ability to enjoy each moment, and frequent, overwhelming episodes of appreciation.   I asked group members to share which symptom they can work to get and how they can see it will help them have more peace in their day to day life. Radha Lazarus participated in group with prompting. She spoke about how she knows that she needs to stay focused on enjoying hte days and she agreed ti is possible to turn her thoughts away from the negative and she can focus on the positive.

## 2021-04-12 ENCOUNTER — HOSPITAL ENCOUNTER (OUTPATIENT)
Dept: BEHAVIORAL/MENTAL HEALTH | Age: 86
Discharge: HOME | End: 2021-04-12
Payer: MEDICARE

## 2021-04-12 PROCEDURE — 90853 GROUP PSYCHOTHERAPY: CPT

## 2021-04-12 NOTE — BH NOTES
This note will not be viewable in Talkdeskt for the following reason(s). This is a Psychotherapy Note. 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  Pt will share one emotion she has been feeling over the past week, other than sadness or loneliness, and work to identify a specific trigger and well as a positive coping strategy for that emotional state.       Intervention/techniques: Informed, Validated/Supported, Reflected, Reframed, Modeled/Rehearsed, Prompted/Cued, Promoted Peer Support and Provided Feedback    Patient mental status/affect: Anxious, Congruent and Preoccupied    Patient behavior/appearance: Neatly Groomed, Attentive, Cooperative and Needed Prompting    Special patient treatment accommodations provided (describe): none    Patient response and progress towards goals: Focus of session was based on information in [place] [employer].   I shared with group the concept she presents about scarcity versus sufficiency. We spoke about how we often thing about the things that we dont have and all that entails and how much that impacts us. We spoke about the impact of this on our world and even our nation right now and the benefits of focusing on sufficiency and that it means [place] experience, a context we generate, a declaration, a knowing that there is enough, and that we are enough. I asked group members to think about how they can focus more on sufficiency and how it can improve their mood and functioning. Darlin Baker was active in group and she was elevated at times but she spoke about how she knows that she can focus on the things that have been going wrong or she can focus on what she has going well.   She spoke about how she had a lot of energy to be outside right now and \"I am going to try and be careful about how much gardening I do. \"  She was able to be active in group and share positive thoughts with others.

## 2021-04-12 NOTE — BH NOTES
This note will not be viewable in Kl[employer]t for the following reason(s). This is a Psychotherapy Note. ([address], [facility] [address] Providers Only)   [facility] Outpatient Program  Group Therapy      Date of service: 4/12/2021    Start time: 1000  Stop time: 1050    Type of session: Therapy Group    Problem number: 1Dep. F33.0    Short term goal (STG):K  Pt will identify an area (fun, family, learning, spirituality, goals, health) each week in which she made an effort to be mindful and feel connected in some way.       Intervention/techniques: Informed, Reflected, Listened/Empathized, Reinforced and Provided Feedback    Patient mental status/affect: Calm and Congruent    Patient behavior/appearance: Neatly Groomed, Attentive, Cooperative, Motivated and Enthusiastic    Special patient treatment accommodations provided (describe): none    Patient response and progress towards goals: Focus of session was on information about being a highly sensitive person (HSP). We discussed how a HSP reacts to minor stressors and triggers to the same degree they would react in a very dangerous situation. I shared how if the amygdala in the brain detects a threat of any kind, the hormones are released and we are feeling it both physically and mentally. We spoke about how the HSP will add negative thoughts about self and our abilities and thoughts about the past and the future in there as well. I shared 4 steps from the [address] of how to manage episodes of stress and they include staying in the moment, practice being a witness, tuning into the body, and focusing on the breath. We spoke about regulating our breathing can help us return our bodies and mind to a pre triggered state. Pt was attentive and engaged with the other group members today. She talked about how at times she feels she is oversensitve. She like to have a \" good cry\" and pray when her emotions are out of control.   She also recognizes that she has to pay attention to her blood sugar and make sure she maintains her medicine and eating habits.

## 2021-04-12 NOTE — BH NOTES
This note will not be viewable in [facility] for the following reason(s). This is a Psychotherapy Note. ([address] [facility] [address] Providers Only)   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  Pt will articulate and or put in writing a plan of action for coping with uncomfortable feelings that get unbalanced for her to help gain confidence for discharge planning.     Intervention/techniques: Informed, Validated/Supported, Challenged, Modeled/Rehearsed, Listened/Empathized, Promoted Peer Support and Provided Feedback    Patient mental status/affect: Anxious, Congruent and Preoccupied    Patient behavior/appearance: Neatly Groomed, Attentive, Cooperative and Needed Prompting    Special patient treatment accommodations provided (describe): none    Patient response and progress towards goals: Focus of session was based on a ALISE talk by Hebert Egan titled 3 reasons you arent doing what you say you will.   We listened to the talk and spoke about the reasons described as to why we know exactly what we want and need to do to be a healthy person but we just dont do it. We spoke about the mindset that she spoke about called defensive failure. We discussed the concrete reasons we dont do what we want/need to do and what our brain tells us as well as the mindset blocks that speaker developed. We spoke about what group members can do to challenge themselves to get started on their goals and how to challenge how they get in their own way. Justin Loya participated in group with prompting. She spoke about how she knows that she wants to be a person who does not allow \"my emotions to drive me. \"  She spoke about how she continues to learn a great deal from talking things out and getting the support that she needs.   She continues to work through her feelings and she is talking about what her mind is telling her and learning how to change those thought processes.

## 2021-04-14 ENCOUNTER — HOSPITAL ENCOUNTER (OUTPATIENT)
Dept: BEHAVIORAL/MENTAL HEALTH | Age: 86
Discharge: HOME | End: 2021-04-14
Payer: MEDICARE

## 2021-04-14 PROCEDURE — 90853 GROUP PSYCHOTHERAPY: CPT

## 2021-04-14 NOTE — BH NOTES
This note will not be viewable in [facility] for the following reason(s). This is a Psychotherapy Note. ([address] [facility] [address] Providers Only) [facility] Out[facility]  Group Therapy  Date of service: 4/14/2021    Start time: 1100  Stop time: 1150    Type of session: Therapy Group    Problem number: 1Dep. F33.0    Short term goal (STG): O Pt will express understanding of the need to take all medications as prescribed and explore reasons why she has not done this in the past.      Intervention/techniques: Validated/Supported, Reinforced and Provided Feedback    Patient mental status/affect: Calm, Congruent and Happy    Patient behavior/appearance: Neatly Groomed, Attentive, Cooperative, Motivated and Enthusiastic    Special patient treatment accommodations provided (describe): none    Patient response and progress towards goals: Focus of session was on the article \"The Emotion Chart My Therapist Gave Me That I Didn't Know I Needed. \"  We spoke about how we often are not willing to identify and process emotions as they come up and we tend to suppress or avoid them. We discussed how this then leads to overreacting later to little things that occur and we are then overly emotional. We spoke about how the Wheel of Emotions can then lead to identifying broader emotions and then can help us specify the underlying emotions. Pt actively participated with the  Wheel of Emotions activity. She discussed her relationship with her  and the way in which she dealt with his drinking and staying out after work. She talked about her emotions and what strategies she tried to deal with all the hurt she was feeling. She stated it  wasn't until she gave him a \"taste of his own medicine\" that  he finally started coming home after work. .  The group discussed healthy relationships and the need for respect of self.

## 2021-04-14 NOTE — BH NOTES
This note will not be viewable in [employer] for the following reason(s). This is a Psychotherapy Note. 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  Pt will identify one adaptive strategy (what she can change in the environment) to help support her memory issues and report to group consistent use of the strategy chosen.      Intervention/techniques: Informed, Validated/Supported, Modeled/Rehearsed, Prompted/Cued, Promoted Peer Support and Provided Feedback    Patient mental status/affect: Anxious, Congruent and Preoccupied    Patient behavior/appearance: Neatly Groomed, Attentive, Cooperative and Needed Prompting    Special patient treatment accommodations provided (describe): none    Patient response and progress towards goals: Focus of session was on the article [address] [place] [address] Drive for the [employer] and [employer].   We spoke about the skills and tips which include stopping overthinking, accept what is and stop pushing, be still, stop comparing self to others, create joyful rituals, take actions that scare you, cherish friends, know your strengths and weaknesses, eat greens, reduce/eliminate animal products, exercise, dont let others walk over you, forgive yourself and forgive others, stay away from negative people, dont be a negative person, allow your feelings, breathe deep, embrace imperfections, watch less TV, unplug, and be of service to others. I asked group members to share their skill or tip they are willing to try starting today and how they think it will improve their mental health. Kelly Rona participated in group with prompting. She spoke about how she knows that she needs to find ways to cope and manage.  She was hyperverbal during this group and she spoke about how she knows that she needs to find ways to relax and unwind and \"I know I get easily worked up. \"  She was interested in group topic and shared a lot of her thoughts on what she believes can work for her.

## 2021-04-14 NOTE — BH NOTES
This note will not be viewable in [employer] for the following reason(s). This is a Psychotherapy Note. 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  Pt will express hope for her future with the limitations and strengths that she currently has.       Intervention/techniques: Informed, Validated/Supported, Modeled/Rehearsed, Prompted/Cued, Promoted Peer Support and Provided Feedback    Patient mental status/affect: Anxious, Congruent and Preoccupied    Patient behavior/appearance: Neatly Groomed, Attentive, Cooperative and Needed Prompting    Special patient treatment accommodations provided (describe): none    Patient response and progress towards goals: Focus of session was the topic of understanding our values and if we are living our lives according to those top values. We spoke about all the values that exist such as independence, fairness, honesty, fitness, emotional strength, forgiveness, acceptance, gratitude, humility, and so forth. Group members were asked to identify what their top values may be currently and analyzing what they are doing that is helping themselves live life according to what they value the most.  We discussed what changes can be made to how we act, how we will treat others, and how we will treat ourselves differently as a result of this analysis. Lauren Fong participated in group with prompting. She spoke about how she is relieved that she got her 2nd shot for Covid today. She spoke about how she knows that she wants to maintain her health the best that she can. She spoke about how she wants to focus on being able to be grateful and appreciative of what she has in her life that is positive.

## 2021-04-19 ENCOUNTER — HOSPITAL ENCOUNTER (OUTPATIENT)
Dept: BEHAVIORAL/MENTAL HEALTH | Age: 86
Discharge: HOME | End: 2021-04-19
Payer: MEDICARE

## 2021-04-19 PROCEDURE — 90853 GROUP PSYCHOTHERAPY: CPT

## 2021-04-19 NOTE — BH NOTES
This note will not be viewable in [employer] for the following reason(s). This is a Psychotherapy Note. 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  Pt will share with group one thought per group, without prompting, that she is unsure about its effect on her or if it is logical and ask for feedback    Intervention/techniques: Informed, Validated/Supported, Challenged, Reframed, Observed/Monitored, Queired/Probed and Provided Feedback    Patient mental status/affect: Anxious, Calm, Manic and Preoccupied    Patient behavior/appearance: Neatly Groomed, Attentive, Cooperative and Needed Prompting    Special patient treatment accommodations provided (describe): none    Patient response and progress towards goals: Focus of session was based on an article from [employer] on 5 skills for conflict resolution. We reviewed the five skills which are: Avoiding, Competing, Accomodating, Collaborating and Compromising. We spoke about how these all have pros and cons and we spoke about group members particular skills that they use and how they work, and what they think they may need to change. We spoke about current conflicts that they are experiencing and how they can work to solve the compromise. Roseanne Gautam participated in group with prompting. She spoke about how she has been able to work on letting go of her past issues with asserting herself. She spoke about how she always would take things personally and she has realized how unhelpful that has been for her. She spoke about who she can benefit from asserting herself right now.

## 2021-04-19 NOTE — BH NOTES
This note will not be viewable in [employer] for the following reason(s). This is a Psychotherapy Note. 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  Pt will identify one adaptive strategy (what she can change in the environment) to help support her memory issues and report to group consistent use of the strategy chosen.     Intervention/techniques: Informed, Validated/Supported, Modeled/Rehearsed, Prompted/Cued, Promoted Peer Support and Provided Feedback    Patient mental status/affect: Anxious, Congruent and Preoccupied    Patient behavior/appearance: Neatly Groomed, Attentive and Cooperative    Special patient treatment accommodations provided (describe): none    Patient response and progress towards goals: Focus of session was a review of the weekend and successes that can be identified in recognizing and managing triggers to negative feeling states. We spoke about what was done in managing triggers that led to success and what was done or not done that led to struggles in maintaining and getting through difficult moments. We also spoke about goals to work towards this week and what is reasonable to accomplish by the end of the week. Rafa Cazares participated well in group with prompting. She spoke about how she has been struggling with her mood for so long but \"I feel good today because I think that my body feels good. \"  She spoke about how she has been spending a lot of time out in the yard and this is very therapeutic for her.

## 2021-04-19 NOTE — BH NOTES
This note will not be viewable in [employer] for the following reason(s). This is a Psychotherapy Note. ([address], [facility] [address] Providers Only) [facility] Outpatient Program  Group Therapy  Date of service: 4/19/2021    Start time: 1100  Stop time: 1150    Type of session: Therapy Group    Problem number: 1Dep. F33.0    Short term goal (STG):O Pt will express understanding of the need to take all medications as prescribed and explore reasons why she has not done this in the past.      Intervention/techniques: Validated/Supported, Listened/Empathized, Reinforced and Provided Feedback    Patient mental status/affect: Calm, Congruent and Happy    Patient behavior/appearance: Neatly Groomed, Attentive, Cooperative, Motivated and Caretaking    Special patient treatment accommodations provided (describe): none    Patient response and progress towards goals: Focus of session was focused on developing understanding of the cognitive distortion of catastrophizing. We spoke about how this is the irrational thought that we believe that something is far worse than it actually is. I shared how we tend to make a catastrophe out of a current situation or we can do it to future oriented events that may or may not even happen. We identified what this pattern of thinking leads us to both emotionally and behaviorally. Group members were asked to share recent thoughts that they can label as catastrophic thinking and we discussed challenges to those thoughts   Pt stated that she was feeling 'happy\" today. She stated she was not feeling the best yesterday. We acknowledged that Sundays seem to be days when she is feeling more depressed/anxious. The group discussed what they do when they feel overwhelmed. Pt noted the importance of taking \"your medicine and eating right\" as ways to prevent getting anxious and overwhelmed.  By doing this you are able to look at situations for what they really are and better work through them

## 2021-04-21 ENCOUNTER — HOSPITAL ENCOUNTER (OUTPATIENT)
Dept: BEHAVIORAL/MENTAL HEALTH | Age: 86
Discharge: HOME | End: 2021-04-21
Payer: MEDICARE

## 2021-04-21 PROCEDURE — 90853 GROUP PSYCHOTHERAPY: CPT

## 2021-04-21 NOTE — BH NOTES
This note will not be viewable in [employer] for the following reason(s). This is a Psychotherapy Note. 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  Pt will identify an area (fun, family, learning, spirituality, goals, health) each week in which she made an effort to be mindful and feel connected in some way.       Intervention/techniques: Informed, Validated/Supported, Prompted/Cued, Listened/Empathized, Promoted Peer Support and Provided Feedback    Patient mental status/affect: Anxious, Congruent and Preoccupied    Patient behavior/appearance: Attentive, Cooperative and Needed Prompting    Special patient treatment accommodations provided (describe): none    Patient response and progress towards goals: Focus of session was on the idea of living simply. Group members addressed what may be weighing them down emotionally, physically, or materially and how it may have gotten to that point. We spoke about how living simply focuses on making choices to spend their emotional energy in ways that will get them somewhere positive and not give energy to negative people, sources, or irresolvable issues. We spoke about how living simply is also about speaking succinctly; saying only what is important and less is more as well as choosing activities carefully. Group members shared what it is that they have successfully given up in their lives that have lightened their emotional, physical, or spiritual self. Makenzie Amada participated in group with prompting and she was focused on a lot of things and she spoke about her concern about some family members.   She showed some signs of how she can get easily overwhelmed with other people and their issues and she spoke about how she knows that she can minimize her stress by reducing her focus on unecessary things.

## 2021-04-21 NOTE — BH NOTES
This note will not be viewable in [email or web address] for the following reason(s). This is a Psychotherapy Note. 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       Intervention/techniques: Informed, Validated/Supported, Modeled/Rehearsed, Prompted/Cued, Facilitated Disclosure and Provided Feedback    Patient mental status/affect: Anxious, Congruent and Preoccupied    Patient behavior/appearance: Neatly Groomed, Attentive, Cooperative and Needed Prompting    Special patient treatment accommodations provided (describe): none    Patient response and progress towards goals: Focus of session was based on article by Nanda Pedroza MA about leaving the comfort zone and trying to challenge irrational thoughts and beliefs through the [facility] of Rational Emotive Behavior Therapy. We went over what the ABCs are and I shared the concept that the therapy program focuses on challenging beliefs as this is viewed as what causes the most problems. We spoke about why people get stuck in comfort zones or unhealthy ruts. I went over that there are four zones of the comfort zone which are: the comfort zone, the fear zone, the learning zone, and the growth zone. We spoke about what beliefs keep group members stuck in unhealthy ruts and the challenges that they can put to those beliefs. We worked to establish a goal for working ourselves out of a comfort zone. Richard Ibarra participated in group with prompting. She spoke about how she knows that she has been more open to change lately and she wants to focus on trying to help others.   She spoke about what she is considering for her future once she is done with program here and she \"wants to be able to help others. \" We spoke about some options for what she can do as she does not have a car anymore to be able to go visit friends and classmates at nursing homes. She was open to ideas and strategies she can engage in.

## 2021-04-21 NOTE — BH NOTES
This note will not be viewable in [employer] for the following reason(s). This is a Psychotherapy Note. ([address], [facility] [address] Providers Only) [facility] Outpatient Program  Group Therapy  Date of service: 4/21/2021    Start time: 1100  Stop time: 1150    Type of session: Therapy Group    Problem number: 1Dep. F33.0    Short term goal (STG): H.  Pt will share one emotion she has been feeling over the past week, other than sadness or loneliness, and work to identify a specific trigger and well as a positive coping strategy for that emotional state.      Intervention/techniques: Informed, Guided, Reinforced and Provided Feedback    Patient mental status/affect: Calm and Congruent    Patient behavior/appearance: Neatly Groomed, Attentive, Cooperative, Motivated and Enthusiastic    Special patient treatment accommodations provided (describe): none    Patient response and progress towards goals: Focus of session was based on information from [address] by Dr. Carin Duane and we focused on the Willingness and Action Plan. I shared with group members the importance to focus on goals that we want to change and the values that drive our goals. I shared the need to know the action steps to accomplish the goals that they want and to be aware of the thoughts, feelings, memories, sensations, and urges that can come up when focusing on achieving this goal.  We spoke about goal habits such as breaking them down into small steps and then taking one step at a time. We spoke about the most important goal group members have for themselves right now. Pt actively participated in group and engaged with the other group members. Pt chose closet organization as her goal/action plan. She discussed how she has wanted to organize this one closet and begins to do it and then gets overwhelmed and quits. We discussed the strategies listed on the group worksheet.  She stated she wants to do it herself because she wants to be in control of what get thrown out.   She reflected on her poor childhood and stated she has a hard time giving things up as she may \"need them one day\"

## 2021-04-26 ENCOUNTER — HOSPITAL ENCOUNTER (OUTPATIENT)
Dept: BEHAVIORAL/MENTAL HEALTH | Age: 86
Discharge: HOME | End: 2021-04-26
Payer: MEDICARE

## 2021-04-26 PROCEDURE — 90853 GROUP PSYCHOTHERAPY: CPT

## 2021-04-26 NOTE — BH NOTES
This note will not be viewable in [employer] for the following reason(s). This is a Psychotherapy Note. 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  Pt will identify and rehearse the management of future situations or circumstances in which lapses could occur and get help of group members to increase confidence of this management.       Intervention/techniques: Informed, Validated/Supported, Prompted/Cued, Listened/Empathized, Promoted Peer Support and Provided Feedback    Patient mental status/affect: Anxious, Congruent and Preoccupied    Patient behavior/appearance: Neatly Groomed, Attentive, Cooperative and Needed Prompting    Special patient treatment accommodations provided (describe): none    Patient response and progress towards goals: Focus of session was based on article 9 strategies to Manage a Personal Setback.   I shared how it can be helpful to view setbacks or slips to promote growth rather than giving up on goals. I shared the strategies which included: realize its normal, dont deny it, dont beat yourself up, accept responsibility, call for help, know it wont last forever, analyze what happened, learn from it, and remember that a slip is not a fall. We spoke about what goals group members believe they have had setbacks and want to get back on track. Angel Miller participated in group with prompting. She spoke about how she has had setbacks emotionally and spiritually as she has gotten out of going to [employer] ever since the pandemic. She spoke about how and what she tells herself to talk herself out of going. We spoke about strategies that can help her now and push her to do what she needs to do and wants to do about it.

## 2021-04-26 NOTE — BH NOTES
This note will not be viewable in [employer] for the following reason(s). This is a Psychotherapy Note. ([address], [facility] [address] Providers Only) [facility] Outpatient Program  Group Therapy  Date of service: 4/26/2021    Start time: 1100  Stop time: 1150    Type of session: Therapy Group    Problem number: 1 Dep F33.0    Short term goal (STG): N Pt will identify one adaptive strategy (what she can change in the environment) to help support her memory issues and report to group consistent use of the strategy chosen.       Intervention/techniques: Validated/Supported, Listened/Empathized, Reinforced and Provided Feedback    Patient mental status/affect: Calm, Congruent and Happy    Patient behavior/appearance: Neatly Groomed, Attentive, Cooperative, Motivated and Enthusiastic    Special patient treatment accommodations provided (describe): none    Patient response and progress towards goals: Focus of session was based on the quote of Respect yourself enough to walk away from anything that no longer serves you, grows you, or makes you happy as well as the quote of You will find it necessary to let things go; simply for the reason that they are heavy.   Group members were asked to share what they need to, or who they need to walk away from for their own recovery. We spoke about the struggles of letting go and how the thoughts or emotions tend to come back. I spoke with group members that [place] go does not mean we wont have to work on letting it go over and over and that it is okay to pop back in our minds and hearts. I asked group members what or who they need to walk away from starting today. Pt stated she was \"happy\" today and things are going well. She participated in the activity and engaged with other members. Pt stated she is \"learning\" how to let things go as she gets older. This is related to her relationships and her own independence as she gets older.

## 2021-04-26 NOTE — BH NOTES
This note will not be viewable in [employer] for the following reason(s). This is a Psychotherapy Note. 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  Pt will articulate and or put in writing a plan of action for coping with uncomfortable feelings that get unbalanced for her to help gain confidence for discharge planning.       Intervention/techniques: Informed, Validated/Supported, Reframed, Modeled/Rehearsed, Listened/Empathized, Facilitated Disclosure and Provided Feedback    Patient mental status/affect: Calm, Congruent and Preoccupied    Patient behavior/appearance: Neatly Groomed, Attentive, Cooperative and Needed Prompting    Special patient treatment accommodations provided (describe): none    Patient response and progress towards goals: Focus of session was a review of the weekend and successes that can be identified in recognizing and managing triggers to negative feeling states. We spoke about what was done in managing triggers that led to success and what was done or not done that led to struggles in maintaining and getting through difficult moments. We also spoke about goals to work towards this week and what is reasonable to accomplish by the end of the week. Willow Montes participated well in group with prompting. She spoke about how she knows that she has felt better about being home alone and she believes she has become less dependent on her kids. She spoke about how someone reached out to her this weekend and picked her up and took her for a ride. She spoke about how that felt good and she was happy that she has been able to see her building more connections. She is developing her relapse prevention plan and we encourage her to write it down.

## 2021-04-28 ENCOUNTER — HOSPITAL ENCOUNTER (OUTPATIENT)
Dept: BEHAVIORAL/MENTAL HEALTH | Age: 86
Discharge: HOME | End: 2021-04-28
Payer: MEDICARE

## 2021-04-28 PROCEDURE — 90853 GROUP PSYCHOTHERAPY: CPT

## 2021-04-28 NOTE — BH NOTES
This note will not be viewable in [employer] for the following reason(s). This is a Psychotherapy Note. ([address], [facility] [address] Providers Only) [facility] Outpatient Program  Group Therapy  Date of service: 4/28/2021    Start time: 1100  Stop time: 1150    Type of session: Therapy Group    Problem number: 1Dep. F33.0    Short term goal (STG): P Pt will share with group one thought per group, without prompting, that she is unsure about its effect on her or if it is logical and ask for feedback.         Intervention/techniques: Informed, Validated/Supported, Reinforced and Provided Feedback    Patient mental status/affect: Calm and Congruent    Patient behavior/appearance: Neatly Groomed, Attentive, Cooperative and Motivated    Special patient treatment accommodations provided (describe): none    Patient response and progress towards goals: Focus of session was based on the concept of assertiveness and group members took a quiz titled Am I Assertive?   We spoke about ideas about how to develop assertiveness and what are the appropriate times to be assertive. We spoke about what it is and what it isnt. We spoke about the benefits of being assertive and how we can be more assertive and how it can contribute and pay off in our day to day life. Pt was cooperative and attentive in group. We discussed characteristics of being passive, assertive and aggressive. Pt stated that she feels at this time in her life she is more assertive than her younger self. She feels she has also learned how to get her point across in a calm manner.

## 2021-04-28 NOTE — BH NOTES
This note will not be viewable in [facility] for the following reason(s). This is a Psychotherapy Note. 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 Pt will express understanding of the need to take all medications as prescribed and explore reasons why she has not done this in the past.      Intervention/techniques: Informed, Validated/Supported, Modeled/Rehearsed, Prompted/Cued and Promoted Peer Support    Patient mental status/affect: Anxious, Congruent and Preoccupied    Patient behavior/appearance: Neatly Groomed, Attentive, Cooperative and Needed Prompting    Special patient treatment accommodations provided (describe): none    Patient response and progress towards goals: Focus of session was on identifying current problem solving approaches and what works and what does not. I shared with group the analogy of how we choose to get into a swimming pool; do we dive, which means taking on the challenge head on with no hesitation, do we slide, which means we quickly enter a situation but feel little control, or do we step, which means we proceed slowly and cautiously. I expressed to group the importance of realizing what past experiences and choices had positive consequences and how did we react and respond before we made those choices. We spoke about using these memories to lead us to make positive and effective decisions in the present and future. Marlen Cabrera participated in group with prompting. She spoke about how she knows that she benefits from getting help from her family with certain issues that arise. She spoke about how she no longer has an issue with asking for help. She is starting to experience some issues with her memory but she is aware of that. worsening

## 2021-04-28 NOTE — BH NOTES
This note will not be viewable in [employer] for the following reason(s). This is a Psychotherapy Note. 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 Pt will develop written continuing aftercare plan with focus on coping with family & other stressors once discharged and not receiving support and direction of group.       Intervention/techniques: Informed, Validated/Supported, Reframed, Listened/Empathized, Observed/Monitored, Promoted Peer Support and Provided Feedback    Patient mental status/affect: Anxious, Congruent and Preoccupied    Patient behavior/appearance: Neatly Groomed, Attentive, Cooperative and Needed Prompting    Special patient treatment accommodations provided (describe): none    Patient response and progress towards goals: Focus of session was on article by Dr Beka Taveras titled Covid Transition Anxiety.   I shared information from the article that specifically talks about how there will be struggles transitioning back to normal behaviors after being limited for over a year. I shared that this can apply as well to similar patterns of avoidance and isolation due to anxiety and self esteem issues and can be very helpful in breaking out of old patterns. We spoke about what specific issues or patterns group members want to break out of that is creating fear and anxiety and how these strategies can help. Otilio Carver participated in group with prompting. She spoke about how she knows that she has been more anxious about things overall and she recognizes that she is struggling with thoughts and feelings as she starts to do new things for herself. She is aware of the need to stay focused on what she has control over and not get emotional about because there is nothing that can be done.

## 2021-05-03 ENCOUNTER — HOSPITAL ENCOUNTER (OUTPATIENT)
Dept: BEHAVIORAL/MENTAL HEALTH | Age: 86
Discharge: HOME | End: 2021-05-03
Payer: MEDICARE

## 2021-05-03 PROCEDURE — 90853 GROUP PSYCHOTHERAPY: CPT

## 2021-05-03 NOTE — BH NOTES
This note will not be viewable in [employer] for the following reason(s). This is a Psychotherapy Note. 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 Pt will develop written continuing aftercare plan with focus on coping with family & other stressors once discharged and not receiving support and direction of group    Intervention/techniques: Informed, Validated/Supported, Modeled/Rehearsed, Prompted/Cued and Provided Feedback    Patient mental status/affect: Anxious, Congruent, Preoccupied and Worried    Patient behavior/appearance: Neatly Groomed, Attentive, Cooperative, Needed Prompting and Needy    Special patient treatment accommodations provided (describe): none    Patient response and progress towards goals: Focus of session was on the qualities and assets that we can possess in order to have a successful recovery. We focused this session on the importance of having a secure base which means that we have our basic needs met and that we have access to and utilize help when it is necessary. We spoke about how we also have to have a sense of self and positive self esteem and we spoke about specific ways in which we have grown in self esteem through the course of this treatment. We spoke about the need and importance of supportive relationships and that we benefit greatly from having others believe in us and that we have the potential to recover. We also spoke about the importance of feeling empowered to make good decisions for ourselves and can work through problems when they arise. Makenzie Amada participated in group with prompting. She spoke about how she knows that she has felt her self confidence start to rise again after so many years of it being down.   She is motivated to keep making progress on the need to find outlets for ongoing connections after she is done with group.

## 2021-05-03 NOTE — BH NOTES
This note will not be viewable in [employer] for the following reason(s). This is a Psychotherapy Note. ([address] [facility] [address] Providers Only)   [facility] Outpatient Program  Group Therapy  Date of service: 5/3/2021  Start time: 1000  Stop time: 1050  Type of session: Therapy Group  Problem number: 1. 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     Intervention/techniques: Informed, Validated/Supported, Reframed, Modeled/Rehearsed, Listened/Empathized, Observed/Monitored, Facilitated Disclosure and Provided Feedback    Patient mental status/affect: Anxious, Congruent and Preoccupied    Patient behavior/appearance: Neatly Groomed, Attentive, Cooperative and Needed Prompting    Special patient treatment accommodations provided (describe): none    Patient response and progress towards goals: Focus of session was on information from [email or web address] about the 6 different types of self care. I shared with group the types which include physical, emotional, mental, spiritual, social, and practical.  We spoke about what happens when we dont take care of ourselves in some, or all, of these ways and how impacts our health, well-being, and functioning. I asked group members to look at these areas and identify where they are doing great and which areas need some improvement. We spoke about the many different ways in which we can improve and practice self care skills. Blanca Sosago participated in group with prompting. She spoke about how she knows that she has to continue to be aware of the need to be balanced in her self care and she can tell when \"I am not paying attention to one of these areas. \"  She spoke about how it has helped her to come out and spend time with her daughters on the weekend.   She is aware of how she can continue to work on finding outlets for help.

## 2021-05-03 NOTE — BH NOTES
This note will not be viewable in [employer] for the following reason(s). This is a Psychotherapy Note. ([address] [facility] [address] Providers Only) [facility] Outpatient Program  Group Therapy  Date of service: 5/3/2021    Start time: 1100  Stop time: 1150    Type of session: Therapy Group    Problem number: 1Dep. F33.0    Short term goal (STG): N.  Pt will identify one adaptive strategy (what she can change in the environment) to help support her memory issues and report to group consistent use of the strategy chosen. Intervention/techniques: Validated/Supported, Listened/Empathized, Reinforced and Provided Feedback    Patient mental status/affect: Calm, Congruent and Happy    Patient behavior/appearance: Neatly Groomed, Attentive, Cooperative and Motivated    Special patient treatment accommodations provided (describe): none    Patient response and progress towards goals: Focus of session was a general overview of triggers, symptoms, and thoughts associated with anxiety. Group members spoke about recent experiences with anxiety and how they coped and reacted. I shared with group members positive coping thoughts to use in moments of anxiety. We spoke about the coping thoughts that include; I can handle this, my feelings wont kill me, my thoughts dont control my life, I do, I am strong enough to handle what is happening to me, I have survived other painful experiences, I can survive this one, and this is an opportunity for me to cope with my fears. We spoke about which coping strategies can be used to help group members at this time to help reduce anxiety and let the feelings and symptoms pass. Pt was attentive and engaged with the other group members  Pt stated that when she starts to feel anxious she tries to find ways to take care of herself. One of her favorite is to work in her flower garden. She is grateful that she is physically and mentally healthy enough to do this.   She also will meditate and pray.  She feels these things help her calm down and get her mind straight to deal with whatever the circumstances are that are bringing her anxious feelings,

## 2021-05-05 ENCOUNTER — HOSPITAL ENCOUNTER (OUTPATIENT)
Dept: BEHAVIORAL/MENTAL HEALTH | Age: 86
Discharge: HOME | End: 2021-05-05
Payer: MEDICARE

## 2021-05-05 PROCEDURE — 90853 GROUP PSYCHOTHERAPY: CPT

## 2021-05-05 NOTE — BH NOTES
This note will not be viewable in [employer] for the following reason(s). This is a Psychotherapy Note. ([address] [facility] [address] Providers Only)    [facility] Outpatient Program  Group Therapy  Date of service: 5/5/2021  Start time: 1000  Stop time: 1050  Type of session: Therapy Group    Problem number: 1. 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  Pt will express understanding of the need to take all medications as prescribed and explore reasons why she has not done this in the past.      Intervention/techniques: Informed, Validated/Supported, Reframed, Modeled/Rehearsed, Observed/Monitored, Promoted Peer Support, Facilitated Disclosure and Provided Feedback    Patient mental status/affect: Anxious, Congruent and Preoccupied    Patient behavior/appearance: Neatly Groomed, Attentive, Cooperative and Needed Prompting    Special patient treatment accommodations provided (describe): none    Patient response and progress towards goals: Focus of session was on some possible defusing techniques for when we are being overtaken by negative thoughts and feelings (adapted from [employer].)  We spoke about the most effective way to defuse which is to stop, step back, observe) and notice the thoughts we are having and the feelings we are experiencing. We spoke about labeling and identifying our emotions and then label our thinking patterns and how they are affecting us and affecting what we are feeling. We then spoke about mindfulness and being aware of our sensations. We also spoke about useful metaphors and visualizations that can help us get through difficult times and experiences such as picturing ourselves as a mountain and that whatever the weather or whatever is happening on the surface, we can stay strong and steady and permanent and be still inside and have peace. Isabelle Flor participated in group with prompting.   She spoke about how she knows that she has to stay focused on the here and now and she is trying to stay focused on being mindful. She spoke about how she knows that she has to work through the emotions that can come up for her when she is dealing with family members who she has a lot of concern about. She is making good progress towards goal and her mood appears to remain stable at this time.

## 2021-05-05 NOTE — BH NOTES
This note will not be viewable in [employer] for the following reason(s). This is a Psychotherapy Note. ([address] [facility] [address] Providers Only) [facility] Structured Outpatient Program  Group Therapy  Date of service: 5/5/2021    Start time: 1100  Stop time: 1150    Type of session: Therapy Group    Problem number: 1Dep. F33.0    Short term goal (STG): P Pt will share with group one thought per group, without prompting, that she is unsure about its effect on her or if it is logical and ask for feedback.     Intervention/techniques: Informed, Validated/Supported, Listened/Empathized, Reinforced and Provided Feedback    Patient mental status/affect: Calm and Congruent    Patient behavior/appearance: Neatly Groomed, Cooperative and Motivated    Special patient treatment accommodations provided (describe): none    Patient response and progress towards goals: Focus of session was on understanding the need for a healthy balance in our mood and our day to day functioning. We reviewed the handout on healthy balance from [facility] about being aware of low energy or mood signs and symptoms, well balanced and healthy signs and symptoms, and high energy or mood signs and symptoms. We went over thoughts, feelings (both emotions and physical sensations) and actions and behaviors for each level. We spoke about what group members were not aware of or didnt take note of in the past and how it can help them today maintain a healthy balance. Pt was engaged with other members and with the activity related to finding a healthy balance. Pt feels that she struggles at times to feel emotionally balanced. Living alone continues be to an adjustment for her. She feels good on Mondays when she can come to group but states when the [employer] pulls back in her driveway at home she feels sad and down. We discussed this the balancing scale and what she does to help herself cope.   Prayer, talking with people and gardening are the coping mechanisms she has found most helpful

## 2021-05-05 NOTE — BH NOTES
This note will not be viewable in [employer] for the following reason(s). This is a Psychotherapy Note. ([address] [facility] [address] Providers Only)   [facility] Outpatient Program  Group Therapy  Date of service: 5/5/2021    Start time: 1200  Stop time: 1250  Type of session: Therapy Group    Problem number: 1. 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 Pt will identify and rehearse the management of future situations or circumstances in which lapses could occur and get help of group members to increase confidence of this management    Intervention/techniques: Informed, Validated/Supported, Modeled/Rehearsed, Prompted/Cued and Facilitated Disclosure    Patient mental status/affect: Anxious, Congruent and Preoccupied    Patient behavior/appearance: Neatly Groomed, Attentive, Cooperative and Needed Prompting    Special patient treatment accommodations provided (describe): none    Patient response and progress towards goals: Focus of session was on information created by [employer] about [employer] Silences which focuses on developing silence and peace coming from your mouth, heart, eyes, ears, and mind. We spoke about how these different aspects can be focused on and we can give intention to these areas to be more positive, thoughtful, open, non-judgmental, and understanding. We spoke about how these five areas operate and how we can misuse these to create more anxiety and problems with others. I asked group members in which area can they create more openness and awareness to help decrease negative emotions and create an overall better sense of well being. Lauren Fong participated in group with prompting. She spoke about how she knows that she has to take in the good things and she could see how this can help her appreciate being in the here and now and feel what is going well for her at this time. She is building up her skills to manage her ins and outs of her mood.

## 2021-05-07 ENCOUNTER — OFFICE VISIT (OUTPATIENT)
Dept: FAMILY MEDICINE CLINIC | Age: 86
End: 2021-05-07
Payer: MEDICARE

## 2021-05-07 VITALS
OXYGEN SATURATION: 98 % | DIASTOLIC BLOOD PRESSURE: 78 MMHG | TEMPERATURE: 97.2 F | HEART RATE: 65 BPM | SYSTOLIC BLOOD PRESSURE: 140 MMHG | WEIGHT: 165 LBS | BODY MASS INDEX: 26.52 KG/M2 | HEIGHT: 66 IN | RESPIRATION RATE: 18 BRPM

## 2021-05-07 DIAGNOSIS — I10 ESSENTIAL HYPERTENSION: Primary | ICD-10-CM

## 2021-05-07 DIAGNOSIS — N18.31 TYPE 2 DIABETES MELLITUS WITH STAGE 3A CHRONIC KIDNEY DISEASE, WITHOUT LONG-TERM CURRENT USE OF INSULIN (HCC): ICD-10-CM

## 2021-05-07 DIAGNOSIS — J30.89 ENVIRONMENTAL AND SEASONAL ALLERGIES: ICD-10-CM

## 2021-05-07 DIAGNOSIS — R42 VERTIGO: ICD-10-CM

## 2021-05-07 DIAGNOSIS — E11.22 TYPE 2 DIABETES MELLITUS WITH STAGE 3A CHRONIC KIDNEY DISEASE, WITHOUT LONG-TERM CURRENT USE OF INSULIN (HCC): ICD-10-CM

## 2021-05-07 DIAGNOSIS — Z86.73 HISTORY OF CVA (CEREBROVASCULAR ACCIDENT): ICD-10-CM

## 2021-05-07 DIAGNOSIS — F33.1 MODERATE EPISODE OF RECURRENT MAJOR DEPRESSIVE DISORDER (HCC): ICD-10-CM

## 2021-05-07 DIAGNOSIS — M15.9 PRIMARY OSTEOARTHRITIS INVOLVING MULTIPLE JOINTS: ICD-10-CM

## 2021-05-07 LAB — GLUCOSE POC: 87 MG/DL

## 2021-05-07 PROCEDURE — G8754 DIAS BP LESS 90: HCPCS | Performed by: NURSE PRACTITIONER

## 2021-05-07 PROCEDURE — G9717 DOC PT DX DEP/BP F/U NT REQ: HCPCS | Performed by: NURSE PRACTITIONER

## 2021-05-07 PROCEDURE — 1090F PRES/ABSN URINE INCON ASSESS: CPT | Performed by: NURSE PRACTITIONER

## 2021-05-07 PROCEDURE — 1101F PT FALLS ASSESS-DOCD LE1/YR: CPT | Performed by: NURSE PRACTITIONER

## 2021-05-07 PROCEDURE — G8400 PT W/DXA NO RESULTS DOC: HCPCS | Performed by: NURSE PRACTITIONER

## 2021-05-07 PROCEDURE — 99214 OFFICE O/P EST MOD 30 MIN: CPT | Performed by: NURSE PRACTITIONER

## 2021-05-07 PROCEDURE — G8536 NO DOC ELDER MAL SCRN: HCPCS | Performed by: NURSE PRACTITIONER

## 2021-05-07 PROCEDURE — G8419 CALC BMI OUT NRM PARAM NOF/U: HCPCS | Performed by: NURSE PRACTITIONER

## 2021-05-07 PROCEDURE — G8427 DOCREV CUR MEDS BY ELIG CLIN: HCPCS | Performed by: NURSE PRACTITIONER

## 2021-05-07 PROCEDURE — G8753 SYS BP > OR = 140: HCPCS | Performed by: NURSE PRACTITIONER

## 2021-05-07 PROCEDURE — 82962 GLUCOSE BLOOD TEST: CPT | Performed by: NURSE PRACTITIONER

## 2021-05-07 NOTE — PROGRESS NOTES
Subjective:     CC: HTN and diabetes    Spenser Bender is a 82 y.o. female who presents today to follow up for HTN and diabetes. Her daughter John Eid manages her medications (fills her pill box) because patient gets confused about what she is supposed to be taking. John Eid is not here today and pt has no med bottles with her. HTN  BP was originally high today at 157/81 but then came down to 140/78 when rechecked manually. She is supposed to be taking Lisinopril 20mg BID and Metoprolol XL 25mg daily. She is asymptomatic. Type 2 diabetes  Lab Results   Component Value Date/Time    Hemoglobin A1c 6.3 (H) 01/21/2021 03:07 PM     On Januvia 100mg daily. Does not check BS at home. I had sent a script to [facility] at her last appt with me in January for a glucometer, test strips, and lancets but they were never picked up. She states she will check on this and pick them up today. Stage 3 CKD  Lab Results   Component Value Date/Time    Creatinine 1.18 (H) 03/22/2021 06:33 AM     Avoids NSAIDS. She is on an ACEi. She has an old CVA that was found on CT scan so takes Lipitor 20mg daily to prevent recurrence. Seasonal Allergies   She is supposed to be taking Xyzol and Singulair. States symptoms have worsened since the pollen came this spring but she has not been using her nasal spray like she should. Vertigo  She has gone to the ER 3 times in the past 3 months for vertigo. During her visit on 2/1/2021 she also c/o substernal chest pain and palpitations. Cardiac enzymes were negative and all other labs were normal.  She followed up with Dr Jenni Steele the next day and he increased her lisinopril to 20 mg twice daily. He ordered a Lexiscan MPI which was normal. Echo 7/1/2020 with LVEF 50-60%. Carotid Doppler 7/2020 with mild bilateral ICA plaque. 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 She followed up w/ NP Safia Cantrell a few days later and again had dizziness.  She reported it resolves when she focuses her eyes on an object. She also reported ears fullness and intermittent bilateral tinnitus. She has chronic sinus congestion and takes Xyzal daily. She has seen ENT in the past and did not want to go back. Todays she states she is no longer having chest pain and has not had any more episodes of dizziness. She was prescribed Meclizine to take prn. Depression  She is followed by Dr Raymond Rivero. She has occas auditory and visual hallucinations. She is supposed to be taking Effexor XR 150mg daily. States she is enjoying going to therapy twice a week.     OA, hands  Takes Tylenol prn with good relief    Patient Active Problem List   Diagnosis Code    Environmental allergies Z91.09    Elevated cholesterol E78.00    Essential hypertension I5    Well controlled type 2 diabetes mellitus ([facility] E11.9    Type 2 diabetes mellitus with stage 3 chronic kidney disease, without long-term current use of insulin ([facility]) E11.22, N18.30    Major depressive disorder, recurrent episode, severe ([address] F33.2    Intractable nausea and vomiting R11.2    Epigastric pain R10.13    Lactic acidemia E87.2    Tobacco use Z72.0    Primary osteoarthritis involving multiple joints M89.49    Stage 3 chronic kidney disease (HCC) N18.30    Age-related cataract of both eyes H25.9    CVA (cerebral vascular accident) ([address] I63.9    Cystitis N30.90    Cigarette smoker F17.210    GIB (gastrointestinal bleeding) K92.2    History of GI bleed Z87.19    History of CVA (cerebrovascular accident) Z86.73    Moderate single current episode of major depressive disorder ([facility] [address] F32.1    Elevated serum creatinine R79.89       Past Medical History:   Diagnosis Date    Allergic rhinitis due to pollen     BPV (benign positional vertigo)     Change in bowel habits     Depression     Diabetes (HCC)     Dysuria     Hemorrhoids     Hypertension     IBS (irritable bowel syndrome)     Menopause     Other diseases of nasal cavity and sinuses(478.19)     Vertigo          Current Outpatient Medications:     Januvia 100 mg tablet, TAKE 1 TABLET BY MOUTH EVERY DAY, Disp: 90 Tab, Rfl: 1    lisinopriL (PRINIVIL, ZESTRIL) 20 mg tablet, TAKE 1 TABLET BY MOUTH EVERY DAY, Disp: 90 Tab, Rfl: 2    scopolamine (TRANSDERM-SCOP) 1 mg over 3 days pt3d, 1 Patch by TransDERmal route every seventy-two (72) hours. , Disp: 10 Patch, Rfl: 0    atorvastatin (LIPITOR) 40 mg tablet, Take 1.5 Tabs by mouth nightly., Disp: 120 Tab, Rfl: 1    aspirin delayed-release 81 mg tablet, Take 1 Tab by mouth daily. , Disp: 90 Tab, Rfl: 1    nitroglycerin (NITROSTAT) 0.4 mg SL tablet, 1 Tab by SubLINGual route every five (5) minutes as needed for Chest Pain. Up to 3 doses. , Disp: 1 Bottle, Rfl: 1    polyethylene glycol (Miralax) 17 gram/dose powder, Take 17 g by mouth daily. 1 tablespoon with 8 oz of water daily, Disp: 235 g, Rfl: 0    simethicone (MYLICON) 80 mg chewable tablet, Take 1 Tab by mouth every six (6) hours as needed for Flatulence. , Disp: 24 Tab, Rfl: 0    meclizine (ANTIVERT) 25 mg tablet, Take  by mouth three (3) times daily as needed for Dizziness. , Disp: , Rfl:     venlafaxine-SR (EFFEXOR-XR) 150 mg capsule, Take 1 Cap by mouth daily (with breakfast). Indications: major depressive disorder, Disp: 90 Cap, Rfl: 1    Blood-Glucose Meter monitoring kit, Check glucose once daily. DX: E11.9, Disp: 1 Kit, Rfl: 0    glucose blood VI test strips (ASCENSIA AUTODISC VI, ONE TOUCH ULTRA TEST VI) strip, Check glucose once daily. DX: E11.9, Disp: 100 Strip, Rfl: 3    lancets misc, Check glucose once daily. DX: E11.9, Disp: 100 Each, Rfl: 3    montelukast (SINGULAIR) 10 mg tablet, Take 1 Tab by mouth daily. , Disp: 90 Tab, Rfl: 0    acetaminophen (TylenoL) 325 mg tablet, Take  by mouth every four (4) hours as needed for Pain., Disp: , Rfl:     levocetirizine (XYZAL) 5 mg tablet, Take 1 pill daily as needed for allergy symptoms, Disp: 90 Tab, Rfl: 2    metoprolol succinate (TOPROL-XL) 25 mg XL tablet, Take 1 Tab by mouth daily. Indications: high blood pressure, Disp: 90 Tab, Rfl: 0    No Known Allergies    Past Surgical History:   Procedure Laterality Date    COLONOSCOPY N/A 7/15/2020    COLONOSCOPY performed by Reinaldo Fernandes [facility] at [facility] ENDOSCOPY    HX CHOLECYSTECTOMY  2016    HX COLONOSCOPY  2013?  HX HYSTERECTOMY      UPPER GI ENDOSCOPY,DIAGNOSIS  7/15/2020            Social History     Tobacco Use   Smoking Status Current Every Day Smoker    Packs/day: 0.25   Smokeless Tobacco Never Used       Social History     Socioeconomic History    Marital status:      Spouse name: Not on file    Number of children: Not on file    Years of education: Not on file    Highest education level: Not on file   Tobacco Use    Smoking status: Current Every Day Smoker     Packs/day: 0.25    Smokeless tobacco: Never Used   Substance and Sexual Activity    Alcohol use: No    Drug use: No       Family History   Problem Relation Age of Onset    No Known Problems Mother        ROS:  Gen: denies fever, chills, or fatigue + 7 pound weight gain in 2 months  HEENT:denies H/A, vision changes, nasal congestion, or sore throat  Resp: denies dyspnea, cough, or wheezing  CV: denies chest pain, pressure, or palpitations  Extremeties: denies edema  Musculoskeletal: +chronic pain in hands due to OA- takes Tylenol  Neuro: denies numbness/tingling, +occasional vertigo. Denies unilateral weakness, facial drooping, or slurred speech   Skin: denies rashes or new lesions   Psych: +depression-stable no anxiety or brad, or other changes in mood      Objective:     Visit Vitals  BP (!) 140/78   Pulse 65   Temp 97.2 °F (36.2 °C) (Oral)   Resp 18   Ht 5' 6\" (1.676 m)   Wt 165 lb (74.8 kg)   SpO2 98%   BMI 26.63 kg/m²       General: Alert and oriented. No acute distress. Well nourished. HEENT    Eyes: Sclera white, conjunctiva clear. PERRLA. Extra ocular movements intact.    Neck: Supple with FROM.  Lungs: Breathing even and unlabored. All lobes clear to auscultation bilaterally   Heart :RRR, S1 and S2 normal intensity, no extra heart sounds  Extremities: Non-edematous  Musculo: Joints of hands swollen   Neuro: Cranial nerves grossly normal.  Psych: Mood and thought content appropriate for situation. Dressed appropriately and with good hygiene. Skin: Warm, dry, and intact. No lesions or discoloration. Assessment/ Plan:     HTN  BP stable  Cont current meds  Low-sodium diet  RTO or go to ER for any CP, SOB, dizziness, or swelling. F/U:  3 months    Depression  Stable  Cont Effexor XR 150mg daily  F/U with psych Dr Gudino Rash as scheduled  F/U here 3 months    Type 2 diabetes  POC glucose checked- 87mg/dL (she is fasting)  She states she will  her glucometer, test strips, and lancets from pharmacy and start checking blood sugar at home  Cont Januvia 100mg daily. Watch the sugar and carbs  F/U 3 months    Stage 3 CKD  Avoids NSAIDS. On ACEi. Need to keep BP and BS under tight control  F/U 3 months    Seasonal Allergies   Cont Singulair and Xyzol daily. Avoid triggers  F/U 3 months    Vertigo  Cont Meclizine prn. Verbal and written instructions (see AVS) provided.  Patient expresses understanding of diagnosis and treatment plan. Health Maintenance Due   Topic Date Due    COVID-19 Vaccine (1) Never done    Shingrix Vaccine Age 50> (1 of 2) Never done    Medicare Yearly Exam  11/11/2020               Hayes Ibrahim.  [place] Board, NP

## 2021-05-07 NOTE — PROGRESS NOTES
1. Have you been to the ER, urgent care clinic since your last visit? Hospitalized since your last visit? Yes - ER Visit - 3/22/21 - Hypertension  & 2/20/21 - Dizziness     2. Have you seen or consulted any other health care providers outside of the [address] [place] [address] since your last visit? Include any pap smears or colon screening. Yes - Dr Lynnette Castro - 5/3/21      Non-Provider Advance Care Planning (ACP) Note    Date of ACP Conversation: 5/7/2021  Persons included in Conversation: patient  Length of ACP Conversation in minutes: <16 minutes (Non-Billable)    Conversation requested by:   Provider    Authorized Decision Maker (if patient is incapable of making informed decisions): This person is:  Next of Kin by law (only applies in absence of a Healthcare Power of  or Legal Guardian)        General ACP for ALL Patients with Decision Making Capacity:    Advance Directive Conversation with Patients who have not yet planned:  Importance of advance care planning, including choosing a healthcare agent to communicate patient's healthcare decisions if patient lost the ability to make decisions, such as after a sudden illness or accident    Review of Existing Advance Directive: (Select questions covered)  \"What information were you given about medical decisions to consider before completing your advance directive? \" Conversation Starter Kit     Interventions Provided:  Provided ACP educational materials:  Conversation Starter Kit

## 2021-05-11 ENCOUNTER — APPOINTMENT (OUTPATIENT)
Dept: BEHAVIORAL/MENTAL HEALTH | Age: 86
End: 2021-05-11
Payer: MEDICARE

## 2021-05-12 ENCOUNTER — HOSPITAL ENCOUNTER (OUTPATIENT)
Dept: BEHAVIORAL/MENTAL HEALTH | Age: 86
Discharge: HOME | End: 2021-05-12
Payer: MEDICARE

## 2021-05-12 PROCEDURE — 90853 GROUP PSYCHOTHERAPY: CPT

## 2021-05-12 PROCEDURE — 99213 OFFICE O/P EST LOW 20 MIN: CPT | Performed by: PSYCHIATRY & NEUROLOGY

## 2021-05-12 NOTE — PROGRESS NOTES
SOP PROGRESS NOTE    INTERVAL HISTORY/FINDINGS:  States she's still been \"good\" affectively, and that she hasn't had any recurrence of dysphoria over the past month. She still has moments of feeling worse, but usually that's due to a physical issue and not emotional. Her N/V functioning has been fairly good and she denies having any hopelessness or SI at all. Tolerating the Effexor XR easily without any SE's noted. Remains social and affectively bright. MEDICATIONS:   Current Outpatient Medications   Medication Sig    Januvia 100 mg tablet TAKE 1 TABLET BY MOUTH EVERY DAY    lisinopriL (PRINIVIL, ZESTRIL) 20 mg tablet TAKE 1 TABLET BY MOUTH EVERY DAY    scopolamine (TRANSDERM-SCOP) 1 mg over 3 days pt3d 1 Patch by TransDERmal route every seventy-two (72) hours.  atorvastatin (LIPITOR) 40 mg tablet Take 1.5 Tabs by mouth nightly.  aspirin delayed-release 81 mg tablet Take 1 Tab by mouth daily.  nitroglycerin (NITROSTAT) 0.4 mg SL tablet 1 Tab by SubLINGual route every five (5) minutes as needed for Chest Pain. Up to 3 doses.  polyethylene glycol (Miralax) 17 gram/dose powder Take 17 g by mouth daily. 1 tablespoon with 8 oz of water daily    simethicone (MYLICON) 80 mg chewable tablet Take 1 Tab by mouth every six (6) hours as needed for Flatulence.  meclizine (ANTIVERT) 25 mg tablet Take  by mouth three (3) times daily as needed for Dizziness.  venlafaxine-SR (EFFEXOR-XR) 150 mg capsule Take 1 Cap by mouth daily (with breakfast). Indications: major depressive disorder    Blood-Glucose Meter monitoring kit Check glucose once daily. DX: E11.9    glucose blood VI test strips (ASCENSIA AUTODISC VI, ONE TOUCH ULTRA TEST VI) strip Check glucose once daily. DX: E11.9    lancets misc Check glucose once daily. DX: E11.9    montelukast (SINGULAIR) 10 mg tablet Take 1 Tab by mouth daily.  acetaminophen (TylenoL) 325 mg tablet Take  by mouth every four (4) hours as needed for Pain.     levocetirizine (XYZAL) 5 mg tablet Take 1 pill daily as needed for allergy symptoms    metoprolol succinate (TOPROL-XL) 25 mg XL tablet Take 1 Tab by mouth daily. Indications: high blood pressure     No current facility-administered medications for this encounter. MENTAL STATUS UPDATE: ([facility] [place])  Appearance:   Neat   Disheveled  Odiferous   Appropriate  Orientation:   Time  Place  Person  Speech:   Coherent  Pressured  Garbled/Slurred  Loud   Soft  Mute  Memory:   Intact  Impaired (Recent  Remote)--Mild  Severe  Mood:   Euthymic  Depressed  Anxious  Elated  Affect:    Appropriate  Inappropriate  Labile  Blunted  Flat  Thought Content:   Logical  Goal directed  Paranoid  Delusional  Illogical IOR  SI  HI  Thought Process:   Coherent/linear  Tangential  Loose/Disorganized   Hallucinations:   None  Auditory  Visual  Tactile  Olfactory  Gustatory  Insight:   Good  Fair  Impaired      Judgment:   Good  Fair  Impaired    CONTINUED NEED FOR TREATMENT: ([facility] [place])  1. Continued psychiatric symptoms causing impairment in IDL  or functioning  2. Continued non-compliance with meds resulting in decompensation  3. High level of debilitation and symptoms with inadequate support  4. Continued need for structured care to adjust medications  5. Continued presence of debilitating symptoms  6. Continued presence of risk factors    CONTINUED NEED FOR GROUP PSYCHOTHERAPY TO ADDRESS THE FOLLOWING: ([facility] [place])  Psychiatric symptom management       Psychiatric relapse prevention    Anxiety management      Self-esteem issues      Poor decision making       Recent decompensation       Safety issues      Anger management     Self care/ADL's     Med/treatment compliance      Impaired boundaries/relationships     Assertiveness      Grief/loss resolution       Guilt/shame issues     Realistic goal setting     DIAGNOSTIC IMPRESSION:  1. 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  Continue the Venlafaxine  mg daily--has 2+ months of refills (90 day)  2. Continue SOP treatment.

## 2021-05-12 NOTE — BH NOTES
This note will not be viewable in [employer] for the following reason(s). This is a Psychotherapy Note. ([address] Only) [facility]  Group Therapy  Date of service: 5/12/2021  Start time: 1200  Stop time: 1250  Type of session: Therapy Group  Problem number: 1. Dep F 33.0    Short term goal (STG): P.  Pt will share with group one thought per group, without prompting, that she is unsure about its effect on her or if it is logical and ask for feedback.     Intervention/techniques: Informed, Validated/Supported, Prompted/Cued, Listened/Empathized, Promoted Peer Support and Provided Feedback    Patient mental status/affect: Anxious, Congruent and Preoccupied    Patient behavior/appearance: Neatly Groomed, Attentive, Cooperative and Needed Prompting    Special patient treatment accommodations provided (describe): None    Patient response and progress towards goals: Focus of session was on the concept of codependency in relationships and how to break free from it. We identified and addressed the self-defeating and unhealthy behaviors we can engage in relationships and what we are willing to work on ending to help create improved and even high quality and supportive relationships. We discussed common hooks in relationships such as caretaking, controlling, discounting ourselves, and overt and covert manipulation. We discussed guilt and what we need to do to get rid of it when it is unnecessary and damaging. Group members addressed the need to let go of being right. I also addressed with group the importance of setting our own expectations for ourselves rather than live for other peoples expectations. Group members discussed what they can identify as problems in relationships and what would the benefit be of improving those relationships. Lauren Fong participated in group with prompting.   She spoke about how she has been able to [place] go of those who don't support me\" and how she can communicate those boundaries. She spoke about how she is selective about who can come over and why and she also will \"talk to anyone on the phone all day. \"  She spoke about how she knows she benefits a great deal from communication with others.

## 2021-05-12 NOTE — BH NOTES
This note will not be viewable in [employer] for the following reason(s). This is a Psychotherapy Note. ([address], [facility] Providers Only) [facility] Outpatient Program  Group Therapy  Date of service: 5/12/2021  Start time: 1000  Stop time: 1050  Type of session: Therapy Group  Problem number: 1. Dep F 33.0    Short term goal (STG): M. Pt will articulate and or put in writing a plan of action for coping with uncomfortable feelings that get unbalanced for her to help gain confidence for discharge planning.       Intervention/techniques: Informed, Validated/Supported, Reframed, Modeled/Rehearsed, Promoted Peer Support and Provided Feedback    Patient mental status/affect: Anxious, Congruent and Preoccupied    Patient behavior/appearance: Neatly Groomed, Attentive, Cooperative and Needy    Special patient treatment accommodations provided (describe): None    Patient response and progress towards goals: Focus of session was based on information from [address] [place] [address] on tips for processing changes. We spoke about our typical reactions to change and how we cope or dont cope with it. We spoke about what changes are coming up and what can be exciting about it but also scary. We spoke about the tips from the handout which included focusing on what you can control, writing out feelings, keeping up with self care, finding support, tune into the good, creating plans, and thinking of your strength and resilience. We spoke about how journaling worries and fears can help us then plan for what actions we can take. Group members worked through a change that is happening or that they can plan for to help with the transition and not lead to setbacks in their recovery. Maricruz Fern participated in group with prompting. She spoke about how she knows that she has needed help in the past \"in knowing when I needed help. \"  She spoke about how she knows that she needs to continue to build up what she can in coping skills to help her as she does plan for discharge. She stated she does not feel positive about this change at this point.

## 2021-05-12 NOTE — BH NOTES
This note will not be viewable in [employer] for the following reason(s). This is a Psychotherapy Note. ([address], [facility] Providers Only) [facility] Outpatient Program  Group Therapy  Date of service: 5/12/2021    Start time: 1100  Stop time: 1150    Type of session: Therapy Group    Problem number: 1Dep. F33.0    Short term goal (STG): N .  Pt will identify one adaptive strategy (what she can change in the environment) to help support her memory issues and report to group consistent use of the strategy chosen.       Intervention/techniques: Informed, Validated/Supported, Reflected, Reinforced and Provided Feedback    Patient mental status/affect: Calm and Congruent    Patient behavior/appearance: Attentive, Cooperative and Motivated    Special patient treatment accommodations provided (describe): none    Patient response and progress towards goals: Focus of session was based on an article from [employer] on 5 skills for conflict resolution. We reviewed the five skills which are: Avoiding, Competing, Accomodating, Collaborating and Compromising. We spoke about how these all have pros and cons and we spoke about group members particular skills that they use and how they work, and what they think they may need to change. We spoke about current conflicts that they are experiencing and how they can work to solve the compromise. Pt participated in the activity and engaged with the other group members. Pt shared with the group that during her marriage she had many conflicts over her husbands drinking. She was not able to change his behavior but she was able to let him know how she felt about it and was able to assert herself in the relationship. She stated she does not like conflict but feels she is able to \"compromise and collaborate\"  when situations happen.

## 2021-05-13 NOTE — BH NOTES
This note will not be viewable in [employer] for the following reason(s). This is a Psychotherapy Note. ([address] [facility] Providers Only) [facility] Outpatient Program  Group Therapy  Date of service: 5/14/2021    Start time: 1000  Stop time: 1050    Type of session: Therapy Group    Problem number: 1Dep F33.0    Short term goal (STG): Q Pt will develop written continuing aftercare plan with focus on coping with family & other stressors once discharged and not receiving support and direction of group    Intervention/techniques: Informed, Validated/Supported, Listened/Empathized and Reinforced    Patient mental status/affect: Calm, Congruent and Happy    Patient behavior/appearance: Neatly Groomed, Attentive, Cooperative and Motivated    Special patient treatment accommodations provided (describe): none    Patient response and progress towards goals: Focus of session was on the importance of identifying triggers that lead to our negative emotions and then allowing ourselves the opportunity to have time to work through those emotions in a positive and productive way. We spoke about how we have to do our best to foresee situations where our emotions may be triggered and have many choices for how to cope in response to those emotions. We spoke about planning and using the phrases first I will, then I will, next I will, and finally I will  We spoke about how we will usually give ourselves enough time to work through the triggers through the process of taking time to work through four coping skills that we decide to use. Pt participated and engaged with the other group members regarding triggers. She stated that there is so much in the world today that is negative. It is hard for her at times to know when to say something and how to react. She doesn't want to offend others or trigger them but it is also difficult at times not to say something.   She just wants to be \"liked by [employer] but knows that can't be so she tries to like herself and be respectful

## 2021-05-14 ENCOUNTER — HOSPITAL ENCOUNTER (OUTPATIENT)
Dept: BEHAVIORAL/MENTAL HEALTH | Age: 86
Discharge: HOME | End: 2021-05-14
Payer: MEDICARE

## 2021-05-14 PROCEDURE — 90853 GROUP PSYCHOTHERAPY: CPT

## 2021-05-14 NOTE — BH NOTES
This note will not be viewable in [employer] for the following reason(s). This is a Psychotherapy Note. ([address] [facility] [address] Providers Only) [facility] Outpatient Program  Group Therapy  Date of service: 5/14/2021    Start time: 1100  Stop time: 1150    Type of session: Therapy Group    Problem number: 1Dep F33.0    Short term goal (STG): O .  Pt will express understanding of the need to take all medications as prescribed and explore reasons why she has not done this in the past.      Intervention/techniques: Informed, Validated/Supported, Reinforced and Provided Feedback    Patient mental status/affect: Calm and Congruent    Patient behavior/appearance: Neatly Groomed, Attentive, Cooperative and Motivated    Special patient treatment accommodations provided (describe): none    Patient response and progress towards goals: Focus of session was on an article called Fear is Your Only Competitor.   The article spoke about valuable steps to overcome fear and how fear can build based on our choices. I shared how fear feeds on inaction and how the solution to that is to do something, anything to get started. We spoke about what is currently fueling our stress and worry and what can be done to start to tackle fears rather than let them build up. I also shared how fear feeds in indecision and how making a decision can help us break down fears and reduce our stress level. Pt was able to support those members today that appeared to be having an exceptionally hard group session. We acknowledged the support that everyone shows for each other. Many group members personally disclosed events from their lives today and they members are dealing with their decisions.   We reinforced confidentiality and the importance of trust .

## 2021-05-17 ENCOUNTER — HOSPITAL ENCOUNTER (OUTPATIENT)
Dept: BEHAVIORAL/MENTAL HEALTH | Age: 86
Discharge: HOME | End: 2021-05-17
Payer: MEDICARE

## 2021-05-17 PROCEDURE — 90853 GROUP PSYCHOTHERAPY: CPT

## 2021-05-17 NOTE — BH NOTES
This note will not be viewable in [employer] for the following reason(s). This is a Psychotherapy Note. ([address] [facility] [address] Providers Only) [facility]  Group Therapy  Date of service: 5/17/2021  Start time: 1200  Stop time: 1250  Type of session: Therapy Group    Problem number: 1. Dep F 33.0    Short term goal (STG): N.  Pt will identify one adaptive strategy (what she can change in the environment) to help support her memory issues and report to group consistent use of the strategy chosen.    Intervention/techniques: Informed, Validated/Supported, Modeled/Rehearsed, Prompted/Cued, Listened/Empathized, Facilitated Disclosure and Provided Feedback    Patient mental status/affect: Anxious, Congruent and Preoccupied    Patient behavior/appearance: Neatly Groomed, Attentive, Cooperative and Needed Prompting    Special patient treatment accommodations provided (describe): None    Patient response and progress towards goals: Focus of session was information on the difference between a victim mentality, an survivor mentality, and a thriver mentality. We spoke about how attitude makes a lot of difference in what we do and how we choose to cope in those times when we are thinking negative thoughts that lead to a victim mentality. I shared that victim thinking and behavior includes negative self talk, isolating behavior, overwhelmed by the past, believes everyone else is better, and places own needs last.  A survivor mentality and behavior includes seeing oneself as wounded but healing, knows that they deserve to seek help, not afraid to tell their story, and are learning healthy needs. A thriver mentality includes gratitude, proud of self care, living in the present, and protects self from unsafe others. We spoke about what actions can be taken or what thoughts could be changed to develop a thriver mentality and being. Roseanne Gautam participated in group with prompting.  She spoke about how she can tell when she is in a negative frame of mind much easier now that she has practiced being more aware. She spoke about how her mood is doing well overall and this helps her a great deal with catching those negative thoughts. She spoke about what she continues to plan to work on challenging more of.

## 2021-05-17 NOTE — BH NOTES
This note will not be viewable in [employer] for the following reason(s). This is a Psychotherapy Note. ([address] [facility] [address] Providers Only) [facility] Outpatient Program  Group Therapy  Date of service: 5/17/2021  Start time: 1000  Stop time: 1050  Type of session: Therapy Group    Problem number: 1. Dep F 33.0    Short term goal (STG): M.  Pt will articulate and or put in writing a plan of action for coping with uncomfortable feelings that get unbalanced for her to help gain confidence for discharge planning.       Intervention/techniques: Informed, Validated/Supported, Challenged, Modeled/Rehearsed, Prompted/Cued and Provided Feedback    Patient mental status/affect: Calm, Congruent and Preoccupied    Patient behavior/appearance: Neatly Groomed, Attentive, Cooperative and Needed Prompting    Special patient treatment accommodations provided (describe): None    Patient response and progress towards goals: Focus of session was on how to stop the cycle of depression. We spoke about the three parts of depression are negative or self critical thoughts, physical symptoms of fatigue, poor concentration, irritability, nervousness, and behavioral isolation or passivity or inactivity. We discussed 4 components of stopping depression which include practice alternative or balanced thinking, get involved with activities, have contact with other people, and take care of our health (rest, eat right, exercise). We spoke about a simple plan to follow to develop a better mood which includes talking back to negative thoughts, initiating pleasant activities, and having purposeful people contact. Littleshanda Dereck participated in group with prompting. She spoke about how she knows that she needs to keep on taking her medications as prescribed and she understood that it was not healthy to stop them. She spoke about how she has to think about how to choose her people and choose who she wants to be around.   She is doing well with making progress and understanding depression.

## 2021-05-17 NOTE — BH NOTES
This note will not be viewable in [employer] for the following reason(s). This is a Psychotherapy Note. ([address] [facility] [address] Providers Only) [facility] Outpatient Program  Group Therapy  Date of service: 5/17/2021    Start time: 1100  Stop time: 1150    Type of session: Therapy Group    Problem number: 1Dep. F33.0    Short term goal (STG): OPt will express understanding of the need to take all medications as prescribed and explore reasons why she has not done this in the past     Intervention/techniques: Informed, Validated/Supported, Reinforced and Provided Feedback    Patient mental status/affect: Calm, Congruent and Happy    Patient behavior/appearance: Neatly Groomed, Attentive, Cooperative and Motivated    Special patient treatment accommodations provided (describe): none    Patient response and progress towards goals: Focus of session was based on article Six Principles for Managing Impulses to Maximize Life Satisfaction and Success.   We reviewed the definition of an impulse which is an impelling force or a sudden wish or urge that prompts an unpremeditated act or feeling.   We spoke about the definition of being impulsive which is characterized by actions based on sudden desires, whims, or inclinations rather than careful thought.   We spoke about a method to try and control impulses and it is PURRRRS which stands for Pause, Use, Reflect, Reason, Respond, Revise, and Stabilize. We spoke about what our impulsive behavior is and how going through these steps may help us to slow down and how may that impact the emotional consequences to be more positive rather than negative. I shared the six principles from the article which included: know your risks, plan for your risks, count to 10 a lot, Be mindful, get corrective feedback, and remember that [place] was not built in a day. Pt participated in the group activity and engaged with the group members.   Pt stated that she has worked on controlling her impulses. She feels she is now more controlled and yet as she has gotten older she can speak what she thinks. She has a hard time admitting that she feels that one of her daughters is not always there for her and this makes her sad. They are times she feels she needs something or help and her daughter will not answer her calls. We talked about making a contact list for the future as needed.

## 2021-05-19 ENCOUNTER — HOSPITAL ENCOUNTER (OUTPATIENT)
Dept: BEHAVIORAL/MENTAL HEALTH | Age: 86
Discharge: HOME | End: 2021-05-19
Payer: MEDICARE

## 2021-05-19 PROCEDURE — 90853 GROUP PSYCHOTHERAPY: CPT

## 2021-05-19 NOTE — BH NOTES
This note will not be viewable in [employer] for the following reason(s). This is a Psychotherapy Note. ([address] [facility] [address] Providers Only) [facility] Structured Outpatient Program  Group Therapy      Date of service: 5/19/2021    Start time: 1000  Stop time: 1050    Type of session: Therapy Group    Problem number: 1. Dep F 33.0    Short term goal (STG): P.  Pt will share with group one thought per group, without prompting, that she is unsure about its effect on her or if it is logical and ask for feedback.      Intervention/techniques: Informed, Validated/Supported, Reframed, Prompted/Cued, Listened/Empathized, Promoted Peer Support and Provided Feedback    Patient mental status/affect: Calm, Congruent and Preoccupied    Patient behavior/appearance: Neatly Groomed, Attentive, Cooperative and Needed Prompting    Special patient treatment accommodations provided (describe): None    Patient response and progress towards goals: Focus of session was on ways to challenge negative thinking and to run our negative thoughts through the filter of the following three questions:  Is my thinking based on fact, is my thinking leading me to feel what I want to feel, and is my thinking leading me towards my goals? We spoke about how if we answer no to any of these questions, we need to stop and take the time to challenge that thought or thoughts. We spoke about what recent negative thoughts we can recognize and we tested our thought out to see if it was a thought we should pay attention to. We addressed how to challenge and/or distract ourselves to move away from unnecessary and unhelpful thoughts. Seb Ocasio participated in group with prompting. She spoke about how she knows that she has been more worried in the past about her relationships and worried about what people think.   She spoke about how she will continue to work on this and to continue to work on her self esteem and knowing that \"I will continue to to talk to people the way I was planning to even if they don't want to talk to me. \"  She spoke about how she continues to work on challenging her thought processes.

## 2021-05-19 NOTE — BH NOTES
This note will not be viewable in [employer] for the following reason(s). This is a Psychotherapy Note. 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 Pt will develop written continuing aftercare plan with focus on coping with family & other stressors once discharged and not receiving support and direction of group    Intervention/techniques: Informed, Guided, Challenged, Prompted/Cued, Promoted Peer Support and Provided Feedback    Patient mental status/affect: Anxious, Congruent, Preoccupied and Worried    Patient behavior/appearance: Neatly Groomed, Attentive, Cooperative and Motivated    Special patient treatment accommodations provided (describe): None    Patient response and progress towards goals: Focus of session was on understanding the difference of the impact that I want versus I need has on our energy and our focus. We spoke about the different energy we can feel if we talk about a goal or a desired change as I want to.    I asked group members to think about the strategy of changing their I need thoughts into I want to thoughts. Each group member shared what they can say they want to work towards. Kourtney Harden participated in group with prompting. She spoke about how she continues to be frustrated with certain people in her life but she knows that she has been able to build up other relationships. She spoke about how she will continue to work on being accepting of where she is and what she is doing in her life. She spoke about how she knows that she has to have specific plans to deal with the most impactful family members and friends.

## 2021-05-24 ENCOUNTER — HOSPITAL ENCOUNTER (OUTPATIENT)
Dept: BEHAVIORAL/MENTAL HEALTH | Age: 86
Discharge: HOME | End: 2021-05-24
Payer: MEDICARE

## 2021-05-24 PROCEDURE — 90853 GROUP PSYCHOTHERAPY: CPT

## 2021-05-24 NOTE — BH NOTES
[facility]  Group Therapy  Date of service: 5/14/2021  Start time: 1100  Stop time: 1150  Type of session: Therapy Group    Problem number:1Dep. F33.0    Short term goal (STG): P .   Pt will share with group one thought per group, without prompting, that she is unsure about its effect on her or if it is logical and ask for feedback.       Intervention/techniques: Informed, Validated/Supported, Refocused, Clarified, Reinforced and Provided Feedback    Patient mental status/affect: Anxious, Congruent, Flat, Preoccupied and Worried    Patient behavior/appearance: Attentive, Cooperative, Needed Prompting, Withdrawn/Quiet and Needy    Special patient treatment accommodations provided (describe): none    Patient response and progress towards goals: Focus of session was on positive self talk when feeling anger. We spoke about how we can develop the skill of helping ourselves out of moments of anger that have a tendency to then impact your day and your functioning and well being. We spoke about the usual self talk that goes with anger and how it can make things worse and asked group members to share their anger thoughts that are typical for them. I shared some ideas for self talk of managing anger such as I dont need to prove myself in this situation or as long as I keep my cool, I am in control or people are going to act the way they want to, not the way I want them to.   We spoke about how these ideas can better sooth our anger and help us refocus on healthy self care. Pt participated in the group activity on a limited basis. She engaged with the group when prompted. Pt did not feel well today as she had not taken her medicine. Other group members and staff were supportive of her and also discussed the importance of  taking her medication on a regular basis for mental and physical health.

## 2021-05-24 NOTE — BH NOTES
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  Pt will develop written continuing aftercare plan with focus on coping with family & other stressors once discharged and not receiving support and direction of group.       Intervention/techniques: Informed, Validated/Supported, Modeled/Rehearsed, Prompted/Cued, Promoted Peer Support and Provided Feedback    Patient mental status/affect: Anxious, Calm, Congruent and Preoccupied    Patient behavior/appearance: Neatly Groomed, Attentive, Cooperative and Needed Prompting    Special patient treatment accommodations provided (describe): None    Patient response and progress towards goals: Focus of session was on identifying coping skills and what categories they may fall under. We spoke about the different categories of distraction skills, grounding skills, emotional release skills, self care skills, thought challenge skills, and accessing our higher self skills. We spoke about what each of these mean and the pros and cons of each category. I asked group members to brainstorm skills for each category and how it can benefit them to use them. We also identified cons of each category and how that can lead them to choose another way to cope with certain situations. Littleshanda Dereck participated in group with prompting. She spoke about how she has not been feeling well and her sugar was high this morning. She said she has felt more depressed this weekend and she was visibly flat and withdrawn. She was given a lot of encouragement in group and support.

## 2021-05-24 NOTE — BH NOTES
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  Pt will express understanding of the need to take all medications as prescribed and explore reasons why she has not done this in the past.      Intervention/techniques: Informed, Validated/Supported, Challenged, Reframed, Observed/Monitored, Facilitated Disclosure and Provided Feedback    Patient mental status/affect: Anxious, Congruent and Preoccupied    Patient behavior/appearance: Neatly Groomed, Attentive, Cooperative and Needed Prompting    Special patient treatment accommodations provided (describe): None    Patient response and progress towards goals: Focus of session was on information from [facility] and 10 ways to decrease stress. I shared with group members the ideas which are live simply, good self care, positive thinking, being cooperative, getting involved in a good cause, living one day at a time, exercise, maintaining balance, giving credit to ourselves when credit is due, and realizing that we cant do everything. I asked group members to share one of these skills that they believed they managed well over the weekend and what used and what skill they could have used to help themselves in a positive direction. I asked group members which skill they will focus on this week to help them decreases stress as well as improve overall functioning and well being. Radha Qiu participated in group with prompting and she was visibly better and she engaged well with peers. She continues to be very vulnerable to setbacks and physical pain is a trigger for her. She continues to appear more unstable at the beginning of the week and the day but responds to the feedback and support of group.

## 2021-05-26 ENCOUNTER — HOSPITAL ENCOUNTER (OUTPATIENT)
Dept: BEHAVIORAL/MENTAL HEALTH | Age: 86
Discharge: HOME | End: 2021-05-26
Payer: MEDICARE

## 2021-05-26 PROCEDURE — 90853 GROUP PSYCHOTHERAPY: CPT

## 2021-05-26 NOTE — BH NOTES
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  Pt will articulate and or put in writing a plan of action for coping with uncomfortable feelings that get unbalanced for her to help gain confidence for discharge planning.       Intervention/techniques: Informed, Validated/Supported, Prompted/Cued, Listened/Empathized, Promoted Peer Support and Provided Feedback    Patient mental status/affect: Calm, Congruent and Preoccupied    Patient behavior/appearance: Neatly Groomed, Attentive, Cooperative and Needed Prompting    Special patient treatment accommodations provided (describe): None    Patient response and progress towards goals: Focus of session was on information about being a highly sensitive person (HSP). We discussed how a HSP reacts to minor stressors and triggers to the same degree they would react in a very dangerous situation. I shared how if the amygdala in the brain detects a threat of any kind, the hormones are released and we are feeling it both physically and mentally. We spoke about how the HSP will add negative thoughts about self and our abilities and thoughts about the past and the future in there as well. I shared 4 steps from the [address] of how to manage episodes of stress and they include staying in the moment, practice being a witness, tuning into the body, and focusing on the breath. We spoke about regulating our breathing can help us return our bodies and mind to a pre triggered state. Radha Qiu participated in group with prompting. She spoke about how she knows that she has been able to identify her emotions more clearly and that this has helped her to learn to work through them.   We spoke about how she knows that connection is reyes for her and her personality and she will always make phone calls to encourage her well being.

## 2021-05-26 NOTE — BH NOTES
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  Pt will identify one adaptive strategy (what she can change in the environment) to help support her memory issues and report to group consistent use of the strategy chosen.     Intervention/techniques: Informed, Validated/Supported, Listened/Empathized, Observed/Monitored, Promoted Peer Support and Provided Feedback    Patient mental status/affect: Anxious, Calm, Congruent and Preoccupied    Patient behavior/appearance: Neatly Groomed, Attentive, Cooperative and Needed Prompting    Special patient treatment accommodations provided (describe): None    Patient response and progress towards goals: Focus of session was based on list 20 Ways to get Mentally Tough.   We reviewed the thoughts of how to manage difficult moments. We spoke about the strategies of how to deal with difficult people, what to do when facing fear, what to do when you feel alone and lost, and how to push through when we feel all is impossible and we lose hope. We spoke about what strategies and issues spoke to group members and how they can use these ways to help them improve their day to day functioning and well-being right now. Willow Montes participated in group well with prompting. She spoke about how she knows that she will continue to work on being grateful and using her prayer strategies and getting outside as her way to cope, especially to set her up for the day. She is very open to feedback and ideas and she continues to benefit from this support.

## 2021-05-26 NOTE — BH NOTES
[facility] Outpatient Program  Group Therapy  Date of service: 5/26/2021  Start time: 1100  Stop time: 1150  Type of session: Therapy Group    Problem number: 1Dep. F33.0    Short term goal (STG): R Pt will identify and rehearse the management of future situations or circumstances in which lapses could occur and get help of group members to increase confidence of this management.       Intervention/techniques: Informed, Validated/Supported, Reinforced and Provided Feedback    Patient mental status/affect: Anxious, Congruent and Preoccupied    Patient behavior/appearance: Attentive and Cooperative    Special patient treatment accommodations provided (describe): none    Patient response and progress towards goals: Focus of session was on ways to manage dysfunctional people in our lives. The information came from [place], [place]. We spoke about the ways to [facility] with love and I shared the strategies which includes focusing on what we can control, respond dont react, respond in a new way, allow others to make their own good or bad decisions, dont give advice or tell people what to do, and dont obsess about other peoples problems. I shared as well the need to give our own expectations a reality check, walk away from an unproductive argument, and choose to not spend time with those who really trigger us. We spoke about strategies that group members can use today to help improve functioning. Pt participated in the group activity and engaged with the other group members. She contributed to the group discussion about detaching from dysfunctional people. She stated that for years she has tried to help others and feels liked she failed. That in turn makes her feel bad about herself. She is trying hard to step back from situations and remember that she can't control others and needs to take care of herself.

## 2021-06-02 ENCOUNTER — HOSPITAL ENCOUNTER (OUTPATIENT)
Dept: BEHAVIORAL/MENTAL HEALTH | Age: 86
Discharge: HOME | End: 2021-06-02
Payer: MEDICARE

## 2021-06-02 PROCEDURE — 90853 GROUP PSYCHOTHERAPY: CPT

## 2021-06-02 NOTE — BH NOTES
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 Pt will develop written continuing aftercare plan with focus on coping with family & other stressors once discharged and not receiving support and direction of group.     Intervention/techniques: Informed, Validated/Supported, Modeled/Rehearsed, Prompted/Cued, Promoted Peer Support and Provided Feedback    Patient mental status/affect: Anxious, Congruent and Preoccupied    Patient behavior/appearance: Neatly Groomed, Attentive, Cooperative and Needed Prompting    Special patient treatment accommodations provided (describe): None    Patient response and progress towards goals: Focus of session was on information from the website [employer]. It was on information on how to filter ourselves before talking. We spoke about being aware of the first thing that comes to mind, and then using our judgement to decide if our words need filtering. I shared the steps of being aware of our intention and if we are going to say this to hurt ourselves or others, focusing on what our choice is and does this really need to be said, having compassion and being understanding of others, and having empathy for others. We spoke about being aware of if we have been listening properly and are our words respectful when we say them. I asked group members to share which one of these steps can help the most in being a more effective communicator. Julius Joyner participated in group with prompting. She spoke about how she has been trying to communicate what is on her mind more directly and assertively. She shared how \"I know I need to share my thoughts in regards to what is on my mind in regards to medications and things like that. \"  She remembers the setback she had when she has stopped medication without talking to the doctor in the past.

## 2021-06-02 NOTE — BH NOTES
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  Pt will share with group one thought per group, without prompting, that she is unsure about its effect on her or if it is logical and ask for feedback.       Intervention/techniques: Informed, Validated/Supported, Modeled/Rehearsed, Listened/Empathized, Queired/Probed, Promoted Peer Support and Provided Feedback    Patient mental status/affect: Anxious, Congruent and Preoccupied    Patient behavior/appearance: Neatly Groomed, Attentive, Cooperative and Needed Prompting    Special patient treatment accommodations provided (describe): None    Patient response and progress towards goals: Focus of session was on Self Respect Essentials. We spoke about why self-respect is so important to recovery in mental health and substance abuse. We discussed the ideas of having a sense of strengths and weaknesses, that we are entitled to be treated with respect, acceptance of self, sense of self is strong enough to withstand bumps and bruises, that we can feel worthwhile even when we dont know something, and that we dont bolster our self-respect at the expense of others. Group members were asked to share their thoughts and feelings about this and how they can increase their ability to respect themselves. Carolin Krishna participated well in group and she spoke about how she knows that she needs to find ways to build up her self esteem and respect. We spoke about the ways in which she has made progress and the experience that she has made in getting stronger and better in this area.

## 2021-06-02 NOTE — BH NOTES
[facility] Outpatient Program  Group Therapy  Date of service: 6/2/2021  Start time: 1100  Stop time: 1150  Type of session: Therapy Group    Problem number: 11Dep. F33.0    Short term goal (STG): R Pt will identify and rehearse the management of future situations or circumstances in which lapses could occur and get help of group members to increase confidence of this management.       Intervention/techniques: Informed, Validated/Supported, Reinforced and Provided Feedback    Patient mental status/affect: Calm, Congruent and Preoccupied    Patient behavior/appearance: Neatly Groomed, Attentive, Cooperative, Needed Prompting and Withdrawn/Quiet    Special patient treatment accommodations provided (describe): none    Patient response and progress towards goals: Focus of session was on establishing daily routine and using tools such as a calendar and journals to help establish it. We spoke about how we benefit when we have a sense of what our priorities are and when we are going to get them done. I shared with group members a template for journaling and noting ideas for what their daily expectations are for themselves, what their expectations are from others, something to do daily for recreation and enjoyment, and what to focus on to improve self-esteem. Group members were asked to share the impact that using tools for organization may impact their self-esteem and confidence. Pt participated in the group activity but was very quiet today. She did engage with the other members when prompted. She took part in the discussion of personal expectations and expectations from others.  We also explored coping skills for dealing with people who have unrealistic expectations of us

## 2021-06-04 ENCOUNTER — HOSPITAL ENCOUNTER (OUTPATIENT)
Dept: BEHAVIORAL/MENTAL HEALTH | Age: 86
Discharge: HOME | End: 2021-06-04
Payer: MEDICARE

## 2021-06-04 PROCEDURE — 90853 GROUP PSYCHOTHERAPY: CPT

## 2021-06-04 NOTE — BH NOTES
[facility] Outpatient Program  Group Therapy  Date of service: 6/4/2021  Start time: 1100  Stop time: 1150  Type of session: Therapy Group    Problem number: 1Dep. F33.0    Short term goal (STG): M Pt will articulate and or put in writing a plan of action for coping with uncomfortable feelings that get unbalanced for her to help gain confidence for discharge planning.      Intervention/techniques: Informed, Validated/Supported, Reinforced and Provided Feedback    Patient mental status/affect: Calm and Congruent    Patient behavior/appearance: Neatly Groomed, Attentive, Cooperative, Motivated and Caretaking    Special patient treatment accommodations provided (describe): none    Patient response and progress towards goals: Focus of session was on conflict resolution and strategies to help us keep calm in a conflict with others. I shared how conflicts can increase the production of cortisol and adrenaline and how that prepares us to either take flight or fight. We spoke about how conflicts can be over something pretty minor to values and very important issues in our lives. I shared how the increase in hormones affects us when we need to be clear minded and aware but those hormones can create a disorientation and confusion. I shared with group some strategies to help us stay calm in a conflict and they included taking deep breaths which actually interferes with the production of these hormones, focusing on body and where we can work to relax it, listen carefully and ask open ended questions, lower our voices, and work to let go and agree to disagree with others. We spoke about with whom group members can practice these strategies. Pt participated in the group activity and engaged with the other group members. She contributed to the conversation related to  effective communication. She stated that she wished she had these strategies when she was  to her .  She feels she may have been able to help herself better deal with his alcoholism.   We discussed the advantages of \"growing older and wiser\" and discussed how others could learn from her experiences

## 2021-06-04 NOTE — BH NOTES
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  Pt will identify one adaptive strategy (what she can change in the environment) to help support her memory issues and report to group consistent use of the strategy chosen.     Intervention/techniques: Informed, Validated/Supported, Reframed, Modeled/Rehearsed, Promoted Peer Support and Provided Feedback    Patient mental status/affect: Anxious, Congruent and Preoccupied    Patient behavior/appearance: Neatly Groomed, Attentive, Cooperative and Needed Prompting    Special patient treatment accommodations provided (describe): None    Patient response and progress towards goals: Focus of session was based on article 10 things HSPs Find Overwhelming by Artaaron Price. I shared the authors ideas as a Highly Sensitive Person (HSP) that affect her and HSPs. The things shared were horror or upsetting visions or movies, having different piles of messes, birthday parties and large social gatherings, poorly fitting or itchy clothes, confrontation or criticism of any kind, carrying other peoples emotional baggage, hearing others argue, setting boundaries and saying no is a struggle, and decision making is very difficult. Tiffanie Magaña participated in group well with prompting. She spoke about how she knows that she needs to work on managing her reactions to others and she does see that she really gets uncomfortable with others when they are confrontational or arguing, even when it is not involving her. She spoke about how she will continue to work on managing her emotional reactions and she could see how knowing this benefits her.

## 2021-06-07 ENCOUNTER — HOSPITAL ENCOUNTER (OUTPATIENT)
Dept: BEHAVIORAL/MENTAL HEALTH | Age: 86
Discharge: HOME | End: 2021-06-07
Payer: MEDICARE

## 2021-06-07 PROCEDURE — 90853 GROUP PSYCHOTHERAPY: CPT

## 2021-06-07 NOTE — BH NOTES
[facility] Outpatient Program  Group Therapy  Date of service: 6/7/2021  Start time: 1000  Stop time: 1050  Type of session: Therapy Group    Problem number: 1Dep. F33.0    Short term goal (STG): P .   Pt will share with group one thought per group, without prompting, that she is unsure about its effect on her or if it is logical and ask for feedback.       Intervention/techniques: Informed, Validated/Supported, Listened/Empathized, Reinforced and Provided Feedback    Patient mental status/affect: Calm and Congruent    Patient behavior/appearance: Neatly Groomed, Attentive, Cooperative, Motivated and Enthusiastic    Special patient treatment accommodations provided (describe): none    Patient response and progress towards goals: Focus of session was on processing grief. We discussed the Tasks of Grief and the Tear process. This included To accept the reality of the loss, Experience the pain of the loss, Adjust to the new environment without the lost person and Reinvest in the new reality. We also explored the concept that grief can also encompass other forms of loss. For example, loss of your younger self, loss of functioning through an accident or illness and loss of an animal. Pt participated in the group activity and engaged with the other members. She related to the [facility] of Grief and was able to share some of her own experiences of dealing with grief in her life. She stated that she had to force herself to come to group but was glad she did.

## 2021-06-07 NOTE — BH NOTES
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       Intervention/techniques: Informed, Validated/Supported, Clarified, Reinforced and Provided Feedback    Patient mental status/affect: Calm and Congruent    Patient behavior/appearance: Neatly Groomed, Attentive, Cooperative and Motivated    Special patient treatment accommodations provided (describe): none    Patient response and progress towards goals: Focus of session was based on the article 10 Signs you are Starting to Heal.  We spoke about the following: you no longer dwell on negative thoughts from others, you no longer feel afraid to ask for help, you are starting to feel again, you no longer beat yourself up about everything, you experience more better days that bad ones, you are gaining more control over your life, you are regaining your appetite, you no longer rely on others to make you happy, and you look forward to the future. We spoke about where group members are and what they are noticing are positive signs of recovery. Pt participated in the discussion related to the \"Task of Mourning\"  She discussed aspects of her life with her  and how she dealt with his death. She often feels lonely living by herself and mises the companionship.

## 2021-06-07 NOTE — BH NOTES
[facility] Outpatient Program  Group Therapy  Date of service: 6/7/2021  Start time: 1200  Stop time: 1250  Type of session: Therapy Group    Problem number: 1Dep. F33.0    Short term goal (STG):N  Pt will identify and rehearse the management of future situations or circumstances in which lapses could occur and get help of group members to increase confidence of this management    Intervention/techniques: Informed, Validated/Supported, Clarified, Reinforced and Provided Feedback    Patient mental status/affect: Calm and Congruent    Patient behavior/appearance: Attentive, Cooperative and Motivated    Special patient treatment accommodations provided (describe): none    Patient response and progress towards goals: Focus of session was on The Symptoms of Inner Peace by Adithya Brewer. I shared the idea with group and that it includes the ideas of how inner peace is a disease and here is what it looks like. The symptoms included a loss of ability to worry, a loss of interest in conflict, an unmistakable ability to enjoy each moment, and frequent, overwhelming episodes of appreciation.   I asked group members to share which symptom they can work to get and how they can see it will help them have more peace in their day to day life. Pt states that she often thinks about her grandson that was killed in the car accident and many other heartaches in her life. She states she is lonely but tries to keep herself busy with her flowers and friends. She shared a pleasant memory of her . She stated that when she did not want to go to work and he would be fixing her breakfast, he would tell her to go outside, breathe the fresh air and she would feel better. Even when it was raining she should walk around and get herself going. She tries to remember this and does it to keep herself motivated.

## 2021-06-09 ENCOUNTER — HOSPITAL ENCOUNTER (OUTPATIENT)
Dept: BEHAVIORAL/MENTAL HEALTH | Age: 86
Discharge: HOME | End: 2021-06-09
Payer: MEDICARE

## 2021-06-09 PROCEDURE — 90853 GROUP PSYCHOTHERAPY: CPT

## 2021-06-09 NOTE — BH NOTES
[facility] Outpatient Program  Group Therapy    Date of Service:  6/9/2021  Start time: 1200  Stop time: 1250  Type of session: Therapy Group    Problem number:1 dep F 33.0    Short term goal (STG): O.  Pt will express understanding of the need to take all medications as prescribed and explore reasons why she has not done this in the past.      Intervention/techniques: Informed, Validated/Supported, Reframed, Modeled/Rehearsed, Promoted Peer Support, Reinforced, Facilitated Disclosure and Provided Feedback    Patient mental status/affect: Anxious, Congruent and Preoccupied    Patient behavior/appearance: Neatly Groomed, Attentive, Cooperative and Needed Prompting    Special patient treatment accommodations provided (describe): None    Patient response and progress towards goals: Focus of session was on understanding anger and find strategies for how to work through the typical phases of anger. We discussed how anger can include lesser feelings such as frustration, irritation, hurt, rage. We discussed the three phases of anger which can be viewed as the red zone, which is where are only focus needs to be on calming down. The orange zone, where our focus needs to be on thinking things through and processing our thoughts that may be increasing our anger and even stirring up more. The green zone is when we need to work through and manage the anger that we feel. We discussed strategies to use in the green zone such as expressing ourselves assertively, thinking of the other perspective, and relaxation skills. Richard Ibarra participated in group with prompting. She spoke about how she knows that she has to find the outlets for her emotions and she continued to be distracted and confused. She spoke about how she is worried about her tracey bushes because they appear to have something on them \"and these flowers mean so much to me, I think that may be weighing me down. \"  She was given support and feedback to keep pushing forward and working through the issues that are bothering her.

## 2021-06-09 NOTE — BH NOTES
[facility] Outpatient Program  Group Therapy    Date of Service: 6/9/2021  Start time: 1100  Stop time: 1150  Type of session: Therapy Group    Problem number: 1. 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  Pt will identify and rehearse the management of future situations or circumstances in which lapses could occur and get help of group members to increase confidence of this management.     Intervention/techniques: Informed, Validated/Supported, Modeled/Rehearsed, Prompted/Cued, Clarified, Reinforced and Facilitated Disclosure    Patient mental status/affect: Anxious and Preoccupied    Patient behavior/appearance: Neatly Groomed, Attentive, Cooperative, Needed Prompting and Negative    Special patient treatment accommodations provided (describe): None    Patient response and progress towards goals: Focus of session was on developing simple and practical action plans to start solving the problems that we are dealing with right now. We spoke about how we need to develop the attitude and belief of moving from being a patient to whom things are done to being a person who does things. We discussed a simple action plan of I want to _______ and then focus on the questions of who can help me and how can they help me, how will I know when I have achieved it, what are my next best steps, why is it important to me, when do I want to achieve it, and what resources do I have? We spoke about having the plan be specific, time-framed, achievable, and reviewable in order to be useful. Sai Fung spoke about feeling out of sorts about herself and she could not explain it well but 'I used to be more emotional and I would be sad and happy more but now I just feel numb. \"  She spoke about how she is not eating well and has no appetite and that she knows this is not good with her being a diabetic.   She agreed she would work on eating well, the right foods and amounts that she can tolerate, and see if this improves her mood and her ability to feel more like herself.

## 2021-06-09 NOTE — BH NOTES
[facility] Outpatient Program  Group Therapy  Date of service: 6/9/2021  Start time: 1000  Stop time: 1050  Type of session: Therapy Group    Problem number: 1Dep. F33.0    Short term goal (STG): N.  Pt will identify one adaptive strategy (what she can change in the environment) to help support her memory issues and report to group consistent use of the strategy chosen.        Intervention/techniques: Informed, Validated/Supported, Prompted/Cued, Listened/Empathized and Reinforced    Patient mental status/affect: Calm and Congruent    Patient behavior/appearance: Attentive, Cooperative and Motivated    Special patient treatment accommodations provided (describe): none    Patient response and progress towards goals: Focus of session was based on an idea developed by Mayi Rodrigues (possibility therapy) and Hever Calderon, Ph.D. (solution-focused therapy). It is called Do One Thing Different and it is supported by 12 steps motto and definition of insanity is doing the same thing over and over again and expecting different results.   I spoke with group members about the idea is to think about the things you do in a problem situation and change any part you can. We spoke about how we can change our reaction, our attitude, our tone of voice, our behavior, we can think about what others do to help themselves and what works and what has worked for us in the past.  We also spoke about picturing life and what we would feel like without this problem in it. I asked group members to identify one small thing that they can do different and how it can impact their life. Pt participated in the group activity and engaged with the other group members. She shared with the group that she has found it helpful to not quickly react with emotions to situations and take time to think before responding. This has helped her get clarity into situations and gives her time to pray about it.   She also tries to change her focus when she is feeling down and attempts to  do something positive for someone else.

## 2021-06-14 ENCOUNTER — HOSPITAL ENCOUNTER (OUTPATIENT)
Dept: BEHAVIORAL/MENTAL HEALTH | Age: 86
Discharge: HOME | End: 2021-06-14
Payer: MEDICARE

## 2021-06-14 PROCEDURE — 90853 GROUP PSYCHOTHERAPY: CPT

## 2021-06-14 NOTE — BH NOTES
[facility] Outpatient Program  Group Therapy    Date of Service: 6/14/2021  Start time: 1000  Stop time: 1050  Type of session: Therapy Group    Problem number: 1. 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  Pt will express understanding of the need to take all medications as prescribed and explore reasons why she has not done this in the past.    Intervention/techniques: Informed, Validated/Supported, Modeled/Rehearsed, Prompted/Cued, Promoted Peer Support, Facilitated Disclosure and Provided Feedback    Patient mental status/affect: Anxious, Congruent and Preoccupied    Patient behavior/appearance: Neatly Groomed, Attentive, Cooperative and Needed Prompting    Special patient treatment accommodations provided (describe): None    Patient response and progress towards goals: Focus of session was a discussion of the quote by Gabriele Lopez which is You can choose courage or you can choose comfort but you cannot choose both.   We spoke about the value in this quote and the comfort can be something hard to recognize because it can be a rut or rock bottom and it is an unhealthy place for us. We spoke about the courage that is need to overcome fears and worries to take on change and how we can overcome those fears. We spoke about how we always have the option to take steps up rather than continue down into that pit. Group members were asked to share how they can overcome fear and break out of that comfort zone. Marlen Cabrera participated in group with prompting. She spoke about how she had a positive experience this weekend as she got to meet the family of her mothers former employer who she had been a  for many years. She said it really helped her to feel good and feel positive \"about people again. \"  She continues to want to work on making good choices about eating and managing her sugar as she continues to be concerned about her poor appetite.

## 2021-06-14 NOTE — BH NOTES
[facility] Outpatient Program  Group Therapy    Date of Service: 6/14/2021  Start time: 1200  Stop time: 1250  Type of session: Therapy Group    Problem number: 1. Dep F 33.0  Short term goal (STG): S. Pt will verbalize belief in herself that she has the ability to make thought out decisions and it is a goal she is willing to work towards    Intervention/techniques: Informed, Validated/Supported, Reframed, Modeled/Rehearsed, Listened/Empathized, Promoted Peer Support and Provided Feedback    Patient mental status/affect: Anxious, Congruent and Preoccupied    Patient behavior/appearance: Neatly Groomed, Attentive, Cooperative and Needed Prompting    Special patient treatment accommodations provided (describe): None    Patient response and progress towards goals: Focus of session was on an article by Mike Farfan LCSW titled End Self Criticism and Learn Self-Acceptance.   We spoke about where the habit of self-criticism can come from and we are often repeating what we heard in younger years. We also spoke about how it can stem from unrealistic expectations due to expecting perfection, which is unachievable. We spoke about the importance of taking this on as a goal for recovery as this habit is often what can derail a person back into unnecessary anxiety and depression as a result of low self-esteem. I shared with group the strategies shared in article such as challenge the inner critic with rational questions, practice helpful self-talk, tell yourself what you needed to hear as a child, and focus on self-acceptance. We spoke about what changes group members can and are willing to make. Otilio Carver participated in group with prompting. She spoke about how she continues to be very sleepy and worn out and that was evident in group. She is going to make sure she does continue to make sure she is not overstretching herself.

## 2021-06-14 NOTE — BH NOTES
1Dep. F33.0[facility] Structured Outpatient Program  Group Therapy  Date of service: 6/14/2021  Start time: 1100  Stop time: 1150  Type of session: Therapy Group    Problem number: 1Dep. F33.0    Short term goal (STG): N Pt will identify one adaptive strategy (what she can change in the environment) to help support her memory issues and report to group consistent use of the strategy chosen.       Intervention/techniques: Informed, Validated/Supported, Prompted/Cued, Queired/Probed and Reinforced    Patient mental status/affect: Calm and Congruent    Patient behavior/appearance: Needed Prompting and Withdrawn/Quiet    Special patient treatment accommodations provided (describe): none    Patient response and progress towards goals: Focus of session was on information from the 4 As of stress relief.   I shared the idea of minor and major stress relievers include the first two As of avoid and alter. I shared that avoid means to just not deal with the needless stressors and make plans to avoid them whenever possible. We spoke about how we need to learn to say no and focus on doing the priorities every day. We also spoke about the need to alter situations to manage stress and work to change situations for the better. We spoke about how these skills include asking others to change their behavior, communicating openly, managing time more effectively and setting limits in advance. I asked group members to share their thoughts about which skills they can use to manage their stress more effectively. Pt did not participate or engage in the activity or with others today. She fell asleep for most of the session. She would respond when prompted and stated she was \"ok\" when asked.

## 2021-06-16 ENCOUNTER — HOSPITAL ENCOUNTER (OUTPATIENT)
Dept: BEHAVIORAL/MENTAL HEALTH | Age: 86
Discharge: HOME | End: 2021-06-16
Payer: MEDICARE

## 2021-06-16 PROCEDURE — 90853 GROUP PSYCHOTHERAPY: CPT

## 2021-06-16 NOTE — BH NOTES
[facility] Outpatient Program  Group Therapy    Date of Service: 6/16/2021  Start time: 1200  Stop time: 1250  Type of session: Therapy Group    Problem number: 1. Dep F 33.0    Short term goal (STG): Q. Pt will develop written continuing aftercare plan with focus on coping with family & other stressors once discharged and not receiving support and direction of group.       Intervention/techniques: Informed, Validated/Supported, Modeled/Rehearsed, Prompted/Cued and Provided Feedback    Patient mental status/affect: Anxious, Congruent and Preoccupied    Patient behavior/appearance: Neatly Groomed, Attentive, Cooperative and Needed Prompting    Special patient treatment accommodations provided (describe): None    Patient response and progress towards goals: Focus of session was from [employer] and an article titled Mindfulness of Cortisol by [employer]. We discussed what cortisol is and how our brain and body is affected when cortisol is released. We spoke about how it is released, and we go into fight or flight mode but it can be released whenever we feel threatened, even when we really are not. We spoke about the ideas of recognizing and seeing cortisol as cortisol, cortisol is not me, and practicing compassion for ourselves and treating ourselves kindly. We spoke about how it can benefit us to see the hormones and chemicals can be managed especially when we recognize we are experiencing them. Caritomono Thompson participated in group with prompting. She spoke about how she can work on this and try to think about why she may be feeling badly physically and it can be connected to stressors and triggers to st ess. She was able to understand who and what may be creating stress for her and that it is up to her to find ways to manage it.

## 2021-06-16 NOTE — BH NOTES
[facility] Outpatient Program  Group Therapy    Date of Service: 6/16/2021  Start time: 1000  Stop time: 1050  Type of session: Therapy Group    Problem number: 1. 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  Pt will identify and rehearse the management of future situations or circumstances in which lapses could occur and get help of group members to increase confidence of this management.       Intervention/techniques: Informed, Validated/Supported, Challenged, Reframed, Listened/Empathized, Promoted Peer Support and Provided Feedback    Patient mental status/affect: Anxious, Congruent and Preoccupied    Patient behavior/appearance: Neatly Groomed, Attentive, Cooperative and Needed Prompting    Special patient treatment accommodations provided (describe): None    Patient response and progress towards goals: Focus of session was based on information in the book [employer] by Godfrey Patel. I shared with group the idea of making the shift between the destructive and unhelpful thinking patterns to more positive and helpful thinking patterns. I shared the tool of the mental story map and how it can start with identifying the primary feeling right now and being aware of our thoughts that are leading to this emotion. We spoke about how you then look for themes in those thoughts such as focusing on the things that we cannot control, being focused on what we dont have, focusing on the past.  We spoke about how then we have the power and knowledge to focus on whatever needs to be changed in our own mind. Ne Cruz participated in group with prompting. She spoke about how she has a lot that she can see are triggers to her emotions but she is working on pushing herself to slow down. She spoke about how she knows that she has been learning to be able to focus on looking for what is positive even when she is feeling hurt by others.   She will continue to work on thinking about her thinking to try and catch unhealthy patterns.

## 2021-06-16 NOTE — BH NOTES
[facility] Outpatient Program  Group Therapy  Date of service: 6/16/2021  Start time: 1100  Stop time: 1150  Type of session: Therapy Group    Problem number: 1Dep. F33.0    Short term goal (STG): M . Pt will articulate and or put in writing a plan of action for coping with uncomfortable feelings that get unbalanced for her to help gain confidence for discharge planning.     Intervention/techniques: Informed, Validated/Supported, Prompted/Cued, Listened/Empathized, Reinforced and Provided Feedback    Patient mental status/affect: Calm and Congruent    Patient behavior/appearance: Attentive, Cooperative and Motivated    Special patient treatment accommodations provided (describe): none    Patient response and progress towards goals: Focus of session was based on handout of The Not To Do List.  We spoke about how we can get overwhelmed with what we have going on in our lives and then we add to it other peoples responsibilities and problems, thinking about the things that are out of our control, the stuff that drains us, and the stuff that doesnt need to get done at all or, at least, for right now. We spoke about how it is helpful to get the priorities in place for the things that we have to get done. Group members were asked to share their thoughts on their ability to be balanced and are their priorities in place right now. Pt participated and engaged with the group today. Pt was feeling better today and appeared to be more alert. We discussed items that were \"on our plate\" and items that are others responsibility. We talked about finding the balance between our own self care and helping to take care of others.

## 2021-06-23 ENCOUNTER — HOSPITAL ENCOUNTER (OUTPATIENT)
Dept: BEHAVIORAL/MENTAL HEALTH | Age: 86
Discharge: HOME | End: 2021-06-23
Payer: MEDICARE

## 2021-06-23 PROCEDURE — 99213 OFFICE O/P EST LOW 20 MIN: CPT | Performed by: PSYCHIATRY & NEUROLOGY

## 2021-06-23 PROCEDURE — 90853 GROUP PSYCHOTHERAPY: CPT

## 2021-06-23 RX ORDER — VENLAFAXINE HYDROCHLORIDE 150 MG/1
150 CAPSULE, EXTENDED RELEASE ORAL
Qty: 90 CAPSULE | Refills: 3 | Status: ON HOLD | OUTPATIENT
Start: 2021-06-23 | End: 2022-02-11

## 2021-06-23 NOTE — BH NOTES
[facility] Outpatient Program  Group Therapy    Date of Service: 6/23/2021  Start time: 1000  Stop time: 1050  Type of session: Therapy Group    Problem number: 1. Dep F 33.0    Short term goal (STG): N.  Pt will identify one adaptive strategy (what she can change in the environment) to help support her memory issues and report to group consistent use of the strategy chosen    Intervention/techniques: Informed, Validated/Supported, Prompted/Cued, Reinforced and Facilitated Disclosure    Patient mental status/affect: Anxious, Congruent and Preoccupied    Patient behavior/appearance: Neatly Groomed, Attentive, Cooperative and Needed Prompting    Special patient treatment accommodations provided (describe): None    Patient response and progress towards goals: Focus of session was on the types of people that we come across in our lives, be it strangers or family, which drains us of our energy and our positive thoughts. We spoke about the importance of learning how to combat and manage these difficult people and that when we are struggling with low self esteem, depression, fears of speaking up and protecting ourselves, and having a need to please others, we are much more vulnerable to letting these people drag us down. We spoke about how we have controllers, self absorbers, chronic victims, and criticizers. We spoke about effective ways to cope with these emotionally toxic people in our lives to be able to maintain our progress. Radhaalec Qiu participated in group with prompting. She spoke about how she knows that she has a lot of good qualities that she can see can be taken advantage of. She spoke about how she feels pretty positive about the relationships that she does have with friends and family and \"I can feel it when I am being affected negatively by others. \"

## 2021-06-23 NOTE — PROGRESS NOTES
SOP PROGRESS NOTE     INTERVAL HISTORY/FINDINGS:  States she's still been \"alright\" affectively, and that her mood has been a little worse due to several deaths in the family and 2 nephews who are having SA problems. Despite all that, she seems to be affectively stable and she's not become depressed at all. Objectively, she appears to be at her baseline. Her N/V functioning has been fair to adequate and she denies having any hopelessness or SI at all. Tolerating the Effexor XR easily without any SE's noted. Appears to be compliant with the meds.     MEDICATIONS:        Current Outpatient Medications   Medication Sig    Januvia 100 mg tablet TAKE 1 TABLET BY MOUTH EVERY DAY    lisinopriL (PRINIVIL, ZESTRIL) 20 mg tablet TAKE 1 TABLET BY MOUTH EVERY DAY    scopolamine (TRANSDERM-SCOP) 1 mg over 3 days pt3d 1 Patch by TransDERmal route every seventy-two (72) hours.  atorvastatin (LIPITOR) 40 mg tablet Take 1.5 Tabs by mouth nightly.  aspirin delayed-release 81 mg tablet Take 1 Tab by mouth daily.  nitroglycerin (NITROSTAT) 0.4 mg SL tablet 1 Tab by SubLINGual route every five (5) minutes as needed for Chest Pain. Up to 3 doses.  polyethylene glycol (Miralax) 17 gram/dose powder Take 17 g by mouth daily. 1 tablespoon with 8 oz of water daily    simethicone (MYLICON) 80 mg chewable tablet Take 1 Tab by mouth every six (6) hours as needed for Flatulence.  meclizine (ANTIVERT) 25 mg tablet Take  by mouth three (3) times daily as needed for Dizziness.  venlafaxine-SR (EFFEXOR-XR) 150 mg capsule Take 1 Cap by mouth daily (with breakfast). Indications: major depressive disorder    Blood-Glucose Meter monitoring kit Check glucose once daily. DX: E11.9    glucose blood VI test strips (ASCENSIA AUTODISC VI, ONE TOUCH ULTRA TEST VI) strip Check glucose once daily. DX: E11.9    lancets misc Check glucose once daily. DX: E11.9    montelukast (SINGULAIR) 10 mg tablet Take 1 Tab by mouth daily.     acetaminophen (TylenoL) 325 mg tablet Take  by mouth every four (4) hours as needed for Pain.  levocetirizine (XYZAL) 5 mg tablet Take 1 pill daily as needed for allergy symptoms    metoprolol succinate (TOPROL-XL) 25 mg XL tablet Take 1 Tab by mouth daily. Indications: high blood pressure      No current facility-administered medications for this encounter.          MENTAL STATUS UPDATE: (Positives in [place])  Appearance:   Neat   Disheveled  Odiferous   Appropriate  Orientation:   Time  Place  Person  Speech:   Coherent  Pressured  Garbled/Slurred  Loud   Soft  Mute  Memory:   Intact  Impaired (Recent  Remote)--Mild  Severe  Mood:   Euthymic  Depressed  Anxious  Elated  Affect:    Appropriate  Inappropriate  Labile  Blunted  Flat  Thought Content:   Logical  Goal directed  Paranoid  Delusional  Illogical IOR  SI  HI  Thought Process:   Coherent/linear  Tangential  Loose/Disorganized   Hallucinations:   None  Auditory  Visual  Tactile  Olfactory  Gustatory  Insight:   Good  Fair  Impaired      Judgment:   Good  Fair  Impaired     CONTINUED NEED FOR TREATMENT: (Positives in [place])  1. Continued psychiatric symptoms causing impairment in IDL  or functioning  2. Continued non-compliance with meds resulting in decompensation  3. High level of debilitation and symptoms with inadequate support  4. Continued need for structured care to adjust medications  5. Continued presence of debilitating symptoms  6.  Continued presence of risk factors     CONTINUED NEED FOR GROUP PSYCHOTHERAPY TO ADDRESS THE FOLLOWING: (Positives in [place])  Psychiatric symptom management       Psychiatric relapse prevention    Anxiety management      Self-esteem issues      Poor decision making       Recent decompensation       Safety issues      Anger management     Self care/ADL's     Med/treatment compliance      Impaired boundaries/relationships     Assertiveness      Grief/loss resolution       Guilt/shame issues     Realistic goal setting    DIAGNOSTIC IMPRESSION:  1. 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 Continue the Venlafaxine  mg daily--#90 with 3 refills (90 day)  2. Continue SOP treatment.

## 2021-06-23 NOTE — BH NOTES
[facility] Outpatient Program  Group Therapy  Date of service: 6/23/2021  Start time: 1100  Stop time: 1150  Type of session: Therapy Group    Problem number: 1Dep F33.0    Short term goal (STG): P.   Pt will share with group one thought per group, without prompting, that she is unsure about its effect on her or if it is logical and ask for feedback.        Intervention/techniques: Informed, Validated/Supported, Reflected, Listened/Empathized and Reinforced    Patient mental status/affect: Calm and Congruent    Patient behavior/appearance: Neatly Groomed, Attentive, Cooperative, Motivated and Withdrawn/Quiet    Special patient treatment accommodations provided (describe): none    Patient response and progress towards goals: Focus of session was on effective ways to control anxiety. We spoke about how these specific skills can have a very immediate effect on stress and anxiety and we spoke about how we can motivate to use them. We spoke about the following skills; dealing with problems on the spot, strong, confident relationships, slowing down, taking one thing at a time, coping with ruts, divide problems up, using past experiences, and building relaxation into our daily lives. We spoke about which skill we will start to incorporate into our daily use. Pt participated in the group and engaged with the other members. Pt talked about her relationship with her  and the difficulties of dealing with an alcoholic. She stated \"she made it through\" but it was difficult.   Pt showed support for another member of the group who was hurting

## 2021-06-28 ENCOUNTER — HOSPITAL ENCOUNTER (OUTPATIENT)
Dept: BEHAVIORAL/MENTAL HEALTH | Age: 86
Discharge: HOME | End: 2021-06-28
Payer: MEDICARE

## 2021-06-28 PROCEDURE — 90853 GROUP PSYCHOTHERAPY: CPT

## 2021-06-28 NOTE — BH NOTES
[facility] Outpatient Program  Group Therapy  Date of service: 6/28/2021  Start time: 1100  Stop time: 1150  Type of session: Therapy Group    Problem number: 1Dep. F33.0    Short term goal (STG): S . Pt will verbalize belief in herself that she has the ability to make thought out decisions and it is a goal she is willing to work towards.      Intervention/techniques: Informed, Validated/Supported, Listened/Empathized, Observed/Monitored and Reinforced    Patient mental status/affect: Calm and Congruent    Patient behavior/appearance: Attentive, Cooperative and Motivated    Special patient treatment accommodations provided (describe): none    Patient response and progress towards goals: Focus of session was on understanding the need for a healthy balance in our mood and our day to day functioning. We reviewed the handout on healthy balance from [facility] about being aware of low energy or mood signs and symptoms, well balanced and healthy signs and symptoms, and high energy or mood signs and symptoms. We went over thoughts, feelings (both emotions and physical sensations) and actions and behaviors for each level. We spoke about what group members were not aware of or didnt take note of in the past and how it can help them today maintain a healthy balance. Pt participated in the group activity and interacted with the other group members. Pt stated that she was in the well, healthy part of the Healthy Balance Scale. She stated she was sad this that her tracey bushes might be dying. She shared this with another member and they have offered to help her.   She states that her hess, God and friends are what enable her to stay in the well, healthy balanced category

## 2021-06-28 NOTE — BH NOTES
[facility] Outpatient Program  Group Therapy    Date of Service: 6/28/2021  Start time: 1200  Stop time: 1250  Type of session: Therapy Group    Problem number: 1. 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  Pt will identify and rehearse the management of future situations or circumstances in which lapses could occur and get help of group members to increase confidence of this management    Intervention/techniques: Informed, Validated/Supported, Prompted/Cued, Listened/Empathized, Promoted Peer Support and Provided Feedback    Patient mental status/affect: Anxious, Congruent and Preoccupied    Patient behavior/appearance: Neatly Groomed, Attentive, Cooperative and Needed Prompting    Special patient treatment accommodations provided (describe): None    Patient response and progress towards goals: Focus of session was on the qualities and assets that we can possess in order to have a successful recovery. We focused this session on the importance of having a secure base which means that we have our basic needs met and that we have access to and utilize help when it is necessary. We spoke about how we also have to have a sense of self and positive self esteem and we spoke about specific ways in which we have grown in self esteem through the course of this treatment. We spoke about the need and importance of supportive relationships and that we benefit greatly from having others believe in us and that we have the potential to recover. We also spoke about the importance of feeling empowered to make good decisions for ourselves and can work through problems when they arise. Alon Bruce participated in group with prompting. She spoke about how she knows that she has been able to build up better confidence in herself. She spoke about what she was feeling and thinking the first time she went to the hospital and \"all I could l think was that I wanted to destroy myself. \"  She was able to take in ideas and share her thoughts well with other group members.

## 2021-06-28 NOTE — BH NOTES
[facility] Outpatient Program  Group Therapy    Date of Service: 6/28/2021  Start time: 1000  Stop time: 1050  Type of session: Therapy Group    Problem number: 1. Dep F 33.0    Short term goal (STG): T. Pt will identify specific relapse triggers & develop in writing and/or articulate two possible coping strategies for each    Intervention/techniques: Informed, Validated/Supported, Modeled/Rehearsed, Prompted/Cued, Promoted Peer Support and Provided Feedback    Patient mental status/affect: Anxious, Congruent and Preoccupied    Patient behavior/appearance: Neatly Groomed, Attentive, Cooperative and Needed Prompting    Special patient treatment accommodations provided (describe): None    Patient response and progress towards goals: Focus of session was on negative self evaluation and how it impacts self esteem and then impacts what we do and how we behave. We spoke about the common ways we are negative towards ourselves is when we tell our selves we should or should not have done things, chastising ourselves for not meeting the standards that we have set for ourselves. Negative self talk also affects us in we start to call ourselves hurtful names and start to believe them, and we also make sweeping generalizations about who and what we are. We spoke about how we can challenge these thoughts by using the skill of looking for facts in what we are saying and looking for alternative views of the situation. We discussed the value in starting a thought diary because it is easier to process our thoughts out on paper than in our heads. Wilber Gusman participated in group with prompting. She spoke about how she is worried about her tracey bushes and she has found herself more depressed as they have a fungus on them and \"these bushes are my source of light and contentment. \"  She spoke about her feelings and the group shared support and encouragement as well as ideas as to what she can do.

## 2021-07-01 ENCOUNTER — APPOINTMENT (OUTPATIENT)
Dept: CT IMAGING | Age: 86
End: 2021-07-01
Attending: EMERGENCY MEDICINE
Payer: MEDICARE

## 2021-07-01 ENCOUNTER — HOSPITAL ENCOUNTER (EMERGENCY)
Age: 86
Discharge: HOME | End: 2021-07-01
Attending: EMERGENCY MEDICINE
Payer: MEDICARE

## 2021-07-01 VITALS
SYSTOLIC BLOOD PRESSURE: 147 MMHG | DIASTOLIC BLOOD PRESSURE: 79 MMHG | OXYGEN SATURATION: 99 % | RESPIRATION RATE: 18 BRPM | WEIGHT: 160 LBS | BODY MASS INDEX: 25.71 KG/M2 | HEIGHT: 66 IN | TEMPERATURE: 98.4 F | HEART RATE: 75 BPM

## 2021-07-01 DIAGNOSIS — R05.9 COUGH: ICD-10-CM

## 2021-07-01 DIAGNOSIS — G45.9 TIA (TRANSIENT ISCHEMIC ATTACK): Primary | ICD-10-CM

## 2021-07-01 LAB
ALBUMIN SERPL-MCNC: 3.5 G/DL (ref 3.5–5)
ALBUMIN/GLOB SERPL: 0.8 {RATIO} (ref 1.1–2.2)
ALP SERPL-CCNC: 89 U/L (ref 45–117)
ALT SERPL-CCNC: 20 U/L (ref 12–78)
ANION GAP SERPL CALC-SCNC: 9 MMOL/L (ref 5–15)
APPEARANCE UR: CLEAR
AST SERPL-CCNC: 18 U/L (ref 15–37)
ATRIAL RATE: 63 BPM
BACTERIA URNS QL MICRO: NEGATIVE /HPF
BASOPHILS # BLD: 0.1 K/UL (ref 0–0.1)
BASOPHILS NFR BLD: 1 % (ref 0–1)
BILIRUB SERPL-MCNC: 0.5 MG/DL (ref 0.2–1)
BILIRUB UR QL: NEGATIVE
BUN SERPL-MCNC: 12 MG/DL (ref 6–20)
BUN/CREAT SERPL: 9 (ref 12–20)
CALCIUM SERPL-MCNC: 8.8 MG/DL (ref 8.5–10.1)
CALCULATED P AXIS, ECG09: 57 DEGREES
CALCULATED R AXIS, ECG10: 4 DEGREES
CALCULATED T AXIS, ECG11: 81 DEGREES
CHLORIDE SERPL-SCNC: 102 MMOL/L (ref 97–108)
CO2 SERPL-SCNC: 29 MMOL/L (ref 21–32)
COLOR UR: ABNORMAL
COMMENT, HOLDF: NORMAL
CREAT SERPL-MCNC: 1.28 MG/DL (ref 0.55–1.02)
DIAGNOSIS, 93000: NORMAL
DIFFERENTIAL METHOD BLD: NORMAL
EOSINOPHIL # BLD: 0.1 K/UL (ref 0–0.4)
EOSINOPHIL NFR BLD: 1 % (ref 0–7)
EPITH CASTS URNS QL MICRO: ABNORMAL /LPF
ERYTHROCYTE [DISTWIDTH] IN BLOOD BY AUTOMATED COUNT: 12.2 % (ref 11.5–14.5)
GLOBULIN SER CALC-MCNC: 4.4 G/DL (ref 2–4)
GLUCOSE BLD STRIP.AUTO-MCNC: 278 MG/DL (ref 65–117)
GLUCOSE SERPL-MCNC: 260 MG/DL (ref 65–100)
GLUCOSE UR STRIP.AUTO-MCNC: NEGATIVE MG/DL
HCT VFR BLD AUTO: 42.5 % (ref 35–47)
HGB BLD-MCNC: 13.9 G/DL (ref 11.5–16)
HGB UR QL STRIP: NEGATIVE
IMM GRANULOCYTES # BLD AUTO: 0 K/UL (ref 0–0.04)
IMM GRANULOCYTES NFR BLD AUTO: 0 % (ref 0–0.5)
INR PPP: 1.2 (ref 0.9–1.1)
KETONES UR QL STRIP.AUTO: NEGATIVE MG/DL
LEUKOCYTE ESTERASE UR QL STRIP.AUTO: NEGATIVE
LYMPHOCYTES # BLD: 1.7 K/UL (ref 0.8–3.5)
LYMPHOCYTES NFR BLD: 23 % (ref 12–49)
MCH RBC QN AUTO: 30.5 PG (ref 26–34)
MCHC RBC AUTO-ENTMCNC: 32.7 G/DL (ref 30–36.5)
MCV RBC AUTO: 93.2 FL (ref 80–99)
MONOCYTES # BLD: 0.4 K/UL (ref 0–1)
MONOCYTES NFR BLD: 6 % (ref 5–13)
NEUTS SEG # BLD: 5.2 K/UL (ref 1.8–8)
NEUTS SEG NFR BLD: 69 % (ref 32–75)
NITRITE UR QL STRIP.AUTO: NEGATIVE
NRBC # BLD: 0 K/UL (ref 0–0.01)
NRBC BLD-RTO: 0 PER 100 WBC
P-R INTERVAL, ECG05: 158 MS
PH UR STRIP: 6 [PH] (ref 5–8)
PLATELET # BLD AUTO: 288 K/UL (ref 150–400)
PMV BLD AUTO: 9.7 FL (ref 8.9–12.9)
POTASSIUM SERPL-SCNC: 3.4 MMOL/L (ref 3.5–5.1)
PROT SERPL-MCNC: 7.9 G/DL (ref 6.4–8.2)
PROT UR STRIP-MCNC: NEGATIVE MG/DL
PROTHROMBIN TIME: 11.8 SEC (ref 9–11.1)
Q-T INTERVAL, ECG07: 414 MS
QRS DURATION, ECG06: 90 MS
QTC CALCULATION (BEZET), ECG08: 423 MS
RBC # BLD AUTO: 4.56 M/UL (ref 3.8–5.2)
RBC #/AREA URNS HPF: ABNORMAL /HPF (ref 0–5)
SAMPLES BEING HELD,HOLD: NORMAL
SERVICE CMNT-IMP: ABNORMAL
SODIUM SERPL-SCNC: 140 MMOL/L (ref 136–145)
SP GR UR REFRACTOMETRY: >1.035 (ref 1–1.03)
TROPONIN I SERPL-MCNC: <0.05 NG/ML
UA: UC IF INDICATED,UAUC: ABNORMAL
UROBILINOGEN UR QL STRIP.AUTO: 0.2 EU/DL (ref 0.2–1)
VENTRICULAR RATE, ECG03: 63 BPM
WBC # BLD AUTO: 7.4 K/UL (ref 3.6–11)
WBC URNS QL MICRO: ABNORMAL /HPF (ref 0–4)

## 2021-07-01 PROCEDURE — 70496 CT ANGIOGRAPHY HEAD: CPT

## 2021-07-01 PROCEDURE — 82962 GLUCOSE BLOOD TEST: CPT

## 2021-07-01 PROCEDURE — 85025 COMPLETE CBC W/AUTO DIFF WBC: CPT

## 2021-07-01 PROCEDURE — 74011250637 HC RX REV CODE- 250/637: Performed by: EMERGENCY MEDICINE

## 2021-07-01 PROCEDURE — 81001 URINALYSIS AUTO W/SCOPE: CPT

## 2021-07-01 PROCEDURE — 84484 ASSAY OF TROPONIN QUANT: CPT

## 2021-07-01 PROCEDURE — 85610 PROTHROMBIN TIME: CPT

## 2021-07-01 PROCEDURE — 36415 COLL VENOUS BLD VENIPUNCTURE: CPT

## 2021-07-01 PROCEDURE — 74011000636 HC RX REV CODE- 636: Performed by: EMERGENCY MEDICINE

## 2021-07-01 PROCEDURE — 99285 EMERGENCY DEPT VISIT HI MDM: CPT

## 2021-07-01 PROCEDURE — 80053 COMPREHEN METABOLIC PANEL: CPT

## 2021-07-01 PROCEDURE — 93005 ELECTROCARDIOGRAM TRACING: CPT

## 2021-07-01 PROCEDURE — 70450 CT HEAD/BRAIN W/O DYE: CPT

## 2021-07-01 RX ORDER — ASPIRIN 325 MG
325 TABLET ORAL DAILY
Qty: 30 TABLET | Refills: 0 | Status: SHIPPED | OUTPATIENT
Start: 2021-07-01 | End: 2021-08-05

## 2021-07-01 RX ORDER — LISINOPRIL 20 MG/1
20 TABLET ORAL
Status: COMPLETED | OUTPATIENT
Start: 2021-07-01 | End: 2021-07-01

## 2021-07-01 RX ORDER — ASPIRIN 325 MG
325 TABLET ORAL ONCE
Status: COMPLETED | OUTPATIENT
Start: 2021-07-01 | End: 2021-07-01

## 2021-07-01 RX ORDER — FLUTICASONE PROPIONATE 50 MCG
2 SPRAY, SUSPENSION (ML) NASAL DAILY
Qty: 1 BOTTLE | Refills: 0 | Status: ON HOLD | OUTPATIENT
Start: 2021-07-01 | End: 2022-02-12 | Stop reason: SDUPTHER

## 2021-07-01 RX ORDER — BENZONATATE 100 MG/1
100 CAPSULE ORAL
Status: COMPLETED | OUTPATIENT
Start: 2021-07-01 | End: 2021-07-01

## 2021-07-01 RX ADMIN — BENZONATATE 100 MG: 100 CAPSULE ORAL at 10:41

## 2021-07-01 RX ADMIN — ASPIRIN 325 MG ORAL TABLET 325 MG: 325 PILL ORAL at 10:41

## 2021-07-01 RX ADMIN — LISINOPRIL 20 MG: 20 TABLET ORAL at 11:53

## 2021-07-01 RX ADMIN — IOPAMIDOL 100 ML: 755 INJECTION, SOLUTION INTRAVENOUS at 10:25

## 2021-07-01 NOTE — ED TRIAGE NOTES
Pt complaints of not being able to finish her sentences, forgetfullness that has been going on for the past few months. Last episode was 3:30pm yesterday. Pt is forming her sentences correctly at this time.  NIH-0

## 2021-07-01 NOTE — ED PROVIDER NOTES
EMERGENCY DEPARTMENT HISTORY AND PHYSICAL EXAM          Date: 7/1/2021  Patient Name: Reginald Richards    History of Presenting Illness     Chief Complaint   Patient presents with    Aphasia       History Provided By: Patient    HPI: Reginald Richards is a 82 y.o. female, pmhx diabetes, hypertension, IBS, vertigo, depression who presents ambulatory to the ED c/o difficultly speaking    Patient explains that recently she has had some trouble intermittently with thoughts. She states she will go to say something and she cannot come up with the words. She notes that it will last a few seconds or minutes and then goes away completely. Yesterday at 330 pm episodes seem to have gotten worse and have been fairly consistent intermittently through the night and into this morning. She states right now her thoughts are clear and she denies any headache, neck pain, chest pain, nausea, vomiting, diarrhea, extremity weakness or numbness. She does states she feels off balance and just generally weak. Patient also notes that she has a tickle in her throat with a mild cough which seems to be new and not responding to her allergy medicine    PCP: Queen Jordyn NP    Allergies: None known  Social Hx: +tobacco, -vaping, -EtOH, -Illicit Drugs; There are no other complaints, changes, or physical findings at this time. Current Outpatient Medications   Medication Sig Dispense Refill    fluticasone propionate (FLONASE) 50 mcg/actuation nasal spray 2 Sprays by Both Nostrils route daily. 1 Bottle 0    aspirin (ASPIRIN) 325 mg tablet Take 1 Tablet by mouth daily. 30 Tablet 0    venlafaxine-SR (EFFEXOR-XR) 150 mg capsule Take 1 Capsule by mouth daily (with breakfast).  Indications: major depressive disorder 90 Capsule 3    Januvia 100 mg tablet TAKE 1 TABLET BY MOUTH EVERY DAY 90 Tab 1    lisinopriL (PRINIVIL, ZESTRIL) 20 mg tablet TAKE 1 TABLET BY MOUTH EVERY DAY 90 Tab 2    atorvastatin (LIPITOR) 40 mg tablet Take 1.5 Tabs by mouth nightly. 120 Tab 1    nitroglycerin (NITROSTAT) 0.4 mg SL tablet 1 Tab by SubLINGual route every five (5) minutes as needed for Chest Pain. Up to 3 doses. 1 Bottle 1    polyethylene glycol (Miralax) 17 gram/dose powder Take 17 g by mouth daily. 1 tablespoon with 8 oz of water daily 235 g 0    Blood-Glucose Meter monitoring kit Check glucose once daily. DX: E11.9 1 Kit 0    glucose blood VI test strips (ASCENSIA AUTODISC VI, ONE TOUCH ULTRA TEST VI) strip Check glucose once daily. DX: E11.9 100 Strip 3    lancets misc Check glucose once daily. DX: E11.9 100 Each 3    montelukast (SINGULAIR) 10 mg tablet Take 1 Tab by mouth daily. 90 Tab 0    acetaminophen (TylenoL) 325 mg tablet Take  by mouth every four (4) hours as needed for Pain.  levocetirizine (XYZAL) 5 mg tablet Take 1 pill daily as needed for allergy symptoms 90 Tab 2    metoprolol succinate (TOPROL-XL) 25 mg XL tablet Take 1 Tab by mouth daily. Indications: high blood pressure 90 Tab 0       Past History     Past Medical History:  Past Medical History:   Diagnosis Date    Allergic rhinitis due to pollen     BPV (benign positional vertigo)     Change in bowel habits     Depression     Diabetes ([address]     Dysuria     Hemorrhoids     Hypertension     IBS (irritable bowel syndrome)     Menopause     Other diseases of nasal cavity and sinuses(478.19)     Vertigo        Past Surgical History:  Past Surgical History:   Procedure Laterality Date    COLONOSCOPY N/A 7/15/2020    COLONOSCOPY performed by Wanda Hernandez [facility] at [facility]    HX CHOLECYSTECTOMY  2016    HX COLONOSCOPY  2013?     HX HYSTERECTOMY      UPPER GI ENDOSCOPY,DIAGNOSIS  7/15/2020            Family History:  Family History   Problem Relation Age of Onset    No Known Problems Mother        Social History:  Social History     Tobacco Use    Smoking status: Current Every Day Smoker     Packs/day: 0.25    Smokeless tobacco: Never Used   Vaping Use    Vaping Use: Never used   Substance Use Topics    Alcohol use: No    Drug use: No       Allergies:  No Known Allergies      Review of Systems   Review of Systems   Constitutional: Negative for activity change, appetite change, chills, fever and unexpected weight change. HENT: Negative for congestion. Eyes: Negative for pain and visual disturbance. Respiratory: Positive for cough (\"Dry for quite some time\"). Negative for shortness of breath. Cardiovascular: Negative for chest pain. Gastrointestinal: Negative for abdominal pain, diarrhea, nausea and vomiting. Genitourinary: Negative for dysuria. Musculoskeletal: Negative for back pain. Skin: Negative for rash. Neurological: Positive for dizziness, speech difficulty and weakness. Negative for headaches. Physical Exam   Physical Exam  Vitals and nursing note reviewed. Constitutional:       Appearance: She is well-developed. She is not diaphoretic. Comments: This is a healthy-appearing elderly female, currently in minimal acute distress except for elevated blood pressure   HENT:      Head: Normocephalic and atraumatic. Eyes:      General:         Right eye: No discharge. Left eye: No discharge. Conjunctiva/sclera: Conjunctivae normal.      Pupils: Pupils are equal, round, and reactive to light. Cardiovascular:      Rate and Rhythm: Normal rate and regular rhythm. Heart sounds: Normal heart sounds. No murmur heard. Pulmonary:      Effort: Pulmonary effort is normal. No respiratory distress. Breath sounds: Normal breath sounds. No wheezing or rales. Abdominal:      General: Bowel sounds are normal. There is no distension. Palpations: Abdomen is soft. Tenderness: There is no abdominal tenderness. Musculoskeletal:         General: Normal range of motion. Cervical back: Normal range of motion and neck supple. Skin:     General: Skin is warm and dry. Findings: No rash.    Neurological: Mental Status: She is alert and oriented to person, place, and time. Cranial Nerves: No cranial nerve deficit. Sensory: No sensory deficit. Motor: No weakness or abnormal muscle tone. Coordination: Coordination normal.      Gait: Gait normal.      Comments: Normal heel-to-shin and finger-to-nose       Diagnostic Study Results     Labs -     Recent Results (from the past 12 hour(s))   GLUCOSE, POC    Collection Time: 07/01/21  9:48 AM   Result Value Ref Range    Glucose (POC) 278 (H) 65 - 117 mg/dL    Performed by Shayy Schmidt    CBC WITH AUTOMATED DIFF    Collection Time: 07/01/21 10:08 AM   Result Value Ref Range    WBC 7.4 3.6 - 11.0 K/uL    RBC 4.56 3.80 - 5.20 M/uL    HGB 13.9 11.5 - 16.0 g/dL    HCT 42.5 35.0 - 47.0 %    MCV 93.2 80.0 - 99.0 FL    MCH 30.5 26.0 - 34.0 PG    MCHC 32.7 30.0 - 36.5 g/dL    RDW 12.2 11.5 - 14.5 %    PLATELET 669 255 - 354 K/uL    MPV 9.7 8.9 - 12.9 FL    NRBC 0.0 0  WBC    ABSOLUTE NRBC 0.00 0.00 - 0.01 K/uL    NEUTROPHILS 69 32 - 75 %    LYMPHOCYTES 23 12 - 49 %    MONOCYTES 6 5 - 13 %    EOSINOPHILS 1 0 - 7 %    BASOPHILS 1 0 - 1 %    IMMATURE GRANULOCYTES 0 0.0 - 0.5 %    ABS. NEUTROPHILS 5.2 1.8 - 8.0 K/UL    ABS. LYMPHOCYTES 1.7 0.8 - 3.5 K/UL    ABS. MONOCYTES 0.4 0.0 - 1.0 K/UL    ABS. EOSINOPHILS 0.1 0.0 - 0.4 K/UL    ABS. BASOPHILS 0.1 0.0 - 0.1 K/UL    ABS. IMM.  GRANS. 0.0 0.00 - 0.04 K/UL    DF AUTOMATED     METABOLIC PANEL, COMPREHENSIVE    Collection Time: 07/01/21 10:08 AM   Result Value Ref Range    Sodium 140 136 - 145 mmol/L    Potassium 3.4 (L) 3.5 - 5.1 mmol/L    Chloride 102 97 - 108 mmol/L    CO2 29 21 - 32 mmol/L    Anion gap 9 5 - 15 mmol/L    Glucose 260 (H) 65 - 100 mg/dL    BUN 12 6 - 20 MG/DL    Creatinine 1.28 (H) 0.55 - 1.02 MG/DL    BUN/Creatinine ratio 9 (L) 12 - 20      GFR est AA 48 (L) >60 ml/min/1.73m2    GFR est non-AA 40 (L) >60 ml/min/1.73m2    Calcium 8.8 8.5 - 10.1 MG/DL    Bilirubin, total 0.5 0.2 - 1.0 MG/DL ALT (SGPT) 20 12 - 78 U/L    AST (SGOT) 18 15 - 37 U/L    Alk. phosphatase 89 45 - 117 U/L    Protein, total 7.9 6.4 - 8.2 g/dL    Albumin 3.5 3.5 - 5.0 g/dL    Globulin 4.4 (H) 2.0 - 4.0 g/dL    A-G Ratio 0.8 (L) 1.1 - 2.2     PROTHROMBIN TIME + INR    Collection Time: 07/01/21 10:08 AM   Result Value Ref Range    INR 1.2 (H) 0.9 - 1.1      Prothrombin time 11.8 (H) 9.0 - 11.1 sec   TROPONIN I    Collection Time: 07/01/21 10:08 AM   Result Value Ref Range    Troponin-I, Qt. <0.05 <0.05 ng/mL   SAMPLES BEING HELD    Collection Time: 07/01/21 10:08 AM   Result Value Ref Range    SAMPLES BEING HELD  1 SST, 1 RED     COMMENT        Add-on orders for these samples will be processed based on acceptable specimen integrity and analyte stability, which may vary by analyte.    EKG, 12 LEAD, INITIAL    Collection Time: 07/01/21 10:41 AM   Result Value Ref Range    Ventricular Rate 63 BPM    Atrial Rate 63 BPM    P-R Interval 158 ms    QRS Duration 90 ms    Q-T Interval 414 ms    QTC Calculation (Bezet) 423 ms    Calculated P Axis 57 degrees    Calculated R Axis 4 degrees    Calculated T Axis 81 degrees    Diagnosis       Sinus rhythm with marked sinus arrhythmia  Nonspecific T wave abnormality  Abnormal ECG  When compared with ECG of 22-MAR-2021 06:27,  premature atrial complexes are no longer present  Nonspecific T wave abnormality has replaced inverted T waves in Lateral leads     URINALYSIS W/ REFLEX CULTURE    Collection Time: 07/01/21 10:57 AM    Specimen: Urine   Result Value Ref Range    Color YELLOW/STRAW      Appearance CLEAR CLEAR      Specific gravity >1.035 (H) 1.003 - 1.030    pH (UA) 6.0 5.0 - 8.0      Protein Negative NEG mg/dL    Glucose Negative NEG mg/dL    Ketone Negative NEG mg/dL    Bilirubin Negative NEG      Blood Negative NEG      Urobilinogen 0.2 0.2 - 1.0 EU/dL    Nitrites Negative NEG      Leukocyte Esterase Negative NEG      WBC 0-4 0 - 4 /hpf    RBC 0-5 0 - 5 /hpf    Epithelial cells MODERATE (A) FEW /lpf    Bacteria Negative NEG /hpf    UA:UC IF INDICATED CULTURE NOT INDICATED BY UA RESULT CNI         Radiologic Studies -   CTA CODE NEURO HEAD AND NECK W CONT   Final Result   1. No large vessel occlusion. 2. Right cervical ICA 50% stenosis. CT CODE NEURO HEAD WO CONTRAST   Final Result   No acute intracranial finding. CT Results  (Last 48 hours)               07/01/21 1019  CTA CODE NEURO HEAD AND NECK W CONT Final result    Impression:  1. No large vessel occlusion. 2. Right cervical ICA 50% stenosis. Narrative:  INDICATION:  Code Stroke        CTA Head and CTA Neck performed with helical axial imaging with bolus IV   injection of 80 mL Isovue 370 contrast with 3D post processing performed, with   sagittal and coronal MIPS provided. Postenhanced Head CT images provided. CT   dose reduction was achieved through the use of a standardized protocol tailored   for this examination and automatic exposure control for dose modulation. NECK:   Common carotid arteries show no significant stenosis. External carotid arteries are patent. ICA Stenosis Assessment (NASCET criteria) Right 50%   Left:0%       Origin of the major brachiocephalic arteries from the aortic arch show no   significant stenosis. Vertebral arteries are patent, codominant and without   significant stenosis. Thyroid and neck soft tissues are unremarkable for age. HEAD:   Intracranial circulation shows no major vessel occlusion, intraluminal thrombus,   aneurysm, AVM or evidence of irregularity suggestive of vasculitis. Images of the brain show no bleed, mass, shift, hydrocephalus, or abnormal   enhancement. 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 07/01/21 1013  CT CODE NEURO HEAD WO CONTRAST Final result    Impression:  No acute intracranial finding. Narrative:  INDICATION: Code Stroke       EXAM: CT HEAD without contrast.       COMPARISON: 3/22/2021.        TECHNIQUE: Unenhanced CT Head is performed. CT dose reduction was achieved   through use of a standardized protocol tailored for this examination and   automatic exposure control for dose modulation. FINDINGS:   No acute infarct is seen. 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 There is no   apparent mass on unenhanced imaging. There is no bleed, shift, obstructive   hydrocephalus or significant extra-axial fluid collection. Bone windows are   unremarkable. No change. CXR Results  (Last 48 hours)    None            Medical Decision Making   I am the first provider for this patient. I reviewed the vital signs, available nursing notes, past medical history, past surgical history, family history and social history. Vital Signs-Reviewed the patient's vital signs. Patient Vitals for the past 12 hrs:   Temp Pulse Resp BP SpO2   07/01/21 1153 -- 75 -- (!) 147/79 --   07/01/21 1145 -- (!) 56 18 (!) 147/79 99 %   07/01/21 1130 -- 75 17 (!) 170/85 97 %   07/01/21 1120 -- 71 24 (!) 164/86 97 %   07/01/21 1101 -- -- 22 139/88 93 %   07/01/21 1045 -- 73 23 136/66 95 %   07/01/21 1001 -- 94 25 (!) 188/88 97 %   07/01/21 0945 98.4 °F (36.9 °C) 86 16 (!) 192/120 96 %       Pulse Oximetry Analysis - 97% on RA    Cardiac Monitor:   Rate: 80bpm  Rhythm: Normal Sinus Rhythm      Records Reviewed: Nursing Notes, Old Medical Records, Previous Radiology Studies and Previous Laboratory Studies    Provider Notes (Medical Decision Making):   MDM: Elderly female presenting with difficulty speaking and complains of off balance with some generalized weakness. On exam there is no evidence of weakness she is quite strong and there is no evidence of dysmetria and patient was able to ambulate into the room. Given her stuttering symptoms however level 2 code stroke evaluation has been initiated. Patient is not a TPA candidate. ED Course:   Initial assessment performed.  The patients presenting problems have been discussed, and they are in agreement with the care plan formulated and outlined with them. I have encouraged them to ask questions as they arise throughout their visit. EKG interpretation: (Preliminary)  Rhythm: Sinus rhythm at a rate of 63 bpm with sinus arrhythmia noted; normal MA; normal QRS; normal QTC. Q waves in lead III previously noted. Prior EKG February 2021 showed lateral T wave flattening, March 2021 showed deep T wave inversions which have been replaced with superficial inversions. Otherwise, EKG appears very similar to February 20, 2021  This EKG was interpreted by ED Provider Rashida Gibbons MD    PROGRESS NOTE:  11:30 PM  Pt well and would really like to go home. CTA without any acute findings. We will change her aspirin from  and I recommend follow-up with her primary care doctor this week or next week for recheck of her symptoms and medical management. Given her dry cough I explained her that this could be her lisinopril but she states she really thinks is coming from her nose. We will try Flonase with her allergy pill but I explained if it does not get better she might want to stop her lisinopril and change to another medication but I explained that she needs to talk to her doctor about that. Discharge note:  Pt re-evaluated and noted to be feeling better, ready for discharge. Updated pt on all final lab and imaging findings. Will follow up as instructed. All questions have been answered, pt voiced understanding and agreement with plan. Specific return precautions provided as well as instructions to return to the ED should sx worsen at any time. Vital signs stable for discharge. Critical Care Time:   0      Diagnosis     Clinical Impression:   1. TIA (transient ischemic attack)    2. Cough        PLAN:  1.    Discharge Medication List as of 7/1/2021 11:46 AM      START taking these medications    Details   fluticasone propionate (FLONASE) 50 mcg/actuation nasal spray 2 Sprays by Both Nostrils route daily. , Normal, Disp-1 Bottle, R-0      aspirin (ASPIRIN) 325 mg tablet Take 1 Tablet by mouth daily. , Normal, Disp-30 Tablet, R-0         CONTINUE these medications which have NOT CHANGED    Details   venlafaxine-SR (EFFEXOR-XR) 150 mg capsule Take 1 Capsule by mouth daily (with breakfast). Indications: major depressive disorder, Normal, Disp-90 Capsule, R-3      Januvia 100 mg tablet TAKE 1 TABLET BY MOUTH EVERY DAY, Normal, Disp-90 Tab, R-1      lisinopriL (PRINIVIL, ZESTRIL) 20 mg tablet TAKE 1 TABLET BY MOUTH EVERY DAY, Normal, Disp-90 Tab, R-2      atorvastatin (LIPITOR) 40 mg tablet Take 1.5 Tabs by mouth nightly., Normal, Disp-120 Tab, R-1Don't fill now, keep on file for future use, dose increased      nitroglycerin (NITROSTAT) 0.4 mg SL tablet 1 Tab by SubLINGual route every five (5) minutes as needed for Chest Pain. Up to 3 doses. , Normal, Disp-1 Bottle, R-1      polyethylene glycol (Miralax) 17 gram/dose powder Take 17 g by mouth daily. 1 tablespoon with 8 oz of water daily, Normal, Disp-235 g, R-0      Blood-Glucose Meter monitoring kit Check glucose once daily. DX: E11.9, Normal, Disp-1 Kit, R-0      glucose blood VI test strips (ASCENSIA AUTODISC VI, ONE TOUCH ULTRA TEST VI) strip Check glucose once daily. DX: E11.9, Normal, Disp-100 Strip, R-3      lancets misc Check glucose once daily. DX: E11.9, Normal, Disp-100 Each, R-3      montelukast (SINGULAIR) 10 mg tablet Take 1 Tab by mouth daily. , Normal, Disp-90 Tab,R-0      acetaminophen (TylenoL) 325 mg tablet Take  by mouth every four (4) hours as needed for Pain., Historical Med      levocetirizine (XYZAL) 5 mg tablet Take 1 pill daily as needed for allergy symptoms, Normal, Disp-90 Tab,R-2      metoprolol succinate (TOPROL-XL) 25 mg XL tablet Take 1 Tab by mouth daily.  Indications: high blood pressure, Normal, Disp-90 Tab,R-0         STOP taking these medications       aspirin delayed-release 81 mg tablet Comments:   Reason for Stoppin.   Follow-up Information     Follow up With Specialties Details Why Contact Info    Tricia Hurley NP Nurse Practitioner Schedule an appointment as soon as possible for a visit  for symptom recheck and blood pressure recheck [id number] [facility] [address] [facility]      [address] [facility] [address] [facility]  If symptoms worsen [address] [phone]        Return to ED if worse     Disposition:  Home       Please note, this dictation was completed with [employer], the computer voice recognition software. Quite often unanticipated grammatical, syntax, homophones, and other interpretive errors are inadvertently transcribed by the computer software. Please disregard these errors. Please excuse any errors that have escaped final proof reading.

## 2021-07-07 ENCOUNTER — HOSPITAL ENCOUNTER (OUTPATIENT)
Dept: BEHAVIORAL/MENTAL HEALTH | Age: 86
Discharge: HOME | End: 2021-07-07
Payer: MEDICARE

## 2021-07-07 PROCEDURE — 90853 GROUP PSYCHOTHERAPY: CPT

## 2021-07-07 NOTE — BH NOTES
[facility] Outpatient Program  Group Therapy    Date of Service: 7/7/2021  Start time: 1200  Stop time: 1250  Type of session: Therapy Group    Problem number: 1. Dep F 33.0    Short term goal (STG): T.  Pt will identify specific relapse triggers & develop in writing and/or articulate two possible coping strategies for each. Intervention/techniques: Informed, Validated/Supported, Reframed, Modeled/Rehearsed, Listened/Empathized, Promoted Peer Support and Provided Feedback    Patient mental status/affect: Anxious, Congruent and Preoccupied    Patient behavior/appearance: Neatly Groomed, Attentive, Cooperative and Needed Prompting    Special patient treatment accommodations provided (describe): None    Patient response and progress towards goals: Focus of session was based on information in The Self Esteem Workbook by HADLEY Acuña. We spoke about how self esteem has to be built on the basis of unconditional self worth. We spoke about what that means and I shared with group Waldemar Rasmussen based on work by Football Meister. I shared that worth is infinite, eternal in all people, everyone has equal worth and it is not comparative, externals such as money, looks, achievements do not increase our self worth, worth is stable and never rejected, and worth does not have to be earned or proved. I asked group members to share their thoughts on how they may be telling themselves currently how they are not worthy or what they do causes them to think negative and debilitating self statements. We discussed how each group member can work to build their unconditional self worth so that self esteem can be built and stabilized. Keron Lloyd participated in group with prompting. She spoke about how she has struggled with seeing others she cared about sick and that is her main concern right now.   She spoke about how she knows she has been able to develop a specific plan for dealing with negative thoughts about herself and she will continue to work on challenging the irrational and unfair thoughts.

## 2021-07-07 NOTE — BH NOTES
[facility] Outpatient Program  Group Therapy    Date of Service: 7/7/2021  Start time: 1000  Stop time: 1050  Type of session: Therapy Group    Problem number: 1. Dep F 33.0    Short term goal (STG): Q.  Pt will develop written continuing aftercare plan with focus on coping with family & other stressors once discharged and not receiving support and direction of group.     Intervention/techniques: Informed, Validated/Supported, Listened/Empathized, Observed/Monitored, Facilitated Disclosure and Provided Feedback    Patient mental status/affect: Anxious, Congruent and Preoccupied    Patient behavior/appearance: Neatly Groomed, Attentive, Cooperative, Needed Prompting and Withdrawn/Quiet    Special patient treatment accommodations provided (describe): None    Patient response and progress towards goals: Focus of session was based on meditation in the book The Language of Letting Go by Carroll. We spoke about what and who we are tolerating right now and what we are noticing how we allow people to treat us. We spoke about how we often are uncomfortable with demanding the best in life and to be treated well and how we can overcome that and be reasonable in our expectations but also not settle for less than we deserve simply because we are human and are allowed to pursue good things in our lives. We spoke about how we can make it a goal to focus on how we let others treat us and not deny or minimize unacceptable behaviors for us. Ezequiel Ramos was quiet in group and she spoke about how she has a nephew in the hospital and she has been thinking of him. She was uncharacteristically quiet in group but she would answer direct questions.

## 2021-07-07 NOTE — BH NOTES
[facility] Outpatient Program  Group Therapy  Date of service: 7/7/2021  Start time: 1100  Stop time: 1150  Type of session: Therapy Group    Problem number: 1Dep. F33.0    Short term goal (STG): S Pt will verbalize belief in herself that she has the ability to make thought out decisions and it is a goal she is willing to work towards.     Intervention/techniques: Informed, Validated/Supported, Listened/Empathized, Reinforced and Facilitated Disclosure    Patient mental status/affect: Calm, Congruent and Happy    Patient behavior/appearance: Attentive, Cooperative and Motivated    Special patient treatment accommodations provided (describe): none    Patient response and progress towards goals: Focus of session was on article by Christina Vieira titled 98 things Its Time You Forgave Yourself For.   We spoke about the impact that a lack of forgiveness has on our functioning and well being. We spoke about the challenge that is added when we lack it towards ourselves. We spoke about the 14 things which are: big things you changed your mind about, the ways in which we fought through the pain, the person you could never love properly, all the ways in which you are not enough, the ways you treated your parents (or others), the way you treated yourself, the breaks you took from life, the chances you didnt take, the things you didnt say until it was too late, the disasters you didnt see coming, and whatever you are still not ready for. We spoke about what group members are going to focus on letting go of for their recovery  Pt participated in the group activity and engaged with the other members. She talked about resilience and the many painful experiences that many of the members shared today. She stated \"that at one point when she realized that she had another father(in heaven) she knew she would be alright\".   She did not expand but stated that she tries to acknowledge others and \"not forget to lift people up\"

## 2021-07-14 ENCOUNTER — HOSPITAL ENCOUNTER (OUTPATIENT)
Dept: BEHAVIORAL/MENTAL HEALTH | Age: 86
Discharge: HOME | End: 2021-07-14
Payer: MEDICARE

## 2021-07-14 PROCEDURE — 90853 GROUP PSYCHOTHERAPY: CPT

## 2021-07-14 NOTE — BH NOTES
[facility]  Group Therapy    Date of Service: 7/14/2021  Start time: 1100  Stop time: 1150  Type of session: Therapy Group    Problem number: 1. Dep F 33.0    Short term goal (STG): T.  Pt will identify specific relapse triggers & develop in writing and/or articulate two possible coping strategies for each.       Intervention/techniques: Informed, Validated/Supported, Modeled/Rehearsed, Prompted/Cued, Promoted Peer Support and Provided Feedback    Patient mental status/affect: Anxious, Congruent and Preoccupied    Patient behavior/appearance: Neatly Groomed, Attentive, Cooperative, Needed Prompting and Withdrawn/Quiet    Special patient treatment accommodations provided (describe): None    Patient response and progress towards goals: Focus of session was based on an article found on the website [employer]ir[employer] about the 7 things that you can control in life.   I shared with group those 7 things which include your breath, your self talk, your gratitude, your body language, your mental and physical fitness, your diet, and your sleep. We spoke about how lack of focus on these areas affects us and what we can do to improve control over these 7 aspects. Connie Priest continues to be quiet in group but she will answer questions. Group members were very supportive of her and encouraging of her to manage and maintain her well being right now.

## 2021-07-14 NOTE — BH NOTES
[facility] Outpatient Program  Group Therapy  Date of service: 7/14/2021  Start time: 1200  Stop time: 1250  Type of session: Therapy Group    Problem number: 1Dep. F33.0    Short term goal (STG):S  Pt will verbalize belief in herself that she has the ability to make thought out decisions and it is a goal she is willing to work towards.        Intervention/techniques: Listened/Empathized, Reinforced and Provided Feedback    Patient mental status/affect: Calm and Congruent    Patient behavior/appearance: Attentive, Cooperative and Motivated    Special patient treatment accommodations provided (describe): none    Patient response and progress towards goals: Focus of session was a general overview of triggers, symptoms, and thoughts associated with anxiety. Group members spoke about recent experiences with anxiety and how they coped and reacted. I shared with group members positive coping thoughts to use in moments of anxiety. We spoke about the coping thoughts that include; I can handle this, my feelings wont kill me, my thoughts dont control my life, I do, I am strong enough to handle what is happening to me, I have survived other painful experiences, I can survive this one, and this is an opportunity for me to cope with my fears. We spoke about which coping strategies can be used to help group members at this time to help reduce anxiety and let the feelings and symptoms pass. Pt participated in the activity and engaged with the other patients. She stated that she had a lot on her mind today . She is confused how people can tell her not to worry things when they don't know how she feels. She tries to keep busy as a strategy for not worrying constantly . She contributed to our discussion regarding anxiety and we reviewed possible coping strategies.

## 2021-07-21 ENCOUNTER — HOSPITAL ENCOUNTER (OUTPATIENT)
Dept: BEHAVIORAL/MENTAL HEALTH | Age: 86
Discharge: HOME | End: 2021-07-21
Payer: MEDICARE

## 2021-07-21 PROCEDURE — 90853 GROUP PSYCHOTHERAPY: CPT

## 2021-07-21 NOTE — BH NOTES
[facility]  Group Therapy  Date of service: 7/21/2021  Start time: 1100  Stop time: 1150  Type of session: Therapy Group    Problem number: 1Dep. F33.0    Short term goal (STG): S Pt will verbalize belief in herself that she has the ability to make thought out decisions and it is a goal she is willing to work towards.       Intervention/techniques: Informed, Validated/Supported, Prompted/Cued, Reinforced and Provided Feedback    Patient mental status/affect: Calm, Congruent and Happy    Patient behavior/appearance: Attentive, Cooperative and Motivated    Special patient treatment accommodations provided (describe): none    Patient response and progress towards goals: Focus of session was on information in regard to the 90 second rule developed by Neuroanatomist Dr. Maryan Jean. I shared the idea that chemicals hit our body and that if we allow them to process, it only takes 90 seconds for them to work through our body and then we can return to a calm state. We spoke about the things that we do that add to our stress including thinking patterns, reactions, and attitudes and how it we can develop the ability to work through the 90 seconds, we can have a better handle on our reactions and responses. We spoke about what strategies that can help us to make it through that 90 seconds. Pt participated in the activity and engaged with the other members. She stated that she feels\"really good' today. She was not experiencing arthritis pain and had a good weekend.   She would like to start volunteering now that she will only be coming to group one time a week

## 2021-07-21 NOTE — BH NOTES
[facility]  Group Therapy    Date of Service: 2021  Start time: 1200  Stop time: 1250  Type of session: Therapy Group    Problem number: 1. 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  Pt will identify and rehearse the management of future situations or circumstances in which lapses could occur and get help of group members to increase confidence of this management    Intervention/techniques: Informed, Validated/Supported, Modeled/Rehearsed, Prompted/Cued, Promoted Peer Support, Facilitated Disclosure and Provided Feedback    Patient mental status/affect: Anxious, Congruent and Preoccupied    Patient behavior/appearance: Neatly Groomed, Attentive, Cooperative and Needed Prompting    Special patient treatment accommodations provided (describe): None    Patient response and progress towards goals: Focus of session was on information from the website [facility]. It was on information on how to filter ourselves before talking. We spoke about being aware of the first thing that comes to mind, and then using our judgement to decide if our words need filtering. I shared the steps of being aware of our intention and if we are going to say this to hurt ourselves or others, focusing on what our choice is and does this really need to be said, having compassion and being understanding of others, and having empathy for others. We spoke about being aware of if we have been listening properly and are our words respectful when we say them. I asked group members to share which one of these steps can help the most in being a more effective communicator. Kamini Ward participated in group with prompting. She spoke about how she continues to think about her sister in law and how \"she told me not to invite any of our old classmates to her  and I thought about that for 7 months! \"  She spoke about how it hurt her feelings and she took it really personally as she can see that now with some perspective. She was able to share she believes she has let this go and she will hope to rebuild a relationship with her sister in law.

## 2021-07-21 NOTE — BH NOTES
[facility]  Group Therapy    Date of Service: 7/21/2021  Start time: 1000  Stop time: 1050  Type of session: Therapy Group    Problem number: 1. Dep F 33.0    Short term goal (STG): Q. Pt will develop written continuing aftercare plan with focus on coping with family & other stressors once discharged and not receiving support and direction of group.     Intervention/techniques: Informed, Validated/Supported, Modeled/Rehearsed, Prompted/Cued, Promoted Peer Support and Provided Feedback    Patient mental status/affect: Anxious, Preoccupied and Worried    Patient behavior/appearance: Neatly Groomed, Attentive, Cooperative and Needed Prompting    Special patient treatment accommodations provided (describe): None    Patient response and progress towards goals: Focus of session was based on information from guide from [address] [place] [address] with worry and anxiety amidst global uncertainty.    We spoke about the value in looking ahead but then we discussed the worry that can create. We discussed how quickly worry is escalating right now and how it is essentially in our heads. We spoke about types of worry and what we can actually do and we discussed strategies to use right now to help with worry. We spoke about making sure that we have balance in our lives with pleasure ,achievement, and closeness and connection to others. We spoke about how this can be improved right now and how to develop better balance. Lester Leiva participated in group with prompting. She spoke about how she knows that she needs to work on managing fears she has and she continues to focus on her worry about losing all of her tracey bushes. She continues to talk about how much she loves them and she is really worried about it. She has been told that she needs to wait to treat them until next year. She is worried because \"sitting out there is my relaxing spot. \"

## 2021-07-28 ENCOUNTER — HOSPITAL ENCOUNTER (OUTPATIENT)
Dept: BEHAVIORAL/MENTAL HEALTH | Age: 86
Discharge: HOME | End: 2021-07-28
Payer: MEDICARE

## 2021-07-28 PROCEDURE — 90853 GROUP PSYCHOTHERAPY: CPT

## 2021-07-28 NOTE — BH NOTES
[facility]  Group Therapy  Date of service: 7/28/2021  Start time: 1200  Stop time: 1250  Type of session: Therapy Group    Problem number: 1Dep. F33.0    Short term goal (STG): S Pt will verbalize belief in herself that she has the ability to make thought out decisions and it is a goal she is willing to work towards.       Intervention/techniques: Informed, Validated/Supported, Reinforced and Provided Feedback    Patient mental status/affect: Calm, Congruent and Happy    Patient behavior/appearance: Attentive, Cooperative and Motivated    Special patient treatment accommodations provided (describe): none    Patient response and progress towards goals: Focus of session was on emotional intelligence and the skills that we can build to increase emotional strength and competence. We spoke about the components of EI ([employer]) and they include the ability to identify and discuss emotions, ability to regulate and control emotions, ability to read, label, and interpret other peoples emotions, and the ability to sustain healthy interpersonal relationships. I addressed with group members 5 skills that they can focus on to increase EI and they include the ability to quickly reduce stress, to recognize the emotions that is being felt, the ability to connect with others using non verbals that are healthy, the ability to use humor to deal with challenges, and the ability to resolve conflicts positively and with confidence. Group members discussed which skill they can practice and improve upon in this upcoming week. Pt stated that she was feeling \"happy\" today and was looking forward to coming to group. She stated that she now has arthritis throughout her body and she is trying to adjust to the pain.   She was very supportive of another group member today by sharing her insights related to this person and her personal experiences

## 2021-07-28 NOTE — BH NOTES
[facility] Outpatient Program  Group Therapy    Date of Service: 7/28/2021  Start time: 1100  Stop time: 1150  Type of session: Therapy Group    Problem number: 1. Dep F 33.0    Short term goal (STG): T. Pt will identify specific relapse triggers & develop in writing and/or articulate two possible coping strategies for each. Intervention/techniques: Informed, Validated/Supported, Modeled/Rehearsed, Listened/Empathized and Provided Feedback    Patient mental status/affect: Anxious, Congruent and Preoccupied    Patient behavior/appearance: Neatly Groomed, Attentive, Cooperative and Needed Prompting    Special patient treatment accommodations provided (describe): None    Patient response and progress towards goals: Focus of session was information on the difference between a victim mentality, an survivor mentality, and a thriver mentality. We spoke about how attitude makes a lot of difference in what we do and how we choose to cope in those times when we are thinking negative thoughts that lead to a victim mentality. I shared that victim thinking and behavior includes negative self talk, isolating behavior, overwhelmed by the past, believes everyone else is better, and places own needs last.  A survivor mentality and behavior includes seeing oneself as wounded but healing, knows that they deserve to seek help, not afraid to tell their story, and are learning healthy needs. A thriver mentality includes gratitude, proud of self care, living in the present, and protects self from unsafe others. We spoke about what actions can be taken or what thoughts could be changed to develop a thriver mentality and being. Tez Maria participated in group with prompting. She spoke about how she knows that she needs to find ways to keep herself in thriver mode. She spoke about how she still is not always comfortable with being alone but \"I know I am never alone with my ry. \"  She spoke about how she will continue to work on being comfortable with herself. She shared how very negative she used to be in her past and she shared some things about her marriage and how unhealthy she was at that time.

## 2021-07-28 NOTE — BH NOTES
[facility] Outpatient Program  Group Therapy    Date of Service: 7/28/2021  Start time: 1000  Stop time: 1050  Type of session: Therapy Group    Problem number: 1. dep F 33.0    Short term goal (STG): Q.  Pt will develop written continuing aftercare plan with focus on coping with family & other stressors once discharged and not receiving support and direction of group.     Intervention/techniques: Informed, Validated/Supported, Modeled/Rehearsed, Prompted/Cued, Reinforced and Provided Feedback    Patient mental status/affect: Calm, Congruent and Preoccupied    Patient behavior/appearance: Neatly Groomed, Attentive, Cooperative and Needed Prompting    Special patient treatment accommodations provided (describe): None    Patient response and progress towards goals: Focus of session was on motivation and what we need to have in order to meet our goals. We spoke about how there is intrinsic motivation and extrinsic motivation. We spoke about how intrinsic is internal and that the rewards of reaching a goal or doing a positive thing for ourselves or others is the pleasurable feelings we get from doing these things. Extrinsic motivation is the benefit of the behavior that lies outside the feelings that are brought out of the behavior. These motivators can be money or rewards or positive feedback and comments. We spoke about making sure that the goals we have do have an intrinsic motivator because if it is all based on extrinsic factors, then it is easy to not reach our goals. I asked group members to share a current goal and decide what is motivating them to meet that goal.  Cee Cornejo participated in group with prompting. She spoke about how she knows that she has motivation to be as independent as she can \"for as long as I can. \"  She spoke about how she knows that she continues to have to rely on others for help but she will challenge herself to do for herself what she can do physically and emotionally.

## 2021-08-03 ENCOUNTER — OFFICE VISIT (OUTPATIENT)
Dept: FAMILY MEDICINE CLINIC | Age: 86
End: 2021-08-03
Payer: MEDICARE

## 2021-08-03 VITALS
DIASTOLIC BLOOD PRESSURE: 78 MMHG | TEMPERATURE: 96.6 F | OXYGEN SATURATION: 99 % | SYSTOLIC BLOOD PRESSURE: 127 MMHG | HEART RATE: 79 BPM | HEIGHT: 66 IN | RESPIRATION RATE: 18 BRPM | BODY MASS INDEX: 26.38 KG/M2 | WEIGHT: 164.13 LBS

## 2021-08-03 DIAGNOSIS — E11.22 TYPE 2 DIABETES MELLITUS WITH STAGE 3A CHRONIC KIDNEY DISEASE, WITHOUT LONG-TERM CURRENT USE OF INSULIN (HCC): ICD-10-CM

## 2021-08-03 DIAGNOSIS — Z00.00 MEDICARE ANNUAL WELLNESS VISIT, SUBSEQUENT: Primary | ICD-10-CM

## 2021-08-03 DIAGNOSIS — F33.1 MODERATE EPISODE OF RECURRENT MAJOR DEPRESSIVE DISORDER (HCC): ICD-10-CM

## 2021-08-03 DIAGNOSIS — I10 ESSENTIAL HYPERTENSION: ICD-10-CM

## 2021-08-03 DIAGNOSIS — Z86.73 HISTORY OF CVA (CEREBROVASCULAR ACCIDENT): ICD-10-CM

## 2021-08-03 DIAGNOSIS — G45.9 TIA (TRANSIENT ISCHEMIC ATTACK): ICD-10-CM

## 2021-08-03 DIAGNOSIS — J30.89 ENVIRONMENTAL AND SEASONAL ALLERGIES: ICD-10-CM

## 2021-08-03 DIAGNOSIS — E78.00 ELEVATED CHOLESTEROL: ICD-10-CM

## 2021-08-03 DIAGNOSIS — K21.9 GASTROESOPHAGEAL REFLUX DISEASE, UNSPECIFIED WHETHER ESOPHAGITIS PRESENT: ICD-10-CM

## 2021-08-03 DIAGNOSIS — N18.31 TYPE 2 DIABETES MELLITUS WITH STAGE 3A CHRONIC KIDNEY DISEASE, WITHOUT LONG-TERM CURRENT USE OF INSULIN (HCC): ICD-10-CM

## 2021-08-03 PROCEDURE — 36415 COLL VENOUS BLD VENIPUNCTURE: CPT | Performed by: NURSE PRACTITIONER

## 2021-08-03 PROCEDURE — G8536 NO DOC ELDER MAL SCRN: HCPCS | Performed by: NURSE PRACTITIONER

## 2021-08-03 PROCEDURE — G8419 CALC BMI OUT NRM PARAM NOF/U: HCPCS | Performed by: NURSE PRACTITIONER

## 2021-08-03 PROCEDURE — 1090F PRES/ABSN URINE INCON ASSESS: CPT | Performed by: NURSE PRACTITIONER

## 2021-08-03 PROCEDURE — G8427 DOCREV CUR MEDS BY ELIG CLIN: HCPCS | Performed by: NURSE PRACTITIONER

## 2021-08-03 PROCEDURE — 99214 OFFICE O/P EST MOD 30 MIN: CPT | Performed by: NURSE PRACTITIONER

## 2021-08-03 PROCEDURE — G9717 DOC PT DX DEP/BP F/U NT REQ: HCPCS | Performed by: NURSE PRACTITIONER

## 2021-08-03 PROCEDURE — G8752 SYS BP LESS 140: HCPCS | Performed by: NURSE PRACTITIONER

## 2021-08-03 PROCEDURE — 1101F PT FALLS ASSESS-DOCD LE1/YR: CPT | Performed by: NURSE PRACTITIONER

## 2021-08-03 PROCEDURE — G0439 PPPS, SUBSEQ VISIT: HCPCS | Performed by: NURSE PRACTITIONER

## 2021-08-03 PROCEDURE — G8754 DIAS BP LESS 90: HCPCS | Performed by: NURSE PRACTITIONER

## 2021-08-03 PROCEDURE — G8400 PT W/DXA NO RESULTS DOC: HCPCS | Performed by: NURSE PRACTITIONER

## 2021-08-03 RX ORDER — OMEPRAZOLE 20 MG/1
20 CAPSULE, DELAYED RELEASE ORAL DAILY
Qty: 90 CAPSULE | Refills: 0 | Status: SHIPPED | OUTPATIENT
Start: 2021-08-03 | End: 2021-11-06

## 2021-08-03 NOTE — PROGRESS NOTES
1. Have you been to the ER, urgent care clinic since your last visit? Hospitalized since your last visit? [facility] TIA 7/2021    2. Have you seen or consulted any other health care providers outside of the [address] [place] [address] since your last visit? Include any pap smears or colon screening. No  Chief Complaint   Patient presents with    Annual Wellness Visit    Hypertension    Diabetes    Cholesterol Problem    Cough     dry cough     Visit Vitals  /78 (BP 1 Location: Left arm, BP Patient Position: Sitting)   Pulse 79   Temp (!) 96.6 °F (35.9 °C) (Temporal)   Resp 18   Ht 5' 6\" (1.676 m)   Wt 164 lb 2 oz (74.4 kg)   SpO2 99%   BMI 26.49 kg/m²       This is the Subsequent Medicare Annual Wellness Exam, performed 12 months or more after the Initial AWV or the last Subsequent AWV    I have reviewed the patient's medical history in detail and updated the computerized patient record.        Assessment/Plan   Education and counseling provided:  Are appropriate based on today's review and evaluation       Depression Risk Factor Screening     3 most recent PHQ Screens 7/1/2021   PHQ Not Done -   Little interest or pleasure in doing things Not at all   Feeling down, depressed, irritable, or hopeless Not at all   Total Score PHQ 2 0   Trouble falling or staying asleep, or sleeping too much -   Feeling tired or having little energy -   Poor appetite, weight loss, or overeating -   Feeling bad about yourself - or that you are a failure or have let yourself or your family down -   Trouble concentrating on things such as school, work, reading, or watching TV -   Moving or speaking so slowly that other people could have noticed; or the opposite being so fidgety that others notice -   Thoughts of being better off dead, or hurting yourself in some way -   PHQ 9 Score -   How difficult have these problems made it for you to do your work, take care of your home and get along with others -       Alcohol Risk Screen    Do you average more than 1 drink per night or more than 7 drinks a week:  No    On any one occasion in the past three months have you have had more than 3 drinks containing alcohol:  No        Functional Ability and Level of Safety    Hearing: Hearing is good. Activities of Daily Living: The home contains: handrails and grab bars  Patient does total self care      Ambulation: with no difficulty     Fall Risk:  Fall Risk Assessment, last 12 mths 5/7/2021   Able to walk? Yes   Fall in past 12 months? 0   Do you feel unsteady? 0   Are you worried about falling 0   Number of falls in past 12 months -   Fall with injury? -      Abuse Screen:  Patient is not abused       Cognitive Screening    Has your family/caregiver stated any concerns about your memory: no     Cognitive Screening: Normal - Clock Drawing Test    Functional Ability:   Does the patient exhibit a steady gait? yes   How long did it take the patient to get up and walk from a sitting position? 4 sec   Is the patient self reliant?  (ie can do own laundry, meals, household chores)  yes     Does the patient handle his/her own medications?  no     Does the patient handle his/her own money? yes     Is the patients home safe (ie good lighting, handrails on stairs and bath, etc.)? yes     Did you notice or did patient express any hearing difficulties? no     Did you notice or did patient express any vision difficulties?   no     Were distance and reading eye charts used? no       Advance Care Planning:   Patient was offered the opportunity to discuss advance care planning:  yes     Does patient have an Advance Directive:  no   If no, did you provide information on Caring Connections?   yes     ADL Assessment 8/3/2021   Feeding yourself No Help Needed   Getting from bed to chair No Help Needed   Getting dressed No Help Needed   Bathing or showering No Help Needed   Walk across the room (includes cane/walker) No Help Needed   Using the telphone No Help Needed   Taking your medications Help Needed   Preparing meals No Help Needed   Managing money (expenses/bills) Help Needed   Moderately strenuous housework (laundry) Help Needed   Shopping for personal items (toiletries/medicines) Help Needed   Shopping for groceries Help Needed   Driving Help Needed   Climbing a flight of stairs No Help Needed   Getting to places beyond walking distances No Help Needed       Abuse Screening Questionnaire 8/3/2021   Do you ever feel afraid of your partner? N   Are you in a relationship with someone who physically or mentally threatens you? N   Is it safe for you to go home?  Y     Health Maintenance Due     Health Maintenance Due   Topic Date Due    Shingrix Vaccine Age 49> (1 of 2) Never done    Medicare Yearly Exam  11/11/2020    A1C test (Diabetic or Prediabetic)  07/21/2021       Patient Care Team   Patient Care Team:  Matilde De La Fuente NP as PCP - General (Nurse Practitioner)  Matilde De La Fuente NP as PCP - [facility] Empaneled Provider  Zafar Bryant MD (General Surgery)  Kely Singleton MD (Family Medicine)    History     Patient Active Problem List   Diagnosis Code    Environmental allergies Z91.09    Elevated cholesterol E78.00    Essential hypertension I5    Well controlled type 2 diabetes mellitus ([address] E11.9    Type 2 diabetes mellitus with stage 3 chronic kidney disease, without long-term current use of insulin ([address] E11.22, N18.30    Major depressive disorder, recurrent episode, severe ([address] F33.2    Intractable nausea and vomiting R11.2    Epigastric pain R10.13    Lactic acidemia E87.2    Tobacco use Z72.0    Primary osteoarthritis involving multiple joints M89.49    Stage 3 chronic kidney disease ([address] N18.30    Age-related cataract of both eyes H25.9    CVA (cerebral vascular accident) ([address] I63.9    Cystitis N30.90    Cigarette smoker F17.210    GIB (gastrointestinal bleeding) K92.2    History of GI bleed Z87.19    History of CVA (cerebrovascular accident) Z86.73    Moderate single current episode of major depressive disorder ([facility] F32.1    Elevated serum creatinine R79.89     Past Medical History:   Diagnosis Date    Allergic rhinitis due to pollen     BPV (benign positional vertigo)     Change in bowel habits     Depression     Diabetes ([facility] [address]     Dysuria     Hemorrhoids     Hypertension     IBS (irritable bowel syndrome)     Menopause     Other diseases of nasal cavity and sinuses(478.19)     Vertigo       Past Surgical History:   Procedure Laterality Date    COLONOSCOPY N/A 7/15/2020    COLONOSCOPY performed by Brittni York MD at [facility] ENDOSCOPY    HX CHOLECYSTECTOMY  2016    HX COLONOSCOPY  2013?  HX HYSTERECTOMY      UPPER GI ENDOSCOPY,DIAGNOSIS  7/15/2020          Current Outpatient Medications   Medication Sig Dispense Refill    metoprolol succinate (TOPROL-XL) 25 mg XL tablet TAKE 1 TABLET BY MOUTH EVERY DAY 90 Tablet 1    fluticasone propionate (FLONASE) 50 mcg/actuation nasal spray 2 Sprays by Both Nostrils route daily. 1 Bottle 0    aspirin (ASPIRIN) 325 mg tablet Take 1 Tablet by mouth daily. 30 Tablet 0    venlafaxine-SR (EFFEXOR-XR) 150 mg capsule Take 1 Capsule by mouth daily (with breakfast). Indications: major depressive disorder 90 Capsule 3    Januvia 100 mg tablet TAKE 1 TABLET BY MOUTH EVERY DAY 90 Tab 1    lisinopriL (PRINIVIL, ZESTRIL) 20 mg tablet TAKE 1 TABLET BY MOUTH EVERY DAY 90 Tab 2    atorvastatin (LIPITOR) 40 mg tablet Take 1.5 Tabs by mouth nightly. 120 Tab 1    nitroglycerin (NITROSTAT) 0.4 mg SL tablet 1 Tab by SubLINGual route every five (5) minutes as needed for Chest Pain. Up to 3 doses. 1 Bottle 1    polyethylene glycol (Miralax) 17 gram/dose powder Take 17 g by mouth daily. 1 tablespoon with 8 oz of water daily 235 g 0    Blood-Glucose Meter monitoring kit Check glucose once daily.  DX: E11.9 1 Kit 0    glucose blood VI test strips (ASCENSIA AUTODISC VI, ONE TOUCH ULTRA TEST VI) strip Check glucose once daily. DX: E11.9 100 Strip 3    lancets misc Check glucose once daily. DX: E11.9 100 Each 3    montelukast (SINGULAIR) 10 mg tablet Take 1 Tab by mouth daily. 90 Tab 0    acetaminophen (TylenoL) 325 mg tablet Take  by mouth every four (4) hours as needed for Pain.       levocetirizine (XYZAL) 5 mg tablet Take 1 pill daily as needed for allergy symptoms 90 Tab 2     No Known Allergies    Family History   Problem Relation Age of Onset    No Known Problems Mother      Social History     Tobacco Use    Smoking status: Current Every Day Smoker     Packs/day: 0.25    Smokeless tobacco: Never Used   Substance Use Topics    Alcohol use: No         Marcy Dueñas RN

## 2021-08-03 NOTE — PROGRESS NOTES
Subjective:     CC: AWV, HTN, diabetes, HLD, cough    Florecita Hernandez is a 82 y.o. female who presents today for an AWV. This is also a 3 month follow up for HTN, HLD, and diabetes. She also has a chronic dry cough. Her daughter [facility] is with her today. She manages pt's medications. Cerebrovascular disease  She went to the ER on 7-1-21 with c/o aphasia. CTA showed no evidence of acute stroke. Carotids were without significant stenosis. She is already on Lipitor 40mg daily so her aspirin was increased from 81 to 325mg daily. She has hx of old CVA that was found on CT scan a year or so ago. Pt has hx of GI bleed while she was taking ASA 325mg daily so we discussed the need to keep her on the ASA 81mg daily. She was also c/o a chronic dry cough. The ER physician suggested stopping Lisinopril but she stated she really thinks is coming from her nose. She was prescribed Flonase for her allergies but has not been using it. Has not picked it up from pharmacy yet. She does have hx of allergies and reports post nasal drip. But she also reports a lot of belching and wavering appetite so there could be some underlying GERD which for which she is not taking anything. HTN  BP at goal today. She is on Lisinopril 20mg daily and Metoprolol XL 25mg daily. She denies CP, SOB, dizziness, or swelling. HLD  Lab Results   Component Value Date/Time    Cholesterol, total 213 (H) 01/21/2021 03:07 PM    HDL Cholesterol 64 01/21/2021 03:07 PM    LDL, calculated 124 (H) 01/21/2021 03:07 PM    LDL, calculated 86.6 07/01/2020 05:00 AM    VLDL, calculated 25 01/21/2021 03:07 PM    VLDL, calculated 21.4 07/01/2020 05:00 AM    Triglyceride 143 01/21/2021 03:07 PM    CHOL/HDL Ratio 3.0 07/01/2020 05:00 AM   She is on Lipitor 40mg daily. Type 2 diabetes  Lab Results   Component Value Date/Time    Hemoglobin A1c 6.3 (H) 01/21/2021 03:07 PM     On Januvia 100mg daily.  Sometimes checks sugar at home, reports readings of 142, 120, 117  first thing in the morning. Stage 3 CKD  Lab Results   Component Value Date/Time    Creatinine 1.28 (H) 07/01/2021 10:08 AM     Avoids NSAIDS. Takes Tylenol. She is on an ACEi. Seasonal Allergies   She is on Xyzol and Singulair daily. Vertigo   She takes Meclizine prn. She has seen ENT in the past and did not want to go back. Depression  She is followed by Dr Renata Rogers. She is on Effexor XR 150mg daily. States she is enjoying going to therapy twice a week.     OA, hands  Takes Tylenol prn with good relief    Patient Active Problem List   Diagnosis Code    Environmental allergies Z91.09    Elevated cholesterol E78.00    Essential hypertension I5    Well controlled type 2 diabetes mellitus ([facility] [address] E11.9    Type 2 diabetes mellitus with stage 3 chronic kidney disease, without long-term current use of insulin ([facility]) E11.22, N18.30    Major depressive disorder, recurrent episode, severe ([facility] [place] [address] F33.2    Intractable nausea and vomiting R11.2    Epigastric pain R10.13    Lactic acidemia E87.2    Tobacco use Z72.0    Primary osteoarthritis involving multiple joints M89.49    Stage 3 chronic kidney disease (HCC) N18.30    Age-related cataract of both eyes H25.9    CVA (cerebral vascular accident) ([facility] [place] [address] I63.9    Cystitis N30.90    Cigarette smoker F17.210    GIB (gastrointestinal bleeding) K92.2    History of GI bleed Z87.19    History of CVA (cerebrovascular accident) Z86.73    Moderate single current episode of major depressive disorder ([facility] [place] [address] F32.1    Elevated serum creatinine R79.89       Past Medical History:   Diagnosis Date    Allergic rhinitis due to pollen     BPV (benign positional vertigo)     Change in bowel habits     Depression     Diabetes (HCC)     Dysuria     Hemorrhoids     Hypertension     IBS (irritable bowel syndrome)     Menopause     Other diseases of nasal cavity and sinuses(478.19)     Vertigo          Current Outpatient Medications:     metoprolol succinate (TOPROL-XL) 25 mg XL tablet, TAKE 1 TABLET BY MOUTH EVERY DAY, Disp: 90 Tablet, Rfl: 1    fluticasone propionate (FLONASE) 50 mcg/actuation nasal spray, 2 Sprays by Both Nostrils route daily. , Disp: 1 Bottle, Rfl: 0    aspirin (ASPIRIN) 325 mg tablet, Take 1 Tablet by mouth daily. , Disp: 30 Tablet, Rfl: 0    venlafaxine-SR (EFFEXOR-XR) 150 mg capsule, Take 1 Capsule by mouth daily (with breakfast). Indications: major depressive disorder, Disp: 90 Capsule, Rfl: 3    Januvia 100 mg tablet, TAKE 1 TABLET BY MOUTH EVERY DAY, Disp: 90 Tab, Rfl: 1    lisinopriL (PRINIVIL, ZESTRIL) 20 mg tablet, TAKE 1 TABLET BY MOUTH EVERY DAY, Disp: 90 Tab, Rfl: 2    atorvastatin (LIPITOR) 40 mg tablet, Take 1.5 Tabs by mouth nightly., Disp: 120 Tab, Rfl: 1    nitroglycerin (NITROSTAT) 0.4 mg SL tablet, 1 Tab by SubLINGual route every five (5) minutes as needed for Chest Pain. Up to 3 doses. , Disp: 1 Bottle, Rfl: 1    polyethylene glycol (Miralax) 17 gram/dose powder, Take 17 g by mouth daily. 1 tablespoon with 8 oz of water daily, Disp: 235 g, Rfl: 0    Blood-Glucose Meter monitoring kit, Check glucose once daily. DX: E11.9, Disp: 1 Kit, Rfl: 0    glucose blood VI test strips (ASCENSIA AUTODISC VI, ONE TOUCH ULTRA TEST VI) strip, Check glucose once daily. DX: E11.9, Disp: 100 Strip, Rfl: 3    lancets misc, Check glucose once daily. DX: E11.9, Disp: 100 Each, Rfl: 3    montelukast (SINGULAIR) 10 mg tablet, Take 1 Tab by mouth daily. , Disp: 90 Tab, Rfl: 0    acetaminophen (TylenoL) 325 mg tablet, Take  by mouth every four (4) hours as needed for Pain., Disp: , Rfl:     levocetirizine (XYZAL) 5 mg tablet, Take 1 pill daily as needed for allergy symptoms, Disp: 90 Tab, Rfl: 2    No Known Allergies    Past Surgical History:   Procedure Laterality Date    COLONOSCOPY N/A 7/15/2020    COLONOSCOPY performed by Juan Miguel Adams MD at [facility] ENDOSCOPY    HX CHOLECYSTECTOMY  2016    HX COLONOSCOPY  2013?     HX HYSTERECTOMY  UPPER GI ENDOSCOPY,DIAGNOSIS  7/15/2020            Social History     Tobacco Use   Smoking Status Current Every Day Smoker    Packs/day: 0.25   Smokeless Tobacco Never Used       Social History     Socioeconomic History    Marital status:      Spouse name: Not on file    Number of children: Not on file    Years of education: Not on file    Highest education level: Not on file   Tobacco Use    Smoking status: Current Every Day Smoker     Packs/day: 0.25    Smokeless tobacco: Never Used   Vaping Use    Vaping Use: Never used   Substance and Sexual Activity    Alcohol use: No    Drug use: No     Social Determinants of Health     Financial Resource Strain:     Difficulty of Paying Living Expenses:    Food Insecurity:     Worried About Running Out of Food in the Last Year:     Ran Out of Food in the Last Year:    Transportation Needs:     Lack of Transportation (Medical):  Lack of Transportation (Non-Medical):    Physical Activity:     Days of Exercise per Week:     Minutes of Exercise per Session:    Stress:     Feeling of Stress :    Social Connections:     Frequency of Communication with Friends and Family:     Frequency of Social Gatherings with Friends and Family:     Attends Jainism Services:     Active Member of Clubs or Organizations:     Attends Club or Organization Meetings:     Marital Status:        Family History   Problem Relation Age of Onset    No Known Problems Mother        ROS:  Gen: denies fever, chills, or fatigue   HEENT:denies H/A or vision changes, +nasal drainage with post nasal drip, denies ear pain or sore throat  Resp: denies dyspnea, +chronic dry cough, no wheezing  CV: denies chest pain, pressure, or palpitations  Extremeties: denies edema  GI:+ belching and wavering appetite, denies dyspepsia, dysphagia, abd pain, or N/V  Musculoskeletal: +chronic pain in hands due to OA- takes Tylenol  Neuro: denies numbness/tingling, +occasional vertigo.  Denies unilateral weakness, facial drooping, AMS, or slurred speech   Skin: denies rashes or new lesions   Psych: +depression-stable no anxiety or brad, or other changes in mood      Objective:     Visit Vitals  /78 (BP 1 Location: Left arm, BP Patient Position: Sitting)   Pulse 79   Temp (!) 96.6 °F (35.9 °C) (Temporal)   Resp 18   Ht 5' 6\" (1.676 m)   Wt 164 lb 2 oz (74.4 kg)   SpO2 99%   BMI 26.49 kg/m²         General: Alert and oriented. No acute distress. Well nourished. HEENT    Eyes: Sclera white, conjunctiva clear. PERRLA. Extra ocular movements intact. Neck: Supple with FROM. Lungs: Breathing even and unlabored. All lobes clear to auscultation bilaterally   Heart :RRR, S1 and S2 normal intensity, no extra heart sounds  Extremities: Non-edematous  Musculo: Joints of hands are swollen   Neuro: Cranial nerves grossly normal.  Psych: Mood and thought content appropriate for situation. Dressed appropriately and with good hygiene. Skin: Warm, dry, and intact. No lesions or discoloration. Assessment/ Plan:     TIA  Reduce ASA to 81mg daily due to hx of GI Bleed while taking 325mg  Cont Lipitor 40mg daily  Keep tight control over BS and BP  Go back to ER for any numbness/tingling, unilateral weakness, facial drooping, slurred speech, confusion, AMS, or dizziness  F/U 3 months    HTN  BP stable  Cont current meds  Check metabolic panel  Low-sodium diet  RTO or go to ER for any CP, SOB, dizziness, or swelling. F/U:  3 months    HLD  Need to recheck lipids but she is not fasting today  Cont Lipitor  Low fat diet  Will check at next visit    Depression  Stable  Cont Effexor XR 150mg daily  F/U with psych Dr Althea Crandall as scheduled  F/U here 3 months    Type 2 diabetes  Check A1C  Cont Januvia 100mg daily. Watch the sugar and carbs  F/U 3 months    Stage 3 CKD  Check creatinine  Avoids NSAIDS. On ACEi.   Need to keep BP and BS under tight control  F/U 3 months    Seasonal Allergies   Cont Singulair and Xyzol daily. Flonase added  Avoid triggers  F/U 3 months    GERD  Start Prilosec 20mg daily for dry cough    Vertigo  Cont Meclizine prn. Verbal and written instructions (see AVS) provided.  Patient expresses understanding of diagnosis and treatment plan. Health Maintenance Due   Topic Date Due    Shingrix Vaccine Age 49> (1 of 2) Never done    Medicare Yearly Exam  11/11/2020    A1C test (Diabetic or Prediabetic)  07/21/2021               Gurvinder Sheehan NP

## 2021-08-03 NOTE — PATIENT INSTRUCTIONS

## 2021-08-04 ENCOUNTER — HOSPITAL ENCOUNTER (OUTPATIENT)
Dept: BEHAVIORAL/MENTAL HEALTH | Age: 86
Discharge: HOME | End: 2021-08-04
Payer: MEDICARE

## 2021-08-04 LAB
ANION GAP SERPL CALC-SCNC: 2 MMOL/L (ref 5–15)
BUN SERPL-MCNC: 17 MG/DL (ref 6–20)
BUN/CREAT SERPL: 17 (ref 12–20)
CALCIUM SERPL-MCNC: 9.2 MG/DL (ref 8.5–10.1)
CHLORIDE SERPL-SCNC: 104 MMOL/L (ref 97–108)
CO2 SERPL-SCNC: 33 MMOL/L (ref 21–32)
CREAT SERPL-MCNC: 1 MG/DL (ref 0.55–1.02)
EST. AVERAGE GLUCOSE BLD GHB EST-MCNC: 148 MG/DL
GLUCOSE SERPL-MCNC: 115 MG/DL (ref 65–100)
HBA1C MFR BLD: 6.8 % (ref 4–5.6)
POTASSIUM SERPL-SCNC: 4.3 MMOL/L (ref 3.5–5.1)
SODIUM SERPL-SCNC: 139 MMOL/L (ref 136–145)

## 2021-08-04 PROCEDURE — 90853 GROUP PSYCHOTHERAPY: CPT

## 2021-08-04 NOTE — BH NOTES
[facility] Outpatient Program  Group Therapy  Date of service: 8/4/2021  Start time: 1100  Stop time: 1150  Type of session: Therapy Group    Problem number: 1Dep. F33.0    Short term goal (STG): T Pt will identify specific relapse triggers & develop in writing and/or articulate two possible coping strategies for each.       Intervention/techniques: Informed, Validated/Supported, Prompted/Cued, Listened/Empathized, Reinforced and Provided Feedback    Patient mental status/affect: Calm and Congruent    Patient behavior/appearance: Attentive, Cooperative and Motivated    Special patient treatment accommodations provided (describe): none    Patient response and progress towards goals: Focus of session was on ways to manage dysfunctional people in our lives. The information came from [employer]. We spoke about the ways to [facility] with love and I shared the strategies which includes focusing on what we can control, respond dont react, respond in a new way, allow others to make their own good or bad decisions, dont give advice or tell people what to do, and dont obsess about other peoples problems. I shared as well the need to give our own expectations a reality check, walk away from an unproductive argument, and choose to not spend time with those who really trigger us. We spoke about strategies that group members can use today to help improve functioning Pt participated in the group activity and engaged with the other group members. She contributed to the conversation regarding \"detaching with love\"  She was able to share her experiences with her  and the many times she tried to help him through his alcoholism. She feels she did everything she could but had to realize she could not stop him. She has found some peace regarding this situation but states it is still hard at times.

## 2021-08-04 NOTE — BH NOTES
[facility] Outpatient Program  Group Therapy    Date of Service: 8/4/2021  Start time: 1000  Stop time: 1050  Type of session: Therapy Group    Problem number: 1. 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  Pt will identify and rehearse the management of future situations or circumstances in which lapses could occur and get help of group members to increase confidence of this management.       Intervention/techniques: Informed, Validated/Supported, Modeled/Rehearsed, Listened/Empathized, Observed/Monitored and Provided Feedback    Patient mental status/affect: Anxious, Congruent and Preoccupied    Patient behavior/appearance: Neatly Groomed, Attentive, Cooperative and Needed Prompting    Special patient treatment accommodations provided (describe): None    Patient response and progress towards goals: Focus of session was based on the book 13 Things Mentally Strong People Dont Do by Judy Bain. We spoke about the information about how we give away our power and how that leads us to be mentally weak. We spoke about examples from the book as to how we give our power away such as feeling deeply affected by criticism from others, [facility] [address] [facility] or apologizing for things that our not our fault, changing goals based on others, and spending a lot of time complaining about others and circumstances. We spoke about how giving away our power makes us feel and how what we can do to manage this and reduce our time and energy spent on others. Salo Lee participated in group with prompting. Once she was prompted, she shared a lot about how much her sister in law and how she can be affects her. She spoke about how she will work on understanding herself better and she will work to let go of these issues that have happened in the past between the two of them. She was motivated to try and keep working on this and trying to resolve the issue.

## 2021-08-04 NOTE — BH NOTES
[facility] Outpatient Program  Group Therapy    Date of Service: 8/4/2021  Start time: 1200  Stop time: 1250  Type of session: Therapy Group    Problem number: 1. Dep F 33.0    Short term goal (STG): U.  Pt will verbalize one memory of the past that she feels regret or guilt over and verbalize her commitment to self-forgiveness in order to help build on her confidence as well.       Intervention/techniques: Informed, Validated/Supported, Observed/Monitored, Promoted Peer Support and Provided Feedback    Patient mental status/affect: Anxious, Congruent and Preoccupied    Patient behavior/appearance: Neatly Groomed, Attentive, Cooperative and Needed Prompting    Special patient treatment accommodations provided (describe): None    Patient response and progress towards goals: Focus of session was on mindfulness and mediation based on information from [employer]. We spoke about the impacts that are positive that mediation can have on us even though it is one of the most challenging practices to learn. We spoke about the benefits on the body and mind and how we can get started. We spoke about the challenges and hurdles and how to overcome them. I asked group members to share one way in which they can start to practice this daily and what their motivation is to do so. Segun Browning participated in group well with prompting. She spoke about how she does not do much meditation but she was willing to try when she heard about the health benefits.   She spoke about how she will continue to practice and she knows she can benefit from trying to clear her mind

## 2021-08-05 PROBLEM — E87.20 LACTIC ACIDEMIA: Status: RESOLVED | Noted: 2019-09-08 | Resolved: 2021-08-05

## 2021-08-05 PROBLEM — K92.2 GIB (GASTROINTESTINAL BLEEDING): Status: RESOLVED | Noted: 2020-07-14 | Resolved: 2021-08-05

## 2021-08-05 PROBLEM — R11.2 INTRACTABLE NAUSEA AND VOMITING: Status: RESOLVED | Noted: 2019-09-08 | Resolved: 2021-08-05

## 2021-08-05 RX ORDER — ATORVASTATIN CALCIUM 40 MG/1
40 TABLET, FILM COATED ORAL
Qty: 90 TABLET | Refills: 1 | Status: SHIPPED | OUTPATIENT
Start: 2021-08-05 | End: 2021-12-06 | Stop reason: DRUGHIGH

## 2021-08-05 RX ORDER — ASPIRIN 81 MG/1
81 TABLET ORAL DAILY
Qty: 90 TABLET | Refills: 1 | Status: ON HOLD
Start: 2021-08-05 | End: 2022-02-12 | Stop reason: SDUPTHER

## 2021-08-11 ENCOUNTER — HOSPITAL ENCOUNTER (OUTPATIENT)
Dept: BEHAVIORAL/MENTAL HEALTH | Age: 86
Discharge: HOME | End: 2021-08-11
Payer: MEDICARE

## 2021-08-11 PROCEDURE — 99213 OFFICE O/P EST LOW 20 MIN: CPT | Performed by: PSYCHIATRY & NEUROLOGY

## 2021-08-11 PROCEDURE — 90853 GROUP PSYCHOTHERAPY: CPT

## 2021-08-11 NOTE — BH NOTES
[facility] Outpatient Program  Group Therapy  Date of service: 8/11/2021  Start time: 1000  Stop time: 1050  Type of session: Therapy Group    Problem number: 1Dep. F33.0    Short term goal (STG): T Pt will identify specific relapse triggers & develop in writing and/or articulate two possible coping strategies for each    Intervention/techniques: Informed, Validated/Supported, Listened/Empathized and Reinforced    Patient mental status/affect: Calm and Congruent    Patient behavior/appearance: Attentive, Cooperative and Motivated    Special patient treatment accommodations provided (describe): none    Patient response and progress towards goals: Focus of session was on using a post crisis WRAP plan developed by Altagracia Trujillo. We spoke about coming out of the hospital or intensive treatment and focusing on what we want to maintain emotionally and physically as a person recovers from emotional or mental health crises. We discussed who is a healthy person to have in support system and who do we need to stay away from. We also discussed other aspects of the post crisis WRAP plan such as what needs to be done daily and being aware of signs of setbacks and identifying specific tools to use if signs and symptoms emerge. Pt participated in the group activity and engaged with the other members. She shared with the group that she had gone to Religion with a fellow group member on Sunday. It was an all white Religion but that did not bother her as the member was happy for her to go with her.

## 2021-08-11 NOTE — BH NOTES
[facility] Outpatient Program  Group Therapy    Date of Service: 8/11/2021  Start time: 1100  Stop time: 1150  Type of session: Therapy Group    Problem number: 1. 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  Pt will identify and rehearse the management of future situations or circumstances in which lapses could occur and get help of group members to increase confidence of this management.       Intervention/techniques: Informed, Validated/Supported, Listened/Empathized, Observed/Monitored, Promoted Peer Support, Facilitated Disclosure and Provided Feedback    Patient mental status/affect: Anxious, Congruent and Preoccupied    Patient behavior/appearance: Neatly Groomed, Attentive, Cooperative and Needed Prompting    Special patient treatment accommodations provided (describe): None    Patient response and progress towards goals: Focus of session was on the DBT skill of radical acceptance. I shared with group the idea of acceptance has to happen in order for a person to move forward. I shared the skills set which includes radical acceptance, turning the mind, and being willing. I shared that radical acceptance is accepting reality as it is and that life can be worth  living even when there is pain. We spoke about how pain and not accepting it leads to more suffering. I shared that it is difficult to accept the things we dont like but it is necessary. I asked group members to share one thing in their life that they have accepted that has led to a release of pain and suffering and something that they need to work on accepting in their life so they can move on. Shu participated in group well with prompting.   She spoke about how she does think she is facing problems well right now and that she has been able to move on from things that \"I can see how much I was hung up on things in the past.\"  She spoke about how she can see the benefit for her in focusing on the here and now and accepting it for what it is.

## 2021-08-11 NOTE — PROGRESS NOTES
SOP PROGRESS NOTE    INTERVAL HISTORY/FINDINGS:  States she's still been \"fair\" affectively, although most of the time she reports feeling \"good\". She has some dysphoric days triggered by 6 extended family members dying, 3 of them from [facility]. However, her grief and sadness is generally very short-lived and she objectively appears to be euthymic and affectively stable. Was able to go to [place] with a friend (recent [employer] grad) and that socialization was \"wonderful\". She realizes she needs to socialize more, if she can (no car, no TV, etc.). No med SE's and she's been compliant with it. Overall, still doing very well affectively. MEDICATIONS:  Current Outpatient Medications   Medication Sig    aspirin delayed-release 81 mg tablet Take 1 Tablet by mouth daily.  atorvastatin (LIPITOR) 40 mg tablet Take 1 Tablet by mouth nightly.  omeprazole (PRILOSEC) 20 mg capsule Take 1 Capsule by mouth daily.  metoprolol succinate (TOPROL-XL) 25 mg XL tablet TAKE 1 TABLET BY MOUTH EVERY DAY    fluticasone propionate (FLONASE) 50 mcg/actuation nasal spray 2 Sprays by Both Nostrils route daily.  venlafaxine-SR (EFFEXOR-XR) 150 mg capsule Take 1 Capsule by mouth daily (with breakfast). Indications: major depressive disorder    Januvia 100 mg tablet TAKE 1 TABLET BY MOUTH EVERY DAY    lisinopriL (PRINIVIL, ZESTRIL) 20 mg tablet TAKE 1 TABLET BY MOUTH EVERY DAY    nitroglycerin (NITROSTAT) 0.4 mg SL tablet 1 Tab by SubLINGual route every five (5) minutes as needed for Chest Pain. Up to 3 doses.  Blood-Glucose Meter monitoring kit Check glucose once daily. DX: E11.9    glucose blood VI test strips (ASCENSIA AUTODISC VI, ONE TOUCH ULTRA TEST VI) strip Check glucose once daily. DX: E11.9    lancets misc Check glucose once daily. DX: E11.9    montelukast (SINGULAIR) 10 mg tablet Take 1 Tab by mouth daily.  acetaminophen (TylenoL) 325 mg tablet Take  by mouth every four (4) hours as needed for Pain.     levocetirizine (XYZAL) 5 mg tablet Take 1 pill daily as needed for allergy symptoms     No current facility-administered medications for this encounter. MENTAL STATUS UPDATE: (Positives in [place])  Appearance:   Neat   Disheveled  Odiferous   Appropriate  Orientation:   Time  Place  Person  Speech:   Coherent  Pressured  Garbled/Slurred  Loud   Soft  Mute  Memory:   Intact  Impaired (Recent  Remote)--Mild  Severe  Mood:   Euthymic  Depressed  Anxious  Elated  Affect:    Appropriate  Inappropriate  Labile  Blunted  Flat  Thought Content:   Logical  Goal directed  Paranoid  Delusional  Illogical IOR [facility]  HI  Thought Process:   Coherent/linear  Tangential  Loose/Disorganized   Hallucinations:   None  Auditory  Visual  Tactile  Olfactory  Gustatory  Insight:   Good  Fair  Impaired      Judgment:   Good  Fair  Impaired     CONTINUED NEED FOR TREATMENT: (Positives in [place])  1. Continued psychiatric symptoms causing impairment in IDL  or functioning  2. Continued non-compliance with meds resulting in decompensation  3. High level of debilitation and symptoms with inadequate support  4. Continued need for structured care to adjust medications  5. Continued presence of debilitating symptoms  6. Continued presence of risk factors     CONTINUED NEED FOR GROUP PSYCHOTHERAPY TO ADDRESS THE FOLLOWING: (Positives in [place])  Psychiatric symptom management       Psychiatric relapse prevention    Anxiety management      Self-esteem issues      Poor decision making       Recent decompensation       Safety issues      Anger management     Self care/ADL's     Med/treatment compliance      Impaired boundaries/relationships     Assertiveness      Grief/loss resolution       Guilt/shame issues     Realistic goal setting      DIAGNOSTIC IMPRESSION:  1. 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 Continue the Venlafaxine  mg daily--has 10.5 months of refills (90 day)  2. Continue SOP treatment 1 x/week.

## 2021-08-11 NOTE — BH NOTES
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  Pt will verbalize one memory of the past that she feels regret or guilt over and verbalize her commitment to self-forgiveness in order to help build on her confidence as well    Intervention/techniques: Informed, Modeled/Rehearsed, Prompted/Cued, Promoted Peer Support and Provided Feedback    Patient mental status/affect: Anxious, Congruent and Preoccupied    Patient behavior/appearance: Neatly Groomed, Attentive, Cooperative and Needed Prompting    Special patient treatment accommodations provided (describe): None    Patient response and progress towards goals: Focus of session was on learning lifes lessons. I shared information from an article from an unknown author titled Juan Lopez is Your Assignment.   We spoke about how lessons in life are not to be gotten away from and will happen always. We spoke about how there are daily opportunities to learn lessons that we like and appreciate as well as dont like. We spoke about how there are no mistakes; only lessons and that we have to learn to do things better the next time we try. We also spoke about the most important point in that lessons will be repeated until they are learned. Group members were asked to share lessons that they struggled with accepting and where they are now in understanding the lessons they know they have learned. Randy Levi participated in group with prompting. She spoke about how she can see the progress that she has made in treatment and she is able to focus on seeing the gains she has made. She spoke about how she wants to keep working on appreciating where she is as she is often still focused on what she does not have or what she has lost.  She is able to see her opportunities for self care and acceptance.

## 2021-08-18 ENCOUNTER — HOSPITAL ENCOUNTER (OUTPATIENT)
Dept: BEHAVIORAL/MENTAL HEALTH | Age: 86
Discharge: HOME | End: 2021-08-18
Payer: MEDICARE

## 2021-08-18 PROCEDURE — 90853 GROUP PSYCHOTHERAPY: CPT

## 2021-08-18 NOTE — BH NOTES
[facility] Outpatient Program  Group Therapy  Date of service: 8/18/2021  Start time: 1100  Stop time: 1150  Type of session: Therapy Group    Problem number: 1Dep. F33.0    Short term goal (STG): R Pt will identify and rehearse the management of future situations or circumstances in which lapses could occur and get help of group members to increase confidence of this management.     Intervention/techniques: Informed, Validated/Supported, Prompted/Cued, Listened/Empathized, Observed/Monitored and Reinforced    Patient mental status/affect: Calm and Congruent    Patient behavior/appearance: Attentive, Cooperative and Motivated    Special patient treatment accommodations provided (describe): none    Patient response and progress towards goals: Focus of session was based on information from the book When Your Past is Hurting Your Present by Ry Ayoub. I shared information about the power of hope and the common symptoms and struggles when a person is led by hopeless thoughts. I shared the common Rivas Reus that can attack our hope and take it away such as: You made your bed, now you must lie in it, opportunity only knocks once, your reputation follows you the rest of your life, and there is no such thing as a second chance. We spoke about specific challenges to these negative thoughts and how to prevent those negative thoughts from turning into a lifestyle. Pt participated in the group activity and engaged with the other members. She verbalized her views on her answers for the questionnaire regarding her feelings. She stated that she tries to feel and acknowledge them, then pray and let them go. She shared she still goes through things but tries to deal with them this way instead of isolating herself.

## 2021-08-18 NOTE — BH NOTES
[facility] Outpatient Program  Group Therapy  Date of service: 8/18/2021  Start time: 1000  Stop time: 1050  Type of session: Therapy Group    Problem number: 1Dep. F33.0    Short term goal (STG): T Pt will identify specific relapse triggers & develop in writing and/or articulate two possible coping strategies for each.      Intervention/techniques: Informed, Validated/Supported, Prompted/Cued, Listened/Empathized, Reinforced and Provided Feedback    Patient mental status/affect: Calm and Congruent    Patient behavior/appearance: , Attentive, Cooperative and Motivated    Special patient treatment accommodations provided (describe): none    Patient response and progress towards goals: Focus of session was on the useful tool of creating a written crisis plan when experiencing negative and powerful emotions. We discussed the basic plan of if I feel blank, I will and discussing specifically what each person can do in response to the emotions that we are feeling. We spoke about when we are experiencing hopelessness, what can be done and who we can call if we are experiencing hopelessness or suicidal thoughts. We discussed that the 24 hour COPE line is available as well as they can always call here and report to us, whether over the phone or leaving a message to return the call, to report that they are not doing well and need some additional help. We spoke about the idea of ratting ourselves out and being the one responsible to express our feelings and ask for help when needed Pt quietly listened to another member that had just returned to group.   She was supportive and was able to share her experience with the grieving process to help the member better understand the concept of \"give it time\"  She also offered support if needed in the future

## 2021-08-23 ENCOUNTER — HOSPITAL ENCOUNTER (OUTPATIENT)
Dept: BEHAVIORAL/MENTAL HEALTH | Age: 86
Discharge: HOME | End: 2021-08-23
Payer: MEDICARE

## 2021-08-23 PROCEDURE — 90853 GROUP PSYCHOTHERAPY: CPT

## 2021-08-23 NOTE — BH NOTES
[facility] Outpatient Program  Group Therapy  Date of service: 8/23/2021  Start time: 1100  Stop time: 1150  Type of session: Therapy Group    Problem number: 1Dep. F33.0    Short term goal (STG): T  Pt will identify specific relapse triggers & develop in writing and/or articulate two possible coping strategies for each.       Intervention/techniques: Informed, Validated/Supported, Reinforced and Provided Feedback    Patient mental status/affect: Calm and Congruent    Patient behavior/appearance: Neatly Groomed, Attentive, Cooperative and Motivated    Special patient treatment accommodations provided (describe): none    Patient response and progress towards goals: Focus of session was on article by Samara Otoole titled 54 things Its Time You Forgave Yourself For.   We spoke about the impact that a lack of forgiveness has on our functioning and well being. We spoke about the challenge that is added when we lack it towards ourselves. We spoke about the 14 things which are: big things you changed your mind about, the ways in which we fought through the pain, the person you could never love properly, all the ways in which you are not enough, the ways you treated your parents (or others), the way you treated yourself, the breaks you took from life, the chances you didnt take, the things you didnt say until it was too late, the disasters you didnt see coming, and whatever you are still not ready for. We spoke about what group members are going to focus on letting go of for their recovery. Pt participated in the group activity and engaged with the other members. Pt shared that she was very happy that she got to go to a [employer] dinner Sat. Night. She was able to see friends and enjoy good food. Pt states she feels blessed to be at this point in her life and feels good about her life.

## 2021-08-23 NOTE — BH NOTES
[facility] Outpatient Program  Group Therapy    Date of Service: 8/23/2021  Start time: 1200  Stop time: 1250  Type of session: Therapy Group    Problem number: 1. 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  Pt will identify and rehearse the management of future situations or circumstances in which lapses could occur and get help of group members to increase confidence of this management.      Intervention/techniques: Informed, Validated/Supported, Modeled/Rehearsed, Prompted/Cued, Listened/Empathized, Promoted Peer Support and Provided Feedback    Patient mental status/affect: Calm, Congruent and Preoccupied    Patient behavior/appearance: Neatly Groomed, Attentive, Cooperative and Needed Prompting    Special patient treatment accommodations provided (describe): None    Patient response and progress towards goals: Focus of session was on identifying the rights that we all have as human beings and the need to keep these in mind when we may be struggling with communicating and relating to other people in our lives. We spoke about how when self esteem is low and anxiety and depression may be high, we have more internal conflict over what we feel comfortable expressing to others and we run the risk of not saying anything out of fear of rejection or being seen as selfish or wrong. We discussed the core rights that we have which include the right to say no, the right to negotiate for change, the right to ask for help and support, the right to be the final  of our own beliefs, and the right to our own experiences regardless of its different from other peoples experiences. Fede Schwartz participated in group with prompting. She spoke about how she does know that she has gotten better at asking for help. She spoke about how she will continue to work to accept help as something that is necessary for her at this time in her life and will challenge any feelings of shame that she may have.

## 2021-08-23 NOTE — BH NOTES
[facility] Outpatient Program  Group Therapy    Date of Service: 8/23/2021  Start time: 1000  Stop time: 1050  Type of session: Therapy Group    Problem number: 1. Dep F 33.0    Short term goal (STG): U.  Pt will verbalize one memory of the past that she feels regret or guilt over and verbalize her commitment to self-forgiveness in order to help build on her confidence as well.     Intervention/techniques: Informed, Prompted/Cued, Listened/Empathized and Provided Feedback    Patient mental status/affect: Anxious, Congruent and Preoccupied    Patient behavior/appearance: Neatly Groomed, Attentive, Cooperative and Needed Prompting    Special patient treatment accommodations provided (describe): None    Patient response and progress towards goals: Focus of session was on the visible and the hidden emotional threats that we are facing and trying to cope with. We spoke about the three steps to emotional management and they include awareness, plan, and action. We spoke about the importance of being aware of our hidden emotional threats or triggers since they can be more destructive and unmanageable that the visible ones. We spoke about how our hidden emotional triggers can include low self-esteem issues, unrealistic expectations, chronic worry that leads to increased vulnerability as well as focus on the past and our mistakes that we made. We spoke about how a person will be increasingly vulnerable if hidden emotional threats are not identified and managed. Ezequiel Ramos participated in group with prompting. She spoke about how she is really starting to feel better and she has her appetite back consistently at this point which makes her happy. She spoke about how she does know that she needs to work on her communication and her tendency to get easily triggered to doubt herself. She was open to ideas for coping for the long run.

## 2021-09-01 ENCOUNTER — HOSPITAL ENCOUNTER (OUTPATIENT)
Dept: BEHAVIORAL/MENTAL HEALTH | Age: 86
Discharge: HOME | End: 2021-09-01
Payer: MEDICARE

## 2021-09-01 PROCEDURE — 90853 GROUP PSYCHOTHERAPY: CPT

## 2021-09-01 NOTE — BH NOTES
[facility] Outpatient Program  Group Therapy    Date of Service: 9/1/2021  Start time: 1200  Stop time: 1250  Type of session: Therapy Group    Problem number: 1. Dep F 33.0    Short term goal (STG): T.  Pt will identify specific relapse triggers & develop in writing and/or articulate two possible coping strategies for each.     Intervention/techniques: Informed, Validated/Supported, Prompted/Cued, Observed/Monitored, Promoted Peer Support and Provided Feedback    Patient mental status/affect: Calm, Congruent and Preoccupied    Patient behavior/appearance: Neatly Groomed, Attentive, Cooperative and Needed Prompting    Special patient treatment accommodations provided (describe): None    Patient response and progress towards goals: Focus of session was on developing a healthy stress management plan for dealing with triggers to stress, anxiety, and worry. We spoke about the importance of focusing on life experiences that have strengthened me and has taught us how to manage stress. We also spoke about the importance of having a positive support network of people who can help nurture us and encourage us, we spoke about attitudes and beliefs that have helped to protect us and help us view things differently, we addressed physical self care habits and that prepare us or help us relieve tension, as well as action skills that we can use to change the situation we are in right at the moment of experiencing stress. Heide Escobar participated in group with prompting. She spoke about how she knows that she has learned a lot about what she is capable of in terms of what she can manage. She spoke about how she will continue to work on seeing her strength and what she is capable of because \"I can easily forget it and not be aware of what I have inside of me that is strong. \"

## 2021-09-01 NOTE — BH NOTES
[facility] Outpatient Program  Group Therapy  Date of service: 9/1/2021  Start time: 1100  Stop time: 1150  Type of session: Therapy Group    Problem number: 1Dep. F33.0    Short term goal (STG): U Pt will verbalize one memory of the past that she feels regret or guilt over and verbalize her commitment to self-forgiveness in order to help build on her confidence as well.       Intervention/techniques: Informed, Validated/Supported, Listened/Empathized, Clarified and Reinforced    Patient mental status/affect: Calm and Congruent    Patient behavior/appearance: Attentive, Cooperative and Motivated    Special patient treatment accommodations provided (describe): none    Patient response and progress towards goals: Focus of session was based on an article found on the website [employer] about the 7 things that you can control in life.   I shared with group those 7 things which include your breath, your self talk, your gratitude, your body language, your mental and physical fitness, your diet, and your sleep. We spoke about how lack of focus on these areas affects us and what we can do to improve control over these 7 aspects. Pt engaged with the other members and participated in the activity. We discussed the many changes of the last year and the current world situations.   She shared her thoughts of her ry and also supported another group member in need

## 2021-09-01 NOTE — BH NOTES
[facility] Outpatient Program  Group Therapy    Date of Service: 9/1/2021  Start time: 1000  Stop time: 1050  Type of session: Therapy Group    Problem number: 1. 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  Pt will identify and rehearse the management of future situations or circumstances in which lapses could occur and get help of group members to increase confidence of this management.       Intervention/techniques: Informed, Validated/Supported, Reframed, Prompted/Cued, Listened/Empathized, Facilitated Disclosure and Provided Feedback    Patient mental status/affect: Anxious, Congruent and Preoccupied    Patient behavior/appearance: Neatly Groomed, Attentive, Cooperative and Needed Prompting    Special patient treatment accommodations provided (describe): None    Patient response and progress towards goals: Focus of session was on discussing three different types of communication and identifying which ones we may engage in and the success it gives us. We spoke about what passive and aggressive communication is and how it proves to be ineffective for us to get what we need or want or to make our relationships better and healthier. We spoke about what we need to be assertive about can be broken down into different categories such as how to say no at the right time in the right way, telling people how you feel, or to get information or clarification of what you need or want. We spoke about what improvements can be made in our communication patterns to enrich our well being and our relationships. Richa Carrizales participated in group with prompting. She spoke about how she does tend to be more assertive and she has learned how to be.   She spoke about how she can be aware of her triggers and how and when she has been passive in the past.

## 2021-09-08 ENCOUNTER — APPOINTMENT (OUTPATIENT)
Dept: BEHAVIORAL/MENTAL HEALTH | Age: 86
End: 2021-09-08
Payer: MEDICARE

## 2021-09-15 ENCOUNTER — HOSPITAL ENCOUNTER (OUTPATIENT)
Dept: BEHAVIORAL/MENTAL HEALTH | Age: 86
Discharge: HOME | End: 2021-09-15
Payer: MEDICARE

## 2021-09-15 PROCEDURE — 90853 GROUP PSYCHOTHERAPY: CPT

## 2021-09-15 NOTE — BH NOTES
[facility] Outpatient Program  Group Therapy  Date of service: 9/15/2021  Start time: 1100  Stop time: 1150  Type of session: Therapy Group    Problem number: 1Dep. F33.0    Short term goal (STG): U .  Pt will verbalize one memory of the past that she feels regret or guilt over and verbalize her commitment to self-forgiveness in order to help build on her confidence as well.       Intervention/techniques: Informed, Validated/Supported, Reinforced, Provided Feedback and Evaluated/Interpreted    Patient mental status/affect: Congruent, Depressed, Flat and Worried    Patient behavior/appearance: Attentive, Cooperative, Needed Prompting and Withdrawn/Quiet    Special patient treatment accommodations provided (describe): none    Patient response and progress towards goals: Focus of session was a discussion on goal planning and was based on handout Creating the Life that you Want: Ten Steps to Accomplishing a Goal.  We spoke about those steps and being aware of the things you need to give up and avoid in order achieve the goal as well as what you need to start doing. We reviewed ideas for goals for lifetime, a 5 Year plan, and daily goals. We spoke about financial, physical, social, personal, and educational/vocational.  We went over goals that are most important right now and how to get started. Pt participated in the activity related to setting goals. She stated that she had been feeling depressed for about the last week. She has experienced multiple deaths and sicknesses with friends and family. She has not been out of the house and was not going to come to group today. She had a couple of friends call her today and tell her to get dressed and go to group. She was thankful they did and that she had come.

## 2021-09-15 NOTE — BH NOTES
[facility] Outpatient Program  Group Therapy    Date of Service: 9/15/2021  Start time: 1200  Stop time: 1250  Type of session: Therapy Group    Problem number: 1. Dep F 33.0    Short term goal (STG): S. Pt will verbalize belief in herself that she has the ability to make thought out decisions and it is a goal she is willing to work towards. Intervention/techniques: Informed, Challenged, Modeled/Rehearsed, Prompted/Cued, Promoted Peer Support and Provided Feedback    Patient mental status/affect: Anxious, Congruent and Preoccupied    Patient behavior/appearance: Neatly Groomed, Attentive, Cooperative and Needed Prompting    Special patient treatment accommodations provided (describe): None    Patient response and progress towards goals: Focus of session was on why mindfulness is important and how we always have to remember that we have a choice in how we react or respond to emotional triggers. We spoke about current stressful events that can lead us to either a distress reaction or a mindful response. I shared with group ideas from [facility] about why the mindfulness response is worth it. I shared the benefits of being non judgmental, see thoughts as mental events rather than real, distance and disengagement from thoughts rather than believing and engaging in thoughts. We spoke about the reduced pain and stress as a result of being mindful. I went over with group members three simple ways to get present including using our senses, taking ten deep breathes, and dropping anchor where you work to feel the floor underneath your feet. Bubba Andino participated in group well with prompting. She spoke about how she knows that she gets anxious and nervous. She was distracted during this group as she had not taken her blood sugar this morning and she was unsure of how she was feeling. She was well supported and encouraged by group members.

## 2021-09-15 NOTE — BH NOTES
[facility] Outpatient Program  Group Therapy    Date of Service: 9/15/2021  Start time: 1000  Stop time: 1050  Type of session: Therapy Group    Problem number: 1. Dep F 33.0    Short term goal (STG): V. Pt will identify an area (fun, family, learning, spirituality, goals, health) each week in which she made an effort to be mindful and feel connected in some way    Intervention/techniques: Informed, Validated/Supported, Modeled/Rehearsed, Listened/Empathized, Observed/Monitored, Promoted Peer Support and Provided Feedback    Patient mental status/affect: Anxious, Congruent, Flat and Preoccupied    Patient behavior/appearance: Neatly Groomed, Attentive, Cooperative and Needed Prompting    Special patient treatment accommodations provided (describe): None    Patient response and progress towards goals: Focus of session was on identifying coping skills and what categories they may fall under. We spoke about the different categories of distraction skills, grounding skills, emotional release skills, self care skills, thought challenge skills, and accessing our higher self skills. We spoke about what each of these mean and the pros and cons of each category. I asked group members to brainstorm skills for each category and how it can benefit them to use them. We also identified cons of each category and how that can lead them to choose another way to cope with certain situations. Dea Gonzales participated in group with prompting. She was quiet during group which is unusual but she spoke about \"not feeling good. \"  She shared that things have been more difficult emotionally and she spoke about losing two nephews who were like children to her. She is clearly more depressed and she expressed knowing it. She has been more withdrawn and isolated \"other than going to funerals. \"  We spoke about how she can try to build up her connections right now since that always helps her emotionally.

## 2021-09-22 ENCOUNTER — HOSPITAL ENCOUNTER (OUTPATIENT)
Dept: BEHAVIORAL/MENTAL HEALTH | Age: 86
Discharge: HOME | End: 2021-09-22
Payer: MEDICARE

## 2021-09-22 PROCEDURE — 90853 GROUP PSYCHOTHERAPY: CPT

## 2021-09-22 PROCEDURE — 99213 OFFICE O/P EST LOW 20 MIN: CPT | Performed by: PSYCHIATRY & NEUROLOGY

## 2021-09-22 NOTE — BH NOTES
[facility] Outpatient Program  Group Therapy    Date of Service: 9/22/2021  Start time: 1000  Stop time: 1050  Type of session: Therapy Group    Problem number: 1. Dep F 33.0    Short term goal (STG): T.  Pt will identify specific relapse triggers & develop in writing and/or articulate two possible coping strategies for each.     Intervention/techniques: Informed, Validated/Supported, Modeled/Rehearsed, Prompted/Cued, Promoted Peer Support and Provided Feedback    Patient mental status/affect: Calm, Congruent and Preoccupied    Patient behavior/appearance: Neatly Groomed, Attentive and Cooperative    Special patient treatment accommodations provided (describe): None    Patient response and progress towards goals: Focus of session was based on information from the book When Your Past is Hurting Your Present by Porter Mora. I shared ideas from the book about how to develop courage and be willing and able to take risks that will help build confidence, self esteem, and increased well being. We spoke about how being stuck in a rut for a long time impacts our courage to take even small risks and we spoke about making an very focused effort on taking small risks daily. We discussed how we also often overthink the things and events that feel like risks and are scary and we wish we had enjoyed the ride.   We spoke about a risk that group members were willing to commit to today to do this week. Segun Browning participated in group with prompting. She spoke about how she knows that she is doing better this week and last week, her mood was very low due to all the losses in her life. She spoke about how she knows that she wants to push herself to make better choices for her self and her mood. She was open to ideas about the small risks that she can take to help herself and how she can work to get out of unhealthy ruts that just create more problems for herself.

## 2021-09-22 NOTE — BH NOTES
[facility] Outpatient Program  Group Therapy    Date of Service: 9/22/2021  Start time: 1200  Stop time: 1250  Type of session: Therapy Group    Problem number: 1. Dep F 33.0    Short term goal (STG): V. Pt will identify an area (fun, family, learning, spirituality, goals, health) each week in which she made an effort to be mindful and feel connected in some way    Intervention/techniques: Informed, Prompted/Cued, Listened/Empathized, Promoted Peer Support and Provided Feedback    Patient mental status/affect: Anxious, Calm, Congruent and Preoccupied    Patient behavior/appearance: Neatly Groomed, Attentive, Cooperative and Needed Prompting    Special patient treatment accommodations provided (describe): None    Patient response and progress towards goals: Focus of session was on conflict resolution and strategies to help us keep calm in a conflict with others. I shared how conflicts can increase the production of cortisol and adrenaline and how that prepares us to either take flight or fight. We spoke about how conflicts can be over something pretty minor to values and very important issues in our lives. I shared how the increase in hormones affects us when we need to be clear minded and aware but those hormones can create a disorientation and confusion. I shared with group some strategies to help us stay calm in a conflict and they included taking deep breaths which actually interferes with the production of these hormones, focusing on body and where we can work to relax it, listen carefully and ask open ended questions, lower our voices, and work to let go and agree to disagree with others. We spoke about with whom group members can practice these strategies. Kevinerika Andino participated in group with prompting. She spoke about how she has some issues with her one daughter who \"can be really rough and difficult. \"  She spoke about how their conversations go with each other and we spoke about some strategies that she can do differently. She spoke about how she is open to expressing her feelings and being able to manage and maintain her own emotional state that way.

## 2021-09-22 NOTE — BH NOTES
[facility] Outpatient Program  Group Therapy  Date of service: 9/22/2021  Start time: 1100  Stop time: 1150  Type of session: Therapy Group    Problem number: 1Dep. F33.0    Short term goal (STG): S Pt will verbalize belief in herself that she has the ability to make thought out decisions and it is a goal she is willing to work towards.        Intervention/techniques: Informed, Validated/Supported, Reinforced and Provided Feedback    Patient mental status/affect: Calm, Congruent and Happy    Patient behavior/appearance: Attentive, Cooperative and Enthusiastic    Special patient treatment accommodations provided (describe): none    Patient response and progress towards goals: Focus of session was on the idea and concept of writing a letter to ourselves of forgiveness. We spoke about the issues and weight we carry around because of holding onto anger towards ourselves and how much damage this does and how we often are willing to forgive others before we forgive ourselves. I shared information on dos and donts of self forgiveness and shared some examples of which direction this can take. Group members began the process of writing a letter and sharing some ideas of how this can benefit them and help them release feelings that are adding to the mix that has led to decreased functioning. Pt participated in the group activity and engaged with the other members. She stated she was feeling good today and loves when she is able to be around others. She followed along with the reading of the forgiveness letter and stated that she liked it and felt it is a good idea to do for herself.

## 2021-09-22 NOTE — PROGRESS NOTES
SOP PROGRESS NOTE    INTERVAL HISTORY/FINDINGS:  States she's still been feeling \"fair\" affectively, and that she still has occasional days when she's slightly more dysphoric. However, she hasn't had any episodes of more severe depression and her N/V functioning has continued to be fairly good. Sleeping and eating well, and she's staying reasonably active. Objectively, she appears to be euthymic and affectively stable. No med SE's at all and she's been very compliant with it. Overall, still doing very well affectively. MEDICATIONS:  Current Outpatient Medications   Medication Sig    levocetirizine (XYZAL) 5 mg tablet TAKE 1 TABLET BY MOUTH DAILY AS NEEDED FOR ALLERGY SYMPTOMS    aspirin delayed-release 81 mg tablet Take 1 Tablet by mouth daily.  atorvastatin (LIPITOR) 40 mg tablet Take 1 Tablet by mouth nightly.  omeprazole (PRILOSEC) 20 mg capsule Take 1 Capsule by mouth daily.  metoprolol succinate (TOPROL-XL) 25 mg XL tablet TAKE 1 TABLET BY MOUTH EVERY DAY    fluticasone propionate (FLONASE) 50 mcg/actuation nasal spray 2 Sprays by Both Nostrils route daily.  venlafaxine-SR (EFFEXOR-XR) 150 mg capsule Take 1 Capsule by mouth daily (with breakfast). Indications: major depressive disorder    Januvia 100 mg tablet TAKE 1 TABLET BY MOUTH EVERY DAY    lisinopriL (PRINIVIL, ZESTRIL) 20 mg tablet TAKE 1 TABLET BY MOUTH EVERY DAY    nitroglycerin (NITROSTAT) 0.4 mg SL tablet 1 Tab by SubLINGual route every five (5) minutes as needed for Chest Pain. Up to 3 doses.  Blood-Glucose Meter monitoring kit Check glucose once daily. DX: E11.9    glucose blood VI test strips (ASCENSIA AUTODISC VI, ONE TOUCH ULTRA TEST VI) strip Check glucose once daily. DX: E11.9    lancets misc Check glucose once daily. DX: E11.9    montelukast (SINGULAIR) 10 mg tablet Take 1 Tab by mouth daily.  acetaminophen (TylenoL) 325 mg tablet Take  by mouth every four (4) hours as needed for Pain.      No current facility-administered medications for this encounter. MENTAL STATUS UPDATE: (Positives in [place])  Appearance:   Neat   Disheveled  Odiferous   Appropriate  Orientation:   Time  Place  Person  Speech:   Coherent  Pressured  Garbled/Slurred  Loud   Soft  Mute  Memory:   Intact  Impaired (Recent  Remote)--Mild  Severe  Mood:   Euthymic  Depressed  Anxious  Elated  Affect:    Appropriate  Inappropriate  Labile  Blunted  Flat  Thought Content:   Logical  Goal directed  Paranoid  Delusional  Illogical IOR [facility]  HI  Thought Process:   Coherent/linear  Tangential  Loose/Disorganized   Hallucinations:   None  Auditory  Visual  Tactile  Olfactory  Gustatory  Insight:   Good  Fair  Impaired      Judgment:   Good  Fair  Impaired     CONTINUED NEED FOR TREATMENT: (Positives in [place])  1. Continued psychiatric symptoms causing impairment in IDL  or functioning  2. Continued non-compliance with meds resulting in decompensation  3. High level of debilitation and symptoms with inadequate support  4. Continued need for structured care to adjust medications  5. Continued presence of debilitating symptoms  6. Continued presence of risk factors     CONTINUED NEED FOR GROUP PSYCHOTHERAPY TO ADDRESS THE FOLLOWING: (Positives in [place])  Psychiatric symptom management       Psychiatric relapse prevention    Anxiety management      Self-esteem issues      Poor decision making       Recent decompensation       Safety issues      Anger management     Self care/ADL's     Med/treatment compliance      Impaired boundaries/relationships     Assertiveness      Grief/loss resolution       Guilt/shame issues     Realistic goal setting      DIAGNOSTIC IMPRESSION:  1. 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 Continue the Venlafaxine  mg daily--has 9 months of refills (90 day)  2. Continue SOP treatment 1 x/week.

## 2021-09-29 ENCOUNTER — HOSPITAL ENCOUNTER (OUTPATIENT)
Dept: BEHAVIORAL/MENTAL HEALTH | Age: 86
Discharge: HOME | End: 2021-09-29
Payer: MEDICARE

## 2021-09-29 PROCEDURE — 90853 GROUP PSYCHOTHERAPY: CPT

## 2021-09-29 NOTE — BH NOTES
[facility] Outpatient Program  Group Therapy  Date of service: 9/29/2021  Start time: 1100  Stop time: 1150  Type of session: Therapy Group    Problem number: 1Dep. F33.0    Short term goal (STG): T Pt will identify specific relapse triggers & develop in writing and/or articulate two possible coping strategies for each.       Intervention/techniques: Informed, Validated/Supported, Reinforced, Facilitated Disclosure and Provided Feedback    Patient mental status/affect: Anxious, Congruent and Worried    Patient behavior/appearance: Attentive and Cooperative    Special patient treatment accommodations provided (describe): none    Patient response and progress towards goals: Focus of session was on effective ways to control anxiety. We spoke about how these specific skills can have a very immediate effect on stress and anxiety and we spoke about how we can motivate to use them. We spoke about the following skills; dealing with problems on the spot, strong, confident relationships, slowing down, taking one thing at a time, coping with ruts, divide problems up, using past experiences, and building relaxation into our daily lives. We spoke about which skill we will start to incorporate into our daily use. Pt participated in the group activity and engaged with the other members. Pt was able to express her feelings and anxiety related to a recent circumstance she experienced. The group was supportive of her and was able to help process and problem solve.

## 2021-09-29 NOTE — BH NOTES
[facility] Outpatient Program  Group Therapy    Date of Service: 9/29/2021  Start time: 1000  Stop time: 1050  Type of session: Therapy Group    Problem number: 1. Dep F 33.0    Short term goal (STG): U.  Pt will verbalize one memory of the past that she feels regret or guilt over and verbalize her commitment to self-forgiveness in order to help build on her confidence as well.     Intervention/techniques: Informed, Validated/Supported, Modeled/Rehearsed, Prompted/Cued, Promoted Peer Support and Provided Feedback    Patient mental status/affect: Anxious, Congruent and Preoccupied    Patient behavior/appearance: Neatly Groomed, Attentive, Cooperative and Needed Prompting    Special patient treatment accommodations provided (describe): None    Patient response and progress towards goals: Focus of session was based on information from [employer] to [address] [place] [address] by Dr Jim Cooley. I shared the ideas on how we look at and emotionally respond to problems in our lives. We spoke about how we have practical problems (real) and emotional ( our reaction to real problems.)  We spoke about how we have imagined problems which are fully created by us and can cause us a lot of anxiety and stress that is completely based on false beliefs. I shared that this concept is based on Stoicism which is that reality does not care about how we emotionally react to problems so we may as well focus on solutions to the real problems. We spoke about real vs imagined problems an worked on solutions to the real problems. Karla Thomas participated in group with prompting. She spoke about how she knows that she is struggling with not feeling well and it does keep occurring. She is aware that she can focus on the solutions that are healthy for her and how to solve where she is not feeling well. She spoke about how she can see how it is very helpful to focus on the real and concrete problems.

## 2021-09-29 NOTE — BH NOTES
[facility] Outpatient Program  Group Therapy    Date of Service: 9/29/2021  Start time: 1200  Stop time: 1250  Type of session: Therapy Group    Problem number: 1. Dep F 33.0    Short term goal (STG): V. Pt will identify an area (fun, family, learning, spirituality, goals, health) each week in which she made an effort to be mindful and feel connected in some way.     Intervention/techniques: Informed, Validated/Supported, Modeled/Rehearsed, Prompted/Cued, Promoted Peer Support and Provided Feedback    Patient mental status/affect: Anxious, Congruent and Preoccupied    Patient behavior/appearance: Neatly Groomed, Attentive, Cooperative and Needed Prompting    Special patient treatment accommodations provided (describe): None    Patient response and progress towards goals: Focus of session was on ideas presented in the book The Language of Letting Go by Carroll. The focus was on the difference between being responsible to someone versus for someone. We discussed how we are responsible for ourselves and how we often get tangled up emotionally when we think we are responsible for others in some way. We discussed how we have to untangle our own self care from others dependent on us unnecessarily for care. We discussed the freedom that can come when we decide to only be responsible to others and how that can impact our own well being and health. I asked group members to think about who they can let of and how they can stop being the caretaker for others, when it is not necessary or appropriate, or how they can better take care of their own needs. Yuki Wood participated in group with prompting. She spoke about how she is aware of her need to focus on herself and how she can still get easily caught up into other peoples issues. 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   We continue to work on her ability to set healthy boundaries for herself.

## 2021-10-06 ENCOUNTER — HOSPITAL ENCOUNTER (OUTPATIENT)
Dept: BEHAVIORAL/MENTAL HEALTH | Age: 86
Discharge: HOME | End: 2021-10-06
Payer: MEDICARE

## 2021-10-06 PROCEDURE — 90853 GROUP PSYCHOTHERAPY: CPT

## 2021-10-06 NOTE — BH NOTES
[facility] Outpatient Program  Group Therapy  Date of service: 10/6/2021  Start time: 1100  Stop time: 1150  Type of session: Therapy Group    Problem number: 1Dep. F33.0    Short term goal (STG): T Pt will identify specific relapse triggers & develop in writing and/or articulate two possible coping strategies for each.       Intervention/techniques: Informed, Validated/Supported, Listened/Empathized, Reinforced, Facilitated Disclosure and Provided Feedback    Patient mental status/affect: Calm and Congruent    Patient behavior/appearance: Neatly Groomed, Attentive, Cooperative and Motivated    Special patient treatment accommodations provided (describe): none    Patient response and progress towards goals: Focus of session was based on the handout Everything Is Awful and Im not Okay.   We spoke about how this handout leads us to ask questions to help us see what we can possibly do to take care of ourselves. We spoke about the questions range from physical needs to emotional and mental needs. We spoke about how this can be useful when we are in crisis and need some simple directions that we may be unable to even think about due to the crisis. We spoke about how group members can use this tool and how they may be willing to use it Pt participated in the group activity and engaged witht he other members. Pt talked about having a headache this morning and called to cancel group. Afterward she used some of her coping skills and decided she would be better off in group and scheduled to be picked up. She stated she loves group and the people and was proud of herself for \"coming on in\" and not staying home by herself. The group was very supportive of her efforts.

## 2021-10-06 NOTE — BH NOTES
[facility] Outpatient Program  Group Therapy    Date of Service: 10/6/2021  Start time: 1200  Stop time: 1250  Type of session: Therapy Group    Problem number: 1. Dep F 33.0    Short term goal (STG):  W.  Pt will verbalize understanding of the difference of a lapse and a relapse and when it may be appropriate to get additional help in the future.      Intervention/techniques: Informed, Validated/Supported, Modeled/Rehearsed, Prompted/Cued, Promoted Peer Support and Provided Feedback    Patient mental status/affect: Anxious, Congruent and Preoccupied    Patient behavior/appearance: Neatly Groomed, Attentive, Cooperative and Needed Prompting    Special patient treatment accommodations provided (describe): None    Patient response and progress towards goals: Focus of session was information from the book Anxious to Please by Deysi Giles and Galina Buchanan. I shared the idea of the key to manage anxiety has to start with awareness. Awareness is sustained attention to thoughts, feelings, body, and behavior. It is mindfulness in essence and we spoke about how difficult this practice is. We spoke about how we experience anxiety as an emotional state but it is a biochemical phenomenon in the body. We spoke about how nice people are most anxious about losing love and respect from others but that behavior is the very thing that keeps us anxious. I shared the list of ways Nice People suppress the experience of anxiety from the book and we spoke about strategies for managing those ways and doing things differently. Mir Garay participated in group with prompting. She spoke about how she has been struggling at times with being aware of her thoughts and feelings. She spoke about how she has \"come a long way\" in understanding herself and her needs. She spoke about what she will do to continue to set healthy boundaries for herself.

## 2021-10-06 NOTE — BH NOTES
[facility] Outpatient Program  Group Therapy    Date of Service: 10/6/2021  Start time: 1000  Stop time: 1050  Type of session: Therapy Group    Problem number: 1. Dep F 33.0    Short term goal (STG): V.  Pt will identify an area (fun, family, learning, spirituality, goals, health) each week in which she made an effort to be mindful and feel connected in some way.     Intervention/techniques: Informed, Validated/Supported, Prompted/Cued, Listened/Empathized and Provided Feedback    Patient mental status/affect: Anxious, Congruent, Preoccupied and Worried    Patient behavior/appearance: Neatly Groomed, Attentive, Cooperative, Needed Prompting and Withdrawn/Quiet    Special patient treatment accommodations provided (describe): None    Patient response and progress towards goals: Focus of session was based on ideas developed from [address] [place] [address],[address] of Highly Effective People.   I shared the concepts and habits that include being proactive, beginning with the end in mind, putting first things first, think win/win, seek to understand than to be understood, synergize, and sharpen the saw. We went over these concepts and how these can be developed into habits and how they can benefit group members and improve their overall functioning. Ifrah Banerjee was quiet in group and easy to engage but she was concerned about having ongoing headaches \"that feel like the are circling the top of my head. \"  She spoke about how she has been able to understand others recently and she is not as upset by other people and what they do and say. She has been able to make a lot of positive choices for herself in regards to how she interacts with others.

## 2021-10-13 ENCOUNTER — HOSPITAL ENCOUNTER (OUTPATIENT)
Dept: BEHAVIORAL/MENTAL HEALTH | Age: 86
Discharge: HOME | End: 2021-10-13
Payer: MEDICARE

## 2021-10-13 PROCEDURE — 90853 GROUP PSYCHOTHERAPY: CPT

## 2021-10-13 NOTE — BH NOTES
[facility] Outpatient Program  Group Therapy  Date of service: 10/13/2021  Start time: 1100  Stop time: 1150  Type of session: Therapy Group    Problem number: 1Dep. F33.0    Short term goal (STG): V.  Pt will identify an area (fun, family, learning, spirituality, goals, health) each week in which she made an effort to be mindful and feel connected in some way.       Intervention/techniques: Informed, Validated/Supported, Listened/Empathized, Reinforced and Provided Feedback    Patient mental status/affect: Calm and Congruent    Patient behavior/appearance: Attentive, Cooperative and Motivated    Special patient treatment accommodations provided (describe): none    Patient response and progress towards goals: Focus of session was based on the article 10 Signs you are Starting to Heal.  We spoke about the following: you no longer dwell on negative thoughts from others, you no longer feel afraid to ask for help, you are starting to feel again, you no longer beat yourself up about everything, you experience more better days that bad ones, you are gaining more control over your life, you are regaining your appetite, you no longer rely on others to make you happy, and you look forward to the future. We spoke about where group members are and what they are noticing are positive signs of recovery. Pt participated in the group activity and engaged with the other members. She stated that she has something new in her life that makes her happy. The neighbors chickens now come to her house while she sits outside and that brings her nandini and helps her to not feel so lonely. She wants to gather corn from the [place] around her to start feeding them. She also stated that she feels she has always been able to ask for help when needed and is curious regarding others opinions.

## 2021-10-13 NOTE — BH NOTES
[facility] Outpatient Program  Group Therapy    Date of Service: 10/13/2021  Start time: 1200  Stop time: 1250  Type of session: Therapy Group    Problem number: 1. Dep F 33.0    Short term goal (STG): W.  Pt will verbalize understanding of the difference of a lapse and a relapse and when it may be appropriate to get additional help in the future    Intervention/techniques: Informed, Validated/Supported, Modeled/Rehearsed, Prompted/Cued, Promoted Peer Support and Provided Feedback    Patient mental status/affect: Anxious, Congruent and Preoccupied    Patient behavior/appearance: Neatly Groomed, Attentive, Cooperative and Needed Prompting    Special patient treatment accommodations provided (describe): None    Patient response and progress towards goals: Focus of session was on mindfulness of others and how this can improve relationships in our lives. We spoke about how this can mean that we really work to pay attention to what others are saying and how to clear up any conflicts with others as peacefully but effectively as we can. We spoke about how we can offer validation of others and how this can improve relationships and can be the encouragement others need to validate us when we need to. We spoke about the importance of not making assumptions and to be our own person in any relationship. We then spoke about how we can practice being mindful of others such as letting go of focus on my own internal thoughts and be curious about others around me. Jennifer Winter participated in group with prompting. She spoke about how she knows that she is aware of the need to keep working on her relationships with her family. She was able to notice that she has a lot of improvement in her communication, especially with her one daughter. She is responsive to feedback and is aware of the progress she has been able to make.

## 2021-10-13 NOTE — BH NOTES
[facility] Outpatient Program  Group Therapy    Date of Service: 10/13/2021  Start time: 1000  Stop time: 1050  Type of session: Therapy Group    Problem number: 1. Dep F 33.0    Short term goal (STG): T. Pt will identify specific relapse triggers & develop in writing and/or articulate two possible coping strategies for each    Intervention/techniques: Informed, Validated/Supported, Prompted/Cued, Observed/Monitored and Provided Feedback    Patient mental status/affect: Anxious, Congruent and Preoccupied    Patient behavior/appearance: Neatly Groomed, Attentive, Cooperative and Needed Prompting    Special patient treatment accommodations provided (describe): None    Patient response and progress towards goals: Focus of session was on what are the behaviors, attitudes, thought patterns, and emotions that we are working on putting in our trash can or have successfully put in the trash and what we are doing instead. We spoke about the specific skills in regards to attitudes, thought patterns, behaviors and emotions that are in our tool box and we are working on increasing the use of these tools in order to increase our positive emotions and improve our overall functioning. We spoke about what specific tools or trash we are having difficulty incorporating or letting go of and how we can work to improve it. Karis Samano participated in group with prompting. She spoke about how she has been struggling at times when she is home alone and \"my thoughts just get started and I cannot seem to stop them. \"  She spoke about how she knows that things are better overall but she continues to need help with remembering what her coping strategies that work for her as well as those things that can cause her damage.

## 2021-10-20 ENCOUNTER — HOSPITAL ENCOUNTER (OUTPATIENT)
Dept: BEHAVIORAL/MENTAL HEALTH | Age: 86
Discharge: HOME | End: 2021-10-20
Payer: MEDICARE

## 2021-10-20 PROCEDURE — 90853 GROUP PSYCHOTHERAPY: CPT
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
## 2021-10-20 NOTE — BH NOTES
[facility] Outpatient Program  Group Therapy    Date of Service: 10/20/2021  Start time: 1200  Stop time: 1250  Type of session: Therapy Group    Problem number: 1. Dep F 33.0    Short term goal (STG): W.  Pt will verbalize understanding of the difference of a lapse and a relapse and when it may be appropriate to get additional help in the future    Intervention/techniques: Informed, Validated/Supported, Prompted/Cued, Listened/Empathized, Promoted Peer Support and Provided Feedback    Patient mental status/affect: Anxious, Congruent and Preoccupied    Patient behavior/appearance: Neatly Groomed, Attentive, Cooperative and Needed Prompting    Special patient treatment accommodations provided (describe): None    Patient response and progress towards goals: Focus of session was on a strategy to help us increase compassion for ourselves and others. We spoke about how we often compare ourselves to others, often unfairly, and in ways that lead us to not feel good about ourselves. We spoke about a strategy developed by Davide Rodriguez in the book Resurfacing: Techniques for Exploring Consciousness.     I shared with group members the ideas of this strategy which was the make statements to ourselves when we find ourselves comparing ourselves to others unfairly. With attention on the other person, we think : Just like me, this person is seeking some happiness for his/her life, this person is trying to avoid suffering in his/her life, this person is seeking to fulfill his/her needs, and this person is learning about life. We spoke about situations and with which people in our lives this can help us keep things in perspective. Shu participated in group well with prompting. She spoke about how she has been able to develop this skill and have more awareness of how much she has changed as a result.   She spoke about how she works hard to be positive towards others and she can benefit from focusing on being more positive towards herself.

## 2021-10-20 NOTE — BH NOTES
[facility] Outpatient Program  Group Therapy    Date of Service: 10/20/2021  Start time: 1000  Stop time: 1050  Type of session: Therapy Group    Problem number: 1. Dep F 33.0    Short term goal (STG): T. Pt will identify specific relapse triggers & develop in writing and/or articulate two possible coping strategies for each.        Intervention/techniques: Informed, Validated/Supported, Listened/Empathized, Observed/Monitored, Promoted Peer Support and Provided Feedback    Patient mental status/affect: Anxious, Calm, Congruent and Preoccupied    Patient behavior/appearance: Neatly Groomed, Attentive, Cooperative and Needed Prompting    Special patient treatment accommodations provided (describe): None    Patient response and progress towards goals: Focus of session was based on information from Psychology [address] on an overview on what keeps worry going. We spoke about the concepts which includes a persons intolerance of uncertainty, having unhealthy positive beliefs about worry, approaching worry with a negative mindset, and cognitive avoidance meaning suppressing or pushing away the anxious thoughts. Group members were asked to share what they recognize as issues for them that may be increasing and maintaining unnecessary worry that causes us damage and what their thoughts are on changing it. Keely Paul participated in group well with prompting. She spoke about how she has been \"shook up\" because she had another person she knows pass away and it has been a lot over the past few months. She spoke about how she is going to view herself in a favorable light to help her work through her emotions about this. She spoke about how she has been able to understand more about the attitude that she can have and how much it affects her outcome and her day to day choices.

## 2021-10-27 ENCOUNTER — HOSPITAL ENCOUNTER (OUTPATIENT)
Dept: BEHAVIORAL/MENTAL HEALTH | Age: 86
Discharge: HOME | End: 2021-10-27
Payer: MEDICARE

## 2021-10-27 PROCEDURE — 90853 GROUP PSYCHOTHERAPY: CPT

## 2021-10-27 NOTE — BH NOTES
[facility] Outpatient Program  Group Therapy    Date of Service: 10/27/2021  Start time: 1000  Stop time: 1050  Type of session: Therapy Group    Problem number: 1. Dep F 33.0    Short term goal (STG): W.  Pt will verbalize understanding of the difference of a lapse and a relapse and when it may be appropriate to get additional help in the future.      Intervention/techniques: Informed, Validated/Supported, Prompted/Cued, Listened/Empathized, Promoted Peer Support and Provided Feedback    Patient mental status/affect: Anxious, Congruent and Preoccupied    Patient behavior/appearance: Neatly Groomed, Attentive, Cooperative and Needed Prompting    Special patient treatment accommodations provided (describe): None    Patient response and progress towards goals: Focus of session was based on DBT skills and developing the [facility] skills of DBT. We spoke about the [facility] skills are observing, describing, and participating and they are core mindfulness skills and these skills help up determine where we are going in our lives, knowing who we are, and learning how to control what is in our minds. We spoke about how these skills lead us to be able to learn hwo to observe and accept emotions, thoughts, situations, and experiences. We spoke about the observing is key as this allows us to step back and allow for needed space and lets us not get caught up in the experience and create additional stress. We spoke about how we can use these skills at this time and work to develop those valuable core mindfulness skills. Akanksha Schneider participated in group with prompting. She spoke about how she has been struggling with losing a lot of people that she knows and she recognizes how overwhelming that can be for her. She spoke about how she is trying to maintain but \"it is hearing about someone different at least a few times a week. \"  She spoke about how she will continue to work on being able to reach out for help and be aware of the fact that she does need and benefit from help.

## 2021-10-27 NOTE — BH NOTES
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       Intervention/techniques: Informed, Validated/Supported, Reinforced and Provided Feedback    Patient mental status/affect: Calm and Congruent    Patient behavior/appearance: Attentive, Cooperative and Motivated    Special patient treatment accommodations provided (describe): none    Patient response and progress towards goals: Focus of session was based on information from [address] by Dr. Alfrieda Frankel and we focused on the Willingness and Action Plan. I shared with group members the importance to focus on goals that we want to change and the values that drive our goals. I shared the need to know the action steps to accomplish the goals that they want and to be aware of the thoughts, feelings, memories, sensations, and urges that can come up when focusing on achieving this goal.  We spoke about goal habits such as breaking them down into small steps and then taking one step at a time. We spoke about the most important goal group members have for themselves right now  Pt participated in the activity and engaged with the other members. She shared that another relative had passed away and that has greatly saddened her. Her goal is to try and get out and socialize as much as she can to keep herself healthy.   Her arthritis has slowed her down but she states she \"loves people\" and it helps her to talk to others

## 2021-10-27 NOTE — BH NOTES
[facility] Outpatient Program  Group Therapy    Date of Service: 10/27/2021  Start time: 1200  Stop time: 1250  Type of session: Therapy Group    Problem number: 1. Dep F 33.0    Short term goal (STG): T. Pt will identify specific relapse triggers & develop in writing and/or articulate two possible coping strategies for each.       Intervention/techniques: Informed, Validated/Supported, Prompted/Cued, Listened/Empathized, Promoted Peer Support and Provided Feedback    Patient mental status/affect: Anxious, Congruent and Preoccupied    Patient behavior/appearance: Neatly Groomed, Attentive, Cooperative and Needed Prompting    Special patient treatment accommodations provided (describe): None    Patient response and progress towards goals: Focus of session was based on article 9 strategies to Manage a Personal Setback.   I shared how it can be helpful to view setbacks or slips to promote growth rather than giving up on goals. I shared the strategies which included: realize its normal, dont deny it, dont beat yourself up, accept responsibility, call for help, know it wont last forever, analyze what happened, learn from it, and remember that a slip is not a fall. We spoke about what goals group members believe they have had setbacks and want to get back on track. Taiwo Caal participated in group with prompting. She spoke about how she knows that she has to find some ways to keep on track with her goals and she was able to share how she sees she has had setbacks and how she had responded in the past.  She spoke about how she will continue to work on understanding what her setbacks are and how she relates to them.

## 2021-11-03 ENCOUNTER — HOSPITAL ENCOUNTER (OUTPATIENT)
Dept: BEHAVIORAL/MENTAL HEALTH | Age: 86
Discharge: HOME | End: 2021-11-03
Payer: MEDICARE

## 2021-11-03 ENCOUNTER — CLINICAL SUPPORT (OUTPATIENT)
Dept: FAMILY MEDICINE CLINIC | Age: 86
End: 2021-11-03
Payer: MEDICARE

## 2021-11-03 DIAGNOSIS — Z23 NEEDS FLU SHOT: Primary | ICD-10-CM

## 2021-11-03 PROCEDURE — G0008 ADMIN INFLUENZA VIRUS VAC: HCPCS | Performed by: NURSE PRACTITIONER

## 2021-11-03 PROCEDURE — 90694 VACC AIIV4 NO PRSRV 0.5ML IM: CPT | Performed by: NURSE PRACTITIONER

## 2021-11-03 PROCEDURE — 90853 GROUP PSYCHOTHERAPY: CPT

## 2021-11-03 NOTE — BH NOTES
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    Short term goal (STG): Pt will verbalize what she learned in past 6 months that helps to build her confidence in her ability to manage herself, and her mental health, once discharged from program    Intervention/techniques: Informed, Validated/Supported, Listened/Empathized and Reinforced    Patient mental status/affect: Calm, Congruent and Happy    Patient behavior/appearance: Attentive, Cooperative and Motivated    Special patient treatment accommodations provided (describe): none    Patient response and progress towards goals: Focus of session was based on information from guide from [address] [place] [address] with worry and anxiety amidst global uncertainty.    We spoke about the value in looking ahead but then we discussed the worry that can create. We discussed how quickly worry is escalating right now and how it is essentially in our heads. We spoke about types of worry and what we can actually do and we discussed strategies to use right now to help with worry. We spoke about making sure that we have balance in our lives with pleasure ,achievement, and closeness and connection to others. We spoke about how this can be improved right now and how to develop better balance. Pt stated that she was not able to sleep very well last night and she was tired today. She engaged with the other members and actively listened to the discussion. She remained quiet for most of the session but did support another pt verbally when talking about \"respect\" in relationships.

## 2021-11-03 NOTE — PATIENT INSTRUCTIONS
Vaccine Information Statement    Influenza (Flu) Vaccine (Inactivated or Recombinant): What You Need to Know    Many vaccine information statements are available in Hebrew and other languages. See www.immunize.org/vis. Hojas de información sobre vacunas están disponibles en español y en muchos otros idiomas. Visite www.immunize.org/vis. 1. Why get vaccinated? Influenza vaccine can prevent influenza (flu). Flu is a contagious disease that spreads around the [facility] every year, usually between October and May. Anyone can get the flu, but it is more dangerous for some people. Infants and young children, people 72 years and older, pregnant people, and people with certain health conditions or a weakened immune system are at greatest risk of flu complications. Pneumonia, bronchitis, sinus infections, and ear infections are examples of flu-related complications. If you have a medical condition, such as heart disease, cancer, or diabetes, flu can make it worse. Flu can cause fever and chills, sore throat, muscle aches, fatigue, cough, headache, and runny or stuffy nose. Some people may have vomiting and diarrhea, though this is more common in children than adults. In an average year, thousands of people in the [facility] die from flu, and many more are hospitalized. Flu vaccine prevents millions of illnesses and flu-related visits to the doctor each year. 2. Influenza vaccines     CDC recommends everyone 6 months and older get vaccinated every flu season. Children 6 months through 6years of age may need 2 doses during a single flu season. Everyone else needs only 1 dose each flu season. It takes about 2 weeks for protection to develop after vaccination. There are many flu viruses, and they are always changing. Each year a new flu vaccine is made to protect against the influenza viruses believed to be likely to cause disease in the upcoming flu season.  Even when the vaccine doesnt exactly match these viruses, it may still provide some protection. Influenza vaccine does not cause flu. Influenza vaccine may be given at the same time as other vaccines. 3. Talk with your health care provider    Tell your vaccination provider if the person getting the vaccine:   Has had an allergic reaction after a previous dose of influenza vaccine, or has any severe, life-threatening allergies    Has ever had Guillain-Barré Syndrome (also called GBS)    In some cases, your health care provider may decide to postpone influenza vaccination until a future visit. Influenza vaccine can be administered at any time during pregnancy. People who are or will be pregnant during influenza season should receive inactivated influenza vaccine. People with minor illnesses, such as a cold, may be vaccinated. People who are moderately or severely ill should usually wait until they recover before getting influenza vaccine. Your health care provider can give you more information. 4. Risks of a vaccine reaction     Soreness, redness, and swelling where the shot is given, fever, muscle aches, and headache can happen after influenza vaccination.  There may be a very small increased risk of Guillain-Barré Syndrome (GBS) after inactivated influenza vaccine (the flu shot). Rexene Sinks children who get the flu shot along with pneumococcal vaccine (PCV13) and/or DTaP vaccine at the same time might be slightly more likely to have a seizure caused by fever. Tell your health care provider if a child who is getting flu vaccine has ever had a seizure. People sometimes faint after medical procedures, including vaccination. Tell your provider if you feel dizzy or have vision changes or ringing in the ears. As with any medicine, there is a very remote chance of a vaccine causing a severe allergic reaction, other serious injury, or death. 5. What if there is a serious problem?     An allergic reaction could occur after the vaccinated person leaves the clinic. If you see signs of a severe allergic reaction (hives, swelling of the face and throat, difficulty breathing, a fast heartbeat, dizziness, or weakness), call 9-1-1 and get the person to the nearest hospital.    For other signs that concern you, call your health care provider. Adverse reactions should be reported to the Vaccine Adverse Event Reporting System (VAERS). Your health care provider will usually file this report, or you can do it yourself. Visit the VAERS website at www.vaers. [facility].gov or call 3-132.763.2910. VAERS is only for reporting reactions, and VAERS staff members do not give medical advice. 6. The National Vaccine Injury Compensation Program    The [facility] Vaccine Injury Compensation Program (VICP) is a federal program that was created to compensate people who may have been injured by certain vaccines. Claims regarding alleged injury or death due to vaccination have a time limit for filing, which may be as short as two years. Visit the VICP website at www.[facility].gov/vaccinecompensation or call 4-832.456.3539 to learn about the program and about filing a claim. 7. How can I learn more?  Ask your health care provider.  Call your local or state health department.  Visit the website of the Food and Drug Administration (FDA) for vaccine package inserts and additional information at www.fda.gov/vaccines-blood-biologics/vaccines.  Contact the Centers for Disease Control and Prevention (CDC):  - Call 6-256.544.3016 (1-800-CDC-INFO) or  - Visit CDCs influenza website at www.cdc.gov/flu. Vaccine Information Statement   Inactivated Influenza Vaccine   8/6/2021  [address]   Department of Health and Human Services  Centers for Disease Control and Prevention    Office Use Only

## 2021-11-03 NOTE — BH NOTES
[facility] Outpatient Program  Group Therapy    Date of Service: 11/3/2021  Start time: 1000  Stop time: 1050  Type of session: Therapy Group    Problem number: 1. Dep F 33.0    Short term goal (STG): X.  Pt will share instances over each week in which she was able to step back and observe her thoughts or feelings and any success in being able to name what she was experiencing in order to develop mindfulness skill.       Intervention/techniques: Informed, Validated/Supported, Modeled/Rehearsed, Prompted/Cued, Promoted Peer Support and Provided Feedback    Patient mental status/affect: Anxious, Congruent and Preoccupied    Patient behavior/appearance: Neatly Groomed, Attentive, Cooperative and Needed Prompting    Special patient treatment accommodations provided (describe): None    Patient response and progress towards goals: Focus of session was on information from article on how complaining rewires our brain for negativity and how to break that habit. Group members were asked to share their thoughts about what it is like for them to be around someone who is consistently complaining and what that may do to them when they are consistently complaining. We spoke about how we can start to practice of not complaining by catching ourselves when we are doing it and also recognizing when others may be doing it around us. I shared how we cant really complain and be grateful at the same time, so focus on being grateful. I asked group members to share their awareness of what they may be complaining about right now the most.  Carlos Siddiqui participated in group with prompting.   She spoke about how she is struggling with some negative thoughts and issues but she does believe she is making steps to solving things rather than getting stuck on the fact that the problems exist.  She spoke about how she is hopeful that she can continue to focus on the issues that she can resolve and continue to practice asking for help as she needs it.

## 2021-11-03 NOTE — BH NOTES
[facility] Outpatient Program  Group Therapy    Date of Service: 11/3/2021  Start time: 1200  Stop time: 1250  Type of session: Therapy Group    Problem number: 1. Dep F 33.0    Short term goal (STG): W.   Pt will verbalize understanding of the difference of a lapse and a relapse and when it may be appropriate to get additional help in the future    Intervention/techniques: Informed, Reframed, Modeled/Rehearsed, Promoted Peer Support and Provided Feedback    Patient mental status/affect: Anxious, Congruent and Preoccupied    Patient behavior/appearance: Neatly Groomed, Attentive, Cooperative and Needed Prompting    Special patient treatment accommodations provided (describe): None    Patient response and progress towards goals: Focus of session was on a tool that can be very helpful in starting and working towards goals and making changes. I discussed with group members the idea of committing to a daily change but only for 30 days. This concept was developed by Randall Alcala and he describes it as 30 days to success.   I spoke about how we often talk ourselves out of reaching important goals because the idea of permanent change can be so overwhelming. The idea was discussed of how a 30 day commitment gives us an opportunity to create a healthy new habit and not overwhelm us and we can actually get started. Group members spoke about potential changes that can be made for 30 days and why those changes are important to them. Page Levy participated in group with prompting. She spoke about how she has been struggling with quitting smoking even though \"I really don't smoke a lot. \"  She spoke about how it is enough though that it bothers her and has a strong desire to work on it. We spoke about how she can look at her habits and patterns.

## 2021-11-10 ENCOUNTER — HOSPITAL ENCOUNTER (OUTPATIENT)
Dept: BEHAVIORAL/MENTAL HEALTH | Age: 86
Discharge: HOME | End: 2021-11-10
Payer: MEDICARE

## 2021-11-10 PROCEDURE — 99213 OFFICE O/P EST LOW 20 MIN: CPT | Performed by: PSYCHIATRY & NEUROLOGY

## 2021-11-10 PROCEDURE — 90853 GROUP PSYCHOTHERAPY: CPT

## 2021-11-10 NOTE — BH NOTES
[facility] Outpatient Program  Group Therapy    Date of Service: 11/10/2021  Start time: 1200  Stop time: 1250  Type of session: Therapy Group    Problem number: 1. Dep F 33.0    Short term goal (STG): Y. Pt will verbalize what she learned in past 6 months that helps to build her confidence in her ability to manage herself, and her mental health, once discharged from program    Intervention/techniques: Informed, Validated/Supported, Modeled/Rehearsed, Prompted/Cued, Promoted Peer Support and Provided Feedback    Patient mental status/affect: Anxious, Congruent and Preoccupied    Patient behavior/appearance: Neatly Groomed, Attentive, Cooperative and Needed Prompting    Special patient treatment accommodations provided (describe): None    Patient response and progress towards goals: Focus of session was on our current ways of coping with emotions and problems and is they effective or not. We spoke about how we can tell what we are doing is effective and that is that our problems get resolved and/or our emotions lessen as a result. We spoke about ineffective coping skills such as suppression or avoidance, being passive and not doing anything about it, acting out or striking back, blaming others or circumstances. We spoke about what are effective coping skills and they include confronting the issues and face reality, seeking out help or information, setting our priorities, and establishing goals. We spoke about what are we currently doing that is working and what is not working. Janelle Carrasco participated in group with prompting. She spoke about how she knows that she has some good distractions that she can turn to right now as she wants to work on managing her feelings of stress and anxiety that can continue to occur for her.   She spoke about how she will continue to work on this and establish outlets to help her cope with those feelings other than smoking especially as she has to prepare herself for the winter where she is at higher risk for decompensation.

## 2021-11-10 NOTE — PROGRESS NOTES
SOP PROGRESS NOTE    INTERVAL HISTORY/FINDINGS:  States she's still been feeling \"good\" affectively, and that she hasn't had any exacerbation of depression or mood instability at all. Her N/V functioning has continued to be fairly good and she continues to reach out to friends and family by phone if she ever starts to feel a little down or flat. Also has a couple of friends who call her regularly, so she's very pleased with that. Sleeping and eating well, and she's staying reasonably active. Objectively appears to be euthymic and affectively stable. No med SE's reported or noted at all and she's been very compliant with the Effexor XR. Overall, still doing very well affectively. MEDICATIONS:  Current Outpatient Medications   Medication Sig    omeprazole (PRILOSEC) 20 mg capsule TAKE 1 CAPSULE BY MOUTH EVERY DAY    lisinopriL (PRINIVIL, ZESTRIL) 20 mg tablet TAKE 1 TABLET BY MOUTH EVERY DAY    Januvia 100 mg tablet TAKE 1 TABLET BY MOUTH EVERY DAY    levocetirizine (XYZAL) 5 mg tablet TAKE 1 TABLET BY MOUTH DAILY AS NEEDED FOR ALLERGY SYMPTOMS    aspirin delayed-release 81 mg tablet Take 1 Tablet by mouth daily.  atorvastatin (LIPITOR) 40 mg tablet Take 1 Tablet by mouth nightly.  metoprolol succinate (TOPROL-XL) 25 mg XL tablet TAKE 1 TABLET BY MOUTH EVERY DAY    fluticasone propionate (FLONASE) 50 mcg/actuation nasal spray 2 Sprays by Both Nostrils route daily.  venlafaxine-SR (EFFEXOR-XR) 150 mg capsule Take 1 Capsule by mouth daily (with breakfast). Indications: major depressive disorder    nitroglycerin (NITROSTAT) 0.4 mg SL tablet 1 Tab by SubLINGual route every five (5) minutes as needed for Chest Pain. Up to 3 doses.  Blood-Glucose Meter monitoring kit Check glucose once daily. DX: E11.9    glucose blood VI test strips (ASCENSIA AUTODISC VI, ONE TOUCH ULTRA TEST VI) strip Check glucose once daily. DX: E11.9    lancets misc Check glucose once daily.  DX: E11.9    montelukast (SINGULAIR) 10 mg tablet Take 1 Tab by mouth daily.  acetaminophen (TylenoL) 325 mg tablet Take  by mouth every four (4) hours as needed for Pain. No current facility-administered medications for this encounter. MENTAL STATUS UPDATE: (Positives in [place])  Appearance:   Neat   Disheveled  Odiferous   Appropriate  Orientation:   Time  Place  Person  Speech:   Coherent  Pressured  Garbled/Slurred  Loud   Soft  Mute  Memory:   Intact  Impaired (Recent  Remote)--Mild  Severe  Mood:   Euthymic  Depressed  Anxious  Elated  Affect:    Appropriate  Inappropriate  Labile  Blunted  Flat  Thought Content:   Logical  Goal directed  Paranoid  Delusional  Illogical IOR [facility]  HI  Thought Process:   Coherent/linear  Tangential  Loose/Disorganized   Hallucinations:   None  Auditory  Visual  Tactile  Olfactory  Gustatory  Insight:   Good  Fair  Impaired      Judgment:   Good  Fair  Impaired    CONTINUED NEED FOR TREATMENT: (Positives in [place])  1. Continued psychiatric symptoms causing impairment in IDL  or functioning  2. Continued non-compliance with meds resulting in decompensation  3. High level of debilitation and symptoms with inadequate support  4. Continued need for structured care to adjust medications  5. Continued presence of debilitating symptoms  6. Continued presence of risk factors    CONTINUED NEED FOR GROUP PSYCHOTHERAPY TO ADDRESS THE FOLLOWING: (Positives in [place])  Psychiatric symptom management       Psychiatric relapse prevention    Anxiety management   Self-esteem issues      Poor decision making       Recent decompensation       Safety issues   Anger management     Self care/ADL's     Med/treatment compliance      Impaired boundaries/relationships   Assertiveness      Grief/loss resolution       Guilt/shame issues     Realistic goal setting    DIAGNOSTIC IMPRESSION:  1. 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 Continue the Venlafaxine  mg daily--has 7+ months of refills (90 day)  2. Continue SOP treatment 1 x/week.

## 2021-11-10 NOTE — BH NOTES
[facility] Outpatient Program  Group Therapy    Date of Service: 11/10/2021  Start time: 1000  Stop time: 1050  Type of session: Therapy Group    Problem number: 1. Dep F 33.0    Short term goal (STG): U.  Pt will verbalize one memory of the past that she feels regret or guilt over and verbalize her commitment to self-forgiveness in order to help build on her confidence as well    Intervention/techniques: Informed, Validated/Supported, Modeled/Rehearsed, Prompted/Cued, Listened/Empathized, Facilitated Disclosure and Provided Feedback    Patient mental status/affect: Anxious, Congruent and Preoccupied    Patient behavior/appearance: Neatly Groomed, Attentive, Cooperative and Needed Prompting    Special patient treatment accommodations provided (describe): None    Patient response and progress towards goals: Focus of session was on information from the website [employer] on 9 things you should be able to say about yourself.   I shared the statements of: I have an open mind, I am following my heart and intuition, I am being honest with myself, I am making a difference, I dont need anyone else to complete me, I have been brave enough to be vulnerable, I have forgiven, I have preserved through tough times, and I have no regrets. I spoke with group members, and asked them to share their thoughts, about why these statements can help us emotionally and developing a stronger sense of well being and emotional health. We spoke about which statement can help them today to improve their emotional stability. Bhanu Chen participated in group with prompting. She spoke about how she knows that she struggles with letting go of cigarettes as her way to cope with stress. She spoke about how she has to be honest with herself about why she does it and how it affects her. She shared how much she wants to work on it but does not fele strong enough at times to overcome those urges.

## 2021-11-10 NOTE — BH NOTES
[facility] Outpatient Program  Group Therapy  Date of service: 11/10/2021  Start time: 1100  Stop time: 1150  Type of session: Therapy Group    Problem number: 1Dep. F33.0    Short term goal (STG): W Pt will verbalize understanding of the difference of a lapse and a relapse and when it may be appropriate to get additional help in the future    Intervention/techniques: Informed, Validated/Supported, Listened/Empathized, Clarified, Reinforced and Provided Feedback    Patient mental status/affect: Calm and Congruent    Patient behavior/appearance: Attentive, Cooperative and Motivated    Special patient treatment accommodations provided (describe): none    Patient response and progress towards goals: Focus of session was on identifying common themes of fears that we hold and what may be causing us fear and holding us back. I discussed with group that fear can be debilitating and cause us unnecessary panic and uncertainty and is unreasonable. We discussed how we need to accept that fear is a part of every day and that we can face it reasonably and with assurance. I discussed the common themes of debilitating fear that holds us back which includes:  the unknown, rejection, being taken advantage of, the future, failure, being alone. Group members worked to identify their current fears and what may be holding them back. Common themes of fear Past is hurting your present Pt participated in the activity and engaged with the other group members. She shared that she is afraid of mice. She knows they will run away from her but when she finds one dead she gets afraid. She will have her son come over to clean it up.  We discussed possible strategies to help her overcome this fear

## 2021-11-15 ENCOUNTER — OFFICE VISIT (OUTPATIENT)
Dept: FAMILY MEDICINE CLINIC | Age: 86
End: 2021-11-15
Payer: MEDICARE

## 2021-11-15 VITALS
HEART RATE: 82 BPM | SYSTOLIC BLOOD PRESSURE: 139 MMHG | TEMPERATURE: 97 F | HEIGHT: 66 IN | BODY MASS INDEX: 27.04 KG/M2 | WEIGHT: 168.25 LBS | RESPIRATION RATE: 18 BRPM | OXYGEN SATURATION: 96 % | DIASTOLIC BLOOD PRESSURE: 80 MMHG

## 2021-11-15 DIAGNOSIS — E11.22 TYPE 2 DIABETES MELLITUS WITH STAGE 3A CHRONIC KIDNEY DISEASE, WITHOUT LONG-TERM CURRENT USE OF INSULIN (HCC): Primary | ICD-10-CM

## 2021-11-15 DIAGNOSIS — R42 VERTIGO: ICD-10-CM

## 2021-11-15 DIAGNOSIS — E78.00 ELEVATED CHOLESTEROL: ICD-10-CM

## 2021-11-15 DIAGNOSIS — I10 ESSENTIAL HYPERTENSION: ICD-10-CM

## 2021-11-15 DIAGNOSIS — N18.31 TYPE 2 DIABETES MELLITUS WITH STAGE 3A CHRONIC KIDNEY DISEASE, WITHOUT LONG-TERM CURRENT USE OF INSULIN (HCC): Primary | ICD-10-CM

## 2021-11-15 DIAGNOSIS — J30.89 ENVIRONMENTAL AND SEASONAL ALLERGIES: ICD-10-CM

## 2021-11-15 DIAGNOSIS — Z23 NEED FOR SHINGLES VACCINE: ICD-10-CM

## 2021-11-15 DIAGNOSIS — F32.1 MODERATE SINGLE CURRENT EPISODE OF MAJOR DEPRESSIVE DISORDER (HCC): ICD-10-CM

## 2021-11-15 DIAGNOSIS — N18.30 STAGE 3 CHRONIC KIDNEY DISEASE, UNSPECIFIED WHETHER STAGE 3A OR 3B CKD (HCC): ICD-10-CM

## 2021-11-15 DIAGNOSIS — Z86.73 HISTORY OF CVA (CEREBROVASCULAR ACCIDENT): ICD-10-CM

## 2021-11-15 DIAGNOSIS — M15.9 PRIMARY OSTEOARTHRITIS INVOLVING MULTIPLE JOINTS: ICD-10-CM

## 2021-11-15 LAB — GLUCOSE POC: 105 MG/DL

## 2021-11-15 PROCEDURE — 82947 ASSAY GLUCOSE BLOOD QUANT: CPT | Performed by: NURSE PRACTITIONER

## 2021-11-15 PROCEDURE — 99214 OFFICE O/P EST MOD 30 MIN: CPT | Performed by: NURSE PRACTITIONER

## 2021-11-15 RX ORDER — ZOSTER VACCINE RECOMBINANT, ADJUVANTED 50 MCG/0.5
0.5 KIT INTRAMUSCULAR ONCE
Qty: 0.5 ML | Refills: 0 | Status: SHIPPED | OUTPATIENT
Start: 2021-11-15 | End: 2021-11-15

## 2021-11-15 NOTE — PROGRESS NOTES
Subjective:     CC: 3 month F/U    Thanh Damon is a 82 y.o. female who presents today for a 3 month follow up for HTN, HLD, and diabetes. She also has a chronic dry cough. Her daughter Lucho Sherwood is with her today. She manages pt's medications. Cerebrovascular disease  She went to the ER on 7-1-21 with c/o aphasia. CTA showed no evidence of acute stroke. Carotids were without significant stenosis. She is already on Lipitor 40mg daily. She has hx of old CVA that was found on CT scan a year or so ago. Pt has hx of GI bleed while she was taking ASA 325mg daily so we discussed the need to keep her on the ASA 81mg daily. She was also c/o a chronic dry cough. The ER physician suggested stopping Lisinopril but she stated she really thinks is coming from her nose. She was prescribed Flonase for her allergies but has not been using it. Has not picked it up from pharmacy yet. She does have hx of allergies and reports post nasal drip. But she also reports a lot of belching and wavering appetite so there could be some underlying GERD which for which she is not taking anything. HTN  BP initially elevated in the office today, but when manually rechecked it was within goal.   She is on Lisinopril 20mg daily and Metoprolol XL 25mg daily. She denies CP, SOB, dizziness, or swelling. HLD  Lab Results   Component Value Date/Time    Cholesterol, total 213 (H) 01/21/2021 03:07 PM    HDL Cholesterol 64 01/21/2021 03:07 PM    LDL, calculated 124 (H) 01/21/2021 03:07 PM    LDL, calculated 86.6 07/01/2020 05:00 AM    VLDL, calculated 25 01/21/2021 03:07 PM    VLDL, calculated 21.4 07/01/2020 05:00 AM    Triglyceride 143 01/21/2021 03:07 PM    CHOL/HDL Ratio 3.0 07/01/2020 05:00 AM   She is on Lipitor 40mg daily. Order placed to check fasting lipid panel/      Type 2 diabetes  Lab Results   Component Value Date/Time    Hemoglobin A1c 6.8 (H) 08/03/2021 04:26 PM     On Januvia 100mg daily.  POC blood sugar checked and it was 105 in the office today. Stage 3 CKD  Lab Results   Component Value Date/Time    Creatinine 1.00 08/03/2021 04:26 PM     Avoids NSAIDS. Takes Tylenol. She is on an ACEi. Seasonal Allergies   She is on Xyzol and Singulair daily. Vertigo   She takes Meclizine prn. She has seen ENT in the past and did not want to go back. Depression  She is followed by Dr Loy Leonardo. She is on Effexor XR 150mg daily. States she is enjoying going to therapy twice a week.     OA, hands  Takes Tylenol prn with good relief    Patient Active Problem List   Diagnosis Code    Environmental allergies Z91.09    Elevated cholesterol E78.00    Essential hypertension I5    Well controlled type 2 diabetes mellitus ([facility] E11.9    Type 2 diabetes mellitus with stage 3 chronic kidney disease, without long-term current use of insulin ([facility]) E11.22, N18.30    Major depressive disorder, recurrent episode, severe ([facility] F33.2    Epigastric pain R10.13    Tobacco use Z72.0    Primary osteoarthritis involving multiple joints M89.49    Stage 3 chronic kidney disease (HCC) N18.30    Age-related cataract of both eyes H25.9    CVA (cerebral vascular accident) ([facility] I63.9    Cystitis N30.90    History of GI bleed Z87.19    History of CVA (cerebrovascular accident) Z80.78       Past Medical History:   Diagnosis Date    Allergic rhinitis due to pollen     BPV (benign positional vertigo)     Change in bowel habits     Depression     Diabetes (HCC)     Dysuria     Hemorrhoids     Hypertension     IBS (irritable bowel syndrome)     Menopause     Other diseases of nasal cavity and sinuses(478.19)     Vertigo          Current Outpatient Medications:     varicella-zoster recombinant, PF, (Shingrix, PF,) 50 mcg/0.5 mL susr injection, 0.5 mL by IntraMUSCular route once for 1 dose., Disp: 0.5 mL, Rfl: 0    omeprazole (PRILOSEC) 20 mg capsule, TAKE 1 CAPSULE BY MOUTH EVERY DAY, Disp: 90 Capsule, Rfl: 3    lisinopriL (PRINIVIL, ZESTRIL) 20 mg tablet, TAKE 1 TABLET BY MOUTH EVERY DAY, Disp: 90 Tablet, Rfl: 2    Januvia 100 mg tablet, TAKE 1 TABLET BY MOUTH EVERY DAY, Disp: 90 Tablet, Rfl: 1    levocetirizine (XYZAL) 5 mg tablet, TAKE 1 TABLET BY MOUTH DAILY AS NEEDED FOR ALLERGY SYMPTOMS, Disp: 90 Tablet, Rfl: 2    aspirin delayed-release 81 mg tablet, Take 1 Tablet by mouth daily. , Disp: 90 Tablet, Rfl: 1    atorvastatin (LIPITOR) 40 mg tablet, Take 1 Tablet by mouth nightly., Disp: 90 Tablet, Rfl: 1    metoprolol succinate (TOPROL-XL) 25 mg XL tablet, TAKE 1 TABLET BY MOUTH EVERY DAY, Disp: 90 Tablet, Rfl: 1    fluticasone propionate (FLONASE) 50 mcg/actuation nasal spray, 2 Sprays by Both Nostrils route daily. , Disp: 1 Bottle, Rfl: 0    venlafaxine-SR (EFFEXOR-XR) 150 mg capsule, Take 1 Capsule by mouth daily (with breakfast). Indications: major depressive disorder, Disp: 90 Capsule, Rfl: 3    nitroglycerin (NITROSTAT) 0.4 mg SL tablet, 1 Tab by SubLINGual route every five (5) minutes as needed for Chest Pain. Up to 3 doses. , Disp: 1 Bottle, Rfl: 1    Blood-Glucose Meter monitoring kit, Check glucose once daily. DX: E11.9, Disp: 1 Kit, Rfl: 0    glucose blood VI test strips (ASCENSIA AUTODISC VI, ONE TOUCH ULTRA TEST VI) strip, Check glucose once daily. DX: E11.9, Disp: 100 Strip, Rfl: 3    lancets misc, Check glucose once daily. DX: E11.9, Disp: 100 Each, Rfl: 3    montelukast (SINGULAIR) 10 mg tablet, Take 1 Tab by mouth daily. , Disp: 90 Tab, Rfl: 0    acetaminophen (TylenoL) 325 mg tablet, Take  by mouth every four (4) hours as needed for Pain., Disp: , Rfl:     No Known Allergies    Past Surgical History:   Procedure Laterality Date    COLONOSCOPY N/A 7/15/2020    COLONOSCOPY performed by Lor Chapman MD at \[facility]\"" ENDOSCOPY    HX CHOLECYSTECTOMY  2016    HX COLONOSCOPY  2013?     HX HYSTERECTOMY      UPPER GI ENDOSCOPY,DIAGNOSIS  7/15/2020            Social History     Tobacco Use   Smoking Status Current Every Day Smoker    Packs/day: 0.25   Smokeless Tobacco Never Used       Social History     Socioeconomic History    Marital status:    Tobacco Use    Smoking status: Current Every Day Smoker     Packs/day: 0.25    Smokeless tobacco: Never Used   Vaping Use    Vaping Use: Never used   Substance and Sexual Activity    Alcohol use: No    Drug use: No       Family History   Problem Relation Age of Onset    No Known Problems Mother        ROS:  Gen: denies fever, chills, or fatigue   HEENT:denies H/A or vision changes, +nasal drainage with post nasal drip, denies ear pain or sore throat  Resp: denies dyspnea, +chronic dry cough, no wheezing  CV: denies chest pain, pressure, or palpitations  Extremeties: denies edema  GI:+ belching and wavering appetite, denies dyspepsia, dysphagia, abd pain, or N/V  Musculoskeletal: +chronic pain in hands due to OA- takes Tylenol  Neuro: denies numbness/tingling, +occasional vertigo. Denies unilateral weakness, facial drooping, AMS, or slurred speech   Skin: denies rashes or new lesions   Psych: +depression-stable no anxiety or brad, or other changes in mood      Objective:     Visit Vitals  /80   Pulse 82   Temp 97 °F (36.1 °C) (Temporal)   Resp 18   Ht 5' 6\" (1.676 m)   Wt 168 lb 4 oz (76.3 kg)   SpO2 96%   BMI 27.16 kg/m²         General: Alert and oriented. No acute distress. Well nourished. HEENT    Eyes: Sclera white, conjunctiva clear. PERRLA. Extra ocular movements intact. Neck: Supple with FROM. Lungs: Breathing even and unlabored. All lobes clear to auscultation bilaterally   Heart :RRR, S1 and S2 normal intensity, no extra heart sounds  Extremities: Non-edematous  Musculo: +intermittent swelling of joints in the hands  Neuro: Cranial nerves grossly normal.  Psych: Mood and thought content appropriate for situation. Dressed appropriately and with good hygiene. Skin: Warm, dry, and intact. No lesions or discoloration.     Assessment/ Plan:     TIA  Cont ASA 81mg daily due to hx of GI Bleed while taking 325mg  Cont Lipitor 40mg daily  Keep tight control over BS and BP  Go back to ER for any numbness/tingling, unilateral weakness, facial drooping, slurred speech, confusion, AMS, or dizziness  F/U 12/6/21 with Zaheer Arrington NP per patient request    HTN  BP stable  Cont current meds  Check metabolic panel-she will make a nurse appt  Low-sodium diet  RTO or go to ER for any CP, SOB, dizziness, or swelling. F/U 12/6/21 with Zaheer Arrington NP per patient request    HLD  Need to recheck lipids but she is not fasting today-she will make a nurse appt  Cont Lipitor  Low fat diet  F/U 12/6/21 with Zaheer Arrington NP per patient request    Depression  Stable  Cont Effexor XR 150mg daily  F/U with psych Dr Nathanael Wells as scheduled  F/U here 3 months    Type 2 diabetes  Check M1M-hej will make a nurse appt  Cont Januvia 100mg daily. Watch the sugar and carbs  F/U 3 months    Stage 3 CKD  Check creatinine-she will make a nurse appt  Avoids NSAIDS. On ACEi. Need to keep BP and BS under tight control  F/U 3 months    Seasonal Allergies   Cont Singulair, Flonase and Xyzol daily. Avoid triggers  F/U 3 months    GERD  Start Prilosec 20mg daily for dry cough    Vertigo  Cont Meclizine prn. Need for shingles vaccine  Sent to pharmacy per patient request    Patient would like to see Kajal Rosales NP. She did not specify what she would like to talk to her about. Advised patient to make an appointment for the second week on December. Verbal and written instructions (see AVS) provided.  Patient expresses understanding of diagnosis and treatment plan. There are no preventive care reminders to display for this patient.             Sandee Rodriguez NP

## 2021-11-15 NOTE — PROGRESS NOTES
1. Have you been to the ER, urgent care clinic since your last visit? Hospitalized since your last visit? No    2. Have you seen or consulted any other health care providers outside of the [address] [place] [address] since your last visit? Include any pap smears or colon screening.  Yes When: behavioral health 11-10-21

## 2021-11-17 ENCOUNTER — HOSPITAL ENCOUNTER (OUTPATIENT)
Dept: BEHAVIORAL/MENTAL HEALTH | Age: 86
Discharge: HOME | End: 2021-11-17
Payer: MEDICARE

## 2021-11-17 PROCEDURE — 90853 GROUP PSYCHOTHERAPY: CPT

## 2021-11-17 NOTE — BH NOTES
[facility]  Group Therapy    Date of Service: 11/17/2021  Start time: 1200  Stop time: 1250  Type of session: Therapy Group    Problem number: 1. Dep F 33.0    Short term goal (STG): W.  Pt will verbalize understanding of the difference of a lapse and a relapse and when it may be appropriate to get additional help in the future  Intervention/techniques: Informed, Validated/Supported, Modeled/Rehearsed, Prompted/Cued, Listened/Empathized and Provided Feedback    Patient mental status/affect: Anxious, Calm and Congruent    Patient behavior/appearance: Neatly Groomed, Attentive, Cooperative and Needed Prompting    Special patient treatment accommodations provided (describe): None    Patient response and progress towards goals: Focus of session was based on information in the book Rising Strong by Ced Cespedes. I shared the ideas she presents as to how we all usually have a first draft to a story we create about any given situation or event in our lives, no matter how small or large. We spoke about what these first draft stories can create for us emotionally if we stick with those versions. I shared how the [employer] encourages us to challenge our first draft with the following questions which are: what do I need to learn and understand about the situation, the other people in the story, and myself? We spoke about how these questions will lead us to data and alternative realities of the situation and how this is a better emotional path. Alanna Matt participated in group with prompting. She spoke about how she has made some progress in understanding the current stressors and what she can do to solve the issues when they come up. She spoke about how she will continue to work on understanding herself and how she can work through problems rather than getting overwhelmed by the problems.

## 2021-11-17 NOTE — BH NOTES
[facility]  Group Therapy    Date of Service: 11/17/2021  Start time: 1000  Stop time: 1050  Type of session: Therapy Group    Problem number: 1. Dep F 33.0    Short term goal (STG): U.  Pt will verbalize one memory of the past that she feels regret or guilt over and verbalize her commitment to self-forgiveness in order to help build on her confidence as well    Intervention/techniques: Informed, Validated/Supported, Reframed, Modeled/Rehearsed, Observed/Monitored, Reinforced and Facilitated Disclosure    Patient mental status/affect: Anxious, Calm and Preoccupied    Patient behavior/appearance: Neatly Groomed, Attentive, Cooperative and Needed Prompting    Special patient treatment accommodations provided (describe): None    Patient response and progress towards goals: Focus of session was based on information from the book The Mindful Path to Self Compassion by Dr. Azul Dunne. I shared with group the way the book explains bring together mindfulness and self compassion through using [facility] your challenges.  I shared how F stands for feel the pain, A stands for accept it, C stands for compassionately respond, and E stands for expect skillful action. We spoke about how we have to use mindful awareness when feeling a lot of inner emotional turmoil and how we will benefit from focusing on behavior changes that will impact us in positive ways. I asked group members what their willingness is to use this skill and process to help themselves and how they see themselves using it. Yogi Brown participated in group with prompting. She spoke about how she is not feeling well today and she has been struggling due to some recent stressors. She continues to get easily overwhelmed with issues that come up like running out of heating oil. We spoke about how she did well in pushing through to still come here today even though she could have called out.   \"I know staying home and just being there would not have helped me. \"

## 2021-11-17 NOTE — BH NOTES
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       Intervention/techniques: Informed, Validated/Supported, Listened/Empathized and Reinforced    Patient mental status/affect: Congruent, Preoccupied and Worried    Patient behavior/appearance: Attentive and Cooperative    Special patient treatment accommodations provided (describe): none    Patient response and progress towards goals: Focus of session was based on article Six Principles for Managing Impulses to Maximize Life Satisfaction and Success.   We reviewed the definition of an impulse which is an impelling force or a sudden wish or urge that prompts an unpremeditated act or feeling.   We spoke about the definition of being impulsive which is characterized by actions based on sudden desires, whims, or inclinations rather than careful thought.   We spoke about a method to try and control impulses and it is PURRRRS which stands for Pause, Use, Reflect, Reason, Respond, Revise, and Stabilize. We spoke about what our impulsive behavior is and how going through these steps may help us to slow down and how may that impact the emotional consequences to be more positive rather than negative. I shared the six principles from the article which included: know your risks, plan for your risks, count to 10 a lot, Be mindful, get corrective feedback, and remember that [place] was not built in a day. Pt stated that she did not feel well today but decided to come to group. She is somewhat depressed and is distracted by the fact that she is out of oil heat.   We discussed options and she was able to make a few phones to assist with her situation.

## 2021-11-22 ENCOUNTER — HOSPITAL ENCOUNTER (OUTPATIENT)
Dept: BEHAVIORAL/MENTAL HEALTH | Age: 86
Discharge: HOME | End: 2021-11-22
Payer: MEDICARE

## 2021-11-22 PROCEDURE — 90853 GROUP PSYCHOTHERAPY: CPT

## 2021-11-22 NOTE — BH NOTES
[facility] Out[facility] Program  Group Therapy    Date of Service: 2021  Start time: 1000  Stop time: 1050  Type of session: Therapy Group    Problem number: 1. Dep F 33.0    Short term goal (STG): Y.  Pt will verbalize what she learned in past 6 months that helps to build her confidence in her ability to manage herself, and her mental health, once discharged from program  Intervention/techniques: Informed, Validated/Supported, Prompted/Cued, Listened/Empathized, Queired/Probed and Problem Solved    Patient mental status/affect: Anxious, Congruent and Preoccupied    Patient behavior/appearance: Neatly Groomed, Attentive, Cooperative and Needed Prompting    Special patient treatment accommodations provided (describe): None    Patient response and progress towards goals: Focus of session was on the visible and the hidden emotional threats that we are facing and trying to cope with. We spoke about the three steps to emotional management and they include awareness, plan, and action. We spoke about the importance of being aware of our hidden emotional threats or triggers since they can be more destructive and unmanageable that the visible ones. We spoke about how our hidden emotional triggers can include low self esteem issues, unrealistic expectations, chronic worry that leads to increased vulnerability as well as focus on the past and our mistakes that we made. We spoke about how a person will be increasingly vulnerable if hidden emotional threats are not identified and managed. Sharon Meyer participated in group with prompting. She spoke about how she has continued to not feel good this weekend but she did go to a  that she knew was important for her to attend. She spoke about how she is looking forward to Thanksgiving with her family and she was able to say she knew it was important for her to come to group today.

## 2021-11-22 NOTE — BH NOTES
[facility] Outpatient Program  Group Therapy    Date of Service: 11/22/2021  Start time: 1100  Stop time: 1150  Type of session: Therapy Group    Problem number: 1. Dep F 33.0    Short term goal (STG): W.  Pt will verbalize understanding of the difference of a lapse and a relapse and when it may be appropriate to get additional help in the future    Intervention/techniques: Informed, Validated/Supported, Reframed, Modeled/Rehearsed, Prompted/Cued, Facilitated Disclosure and Provided Feedback    Patient mental status/affect: Anxious, Congruent and Preoccupied    Patient behavior/appearance: Neatly Groomed, Attentive, Cooperative and Needed Prompting    Special patient treatment accommodations provided (describe): None    Patient response and progress towards goals: Focus of session was based on the quote: I choose to live by choice, not by chance, to make changes, not excuses, to be motivated, not manipulated, to be useful, not used, to excel, not to compete. I choose self esteem, not self pity, I choose to listen to my inner voice, not the random opinion of others. I choose to be me, I choose to be authentic.   I asked group members to talk about which part of the quote stood out for them and is something that they can benefit from focusing on for their own self care. Alden Jacques participated in group with prompting. She spoke about how she knows that she needs to continue to challenge herself to think positive things about herself. She spoke about how it does feel like things will \"fall apart quickly\" if she does not have the intervention of group and the encouragement. She spoke about the colder weather has been much more challenging for her and she feels very vulnerable when she is alone.

## 2021-12-01 ENCOUNTER — HOSPITAL ENCOUNTER (OUTPATIENT)
Dept: BEHAVIORAL/MENTAL HEALTH | Age: 86
Discharge: HOME | End: 2021-12-01
Payer: MEDICARE

## 2021-12-01 ENCOUNTER — TELEPHONE (OUTPATIENT)
Dept: FAMILY MEDICINE CLINIC | Age: 86
End: 2021-12-01

## 2021-12-01 PROCEDURE — 90853 GROUP PSYCHOTHERAPY: CPT

## 2021-12-01 NOTE — TELEPHONE ENCOUNTER
----- Message from Rupert Yin sent at 11/30/2021  4:18 PM EST -----  Subject: Appointment Request    Reason for Call: Routine (Patient Request) No Script    QUESTIONS  Type of Appointment? Established Patient  Reason for appointment request? No appointments available during search  Additional Information for Provider? Pt was scheduled to day with the   nurse for bloodwork and missed her appt; please contact Freddie Granado to   reschedule appt; no appts found when searched  ---------------------------------------------------------------------------  --------------  [address]  What is the best way for the office to contact you? OK to leave message on   voicemail  Preferred Call Back Phone Number? [phone]  ---------------------------------------------------------------------------  --------------  SCRIPT ANSWERS  Relationship to Patient? Other  Representative Name? Freddie Granado  Additional information verified (besides Name and Date of Birth)? Phone   Number  Specialty Confirmation? Primary Care  (Is the patient requesting to see the provider for a procedure?)? No  (Is the patient requesting to see the provider urgently  today or   tomorrow. )? No  Have you been diagnosed with, awaiting test results for, or told that you   are suspected of having COVID-19 (Coronavirus)? (If patient has tested   negative or was tested as a requirement for work, school, or travel and   not based on symptoms, answer no)? No  Within the past two weeks have you developed any of the following symptoms   (answer no if symptoms have been present longer than 2 weeks or began   more than 2 weeks ago)? Fever or Chills, Cough, Shortness of breath or   difficulty breathing, Loss of taste or smell, Sore throat, Nasal   congestion, Sneezing or runny nose, Fatigue or generalized body aches   (answer no if pain is specific to a body part e.g. back pain), Diarrhea,   Headache?  No  Have you had close contact with someone with COVID-19 in the last 14 days? No  (Service Expert  click yes below to proceed with [facility] As Usual   Scheduling)?  Yes

## 2021-12-01 NOTE — BH NOTES
[facility] Out[facility] Program  Group Therapy  Date of service: 12/1/2021  Start time: 1000  Stop time: 1050  Type of session: Therapy Group    Problem number: 1Dep. F33.0    Short term goal (STG): YPt will verbalize what she learned in past 6 months that helps to build her confidence in her ability to manage herself, and her mental health, once discharged from program  Intervention/techniques: Informed, Validated/Supported, Reinforced and Provided Feedback    Patient mental status/affect: Calm and Congruent    Patient behavior/appearance: Attentive, Cooperative and Caretaking    Special patient treatment accommodations provided (describe): none    Patient response and progress towards goals: Focus of session was developing skills to identify and manage triggers to strong moods. We discussed the most common negative and overpowering emotions that can get in our way and get us stuck or taking steps back (depression, nervousness, anxious, anger, frustration, hurt, fear). We spoke about recognizing what was going on around us that led us to feel these intense feelings and look at what we did in response to those moods. We discussed alternative ways we could have responded and discussed what skills we need to practice in order to develop resilience to minor or major stressors in our lives  Pt actively listened to the conversation in group today. She was supportive of another pt who was struggling today. She showed compassion and empathy.

## 2021-12-01 NOTE — BH NOTES
[facility] Outpatient Program  Group Therapy    Date of Service: 12/1/2021  Start time: 1200  Stop time: 1250  Type of session: Therapy Group    Problem number: 1. Dep F 33.0    Short term goal (STG): w. Pt will verbalize understanding of the difference of a lapse and a relapse and when it may be appropriate to get additional help in the future    Intervention/techniques: Informed, Validated/Supported, Modeled/Rehearsed, Prompted/Cued, Listened/Empathized and Provided Feedback    Patient mental status/affect: Anxious, Congruent and Preoccupied    Patient behavior/appearance: Neatly Groomed, Attentive, Cooperative and Needed Prompting    Special patient treatment accommodations provided (describe): None    Patient response and progress towards goals: Focus of session was on a tool that can be very helpful in starting and working towards goals and making changes. I discussed with group members the idea of committing to a daily change but only for 30 days. This concept was developed by Randall Alcala and he describes it as 30 days to success.   I spoke about how we often talk ourselves out of reaching important goals because the idea of permanent change can be so overwhelming. The idea was discussed of how a 30 day commitment gives us an opportunity to create a healthy new habit and not overwhelm us and we can actually get started. Group members spoke about potential changes that can be made for 30 days and why those changes are important to them. Page Levy spoke about how she has been frustrated with herself due to lack of progress in her goal to quit smoking. She spoke about how she does want to quit but she is not doing well with it and we discussed how not committing to the goal is affecting her self esteem. We spoke about some alternatives she can focus on to help her and she agreed she may be reaching too high in her expectations of herself right now.

## 2021-12-01 NOTE — BH NOTES
[facility] Program  Group Therapy    Date of Service: 12/1/2021  Start time: 1100  Stop time: 1150  Type of session: Therapy Group    Problem number: 1. Dep F 33.0    Short term goal (STG): Y. Pt will verbalize what she learned in past 6 months that helps to build her confidence in her ability to manage herself, and her mental health, once discharged from program    Intervention/techniques: Informed, Validated/Supported, Modeled/Rehearsed, Prompted/Cued and Facilitated Disclosure    Patient mental status/affect: Calm, Flat, Preoccupied and Worried    Patient behavior/appearance: Neatly Groomed, Attentive, Cooperative, Needed Prompting and Withdrawn/Quiet    Special patient treatment accommodations provided (describe): None    Patient response and progress towards goals: Focus of session was based on information from the [place] [id number] by Jaskaran Gonzalez, PhD.  I went over the version of understanding the ABCs of Mood.   I shared how the idea is to change the way we react to a perceived problem so you can find healthy relief quickly. The ABCs are A (affect or feelings), B (behaviors or actions) and C (cognitions or thoughts.)  We spoke about how we can use this understanding to help us challenge our thoughts about the problems and make healthy choices in regard to our behaviors. We discussed what can be worked on this week to help further progress in recovery. Jett Braun was more withdrawn today but she would engage with questions. She spoke about how she does think she needs to focus on her self care and that she can challenge negative thoughts that she is having. She spoke about how she will continue to share her thoughts in group and be given some wasy to think in alternative ways and not increase her negative feelings.

## 2021-12-03 ENCOUNTER — HOSPITAL ENCOUNTER (EMERGENCY)
Age: 86
Discharge: HOME | End: 2021-12-04
Attending: FAMILY MEDICINE
Payer: MEDICARE

## 2021-12-03 VITALS
HEART RATE: 74 BPM | HEIGHT: 66 IN | WEIGHT: 162 LBS | TEMPERATURE: 97.4 F | RESPIRATION RATE: 18 BRPM | DIASTOLIC BLOOD PRESSURE: 84 MMHG | OXYGEN SATURATION: 98 % | SYSTOLIC BLOOD PRESSURE: 162 MMHG | BODY MASS INDEX: 26.03 KG/M2

## 2021-12-03 DIAGNOSIS — G45.9 TIA (TRANSIENT ISCHEMIC ATTACK): Primary | ICD-10-CM

## 2021-12-03 DIAGNOSIS — F17.200 TOBACCO USE DISORDER: ICD-10-CM

## 2021-12-03 PROCEDURE — 99283 EMERGENCY DEPT VISIT LOW MDM: CPT

## 2021-12-04 ENCOUNTER — APPOINTMENT (OUTPATIENT)
Dept: CT IMAGING | Age: 86
End: 2021-12-04
Attending: FAMILY MEDICINE
Payer: MEDICARE

## 2021-12-04 LAB
ALBUMIN SERPL-MCNC: 3.7 G/DL (ref 3.5–5)
ALBUMIN/GLOB SERPL: 0.8 {RATIO} (ref 1.1–2.2)
ALP SERPL-CCNC: 97 U/L (ref 45–117)
ALT SERPL-CCNC: 25 U/L (ref 12–78)
ANION GAP SERPL CALC-SCNC: 7 MMOL/L (ref 5–15)
AST SERPL-CCNC: 21 U/L (ref 15–37)
ATRIAL RATE: 78 BPM
BASOPHILS # BLD: 0.1 K/UL (ref 0–0.1)
BASOPHILS NFR BLD: 1 % (ref 0–1)
BILIRUB SERPL-MCNC: 0.5 MG/DL (ref 0.2–1)
BUN SERPL-MCNC: 16 MG/DL (ref 6–20)
BUN/CREAT SERPL: 11 (ref 12–20)
CALCIUM SERPL-MCNC: 9 MG/DL (ref 8.5–10.1)
CALCULATED P AXIS, ECG09: 76 DEGREES
CALCULATED R AXIS, ECG10: 26 DEGREES
CALCULATED T AXIS, ECG11: 83 DEGREES
CHLORIDE SERPL-SCNC: 100 MMOL/L (ref 97–108)
CO2 SERPL-SCNC: 32 MMOL/L (ref 21–32)
CREAT SERPL-MCNC: 1.46 MG/DL (ref 0.55–1.02)
DIAGNOSIS, 93000: NORMAL
DIFFERENTIAL METHOD BLD: ABNORMAL
EOSINOPHIL # BLD: 0.1 K/UL (ref 0–0.4)
EOSINOPHIL NFR BLD: 1 % (ref 0–7)
ERYTHROCYTE [DISTWIDTH] IN BLOOD BY AUTOMATED COUNT: 12.3 % (ref 11.5–14.5)
GLOBULIN SER CALC-MCNC: 4.7 G/DL (ref 2–4)
GLUCOSE SERPL-MCNC: 127 MG/DL (ref 65–100)
HCT VFR BLD AUTO: 44.4 % (ref 35–47)
HGB BLD-MCNC: 14.3 G/DL (ref 11.5–16)
IMM GRANULOCYTES # BLD AUTO: 0.1 K/UL (ref 0–0.04)
IMM GRANULOCYTES NFR BLD AUTO: 1 % (ref 0–0.5)
LYMPHOCYTES # BLD: 2.3 K/UL (ref 0.8–3.5)
LYMPHOCYTES NFR BLD: 24 % (ref 12–49)
MCH RBC QN AUTO: 30.2 PG (ref 26–34)
MCHC RBC AUTO-ENTMCNC: 32.2 G/DL (ref 30–36.5)
MCV RBC AUTO: 93.7 FL (ref 80–99)
MONOCYTES # BLD: 0.9 K/UL (ref 0–1)
MONOCYTES NFR BLD: 9 % (ref 5–13)
NEUTS SEG # BLD: 6.3 K/UL (ref 1.8–8)
NEUTS SEG NFR BLD: 64 % (ref 32–75)
NRBC # BLD: 0 K/UL (ref 0–0.01)
NRBC BLD-RTO: 0 PER 100 WBC
P-R INTERVAL, ECG05: 154 MS
PLATELET # BLD AUTO: 312 K/UL (ref 150–400)
PMV BLD AUTO: 9.9 FL (ref 8.9–12.9)
POTASSIUM SERPL-SCNC: 4 MMOL/L (ref 3.5–5.1)
PROT SERPL-MCNC: 8.4 G/DL (ref 6.4–8.2)
Q-T INTERVAL, ECG07: 420 MS
QRS DURATION, ECG06: 84 MS
QTC CALCULATION (BEZET), ECG08: 478 MS
RBC # BLD AUTO: 4.74 M/UL (ref 3.8–5.2)
SODIUM SERPL-SCNC: 139 MMOL/L (ref 136–145)
VENTRICULAR RATE, ECG03: 78 BPM
WBC # BLD AUTO: 9.7 K/UL (ref 3.6–11)

## 2021-12-04 PROCEDURE — 93005 ELECTROCARDIOGRAM TRACING: CPT

## 2021-12-04 PROCEDURE — 70450 CT HEAD/BRAIN W/O DYE: CPT

## 2021-12-04 PROCEDURE — 36415 COLL VENOUS BLD VENIPUNCTURE: CPT

## 2021-12-04 PROCEDURE — 80053 COMPREHEN METABOLIC PANEL: CPT

## 2021-12-04 PROCEDURE — 85025 COMPLETE CBC W/AUTO DIFF WBC: CPT

## 2021-12-04 NOTE — ED NOTES
Patient states she understands discharge instructions and will follow up if needed. Patient denies pain.

## 2021-12-04 NOTE — ED PROVIDER NOTES
EMERGENCY DEPARTMENT HISTORY AND PHYSICAL EXAM          Date: 12/3/2021  Patient Name: Brett Garcia    History of Presenting Illness     Chief Complaint   Patient presents with    Aphasia       History Provided By: Patient    HPI: Brett Garcia is a 82 y.o. female, pmhx depression, GERD, DMII, htn, tobacco use, who presents to the ED c/o difficulty speaking that occurred about an hour ago. She was talking to her friend when she had an episode in which she was not able to form the words. She reports that the episode lasted for about an hour, intermittently. There were three episodes, each 2 minutes. No trouble with unilateral weakness or dizziness. She did have a frontal headache. She took nothing for the pain. She had an episode similar to this several months ago, but it did not last for this long; it only lasted for a few seconds, once. PCP: Manisha Gonzales NP    Allergies: NKDA  Social Hx: 1/2 tobacco, - vaping, - EtOH, - Illicit Drugs; Lives locally. There are no other complaints, changes, or physical findings at this time. Current Outpatient Medications   Medication Sig Dispense Refill    omeprazole (PRILOSEC) 20 mg capsule TAKE 1 CAPSULE BY MOUTH EVERY DAY 90 Capsule 3    lisinopriL (PRINIVIL, ZESTRIL) 20 mg tablet TAKE 1 TABLET BY MOUTH EVERY DAY 90 Tablet 2    Januvia 100 mg tablet TAKE 1 TABLET BY MOUTH EVERY DAY 90 Tablet 1    levocetirizine (XYZAL) 5 mg tablet TAKE 1 TABLET BY MOUTH DAILY AS NEEDED FOR ALLERGY SYMPTOMS 90 Tablet 2    aspirin delayed-release 81 mg tablet Take 1 Tablet by mouth daily. 90 Tablet 1    atorvastatin (LIPITOR) 40 mg tablet Take 1 Tablet by mouth nightly. 90 Tablet 1    metoprolol succinate (TOPROL-XL) 25 mg XL tablet TAKE 1 TABLET BY MOUTH EVERY DAY 90 Tablet 1    fluticasone propionate (FLONASE) 50 mcg/actuation nasal spray 2 Sprays by Both Nostrils route daily.  1 Bottle 0    venlafaxine-SR (EFFEXOR-XR) 150 mg capsule Take 1 Capsule by mouth daily (with breakfast). Indications: major depressive disorder 90 Capsule 3    nitroglycerin (NITROSTAT) 0.4 mg SL tablet 1 Tab by SubLINGual route every five (5) minutes as needed for Chest Pain. Up to 3 doses. 1 Bottle 1    Blood-Glucose Meter monitoring kit Check glucose once daily. DX: E11.9 1 Kit 0    glucose blood VI test strips (ASCENSIA AUTODISC VI, ONE TOUCH ULTRA TEST VI) strip Check glucose once daily. DX: E11.9 100 Strip 3    lancets misc Check glucose once daily. DX: E11.9 100 Each 3    montelukast (SINGULAIR) 10 mg tablet Take 1 Tab by mouth daily. 90 Tab 0    acetaminophen (TylenoL) 325 mg tablet Take  by mouth every four (4) hours as needed for Pain. Past History     Past Medical History:  Past Medical History:   Diagnosis Date    Allergic rhinitis due to pollen     BPV (benign positional vertigo)     Change in bowel habits     Depression     Diabetes ([address]     Dysuria     Hemorrhoids     Hypertension     IBS (irritable bowel syndrome)     Menopause     Other diseases of nasal cavity and sinuses(478.19)     Vertigo        Past Surgical History:  Past Surgical History:   Procedure Laterality Date    COLONOSCOPY N/A 7/15/2020    COL[facility] performed by [facility] at [facility] END[facility]COPY    HX CHOLECYSTECTOMY  2016    HX COLONOSCOPY  2013?  HX HYSTERECTOMY      UPPER GI ENDOSCOPY,DIAGNOSIS  7/15/2020            Family History:  Family History   Problem Relation Age of Onset    No Known Problems Mother        Social History:  Social History     Tobacco Use    Smoking status: Current Every Day Smoker     Packs/day: 0.25    Smokeless tobacco: Never Used   Vaping Use    Vaping Use: Never used   Substance Use Topics    Alcohol use: No    Drug use: No       Allergies:  No Known Allergies      Review of Systems   Review of Systems   Constitutional: Negative for appetite change and fever. HENT: Negative for congestion, rhinorrhea and sore throat.     Respiratory: Negative for cough, chest tightness and shortness of breath. Cardiovascular: Negative for chest pain and leg swelling. Gastrointestinal: Negative for abdominal pain, constipation, diarrhea and vomiting. Genitourinary: Negative for difficulty urinating and dysuria. Musculoskeletal: Negative for back pain and neck pain. Skin: Negative for rash. Neurological: Negative for dizziness, weakness, light-headedness, numbness and headaches. Hematological: Does not bruise/bleed easily. Physical Exam   Physical Exam  Vitals reviewed. Constitutional:       General: She is not in acute distress. Appearance: She is well-developed. She is not diaphoretic. HENT:      Head: Normocephalic and atraumatic. Right Ear: External ear normal.      Left Ear: External ear normal.      Nose: Nose normal.   Eyes:      General: No scleral icterus. Right eye: No discharge. Left eye: No discharge. Conjunctiva/sclera: Conjunctivae normal.      Pupils: Pupils are equal, round, and reactive to light. Neck:      Thyroid: No thyromegaly. Trachea: No tracheal deviation. Cardiovascular:      Rate and Rhythm: Normal rate and regular rhythm. Heart sounds: Normal heart sounds. No murmur heard. No friction rub. No gallop. Pulmonary:      Effort: Pulmonary effort is normal. No respiratory distress. Breath sounds: Normal breath sounds. No wheezing or rales. Chest:      Chest wall: No tenderness. Abdominal:      General: Bowel sounds are normal. There is no distension. Palpations: Abdomen is soft. Tenderness: There is no abdominal tenderness. There is no guarding or rebound. Musculoskeletal:         General: No tenderness or deformity. Normal range of motion. Cervical back: Normal range of motion and neck supple. Skin:     General: Skin is warm and dry. Neurological:      General: No focal deficit present.       Mental Status: She is alert and oriented to person, place, and time. Cranial Nerves: No cranial nerve deficit. Sensory: No sensory deficit. Motor: No weakness. Coordination: Coordination normal.      Gait: Gait normal.      Deep Tendon Reflexes: Reflexes are normal and symmetric. Reflexes normal.      Comments: , Biceps, Triceps, Hip flexors, Quad, Hamstr 5/5 bilat. Finger to nose normal bilat. NIHSS 0. Diagnostic Study Results     Labs -     Recent Results (from the past 12 hour(s))   CBC WITH AUTOMATED DIFF    Collection Time: 12/04/21  1:25 AM   Result Value Ref Range    WBC 9.7 3.6 - 11.0 K/uL    RBC 4.74 3.80 - 5.20 M/uL    HGB 14.3 11.5 - 16.0 g/dL    HCT 44.4 35.0 - 47.0 %    MCV 93.7 80.0 - 99.0 FL    MCH 30.2 26.0 - 34.0 PG    MCHC 32.2 30.0 - 36.5 g/dL    RDW 12.3 11.5 - 14.5 %    PLATELET 289 286 - 542 K/uL    MPV 9.9 8.9 - 12.9 FL    NRBC 0.0 0  WBC    ABSOLUTE NRBC 0.00 0.00 - 0.01 K/uL    NEUTROPHILS 64 32 - 75 %    LYMPHOCYTES 24 12 - 49 %    MONOCYTES 9 5 - 13 %    EOSINOPHILS 1 0 - 7 %    BASOPHILS 1 0 - 1 %    IMMATURE GRANULOCYTES 1 (H) 0.0 - 0.5 %    ABS. NEUTROPHILS 6.3 1.8 - 8.0 K/UL    ABS. LYMPHOCYTES 2.3 0.8 - 3.5 K/UL    ABS. MONOCYTES 0.9 0.0 - 1.0 K/UL    ABS. EOSINOPHILS 0.1 0.0 - 0.4 K/UL    ABS. BASOPHILS 0.1 0.0 - 0.1 K/UL    ABS. IMM. GRANS. 0.1 (H) 0.00 - 0.04 K/UL    DF AUTOMATED     METABOLIC PANEL, COMPREHENSIVE    Collection Time: 12/04/21  1:25 AM   Result Value Ref Range    Sodium 139 136 - 145 mmol/L    Potassium 4.0 3.5 - 5.1 mmol/L    Chloride 100 97 - 108 mmol/L    CO2 32 21 - 32 mmol/L    Anion gap 7 5 - 15 mmol/L    Glucose 127 (H) 65 - 100 mg/dL    BUN 16 6 - 20 MG/DL    Creatinine 1.46 (H) 0.55 - 1.02 MG/DL    BUN/Creatinine ratio 11 (L) 12 - 20      GFR est AA 41 (L) >60 ml/min/1.73m2    GFR est non-AA 34 (L) >60 ml/min/1.73m2    Calcium 9.0 8.5 - 10.1 MG/DL    Bilirubin, total 0.5 0.2 - 1.0 MG/DL    ALT (SGPT) 25 12 - 78 U/L    AST (SGOT) 21 15 - 37 U/L    Alk.  phosphatase 97 45 - 117 U/L    Protein, total 8.4 (H) 6.4 - 8.2 g/dL    Albumin 3.7 3.5 - 5.0 g/dL    Globulin 4.7 (H) 2.0 - 4.0 g/dL    A-G Ratio 0.8 (L) 1.1 - 2.2         Radiologic Studies -   CT HEAD WO CONT   Final Result   No acute intracranial abnormality. CT Results  (Last 48 hours)               12/04/21 0136  CT HEAD WO CONT Final result    Impression:  No acute intracranial abnormality. Narrative:  EXAM:  CT HEAD WO CONT       INDICATION: Aphasia       COMPARISON: CT 7/1/2021       TECHNIQUE: Axial noncontrast head CT from foramen magnum to vertex. Coronal and   sagittal reformatted images were obtained. CT dose reduction was achieved   through use of a standardized protocol tailored for this examination and   automatic exposure control for dose modulation. FINDINGS:  There is diffuse age-related parenchymal volume loss. The ventricles   and sulci are age-appropriate without hydrocephalus. There is no mass effect or   midline shift. There is no intracranial hemorrhage or extra-axial fluid   collection. Scattered foci of low attenuation in the periventricular white   matter represent stable chronic microvascular ischemic changes. There is a   stable chronic right cerebellar infarct. There is no new abnormal parenchymal   attenuation. The basal cisterns are patent. The osseous structures are intact. The visualized paranasal sinuses and mastoid   air cells are clear. CXR Results  (Last 48 hours)    None            Medical Decision Making   I am the first provider for this patient. I reviewed the vital signs, available nursing notes, past medical history, past surgical history, family history and social history. Vital Signs-Reviewed the patient's vital signs.   Patient Vitals for the past 12 hrs:   Temp Pulse Resp BP SpO2   12/03/21 2340 97.4 °F (36.3 °C) 74 18 (!) 162/84 98 %       Pulse Oximetry Analysis - 98 % on RA      Records Reviewed: Nursing Notes, Old Medical Records, Previous electrocardiograms, Previous Radiology Studies and Previous Laboratory Studies    Provider Notes (Medical Decision Making):   MDM: Pt with a h/o TIA, full workup done 5 months ago, including CTA of head and neck, showing right ICA 50%, left 0. Neurologically normal on arrival. Will observe in the ED for the next 2-3 hours. ED Course:   Initial assessment performed. The patients presenting problems have been discussed, and they are in agreement with the care plan formulated and outlined with them. I have encouraged them to ask questions as they arise throughout their visit. EKG interpretation: (Preliminary)  Rhythm: NSR, HR 78, No ST changes. This EKG was interpreted by ED Provider Dr Beni Richardson MD    12:44 AM   Spent 3 minutes discussing the risks of smoking and the benefits of smoking cessation as well as the long term sequelae of smoking with the pt who verbalized her understanding. Reviewed strategies for success, including gradually decreasing the number of cigarettes smoked a day. PROGRESS NOTE:  Neurologically stable throughout her ED course. No further episodes of aphasia or confusion for the 3 hours that she was observed. Discharge note:  Pt re-evaluated and noted to be feeling better, ready for discharge. Updated pt on all final lab findings. Will follow up as instructed. All questions have been answered, pt voiced understanding and agreement with plan. Specific return precautions provided as well as instructions to return to the ED should sx worsen at any time. Vital signs stable for discharge. Critical Care Time:   0      Diagnosis     Clinical Impression:   1. TIA (transient ischemic attack)    2. Tobacco use disorder        PLAN:  1. Discharge home  2.   Stop smoking  Current Discharge Medication List          Follow-up Information     Follow up With Specialties Details Why Contact Rochelle Simms NP Nurse Practitioner In 3 days  [address] [facility]  [phone]          Return to ED if worse     Disposition:  Home

## 2021-12-04 NOTE — ED TRIAGE NOTES
Patient states that she was having dinner and speaking with a friend patient states she lost her train of thought and wording. Patient dines any pain or chief complaint at this time.

## 2021-12-06 ENCOUNTER — OFFICE VISIT (OUTPATIENT)
Dept: FAMILY MEDICINE CLINIC | Age: 86
End: 2021-12-06
Payer: MEDICARE

## 2021-12-06 VITALS
DIASTOLIC BLOOD PRESSURE: 68 MMHG | OXYGEN SATURATION: 98 % | SYSTOLIC BLOOD PRESSURE: 138 MMHG | HEART RATE: 70 BPM | BODY MASS INDEX: 26.03 KG/M2 | RESPIRATION RATE: 18 BRPM | TEMPERATURE: 97 F | WEIGHT: 162 LBS | HEIGHT: 66 IN

## 2021-12-06 DIAGNOSIS — E11.22 TYPE 2 DIABETES MELLITUS WITH STAGE 3A CHRONIC KIDNEY DISEASE, WITHOUT LONG-TERM CURRENT USE OF INSULIN (HCC): ICD-10-CM

## 2021-12-06 DIAGNOSIS — E78.00 ELEVATED CHOLESTEROL: ICD-10-CM

## 2021-12-06 DIAGNOSIS — G45.9 TIA (TRANSIENT ISCHEMIC ATTACK): Primary | ICD-10-CM

## 2021-12-06 DIAGNOSIS — Z72.0 TOBACCO USE: ICD-10-CM

## 2021-12-06 DIAGNOSIS — I10 ESSENTIAL HYPERTENSION: ICD-10-CM

## 2021-12-06 DIAGNOSIS — N18.31 TYPE 2 DIABETES MELLITUS WITH STAGE 3A CHRONIC KIDNEY DISEASE, WITHOUT LONG-TERM CURRENT USE OF INSULIN (HCC): ICD-10-CM

## 2021-12-06 PROCEDURE — 99214 OFFICE O/P EST MOD 30 MIN: CPT | Performed by: NURSE PRACTITIONER

## 2021-12-06 RX ORDER — ATORVASTATIN CALCIUM 80 MG/1
80 TABLET, FILM COATED ORAL DAILY
Qty: 90 TABLET | Refills: 0 | Status: SHIPPED | OUTPATIENT
Start: 2021-12-06 | End: 2022-03-01 | Stop reason: SDUPTHER

## 2021-12-06 NOTE — PROGRESS NOTES
Subjective:     CC: ER visit F/U (TIA)    Zane Metha is a 82 y.o. female, accompanied by her daughter Sharon Lyons, who presents today to follow up for an ER visit that took place on 12-3-21 for a TIA. She presented to ED with c/o difficulty speaking that occurred about an hour prior. She was talking to her friend when she had an episode in which she was not able to form the words. She reports that the episode lasted for about an hour, intermittently. There were three episodes, each 2 minutes. No trouble with unilateral weakness or dizziness. She did have a frontal headache.       She had a CT of brain that showed the following:  Scattered foci of low attenuation in the periventricular white   matter represent stable chronic microvascular ischemic changes. There is a   stable chronic right cerebellar infarct. She was observed for 3 hours and then discharged home. Since then she has no more symptoms. She has a hx of cerebrovascular disease  She had a similar aphasic episode which brought her to the ER on 7-1-21. She is on Lipitor 40mg daily. Pt has hx of GI bleed while she was taking ASA 325mg daily so we discussed the need to keep her on the ASA 81mg daily. We discussed the risk factors for stroke and TIA including her:     HTN  BP initially elevated in the office today at 171/93, but when manually rechecked it was within goal at 138/68. She checks it at home occasionally and will get readings in the 120's. She forgot to take her meds this morning. Does not miss doses often. She is on Lisinopril 20mg daily and Metoprolol XL 25mg daily. She denies CP, SOB, dizziness, or swelling.      HLD  Lab Results   Component Value Date/Time    Cholesterol, total 213 (H) 01/21/2021 03:07 PM    HDL Cholesterol 64 01/21/2021 03:07 PM    LDL, calculated 124 (H) 01/21/2021 03:07 PM    LDL, calculated 86.6 07/01/2020 05:00 AM    VLDL, calculated 25 01/21/2021 03:07 PM    VLDL, calculated 21.4 07/01/2020 05:00 AM Triglyceride 143 01/21/2021 03:07 PM    CHOL/HDL Ratio 3.0 07/01/2020 05:00 AM     She is on Lipitor 40mg daily. Will increase to 80mg today. Type 2 diabetes  Lab Results   Component Value Date/Time    Hemoglobin A1c 6.8 (H) 08/03/2021 04:26 PM     On Januvia 100mg daily. Home BS never over 200. Tobacco use  She smokes about 1/4 pack per day. Is going to try to quit.       Patient Active Problem List   Diagnosis Code    Environmental allergies Z91.09    Elevated cholesterol E78.00    Essential hypertension I5    Well controlled type 2 diabetes mellitus ([facility] [address] E11.9    Type 2 diabetes mellitus with stage 3 chronic kidney disease, without long-term current use of insulin ([facility]) E11.22, N18.30    Major depressive disorder, recurrent episode, severe ([facility] [address] F33.2    Epigastric pain R10.13    Tobacco use Z72.0    Primary osteoarthritis involving multiple joints M89.49    Stage 3 chronic kidney disease (HCC) N18.30    Age-related cataract of both eyes H25.9    CVA (cerebral vascular accident) ([facility] [address] I63.9    Cystitis N30.90    History of GI bleed Z87.19    History of CVA (cerebrovascular accident) Z80.78       Past Medical History:   Diagnosis Date    Allergic rhinitis due to pollen     BPV (benign positional vertigo)     Change in bowel habits     Depression     Diabetes ([facility])     Dysuria     Hemorrhoids     Hypertension     IBS (irritable bowel syndrome)     Menopause     Other diseases of nasal cavity and sinuses(478.19)     Vertigo          Current Outpatient Medications:     omeprazole (PRILOSEC) 20 mg capsule, TAKE 1 CAPSULE BY MOUTH EVERY DAY, Disp: 90 Capsule, Rfl: 3    lisinopriL (PRINIVIL, ZESTRIL) 20 mg tablet, TAKE 1 TABLET BY MOUTH EVERY DAY, Disp: 90 Tablet, Rfl: 2    Januvia 100 mg tablet, TAKE 1 TABLET BY MOUTH EVERY DAY, Disp: 90 Tablet, Rfl: 1    levocetirizine (XYZAL) 5 mg tablet, TAKE 1 TABLET BY MOUTH DAILY AS NEEDED FOR ALLERGY SYMPTOMS, Disp: 90 Tablet, Rfl: 2   aspirin delayed-release 81 mg tablet, Take 1 Tablet by mouth daily. , Disp: 90 Tablet, Rfl: 1    atorvastatin (LIPITOR) 40 mg tablet, Take 1 Tablet by mouth nightly., Disp: 90 Tablet, Rfl: 1    metoprolol succinate (TOPROL-XL) 25 mg XL tablet, TAKE 1 TABLET BY MOUTH EVERY DAY, Disp: 90 Tablet, Rfl: 1    fluticasone propionate (FLONASE) 50 mcg/actuation nasal spray, 2 Sprays by Both Nostrils route daily. , Disp: 1 Bottle, Rfl: 0    venlafaxine-SR (EFFEXOR-XR) 150 mg capsule, Take 1 Capsule by mouth daily (with breakfast). Indications: major depressive disorder, Disp: 90 Capsule, Rfl: 3    nitroglycerin (NITROSTAT) 0.4 mg SL tablet, 1 Tab by SubLINGual route every five (5) minutes as needed for Chest Pain. Up to 3 doses. , Disp: 1 Bottle, Rfl: 1    Blood-Glucose Meter monitoring kit, Check glucose once daily. DX: E11.9, Disp: 1 Kit, Rfl: 0    glucose blood VI test strips (ASCENSIA AUTODISC VI, ONE TOUCH ULTRA TEST VI) strip, Check glucose once daily. DX: E11.9, Disp: 100 Strip, Rfl: 3    lancets misc, Check glucose once daily. DX: E11.9, Disp: 100 Each, Rfl: 3    montelukast (SINGULAIR) 10 mg tablet, Take 1 Tab by mouth daily. , Disp: 90 Tab, Rfl: 0    acetaminophen (TylenoL) 325 mg tablet, Take  by mouth every four (4) hours as needed for Pain., Disp: , Rfl:     No Known Allergies    Past Surgical History:   Procedure Laterality Date    COLONOSCOPY N/A 7/15/2020    [facility] Lizbeth Conrad MD at [facility] ENDOSCOPY    HX CHOLECYSTECTOMY  2016    HX COLONOSCOPY  2013?     HX HYSTERECTOMY      UPPER GI ENDOSCOPY,DIAGNOSIS  7/15/2020            Social History     Tobacco Use   Smoking Status Current Every Day Smoker    Packs/day: 0.25   Smokeless Tobacco Never Used       Social History     Socioeconomic History    Marital status:    Tobacco Use    Smoking status: Current Every Day Smoker     Packs/day: 0.25    Smokeless tobacco: Never Used   Vaping Use    Vaping Use: Never used   Substance and Sexual Activity    Alcohol use: No    Drug use: No       Family History   Problem Relation Age of Onset    No Known Problems Mother        ROS:  Gen: denies fever, chills, or fatigue   HEENT:denies H/A or vision changes  Resp: denies dyspnea  CV: denies chest pain, pressure, or palpitations  Extremeties: denies edema  Neuro: denies numbness/tingling, +occasional vertigo. Denies unilateral weakness, facial drooping, AMS, or slurred speech   Skin: denies rashes or new lesions   Psych: +depression-stable no anxiety or brad, or other changes in mood      Objective:     Visit Vitals  /68   Pulse 70   Temp 97 °F (36.1 °C) (Temporal)   Resp 18   Ht 5' 6\" (1.676 m)   Wt 162 lb (73.5 kg)   SpO2 98%   BMI 26.15 kg/m²       General: Alert and oriented. No acute distress. Well nourished. HEENT    Eyes: Sclera white, conjunctiva clear. PERRLA. Extra ocular movements intact. Neck: Supple with FROM. Lungs: Breathing even and unlabored. All lobes clear to auscultation bilaterally   Heart :RRR, S1 and S2 normal intensity, no extra heart sounds  Extremities: Non-edematous  Musculo: +intermittent swelling of joints in the hands  Neuro: Cranial nerves grossly normal. Answers questions appropriately. Normal gait. Moves all extremities freely. Psych: Mood and thought content appropriate for situation. Dressed appropriately and with good hygiene. Skin: Warm, dry, and intact. No lesions or discoloration. Assessment/ Plan:     TIA  Cont ASA 81mg daily due to hx of GI Bleed while taking 325mg  Increase Lipitor from 40 to 80mg daily- call for any side effects such as muscle cramping or weakness  Keep tight control over BS and BP  Go back to ER for any numbness/tingling, unilateral weakness, facial drooping, slurred speech, confusion, AMS, or dizziness  F/U 3 months    HTN  BP stable  Cont current meds  Low-sodium diet  RTO or go to ER for any CP, SOB, dizziness, or swelling.   F/U 3 months    HLD  Need to recheck lipids but she is not fasting today-will recheck at next appt  Increase Lipitor to 80mg  Low fat diet  F/U 3 months    Type 2 diabetes  Home BS at goal  Cont Januvia 100mg daily. Watch the sugar and carbs  F/U 3 months, will recheck A1C    Tobacco use  Recommend quitting smoking to reduce risk of stroke    Verbal and written instructions (see AVS) provided.  Patient expresses understanding of diagnosis and treatment plan. There are no preventive care reminders to display for this patient.             Justin Martini NP

## 2021-12-06 NOTE — PATIENT INSTRUCTIONS
Transient Ischemic Attack: Care Instructions  Overview     A transient ischemic attack (TIA) is when blood flow to a part of your brain is blocked for a short time. A TIA is like a stroke but usually lasts only a few minutes. A TIA does not cause lasting brain damage. Any vision problems, slurred speech, or other symptoms usually go away in 10 to 20 minutes. But they may last for up to 24 hours. TIAs are often warning signs of a stroke. Some people who have a TIA may have a stroke in the future. A stroke can cause symptoms like those of a TIA. But a stroke causes lasting damage to your brain. You can take steps to help prevent a stroke. One thing you can do is get early treatment. If you have other new symptoms, or if your symptoms do not get better, go back to the emergency room or call your doctor right away. Getting treatment right away may prevent long-term brain damage caused by a stroke. Follow-up care is a key part of your treatment and safety. Be sure to make and go to all appointments, and call your doctor if you are having problems. It's also a good idea to know your test results and keep a list of the medicines you take. How can you care for yourself at home? Medicines    · Be safe with medicines. Take your medicines exactly as prescribed. Call your doctor if you think you are having a problem with your medicine.     · If you take a blood thinner, such as aspirin, be sure you get instructions about how to take your medicine safely. Blood thinners can cause serious bleeding problems.     · Call your doctor if you are not able to take your medicines for any reason.     · Do not take any over-the-counter medicines or herbal products without talking to your doctor first.     · If you take birth control pills or hormone therapy, talk to your doctor. Ask if these treatments are right for you. Lifestyle changes    · Do not smoke.  If you need help quitting, talk to your doctor about stop-smoking programs and medicines.     · Be active. If your doctor recommends it, get more exercise. Walking is a good choice. Bit by bit, increase the amount you walk every day. Try for at least 30 minutes on most days of the week. You also may want to swim, bike, or do other activities.     · Eat heart-healthy foods. These include fruits, vegetables, high-fiber foods, lean meats, beans, peas, nuts, seeds, and soy products, and foods that are low in sodium, saturated fat, and trans fat.     · Stay at a healthy weight. Lose weight if you need to.     · Limit alcohol to 2 drinks a day for men and 1 drink a day for women. Staying healthy    · Manage other health problems such as diabetes, high blood pressure, and high cholesterol.     · Get the flu vaccine every year. When should you call for help? Call 911 anytime you think you may need emergency care. For example, call if:    · You have new or worse symptoms of a stroke. These may include:  ? Sudden numbness, tingling, weakness, or loss of movement in your face, arm, or leg, especially on only one side of your body. ? Sudden vision changes. ? Sudden trouble speaking. ? Sudden confusion or trouble understanding simple statements. ? Sudden problems with walking or balance. ? A sudden, severe headache that is different from past headaches. Call 911 even if these symptoms go away in a few minutes.     · You feel like you are having another TIA. Watch closely for changes in your health, and be sure to contact your doctor if you have any problems. Where can you learn more? Go to http://www.[employer]com/  Enter I231 in the search box to learn more about \"Transient Ischemic Attack: Care Instructions. \"  Current as of: July 6, 2021               Content Version: 13.0  © 8239-9718 [employer]. Care instructions adapted under license by [employer] (which disclaims liability or warranty for this information).  If you have questions about a medical condition or this instruction, always ask your healthcare professional. [place] [address] any warranty or liability for your use of this information.

## 2021-12-08 ENCOUNTER — HOSPITAL ENCOUNTER (OUTPATIENT)
Dept: BEHAVIORAL/MENTAL HEALTH | Age: 86
Discharge: HOME | End: 2021-12-08
Payer: MEDICARE

## 2021-12-08 PROCEDURE — 90853 GROUP PSYCHOTHERAPY: CPT

## 2021-12-08 NOTE — BH NOTES
[facility] Outpatient Program  Group Therapy    Date of Service: 12/8/2021  Start time: 1100  Stop time: 1150  Type of session: Therapy Group    Problem number: 1. Dep F 33.0  Short term goal (STG): W. Pt will verbalize understanding of the difference of a lapse and a relapse and when it may be appropriate to get additional help in the future.       Intervention/techniques: Informed, Validated/Supported, Modeled/Rehearsed, Prompted/Cued, Promoted Peer Support and Provided Feedback    Patient mental status/affect: Anxious, Congruent and Preoccupied    Patient behavior/appearance: Neatly Groomed, Attentive, Cooperative and Needed Prompting    Special patient treatment accommodations provided (describe): None    Patient response and progress towards goals: Focus of session was on information from [facility] and 10 ways to decrease stress. I shared with group members the ideas which are live simply, good self care, positive thinking, being cooperative, getting involved in a good cause, living one day at a time, exercise, maintaining balance, giving credit to ourselves when credit is due, and realizing that we cant do everything. I asked group members to share one of these skills that they believed they managed well over the past week and what used and what skill they could have used to help themselves in a positive direction. I asked group members which skill they will focus on this week to help them decreases stress as well as improve overall functioning and well being. Raciel Quinn participated in group with prompting. She spoke about how she has been worried about events that led her to ER this past few days. She said she \"lost my voice\" and she spoke about how she has had that happen a couple of times in the past.  She spoke about how she is going to work on \"seeing the positive and trying to fill my mind with positive images. \"  She is expressing her concerns well and she wants to take steps to take care of herself.

## 2021-12-08 NOTE — BH NOTES
[facility] Outpatient Program  Group Therapy  Date of service: 12/8/2021  Start time: 1200  Stop time: 1250  Type of session: Therapy Group    Problem number: 1Dep. F33.0    Short term goal (STG): Y    Intervention/techniques: Informed, Validated/Supported and Reinforced    Patient mental status/affect: Calm and Congruent    Patient behavior/appearance: Attentive, Cooperative and Motivated    Special patient treatment accommodations provided (describe): none    Patient response and progress towards goals: Focus of session was on The Symptoms of Inner Peace by Radha Cadena. I shared the idea with group and that it includes the ideas of how inner peace is a disease and here is what it looks like. The symptoms included a loss of ability to worry, a loss of interest in conflict, an unmistakable ability to enjoy each moment, and frequent, overwhelming episodes of appreciation.   I asked group members to share which symptom they can work to get and how they can see it will help them have more peace in their day to day life Pt shared that she was unable to go back to sleep last night after waking up at 4:30. She did things around the house and figured out she had drank a pepsi before bed. She stated her stomach started to hurt and she was thinking about not going to group. She decided to come and her stomach feels better. She stated that she tries to remember that there have been times she has felt worse and to just keep going.

## 2021-12-08 NOTE — BH NOTES
[facility] Outpatient Program  Group Therapy  Date of service: 12/8/2021  Start time: 1000  Stop time: 1050  Type of session: Therapy Group    Problem number: 1Dep. F33.0    Short term goal (STG): XPt will share instances over each week in which she was able to step back and observe her thoughts or feelings and any success in being able to name what she was experiencing in order to develop mindfulness skill     Intervention/techniques: Informed, Validated/Supported, Listened/Empathized and Reinforced    Patient mental status/affect: Anxious, Congruent, Preoccupied and Worried    Patient behavior/appearance: Attentive and Withdrawn/Quiet    Special patient treatment accommodations provided (describe): none    Patient response and progress towards goals: Focus of session was on the DBT distress tolerance skill DISTRACT. I shared with group what this acronym stands for which is Do something else, Images, Senses, Think, Remember, Accept, Create, and Take opposite action. We spoke about how these are effective skills for challenging ourselves when we are feeling overwhelmed emotionally. I asked group members which skill they are willing to make the attempt to utilize in order to help themselves manage difficult moments. Pt stated at the end of the group that she had gone to the hospital 2 days ago because she had another episode of not being able to talk. They ran various test but could not make a diagnosis. She stated the nurse talked to her about if she is smoking more than a pack a week that could have something to to with this.  She states she is going to try and stop as she does not like not being able to use the phone to talk with people since she is alone

## 2021-12-15 ENCOUNTER — HOSPITAL ENCOUNTER (OUTPATIENT)
Dept: BEHAVIORAL/MENTAL HEALTH | Age: 86
Discharge: HOME | End: 2021-12-15
Payer: MEDICARE

## 2021-12-15 PROCEDURE — 99213 OFFICE O/P EST LOW 20 MIN: CPT | Performed by: PSYCHIATRY & NEUROLOGY

## 2021-12-15 PROCEDURE — 90853 GROUP PSYCHOTHERAPY: CPT

## 2021-12-15 NOTE — BH NOTES
[facility] Outpatient Program  Group Therapy  Date of service: 12/15/2021  Start time: 1100  Stop time: 1150  Type of session: Therapy Group    Problem number: 1Dep. F33.0    Short term goal (STG): X Pt will share instances over each week in which she was able to step back and observe her thoughts or feelings and any success in being able to name what she was experiencing in order to develop mindfulness skill.       Intervention/techniques: Informed, Validated/Supported and Listened/Empathized    Patient mental status/affect: Calm and Congruent    Patient behavior/appearance: Attentive, Cooperative and Motivated    Special patient treatment accommodations provided (describe): none    Patient response and progress towards goals: Focus of session was based on an article found on the website [email or web address] about the 7 things that you can control in life.   I shared with group those 7 things which include your breath, your self talk, your gratitude, your body language, your mental and physical fitness, your diet, and your sleep. We spoke about how lack of focus on these areas affects us and what we can do to improve control over these 7 aspects. Pt participated in the group activity and engaged with the other members. She has been trying to quit smoking and shared an incident with the group. She received news of a death of a friend last night and she wanted to call her daughter and tell her and have her bring her a cigarette. She thought about it and decided not to and was proud of her actions.   She stated she felt is she started it would happen again and acknowledged how hard it is to stop

## 2021-12-15 NOTE — PROGRESS NOTES
SOP PROGRESS NOTE    INTERVAL HISTORY/FINDINGS: States she's still been feeling \"pretty good\" affectively, and that she still hasn't had any exacerbation of depression or mood instability over the last month. Her N/V functioning has continued to be fairly good--only has some tiredness every so often, but not regularly. She continues to reach out to friends and family by phone, especially if she ever starts to feel a little down or flat. Sleeping and eating well, and she's still staying fairly active. No med SE's reported or noted at all and she's been very compliant with the Effexor XR. Overall, still doing very well affectively. MEDICATIONS:  Current Outpatient Medications   Medication Sig    atorvastatin (LIPITOR) 80 mg tablet Take 1 Tablet by mouth daily.  omeprazole (PRILOSEC) 20 mg capsule TAKE 1 CAPSULE BY MOUTH EVERY DAY    lisinopriL (PRINIVIL, ZESTRIL) 20 mg tablet TAKE 1 TABLET BY MOUTH EVERY DAY    Januvia 100 mg tablet TAKE 1 TABLET BY MOUTH EVERY DAY    levocetirizine (XYZAL) 5 mg tablet TAKE 1 TABLET BY MOUTH DAILY AS NEEDED FOR ALLERGY SYMPTOMS    aspirin delayed-release 81 mg tablet Take 1 Tablet by mouth daily.  metoprolol succinate (TOPROL-XL) 25 mg XL tablet TAKE 1 TABLET BY MOUTH EVERY DAY    fluticasone propionate (FLONASE) 50 mcg/actuation nasal spray 2 Sprays by Both Nostrils route daily.  venlafaxine-SR (EFFEXOR-XR) 150 mg capsule Take 1 Capsule by mouth daily (with breakfast). Indications: major depressive disorder    nitroglycerin (NITROSTAT) 0.4 mg SL tablet 1 Tab by SubLINGual route every five (5) minutes as needed for Chest Pain. Up to 3 doses.  Blood-Glucose Meter monitoring kit Check glucose once daily. DX: E11.9    glucose blood VI test strips (ASCENSIA AUTODISC VI, ONE TOUCH ULTRA TEST VI) strip Check glucose once daily. DX: E11.9    lancets misc Check glucose once daily. DX: E11.9    montelukast (SINGULAIR) 10 mg tablet Take 1 Tab by mouth daily.     acetaminophen (TylenoL) 325 mg tablet Take  by mouth every four (4) hours as needed for Pain. No current facility-administered medications for this encounter. MENTAL STATUS UPDATE: (Positives in [place])  Appearance:   Neat   Disheveled  Odiferous   Appropriate  Orientation:   Time  Place  Person  Speech:   Coherent  Pressured  Garbled/Slurred  Loud   Soft  Mute  Memory:   Intact  Impaired (Recent  Remote)--Mild  Severe  Mood:   Euthymic  Depressed  Anxious  Elated  Affect:    Appropriate  Inappropriate  Labile  Blunted  Flat  Thought Content:   Logical  Goal directed  Paranoid  Delusional  Illogical IOR [facility]  HI  Thought Process:   Coherent/linear  Tangential  Loose/Disorganized   Hallucinations:   None  Auditory  Visual  Tactile  Olfactory  Gustatory  Insight:   Good  Fair  Impaired      Judgment:   Good  Fair  Impaired     CONTINUED NEED FOR TREATMENT: (Positives in [place])  1. Continued psychiatric symptoms causing impairment in IDL  or functioning  2. Continued non-compliance with meds resulting in decompensation  3. High level of debilitation and symptoms with inadequate support  4. Continued need for structured care to adjust medications  5. Continued presence of debilitating symptoms  6. Continued presence of risk factors     CONTINUED NEED FOR GROUP PSYCHOTHERAPY TO ADDRESS THE FOLLOWING: (Positives in [place])  Psychiatric symptom management       Psychiatric relapse prevention    Anxiety management   Self-esteem issues      Poor decision making       Recent decompensation       Safety issues   Anger management     Self care/ADL's     Med/treatment compliance      Impaired boundaries/relationships   Assertiveness      Grief/loss resolution       Guilt/shame issues     Realistic goal setting     DIAGNOSTIC IMPRESSION:  1. 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 Continue the Venlafaxine  mg daily--has 6+ months of refills (90 day)  2. Continue SOP treatment 1 x/week.

## 2021-12-15 NOTE — BH NOTES
[facility] Outpatient Program  Group Therapy    Date of Service:  12/15/2021   Start time: 1200  Stop time: 1250  Type of session: Therapy Group    Problem number: 1. Dep F 33.0    Short term goal (STG): Y.  Pt will verbalize what she learned in past 6 months that helps to build her confidence in her ability to manage herself, and her mental health, once discharged from program.    Intervention/techniques: Informed, Validated/Supported, Prompted/Cued, Listened/Empathized, Promoted Peer Support and Provided Feedback    Patient mental status/affect: Anxious, Congruent and Preoccupied    Patient behavior/appearance: Neatly Groomed, Attentive, Cooperative and Needed Prompting    Special patient treatment accommodations provided (describe): None    Patient response and progress towards goals: Focus of session was on Tips for Emotional Resilience by Jayson Gtz MS, LPC. We went over what resilience is and that is the ability to adapt to stressful situations or crises. I shared the tips with group members which includes: focus on what you can control, use events as learning experiences, alter your perceptions, limit the hostility factor, strive for goodness, not perfection, develop compassion, develop good self care habits, dont isolate, look for humor in things, and develop mindfulness. We spoke about the tips group members are using and what they can benefit from using. Jett Braun participated in group with prompting. She spoke about how she has been struggling with maintaining. She did say she has not been smoking over the past week and she is able to experience a growth in confidence as a result. She was encouraged well by other group members.

## 2021-12-15 NOTE — BH NOTES
[facility] Outpatient Program  Group Therapy    Date of Service: 12/15/2021  Start time: 1000  Stop time: 1050  Type of session: Therapy Group    Problem number: 1. Dep F 33.0    Short term goal (STG): W.  Pt will verbalize understanding of the difference of a lapse and a relapse and when it may be appropriate to get additional help in the future    Intervention/techniques: Informed, Validated/Supported, Prompted/Cued, Listened/Empathized, Promoted Peer Support and Provided Feedback    Patient mental status/affect: Anxious, Congruent and Preoccupied    Patient behavior/appearance: Neatly Groomed, Attentive, Cooperative and Needed Prompting    Special patient treatment accommodations provided (describe): None    Patient response and progress towards goals: Focus of session was information on the difference between a victim mentality, an survivor mentality, and a thriver mentality. We spoke about how attitude makes a lot of difference in what we do and how we choose to cope in those times when we are thinking negative thoughts that lead to a victim mentality. I shared that victim thinking and behavior includes negative self talk, isolating behavior, overwhelmed by the past, believes everyone else is better, and places own needs last.  A survivor mentality and behavior includes seeing oneself as wounded but healing, knows that they deserve to seek help, not afraid to tell their story, and are learning healthy needs. A thriver mentality includes gratitude, proud of self care, living in the present, and protects self from unsafe others. We spoke about what actions can be taken or what thoughts could be changed to develop a thriver mentality and being. Taiwo Caal participated in group with prompting. She spoke about how she knows that her self talk can be different and she is willing to work on focusing on how she is talking to herself and the impact it has on her.   'I can think about if I would say this to anyone else and recognize that I wouldn't talk to anyone the way I sometimes talk to myself. \"

## 2021-12-20 ENCOUNTER — HOSPITAL ENCOUNTER (OUTPATIENT)
Dept: BEHAVIORAL/MENTAL HEALTH | Age: 86
Discharge: HOME | End: 2021-12-20
Payer: MEDICARE

## 2021-12-20 PROCEDURE — 90853 GROUP PSYCHOTHERAPY: CPT

## 2021-12-20 NOTE — BH NOTES
[facility] Outpatient Program  Group Therapy    Date of Service: 12/20/2021  Start time: 1100  Stop time: 1150  Type of session: Therapy Group    Problem number: 1. Dep F 33.0    Short term goal (STG): Y.  Pt will verbalize what she learned in past 6 months that helps to build her confidence in her ability to manage herself, and her mental health, once discharged from program.    Intervention/techniques: Informed, Validated/Supported, Modeled/Rehearsed, Observed/Monitored, Promoted Peer Support and Provided Feedback    Patient mental status/affect: Anxious, Congruent and Preoccupied    Patient behavior/appearance: Neatly Groomed, Attentive, Cooperative and Needy    Special patient treatment accommodations provided (describe): None    Patient response and progress towards goals: Focus of session was based on article 5 Crucial Coping Skills That Take Time in Sobriety.   I spoke with group members about how all these coping strategies apply to being in mental health recovery as well as working to make positive changes in our lives. The skills we spoke about were: Letting go of a victim mentality, learning to deal with resentment, taking ownership of mistakes, being honest, and practicing gratitude.   We spoke about the aspects of these skills and why they are more difficult to develop. We spoke about which skill group members are willing to work to develop. Radha Robledo participated in group with prompting. She was off topic a lot but she was talking about things that she was struggling with. She spoke about her granddaughter who she has worries about since her granddaughter has stated she is a lesbian. We spoke about how pt can have a conversation with her about her life and her choices without it appearing she is judging her for being who she is.

## 2021-12-20 NOTE — BH NOTES
[facility] Outpatient Program  Group Therapy  Date of service: 12/20/2021  Start time: 1200  Stop time: 1250  Type of session: Therapy Group    Problem number: 1Dep. F33.0    Short term goal (STG): X Pt will share instances over each week in which she was able to step back and observe her thoughts or feelings and any success in being able to name what she was experiencing in order to develop mindfulness skill.       Intervention/techniques: Informed, Validated/Supported, Listened/Empathized and Reinforced    Patient mental status/affect: Anxious, Congruent and Worried    Patient behavior/appearance: Attentive and Cooperative    Special patient treatment accommodations provided (describe): none    Patient response and progress towards goals: Focus of session was on effective ways to control anxiety. We spoke about how these specific skills can have a very immediate effect on stress and anxiety and we spoke about how we can motivate to use them. We spoke about the following skills; dealing with problems on the spot, strong, confident relationships, slowing down, taking one thing at a time, coping with ruts, divide problems up, using past experiences, and building relaxation into our daily lives. We spoke about which skill we will start to incorporate into our daily use Pt participated in the group discussion and engaged with the other members. She stated that her knee has been hurting her. She has arthritis and it is swollen. She was not going to come today but decided to and was glad she did. She is making plans to keep herself comforted over [place] during the times that she will be alone.

## 2021-12-20 NOTE — BH NOTES
[facility] Outpatient Program  Group Therapy    Date of Service: 12/20/2021  Start time: 1000  Stop time: 1050  Type of session: Therapy Group    Problem number: 1. Dep F 33.0    Short term goal (STG): W.  Pt will verbalize understanding of the difference of a lapse and a relapse and when it may be appropriate to get additional help in the future.      Intervention/techniques: Informed, Validated/Supported, Prompted/Cued, Observed/Monitored and Provided Feedback    Patient mental status/affect: Calm, Congruent, Flat and Preoccupied    Patient behavior/appearance: Neatly Groomed, Attentive, Cooperative, Needed Prompting and Withdrawn/Quiet    Special patient treatment accommodations provided (describe): None    Patient response and progress towards goals: Focus of session was focused on developing understanding of the cognitive distortion of catastrophizing. We spoke about how this is the irrational thought that we believe that something is far worse than it actually is. I shared how we tend to make a catastrophe out of a current situation, or we can do it to future oriented events that may or may not even happen. We identified what this pattern of thinking leads us to both emotionally and behaviorally. Group members were asked to share recent thoughts that they can label as catastrophic thinking and we discussed challenges to those thoughts. Alanna Matt participated in group with prompting. She was very quiet today talking about her leg hurting. She did share she went to [employer] to go shopping with her daughter and that it went well but she was very tired as a result. She was attentive to others in group.

## 2021-12-27 ENCOUNTER — HOSPITAL ENCOUNTER (OUTPATIENT)
Dept: BEHAVIORAL/MENTAL HEALTH | Age: 86
Discharge: HOME | End: 2021-12-27
Payer: MEDICARE

## 2021-12-27 PROCEDURE — 90853 GROUP PSYCHOTHERAPY: CPT

## 2021-12-27 NOTE — BH NOTES
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  Pt will verbalize what she learned in past 6 months that helps to build her confidence in her ability to manage herself, and her mental health, once discharged from program.      Intervention/techniques: Informed, Reframed, Modeled/Rehearsed, Listened/Empathized, Observed/Monitored, Promoted Peer Support and Provided Feedback    Patient mental status/affect: Anxious, Congruent and Preoccupied    Patient behavior/appearance: Neatly Groomed, Attentive and Cooperative    Special patient treatment accommodations provided (describe): None    Patient response and progress towards goals: Focus of session was on identifying the rights that we all have as human beings and the need to keep these in mind when we may be struggling with communicating and relating to other people in our lives. We spoke about how when self esteem is low and anxiety and depression may be high, we have more internal conflict over what we feel comfortable expressing to others and we run the risk of not saying anything out of fear of rejection or being seen as selfish or wrong. We discussed the core rights that we have which include the right to say no, the right to negotiate for change, the right to ask for help and support, the right to be the final  of our own beliefs, and the right to our own experiences regardless of its different from other peoples experiences. Sharmila Dickerson participated in group with prompting. She spoke about how she does know and understand the impact that \"always saying yes to things and constantly bending over backwards helps her and also harms her. She was able to express understanding in what she has the right to stand up for in her life right now.

## 2021-12-27 NOTE — BH NOTES
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  Pt will verbalize understanding of the difference of a lapse and a relapse and when it may be appropriate to get additional help in the future.      Intervention/techniques: Informed, Validated/Supported, Modeled/Rehearsed, Prompted/Cued, Promoted Peer Support and Provided Feedback    Patient mental status/affect: Anxious, Calm, Congruent and Preoccupied    Patient behavior/appearance: Neatly Groomed, Attentive, Cooperative and Needed Prompting    Special patient treatment accommodations provided (describe): None    Patient response and progress towards goals: Focus of session was on identifying and challenging unhelpful and negative thinking patterns. We discussed the importance of accepting the facts of situations and not what we are interpreting from them. I discussed with group members thinking processes that include self-blame which is accepting responsibility for all that is wrong, whether it is our fault or not. We also reviewed the thinking patterns of all or nothing thinking, jumping to conclusions, overgeneralizing, making assumptions, and labeling. Group members worked to identify the thought pattern that they get caught up in and how the thinking can be disputed and challenge what is not true and replace it with truthful and rational thoughts. Karla Thomas participated in group well with prompting. She spoke about how she knows that she needs to challenge her thinking patterns and be more open to other ways of thinking. She was able to engage well in group and share her thoughts with peers in group.

## 2021-12-28 ENCOUNTER — APPOINTMENT (OUTPATIENT)
Dept: CT IMAGING | Age: 86
End: 2021-12-28
Attending: EMERGENCY MEDICINE
Payer: MEDICARE

## 2021-12-28 ENCOUNTER — HOSPITAL ENCOUNTER (EMERGENCY)
Age: 86
Discharge: HOME | End: 2021-12-28
Attending: EMERGENCY MEDICINE
Payer: MEDICARE

## 2021-12-28 VITALS
TEMPERATURE: 98.5 F | SYSTOLIC BLOOD PRESSURE: 160 MMHG | HEIGHT: 66 IN | DIASTOLIC BLOOD PRESSURE: 96 MMHG | BODY MASS INDEX: 26.52 KG/M2 | RESPIRATION RATE: 16 BRPM | HEART RATE: 85 BPM | WEIGHT: 165 LBS | OXYGEN SATURATION: 94 %

## 2021-12-28 DIAGNOSIS — S00.83XA CONTUSION OF FACE, INITIAL ENCOUNTER: ICD-10-CM
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
## 2021-12-28 PROCEDURE — 70486 CT MAXILLOFACIAL W/O DYE: CPT

## 2021-12-28 PROCEDURE — 99283 EMERGENCY DEPT VISIT LOW MDM: CPT

## 2021-12-28 PROCEDURE — 70450 CT HEAD/BRAIN W/O DYE: CPT

## 2021-12-28 NOTE — ED PROVIDER NOTES
EMERGENCY DEPARTMENT HISTORY AND PHYSICAL EXAM      Date: 12/28/2021  Patient Name: Gwyndolyn Nissen    History of Presenting Illness     Chief Complaint   Patient presents with    Fall     fell out of the bed does not remember the fall. She was able to get up and went to the bathroom and then back to bed. She called her cousin who called [phone]. EMS came to evalaute the patient and she opted to have her family drive her here. Has a cut on her upper right inside lip. No neck or back pain . Also has right sided \"misery\" after the fall. Ambulates well       History Provided By: Patient    HPI: Gwyndolyn Nissen, 82 y.o. female with PMHx significant for DM, IBS, hypertension presents ambulatory to the ED with cc of fall. Patient reports she woke up this morning in the middle of a bad dream and rolled out of bed and hit her right face on the corner of the dresser. She reports mild pain and swelling in her right maxilla area and also had a small laceration in her right upper lip. No bleeding. No other injuries. No extremity injuries. She denies any neck pain. No loss of consciousness. She is otherwise without complaints. Pain is a dull ache. Denies any eye injury or changes in vision. No nausea or vomiting. PMHx: Significant for DM, IBS, hypertension  PSHx: Significant for hysterectomy, cholecystectomy  Social Hx: Quarter pack a day smoker. Denies drug or alcohol use. There are no other complaints, changes, or physical findings at this time. PCP: Maci Seals NP    No current facility-administered medications on file prior to encounter. Current Outpatient Medications on File Prior to Encounter   Medication Sig Dispense Refill    atorvastatin (LIPITOR) 80 mg tablet Take 1 Tablet by mouth daily.  90 Tablet 0    omeprazole (PRILOSEC) 20 mg capsule TAKE 1 CAPSULE BY MOUTH EVERY DAY 90 Capsule 3    lisinopriL (PRINIVIL, ZESTRIL) 20 mg tablet TAKE 1 TABLET BY MOUTH EVERY DAY 90 Tablet 2  Januvia 100 mg tablet TAKE 1 TABLET BY MOUTH EVERY DAY 90 Tablet 1    levocetirizine (XYZAL) 5 mg tablet TAKE 1 TABLET BY MOUTH DAILY AS NEEDED FOR ALLERGY SYMPTOMS 90 Tablet 2    aspirin delayed-release 81 mg tablet Take 1 Tablet by mouth daily. 90 Tablet 1    metoprolol succinate (TOPROL-XL) 25 mg XL tablet TAKE 1 TABLET BY MOUTH EVERY DAY 90 Tablet 1    fluticasone propionate (FLONASE) 50 mcg/actuation nasal spray 2 Sprays by Both Nostrils route daily. 1 Bottle 0    venlafaxine-SR (EFFEXOR-XR) 150 mg capsule Take 1 Capsule by mouth daily (with breakfast). Indications: major depressive disorder 90 Capsule 3    nitroglycerin (NITROSTAT) 0.4 mg SL tablet 1 Tab by SubLINGual route every five (5) minutes as needed for Chest Pain. Up to 3 doses. 1 Bottle 1    Blood-Glucose Meter monitoring kit Check glucose once daily. DX: E11.9 1 Kit 0    glucose blood VI test strips (ASCENSIA AUTODISC VI, ONE TOUCH ULTRA TEST VI) strip Check glucose once daily. DX: E11.9 100 Strip 3    lancets misc Check glucose once daily. DX: E11.9 100 Each 3    montelukast (SINGULAIR) 10 mg tablet Take 1 Tab by mouth daily. 90 Tab 0    acetaminophen (TylenoL) 325 mg tablet Take  by mouth every four (4) hours as needed for Pain. Past History     Past Medical History:  Past Medical History:   Diagnosis Date    Allergic rhinitis due to pollen     BPV (benign positional vertigo)     Change in bowel habits     Depression     Diabetes ([address]     Dysuria     Hemorrhoids     Hypertension     IBS (irritable bowel syndrome)     Menopause     Other diseases of nasal cavity and sinuses(478.19)     Vertigo        Past Surgical History:  Past Surgical History:   Procedure Laterality Date    COLONOSCOPY N/A 7/15/2020    COL[facility]COPY    HX CHOLECYSTECTOMY  2016    HX COLONOSCOPY  2013?     HX HYSTERECTOMY      UPPER GI ENDOSCOPY,DIAGNOSIS  7/15/2020            Family History:  Family History   Problem Relation Age of Onset    No Known Problems Mother        Social History:  Social History     Tobacco Use    Smoking status: Current Every Day Smoker     Packs/day: 0.25    Smokeless tobacco: Never Used   Vaping Use    Vaping Use: Never used   Substance Use Topics    Alcohol use: No    Drug use: No       Allergies:  No Known Allergies      Review of Systems   Review of Systems   Constitutional: Negative for activity change, chills and fever. HENT: Negative for congestion and sore throat. Eyes: Negative for pain and redness. Respiratory: Negative for cough, chest tightness and shortness of breath. Cardiovascular: Negative for chest pain and palpitations. Gastrointestinal: Negative for abdominal pain, diarrhea, nausea and vomiting. Genitourinary: Negative for dysuria, frequency and urgency. Musculoskeletal: Negative for back pain and neck pain. Skin: Negative for rash. Neurological: Positive for headaches. Negative for syncope and light-headedness. Psychiatric/Behavioral: Negative for confusion. All other systems reviewed and are negative. Physical Exam   Physical Exam  Vitals and nursing note reviewed. Constitutional:       General: She is not in acute distress. Appearance: She is well-developed. She is not diaphoretic. Comments: Calm and pleasant female. HENT:      Head: Normocephalic. Comments: Mild swelling and tenderness of right maxilla area. Skin intact. No evidence of entrapment upward or downward gaze. No malocclusion. No mandibular tenderness. Mall 5 mm laceration in the inner right upper lip. No bleeding or drainage. No loose teeth. Nose: Nose normal.      Mouth/Throat:      Pharynx: No oropharyngeal exudate. Eyes:      General: No scleral icterus. Conjunctiva/sclera: Conjunctivae normal.      Pupils: Pupils are equal, round, and reactive to light. Neck:      Thyroid: No thyromegaly.       Vascular: No JVD.      Trachea: No tracheal deviation. Cardiovascular:      Rate and Rhythm: Normal rate and regular rhythm. Heart sounds: No murmur heard. No friction rub. No gallop. Pulmonary:      Effort: Pulmonary effort is normal. No respiratory distress. Breath sounds: Normal breath sounds. No stridor. No wheezing or rales. Abdominal:      General: Bowel sounds are normal. There is no distension. Palpations: Abdomen is soft. Tenderness: There is no abdominal tenderness. There is no guarding or rebound. Musculoskeletal:         General: Normal range of motion. Cervical back: Normal range of motion and neck supple. Comments: All active range of motion in bilateral upper and lower extremities without pain. 5 strength throughout. Lymphadenopathy:      Cervical: No cervical adenopathy. Skin:     General: Skin is warm and dry. Capillary Refill: Capillary refill takes less than 2 seconds. Findings: No erythema or rash. Neurological:      General: No focal deficit present. Mental Status: She is alert and oriented to person, place, and time. Cranial Nerves: No cranial nerve deficit. Motor: No abnormal muscle tone. Coordination: Coordination normal.   Psychiatric:         Mood and Affect: Mood normal.         Behavior: Behavior normal.             Diagnostic Study Results     Labs -   No results found for this or any previous visit (from the past 12 hour(s)). Radiologic Studies -   CT HEAD WO CONT   Final Result   No acute intracranial hemorrhage, mass or infarct. CT MAXILLOFACIAL WO CONT   Final Result   Mild right preseptal soft tissue swelling. CT Results  (Last 48 hours)               12/28/21 1524  CT HEAD WO CONT Final result    Impression:  No acute intracranial hemorrhage, mass or infarct. Narrative:  INDICATION: fall out of bed. Exam: Noncontrast CT of the brain is performed with 5 mm collimation.        CT dose reduction was achieved with the use of the standardized protocol   tailored for this examination and automatic exposure control for dose   modulation. FINDINGS: There is no acute intracranial hemorrhage, mass, mass effect or   herniation. Ventricular system is normal. There are stable periventricular white   matter hypodensities consistent with chronic microvascular ischemic changes. Right cerebellar encephalomalacia is unchanged. There is no evidence of acute   territorial infarct. The mastoid air cells are well pneumatized. The visualized   paranasal sinuses are normal.           12/28/21 1524  CT MAXILLOFACIAL WO CONT Final result    Impression:  Mild right preseptal soft tissue swelling. Narrative:  MAXILLOFACIAL CT  WITHOUT CONTRAST: 12/28/2021 3:24 PM       INDICATION: Right facial trauma. COMPARISON: None. PROCEDURE: CT was performed from the mandible through the frontal region without   contrast. Sagittal and coronal reconstructions were performed. CT dose reduction   was achieved through use of a standardized protocol tailored for this   examination and automatic exposure control for dose modulation. FINDINGS: There is mild, right, preseptal soft tissue swelling, superior eyelid. The orbits are normal. No facial fracture. The sinuses are clear. .               CXR Results  (Last 48 hours)    None            Medical Decision Making   I am the first provider for this patient. I reviewed the vital signs, available nursing notes, past medical history, past surgical history, family history and social history. Vital Signs-Reviewed the patient's vital signs. No data found. Records Reviewed: Nursing notes reviewed    Provider Notes (Medical Decision Making):   DDX: Laceration, facial fracture. ICH. ED Course:   Initial assessment performed.  The patients presenting problems have been discussed, and they are in agreement with the care plan formulated and outlined with them. I have encouraged them to ask questions as they arise throughout their visit. PROGRESS NOTE    Pt reevaluated. The max face with nasal fracture. No septal hematoma. CT head without acute findings. Laceration did not require repair. Discharge home. Written by Juan Daniel Sotelo MD     Progress note:    Pt noted to be feeling better and ready for discharge. Updated pt and/or family on all final lab and/or  imaging findings. Will follow up as instructed. All questions have been answered, pt voiced understanding and agreement with plan. Specific return precautions provided as well as instructions to return to the ED should sx worsen at any time. Vital signs stable for discharge. I have also put together some discharge instructions for them that include: 1) educational information regarding their diagnosis, 2) how to care for their diagnosis at home, as well a 3) list of reasons why they would want to return to the ED prior to their follow-up appointment, should their condition change. Written by Juan Daniel Sotelo MD        Critical Care Time:   0    Disposition:  Discharge    PLAN:  1. Discharge Medication List as of 12/28/2021  4:04 PM        2. Follow-up Information     Follow up With Specialties Details Why Contact Rochelle Almazan NP Nurse Practitioner Schedule an appointment as soon as possible for a visit in 3 days  [address] [place] [address] Emergency Medicine Go in 1 day If symptoms worsen [facility]  [phone]        Return to ED if worse     Diagnosis     Clinical Impression:   1. Fall, initial encounter    2. Contusion of face, initial encounter              Please note that this dictation was completed with [employer], the computer voice recognition software.   Quite often unanticipated grammatical, syntax, homophones, and other interpretive errors are inadvertently transcribed by the computer software. Please disregard these errors. Please excuse any errors that have escaped final proofreading.

## 2022-01-04 NOTE — TELEPHONE ENCOUNTER
Called and spoke with patient and went over the directions on the bottle of the meds. Patient states understanding.
Patient wants to discuss her meds.
no

## 2022-01-10 ENCOUNTER — TELEPHONE (OUTPATIENT)
Dept: FAMILY MEDICINE CLINIC | Age: 87
End: 2022-01-10

## 2022-01-10 RX ORDER — LISINOPRIL 20 MG/1
20 TABLET ORAL 2 TIMES DAILY
Qty: 180 TABLET | Refills: 3 | Status: SHIPPED | OUTPATIENT
Start: 2022-01-10

## 2022-01-19 ENCOUNTER — HOSPITAL ENCOUNTER (OUTPATIENT)
Dept: BEHAVIORAL/MENTAL HEALTH | Age: 87
Discharge: HOME | End: 2022-01-19
Payer: MEDICARE

## 2022-01-19 PROCEDURE — 90853 GROUP PSYCHOTHERAPY: CPT
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
## 2022-01-19 NOTE — BH NOTES
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 Pt will identify an activity that has a high likelihood for pleasure and mastery to build into her relapse prevention plan and take steps to beginning the activity as a habit    Intervention/techniques: Informed, Validated/Supported, Modeled/Rehearsed, Prompted/Cued, Promoted Peer Support and Provided Feedback    Patient mental status/affect: Anxious, Congruent and Preoccupied    Patient behavior/appearance: Neatly Groomed, Attentive, Cooperative and Needed Prompting    Special patient treatment accommodations provided (describe): None    Patient response and progress towards goals: Focus of session was information and ideas for how to create self-discipline. We went over a useful affirmation about discipline and how having self-discipline allows us to have trust in ourselves to be able to respond well in any situation. We spoke about what life enhancing habits and routines make sense for us to do and help us build a solid daily foundation to help guide our choices. I shared some ideas and had group members share theirs as to how we can create more discipline for ourselves and what tools can work. We spoke about who we can rely on in our lives to help us in our path and help remind us about what we are doing and why. Jose Juan Wilhelm participated in group with prompting. She was off topic a lot and she shared memories from her childhood and she became tearful talking about certain things. She spoke about how it upsets her a great deal to see so much racial strife and \"I don't get caught up in it. I can see the value in people and I am benny I just don't think about race. \"
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
## 2022-01-25 ENCOUNTER — NURSE TRIAGE (OUTPATIENT)
Dept: OTHER | Facility: CLINIC | Age: 87
End: 2022-01-25

## 2022-01-25 NOTE — TELEPHONE ENCOUNTER
No contact made. Reason for Disposition   No answer. First attempt to contact caller. Follow-up call scheduled within 15 minutes.     Protocols used: NO CONTACT OR DUPLICATE CONTACT CALL-ADULT-[place]

## 2022-01-27 ENCOUNTER — HOSPITAL ENCOUNTER (EMERGENCY)
Age: 87
Discharge: HOME | End: 2022-01-27
Attending: EMERGENCY MEDICINE
Payer: MEDICARE

## 2022-01-27 ENCOUNTER — APPOINTMENT (OUTPATIENT)
Dept: GENERAL RADIOLOGY | Age: 87
End: 2022-01-27
Attending: EMERGENCY MEDICINE
Payer: MEDICARE

## 2022-01-27 ENCOUNTER — APPOINTMENT (OUTPATIENT)
Dept: CT IMAGING | Age: 87
End: 2022-01-27
Attending: EMERGENCY MEDICINE
Payer: MEDICARE

## 2022-01-27 VITALS
OXYGEN SATURATION: 94 % | BODY MASS INDEX: 26.52 KG/M2 | SYSTOLIC BLOOD PRESSURE: 149 MMHG | HEIGHT: 66 IN | DIASTOLIC BLOOD PRESSURE: 68 MMHG | HEART RATE: 77 BPM | WEIGHT: 165 LBS | RESPIRATION RATE: 20 BRPM | TEMPERATURE: 98.2 F

## 2022-01-27 DIAGNOSIS — K57.92 ACUTE DIVERTICULITIS OF INTESTINE: Primary | ICD-10-CM

## 2022-01-27 DIAGNOSIS — R10.84 ABDOMINAL PAIN, GENERALIZED: ICD-10-CM

## 2022-01-27 LAB
ALBUMIN SERPL-MCNC: 3.4 G/DL (ref 3.5–5)
ALBUMIN/GLOB SERPL: 0.7 {RATIO} (ref 1.1–2.2)
ALP SERPL-CCNC: 94 U/L (ref 45–117)
ALT SERPL-CCNC: 46 U/L (ref 12–78)
ANION GAP SERPL CALC-SCNC: 10 MMOL/L (ref 5–15)
APPEARANCE UR: CLEAR
AST SERPL-CCNC: 31 U/L (ref 15–37)
BACTERIA URNS QL MICRO: NEGATIVE /HPF
BASOPHILS # BLD: 0 K/UL (ref 0–0.1)
BASOPHILS NFR BLD: 0 % (ref 0–1)
BILIRUB SERPL-MCNC: 1.2 MG/DL (ref 0.2–1)
BILIRUB UR QL: NEGATIVE
BUN SERPL-MCNC: 13 MG/DL (ref 6–20)
BUN/CREAT SERPL: 12 (ref 12–20)
CALCIUM SERPL-MCNC: 8.9 MG/DL (ref 8.5–10.1)
CHLORIDE SERPL-SCNC: 99 MMOL/L (ref 97–108)
CO2 SERPL-SCNC: 30 MMOL/L (ref 21–32)
COLOR UR: ABNORMAL
CREAT SERPL-MCNC: 1.11 MG/DL (ref 0.55–1.02)
DIFFERENTIAL METHOD BLD: ABNORMAL
EOSINOPHIL # BLD: 0.1 K/UL (ref 0–0.4)
EOSINOPHIL NFR BLD: 0 % (ref 0–7)
EPITH CASTS URNS QL MICRO: ABNORMAL /LPF
ERYTHROCYTE [DISTWIDTH] IN BLOOD BY AUTOMATED COUNT: 12.4 % (ref 11.5–14.5)
FLUAV RNA SPEC QL NAA+PROBE: NOT DETECTED
FLUBV RNA SPEC QL NAA+PROBE: NOT DETECTED
GLOBULIN SER CALC-MCNC: 4.8 G/DL (ref 2–4)
GLUCOSE SERPL-MCNC: 163 MG/DL (ref 65–100)
GLUCOSE UR STRIP.AUTO-MCNC: NEGATIVE MG/DL
HCT VFR BLD AUTO: 42.5 % (ref 35–47)
HGB BLD-MCNC: 13.7 G/DL (ref 11.5–16)
HGB UR QL STRIP: ABNORMAL
IMM GRANULOCYTES # BLD AUTO: 0.1 K/UL (ref 0–0.04)
IMM GRANULOCYTES NFR BLD AUTO: 1 % (ref 0–0.5)
KETONES UR QL STRIP.AUTO: NEGATIVE MG/DL
LACTATE SERPL-SCNC: 1.6 MMOL/L (ref 0.4–2)
LEUKOCYTE ESTERASE UR QL STRIP.AUTO: ABNORMAL
LYMPHOCYTES # BLD: 2.1 K/UL (ref 0.8–3.5)
LYMPHOCYTES NFR BLD: 14 % (ref 12–49)
MCH RBC QN AUTO: 30.2 PG (ref 26–34)
MCHC RBC AUTO-ENTMCNC: 32.2 G/DL (ref 30–36.5)
MCV RBC AUTO: 93.8 FL (ref 80–99)
MONOCYTES # BLD: 1 K/UL (ref 0–1)
MONOCYTES NFR BLD: 7 % (ref 5–13)
NEUTS SEG # BLD: 11.3 K/UL (ref 1.8–8)
NEUTS SEG NFR BLD: 78 % (ref 32–75)
NITRITE UR QL STRIP.AUTO: NEGATIVE
NRBC # BLD: 0 K/UL (ref 0–0.01)
NRBC BLD-RTO: 0 PER 100 WBC
PH UR STRIP: 6 [PH] (ref 5–8)
PLATELET # BLD AUTO: 285 K/UL (ref 150–400)
PMV BLD AUTO: 10.1 FL (ref 8.9–12.9)
POTASSIUM SERPL-SCNC: 3.8 MMOL/L (ref 3.5–5.1)
PROT SERPL-MCNC: 8.2 G/DL (ref 6.4–8.2)
PROT UR STRIP-MCNC: NEGATIVE MG/DL
RBC # BLD AUTO: 4.53 M/UL (ref 3.8–5.2)
RBC #/AREA URNS HPF: ABNORMAL /HPF (ref 0–5)
SARS-COV-2, COV2: NOT DETECTED
SODIUM SERPL-SCNC: 139 MMOL/L (ref 136–145)
SP GR UR REFRACTOMETRY: 1.01 (ref 1–1.03)
TROPONIN-HIGH SENSITIVITY: 10 NG/L (ref 0–51)
UA: UC IF INDICATED,UAUC: ABNORMAL
UROBILINOGEN UR QL STRIP.AUTO: 0.2 EU/DL (ref 0.2–1)
WBC # BLD AUTO: 14.6 K/UL (ref 3.6–11)
WBC URNS QL MICRO: ABNORMAL /HPF (ref 0–4)

## 2022-01-27 PROCEDURE — 36415 COLL VENOUS BLD VENIPUNCTURE: CPT

## 2022-01-27 PROCEDURE — 83605 ASSAY OF LACTIC ACID: CPT

## 2022-01-27 PROCEDURE — 87040 BLOOD CULTURE FOR BACTERIA: CPT

## 2022-01-27 PROCEDURE — 99285 EMERGENCY DEPT VISIT HI MDM: CPT

## 2022-01-27 PROCEDURE — 81001 URINALYSIS AUTO W/SCOPE: CPT

## 2022-01-27 PROCEDURE — 85025 COMPLETE CBC W/AUTO DIFF WBC: CPT

## 2022-01-27 PROCEDURE — 80053 COMPREHEN METABOLIC PANEL: CPT

## 2022-01-27 PROCEDURE — 84484 ASSAY OF TROPONIN QUANT: CPT

## 2022-01-27 PROCEDURE — 74176 CT ABD & PELVIS W/O CONTRAST: CPT

## 2022-01-27 PROCEDURE — 71045 X-RAY EXAM CHEST 1 VIEW: CPT

## 2022-01-27 PROCEDURE — 74011250637 HC RX REV CODE- 250/637: Performed by: EMERGENCY MEDICINE

## 2022-01-27 PROCEDURE — 87636 SARSCOV2 & INF A&B AMP PRB: CPT

## 2022-01-27 PROCEDURE — 93005 ELECTROCARDIOGRAM TRACING: CPT

## 2022-01-27 RX ORDER — SODIUM CHLORIDE 0.9 % (FLUSH) 0.9 %
5-10 SYRINGE (ML) INJECTION AS NEEDED
Status: DISCONTINUED | OUTPATIENT
Start: 2022-01-27 | End: 2022-01-27 | Stop reason: HOSPADM

## 2022-01-27 RX ORDER — CIPROFLOXACIN 500 MG/1
500 TABLET ORAL 2 TIMES DAILY
Qty: 20 TABLET | Refills: 0 | Status: SHIPPED | OUTPATIENT
Start: 2022-01-27 | End: 2022-02-06

## 2022-01-27 RX ORDER — METRONIDAZOLE 500 MG/1
500 TABLET ORAL
Status: COMPLETED | OUTPATIENT
Start: 2022-01-27 | End: 2022-01-27

## 2022-01-27 RX ORDER — CIPROFLOXACIN 500 MG/1
500 TABLET ORAL
Status: COMPLETED | OUTPATIENT
Start: 2022-01-27 | End: 2022-01-27

## 2022-01-27 RX ORDER — METRONIDAZOLE 500 MG/1
500 TABLET ORAL 2 TIMES DAILY
Qty: 20 TABLET | Refills: 0 | Status: ON HOLD | OUTPATIENT
Start: 2022-01-27 | End: 2022-02-11

## 2022-01-27 RX ADMIN — CIPROFLOXACIN 500 MG: 500 TABLET, FILM COATED ORAL at 15:57

## 2022-01-27 RX ADMIN — METRONIDAZOLE 500 MG: 500 TABLET ORAL at 15:57

## 2022-01-27 NOTE — ED PROVIDER NOTES
EMERGENCY DEPARTMENT HISTORY AND PHYSICAL EXAM      Date: 1/27/2022  Patient Name: Geoffrey Rivers    History of Presenting Illness     Chief Complaint   Patient presents with    Abdominal Pain     abd pain started last night. Upper pain No nausea or vomiting. Last BM yesterday. Also has frequnecy of urination off an on. History Provided By: Patient and EMS    HPI: Geoffrey Rivers, 82 y.o. female with PMHx significant for DM, hypertension, IBS, presents via EMS to the ED with cc of abdominal pain. Patient reports he has had waxing and waning abdominal pain for the past 3 days. Pain is diffuse throughout her abdomen. Pain is worse with movement. Reports some nausea but denies any vomiting. She has had subjective fevers and chills. She took some Rolaids without any improvement prior abdominal surgeries consist of hysterectomy and cholecystectomy. She denies any urinary frequency, dysuria or hematuria. She denies any chest pain or shortness of breath. No cough. Pain is a dull ache with occasional sharp component if she moves. PMHx: Significant for DM, hypertension, IBS  PSHx: Significant for hysterectomy, cholecystectomy  Social Hx:     There are no other complaints, changes, or physical findings at this time. PCP: Zaid Zavala NP    No current facility-administered medications on file prior to encounter. Current Outpatient Medications on File Prior to Encounter   Medication Sig Dispense Refill    metoprolol succinate (TOPROL-XL) 25 mg XL tablet TAKE 1 TABLET BY MOUTH EVERY DAY 90 Tablet 1    lisinopriL (PRINIVIL, ZESTRIL) 20 mg tablet Take 1 Tablet by mouth two (2) times a day. 180 Tablet 3    atorvastatin (LIPITOR) 80 mg tablet Take 1 Tablet by mouth daily.  90 Tablet 0    omeprazole (PRILOSEC) 20 mg capsule TAKE 1 CAPSULE BY MOUTH EVERY DAY 90 Capsule 3    Januvia 100 mg tablet TAKE 1 TABLET BY MOUTH EVERY DAY 90 Tablet 1    levocetirizine (XYZAL) 5 mg tablet TAKE 1 TABLET BY MOUTH DAILY AS NEEDED FOR ALLERGY SYMPTOMS 90 Tablet 2    aspirin delayed-release 81 mg tablet Take 1 Tablet by mouth daily. 90 Tablet 1    fluticasone propionate (FLONASE) 50 mcg/actuation nasal spray 2 Sprays by Both Nostrils route daily. 1 Bottle 0    venlafaxine-SR (EFFEXOR-XR) 150 mg capsule Take 1 Capsule by mouth daily (with breakfast). Indications: major depressive disorder 90 Capsule 3    nitroglycerin (NITROSTAT) 0.4 mg SL tablet 1 Tab by SubLINGual route every five (5) minutes as needed for Chest Pain. Up to 3 doses. 1 Bottle 1    Blood-Glucose Meter monitoring kit Check glucose once daily. DX: E11.9 1 Kit 0    glucose blood VI test strips (ASCENSIA AUTODISC VI, ONE TOUCH ULTRA TEST VI) strip Check glucose once daily. DX: E11.9 100 Strip 3    lancets misc Check glucose once daily. DX: E11.9 100 Each 3    montelukast (SINGULAIR) 10 mg tablet Take 1 Tab by mouth daily. 90 Tab 0    acetaminophen (TylenoL) 325 mg tablet Take  by mouth every four (4) hours as needed for Pain. Past History     Past Medical History:  Past Medical History:   Diagnosis Date    Allergic rhinitis due to pollen     BPV (benign positional vertigo)     Change in bowel habits     Depression     Diabetes ([address]     Dysuria     Hemorrhoids     Hypertension     IBS (irritable bowel syndrome)     Menopause     Other diseases of nasal cavity and sinuses(478.19)     Vertigo        Past Surgical History:  Past Surgical History:   Procedure Laterality Date    COLONOSCOPY N/A 7/15/2020    COLONOSCOPY performed by Toñito Felipe MD at [facility] ENDOSCOPY    HX CHOLECYSTECTOMY  2016    HX COLONOSCOPY  2013?     HX HYSTERECTOMY      UPPER GI ENDOSCOPY,DIAGNOSIS  7/15/2020            Family History:  Family History   Problem Relation Age of Onset    No Known Problems Mother        Social History:  Social History     Tobacco Use    Smoking status: Current Every Day Smoker     Packs/day: 0.25    Smokeless tobacco: Never Used   Vaping Use    Vaping Use: Never used   Substance Use Topics    Alcohol use: No    Drug use: No       Allergies:  No Known Allergies      Review of Systems   Review of Systems   Constitutional: Positive for chills and fever. Negative for activity change. HENT: Negative for congestion and sore throat. Eyes: Negative for pain and redness. Respiratory: Negative for cough, chest tightness and shortness of breath. Cardiovascular: Negative for chest pain and palpitations. Gastrointestinal: Positive for abdominal pain and nausea. Negative for diarrhea and vomiting. Genitourinary: Negative for dysuria, frequency and urgency. Musculoskeletal: Negative for back pain and neck pain. Skin: Negative for rash. Neurological: Negative for syncope, light-headedness and headaches. Psychiatric/Behavioral: Negative for confusion. All other systems reviewed and are negative. Physical Exam   Physical Exam  Vitals and nursing note reviewed. Constitutional:       General: She is not in acute distress. Appearance: She is well-developed. She is not diaphoretic. Comments: Calm and pleasant elderly black female. HENT:      Head: Normocephalic. Nose: Nose normal.      Mouth/Throat:      Pharynx: No oropharyngeal exudate. Eyes:      General: No scleral icterus. Conjunctiva/sclera: Conjunctivae normal.      Pupils: Pupils are equal, round, and reactive to light. Neck:      Thyroid: No thyromegaly. Vascular: No JVD. Trachea: No tracheal deviation. Cardiovascular:      Rate and Rhythm: Normal rate and regular rhythm. Heart sounds: No murmur heard. No friction rub. No gallop. Pulmonary:      Effort: Pulmonary effort is normal. No respiratory distress. Breath sounds: Normal breath sounds. No stridor. No wheezing or rales. Abdominal:      General: Bowel sounds are normal. There is no distension. Palpations: Abdomen is soft. Tenderness:  There is no abdominal tenderness. There is no guarding or rebound. Musculoskeletal:         General: Normal range of motion. Cervical back: Normal range of motion and neck supple. Lymphadenopathy:      Cervical: No cervical adenopathy. Skin:     General: Skin is warm and dry. Findings: No erythema or rash. Neurological:      Mental Status: She is alert and oriented to person, place, and time. Cranial Nerves: No cranial nerve deficit. Motor: No abnormal muscle tone. Coordination: Coordination normal.   Psychiatric:         Behavior: Behavior normal.             Diagnostic Study Results     Labs -     Recent Results (from the past 12 hour(s))   LACTIC ACID    Collection Time: 01/27/22  1:20 PM   Result Value Ref Range    Lactic acid 1.6 0.4 - 2.0 MMOL/L   METABOLIC PANEL, COMPREHENSIVE    Collection Time: 01/27/22  1:20 PM   Result Value Ref Range    Sodium 139 136 - 145 mmol/L    Potassium 3.8 3.5 - 5.1 mmol/L    Chloride 99 97 - 108 mmol/L    CO2 30 21 - 32 mmol/L    Anion gap 10 5 - 15 mmol/L    Glucose 163 (H) 65 - 100 mg/dL    BUN 13 6 - 20 MG/DL    Creatinine 1.11 (H) 0.55 - 1.02 MG/DL    BUN/Creatinine ratio 12 12 - 20      GFR est AA 57 (L) >60 ml/min/1.73m2    GFR est non-AA 47 (L) >60 ml/min/1.73m2    Calcium 8.9 8.5 - 10.1 MG/DL    Bilirubin, total 1.2 (H) 0.2 - 1.0 MG/DL    ALT (SGPT) 46 12 - 78 U/L    AST (SGOT) 31 15 - 37 U/L    Alk.  phosphatase 94 45 - 117 U/L    Protein, total 8.2 6.4 - 8.2 g/dL    Albumin 3.4 (L) 3.5 - 5.0 g/dL    Globulin 4.8 (H) 2.0 - 4.0 g/dL    A-G Ratio 0.7 (L) 1.1 - 2.2     CBC WITH AUTOMATED DIFF    Collection Time: 01/27/22  1:20 PM   Result Value Ref Range    WBC 14.6 (H) 3.6 - 11.0 K/uL    RBC 4.53 3.80 - 5.20 M/uL    HGB 13.7 11.5 - 16.0 g/dL    HCT 42.5 35.0 - 47.0 %    MCV 93.8 80.0 - 99.0 FL    MCH 30.2 26.0 - 34.0 PG    MCHC 32.2 30.0 - 36.5 g/dL    RDW 12.4 11.5 - 14.5 %    PLATELET 983 844 - 114 K/uL    MPV 10.1 8.9 - 12.9 FL    NRBC 0.0 0 PER 100 WBC    ABSOLUTE NRBC 0.00 0.00 - 0.01 K/uL    NEUTROPHILS 78 (H) 32 - 75 %    LYMPHOCYTES 14 12 - 49 %    MONOCYTES 7 5 - 13 %    EOSINOPHILS 0 0 - 7 %    BASOPHILS 0 0 - 1 %    IMMATURE GRANULOCYTES 1 (H) 0.0 - 0.5 %    ABS. NEUTROPHILS 11.3 (H) 1.8 - 8.0 K/UL    ABS. LYMPHOCYTES 2.1 0.8 - 3.5 K/UL    ABS. MONOCYTES 1.0 0.0 - 1.0 K/UL    ABS. EOSINOPHILS 0.1 0.0 - 0.4 K/UL    ABS. BASOPHILS 0.0 0.0 - 0.1 K/UL    ABS. IMM.  GRANS. 0.1 (H) 0.00 - 0.04 K/UL    DF AUTOMATED     TROPONIN-HIGH SENSITIVITY    Collection Time: 01/27/22  1:20 PM   Result Value Ref Range    Troponin-High Sensitivity 10 0 - 51 ng/L   EKG, 12 LEAD, INITIAL    Collection Time: 01/27/22  2:02 PM   Result Value Ref Range    Ventricular Rate 68 BPM    Atrial Rate 68 BPM    P-R Interval 158 ms    QRS Duration 84 ms    Q-T Interval 394 ms    QTC Calculation (Bezet) 418 ms    Calculated P Axis 52 degrees    Calculated R Axis 3 degrees    Calculated T Axis 93 degrees    Diagnosis       Sinus rhythm with occasional premature ventricular complexes  Inferior infarct , age undetermined  Abnormal ECG  When compared with ECG of 04-DEC-2021 01:18,  premature ventricular complexes are now present  Inferior infarct is now present  Inverted T waves have replaced nonspecific T wave abnormality in Anterior   leads  QT has shortened     URINALYSIS W/ REFLEX CULTURE    Collection Time: 01/27/22  2:10 PM    Specimen: Urine   Result Value Ref Range    Color YELLOW/STRAW      Appearance CLEAR CLEAR      Specific gravity 1.008 1.003 - 1.030      pH (UA) 6.0 5.0 - 8.0      Protein Negative NEG mg/dL    Glucose Negative NEG mg/dL    Ketone Negative NEG mg/dL    Bilirubin Negative NEG      Blood TRACE (A) NEG      Urobilinogen 0.2 0.2 - 1.0 EU/dL    Nitrites Negative NEG      Leukocyte Esterase SMALL (A) NEG      WBC 0-4 0 - 4 /hpf    RBC 0-5 0 - 5 /hpf    Epithelial cells FEW FEW /lpf    Bacteria Negative NEG /hpf    UA:UC IF INDICATED CULTURE NOT INDICATED BY UA RESULT CNI     COVID-19 WITH INFLUENZA A/B    Collection Time: 01/27/22  2:16 PM   Result Value Ref Range    SARS-CoV-2 Not detected NOTD      Influenza A by PCR Not detected NOTD      Influenza B by PCR Not detected NOTD         Radiologic Studies -   CT ABD PELV WO CONT   Final Result   1. Evidence of colonic diverticulosis. Evidence of sigmoid diverticulitis. No   evidence of intestinal obstruction or free intraperitoneal air. 2. Presence of small, fat-containing inguinal hernias bilaterally. 3. Presence of a small hiatal hernia. 4. Surgical absence of the gallbladder. 5. Surgical absence of the uterus. 6. Normal appearance of the appendix. 7. Evidence of degenerative change involving the lumbar spine. XR CHEST PORT   Final Result   Clear lungs. CT Results  (Last 48 hours)               01/27/22 1507  CT ABD PELV WO CONT Final result    Impression:  1. Evidence of colonic diverticulosis. Evidence of sigmoid diverticulitis. No   evidence of intestinal obstruction or free intraperitoneal air. 2. Presence of small, fat-containing inguinal hernias bilaterally. 3. Presence of a small hiatal hernia. 4. Surgical absence of the gallbladder. 5. Surgical absence of the uterus. 6. Normal appearance of the appendix. 7. Evidence of degenerative change involving the lumbar spine. Narrative:  EXAM: CT ABD PELV WO CONT       INDICATION: abd pain, fever       COMPARISON: CT examination of the abdomen and pelvis dated 2/1/2021       CONTRAST:  None. TECHNIQUE:    Thin axial images were obtained through the abdomen and pelvis. Coronal and   sagittal reformats were generated. Oral contrast was not administered. CT dose   reduction was achieved through use of a standardized protocol tailored for this   examination and automatic exposure control for dose modulation.         The absence of intravenous contrast material reduces the sensitivity for   evaluation of the vasculature and solid organs. FINDINGS:    LOWER THORAX: No significant abnormality in the incidentally imaged lower chest.   LIVER: No mass. BILIARY TREE: Surgical absence of the gallbladder. . CBD is not dilated. SPLEEN: within normal limits. PANCREAS: No focal abnormality. ADRENALS: Unremarkable. KIDNEYS/URETERS: No calculus or hydronephrosis. STOMACH: Presence of a small hiatal hernia (see axial image 10). SMALL BOWEL: No dilatation or wall thickening. COLON: Evidence of colonic diverticulosis. There is evidence of inflammatory   change involving the sigmoid colon. This is indicative of diverticulitis (see   axial image 53, coronal image 28 and sagittal image 41. APPENDIX: Normal in appearance (see axial image 46   PERITONEUM: No ascites or pneumoperitoneum. RETROPERITONEUM: No lymphadenopathy or aortic aneurysm. REPRODUCTIVE ORGANS: Previous hysterectomy. URINARY BLADDER: No mass or calculus. BONES: No destructive bone lesion. Evidence of moderate degenerative change   involving the lumbar spine. Very prominent posterior osteophyte formation is   noted at the L4-5 level. There is impression upon anterior aspect of the thecal   sac at this level (see sagittal image 36). A longitudinally oriented band of   calcification is situated posterior to the T12-L1 level (see sagittal image 38). ABDOMINAL WALL: As seen on axial image 17, small, fat-containing inguinal   hernias are noted bilaterally. There has been no change in the appearance of   these small hernias since the previous examination. ADDITIONAL COMMENTS: N/A               CXR Results  (Last 48 hours)               01/27/22 1314  XR CHEST PORT Final result    Impression:  Clear lungs. Narrative:  PORTABLE CHEST RADIOGRAPH/S: 1/27/2022 1:14 PM       INDICATION: Pneumonia. COMPARISON: 2/1/2021, 8/17/2020. TECHNIQUE: Portable frontal upright radiograph/s of the chest.       FINDINGS:    The lungs are clear.  The central airways are patent. No pneumothorax or pleural   effusion. Medical Decision Making   I am the first provider for this patient. I reviewed the vital signs, available nursing notes, past medical history, past surgical history, family history and social history. Vital Signs-Reviewed the patient's vital signs. Patient Vitals for the past 12 hrs:   Temp Pulse Resp BP SpO2   01/27/22 1612 98.2 °F (36.8 °C) 77 20 (!) 149/68 94 %   01/27/22 1601 -- 88 18 (!) 148/82 --   01/27/22 1424 98.3 °F (36.8 °C) 74 16 (!) 144/89 99 %   01/27/22 1249 (!) 100.9 °F (38.3 °C) 99 16 132/76 99 %     ED EKG interpretation: 2:02 PM  Rhythm: normal sinus rhythm; and regular . Rate (approx.): 68; Axis: normal; CA Interval: normal; QRS interval: normal ; ST/T wave: non-specific changes; This EKG was interpreted by Sebastián Hagan MD,ED Provider. Records Reviewed: Nursing notes reviewed    Provider Notes (Medical Decision Making):   DDX: Diverticulitis, UTI    ED Course:   Initial assessment performed. The patients presenting problems have been discussed, and they are in agreement with the care plan formulated and outlined with them. I have encouraged them to ask questions as they arise throughout their visit. PROGRESS NOTE    Pt reevaluated. CT abdomen pelvis with sigmoid diverticulitis without perforation or abscess. WBC elevated at 14.6. Patient well-appearing and afebrile. Abdomen soft. Lear chest x-ray. Gave first dose of p.o. Cipro and Flagyl here. Will discharge with Cipro and Flagyl x10 days. Patient tolerating p.o. without difficulty. Written by Sebastián Hagan MD     Progress note:    Pt noted to be feeling better and ready for discharge. Updated pt and/or family on all final lab and/or  imaging findings. Will follow up as instructed. All questions have been answered, pt voiced understanding and agreement with plan.        Abx were prescribed, pt advised that diarrhea and rash are possible side effects of the medications. Specific return precautions provided as well as instructions to return to the ED should sx worsen at any time. Vital signs stable for discharge. I have also put together some discharge instructions for them that include: 1) educational information regarding their diagnosis, 2) how to care for their diagnosis at home, as well a 3) list of reasons why they would want to return to the ED prior to their follow-up appointment, should their condition change. Written by Sebastián Hagan MD        Critical Care Time:   0    Disposition:  Discharge    PLAN:  1. Discharge Medication List as of 1/27/2022  3:54 PM      START taking these medications    Details   ciprofloxacin HCl (Cipro) 500 mg tablet Take 1 Tablet by mouth two (2) times a day for 10 days. , Normal, Disp-20 Tablet, R-0      metroNIDAZOLE (FlagyL) 500 mg tablet Take 1 Tablet by mouth two (2) times a day., Normal, Disp-20 Tablet, R-0         CONTINUE these medications which have NOT CHANGED    Details   metoprolol succinate (TOPROL-XL) 25 mg XL tablet TAKE 1 TABLET BY MOUTH EVERY DAY, Normal, Disp-90 Tablet, R-1      lisinopriL (PRINIVIL, ZESTRIL) 20 mg tablet Take 1 Tablet by mouth two (2) times a day., Normal, Disp-180 Tablet, R-3      atorvastatin (LIPITOR) 80 mg tablet Take 1 Tablet by mouth daily. , Normal, Disp-90 Tablet, R-0      omeprazole (PRILOSEC) 20 mg capsule TAKE 1 CAPSULE BY MOUTH EVERY DAY, Normal, Disp-90 Capsule, R-3      Januvia 100 mg tablet TAKE 1 TABLET BY MOUTH EVERY DAY, Normal, Disp-90 Tablet, R-1      levocetirizine (XYZAL) 5 mg tablet TAKE 1 TABLET BY MOUTH DAILY AS NEEDED FOR ALLERGY SYMPTOMS, Normal, Disp-90 Tablet, R-2      aspirin delayed-release 81 mg tablet Take 1 Tablet by mouth daily. , No Print, Disp-90 Tablet, R-1      fluticasone propionate (FLONASE) 50 mcg/actuation nasal spray 2 Sprays by Both Nostrils route daily. , Normal, Disp-1 Bottle, R-0      venlafaxine-SR (EFFEXOR-XR) 150 mg capsule Take 1 Capsule by mouth daily (with breakfast). Indications: major depressive disorder, Normal, Disp-90 Capsule, R-3      nitroglycerin (NITROSTAT) 0.4 mg SL tablet 1 Tab by SubLINGual route every five (5) minutes as needed for Chest Pain. Up to 3 doses. , Normal, Disp-1 Bottle, R-1      Blood-Glucose Meter monitoring kit Check glucose once daily. DX: E11.9, Normal, Disp-1 Kit, R-0      glucose blood VI test strips (ASCENSIA AUTODISC VI, ONE TOUCH ULTRA TEST VI) strip Check glucose once daily. DX: E11.9, Normal, Disp-100 Strip, R-3      lancets misc Check glucose once daily. DX: E11.9, Normal, Disp-100 Each, R-3      montelukast (SINGULAIR) 10 mg tablet Take 1 Tab by mouth daily. , Normal, Disp-90 Tab,R-0      acetaminophen (TylenoL) 325 mg tablet Take  by mouth every four (4) hours as needed for Pain., Historical Med           2. Follow-up Information     Follow up With Specialties Details Why Contact Info    Obdulio Delgado NP Nurse Practitioner Schedule an appointment as soon as possible for a visit in 1 week  [address] [address] [address] [place] [address].      [address] [place] [address] Emergency Medicine Go in 1 day If symptoms worsen [facility]  [phone]        Return to ED if worse     Diagnosis     Clinical Impression:   1. Acute diverticulitis of intestine    2. Abdominal pain, generalized              Please note that this dictation was completed with [employer], the computer voice recognition software. Quite often unanticipated grammatical, syntax, homophones, and other interpretive errors are inadvertently transcribed by the computer software. Please disregard these errors. Please excuse any errors that have escaped final proofreading.

## 2022-01-27 NOTE — ED NOTES
D/C instructions provided and reviewed with patient. Opportunity provided for discussion. All questions answered. Nothing further at this time.  [facility]

## 2022-02-01 LAB
ATRIAL RATE: 68 BPM
CALCULATED P AXIS, ECG09: 52 DEGREES
CALCULATED R AXIS, ECG10: 3 DEGREES
CALCULATED T AXIS, ECG11: 93 DEGREES
DIAGNOSIS, 93000: NORMAL
P-R INTERVAL, ECG05: 158 MS
Q-T INTERVAL, ECG07: 394 MS
QRS DURATION, ECG06: 84 MS
QTC CALCULATION (BEZET), ECG08: 418 MS
VENTRICULAR RATE, ECG03: 68 BPM

## 2022-02-02 LAB
BACTERIA SPEC CULT: NORMAL
BACTERIA SPEC CULT: NORMAL
SERVICE CMNT-IMP: NORMAL
SERVICE CMNT-IMP: NORMAL

## 2022-02-07 ENCOUNTER — VIRTUAL VISIT (OUTPATIENT)
Dept: FAMILY MEDICINE CLINIC | Age: 87
End: 2022-02-07
Payer: MEDICARE

## 2022-02-07 ENCOUNTER — TELEPHONE (OUTPATIENT)
Dept: FAMILY MEDICINE CLINIC | Age: 87
End: 2022-02-07

## 2022-02-07 DIAGNOSIS — K57.92 DIVERTICULITIS: ICD-10-CM

## 2022-02-07 DIAGNOSIS — J40 BRONCHITIS: Primary | ICD-10-CM

## 2022-02-07 PROCEDURE — G2025 DIS SITE TELE SVCS RHC/FQHC: HCPCS | Performed by: NURSE PRACTITIONER

## 2022-02-07 RX ORDER — PREDNISONE 10 MG/1
TABLET ORAL
Qty: 21 TABLET | Refills: 0 | Status: ON HOLD | OUTPATIENT
Start: 2022-02-07 | End: 2022-02-11

## 2022-02-07 NOTE — PROGRESS NOTES
Patricia Solis is a 82 y.o. female evaluated via telephone on 2/7/2022. Consent:  She and/or health care decision maker is aware that that she may receive a bill for this telephone service, depending on her insurance coverage, and has provided verbal consent to proceed: Yes    CC: cough    HPI  Ms. Thomas Gauthier is a 83yo female who presents today via telephone call with c/o an intermittent productive cough. She is coughing up green mucus. She denies CP or SOB. Denies fever or chills. She used to wheeze back when she was smoking but she quit 3 months ago. Denies stuffy nose or headaches but she did notice some swelling of the earlobe over the past few days. This is getting better. She has been tested for Covid already, it was neg. She does have hx of allergies and takes Xyzol and Singulair daily with Flonase. Of note she was seen in the ER 1-27-22 and dx'd with diverticulitis. Tx'd with Cipro and Flagyl. Abdominal pain is much better now. PLAN    Cough  Suspect bronchitis  Start prednisone 10mg- take 6 pills today then take 1 less pill every day until gone (#21, 0R)  Increase fluids to thin mucus  Cont Singulair and other allergy meds  Cont to avoid smoking. Advised to call us at the end of the week if symptoms worsen or do not improve. Diverticulitis  Resolving  Advised to finish antibx    Documentation:  I communicated with the patient and/or health care decision maker about the plan of care as noted above. I affirm this is a Patient Initiated Episode with an Established Patient who has not had a related appointment within my department in the past 7 days or scheduled within the next 24 hours.     Total Time: minutes: 11-20 minutes    Note: not billable if this call serves to triage the patient into an appointment for the relevant concern      Cristopher Jordan NP

## 2022-02-07 NOTE — PROGRESS NOTES
1. Have you been to the ER, urgent care clinic since your last visit? Hospitalized since your last visit? Yes When: ER 1-27-22    2. Have you seen or consulted any other health care providers outside of the [address] [place] [address] since your last visit? Include any pap smears or colon screening.  Yes When: [facility] 2-2-22

## 2022-02-07 NOTE — TELEPHONE ENCOUNTER
Per pt request, please call her daughter [facility] and have her schedule a routine check up in 1 month.

## 2022-02-10 ENCOUNTER — HOSPITAL ENCOUNTER (OUTPATIENT)
Age: 87
Setting detail: OBSERVATION
Discharge: HOME HEALTH | End: 2022-02-12
Attending: EMERGENCY MEDICINE | Admitting: STUDENT IN AN ORGANIZED HEALTH CARE EDUCATION/TRAINING PROGRAM
Payer: MEDICARE

## 2022-02-10 ENCOUNTER — APPOINTMENT (OUTPATIENT)
Dept: CT IMAGING | Age: 87
End: 2022-02-10
Attending: EMERGENCY MEDICINE
Payer: MEDICARE

## 2022-02-10 ENCOUNTER — APPOINTMENT (OUTPATIENT)
Dept: GENERAL RADIOLOGY | Age: 87
End: 2022-02-10
Attending: EMERGENCY MEDICINE
Payer: MEDICARE

## 2022-02-10 DIAGNOSIS — G45.9 TIA (TRANSIENT ISCHEMIC ATTACK): Primary | ICD-10-CM

## 2022-02-10 LAB
ALBUMIN SERPL-MCNC: 3.2 G/DL (ref 3.5–5)
ALBUMIN/GLOB SERPL: 0.8 {RATIO} (ref 1.1–2.2)
ALP SERPL-CCNC: 84 U/L (ref 45–117)
ALT SERPL-CCNC: 27 U/L (ref 12–78)
ANION GAP SERPL CALC-SCNC: 8 MMOL/L (ref 5–15)
AST SERPL-CCNC: 36 U/L (ref 15–37)
BASOPHILS # BLD: 0.1 K/UL (ref 0–0.1)
BASOPHILS NFR BLD: 1 % (ref 0–1)
BILIRUB SERPL-MCNC: 0.6 MG/DL (ref 0.2–1)
BUN SERPL-MCNC: 14 MG/DL (ref 6–20)
BUN/CREAT SERPL: 15 (ref 12–20)
CALCIUM SERPL-MCNC: 8.6 MG/DL (ref 8.5–10.1)
CHLORIDE SERPL-SCNC: 104 MMOL/L (ref 97–108)
CO2 SERPL-SCNC: 30 MMOL/L (ref 21–32)
CREAT SERPL-MCNC: 0.96 MG/DL (ref 0.55–1.02)
DIFFERENTIAL METHOD BLD: ABNORMAL
EOSINOPHIL # BLD: 0.2 K/UL (ref 0–0.4)
EOSINOPHIL NFR BLD: 3 % (ref 0–7)
ERYTHROCYTE [DISTWIDTH] IN BLOOD BY AUTOMATED COUNT: 12.4 % (ref 11.5–14.5)
GLOBULIN SER CALC-MCNC: 4.2 G/DL (ref 2–4)
GLUCOSE BLD STRIP.AUTO-MCNC: 121 MG/DL (ref 65–117)
GLUCOSE SERPL-MCNC: 124 MG/DL (ref 65–100)
HCT VFR BLD AUTO: 42.3 % (ref 35–47)
HGB BLD-MCNC: 13.9 G/DL (ref 11.5–16)
IMM GRANULOCYTES # BLD AUTO: 0.1 K/UL (ref 0–0.04)
IMM GRANULOCYTES NFR BLD AUTO: 1 % (ref 0–0.5)
INR PPP: 1 (ref 0.9–1.1)
LYMPHOCYTES # BLD: 2.5 K/UL (ref 0.8–3.5)
LYMPHOCYTES NFR BLD: 29 % (ref 12–49)
MCH RBC QN AUTO: 30.3 PG (ref 26–34)
MCHC RBC AUTO-ENTMCNC: 32.9 G/DL (ref 30–36.5)
MCV RBC AUTO: 92.2 FL (ref 80–99)
MONOCYTES # BLD: 0.6 K/UL (ref 0–1)
MONOCYTES NFR BLD: 7 % (ref 5–13)
NEUTS SEG # BLD: 5.3 K/UL (ref 1.8–8)
NEUTS SEG NFR BLD: 59 % (ref 32–75)
NRBC # BLD: 0 K/UL (ref 0–0.01)
NRBC BLD-RTO: 0 PER 100 WBC
PLATELET # BLD AUTO: 365 K/UL (ref 150–400)
PMV BLD AUTO: 9.5 FL (ref 8.9–12.9)
POTASSIUM SERPL-SCNC: 5.1 MMOL/L (ref 3.5–5.1)
PROT SERPL-MCNC: 7.4 G/DL (ref 6.4–8.2)
PROTHROMBIN TIME: 10.1 SEC (ref 9–11.1)
RBC # BLD AUTO: 4.59 M/UL (ref 3.8–5.2)
SERVICE CMNT-IMP: ABNORMAL
SODIUM SERPL-SCNC: 142 MMOL/L (ref 136–145)
TROPONIN-HIGH SENSITIVITY: 9 NG/L (ref 0–51)
WBC # BLD AUTO: 8.8 K/UL (ref 3.6–11)

## 2022-02-10 PROCEDURE — 85610 PROTHROMBIN TIME: CPT

## 2022-02-10 PROCEDURE — 36415 COLL VENOUS BLD VENIPUNCTURE: CPT

## 2022-02-10 PROCEDURE — 85025 COMPLETE CBC W/AUTO DIFF WBC: CPT

## 2022-02-10 PROCEDURE — 70450 CT HEAD/BRAIN W/O DYE: CPT

## 2022-02-10 PROCEDURE — 70496 CT ANGIOGRAPHY HEAD: CPT

## 2022-02-10 PROCEDURE — 80053 COMPREHEN METABOLIC PANEL: CPT

## 2022-02-10 PROCEDURE — 84484 ASSAY OF TROPONIN QUANT: CPT

## 2022-02-10 PROCEDURE — 99284 EMERGENCY DEPT VISIT MOD MDM: CPT

## 2022-02-10 PROCEDURE — 82962 GLUCOSE BLOOD TEST: CPT

## 2022-02-10 PROCEDURE — 71045 X-RAY EXAM CHEST 1 VIEW: CPT

## 2022-02-10 PROCEDURE — 93005 ELECTROCARDIOGRAM TRACING: CPT

## 2022-02-10 PROCEDURE — 94762 N-INVAS EAR/PLS OXIMTRY CONT: CPT

## 2022-02-10 PROCEDURE — 74011000636 HC RX REV CODE- 636: Performed by: EMERGENCY MEDICINE

## 2022-02-11 ENCOUNTER — APPOINTMENT (OUTPATIENT)
Dept: MRI IMAGING | Age: 87
End: 2022-02-11
Attending: INTERNAL MEDICINE
Payer: MEDICARE

## 2022-02-11 ENCOUNTER — APPOINTMENT (OUTPATIENT)
Dept: ULTRASOUND IMAGING | Age: 87
End: 2022-02-11
Attending: EMERGENCY MEDICINE
Payer: MEDICARE

## 2022-02-11 PROBLEM — J30.9 ALLERGIC RHINITIS: Chronic | Status: ACTIVE | Noted: 2022-02-11

## 2022-02-11 LAB
APPEARANCE UR: CLEAR
ATRIAL RATE: 71 BPM
BACTERIA URNS QL MICRO: ABNORMAL /HPF
BILIRUB UR QL: NEGATIVE
CALCULATED P AXIS, ECG09: 72 DEGREES
CALCULATED R AXIS, ECG10: 32 DEGREES
CALCULATED T AXIS, ECG11: 90 DEGREES
COLOR UR: ABNORMAL
DIAGNOSIS, 93000: NORMAL
ECHO AO ROOT DIAM: 3 CM
ECHO AO ROOT INDEX: 1.63 CM/M2
ECHO AV PEAK GRADIENT: 5 MMHG
ECHO AV PEAK VELOCITY: 1.1 M/S
ECHO EST RA PRESSURE: 10 MMHG
ECHO LA DIAMETER INDEX: 1.68 CM/M2
ECHO LA DIAMETER: 3.1 CM
ECHO LA TO AORTIC ROOT RATIO: 1.03
ECHO LV E' SEPTAL VELOCITY: 6 CM/S
ECHO LV FRACTIONAL SHORTENING: 34 % (ref 28–44)
ECHO LV INTERNAL DIMENSION DIASTOLE INDEX: 1.9 CM/M2
ECHO LV INTERNAL DIMENSION DIASTOLIC: 3.5 CM (ref 3.9–5.3)
ECHO LV INTERNAL DIMENSION SYSTOLIC INDEX: 1.25 CM/M2
ECHO LV INTERNAL DIMENSION SYSTOLIC: 2.3 CM
ECHO LV IVSD: 1.1 CM (ref 0.6–0.9)
ECHO LV MASS 2D: 119 G (ref 67–162)
ECHO LV MASS INDEX 2D: 64.7 G/M2 (ref 43–95)
ECHO LV POSTERIOR WALL DIASTOLIC: 1.1 CM (ref 0.6–0.9)
ECHO LV RELATIVE WALL THICKNESS RATIO: 0.63
ECHO LVOT AREA: 3.1 CM2
ECHO LVOT DIAM: 2 CM
ECHO MV A VELOCITY: 1 M/S
ECHO MV E DECELERATION TIME (DT): 294.7 MS
ECHO MV E VELOCITY: 0.62 M/S
ECHO MV E/A RATIO: 0.62
ECHO MV E/E' SEPTAL: 10.33
ECHO RIGHT VENTRICULAR SYSTOLIC PRESSURE (RVSP): 31 MMHG
ECHO TV REGURGITANT MAX VELOCITY: 2.27 M/S
ECHO TV REGURGITANT PEAK GRADIENT: 21 MMHG
EPITH CASTS URNS QL MICRO: ABNORMAL /LPF
FLUAV RNA SPEC QL NAA+PROBE: NOT DETECTED
FLUBV RNA SPEC QL NAA+PROBE: NOT DETECTED
GLUCOSE BLD STRIP.AUTO-MCNC: 120 MG/DL (ref 65–117)
GLUCOSE BLD STRIP.AUTO-MCNC: 157 MG/DL (ref 65–117)
GLUCOSE BLD STRIP.AUTO-MCNC: 212 MG/DL (ref 65–117)
GLUCOSE BLD STRIP.AUTO-MCNC: 89 MG/DL (ref 65–117)
GLUCOSE UR STRIP.AUTO-MCNC: NEGATIVE MG/DL
HGB UR QL STRIP: NEGATIVE
KETONES UR QL STRIP.AUTO: NEGATIVE MG/DL
LEUKOCYTE ESTERASE UR QL STRIP.AUTO: NEGATIVE
NITRITE UR QL STRIP.AUTO: NEGATIVE
P-R INTERVAL, ECG05: 158 MS
PH UR STRIP: 6.5 [PH] (ref 5–8)
PROT UR STRIP-MCNC: NEGATIVE MG/DL
Q-T INTERVAL, ECG07: 404 MS
QRS DURATION, ECG06: 80 MS
QTC CALCULATION (BEZET), ECG08: 439 MS
RBC #/AREA URNS HPF: ABNORMAL /HPF (ref 0–5)
SARS-COV-2, COV2: NOT DETECTED
SERVICE CMNT-IMP: ABNORMAL
SERVICE CMNT-IMP: NORMAL
SP GR UR REFRACTOMETRY: <1.005 (ref 1–1.03)
UA: UC IF INDICATED,UAUC: ABNORMAL
UROBILINOGEN UR QL STRIP.AUTO: 0.2 EU/DL (ref 0.2–1)
VENTRICULAR RATE, ECG03: 71 BPM
WBC URNS QL MICRO: ABNORMAL /HPF (ref 0–4)

## 2022-02-11 PROCEDURE — 93306 TTE W/DOPPLER COMPLETE: CPT

## 2022-02-11 PROCEDURE — 92610 EVALUATE SWALLOWING FUNCTION: CPT

## 2022-02-11 PROCEDURE — 74011000250 HC RX REV CODE- 250: Performed by: INTERNAL MEDICINE

## 2022-02-11 PROCEDURE — 97110 THERAPEUTIC EXERCISES: CPT

## 2022-02-11 PROCEDURE — G0378 HOSPITAL OBSERVATION PER HR: HCPCS

## 2022-02-11 PROCEDURE — 99220 PR INITIAL OBSERVATION CARE/DAY 70 MINUTES: CPT | Performed by: PSYCHIATRY & NEUROLOGY

## 2022-02-11 PROCEDURE — 87636 SARSCOV2 & INF A&B AMP PRB: CPT

## 2022-02-11 PROCEDURE — 94762 N-INVAS EAR/PLS OXIMTRY CONT: CPT

## 2022-02-11 PROCEDURE — 96372 THER/PROPH/DIAG INJ SC/IM: CPT

## 2022-02-11 PROCEDURE — 82962 GLUCOSE BLOOD TEST: CPT

## 2022-02-11 PROCEDURE — 93306 TTE W/DOPPLER COMPLETE: CPT | Performed by: INTERNAL MEDICINE

## 2022-02-11 PROCEDURE — 81001 URINALYSIS AUTO W/SCOPE: CPT

## 2022-02-11 PROCEDURE — 74011250637 HC RX REV CODE- 250/637: Performed by: INTERNAL MEDICINE

## 2022-02-11 PROCEDURE — 74011000636 HC RX REV CODE- 636: Performed by: EMERGENCY MEDICINE

## 2022-02-11 PROCEDURE — 74011250637 HC RX REV CODE- 250/637: Performed by: EMERGENCY MEDICINE

## 2022-02-11 PROCEDURE — 94760 N-INVAS EAR/PLS OXIMETRY 1: CPT

## 2022-02-11 PROCEDURE — 97165 OT EVAL LOW COMPLEX 30 MIN: CPT

## 2022-02-11 PROCEDURE — 74011250636 HC RX REV CODE- 250/636: Performed by: INTERNAL MEDICINE

## 2022-02-11 PROCEDURE — 97161 PT EVAL LOW COMPLEX 20 MIN: CPT

## 2022-02-11 PROCEDURE — 70551 MRI BRAIN STEM W/O DYE: CPT

## 2022-02-11 RX ORDER — LISINOPRIL 20 MG/1
20 TABLET ORAL 2 TIMES DAILY
Status: DISCONTINUED | OUTPATIENT
Start: 2022-02-11 | End: 2022-02-12 | Stop reason: HOSPADM

## 2022-02-11 RX ORDER — METOPROLOL SUCCINATE 25 MG/1
25 TABLET, EXTENDED RELEASE ORAL DAILY
Status: DISCONTINUED | OUTPATIENT
Start: 2022-02-11 | End: 2022-02-12

## 2022-02-11 RX ORDER — NITROGLYCERIN 0.4 MG/1
0.4 TABLET SUBLINGUAL
Status: DISCONTINUED | OUTPATIENT
Start: 2022-02-11 | End: 2022-02-12 | Stop reason: HOSPADM

## 2022-02-11 RX ORDER — GUAIFENESIN 100 MG/5ML
162 LIQUID (ML) ORAL
Status: COMPLETED | OUTPATIENT
Start: 2022-02-11 | End: 2022-02-11

## 2022-02-11 RX ORDER — ENOXAPARIN SODIUM 100 MG/ML
40 INJECTION SUBCUTANEOUS DAILY
Status: DISCONTINUED | OUTPATIENT
Start: 2022-02-11 | End: 2022-02-12 | Stop reason: HOSPADM

## 2022-02-11 RX ORDER — ATORVASTATIN CALCIUM 40 MG/1
80 TABLET, FILM COATED ORAL DAILY
Status: DISCONTINUED | OUTPATIENT
Start: 2022-02-11 | End: 2022-02-12 | Stop reason: HOSPADM

## 2022-02-11 RX ORDER — GUAIFENESIN 100 MG/5ML
81 LIQUID (ML) ORAL DAILY
Status: DISCONTINUED | OUTPATIENT
Start: 2022-02-11 | End: 2022-02-11

## 2022-02-11 RX ORDER — LEVOCETIRIZINE DIHYDROCHLORIDE 5 MG/1
5 TABLET, FILM COATED ORAL DAILY
Status: DISCONTINUED | OUTPATIENT
Start: 2022-02-11 | End: 2022-02-11

## 2022-02-11 RX ORDER — ACETAMINOPHEN 325 MG/1
650 TABLET ORAL
Status: DISCONTINUED | OUTPATIENT
Start: 2022-02-11 | End: 2022-02-12 | Stop reason: HOSPADM

## 2022-02-11 RX ORDER — POLYETHYLENE GLYCOL 3350 17 G/17G
17 POWDER, FOR SOLUTION ORAL DAILY PRN
Status: DISCONTINUED | OUTPATIENT
Start: 2022-02-11 | End: 2022-02-12 | Stop reason: HOSPADM

## 2022-02-11 RX ORDER — ALOGLIPTIN 6.25 MG/1
12.5 TABLET, FILM COATED ORAL DAILY
Status: DISCONTINUED | OUTPATIENT
Start: 2022-02-11 | End: 2022-02-12 | Stop reason: HOSPADM

## 2022-02-11 RX ORDER — SODIUM CHLORIDE 0.9 % (FLUSH) 0.9 %
5-40 SYRINGE (ML) INJECTION EVERY 8 HOURS
Status: DISCONTINUED | OUTPATIENT
Start: 2022-02-11 | End: 2022-02-12 | Stop reason: HOSPADM

## 2022-02-11 RX ORDER — VENLAFAXINE HYDROCHLORIDE 150 MG/1
150 CAPSULE, EXTENDED RELEASE ORAL
Status: DISCONTINUED | OUTPATIENT
Start: 2022-02-11 | End: 2022-02-12 | Stop reason: HOSPADM

## 2022-02-11 RX ORDER — ONDANSETRON 2 MG/ML
4 INJECTION INTRAMUSCULAR; INTRAVENOUS
Status: DISCONTINUED | OUTPATIENT
Start: 2022-02-11 | End: 2022-02-11

## 2022-02-11 RX ORDER — VENLAFAXINE HYDROCHLORIDE 150 MG/1
150 CAPSULE, EXTENDED RELEASE ORAL DAILY
COMMUNITY
End: 2022-05-27 | Stop reason: SDUPTHER

## 2022-02-11 RX ORDER — SODIUM CHLORIDE 0.9 % (FLUSH) 0.9 %
5-40 SYRINGE (ML) INJECTION AS NEEDED
Status: DISCONTINUED | OUTPATIENT
Start: 2022-02-11 | End: 2022-02-12 | Stop reason: HOSPADM

## 2022-02-11 RX ORDER — ASPIRIN 81 MG/1
81 TABLET ORAL 2 TIMES DAILY
Status: DISCONTINUED | OUTPATIENT
Start: 2022-02-11 | End: 2022-02-12 | Stop reason: HOSPADM

## 2022-02-11 RX ORDER — CETIRIZINE HCL 10 MG
10 TABLET ORAL DAILY
Status: DISCONTINUED | OUTPATIENT
Start: 2022-02-11 | End: 2022-02-12 | Stop reason: HOSPADM

## 2022-02-11 RX ADMIN — SODIUM CHLORIDE, PRESERVATIVE FREE 10 ML: 5 INJECTION INTRAVENOUS at 22:00

## 2022-02-11 RX ADMIN — ASPIRIN 81 MG CHEWABLE TABLET 162 MG: 81 TABLET CHEWABLE at 00:33

## 2022-02-11 RX ADMIN — VENLAFAXINE HYDROCHLORIDE 150 MG: 150 CAPSULE, EXTENDED RELEASE ORAL at 09:55

## 2022-02-11 RX ADMIN — ASPIRIN 81 MG: 81 TABLET, COATED ORAL at 17:37

## 2022-02-11 RX ADMIN — SODIUM CHLORIDE, PRESERVATIVE FREE 5 ML: 5 INJECTION INTRAVENOUS at 17:40

## 2022-02-11 RX ADMIN — IOPAMIDOL 100 ML: 755 INJECTION, SOLUTION INTRAVENOUS at 00:02

## 2022-02-11 RX ADMIN — ASPIRIN 81 MG: 81 TABLET, COATED ORAL at 09:54

## 2022-02-11 RX ADMIN — LISINOPRIL 20 MG: 20 TABLET ORAL at 09:53

## 2022-02-11 RX ADMIN — ATORVASTATIN CALCIUM 80 MG: 40 TABLET, FILM COATED ORAL at 09:53

## 2022-02-11 RX ADMIN — LISINOPRIL 20 MG: 20 TABLET ORAL at 17:37

## 2022-02-11 RX ADMIN — ENOXAPARIN SODIUM 40 MG: 100 INJECTION SUBCUTANEOUS at 09:53

## 2022-02-11 RX ADMIN — CETIRIZINE HYDROCHLORIDE 10 MG: 10 TABLET, FILM COATED ORAL at 09:53

## 2022-02-11 RX ADMIN — ALOGLIPTIN 12.5 MG: 6.25 TABLET, FILM COATED ORAL at 09:53

## 2022-02-11 RX ADMIN — SODIUM CHLORIDE, PRESERVATIVE FREE 5 ML: 5 INJECTION INTRAVENOUS at 08:00

## 2022-02-11 RX ADMIN — METOPROLOL SUCCINATE 25 MG: 25 TABLET, EXTENDED RELEASE ORAL at 09:54

## 2022-02-11 NOTE — PROGRESS NOTES
Neuro assessment preformed early at 0800 by this RN, in anticipation of patient being  RENATO for scheduled MRI. See flowsheet for full assessment.

## 2022-02-11 NOTE — PROGRESS NOTES
PHYSICAL THERAPY EVALUATION/DISCHARGE  Patient: Whitley Najera (49 y.o. female)  Date: 2/11/2022  Primary Diagnosis: TIA (transient ischemic attack) [G45.9]       Precautions:  DNR,Fall       ASSESSMENT  Based on the objective data described below, the patient presents with B LE AROM and strength that is currently at or near her baseline status. Pt was able to demonstrate bed mobility, multiple functional transfers and ambulation both on level surfaces and up/down steps w/o assistance and no AD was used. Subsequently, today's session will be an evaluation only and she does not currently warrant additional f/u from a PT standpoint and would be functionally appropriate for a d/c back home w/ family support. Functional Outcome Measure: The patient scored 100/100 on the Barthel Index outcome measure which is indicative of no self care impairment. Other factors to consider for discharge: anxiety regarding being home alone     Further skilled acute physical therapy is not indicated at this time. PLAN :  Recommendation for discharge: (in order for the patient to meet his/her long term goals)  No skilled physical therapy/ follow up rehabilitation needs identified at this time. This discharge recommendation:  Has not yet been discussed the attending provider and/or case management    IF patient discharges home will need the following DME: Pt noted that she uses a borrowed spc PRN but not regularly       SUBJECTIVE:   Patient stated I'm feeling better, I just get anxious a lot.     OBJECTIVE DATA SUMMARY:   HISTORY:    Past Medical History:   Diagnosis Date    Allergic rhinitis due to pollen     BPV (benign positional vertigo)     Change in bowel habits     Depression     Diabetes (HCC)     Dysuria     Hemorrhoids     Hypertension     IBS (irritable bowel syndrome)     Menopause     Other diseases of nasal cavity and sinuses(478.19)     Vertigo      Past Surgical History:   Procedure Laterality Date    COLONOSCOPY N/A 7/15/2020    COLONOSCOPY [facility] Gino [facility]z [facility]OSCOPY    HX CHOLECYSTECTOMY  2016    HX COLONOSCOPY  2013?  HX HYSTERECTOMY      UPPER GI ENDOSCOPY,DIAGNOSIS  7/15/2020            Prior level of function: At baseline, pt is completely independent but noted that she occasionally uses her friend's borrowed spc  Personal factors and/or comorbidities impacting plan of care: anxiety, R hip arthritis    Home Situation  Home Environment: Private residence  # Steps to Enter:  (4)  Rails to Enter: Yes  One/Two Story Residence: One story  Living Alone: Yes  Support Systems: Child(roque)  Patient Expects to be Discharged to[de-identified] Home  Current DME Used/Available at Home: Glucometer,Cane, straight  Tub or Shower Type: Tub/Shower combination    EXAMINATION/PRESENTATION/DECISION MAKING:   Critical Behavior:  Neurologic State: Alert  Orientation Level: Appropriate for age,Oriented to person,Oriented to place,Oriented to situation,Oriented to time  Cognition: Appropriate decision making,Follows commands  Safety/Judgement: Awareness of environment  Hearing: Auditory  Auditory Impairment: Hard of hearing, bilateral     Range Of Motion:  AROM: Within functional limits  RLE Assessment (WDL): Within defined limits   LLE Assessment (WDL): Within defined limits     Strength:    Strength:  Within functional limits     RLE Assessment (WDL): Within defined limits  LLE Assessment (WDL): Within defined limits     Tone & Sensation:   Grossly intact to light touch     RLE Assessment (WDL): Within defined limits     LLE Assessment (WDL): Within defined limits      Coordination:  Coordination: Within functional limits  Vision:   Corrective Lenses: Reading glasses  Functional Mobility:  Bed Mobility:  Rolling: Independent  Supine to Sit: Independent  Sit to Supine: Independent  Scooting: Independent  Transfers:  Sit to Stand: Independent  Stand to Sit: Independent        Bed to Chair: Independent    Balance:   Sitting: Intact  Standing: Intact  Ambulation/Gait Training:  Distance (ft):  (>200' continuously; no AD)  Assistive Device: Gait belt  Ambulation - Level of Assistance: Stand-by assistance     Gait Description (WDL): Within defined limits  Speed/Vivian: Delayed      Stairs:  Number of Stairs Trained:  (up/down 5 steps w/ HR and SBA)      Rail Use: Both    Therapeutic Exercises:   As noted pt ambulated in hallway on level surfaces and then up/down steps w/ UE support. Pt was able to ambulate >200' continuously w/ SBA only; no LOB, no SOB. While walking she was challenged w/ vertical and horizontal head turning activities, stop/start activities, 360 deg turning and picking up objects from the floor. Seated rest break needed upon completion. Functional Measure:  As noted Barthel Index 100/100. Physical Therapy Evaluation Charge Determination   History Examination Presentation Decision-Making   LOW Complexity : Zero comorbidities / personal factors that will impact the outcome / POC MEDIUM Complexity : 3 Standardized tests and measures addressing body structure, function, activity limitation and / or participation in recreation  LOW Complexity : Stable, uncomplicated  LOW Complexity : FOTO score of       Based on the above components, the patient evaluation is determined to be of the following complexity level: LOW     Pain Rating:  Generally denied but as session progressed she did note minor chronic soreness in R hip due to OA    Activity Tolerance:   Good      After treatment patient left in no apparent distress:   Sitting in chair and Call bell within reach    COMMUNICATION/EDUCATION:   The patients plan of care was discussed with: Occupational therapist, Registered nurse and Rehabilitation technician. Patient/family have participated as able in goal setting and plan of care.     Thank you for this referral.  Kristi Greenwood PT   Time Calculation: 30 mins

## 2022-02-11 NOTE — ROUTINE PROCESS
COVID test ordered for admission, report received from [facility] in ED, awaiting results to M/S for proper room assignment.

## 2022-02-11 NOTE — ED PROVIDER NOTES
EMERGENCY DEPARTMENT HISTORY AND PHYSICAL EXAM      Date: 2/10/2022  Patient Name: Josue Degroot    History of Presenting Illness     Chief Complaint   Patient presents with    Aphasia       History Provided By: Patient    HPI:   The history is provided by the patient. Aphasia   This is a new problem. The current episode started 1 to 2 hours ago. The problem has been resolved. There has been no fever. Pertinent negatives include no vomiting, no congestion, no headaches, no shortness of breath, no stiff neck and no cough. She has tried nothing for the symptoms. The treatment provided no relief. Josue Degroot, 82 y.o. female  presents to the ED with cc of difficulty with her words, patient reports she had an episode of approximately 5 minutes where she could not say the words that she wanted to say. Symptoms have resolved. This occurred around 10 PM.  She denies any other symptoms associated with this. She denies headache visual changes double vision loss of vision numbness tingling weakness in the upper or lower extremities denies any facial droop. Patient was a long non-smoker but she did call her daughter when symptoms occurred. She has high blood pressure diabetes, she does not take any blood thinners she does take aspirin. She denies any prior history of strokes. There are no other complaints, changes, or physical findings at this time. PCP: Nieves Mackey NP    No current facility-administered medications on file prior to encounter. Current Outpatient Medications on File Prior to Encounter   Medication Sig Dispense Refill    metoprolol succinate (TOPROL-XL) 25 mg XL tablet TAKE 1 TABLET BY MOUTH EVERY DAY 90 Tablet 1    lisinopriL (PRINIVIL, ZESTRIL) 20 mg tablet Take 1 Tablet by mouth two (2) times a day. 180 Tablet 3    atorvastatin (LIPITOR) 80 mg tablet Take 1 Tablet by mouth daily.  90 Tablet 0    Januvia 100 mg tablet TAKE 1 TABLET BY MOUTH EVERY DAY 90 Tablet 1    levocetirizine (XYZAL) 5 mg tablet TAKE 1 TABLET BY MOUTH DAILY AS NEEDED FOR ALLERGY SYMPTOMS 90 Tablet 2    aspirin delayed-release 81 mg tablet Take 1 Tablet by mouth daily. (Patient taking differently: Take 81 mg by mouth daily. Pt takes 1 in AM and 1 at PM) 90 Tablet 1    fluticasone propionate (FLONASE) 50 mcg/actuation nasal spray 2 Sprays by Both Nostrils route daily. 1 Bottle 0    venlafaxine-SR (EFFEXOR-XR) 150 mg capsule Take 1 Capsule by mouth daily (with breakfast). Indications: major depressive disorder 90 Capsule 3    Blood-Glucose Meter monitoring kit Check glucose once daily. DX: E11.9 1 Kit 0    glucose blood VI test strips (ASCENSIA AUTODISC VI, ONE TOUCH ULTRA TEST VI) strip Check glucose once daily. DX: E11.9 100 Strip 3    lancets misc Check glucose once daily. DX: E11.9 100 Each 3    acetaminophen (TylenoL) 325 mg tablet Take  by mouth every four (4) hours as needed for Pain.  predniSONE (STERAPRED DS) 10 mg dose pack See administration instruction per 10mg dose pack (Patient not taking: Reported on 2/11/2022) 21 Tablet 0    metroNIDAZOLE (FlagyL) 500 mg tablet Take 1 Tablet by mouth two (2) times a day. 20 Tablet 0    omeprazole (PRILOSEC) 20 mg capsule TAKE 1 CAPSULE BY MOUTH EVERY DAY (Patient not taking: Reported on 2/11/2022) 90 Capsule 3    nitroglycerin (NITROSTAT) 0.4 mg SL tablet 1 Tab by SubLINGual route every five (5) minutes as needed for Chest Pain. Up to 3 doses. (Patient not taking: Reported on 2/11/2022) 1 Bottle 1    montelukast (SINGULAIR) 10 mg tablet Take 1 Tab by mouth daily.  (Patient not taking: Reported on 2/11/2022) 90 Tab 0       Past History     Past Medical History:  Past Medical History:   Diagnosis Date    Allergic rhinitis due to pollen     BPV (benign positional vertigo)     Change in bowel habits     Depression     Diabetes (HCC)     Dysuria     Hemorrhoids     Hypertension     IBS (irritable bowel syndrome)     Menopause     Other diseases of nasal cavity and sinuses(478.19)     Vertigo        Past Surgical History:  Past Surgical History:   Procedure Laterality Date    COLONOSCOPY N/A 7/15/2020    [facility]COPY    HX CHOLECYSTECTOMY  2016    HX COLONOSCOPY  2013?  HX HYSTERECTOMY      UPPER GI ENDOSCOPY,DIAGNOSIS  7/15/2020            Family History:  Family History   Problem Relation Age of Onset    No Known Problems Mother        Social History:  Social History     Tobacco Use    Smoking status: Current Every Day Smoker     Packs/day: 0.25    Smokeless tobacco: Never Used   Vaping Use    Vaping Use: Never used   Substance Use Topics    Alcohol use: No    Drug use: No       Allergies:  No Known Allergies      Review of Systems   Review of Systems   Constitutional: Negative. Negative for chills and fever. HENT: Negative. Negative for congestion and sore throat. Eyes: Negative. Negative for discharge and redness. Respiratory: Negative. Negative for cough and shortness of breath. Cardiovascular: Negative. Negative for chest pain and palpitations. Gastrointestinal: Negative. Negative for abdominal pain, nausea and vomiting. Endocrine: Negative. Negative for polydipsia and polyuria. Genitourinary: Negative. Negative for dysuria, flank pain and frequency. Musculoskeletal: Negative. Negative for arthralgias and back pain. Skin: Negative. Negative for rash and wound. Allergic/Immunologic: Negative. Neurological: Positive for speech difficulty. Negative for dizziness, tremors, seizures, facial asymmetry, light-headedness, numbness and headaches. Hematological: Negative. Negative for adenopathy. Does not bruise/bleed easily. Psychiatric/Behavioral: Negative. Negative for confusion. The patient is not nervous/anxious. All other systems reviewed and are negative. Physical Exam   Physical Exam  Vitals and nursing note reviewed.    Constitutional: General: She is not in acute distress. Appearance: Normal appearance. She is well-developed and normal weight. She is not ill-appearing, toxic-appearing or diaphoretic. HENT:      Head: Normocephalic and atraumatic. Nose: Nose normal.      Mouth/Throat:      Mouth: Mucous membranes are moist.      Pharynx: Oropharynx is clear. Eyes:      Extraocular Movements: Extraocular movements intact. Conjunctiva/sclera: Conjunctivae normal.      Pupils: Pupils are equal, round, and reactive to light. Cardiovascular:      Rate and Rhythm: Normal rate and regular rhythm. Pulses: Normal pulses. Pulmonary:      Effort: Pulmonary effort is normal. No respiratory distress. Abdominal:      General: There is no distension. Palpations: Abdomen is soft. Musculoskeletal:         General: No swelling or tenderness. Normal range of motion. Cervical back: Normal range of motion and neck supple. No rigidity. No muscular tenderness. Skin:     General: Skin is warm and dry. Capillary Refill: Capillary refill takes less than 2 seconds. Findings: No erythema or rash. Neurological:      General: No focal deficit present. Mental Status: She is alert and oriented to person, place, and time. Mental status is at baseline. Cranial Nerves: Cranial nerves are intact. No cranial nerve deficit. Sensory: Sensation is intact. No sensory deficit. Motor: Motor function is intact. No weakness. Coordination: Coordination is intact. Coordination normal.      Gait: Gait is intact. Psychiatric:         Mood and Affect: Mood normal.         Behavior: Behavior normal.         Thought Content:  Thought content normal.         Judgment: Judgment normal.         Diagnostic Study Results     Labs -     Recent Results (from the past 12 hour(s))   GLUCOSE, POC    Collection Time: 02/10/22 10:39 PM   Result Value Ref Range    Glucose (POC) 121 (H) 65 - 117 mg/dL    Performed by Wily Woods (PCT)    CBC WITH AUTOMATED DIFF    Collection Time: 02/10/22 11:05 PM   Result Value Ref Range    WBC 8.8 3.6 - 11.0 K/uL    RBC 4.59 3.80 - 5.20 M/uL    HGB 13.9 11.5 - 16.0 g/dL    HCT 42.3 35.0 - 47.0 %    MCV 92.2 80.0 - 99.0 FL    MCH 30.3 26.0 - 34.0 PG    MCHC 32.9 30.0 - 36.5 g/dL    RDW 12.4 11.5 - 14.5 %    PLATELET 473 536 - 962 K/uL    MPV 9.5 8.9 - 12.9 FL    NRBC 0.0 0  WBC    ABSOLUTE NRBC 0.00 0.00 - 0.01 K/uL    NEUTROPHILS 59 32 - 75 %    LYMPHOCYTES 29 12 - 49 %    MONOCYTES 7 5 - 13 %    EOSINOPHILS 3 0 - 7 %    BASOPHILS 1 0 - 1 %    IMMATURE GRANULOCYTES 1 (H) 0.0 - 0.5 %    ABS. NEUTROPHILS 5.3 1.8 - 8.0 K/UL    ABS. LYMPHOCYTES 2.5 0.8 - 3.5 K/UL    ABS. MONOCYTES 0.6 0.0 - 1.0 K/UL    ABS. EOSINOPHILS 0.2 0.0 - 0.4 K/UL    ABS. BASOPHILS 0.1 0.0 - 0.1 K/UL    ABS. IMM. GRANS. 0.1 (H) 0.00 - 0.04 K/UL    DF AUTOMATED     METABOLIC PANEL, COMPREHENSIVE    Collection Time: 02/10/22 11:05 PM   Result Value Ref Range    Sodium 142 136 - 145 mmol/L    Potassium 5.1 3.5 - 5.1 mmol/L    Chloride 104 97 - 108 mmol/L    CO2 30 21 - 32 mmol/L    Anion gap 8 5 - 15 mmol/L    Glucose 124 (H) 65 - 100 mg/dL    BUN 14 6 - 20 MG/DL    Creatinine 0.96 0.55 - 1.02 MG/DL    BUN/Creatinine ratio 15 12 - 20      GFR est AA >60 >60 ml/min/1.73m2    GFR est non-AA 55 (L) >60 ml/min/1.73m2    Calcium 8.6 8.5 - 10.1 MG/DL    Bilirubin, total 0.6 0.2 - 1.0 MG/DL    ALT (SGPT) 27 12 - 78 U/L    AST (SGOT) 36 15 - 37 U/L    Alk.  phosphatase 84 45 - 117 U/L    Protein, total 7.4 6.4 - 8.2 g/dL    Albumin 3.2 (L) 3.5 - 5.0 g/dL    Globulin 4.2 (H) 2.0 - 4.0 g/dL    A-G Ratio 0.8 (L) 1.1 - 2.2     PROTHROMBIN TIME + INR    Collection Time: 02/10/22 11:05 PM   Result Value Ref Range    INR 1.0 0.9 - 1.1      Prothrombin time 10.1 9.0 - 11.1 sec   TROPONIN-HIGH SENSITIVITY    Collection Time: 02/10/22 11:05 PM   Result Value Ref Range    Troponin-High Sensitivity 9 0 - 51 ng/L   EKG, 12 LEAD, INITIAL    Collection Time: 02/10/22 11:17 PM   Result Value Ref Range    Ventricular Rate 71 BPM    Atrial Rate 71 BPM    P-R Interval 158 ms    QRS Duration 80 ms    Q-T Interval 404 ms    QTC Calculation (Bezet) 439 ms    Calculated P Axis 72 degrees    Calculated R Axis 32 degrees    Calculated T Axis 90 degrees    Diagnosis       Normal sinus rhythm with sinus arrhythmia  Low voltage QRS  Nonspecific ST and T wave abnormality  Abnormal ECG  When compared with ECG of 27-JAN-2022 14:02,  premature ventricular complexes are no longer present  Nonspecific T wave abnormality has replaced inverted T waves in Anterior   leads     URINALYSIS W/ REFLEX CULTURE    Collection Time: 02/11/22 12:30 AM    Specimen: Urine   Result Value Ref Range    Color YELLOW/STRAW      Appearance CLEAR CLEAR      Specific gravity <1.005 1.003 - 1.030    pH (UA) 6.5 5.0 - 8.0      Protein Negative NEG mg/dL    Glucose Negative NEG mg/dL    Ketone Negative NEG mg/dL    Bilirubin Negative NEG      Blood Negative NEG      Urobilinogen 0.2 0.2 - 1.0 EU/dL    Nitrites Negative NEG      Leukocyte Esterase Negative NEG      WBC 0-4 0 - 4 /hpf    RBC 0-5 0 - 5 /hpf    Epithelial cells FEW FEW /lpf    Bacteria 1+ (A) NEG /hpf    UA:UC IF INDICATED CULTURE NOT INDICATED BY UA RESULT CNI     COVID-19 WITH INFLUENZA A/B    Collection Time: 02/11/22  1:35 AM   Result Value Ref Range    SARS-CoV-2 Not detected NOTD      Influenza A by PCR Not detected NOTD      Influenza B by PCR Not detected NOTD         Radiologic Studies -   CTA CODE NEURO HEAD AND NECK W CONT         XR CHEST PORT   Final Result   No acute process. CT CODE NEURO HEAD WO CONTRAST   Final Result   No acute intracranial abnormality. 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 MRI BRAIN WO CONT    (Results Pending)     CT Results  (Last 48 hours)               02/11/22 0001  CTA CODE NEURO HEAD AND NECK W CONT Preliminary result    Narrative:  *PRELIMINARY REPORT*       No acute large vessel occlusion. Cerebral perfusion imaging not performed. Preliminary report was provided by Dr. Jia Kaufman, the on-call radiologist, at 12:22   AM       Final report to follow. *END PRELIMINARY REPORT*                                       02/10/22 2249  CT CODE NEURO HEAD WO CONTRAST Final result    Impression:  No acute intracranial abnormality. 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 Narrative:  HEAD CT WITHOUT CONTRAST: 2/10/2022 10:49 PM       INDICATION: Code Stroke       COMPARISON: None. PROCEDURE: Axial images of the head were obtained without contrast. Coronal and   sagittal reformats were performed. CT dose reduction was achieved through use of   a standardized protocol tailored for this examination and automatic exposure   control for dose modulation. FINDINGS: There is an old infarction in the inferoposterior, right cerebellum. Periventricular white matter hypodensity is nonspecific, but consistent with   chronic small vessel ischemic disease. The ventricles and sulci are appropriate   in size and configuration for age. No ill-defined loss of gray-white   differentiation to suggest late acute or early subacute infarction. No mass   effect or intracranial hemorrhage. CXR Results  (Last 48 hours)               02/10/22 5495  XR CHEST PORT Final result    Impression:  No acute process. Narrative:  INDICATION: CVA       EXAM:  AP CHEST RADIOGRAPH       COMPARISON: January 27, 2022       FINDINGS:       AP portable view of the chest demonstrates normal heart size. Right upper   paratracheal fullness at the thoracic inlet could be due to tortuous vessels   seen on prior CT. There is no edema, effusion, consolidation, or pneumothorax. The osseous structures are unremarkable. Medical Decision Making   I am the first provider for this patient.     I reviewed the vital signs, available nursing notes, past medical history, past surgical history, family history and social history. Vital Signs-Reviewed the patient's vital signs. Patient Vitals for the past 12 hrs:   Temp Pulse Resp BP SpO2   02/11/22 0256 98.3 °F (36.8 °C) 64 18 (!) 166/82 --   02/11/22 0211 98.3 °F (36.8 °C) 76 18 (!) 147/81 95 %   02/11/22 0030 -- 68 18 (!) 168/89 97 %   02/10/22 2330 -- 71 18 (!) 165/93 98 %   02/10/22 2235 98 °F (36.7 °C) 81 20 (!) 150/80 98 %           EKG interpretation: (Preliminary)  Rhythm: normal sinus rhythm;  regular . Rate (approx.): 71; Blocks: none; Ectopy: noneAxis: normal; P wave: normal; QRS interval: normal ; ST/T wave: non-specific changes; in  Lead: ; Other findings: .        Records Reviewed: Nursing Notes, Old Medical Records, Previous Radiology Studies and Previous Laboratory Studies    Provider Notes (Medical Decision Making):   Patient presents with what sounds like an expressive aphasia symptoms of all resolved, will discuss with teleneurologist and perform stroke work-up. ED Course:   Initial assessment performed. The patients presenting problems have been discussed, and they are in agreement with the care plan formulated and outlined with them. I have encouraged them to ask questions as they arise throughout their visit. ED Course as of 02/11/22 0409   Thu Feb 10, 2022   2244 Spoke with Dr. Medina Both teleneurology, patient not a TPA candidate, recommend admission for TIA work-up. [MF]   Fri Feb 11, 2022   Chelly Bones Results with patient questions answered, patient continues to have no symptoms no recurrence of speech difficulty. Updated patient on plan for admission for further evaluation. [MF]   0037 Confirmed patients wishes to be a DNR. [MF]   0102 Discussed case with Dr. Sloane Flores agrees with admission.  []      ED Course User Index  [MF] [place] MD         Medications Given in the ED:    Medications   acetaminophen (TYLENOL) tablet 650 mg (has no administration in time range)   ondansetron (ZOFRAN) injection 4 mg (has no administration in time range) aspirin chewable tablet 81 mg (has no administration in time range)   lisinopriL (PRINIVIL, ZESTRIL) tablet 20 mg (has no administration in time range)   metoprolol succinate (TOPROL-XL) XL tablet 25 mg (has no administration in time range)   atorvastatin (LIPITOR) tablet 80 mg (has no administration in time range)   iopamidoL (ISOVUE-370) 76 % injection 100 mL (100 mL IntraVENous Given 2/11/22 0002)   aspirin chewable tablet 162 mg (162 mg Oral Given 2/11/22 0033)           12:39 AM    Presents with expressive aphasia x5 minutes symptoms resolved, nonfocal exam on arrival.  Head CT revealed evidence of an old stroke, CTA was negative for LVO. Laboratory studies unremarkable, will admit for TIA work-up MRI and further evaluation. I have reviewed all pertinent and currently available diagnostic test results for this visit including, but not limited to, labs, xrays, and EKGs. I have reviewed all pertinent and currently available medical records. My plan of care and further evaluation and/or disposition is based on these results, as well as the initial, and subsequent, history and physical exam, as well as any additional complaints during the visit. Sinduh Armstrong MD        Procedures        Disposition:  Admitted            Diagnosis     Clinical Impression:   1. TIA (transient ischemic attack)        Attestations: Sindhu Armstrong MD    Please note that this dictation was completed with [employer], the computer voice recognition software. Quite often unanticipated grammatical, syntax, homophones, and other interpretive errors are inadvertently transcribed by the computer software. Please disregard these errors. Please excuse any errors that have escaped final proofreading. Thank you.

## 2022-02-11 NOTE — PROGRESS NOTES
Bedside and Verbal shift change report given to WILFRID Rey RN (oncoming nurse) by Billie Wright RN (offgoing nurse). Report included the following information SBAR, Kardex, ED Summary, MAR, Accordion and Recent Results.

## 2022-02-11 NOTE — PROGRESS NOTES
HPI    The patient is a pleasant 82year old female who presented to hospital after having developed a 2 minute episode of speech difficulty with talking on the phone with her friend. She felt that her speech was slurred. She reports that she did have two prior episodes that were similar to this in the past year. No pain associated with this. No change in vision, hearing, numbness or tingling. No chest pain. She does have ch ronic generalized weakness associated with her arthritis, but no focality. No recurrence of events. Past medical history:   Depression  Diabetes  Htn    Past surgical history:   Cholecystectomy    Family history:   No history of a stroke at a young age    NKDA    Pertinent medication:  Daily aspirin  Metoprolol  Lipitor  Diabetes medications    Social history:   + smoking    Examinati on:  Examination via telemedicine. Some limitations in detailed testing due to telemedicine modality  Awake, alert, oriented to person, place and time. No dysarthria or aphasia  Naming intact. Good recall    Eomi, no facial weakness or sensory deficit  Tongue midline    No pronator drift  No fasciculations  Strength appears full  Can raise arms about her head symmetric 5 times without difficulty  Can hold  each leg off of bed for 5 seconds without drift    Sensation:   Intact to light touch throughout, however distal decrease bilaterally    Finger tapping and rapid alternating movements were normal.  Good finger to nose. Gait: not assessed this am at the bedside. Laboratory:   I did independently review the brain mri from 2/11/2022. No acute stroke. Chronic cerebella infarct. Significant chronic small vessel  ischemic disease  CTA with no large vessel flow limiting stenosis      Assessment and plan: This is a pleasant 82year old female with transient slurred speech. Her neurological examination via telemedicine with no focal deficits    Transient neurological deficit.   Brain mri with no evidence of an acute stroke. This is most consistent with a TIA  Risk factors include htn, diabetes, htn, smoking. Continue aspiri n 81 mg. Add plavix. 75 mg daily. I discussed this with the patient today. Htn: aggressive control with goal sbp < 140  Dyslipidemia: continue aggressive statin therapy. Goal LDL < 70. Updated ldl is pending  Diabetes:  continue aggressive glycemic control  CTA with no flow limiting stenosis  Echo pending  Smoking cessation education was provided today. I provided stroke education to the patient today  <BR >Probable distal neuropathy:   This can be followed up in the neurology clinic after discharge. She may need a Emg/ncs. Most likely related to her diabetes. There are no additional neurology recommendations at this time. The patient should follow up in the neurology clinic in 3-4 weeks time. If questions arise, please contact me.        I spent 70 minutes on the day of the visit with this acutely ill patient reviewing  medical records, obtaining a history and examination, discussing treatment plan with the patient, and collaborating with nursing and primary  physicians

## 2022-02-11 NOTE — PROGRESS NOTES
Pharmacy Medication Reconciliation     The patient was interviewed regarding current PTA medication list, use and drug allergies. Clarified PTA med list with patient    The patient was questioned regarding use of any:  inhalers, topical products, over the counter medications, herbal medications, vitamin products or ophthalmic/nasal/otic medication use. Allergies were verified. Allergy Update: none    Recommendations/Findings: The following amendments were made to the patient's active medication list on file [facility]:   1) Additions:none    2) Deletions: singulair, omeprazole, nitroglycerin, metronidazole, prednisone    3) Changes: flonase    Counseling provided includes side effects/adverse drug reaction: yes    Drug Interactions and duplicate therapies include: n/a    PTA medication list was corrected to the following:     Prior to Admission Medications   Prescriptions Last Dose Informant Patient Reported? Taking? Januvia 100 mg tablet 2/10/2022 at Unknown time  No Yes   Sig: TAKE 1 TABLET BY MOUTH EVERY DAY   acetaminophen (TylenoL) 325 mg tablet 2/10/2022 at Unknown time  Yes Yes   Sig: Take  by mouth every four (4) hours as needed for Pain. aspirin delayed-release 81 mg tablet 2/10/2022 at Unknown time  No Yes   Sig: Take 1 Tablet by mouth daily. Patient taking differently: Take 81 mg by mouth two (2) times a day. Pt takes 1 in AM and 1 at PM   atorvastatin (LIPITOR) 80 mg tablet 2/10/2022 at Unknown time  No Yes   Sig: Take 1 Tablet by mouth daily. fluticasone propionate (FLONASE) 50 mcg/actuation nasal spray 2022 at Unknown time  No Yes   Si Sprays by Both Nostrils route daily. Patient taking differently: 2 Sprays by Both Nostrils route as needed.  ONLY AS NEEDED   levocetirizine (XYZAL) 5 mg tablet 2/10/2022 at Unknown time  No Yes   Sig: TAKE 1 TABLET BY MOUTH DAILY AS NEEDED FOR ALLERGY SYMPTOMS   lisinopriL (PRINIVIL, ZESTRIL) 20 mg tablet 2/10/2022 at Unknown time  No Yes Sig: Take 1 Tablet by mouth two (2) times a day. metoprolol succinate (TOPROL-XL) 25 mg XL tablet 2/10/2022 at Unknown time  No Yes   Sig: TAKE 1 TABLET BY MOUTH EVERY DAY   omeprazole (PRILOSEC) 20 mg capsule Not Taking at Unknown time  No No   Sig: TAKE 1 CAPSULE BY MOUTH EVERY DAY   Patient not taking: Reported on 2/11/2022   venlafaxine-SR (EFFEXOR-XR) 150 mg capsule   Yes Yes   Sig: Take 150 mg by mouth daily.       Facility-Administered Medications: None        Thank you,  Sg Fernández, PHARMD

## 2022-02-11 NOTE — PROGRESS NOTES
Problem: Falls - Risk of  Goal: *Absence of Falls  Description: Document Laura Lunsford Fall Risk and appropriate interventions in the flowsheet.   Outcome: Progressing Towards Goal  Note: Fall Risk Interventions:                      History of Falls Interventions: Bed/chair exit alarm,Door open when patient unattended         Problem: Patient Education: Go to Patient Education Activity  Goal: Patient/Family Education  Outcome: Progressing Towards Goal     Problem: Patient Education: Go to Patient Education Activity  Goal: Patient/Family Education  Outcome: Progressing Towards Goal     Problem: TIA/CVA Stroke: 0-24 hours  Goal: Activity/Safety  Outcome: Progressing Towards Goal  Goal: Diagnostic Test/Procedures  Outcome: Progressing Towards Goal  Goal: Psychosocial  Outcome: Progressing Towards Goal  Goal: *Ability to perform ADLs and demonstrates progressive mobility and function  Outcome: Progressing Towards Goal

## 2022-02-11 NOTE — PROGRESS NOTES
Problem: Falls - Risk of  Goal: *Absence of Falls  Description: Document Laura Lunsford Fall Risk and appropriate interventions in the flowsheet. Outcome: Progressing Towards Goal  Note: Fall Risk Interventions:                      History of Falls Interventions: Bed/chair exit alarm,Door open when patient unattended         Problem: Patient Education: Go to Patient Education Activity  Goal: Patient/Family Education  Outcome: Progressing Towards Goal     Problem: Patient Education: Go to Patient Education Activity  Goal: Patient/Family Education  Outcome: Progressing Towards Goal     Problem: TIA/CVA Stroke: 0-24 hours  Goal: Activity/Safety  Outcome: Progressing Towards Goal  Goal: Diagnostic Test/Procedures  Outcome: Progressing Towards Goal  Goal: Psychosocial  Outcome: Progressing Towards Goal  Goal: *Ability to perform ADLs and demonstrates progressive mobility and function  Outcome: Progressing Towards Goal     Problem: Diabetes Self-Management  Goal: *Disease process and treatment process  Description: Define diabetes and identify own type of diabetes; list 3 options for treating diabetes. Outcome: Progressing Towards Goal  Goal: *Incorporating physical activity into lifestyle  Description: State effect of exercise on blood glucose levels. Outcome: Progressing Towards Goal  Goal: *Developing strategies to promote health/change behavior  Description: Define the ABC's of diabetes; identify appropriate screenings, schedule and personal plan for screenings. Outcome: Progressing Towards Goal  Goal: *Using medications safely  Description: State effect of diabetes medications on diabetes; name diabetes medication taking, action and side effects. Outcome: Progressing Towards Goal  Goal: *Monitoring blood glucose, interpreting and using results  Description: Identify recommended blood glucose targets  and personal targets.   Outcome: Progressing Towards Goal  Goal: *Prevention, detection, treatment of acute complications  Description: List symptoms of hyper- and hypoglycemia; describe how to treat low blood sugar and actions for lowering  high blood glucose level.   Outcome: Progressing Towards Goal

## 2022-02-11 NOTE — PROGRESS NOTES
Pt to floor @ [phone] via stretcher. [facility] completed with Viviana Velasquez RN.

## 2022-02-11 NOTE — PROGRESS NOTES
Problem: Falls - Risk of  Goal: *Absence of Falls  Description: Document Mae Samano Fall Risk and appropriate interventions in the flowsheet.   Outcome: Progressing Towards Goal  Note: Fall Risk Interventions:  Mobility Interventions: Bed/chair exit alarm,Patient to call before getting OOB         Medication Interventions: Bed/chair exit alarm,Patient to call before getting OOB         History of Falls Interventions: Door open when patient unattended         Problem: Patient Education: Go to Patient Education Activity  Goal: Patient/Family Education  Outcome: Progressing Towards Goal     Problem: Patient Education: Go to Patient Education Activity  Goal: Patient/Family Education  Outcome: Progressing Towards Goal     Problem: TIA/CVA Stroke: 0-24 hours  Goal: Off Pathway (Use only if patient is Off Pathway)  Outcome: Progressing Towards Goal  Goal: Activity/Safety  Outcome: Progressing Towards Goal  Goal: Consults, if ordered  Outcome: Progressing Towards Goal     Problem: Ischemic Stroke: Discharge Outcomes  Goal: *Verbalizes anxiety and depression are reduced or absent  Outcome: Progressing Towards Goal

## 2022-02-11 NOTE — PROGRESS NOTES
Daughter [facility]) called for update on pt. Pt gave nurse verbal consent to give daughter update.

## 2022-02-11 NOTE — PROGRESS NOTES
IDR Team; MD, occupational therapy, Care Manager, , Nursing Supervisor, Pharmacy and Dietician, met to review patient's plan of care. Discussed goals, interventions, barriers and progress.      Team will continue to monitor progress and report any concerns to the physician and care management as indicated.     Transition of Care Plan: Per MD, patient had 5 min episode of not getting words out. Has echo, MRI and neuro consult ordered. SLP says she is swallowing well so she is signing off.

## 2022-02-11 NOTE — PROGRESS NOTES
OCCUPATIONAL THERAPY EVALUATION/DISCHARGE  Patient: Leydi Gonzalez (72 y.o. female)  Date: 2/11/2022  Primary Diagnosis: TIA (transient ischemic attack) [G45.9]       Precautions:     ASSESSMENT  Based on the objective data described below, the patient presents with suspected TIA. Uses no mobility aid at home but has a cane. She is independent at home with self care and IADL tasks. .    Current Level of Function (ADLs/self-care): Pt has anxiety. She demonstrated independence in bed mobility, transfers, self care tasks at toilet, sink and demonstrated flexibility and ability to reach feet, get socks on. Independent in self care. Functional Outcome Measure: The patient scored 100  on the barthel index outcome measure which is indicative of no impairment. Other factors to consider for discharge: none     PLAN :  Recommend with staff: monitor for safety    Recommendation for discharge: (in order for the patient to meet his/her long term goals)  No skilled occupational therapy/ follow up rehabilitation needs identified at this time. This discharge recommendation:  Has been made in collaboration with the attending provider and/or case management    IF patient discharges home will need the following DME: none       SUBJECTIVE:   Patient stated I feel nervous alot.     OBJECTIVE DATA SUMMARY:   HISTORY:   Past Medical History:   Diagnosis Date    Allergic rhinitis due to pollen     BPV (benign positional vertigo)     Change in bowel habits     Depression     Diabetes ([address]     Dysuria     Hemorrhoids     Hypertension     IBS (irritable bowel syndrome)     Menopause     Other diseases of nasal cavity and sinuses(478.19)     Vertigo      Past Surgical History:   Procedure Laterality Date    COLONOSCOPY N/A 7/15/2020    COLONOSCOPY performed [facility]y Salgado [facility] at [facility] ENDOSCOPY    HX CHOLECYSTECTOMY  2016    HX COLONOSCOPY  2013?     HX HYSTERECTOMY      UPPER GI ENDOSCOPY,DIAGNOSIS  7/15/2020 Prior Level of Function/Environment/Context: independent ADL/IADL  Expanded or extensive additional review of patient history:   Home Situation  Home Environment: Private residence  # Steps to Enter: 4 (BACK DOOR has steps too but no rail)  Rails to [employer]: Yes  One/Two Story Residence: One story  Living Alone: Yes  Support Systems: Child(roque) (Patient lives alone but has supportive family)  Patient Expects to be Discharged to[de-identified] Home with home health  Current DME Used/Available at Home: Glucometer,Cane, straight  Tub or Shower Type: Tub/Shower combination    Hand dominance: Right    EXAMINATION OF PERFORMANCE DEFICITS:  Cognitive/Behavioral Status:  Neurologic State: Alert  Orientation Level: Oriented to person;Oriented to place;Oriented to situation; Other (Comment) (Oriented to month/year (not day))  Cognition: Follows commands  Perception: Appears intact  Perseveration: Perseverates during conversation  Safety/Judgement: Awareness of environment    Skin: intact    Edema: none observed    Hearing: Auditory  Auditory Impairment: Hard of hearing, bilateral    Vision/Perceptual:              Corrective Lenses: Reading glasses    Range of Motion:  WLF BUE        Strength:  5/5 shoulders/elbows bilaterally       Coordination:     Fine Motor Skills-Upper: Left Intact; Right Intact    Gross Motor Skills-Upper: Left Intact; Right Intact    Tone & Sensation:  normal       Balance:  Sitting: Intact  Standing: Intact    Functional Mobility and Transfers for ADLs:  Bed Mobility:  Rolling: Independent  Supine to Sit: Independent  Sit to Supine: Independent  Scooting: Independent    Transfers:  Sit to Stand: Independent  Stand to Sit: Independent  Bed to Chair: Independent  Bathroom Mobility: Independent  Toilet Transfer : Independent    ADL Assessment:  Feeding: Independent    Oral Facial Hygiene/Grooming: Independent    Bathing: Independent    Upper Body Dressing: Independent    Lower Body Dressing: Independent    Toileting: Independent                ADL Intervention and task modifications:  Feeding  Feeding Assistance: Independent    Grooming  Washing Hands: Independent      Lower Body Dressing Assistance  Socks: Independent    Toileting  Toileting Assistance: Independent  Bladder Hygiene: Independent  Clothing Management: Independent    Cognitive Retraining  Safety/Judgement: Awareness of environment      Functional Measure:    Barthel Index:  Bathin  Bladder: 10  Bowels: 10  Groomin  Dressing: 10  Feeding: 10  Mobility: 15  Stairs: 10  Toilet Use: 10  Transfer (Bed to Chair and Back): 15  Total: 100/100      The Barthel ADL Index: Guidelines  1. The index should be used as a record of what a patient does, not as a record of what a patient could do. 2. The main aim is to establish degree of independence from any help, physical or verbal, however minor and for whatever reason. 3. The need for supervision renders the patient not independent. 4. A patient's performance should be established using the best available evidence. Asking the patient, friends/relatives and nurses are the usual sources, but direct observation and common sense are also important. However direct testing is not needed. 5. Usually the patient's performance over the preceding 24-48 hours is important, but occasionally longer periods will be relevant. 6. Middle categories imply that the patient supplies over 50 per cent of the effort. 7. Use of aids to be independent is allowed. Score Interpretation (from [address] [address])    Independent   60-79 Minimally independent   40-59 Partially dependent   20-39 Very dependent   <20 Totally dependent     -Blank Almaguer., Barthel, DBearW. (1965). Functional evaluation: the Barthel Index. [address] [facility] ([address] (1997). The Barthel activities of daily living index: self-reporting versus actual performance in the old (> or = 75 years).  Journal of [facility] Society 45(7), 14 [facility], [employer]., [facility]., Rene Pittman. (). Measuring the change in disability after inpatient rehabilitation; comparison of the responsiveness of the Barthel Index and Functional [place] Measure. Journal of Neurology, Neurosurgery, and Psychiatry, 66(4), 735-416. KATIE Guthrie, TOBY June, & Yola Knox M.A. (2004) Assessment of post-stroke quality of life in cost-effectiveness studies: The usefulness of the Barthel Index and the EuroQoL-5D.  Quality of Life Research, 15, 312-13         Occupational Therapy Evaluation Charge Determination   History Examination Decision-Making   LOW Complexity : Brief history review  LOW Complexity : 1-3 performance deficits relating to physical, cognitive , or psychosocial skils that result in activity limitations and / or participation restrictions  LOW Complexity : No comorbidities that affect functional and no verbal or physical assistance needed to complete eval tasks       Based on the above components, the patient evaluation is determined to be of the following complexity level: LOW   Pain Ratin/10    Activity Tolerance:   Good    After treatment patient left in no apparent distress:    Sitting in chair, Call bell within reach and Bed / chair alarm activated    COMMUNICATION/EDUCATION:   The patients plan of care was discussed with: Physical therapist.     Thank you for this referral.  Lisa Esteves OT  Time Calculation: 20 mins

## 2022-02-11 NOTE — H&P
[facility]   Admission History & Physical        2/11/2022 7:35 AM  Patient: Noe Merrill 1935  PCP: Jamaica Palm NP    HISTORY  Chief Complaint:   Chief Complaint   Patient presents with    Aphasia       HPI: 82 y.o. female presenting for admission to [facility] for further evaluation and treatment for TIA (transient ischemic attack). She  has a past medical history of Allergic rhinitis due to pollen, BPV (benign positional vertigo), Change in bowel habits, Depression, Diabetes ([address] Dysuria, Hemorrhoids, Hypertension, IBS (irritable bowel syndrome), Menopause, Other diseases of nasal cavity and sinuses(478.19), and Vertigo. .     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 He was talking on the phone with a friend and suddenly was not able to produce words. She denied any visual complaint or focal weakness. She reports a history of a previous stroke seen on CT scan, however she never experienced any stroke symptoms. She does report a history of hypertension and hyperlipidemia on medical treatment. She has a history of anxiety and depression with fair control on current treatment with Effexor XR. She has been taking aspirin 81 mg twice daily for her circulation. I am she feels she is taking excessive amounts of blood pressure medication. She came to the ER promptly with concern over her symptoms. The symptoms have resolved by the time of presentation. She underwent CT and CTA which did not reveal any evidence acute stroke, however did confirm the previous cerebellar stroke on the right side. This morning she denies any problems with weakness, loss of feeling, visual disturbance, or speech deficit.     Past Medical History:  Past Medical History:   Diagnosis Date    Allergic rhinitis due to pollen     BPV (benign positional vertigo)     Change in bowel habits     Depression     Diabetes ([address]  Dysuria     Hemorrhoids     Hypertension     IBS (irritable bowel syndrome)     Menopause     Other diseases of nasal cavity and sinuses(478.19)     Vertigo        Past Surgical History:  Past Surgical History:   Procedure Laterality Date    COLONOSCOPY N/A 7/15/2020    COLONOSCOPY performed by Johny Figueroa MD at [facility] ENDOSCOPY    HX CHOLECYSTECTOMY  2016    HX COLONOSCOPY  2013?  HX HYSTERECTOMY      UPPER GI ENDOSCOPY,DIAGNOSIS  7/15/2020            Medication:  Prior to Admission medications    Medication Sig Start Date End Date Taking? Authorizing Provider   metoprolol succinate (TOPROL-XL) 25 mg XL tablet TAKE 1 TABLET BY MOUTH EVERY DAY 1/23/22  Yes Denilson VILLA NP   lisinopriL (PRINIVIL, ZESTRIL) 20 mg tablet Take 1 Tablet by mouth two (2) times a day. 1/10/22  Yes Denilson VILLA NP   atorvastatin (LIPITOR) 80 mg tablet Take 1 Tablet by mouth daily. 12/6/21  Yes Denilson VILLA NP   Januvia 100 mg tablet TAKE 1 TABLET BY MOUTH EVERY DAY 10/19/21  Yes Denilson VILLA NP   levocetirizine (XYZAL) 5 mg tablet TAKE 1 TABLET BY MOUTH DAILY AS NEEDED FOR ALLERGY SYMPTOMS 9/17/21  Yes Denilson VILLA NP   aspirin delayed-release 81 mg tablet Take 1 Tablet by mouth daily. Patient taking differently: Take 81 mg by mouth daily. Pt takes 1 in AM and 1 at PM 8/5/21  Yes Gonzalo Mcintosh NP   fluticasone propionate (FLONASE) 50 mcg/actuation nasal spray 2 Sprays by Both Nostrils route daily. 7/1/21  Yes Mj Springer MD   venlafaxine-SR [facility] 150 mg capsule Take 1 Capsule by mouth daily (with breakfast). Indications: major depressive disorder 6/23/21  Yes Fabiana Funk MD   Blood-Glucose Meter monitoring kit Check glucose once daily. DX: E11.9 1/21/21  Yes Gonzalo Mcintosh NP   glucose blood VI test strips (ASCENSIA AUTODISC VI, ONE TOUCH ULTRA TEST VI) strip Check glucose once daily. DX: E11.9 1/21/21  Yes Denilson VILLA NP   lancets misc Check glucose once daily. DX: E11.9 1/21/21  Yes Pola VILLA NP   acetaminophen (TylenoL) 325 mg tablet Take  by mouth every four (4) hours as needed for Pain. Yes Provider, Historical   predniSONE (STERAPRED DS) 10 mg dose pack See administration instruction per 10mg dose pack  Patient not taking: Reported on 2/11/2022 2/7/22   Jin Reid NP   metroNIDAZOLE (FlagyL) 500 mg tablet Take 1 Tablet by mouth two (2) times a day. 1/27/22   Pattie Marie MD   omeprazole (PRILOSEC) 20 mg capsule TAKE 1 CAPSULE BY MOUTH EVERY DAY  Patient not taking: Reported on 2/11/2022 11/6/21   Pola VILLA NP   nitroglycerin (NITROSTAT) 0.4 mg SL tablet 1 Tab by SubLINGual route every five (5) minutes as needed for Chest Pain. Up to 3 doses. Patient not taking: Reported on 2/11/2022 2/2/21   Kristen Terry MD   montelukast (SINGULAIR) 10 mg tablet Take 1 Tab by mouth daily.   Patient not taking: Reported on 2/11/2022 11/10/20   Jin Reid NP       Allergies:  No Known Allergies    Social History:  Social History     Tobacco Use    Smoking status: Current Every Day Smoker     Packs/day: 0.25    Smokeless tobacco: Never Used   Vaping Use    Vaping Use: Never used   Substance Use Topics    Alcohol use: No    Drug use: No       Family History:  Family History   Problem Relation Age of Onset    No Known Problems Mother        ROS:  Total of 12 systems reviewed as follows:  POSITIVE= bolded text  Negative = text not bolded       General:  fever, chills, sweats, generalized weakness, weight loss/gain, loss of appetite   Eyes:    blurred vision, eye pain, loss of vision, double vision  ENT:    rhinorrhea, pharyngitis   Respiratory:  cough, sputum production, SOB, BARRON, wheezing, pleuritic pain   Cardiology:   chest pain, palpitations, orthopnea, PND, edema, syncope   Gastrointestinal:  abdominal pain , N/V, diarrhea, dysphagia, constipation, bleeding   Genitourinary:  frequency, urgency, dysuria, hematuria, incontinence, prostatism Muskuloskeletal: arthralgia, myalgia, back pain  Hematology:   easy bruising, nose or gum bleeding, lymphadenopathy   Dermatological: rash, ulceration, pruritis, color change / jaundice  Endocrine:   hot flashes or polydipsia   Neurological:  headache, dizziness, confusion, focal weakness, paresthesia, speech difficulties, memory loss, gait difficulty  Psychological: feelings of anxiety, depression, agitation      PHYSICAL EXAM:  Patient Vitals for the past 24 hrs:   Temp Pulse Resp BP SpO2   02/11/22 0700 97.6 °F (36.4 °C) 60 18 (!) 160/78 98 %   02/11/22 0501 97.9 °F (36.6 °C) 62 18 (!) 164/80 100 %   02/11/22 0256 98.3 °F (36.8 °C) 64 18 (!) 166/82 --   02/11/22 0211 98.3 °F (36.8 °C) 76 18 (!) 147/81 95 %   02/11/22 0030 -- 68 18 (!) 168/89 97 %   02/10/22 2330 -- 71 18 (!) 165/93 98 %   02/10/22 2235 98 °F (36.7 °C) 81 20 (!) 150/80 98 %       General:    Alert, cooperative, no distress, appears stated age. HEENT: Atraumatic, anicteric sclerae, pink conjunctivae     No oral ulcers, mucosa moist, throat clear, dentition fair  Neck:  Supple, symmetrical;   thyroid non tender  Lungs:   Clear to auscultation bilaterally. No wheezing or rhonchi. No rales. Chest wall:  No tenderness. No accessory muscle use. Heart:   Regular rhythm. No  murmur. No edema  Abdomen:   Soft, non-tender. Not distended. Bowel sounds normal  Extremities: No cyanosis. No clubbing      Capillary refill normal,  Radial pulse 2+,  DP  1+  Skin:     Not pale. Not jaundiced. No rashes   Psych:  Not depressed. Not anxious or agitated. Neurologic: EOMs intact. No facial asymmetry. No aphasia or slurred speech. Symmetrical strength, Sensation grossly intact.  Alert and oriented    Lab Data Reviewed:    Recent Results (from the past 24 hour(s))   GLUCOSE, POC    Collection Time: 02/10/22 10:39 PM   Result Value Ref Range    Glucose (POC) 121 (H) 65 - 117 mg/dL    Performed by [employer]a Gaming (PCT)    CBC WITH AUTOMATED DIFF Collection Time: 02/10/22 11:05 PM   Result Value Ref Range    WBC 8.8 3.6 - 11.0 K/uL    RBC 4.59 3.80 - 5.20 M/uL    HGB 13.9 11.5 - 16.0 g/dL    HCT 42.3 35.0 - 47.0 %    MCV 92.2 80.0 - 99.0 FL    MCH 30.3 26.0 - 34.0 PG    MCHC 32.9 30.0 - 36.5 g/dL    RDW 12.4 11.5 - 14.5 %    PLATELET 765 528 - 725 K/uL    MPV 9.5 8.9 - 12.9 FL    NRBC 0.0 0  WBC    ABSOLUTE NRBC 0.00 0.00 - 0.01 K/uL    NEUTROPHILS 59 32 - 75 %    LYMPHOCYTES 29 12 - 49 %    MONOCYTES 7 5 - 13 %    EOSINOPHILS 3 0 - 7 %    BASOPHILS 1 0 - 1 %    IMMATURE GRANULOCYTES 1 (H) 0.0 - 0.5 %    ABS. NEUTROPHILS 5.3 1.8 - 8.0 K/UL    ABS. LYMPHOCYTES 2.5 0.8 - 3.5 K/UL    ABS. MONOCYTES 0.6 0.0 - 1.0 K/UL    ABS. EOSINOPHILS 0.2 0.0 - 0.4 K/UL    ABS. BASOPHILS 0.1 0.0 - 0.1 K/UL    ABS. IMM. GRANS. 0.1 (H) 0.00 - 0.04 K/UL    DF AUTOMATED     METABOLIC PANEL, COMPREHENSIVE    Collection Time: 02/10/22 11:05 PM   Result Value Ref Range    Sodium 142 136 - 145 mmol/L    Potassium 5.1 3.5 - 5.1 mmol/L    Chloride 104 97 - 108 mmol/L    CO2 30 21 - 32 mmol/L    Anion gap 8 5 - 15 mmol/L    Glucose 124 (H) 65 - 100 mg/dL    BUN 14 6 - 20 MG/DL    Creatinine 0.96 0.55 - 1.02 MG/DL    BUN/Creatinine ratio 15 12 - 20      GFR est AA >60 >60 ml/min/1.73m2    GFR est non-AA 55 (L) >60 ml/min/1.73m2    Calcium 8.6 8.5 - 10.1 MG/DL    Bilirubin, total 0.6 0.2 - 1.0 MG/DL    ALT (SGPT) 27 12 - 78 U/L    AST (SGOT) 36 15 - 37 U/L    Alk.  phosphatase 84 45 - 117 U/L    Protein, total 7.4 6.4 - 8.2 g/dL    Albumin 3.2 (L) 3.5 - 5.0 g/dL    Globulin 4.2 (H) 2.0 - 4.0 g/dL    A-G Ratio 0.8 (L) 1.1 - 2.2     PROTHROMBIN TIME + INR    Collection Time: 02/10/22 11:05 PM   Result Value Ref Range    INR 1.0 0.9 - 1.1      Prothrombin time 10.1 9.0 - 11.1 sec   TROPONIN-HIGH SENSITIVITY    Collection Time: 02/10/22 11:05 PM   Result Value Ref Range    Troponin-High Sensitivity 9 0 - 51 ng/L   EKG, 12 LEAD, INITIAL    Collection Time: 02/10/22 11:17 PM   Result Value Ref Range    Ventricular Rate 71 BPM    Atrial Rate 71 BPM    P-R Interval 158 ms    QRS Duration 80 ms    Q-T Interval 404 ms    QTC Calculation (Bezet) 439 ms    Calculated P Axis 72 degrees    Calculated R Axis 32 degrees    Calculated T Axis 90 degrees    Diagnosis       Normal sinus rhythm with sinus arrhythmia  Low voltage QRS  Nonspecific ST and T wave abnormality  Abnormal ECG  When compared with ECG of 27-JAN-2022 14:02,  premature ventricular complexes are no longer present  Nonspecific T wave abnormality has replaced inverted T waves in Anterior   leads     URINALYSIS W/ REFLEX CULTURE    Collection Time: 02/11/22 12:30 AM    Specimen: Urine   Result Value Ref Range    Color YELLOW/STRAW      Appearance CLEAR CLEAR      Specific gravity <1.005 1.003 - 1.030    pH (UA) 6.5 5.0 - 8.0      Protein Negative NEG mg/dL    Glucose Negative NEG mg/dL    Ketone Negative NEG mg/dL    Bilirubin Negative NEG      Blood Negative NEG      Urobilinogen 0.2 0.2 - 1.0 EU/dL    Nitrites Negative NEG      Leukocyte Esterase Negative NEG      WBC 0-4 0 - 4 /hpf    RBC 0-5 0 - 5 /hpf    Epithelial cells FEW FEW /lpf    Bacteria 1+ (A) NEG /hpf    UA:UC IF INDICATED CULTURE NOT INDICATED BY UA RESULT CNI     COVID-19 WITH INFLUENZA A/B    Collection Time: 02/11/22  1:35 AM   Result Value Ref Range    SARS-CoV-2 Not detected NOTD      Influenza A by PCR Not detected NOTD      Influenza B by PCR Not detected NOTD     GLUCOSE, POC    Collection Time: 02/11/22  6:31 AM   Result Value Ref Range    Glucose (POC) 120 (H) 65 - 117 mg/dL    Performed by Albertina Acuña        EKG: NSR 72 bpm, SA, Low Voltage, NSSTTWC     Radiology:  CTA CODE NEURO HEAD AND NECK W CONT         XR CHEST PORT   Final Result   No acute process. CT CODE NEURO HEAD WO CONTRAST   Final Result   No acute intracranial abnormality. 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       MRI BRAIN WO CONT    (Results Pending)     CONSULTS:  Dr. Mila Jones Neurology pending    Care Plan discussed with:   Patient x    Family     RN x         Consultant      Expected  Disposition:   Home with Family x   HH/PT/OT/RN    SNF/LTC    [place]      TOTAL TIME:  60 Minutes      Comments    x Reviewed previous records   >50% of visit spent in counseling and coordination of care x Discussion with patient and/or family and questions answered       _______________________________________________________  Given the patient's current clinical presentation, I have a high level of concern for decompensation if discharged from the emergency department. Complex decision making was performed, which includes reviewing the patient's available past medical records, laboratory results, and x-ray films. My assessment of this patient's clinical condition and my plan of care is as follows.     ASSESSMENT / PLAN    Principal Problem:    TIA (transient ischemic attack) (12/6/2021)  Active Problems:    History of CVA (cerebrovascular accident) (10/24/2020)  Patient admitted for a transient episode of expressive aphasia last evening  CT and CTA head and neck not reveal any acute stroke  Further work-up today with echo and MRI  Continue ASA 81 mg twice daily, Lipitor 80 mg daily, treatment of blood pressure and diabetes  Blood sugar 4 times daily before meals and at bedtime, hemoglobin A1c  PT OT and speech therapy  Discharge planning  Neurology evaluation by Dr. Jodee Mendoza      Well controlled type 2 diabetes mellitus ([facility] [address] (7/24/2017)  Blood glucose 4 times daily before meals and at bedtime  She was treated with Januvia 100 mg daily prior to admission-pharmacy has substituted Alogliptin 12.5 mg      Elevated cholesterol (8/4/2016)  Assess lipid profile  Maintain Lipitor 80 mg each evening      Essential hypertension (3/17/2017)  Follow pressure control-systolics less than 044  Maintain lisinopril 20 mg twice daily, Toprol-XL 25 mg daily      Tobacco use (2/20/2020)  Strong encouragement for abstinence  Of for NicoDerm patch if necessary      Stage 3 chronic kidney disease ([facility] [address] (2/23/2020)  Stable, follow      Allergic rhinitis (2/11/2022)  Maintain Xyzal on discharge-pharmacy on giving Zyrtec       Depression  Continue Effexor  mg daily      SAFETY:   Code Status:DNR  DVT prophylaxis:Lovenox  Stress Ulcer prophylaxis: Not Indicated  Bladder catheter:no  Family Contact Info:  Primary Emergency Contact: [address] [facility], [place] Phone: [phone]: [facility] Room 120/01  Disposition: TBD, likely home when stable  Admission status:  Observation    -Tentative plan of care discussed with patient / family, who demonstrated understanding and is in agreement to the above  -Case was reviewed with the ED Provider, Angy Correa, [address], MD  [facility] Hospitalist  [phone]

## 2022-02-11 NOTE — PROGRESS NOTES
Care Management Interventions  PCP Verified by CM: Yes Chidi Qiu, NADER)  Last Visit to PCP: 02/07/22  Palliative Care Criteria Met (RRAT>21 & CHF Dx)?: No (No MD order for Palliative Care)  Transition of Care Consult (CM Consult): [address]: No  Reason Outside [place]: Out of service area  Discharge Durable Medical Equipment: No  Physical Therapy Consult: Yes  Occupational Therapy Consult: Yes  Speech Therapy Consult: Yes  Support Systems: Child(roque) (Patient lives alone but has supportive family)  Confirm Follow Up Transport: Family  [employer] Information Provided?: No  Discharge Location  Patient Expects to be Discharged to[de-identified] Home with home health     Met with the patient to review and discuss plan of care and disposition plans. Patient lives at home alone but have family coming in and out at times. She is independent with ADLs/IADLs and manages ok at home. She goes to [facility] once a week for group therapy. Patient did state that she was not feeling well and when asked why she stated that she was scared and nervous but could not explain why. Patient does have a history of depression. Patient has a cane to use at home if needed. Patient is admitted to Observation status. [facility] Observation Notification notice explained to the patient. She verbalizes understanding. Copy to her and copy placed in her chart. Care Management letter with contact information given to the patient and encouraged her to call if needed. Patient already has an Advanced Directive naming her Primary and [address].     Reason for Admission:  TIA                     RUR Score:       NA  Observation              Plan for utilizing home health:    Yes      PCP: First and Last name:  Juany Cee NP    Name of Practice: [facility]  Are you a current patient: Yes/No: YES  Approximate date of last visit: 2/7/22  Can you participate in a virtual visit with your PCP: NO                    Current Advanced Directive/Advance Care Plan: DNR      Healthcare Decision Maker:   Click here to complete [address] including selection of the Healthcare Decision Maker Relationship (ie \"Primary\")             Primary Decision MakerWinsaroj Reyes - [phone]    Secondary Decision Maker: Wendie Reyes - [phone]                  Transition of Care Plan:   Home with home health    Fran 13 (ACP) Conversation      Date of Conversation: 2/10/2022  Conducted with: Patient with Decision Making Capacity    Healthcare Decision Maker:     Primary Decision Maker: Robinson Reyes - [phone]    Secondary Decision Maker: Wendie Reyes - [phone]  Click here to complete [address] including selection of the Healthcare Decision Maker Relationship (ie \"Primary\")      Today we documented Decision Maker(s) consistent with Legal Next of Kin hierarchy. Content/Action Overview: Has ACP document(s) on file - reflects the patient's care preferences  Reviewed DNR/DNI and patient elects Full Code (Attempt Resuscitation) Full code elected because there is no DDNR on the chart to substantiate the DNR Order.   Topics discussed: NA  Additional Comments: NA     Length of Voluntary ACP Conversation in minutes:  20 minutes    Syed Willson

## 2022-02-12 VITALS
TEMPERATURE: 98.2 F | HEIGHT: 66 IN | HEART RATE: 72 BPM | WEIGHT: 164 LBS | RESPIRATION RATE: 18 BRPM | BODY MASS INDEX: 26.36 KG/M2 | SYSTOLIC BLOOD PRESSURE: 128 MMHG | OXYGEN SATURATION: 96 % | DIASTOLIC BLOOD PRESSURE: 74 MMHG

## 2022-02-12 LAB
ANION GAP SERPL CALC-SCNC: 9 MMOL/L (ref 5–15)
BASOPHILS # BLD: 0.1 K/UL (ref 0–0.1)
BASOPHILS NFR BLD: 1 % (ref 0–1)
BUN SERPL-MCNC: 13 MG/DL (ref 6–20)
BUN/CREAT SERPL: 13 (ref 12–20)
CALCIUM SERPL-MCNC: 8.4 MG/DL (ref 8.5–10.1)
CHLORIDE SERPL-SCNC: 106 MMOL/L (ref 97–108)
CHOLEST SERPL-MCNC: 107 MG/DL
CO2 SERPL-SCNC: 30 MMOL/L (ref 21–32)
CREAT SERPL-MCNC: 1.02 MG/DL (ref 0.55–1.02)
DIFFERENTIAL METHOD BLD: ABNORMAL
EOSINOPHIL # BLD: 0.3 K/UL (ref 0–0.4)
EOSINOPHIL NFR BLD: 4 % (ref 0–7)
ERYTHROCYTE [DISTWIDTH] IN BLOOD BY AUTOMATED COUNT: 12.5 % (ref 11.5–14.5)
GLUCOSE BLD STRIP.AUTO-MCNC: 106 MG/DL (ref 65–117)
GLUCOSE SERPL-MCNC: 96 MG/DL (ref 65–100)
HCT VFR BLD AUTO: 43.6 % (ref 35–47)
HDLC SERPL-MCNC: 56 MG/DL
HDLC SERPL: 1.9 {RATIO} (ref 0–5)
HGB BLD-MCNC: 13.7 G/DL (ref 11.5–16)
IMM GRANULOCYTES # BLD AUTO: 0.1 K/UL (ref 0–0.04)
IMM GRANULOCYTES NFR BLD AUTO: 1 % (ref 0–0.5)
LDLC SERPL CALC-MCNC: 38 MG/DL (ref 0–100)
LYMPHOCYTES # BLD: 2.6 K/UL (ref 0.8–3.5)
LYMPHOCYTES NFR BLD: 30 % (ref 12–49)
MAGNESIUM SERPL-MCNC: 2 MG/DL (ref 1.6–2.4)
MCH RBC QN AUTO: 29.8 PG (ref 26–34)
MCHC RBC AUTO-ENTMCNC: 31.4 G/DL (ref 30–36.5)
MCV RBC AUTO: 95 FL (ref 80–99)
MONOCYTES # BLD: 0.8 K/UL (ref 0–1)
MONOCYTES NFR BLD: 8 % (ref 5–13)
NEUTS SEG # BLD: 5.1 K/UL (ref 1.8–8)
NEUTS SEG NFR BLD: 56 % (ref 32–75)
NRBC # BLD: 0 K/UL (ref 0–0.01)
NRBC BLD-RTO: 0 PER 100 WBC
PLATELET # BLD AUTO: 367 K/UL (ref 150–400)
PMV BLD AUTO: 9.9 FL (ref 8.9–12.9)
POTASSIUM SERPL-SCNC: 4.1 MMOL/L (ref 3.5–5.1)
RBC # BLD AUTO: 4.59 M/UL (ref 3.8–5.2)
SERVICE CMNT-IMP: NORMAL
SODIUM SERPL-SCNC: 145 MMOL/L (ref 136–145)
TRIGL SERPL-MCNC: 65 MG/DL (ref ?–150)
VLDLC SERPL CALC-MCNC: 13 MG/DL
WBC # BLD AUTO: 9 K/UL (ref 3.6–11)

## 2022-02-12 PROCEDURE — 80061 LIPID PANEL: CPT

## 2022-02-12 PROCEDURE — 94760 N-INVAS EAR/PLS OXIMETRY 1: CPT

## 2022-02-12 PROCEDURE — 74011250636 HC RX REV CODE- 250/636: Performed by: INTERNAL MEDICINE

## 2022-02-12 PROCEDURE — 83735 ASSAY OF MAGNESIUM: CPT

## 2022-02-12 PROCEDURE — 74011000250 HC RX REV CODE- 250: Performed by: INTERNAL MEDICINE

## 2022-02-12 PROCEDURE — 80048 BASIC METABOLIC PNL TOTAL CA: CPT

## 2022-02-12 PROCEDURE — G0378 HOSPITAL OBSERVATION PER HR: HCPCS

## 2022-02-12 PROCEDURE — 74011250637 HC RX REV CODE- 250/637: Performed by: INTERNAL MEDICINE

## 2022-02-12 PROCEDURE — 36415 COLL VENOUS BLD VENIPUNCTURE: CPT

## 2022-02-12 PROCEDURE — 82962 GLUCOSE BLOOD TEST: CPT

## 2022-02-12 PROCEDURE — 85025 COMPLETE CBC W/AUTO DIFF WBC: CPT

## 2022-02-12 RX ORDER — CLOPIDOGREL BISULFATE 75 MG/1
75 TABLET ORAL DAILY
Qty: 30 TABLET | Refills: 0 | Status: SHIPPED | OUTPATIENT
Start: 2022-02-13 | End: 2022-03-09

## 2022-02-12 RX ORDER — ASPIRIN 81 MG/1
81 TABLET ORAL DAILY
Qty: 90 TABLET | Refills: 1 | Status: SHIPPED | OUTPATIENT
Start: 2022-02-12

## 2022-02-12 RX ORDER — METOPROLOL SUCCINATE 50 MG/1
50 TABLET, EXTENDED RELEASE ORAL DAILY
Qty: 30 TABLET | Refills: 0 | Status: SHIPPED | OUTPATIENT
Start: 2022-02-13 | End: 2022-03-14

## 2022-02-12 RX ORDER — METOPROLOL SUCCINATE 50 MG/1
50 TABLET, EXTENDED RELEASE ORAL DAILY
Status: DISCONTINUED | OUTPATIENT
Start: 2022-02-12 | End: 2022-02-12 | Stop reason: HOSPADM

## 2022-02-12 RX ORDER — CLOPIDOGREL BISULFATE 75 MG/1
75 TABLET ORAL DAILY
Status: DISCONTINUED | OUTPATIENT
Start: 2022-02-12 | End: 2022-02-12 | Stop reason: HOSPADM

## 2022-02-12 RX ORDER — FLUTICASONE PROPIONATE 50 MCG
2 SPRAY, SUSPENSION (ML) NASAL AS NEEDED
Qty: 1 EACH | Refills: 0 | Status: SHIPPED
Start: 2022-02-12

## 2022-02-12 RX ADMIN — CETIRIZINE HYDROCHLORIDE 10 MG: 10 TABLET, FILM COATED ORAL at 08:21

## 2022-02-12 RX ADMIN — SODIUM CHLORIDE, PRESERVATIVE FREE 10 ML: 5 INJECTION INTRAVENOUS at 08:21

## 2022-02-12 RX ADMIN — VENLAFAXINE HYDROCHLORIDE 150 MG: 150 CAPSULE, EXTENDED RELEASE ORAL at 08:21

## 2022-02-12 RX ADMIN — CLOPIDOGREL BISULFATE 75 MG: 75 TABLET ORAL at 08:20

## 2022-02-12 RX ADMIN — ENOXAPARIN SODIUM 40 MG: 100 INJECTION SUBCUTANEOUS at 08:20

## 2022-02-12 RX ADMIN — ASPIRIN 81 MG: 81 TABLET, COATED ORAL at 08:20

## 2022-02-12 RX ADMIN — ALOGLIPTIN 12.5 MG: 6.25 TABLET, FILM COATED ORAL at 08:21

## 2022-02-12 RX ADMIN — LISINOPRIL 20 MG: 20 TABLET ORAL at 08:20

## 2022-02-12 RX ADMIN — ATORVASTATIN CALCIUM 80 MG: 40 TABLET, FILM COATED ORAL at 08:21

## 2022-02-12 RX ADMIN — METOPROLOL SUCCINATE 50 MG: 50 TABLET, EXTENDED RELEASE ORAL at 08:21

## 2022-02-12 NOTE — PROGRESS NOTES
Problem: Falls - Risk of  Goal: *Absence of Falls  Description: Document Sumanth Counts Fall Risk and appropriate interventions in the flowsheet.   Outcome: Progressing Towards Goal  Note: Fall Risk Interventions:  Mobility Interventions: Bed/chair exit alarm,Patient to call before getting OOB         Medication Interventions: Bed/chair exit alarm,Patient to call before getting OOB         History of Falls Interventions: Bed/chair exit alarm,Door open when patient unattended         Problem: Patient Education: Go to Patient Education Activity  Goal: Patient/Family Education  Outcome: Progressing Towards Goal     Problem: Patient Education: Go to Patient Education Activity  Goal: Patient/Family Education  Outcome: Progressing Towards Goal     Problem: TIA/CVA Stroke: Day 2 Until Discharge  Goal: Activity/Safety  Outcome: Progressing Towards Goal  Goal: Diagnostic Test/Procedures  Outcome: Progressing Towards Goal  Goal: Nutrition/Diet  Outcome: Progressing Towards Goal  Goal: Medications  Outcome: Progressing Towards Goal  Goal: Respiratory  Outcome: Progressing Towards Goal  Goal: Treatments/Interventions/Procedures  Outcome: Progressing Towards Goal  Goal: Psychosocial  Outcome: Progressing Towards Goal  Goal: *Verbalizes anxiety and depression are reduced or absent  Outcome: Progressing Towards Goal  Goal: *Absence of aspiration  Outcome: Progressing Towards Goal  Goal: *Absence of deep venous thrombosis signs and symptoms(Stroke Metric)  Outcome: Progressing Towards Goal  Goal: *Optimal pain control at patient's stated goal  Outcome: Progressing Towards Goal  Goal: *Tolerating diet  Outcome: Progressing Towards Goal  Goal: *Ability to perform ADLs and demonstrates progressive mobility and function  Outcome: Progressing Towards Goal  Goal: *Stroke education continued(Stroke Metric)  Outcome: Progressing Towards Goal     Problem: Diabetes Self-Management  Goal: *Disease process and treatment process  Description: Define diabetes and identify own type of diabetes; list 3 options for treating diabetes. Outcome: Progressing Towards Goal  Goal: *Incorporating nutritional management into lifestyle  Description: Describe effect of type, amount and timing of food on blood glucose; list 3 methods for planning meals. Outcome: Progressing Towards Goal  Goal: *Incorporating physical activity into lifestyle  Description: State effect of exercise on blood glucose levels. Outcome: Progressing Towards Goal  Goal: *Developing strategies to promote health/change behavior  Description: Define the ABC's of diabetes; identify appropriate screenings, schedule and personal plan for screenings. Outcome: Progressing Towards Goal  Goal: *Using medications safely  Description: State effect of diabetes medications on diabetes; name diabetes medication taking, action and side effects. Outcome: Progressing Towards Goal  Goal: *Monitoring blood glucose, interpreting and using results  Description: Identify recommended blood glucose targets  and personal targets. Outcome: Progressing Towards Goal  Goal: *Prevention, detection, treatment of acute complications  Description: List symptoms of hyper- and hypoglycemia; describe how to treat low blood sugar and actions for lowering  high blood glucose level. Outcome: Progressing Towards Goal  Goal: *Developing strategies to address psychosocial issues  Description: Describe feelings about living with diabetes; identify support needed and support network  Outcome: Progressing Towards Goal     Problem: Patient Education: Go to Patient Education Activity  Goal: Patient/Family Education  Outcome: Progressing Towards Goal     Problem: Pressure Injury - Risk of  Goal: *Prevention of pressure injury  Description: Document Malachi Scale and appropriate interventions in the flowsheet.   Outcome: Progressing Towards Goal  Note: Pressure Injury Interventions:  Sensory Interventions: Assess changes in LOC    Moisture Interventions: Absorbent underpads    Activity Interventions: Increase time out of bed    Mobility Interventions: PT/OT evaluation    Nutrition Interventions: Document food/fluid/supplement intake                     Problem: Patient Education: Go to Patient Education Activity  Goal: Patient/Family Education  Outcome: Progressing Towards Goal     Problem: TIA/CVA Stroke: 0-24 hours  Goal: *Rehab consulted(Stroke Metric)  Outcome: Resolved/Not Met     Problem: Diabetes Self-Management  Goal: *Insulin pump training  Outcome: Resolved/Not Met     Problem: TIA/CVA Stroke: 0-24 hours  Goal: Activity/Safety  Outcome: Resolved/Met  Goal: Consults, if ordered  Outcome: Resolved/Met  Goal: Diagnostic Test/Procedures  Outcome: Resolved/Met  Goal: Nutrition/Diet  Outcome: Resolved/Met  Goal: Medications  Outcome: Resolved/Met  Goal: Respiratory  Outcome: Resolved/Met  Goal: Treatments/Interventions/Procedures  Outcome: Resolved/Met  Goal: Psychosocial  Outcome: Resolved/Met  Goal: *Hemodynamically stable  Outcome: Resolved/Met  Goal: *Neurologically stable  Description: Absence of additional neurological deficits    Outcome: Resolved/Met  Goal: *Verbalizes anxiety and depression are reduced or absent  Outcome: Resolved/Met  Goal: *Absence of Signs of Aspiration on Current Diet  Outcome: Resolved/Met  Goal: *Absence of deep venous thrombosis signs and symptoms(Stroke Metric)  Outcome: Resolved/Met  Goal: *Ability to perform ADLs and demonstrates progressive mobility and function  Outcome: Resolved/Met  Goal: *Stroke education started(Stroke Metric)  Outcome: Resolved/Met  Goal: *Dysphagia screen performed(Stroke Metric)  Outcome: Resolved/Met

## 2022-02-12 NOTE — PROGRESS NOTES
Bedside and Verbal shift change report given to WILFRID Avila RN (oncoming nurse) by Shelby De Los Santos RN (offgoing nurse). Report included the following information SBAR, Kardex, MAR, Accordion and Recent Results.

## 2022-02-12 NOTE — PROGRESS NOTES
Bedside and Verbal shift change report given to Elfego Hall RN (oncoming nurse) by [place] [employer] [place]. Belkis RN (offgoing nurse). Report included the following information SBAR and Kardex.

## 2022-02-12 NOTE — DISCHARGE INSTRUCTIONS
Hospitalist Recommendations    Reduce Aspirin 81mg to once daily  Add Plavix 75mg daily (another blood thinner)  Advance Toprol XL to 50mg daily    Continue Lisinopril, Xyzal, Effexor XR, Januvia, and Lipitor    Need to get better BP control,  Need systolic BP <263    PA MD OCTAVIO

## 2022-02-12 NOTE — DISCHARGE SUMMARY
[facility] Hospitalist Discharge Summary    Patient ID:  Juan Jiménez  [id number]  82 y.o.  1935    PCP on record: Andre De La Fuente NP    Admit date: 2/10/2022  Discharge date and time: 2/12/2022     DISCHARGE DIAGNOSIS:    Principal Problem:    TIA (transient ischemic attack) (12/6/2021)    Active Problems:    History of CVA (cerebrovascular accident) (10/24/2020)    Well controlled type 2 diabetes mellitus ([facility] (7/24/2017)    Elevated cholesterol (8/4/2016)    Essential hypertension (3/17/2017)    Tobacco use (2/20/2020)    Stage 3 chronic kidney disease ([facility] [address] (2/23/2020)    Allergic rhinitis (2/11/2022)    CONSULTATIONS:  IP CONSULT TO NEUROLOGY    Transient neurological deficit. Brain mri with no evidence of an acute stroke. This is most consistent with a TIA  Risk factors include htn, diabetes, htn, smoking. Continue aspiri n 81 mg. Add plavix. 75 mg daily. I discussed this with the patient today. Htn: aggressive control with goal sbp < 140  Dyslipidemia: continue aggressive statin therapy. Goal LDL < 70. Updated ldl is pending  Diabetes:  continue aggressive glycemic control  CTA with no flow limiting stenosis  Echo pending  Smoking cessation education was provided today. I provided stroke education to the patient today  <BR >Probable distal neuropathy:   This can be followed up in the neurology clinic after discharge. She may need a Emg/ncs. Most likely related to her diabetes.     There are no additional neurology recommendations at this time. The patient should follow up in the neurology clinic in 3-4 weeks time. Hever Orozco DO       Excerpted HPI from H&P of Casimiro Lora MD:  82 y.o. female presenting for admission to [facility] for further evaluation and treatment for TIA (transient ischemic attack).   She  has a past medical history of Allergic rhinitis due to pollen, BPV (benign positional vertigo), Change in bowel habits, Depression, Diabetes ([address] Dysuria, Hemorrhoids, Hypertension, IBS (irritable bowel syndrome), Menopause, Other diseases of nasal cavity and sinuses(478.19), and Vertigo. .      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 He was talking on the phone with a friend and suddenly was not able to produce words. She denied any visual complaint or focal weakness. She reports a history of a previous stroke seen on CT scan, however she never experienced any stroke symptoms. She does report a history of hypertension and hyperlipidemia on medical treatment. She has a history of anxiety and depression with fair control on current treatment with Effexor XR. She has been taking aspirin 81 mg twice daily for her circulation. I am she feels she is taking excessive amounts of blood pressure medication.     She came to the ER promptly with concern over her symptoms. The symptoms have resolved by the time of presentation. She underwent CT and CTA which did not reveal any evidence acute stroke, however did confirm the previous cerebellar stroke on the right side. This morning she denies any problems with weakness, loss of feeling, visual disturbance, or speech deficit.    ______________________________________________________________________  DISCHARGE SUMMARY/HOSPITAL COURSE:  for full details see H&P, daily progress notes, labs, consult notes.      Principal Problem:    TIA (transient ischemic attack) (12/6/2021)  Active Problems:    History of CVA (cerebrovascular accident) (10/24/2020)  Brief 5-minute episode of speech deficit  Was unable to produce the words she attempted, observed by her friend on the phone  Cleared before being assessed by EMS or emergency department  No speech or focal neurologic deficit was appreciated on exam  Radiographic evaluation noted the old right cerebellar infarct  Consulted teleneurology in the ED as well as neurology in-house with  Vota  Patient will maintain aspirin 81 mg daily and Lipitor 80 mg daily. To add Plavix 75 mg daily and advance Toprol-XL from 25 to 50 mg daily to improve blood pressure control  Plan follow-up with neurology through PCP  Abstain from tobacco use      Well controlled type 2 diabetes mellitus ([facility] (7/24/2017)  Follow with PCP. Resume home treatment Januvia 100 mg daily  Last A1c 6.8 August 2021   on discharge      Elevated cholesterol (8/4/2016)  Maintain Lipitor 80 mg daily  Current labs noted LDL 38 and HDL 56      Essential hypertension (3/17/2017)  Blood pressure is mildly elevated during the admission  Patient will advance Toprol-XL from 25 up to 50 mg daily  Maintain current lisinopril 20 mg twice daily  Need systolic blood pressure below 140      Tobacco use (2/20/2020)  Strongly discouraged. Offered assistance with Wellbutrin, NicoDerm CQ, Xanax if desired      Stage 3 chronic kidney disease ([facility] (2/23/2020)  Stable, follow renal function. Avoid toxins. Allergic rhinitis (2/11/2022)  Resume PTA treatment with Xyzal and as needed Flonase          _______________________________________________________________________  Patient seen and examined by me on discharge day. Pertinent Findings:  Visit Vitals  BP (!) 178/90 (BP 1 Location: Right upper arm, BP Patient Position: At rest)   Pulse 68   Temp 98.4 °F (36.9 °C)   Resp 20   Ht 5' 6\" (1.676 m)   Wt 74.4 kg (164 lb)   SpO2 94%   BMI 26.47 kg/m²     Gen:    Not in distress  Chest: Nonlabored respiration, Clear lungs  CVS:   Regular rhythm. No edema  Abd:  Soft, not distended, not tender  Neuro:  Alert, nonfocal, weak.   Speech normal.    LABS:  Results for Glenis Collet (MRN [id number]) as of 2/12/2022 10:14   2/10/2022 22:39 2/11/2022 06:31 2/11/2022 11:38 2/11/2022 17:19 2/11/2022 20:25 2/12/2022 06:19   GLU-  (H) 120 (H) 157 (H) 89 212 (H) 106     Results for Glenis Collet (MRN [id number]) as of 2/12/2022 10:14   2/10/2022 23:05 2/12/2022 06:40   WBC 8.8 9.0   NRBC 0.0 0.0   RBC 4.59 4.59   HGB 13.9 13.7   HCT 42.3 43.6   MCV 92.2 95.0   MCH 30.3 29.8   MCHC 32.9 31.4   RDW 12.4 12.5   PLATELET 994 195   MPV 9.5 9.9   NEUTROPHILS 59 56   LYMPHOCYTE 29 30   MONOCYTES 7 8   EOSINOPHILS 3 4     Results for Serena Jones (MRN [id number]) as of 2/12/2022 10:14   2/11/2022 00:30   Color YELLOW/STRAW   Appearance CLEAR   Specific gravity <1.005   pH (UA) 6.5   Protein Negative   Glucose Negative   Ketone Negative   Blood Negative   Bilirubin Negative   Urobilinogen 0.2   Nitrites Negative   Leukocyte Esterase Negative   Epithelial cells FEW   WBC 0-4   RBC 0-5   Bacteria 1+ (A)     Results for Serena Jones (MRN [id number]) as of 2/12/2022 10:14   2/10/2022 23:05   INR 1.0   Pro time 10.1     Results for Serena Jones (MRN [id number]) as of 2/12/2022 10:14   2/10/2022 23:05 2/12/2022 06:40   Sodium 142 145   Potassium 5.1 4.1   Chloride 104 106   CO2 30 30   Anion gap 8 9   Glucose 124 (H) 96   BUN 14 13   Creatinine 0.96 1.02   BUN/Cr ratio 15 13   Calcium 8.6 8.4 (L)   Magnesium  2.0   GFR est AA >60 >60   Bilirubin, total 0.6    Protein, total 7.4    Albumin 3.2 (L)    Globulin 4.2 (H)    A-G Ratio 0.8 (L)    ALT 27    AST 36    Alk. phos 84    Troponin-HS 9      Results for Serena Jones (MRN [id number]) as of 2/12/2022 11:46   2/12/2022 06:40   Triglyceride 65   Chol, total 107   HDL Chol 56   CHOL/HDL  1.9   VLDL, calc 13   LDL, calc 38     Results for Serena Jones (MRN [id number]) as of 2/12/2022 10:14   2/11/2022 01:35   Influenza A by PCR Not detected   Influenza B by PCR Not detected   SARS-COV-2  PCR NOT DETECTED     RADIOLOGY:  CT HEAD 2/10  There is an old infarction in the inferoposterior, right cerebellum. Periventricular white matter hypodensity is nonspecific, but consistent with  chronic small vessel ischemic disease. The ventricles and sulci are appropriate  in size and configuration for age.  No ill-defined loss of gray-white  differentiation to suggest late acute or early subacute infarction. No mass  effect or intracranial hemorrhage.   IMPRESSION:  No acute intracranial abnormality. 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 CTA HEAD / NECK 2/10:  1. No acute vascular abnormality, no large vessel occlusion. No hemodynamically  significant stenosis. 2. 4 mm nodule in the right upper lobe. If the patient is judged high risk for  lung cancer (smoking history, history of other malignancy, etc.) recommend  follow-up CT in 12 months to assess for stability. If the patient is judged low  risk, no further follow-up is required. pCXR 2/10:  AP portable view of the chest demonstrates normal heart size. Right upper  paratracheal fullness at the thoracic inlet could be due to tortuous vessels  seen on prior CT. There is no edema, effusion, consolidation, or pneumothorax. The osseous structures are unremarkable.   IMPRESSION:  No acute process. MRI BRAIN 2/11:  1. No acute findings. 2. Chronic right cerebellar infarct. Moderate signal abnormality in the cerebral  white matter and arian consistent with chronic small vessel ischemic change. ECHO 2/11:    Left Ventricle: Left ventricle size is normal. Normal wall thickness. Normal wall motion. Normal left ventricular systolic function with a visually estimated EF of 60 - 65%. Normal diastolic function.   Mitral Valve: Mild transvalvular regurgitation. EKG: NSR 72 bpm, SA, Low Voltage, NSSTTWC       _______________________________________________________________________  DISCHARGE MEDICATIONS:   Current Discharge Medication List      START taking these medications    Details   clopidogreL (PLAVIX) 75 mg tab Take 1 Tablet by mouth daily.   Qty: 30 Tablet, Refills: 0  Start date: 2/13/2022         CONTINUE these medications which have CHANGED    Details   fluticasone propionate (FLONASE) 50 mcg/actuation nasal spray 2 Sprays by Both Nostrils route as needed for Allergies. ONLY AS NEEDED  Qty: 1 Each, Refills: 0  Start date: 2/12/2022      metoprolol succinate (TOPROL-XL) 50 mg XL tablet Take 1 Tablet by mouth daily. Qty: 30 Tablet, Refills: 0  Start date: 2/13/2022      aspirin delayed-release 81 mg tablet Take 1 Tablet by mouth daily. Qty: 90 Tablet, Refills: 1  Start date: 2/12/2022         CONTINUE these medications which have NOT CHANGED    Details   venlafaxine-SR (EFFEXOR-XR) 150 mg capsule Take 150 mg by mouth daily. lisinopriL (PRINIVIL, ZESTRIL) 20 mg tablet Take 1 Tablet by mouth two (2) times a day. Qty: 180 Tablet, Refills: 3      atorvastatin (LIPITOR) 80 mg tablet Take 1 Tablet by mouth daily. Qty: 90 Tablet, Refills: 0      Januvia 100 mg tablet TAKE 1 TABLET BY MOUTH EVERY DAY  Qty: 90 Tablet, Refills: 1      levocetirizine (XYZAL) 5 mg tablet TAKE 1 TABLET BY MOUTH DAILY AS NEEDED FOR ALLERGY SYMPTOMS  Qty: 90 Tablet, Refills: 2    Associated Diagnoses: Environmental and seasonal allergies      acetaminophen (TylenoL) 325 mg tablet Take  by mouth every four (4) hours as needed for Pain.          STOP taking these medications       omeprazole (PRILOSEC) 20 mg capsule Comments:   Reason for Stopping:               My Recommended  Diet: Diabetic Cardiac  Activity: Ad Yenni  Wound Care: none  Follow-up labs: routine    Hospitalist Recommendations    Reduce Aspirin 81mg to once daily  Add Plavix 75mg daily (another blood thinner)  Advance Toprol XL to 50mg daily    Continue Lisinopril, Xyzal, Effexor XR, Januvia, and Lipitor    Need to get better BP control,  Need systolic BP <698    JOSE CUNNINGHAM MD  [phone]  ______________________________________________________________________  DISPOSITION:    Home with Family: x   Home with HH/PT/OT/RN: x   SNF/LTC:    COOPER:    OTHER:        Condition at Discharge:  Stable  _____________________________________________________________________  Follow up with:   PCP : Barron Jackson NP  Follow-up Information     Follow up With Specialties Details Why Contact Info    Maria Cifuentes NP Nurse Practitioner In 1 week Follow progress - adjusted medications [address] [facility]  [phone]            Total time in minutes spent coordinating this discharge (includes going over instructions, follow-up, prescriptions, and preparing report for sign off to her PCP) :35 minutes    Signed:  Roxi Bain MD  [facility] Hospitalist  [phone]

## 2022-02-12 NOTE — PROGRESS NOTES
Problem: Falls - Risk of  Goal: *Absence of Falls  Description: Document Junior Read Fall Risk and appropriate interventions in the flowsheet.   Outcome: Resolved/Met  Note: Fall Risk Interventions:  Mobility Interventions: Bed/chair exit alarm    Mentation Interventions: Bed/chair exit alarm,Door open when patient unattended    Medication Interventions: Bed/chair exit alarm,Patient to call before getting OOB         History of Falls Interventions: Bed/chair exit alarm,Door open when patient unattended         Problem: Patient Education: Go to Patient Education Activity  Goal: Patient/Family Education  Outcome: Resolved/Met     Problem: Patient Education: Go to Patient Education Activity  Goal: Patient/Family Education  Outcome: Resolved/Met     Problem: TIA/CVA Stroke: 0-24 hours  Goal: Off Pathway (Use only if patient is Off Pathway)  Outcome: Resolved/Met  Goal: Discharge Planning  Outcome: Resolved/Met  Goal: Minimize risk of bleeding post-thrombolytic infusion  Outcome: Resolved/Met  Goal: Monitor for complications post-thrombolytic infusion  Outcome: Resolved/Met     Problem: TIA/CVA Stroke: Day 2 Until Discharge  Goal: Off Pathway (Use only if patient is Off Pathway)  Outcome: Resolved/Met  Goal: Activity/Safety  Outcome: Resolved/Met  Goal: Diagnostic Test/Procedures  Outcome: Resolved/Met  Goal: Nutrition/Diet  Outcome: Resolved/Met  Goal: Discharge Planning  Outcome: Resolved/Met  Goal: Medications  Outcome: Resolved/Met  Goal: Respiratory  Outcome: Resolved/Met  Goal: Treatments/Interventions/Procedures  Outcome: Resolved/Met  Goal: Psychosocial  Outcome: Resolved/Met  Goal: *Verbalizes anxiety and depression are reduced or absent  Outcome: Resolved/Met  Goal: *Absence of aspiration  Outcome: Resolved/Met  Goal: *Absence of deep venous thrombosis signs and symptoms(Stroke Metric)  Outcome: Resolved/Met  Goal: *Optimal pain control at patient's stated goal  Outcome: Resolved/Met  Goal: *Tolerating diet  Outcome: Resolved/Met  Goal: *Ability to perform ADLs and demonstrates progressive mobility and function  Outcome: Resolved/Met  Goal: *Stroke education continued(Stroke Metric)  Outcome: Resolved/Met     Problem: Ischemic Stroke: Discharge Outcomes  Goal: *Verbalizes anxiety and depression are reduced or absent  Outcome: Resolved/Met  Goal: *Verbalize understanding of risk factor modification(Stroke Metric)  Outcome: Resolved/Met  Goal: *Hemodynamically stable  Outcome: Resolved/Met  Goal: *Absence of aspiration pneumonia  Outcome: Resolved/Met  Goal: *Aware of needed dietary changes  Outcome: Resolved/Met  Goal: *Verbalize understanding of prescribed medications including anti-coagulants, anti-lipid, and/or anti-platelets(Stroke Metric)  Outcome: Resolved/Met  Goal: *Tolerating diet  Outcome: Resolved/Met  Goal: *Aware of follow-up diagnostics related to anticoagulants  Outcome: Resolved/Met  Goal: *Ability to perform ADLs and demonstrates progressive mobility and function  Outcome: Resolved/Met  Goal: *Absence of DVT(Stroke Metric)  Outcome: Resolved/Met  Goal: *Absence of aspiration  Outcome: Resolved/Met  Goal: *Optimal pain control at patient's stated goal  Outcome: Resolved/Met  Goal: *Home safety concerns addressed  Outcome: Resolved/Met  Goal: *Describes available resources and support systems  Outcome: Resolved/Met  Goal: *Verbalizes understanding of activation of EMS(911) for stroke symptoms(Stroke Metric)  Outcome: Resolved/Met  Goal: *Understands and describes signs and symptoms to report to providers(Stroke Metric)  Outcome: Resolved/Met  Goal: *Neurolgocially stable (absence of additional neurological deficits)  Outcome: Resolved/Met  Goal: *Verbalizes importance of follow-up with primary care physician(Stroke Metric)  Outcome: Resolved/Met  Goal: *Smoking cessation discussed,if applicable(Stroke Metric)  Outcome: Resolved/Met  Goal: *Depression screening completed(Stroke Metric)  Outcome: Resolved/Met     Problem: Diabetes Self-Management  Goal: *Disease process and treatment process  Description: Define diabetes and identify own type of diabetes; list 3 options for treating diabetes. Outcome: Resolved/Met  Goal: *Incorporating nutritional management into lifestyle  Description: Describe effect of type, amount and timing of food on blood glucose; list 3 methods for planning meals. Outcome: Resolved/Met  Goal: *Incorporating physical activity into lifestyle  Description: State effect of exercise on blood glucose levels. Outcome: Resolved/Met  Goal: *Developing strategies to promote health/change behavior  Description: Define the ABC's of diabetes; identify appropriate screenings, schedule and personal plan for screenings. Outcome: Resolved/Met  Goal: *Using medications safely  Description: State effect of diabetes medications on diabetes; name diabetes medication taking, action and side effects. Outcome: Resolved/Met  Goal: *Monitoring blood glucose, interpreting and using results  Description: Identify recommended blood glucose targets  and personal targets. Outcome: Resolved/Met  Goal: *Prevention, detection, treatment of acute complications  Description: List symptoms of hyper- and hypoglycemia; describe how to treat low blood sugar and actions for lowering  high blood glucose level. Outcome: Resolved/Met  Goal: *Prevention, detection and treatment of chronic complications  Description: Define the natural course of diabetes and describe the relationship of blood glucose levels to long term complications of diabetes.   Outcome: Resolved/Met  Goal: *Developing strategies to address psychosocial issues  Description: Describe feelings about living with diabetes; identify support needed and support network  Outcome: Resolved/Met  Goal: *Insulin pump training  Outcome: Resolved/Met  Goal: *Sick day guidelines  Outcome: Resolved/Met  Goal: *Patient Specific Goal (EDIT GOAL, INSERT TEXT)  Outcome: Resolved/Met     Problem: Patient Education: Go to Patient Education Activity  Goal: Patient/Family Education  Outcome: Resolved/Met     Problem: Pressure Injury - Risk of  Goal: *Prevention of pressure injury  Description: Document Malachi Scale and appropriate interventions in the flowsheet.   Outcome: Resolved/Met     Problem: Patient Education: Go to Patient Education Activity  Goal: Patient/Family Education  Outcome: Resolved/Met

## 2022-02-12 NOTE — PROGRESS NOTES
Discharge instructions reviewed with patient  To front entrance via wheelchair  Discharged home at [phone]

## 2022-02-16 ENCOUNTER — HOSPITAL ENCOUNTER (OUTPATIENT)
Dept: BEHAVIORAL/MENTAL HEALTH | Age: 87
Discharge: HOME | End: 2022-02-16
Payer: MEDICARE

## 2022-02-16 PROCEDURE — 99213 OFFICE O/P EST LOW 20 MIN: CPT | Performed by: PSYCHIATRY & NEUROLOGY

## 2022-02-16 PROCEDURE — 90853 GROUP PSYCHOTHERAPY: CPT

## 2022-02-16 NOTE — BH NOTES
[facility] Outpatient Program  Group Therapy  Date of service: 2/16/2021  Start time: 1100  Stop time: 1150  Type of session: Therapy Group    Problem number: 1Dep. F33.0    Short term goal (STG): Z Pt will identify an activity that has a high likelihood for pleasure and mastery to build into her relapse prevention plan and take steps to beginning the activity as a habit    Intervention/techniques: Informed, Validated/Supported, Prompted/Cued, Reinforced and Provided Feedback    Patient mental status/affect: Calm and Congruent    Patient behavior/appearance: Attentive, Cooperative and Withdrawn/Quiet    Special patient treatment accommodations provided (describe): none    Patient response and progress towards goals: Focus of session was based on the article 10 Signs you are Starting to Heal.  We spoke about the following: you no longer dwell on negative thoughts from others, you no longer feel afraid to ask for help, you are starting to feel again, you no longer beat yourself up about everything, you experience more better days that bad ones, you are gaining more control over your life, you are regaining your appetite, you no longer rely on others to make you happy, and you look forward to the future. We spoke about where group members are and what they are noticing are positive signs of recovery. Pt participated in the group activity and engaged with the other members. She shared that she had not been feeling well emotionally or medically and that is why she had been absent. She noted that she had not wanted to be around other people and socialize. She realized that was a change of behavior for her and was concerned. She stated she was feeling better, got herself together this morning and was glad she came.

## 2022-02-16 NOTE — BH NOTES
[facility] Outpatient Program  Group Therapy    Date of Service: 2/16/2022  Start time: 1200  Stop time: 1250  Type of session: Therapy Group    Problem number: 1. Dep F 33.0    Short term goal (STG): Y.  Pt will verbalize what she learned in past 6 months that helps to build her confidence in her ability to manage herself, and her mental health, once discharged from program.   Intervention/techniques: Informed, Validated/Supported, Observed/Monitored, Promoted Peer Support and Provided Feedback    Patient mental status/affect: Anxious, Congruent and Preoccupied    Patient behavior/appearance: Neatly Groomed, Attentive, Cooperative and Needed Prompting    Special patient treatment accommodations provided (describe): None    Patient response and progress towards goals: Focus of session was on effective communication in relationships using I statements.   We spoke about the difference between I statements and statements that are attacking of others and how each type of statement impacts a person and how it effects a relationship. I gave some examples of I statements and you statements and asked group to process them and think about the impact and how it would impact them. I asked group members to challenge some you statements and look for the I statement that could be used to replace it. We spoke about people who group members can work to use I statements and why that is important to that relationship. Daina Wright participated in group with prompting. She spoke about how she has really struggled emotionally since feeling so badly this past month. She spoke about how she have an increase in hopeless thoughts. She said she will continue to check in with her thoughts and feelings with her children when she is unable to get the support that she needs from group and the clinical intervention.

## 2022-02-16 NOTE — BH NOTES
[facility] Outpatient Program  Group Therapy    Date of Service: 2/16/2022  Start time: 1000  Stop time: 1050  Type of session: Therapy Group    Problem number: 1. Dep F 33.0    Short term goal (STG): X.  Pt will share instances over each week in which she was able to step back and observe her thoughts or feelings and any success in being able to name what she was experiencing in order to develop mindfulness skill.      Intervention/techniques: Informed, Validated/Supported, Modeled/Rehearsed, Prompted/Cued, Promoted Peer Support and Provided Feedback    Patient mental status/affect: Anxious, Congruent and Preoccupied    Patient behavior/appearance: Neatly Groomed, Attentive, Cooperative and Needed Prompting    Special patient treatment accommodations provided (describe): None    Patient response and progress towards goals: Focus of session was based on information from article from Dr. Eliane Salinas on [facility] Work.   I shared the information about how our brains have been created and how humans have operated since the beginning of time. We spoke about addiction and how the definition is typically: continued use despite adverse consequences. We spoke about how we create unhealthy behaviors and habits and why willpower does not work. I shared the authors encouragement to practice mindfulness and being aware and then to focus on getting curious with ourselves by asking questions like why am I doing this or about to do it? We spoke about the goal of finding a bigger and better reward overall and how this awareness can help make important changes that will impact our mental health and well-being. Rahel Asif participated in group with prompting. She spoke about how she knows that she needs to stay focused on doing healthy things. She spoke about how she will work on quitting smoking and she has only had 2 cigarettes since being here before she got sick with Diverticulitis.   She spoke about how she will continue to work on this as it does not help her and makes her feel worse with the action.

## 2022-02-16 NOTE — PROGRESS NOTES
SOP PROGRESS NOTE    INTERVAL HISTORY/FINDINGS:  States she's \"not feeling too good\" overall, mostly physically, but also affectively to a small degree. She's had a few medical events including a TIA less than a week ago, and consequently she's been more negative and worried, like she usually does re: her health. However, she's not become overtly depressed, and she agrees that we don't need to increase or adjust the Effexor at all. Her N/V functioning has been adequate, still, but she reports having ongoing tiredness and a poor appetite. No med SE's reported or noted at all and she's been very compliant with the Effexor XR. Overall, she's doing modeately well. MEDICATIONS:  Current Outpatient Medications   Medication Sig    fluticasone propionate (FLONASE) 50 mcg/actuation nasal spray 2 Sprays by Both Nostrils route as needed for Allergies. ONLY AS NEEDED    metoprolol succinate (TOPROL-XL) 50 mg XL tablet Take 1 Tablet by mouth daily.  clopidogreL (PLAVIX) 75 mg tab Take 1 Tablet by mouth daily.  aspirin delayed-release 81 mg tablet Take 1 Tablet by mouth daily.  venlafaxine-SR (EFFEXOR-XR) 150 mg capsule Take 150 mg by mouth daily.  lisinopriL (PRINIVIL, ZESTRIL) 20 mg tablet Take 1 Tablet by mouth two (2) times a day.  atorvastatin (LIPITOR) 80 mg tablet Take 1 Tablet by mouth daily.  Januvia 100 mg tablet TAKE 1 TABLET BY MOUTH EVERY DAY    levocetirizine (XYZAL) 5 mg tablet TAKE 1 TABLET BY MOUTH DAILY AS NEEDED FOR ALLERGY SYMPTOMS    acetaminophen (TylenoL) 325 mg tablet Take  by mouth every four (4) hours as needed for Pain. No current facility-administered medications for this encounter.      MENTAL STATUS UPDATE: (Positives in [place])  Appearance:   Neat   Disheveled  Odiferous   Appropriate  Orientation:   Time  Place  Person  Speech:   Coherent  Pressured  Garbled/Slurred  Loud   Soft  Mute  Memory:   Intact  Impaired (Recent  Remote)--Mild  Severe  Mood:   Euthymic  Depressed-mild  Anxious-mild  Elated  Affect:    Appropriate  Inappropriate  Labile  Blunted  Flat-mild  Thought Content:   Logical  Goal directed  Paranoid  Delusional  Illogical IOR [employer]  HI  Thought Process:   Coherent/linear  Tangential  Loose/Disorganized   Hallucinations:   None  Auditory  Visual  Tactile  Olfactory  Gustatory  Insight:   Good  Fair  Impaired      Judgment:   Good  Fair  Impaired     CONTINUED NEED FOR TREATMENT: (Positives in [place])  1. Continued psychiatric symptoms causing impairment in IDL  or functioning  2. Continued non-compliance with meds resulting in decompensation  3. High level of debilitation and symptoms with inadequate support  4. Continued need for structured care to adjust medications  5. Continued presence of debilitating symptoms  6. Continued presence of risk factors     CONTINUED NEED FOR GROUP PSYCHOTHERAPY TO ADDRESS THE FOLLOWING: (Positives in [place])  Psychiatric symptom management       Psychiatric relapse prevention    Anxiety management   Self-esteem issues      Poor decision making       Recent decompensation       Safety issues   Anger management     Self care/ADL's     Med/treatment compliance      Impaired boundaries/relationships   Assertiveness      Grief/loss resolution       Guilt/shame issues     Realistic goal setting     DIAGNOSTIC IMPRESSION:  1. 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 Continue the Venlafaxine  mg daily--has 4 months of refills (90 day)  2. Continue SOP treatment 1 x/week.

## 2022-02-18 NOTE — PROGRESS NOTES
TELEPHONE CONVERSATION: 2/18/22    Spoke with the patient by phone. She is requesting some help. Made a referral to [employer] and will initiate a prescreening/[facility] for personal care aides.

## 2022-02-23 ENCOUNTER — OFFICE VISIT (OUTPATIENT)
Dept: FAMILY MEDICINE CLINIC | Age: 87
End: 2022-02-23
Payer: MEDICARE

## 2022-02-23 ENCOUNTER — HOSPITAL ENCOUNTER (OUTPATIENT)
Dept: BEHAVIORAL/MENTAL HEALTH | Age: 87
Discharge: HOME | End: 2022-02-23
Payer: MEDICARE

## 2022-02-23 VITALS
SYSTOLIC BLOOD PRESSURE: 139 MMHG | RESPIRATION RATE: 18 BRPM | TEMPERATURE: 96 F | OXYGEN SATURATION: 97 % | HEIGHT: 66 IN | DIASTOLIC BLOOD PRESSURE: 68 MMHG | BODY MASS INDEX: 26.52 KG/M2 | HEART RATE: 76 BPM | WEIGHT: 165 LBS

## 2022-02-23 DIAGNOSIS — N18.9 CKD (CHRONIC KIDNEY DISEASE), SYMPTOM MANAGEMENT ONLY, UNSPECIFIED STAGE: ICD-10-CM

## 2022-02-23 DIAGNOSIS — F32.1 MODERATE SINGLE CURRENT EPISODE OF MAJOR DEPRESSIVE DISORDER (HCC): ICD-10-CM

## 2022-02-23 DIAGNOSIS — Z87.891 FORMER SMOKER: ICD-10-CM

## 2022-02-23 DIAGNOSIS — Z91.09 ENVIRONMENTAL ALLERGIES: ICD-10-CM

## 2022-02-23 DIAGNOSIS — M15.9 PRIMARY OSTEOARTHRITIS INVOLVING MULTIPLE JOINTS: ICD-10-CM

## 2022-02-23 DIAGNOSIS — Z92.29 HX OF LONG TERM USE OF BLOOD THINNERS: ICD-10-CM

## 2022-02-23 DIAGNOSIS — E11.9 WELL CONTROLLED TYPE 2 DIABETES MELLITUS (HCC): ICD-10-CM

## 2022-02-23 DIAGNOSIS — G45.9 TIA (TRANSIENT ISCHEMIC ATTACK): Primary | ICD-10-CM

## 2022-02-23 PROBLEM — N30.90 CYSTITIS: Status: RESOLVED | Noted: 2020-06-30 | Resolved: 2022-02-23

## 2022-02-23 PROBLEM — I63.9 CVA (CEREBRAL VASCULAR ACCIDENT) (HCC): Status: RESOLVED | Noted: 2020-06-30 | Resolved: 2022-02-23

## 2022-02-23 PROBLEM — N18.30 STAGE 3 CHRONIC KIDNEY DISEASE (HCC): Status: RESOLVED | Noted: 2020-02-23 | Resolved: 2022-02-23

## 2022-02-23 PROBLEM — E11.22 TYPE 2 DIABETES MELLITUS WITH CHRONIC KIDNEY DISEASE (HCC): Status: ACTIVE | Noted: 2022-02-23

## 2022-02-23 PROBLEM — R10.13 EPIGASTRIC PAIN: Status: RESOLVED | Noted: 2019-09-08 | Resolved: 2022-02-23

## 2022-02-23 PROCEDURE — 99214 OFFICE O/P EST MOD 30 MIN: CPT | Performed by: NURSE PRACTITIONER

## 2022-02-23 PROCEDURE — 90853 GROUP PSYCHOTHERAPY: CPT

## 2022-02-23 NOTE — PROGRESS NOTES
Subjective:     CC: Mary Moreno is a 82 y.o. female, accompanied by her daughter Alverto Bagley, who presents today to follow up for a hospitalization. She was admitted on 2-10-22 for TIA. She has had multiple TIA's over the past couple of years. She was restarted on Plavix however she does have a hx of GI bleed so this is a risk that we have already discussed at previous visits. She was also advised to take baby asa and her Metoprolol was increased to 50mg daily. She was told to cont Lipitor 80mg daily. She was discharged home on 2-12-22. We discussed the risk factors for stroke and TIA including the following problems:     HTN  BP at goal today. Taking meds daily. She denies CP, SOB, dizziness, or swelling. HLD  Lab Results   Component Value Date/Time    Cholesterol, total 107 02/12/2022 06:40 AM    HDL Cholesterol 56 02/12/2022 06:40 AM    LDL, calculated 38 02/12/2022 06:40 AM    VLDL, calculated 13 02/12/2022 06:40 AM    Triglyceride 65 02/12/2022 06:40 AM    CHOL/HDL Ratio 1.9 02/12/2022 06:40 AM     Well controlled with Lipitor 80mg daily. Type 2 diabetes  Lab Results   Component Value Date/Time    Hemoglobin A1c 6.8 (H) 08/03/2021 04:26 PM     On Januvia 100mg daily. Home BS never over 200. Denies hypoglycemia. Due for A1C recheck. CKD, stage unspecified  Recent creatinine was in the normal range however she does have hx of CKD stage 3. Avoids NSAIDS. Tobacco use  She used to smoke about 1/4 pack per day however she quit 3 months ago! Seasonal Allergies   Symptoms stable with Xyzol and Flonase daily.     Vertigo   She takes Meclizine prn. She has seen ENT in the past and did not want to go back. Major Depression  She is followed by Dr Niru Lawrence. She is on Effexor XR 150mg daily.  She goes to therapy twice a week.     OA, hands  Takes Tylenol prn with good relief     Patient Active Problem List   Diagnosis Code    Environmental allergies Z91.09    Elevated cholesterol E78.00    Essential hypertension I5    Well controlled type 2 diabetes mellitus ([facility] E11.9    Type 2 diabetes mellitus with stage 3 chronic kidney disease, without long-term current use of insulin (HCC) E11.22, N18.30    Major depressive disorder, recurrent episode, severe ([facility] F33.2    Epigastric pain R10.13    Tobacco use Z72.0    Primary osteoarthritis involving multiple joints M89.49    Stage 3 chronic kidney disease (HCC) N18.30    Age-related cataract of both eyes H25.9    CVA (cerebral vascular accident) ([facility] I63.9    Cystitis N30.90    History of GI bleed Z87.19    History of CVA (cerebrovascular accident) Z80.78    TIA (transient ischemic attack) G45.9    Allergic rhinitis J30.9       Past Medical History:   Diagnosis Date    Allergic rhinitis due to pollen     BPV (benign positional vertigo)     Change in bowel habits     Depression     Diabetes (HCC)     Dysuria     Hemorrhoids     Hypertension     IBS (irritable bowel syndrome)     Menopause     Other diseases of nasal cavity and sinuses(478.19)     Vertigo          Current Outpatient Medications:     glucose blood VI test strips (OneTouch Ultra Test) strip, CHECK GLUCOSE ONCE DAILY. DX: E11.9, Disp: 100 Strip, Rfl: 3    fluticasone propionate (FLONASE) 50 mcg/actuation nasal spray, 2 Sprays by Both Nostrils route as needed for Allergies. ONLY AS NEEDED, Disp: 1 Each, Rfl: 0    metoprolol succinate (TOPROL-XL) 50 mg XL tablet, Take 1 Tablet by mouth daily. , Disp: 30 Tablet, Rfl: 0    clopidogreL (PLAVIX) 75 mg tab, Take 1 Tablet by mouth daily. , Disp: 30 Tablet, Rfl: 0    aspirin delayed-release 81 mg tablet, Take 1 Tablet by mouth daily. , Disp: 90 Tablet, Rfl: 1    venlafaxine-SR (EFFEXOR-XR) 150 mg capsule, Take 150 mg by mouth daily. , Disp: , Rfl:     lisinopriL (PRINIVIL, ZESTRIL) 20 mg tablet, Take 1 Tablet by mouth two (2) times a day., Disp: 180 Tablet, Rfl: 3    atorvastatin (LIPITOR) 80 mg tablet, Take 1 Tablet by mouth daily. , Disp: 90 Tablet, Rfl: 0    Januvia 100 mg tablet, TAKE 1 TABLET BY MOUTH EVERY DAY, Disp: 90 Tablet, Rfl: 1    levocetirizine (XYZAL) 5 mg tablet, TAKE 1 TABLET BY MOUTH DAILY AS NEEDED FOR ALLERGY SYMPTOMS, Disp: 90 Tablet, Rfl: 2    acetaminophen (TylenoL) 325 mg tablet, Take  by mouth every four (4) hours as needed for Pain., Disp: , Rfl:     No Known Allergies    Past Surgical History:   Procedure Laterality Date    COLONOSCOPY N/A 7/15/2020    COLONOSCOPY performed by Homar Alejo MD at [facility] ENDOSCOPY    HX CHOLECYSTECTOMY  2016    HX COLONOSCOPY  2013?  HX HYSTERECTOMY      UPPER GI ENDOSCOPY,DIAGNOSIS  7/15/2020            Social History     Tobacco Use   Smoking Status Current Every Day Smoker    Packs/day: 0.25   Smokeless Tobacco Never Used       Social History     Socioeconomic History    Marital status:    Tobacco Use    Smoking status: Current Every Day Smoker     Packs/day: 0.25    Smokeless tobacco: Never Used   Vaping Use    Vaping Use: Never used   Substance and Sexual Activity    Alcohol use: No    Drug use: No       Family History   Problem Relation Age of Onset    No Known Problems Mother        ROS:  Gen: denies fever, chills, or fatigue   HEENT:denies H/A, vision changes, nasal congestion, ear pain, or sore throat  Resp: denies dyspnea, cough, or wheezing  CV: denies chest pain, pressure, or palpitations  Extremeties: denies edema  GI: denies abd pain, NVD, melena, or hematochezia  Neuro: denies numbness/tingling, +occasional vertigo.  Denies unilateral weakness, facial drooping, AMS, or slurred speech   Skin: denies rashes or new lesions   Psych: +depression-stable no anxiety or brad, or other changes in mood      Objective:     Visit Vitals  /68 (BP 1 Location: Left arm, BP Patient Position: Sitting)   Pulse 76   Temp (!) 96 °F (35.6 °C) (Temporal)   Resp 18   Ht 5' 6\" (1.676 m)   Wt 165 lb (74.8 kg)   SpO2 97%   BMI 26.63 kg/m²         General: Alert and oriented. No acute distress. Well nourished. HEENT    Eyes: Sclera white, conjunctiva clear. PERRLA. Extra ocular movements intact. Neck: Supple with FROM. No carotid bruits  Lungs: Breathing even and unlabored. All lobes clear to auscultation bilaterally   Heart :RRR, S1 and S2 normal intensity, no extra heart sounds  Extremities: Non-edematous  Musculo: +intermittent swelling of joints in the hands  Neuro: Cranial nerves grossly normal. Answers questions appropriately. Normal gait. Moves all extremities freely. Psych: Mood and thought content appropriate for situation. Dressed appropriately and with good hygiene. Skin: Warm, dry, and intact. No lesions or discoloration. Assessment/ Plan:     TIA  Cont Plavix 75mg daily  Cont ASA 81mg daily  Increase Lipitor 80mg daily  Keep tight control over BS and BP  Go back to ER for any unilateral numbness/tingling, unilateral weakness, facial drooping, slurred speech, confusion, AMS, or dizziness  F/U 3 months    Blood thinner use  Explained potential adverse reactions of Plavix including abnormal bleeding  Monitor stools every day- notify provider for any black or bloody stools  Notify provider for any fatigue, lightheadedness, CP, or SOB  RTO in 1 month for lab visit to check CBC    HTN  BP stable  Cont current meds  Low-sodium diet  RTO or go to ER for any CP, SOB, dizziness, or swelling. F/U 3 months    HLD  Cont Lipitor to 80mg daily with baby asa  Low fat diet    Type 2 diabetes  Home BS at goal  Cont Januvia 100mg daily. Watch the sugar and carbs  F/U 1 month for lab visit to recheck A1C    Hx of CKD stage 3  Recent creatinine normal  Avoids NSAIDS  Stay well hydrated  Keep tight control over BP and BS  F/U 3 months    Tobacco use  She quit 3 months ago! Seasonal Allergies   Symptoms stable   Cont Xyzol and Flonase daily.     Vertigo  Cont Meclizine prn.      Major Depression  Per psych     OA, hands  Cont Tylenol prn        Verbal and written instructions (see AVS) provided.  Patient expresses understanding of diagnosis and treatment plan.     Health Maintenance Due   Topic Date Due    COVID-19 Vaccine (3 - Booster for Moderna series) 09/14/2021    Foot Exam Q1  01/21/2022    A1C test (Diabetic or Prediabetic)  02/03/2022               Kayla Engle, NP

## 2022-02-23 NOTE — PROGRESS NOTES
1. Have you been to the ER, urgent care clinic since your last visit? Hospitalized since your last visit? No    2. Have you seen or consulted any other health care providers outside of the [address] [place] [address] since your last visit? Include any pap smears or colon screening.  No     Chief Complaint   Patient presents with    Diabetes    TIA    Cholesterol Problem    Arthritis     Visit Vitals  /68 (BP 1 Location: Left arm, BP Patient Position: Sitting)   Pulse 76   Temp (!) 96 °F (35.6 °C) (Temporal)   Resp 18   Ht 5' 6\" (1.676 m)   Wt 165 lb (74.8 kg)   SpO2 97%   BMI 26.63 kg/m²

## 2022-02-23 NOTE — BH NOTES
[facility] Outpatient Program  Group Therapy  Date of service: 2/23/2022  Start time: 1100  Stop time: 1150  Type of session: Therapy Group    Problem number: 1Dep. F33.0    Short term goal (STG): Z Pt will identify an activity that has a high likelihood for pleasure and mastery to build into her relapse prevention plan and take steps to beginning the activity as a habit    Intervention/techniques: Informed, Validated/Supported, Listened/Empathized, Reinforced, Facilitated Disclosure and Provided Feedback    Patient mental status/affect: Calm, Congruent and Happy    Patient behavior/appearance: Attentive, Cooperative and Motivated    Special patient treatment accommodations provided (describe): none    Patient response and progress towards goals: Focus of session was based on handout titled Definitions of Recovery.   We spoke about the qualities that are valued and needed in recovery and they include determination, perseverance, fortitude, resilience, strength, belief, open-mindedness, insight, honesty, and self-compassion. We spoke about the definitions of these qualities and how they can be developed to help increase their long term recovery and their ability to be successful in it. Pt participated in the group activity and engaged with the other members . She shared that she had gone to the hospital for stomach issues and is now taking medicine. She feels better and is glad to be back at group. Pt feels she has a certain strength that has gotten her through the many events in her life.   She states continues to pray and utilize her strength each day

## 2022-02-23 NOTE — BH NOTES
[facility] Outpatient Program  Group Therapy    Date of Service: 2/23/2022  Start time: 1200  Stop time: 1250  Type of session: Therapy Group    Problem number: 1. Dep F 33.0    Short term goal (STG): Y.  Pt will verbalize what she learned in past 6 months that helps to build her confidence in her ability to manage herself, and her mental health, once discharged from program.      Intervention/techniques: Informed, Validated/Supported, Reframed, Modeled/Rehearsed, Promoted Peer Support and Provided Feedback    Patient mental status/affect: Calm, Congruent and Preoccupied    Patient behavior/appearance: Neatly Groomed, Attentive, Cooperative and Needed Prompting    Special patient treatment accommodations provided (describe): None    Patient response and progress towards goals: Focus of session was on strategies to challenge specific situations and triggers to negative thought patterns. We spoke about the situations, emotions, and thoughts that lead to thoughts that tend to plague our emotional health the most which includes when someone tells us we did something wrong, when we make a mistake, when we offend someone, when plans dont work out, when people laugh at us, when we are feeling sad and/or unloved, when we are nervous or socially anxious, when we are worried, when we feel overwhelmed by things to do and are exhausted. We spoke about positive statements that we can practice as our rebuttal to the negative thoughts and how to practice challenging our thoughts that do not lead to better health and well being. Juan Miguel Moore participated in group with prompting. She spoke about how she knows that she has to work on how to cope when facing physical health issues as \"I know that they will just be increasing over time. \"  She spoke about how she knows that she can work on accepting what these issues are and focusing on what, if anything, she can work to improve.

## 2022-02-23 NOTE — BH NOTES
[facility] Outpatient Program  Group Therapy    Date of Service: 2/23/2022  Start time: 1000  Stop time: 1050  Type of session: Therapy Group    Problem number: 1. Dep F 33.0    Short term goal (STG): X.   Pt will share instances over each week in which she was able to step back and observe her thoughts or feelings and any success in being able to name what she was experiencing in order to develop mindfulness skill. Intervention/techniques: Informed, Validated/Supported, Reframed, Modeled/Rehearsed, Observed/Monitored and Provided Feedback    Patient mental status/affect: Anxious, Congruent and Preoccupied    Patient behavior/appearance: Neatly Groomed, Attentive, Cooperative, Needed Prompting and Withdrawn/Quiet    Special patient treatment accommodations provided (describe): None    Patient response and progress towards goals: Focus of session was on identifying appropriate social skills that children need to learn and why and we discussed why it is a good exercise to think about ourselves as adults and if we have these skills as well. We spoke about some of the more important skills to have to help us communicate with people and have healthy relationships. We discussed the skills of asking for what you want, answering questions, getting attention appropriately, accepting no for an answer, disagreeing appropriately, listening, giving constructive criticism, asking for help, making an apology, and staying on task. I asked group members for their feedback about these skills and what are they good at and where is the room for improvement. I asked group members to think about a goal in social skill development. Kat Lukassusanalalo participated in group with prompting. She was quiet today but she did not share why. She spoke about how she can probably \"do better with listening. I can often get off track with my own thoughts and I know that I sometimes don't hear fully what others are telling me. \"

## 2022-03-01 RX ORDER — ATORVASTATIN CALCIUM 80 MG/1
80 TABLET, FILM COATED ORAL DAILY
Qty: 90 TABLET | Refills: 3 | Status: SHIPPED | OUTPATIENT
Start: 2022-03-01

## 2022-03-02 ENCOUNTER — HOSPITAL ENCOUNTER (OUTPATIENT)
Dept: BEHAVIORAL/MENTAL HEALTH | Age: 87
Discharge: HOME | End: 2022-03-02
Payer: MEDICARE

## 2022-03-02 PROCEDURE — 90853 GROUP PSYCHOTHERAPY: CPT

## 2022-03-02 NOTE — BH NOTES
[facility] Outpatient Program  Group Therapy    Date of Service: 3/2/2022  Start time: 1200  Stop time: 1250  Type of session: Therapy Group    Problem number: 1. Dep F 33.0    Short term goal (STG): Y. Pt will verbalize what she learned in past 6 months that helps to build her confidence in her ability to manage herself, and her mental health, once discharged from program.      Intervention/techniques: Informed, Modeled/Rehearsed, Prompted/Cued, Promoted Peer Support and Provided Feedback    Patient mental status/affect: Calm, Congruent and Preoccupied    Patient behavior/appearance: Neatly Groomed, Attentive, Cooperative and Needed Prompting    Special patient treatment accommodations provided (describe): None    Patient response and progress towards goals: Focus of session was based on the quote: I choose to live by choice, not by chance, to make changes, not excuses, to be motivated, not manipulated, to be useful, not used, to excel, not to compete. I choose self esteem, not self pity, I choose to listen to my inner voice, not the random opinion of others. I choose to be me, I choose to be authentic.   I asked group members to talk about which part of the quote stood out for them and is something that they can benefit from focusing on for their own self care. Deniz Rey participated in group with prompting. She spoke about how ability to see her choices more clearly and be able to focus her attention on what she can do that is helpful rather than those things that will cause her damage and setbacks.

## 2022-03-02 NOTE — BH NOTES
[facility] Outpatient Program  Group Therapy  Date of service: 3/2/2022  Start time: 1100  Stop time: 1150  Type of session: Therapy Group    Problem number: 1Dep. F33.0    Short term goal (STG): ZPt will identify an activity that has a high likelihood for pleasure and mastery to build into her relapse prevention plan and take steps to beginning the activity as a habit     Intervention/techniques: Informed, Validated/Supported, Prompted/Cued, Listened/Empathized, Reinforced, Facilitated Disclosure, Provided Feedback and Evaluated/Interpreted    Patient mental status/affect: Calm and Congruent    Patient behavior/appearance: Attentive, Cooperative and Motivated    Special patient treatment accommodations provided (describe): none    Patient response and progress towards goals: Focus of session was based on information from the book When Your Past is Hurting Your Present by Muriel Ornelas. I shared information about the power of hope and the common symptoms and struggles when a person is led by hopeless thoughts. I shared the common Oliva [place] that can attack our hope and take it away such as: You made your bed, now you must lie in it, opportunity only knocks once, your reputation follows you the rest of your life, and there is no such thing as a second chance. We spoke about specific challenges to these negative thoughts and how to prevent those negative thoughts from turning into a lifestyle. Pt listened to the group discussion and engaged with others today. She stated she was feeling better today and was glad she came to group.   She was supportive of others as they shared their concerns and reflected on her own experiences

## 2022-03-04 NOTE — BH NOTES
[facility]patient [facility]  Group Therapy  Treatment Plan Review          TREATMENT PLAN REVIEW REVIEW DATE: 2/28/2022     summary of Ongoing issues with Symptoms/Functional Impairments Indicating Need for Continued Stay:  Deniz Rey has made some good progress over the past month. She has been feeling better physically. She is understanding the importance of staying connected to others. She is more confidnet in herself to be able to be dicharged soon from program.  We will plan on discarge at the end of march if pt continues to maintain current stability. TREATMENT & DISCHARGE PLANNING CHANGES    Modality or Intervention  Changes Pt will continue to attend one day a week, three groups a day. Psychotropic Medication Changes No changes since last review   Discharge  Planning or Target Date  Changes 3/31/2022   New Issues No new issues   TREATMENT TEAM SIGNATURE / DATE   I have participated in the development of this plan of treatment and agreed to its implementation. Client Signature PRINTED Name                       Larissa Uriarte Date & Time       Family / Legal Representative Signature (If Applicable) PRINTED Name & Relationship    ????? Date & Time   Therapist Signature PRINTED Name & Rianna Marion LCSW Date & Time       Therapist Signature PRINTED Name & Credential                         Kalyan Harrison LCSW   Date & Time       Other Signature PRINTED Name & Credential     Date & Time   Other Signature PRINTED Name & Credential or Relationship    ????? Date & Time       PHYSICIAN CERTIFICATION OF THE LEVEL OF CARE:  I certify that these outpatient behavioral health services are medically necessary to improve and maintain the patients condition and functional level and prevent relapse or admission to a higher level of care. Physician Chica Andrea Name Marleen Chung M.D.  Date & Time Treatment Plan Review - Specific Goal Progress Review Date: 2/28/2022   GOAL STATUS CODES:   M = Met     C = Continued     D/C = Discontinued     R = Revised     N = New Goal   PROBLEM  NUMBER: 1 PROBLEM: Depression   STG Goal Status Comments (Progress or Lack of Progress)   X      M Pt is doing well with this goal.  We will meet it at this time. Y      C We will continue to work on this goal as well. She does not have the confidence in herself to maintain after discharge. Pt will verbalize what she learned in past 6 months that helps to build her confidence in her ability to manage herself, and her mental health, once discharged from program.  By 3/30/2022. Z      C We will work on this goal as we have not had a lot of time to process and address it. Pt will identify an activity that has a high likelihood for pleasure and mastery to build into her relapse prevention plan and take steps to beginning the activity as a habit.   By 3/30/2022. A2 C We will continue to work on this goal and help her understand she is ready for discharge and can manage herself and be able to reach out for help. Pt will identify what she wants to be able to do or what her functioning level will be in order for her to know she is ready for discharge.   By 3/30/2022.        PROBLEM  NUMBER:  PROBLEM:     STG Goal Status Comments (Progress or Lack of Progress)                                PROBLEM  NUMBER:  PROBLEM:    STG Goal Status Comments (Progress or Lack of Progress)                             []- Check ONLY if Problem/Goal Progress Comments  Continued on Another Page

## 2022-03-09 ENCOUNTER — HOSPITAL ENCOUNTER (OUTPATIENT)
Dept: BEHAVIORAL/MENTAL HEALTH | Age: 87
Discharge: HOME | End: 2022-03-09
Payer: MEDICARE

## 2022-03-09 PROCEDURE — 90853 GROUP PSYCHOTHERAPY: CPT

## 2022-03-09 NOTE — BH NOTES
[facility] Outpatient Program  Group Therapy  Date of service: 3/9/2022  Start time: 1100  Stop time: 1150  Type of session: Therapy Group    Problem number: 1Dep. F33.0    Short term goal (STG): ZPt will identify an activity that has a high likelihood for pleasure and mastery to build into her relapse prevention plan and take steps to beginning the activity as a habit     Intervention/techniques: Informed, Validated/Supported, Listened/Empathized, Reinforced and Provided Feedback    Patient mental status/affect: Calm and Congruent    Patient behavior/appearance: Attentive, Cooperative and Motivated    Special patient treatment accommodations provided (describe): none    Patient response and progress towards goals: Focus of session was on self-sabotage behaviors and the need to figure out why we continue to engage in them. We spoke about reasons why people in general self-sabotage their successes and their recovery and they can include feeling fear of success and change, feeling overwhelmed, fear of the unknown, it is easier to be where we are, its too much work to change, we cave into others demands, it takes a lot of effort to care for self when we have low self-esteem, and a lack of ry in our own abilities. We spoke about how hating ourselves and who we are feels this cycle of self-sabotage and unhealthy behaviors. I shared with group the need to develop tolerance for themselves and the time it takes to change and develop alternatives to self-sabotage and what those could be Pt participated in the group activity and engaged with the other members. She stated that she realizes that there are things that she is not able to do anymore and is trying to adjust.  She is trying to be happy and still have contact with others. It makes her sad that she feels she needs to give you her flower gardening up as her arthritis bothers her many days now.

## 2022-03-09 NOTE — BH NOTES
[facility] Outpatient Program  Group Therapy    Date of Service: 3/9/2022  Start time: 1000  Stop time: 1050  Type of session: Therapy Group    Problem number: 1. Dep F 33.0    Short term goal (STG): Y. Pt will verbalize what she learned in past 6 months that helps to build her confidence in her ability to manage herself, and her mental health, once discharged from program.    Intervention/techniques: Informed, Validated/Supported, Modeled/Rehearsed, Observed/Monitored, Promoted Peer Support and Provided Feedback    Patient mental status/affect: Calm, Congruent and Preoccupied    Patient behavior/appearance: Neatly Groomed, Attentive, Cooperative, Needed Prompting and Withdrawn/Quiet    Special patient treatment accommodations provided (describe): None    Patient response and progress towards goals: Focus of session was on discussing three different types of communication and identifying which ones we may engage in and the success it gives us. We spoke about what passive and aggressive communication is and how it proves to be ineffective for us to get what we need or want or to make our relationships better and healthier. We spoke about what we need to be assertive about can be broken down into different categories such as how to say no at the right time in the right way, telling people how you feel, or to get information or clarification of what you need or want. We spoke about what improvements can be made in our communication patterns to enrich our well being and our relationships. Slick Jostinjulian participated in group with prompting. She spoke about how she knows that she is feeling consistently better and she has been emotional but 'I think I have figured that is just me. I get teary but I am fine. \"  She spoke about planning for discharge possibly at the end of the month and what that can look like and how she can start to think about how she can fill the time on Wednesday when she does not come here.

## 2022-03-09 NOTE — BH NOTES
[facility] Outpatient Program  Group Therapy    Date of Service: 3/9/2022  Start time: 1200  Stop time: 1250  Type of session: Therapy Group    Problem number: 1. Dep F 33.0    Short term goal (STG): A2.  Pt will identify what she wants to be able to do or what her functioning level will be in order for her to know she is ready for discharge.     Intervention/techniques: Informed, Validated/Supported, Modeled/Rehearsed, Promoted Peer Support and Provided Feedback    Patient mental status/affect: Anxious, Congruent and Preoccupied    Patient behavior/appearance: Neatly Groomed, Attentive, Cooperative and Needed Prompting    Special patient treatment accommodations provided (describe): None    Patient response and progress towards goals: Focus of session was on identifying coping skills and what categories they may fall under. We spoke about the different categories of distraction skills, grounding skills, emotional release skills, self care skills, thought challenge skills, and accessing our higher self skills. We spoke about what each of these mean and the pros and cons of each category. I asked group members to brainstorm skills for each category and how it can benefit them to use them. We also identified cons of each category and how that can lead them to choose another way to cope with certain situations. Bianca Swift participated in group with prompting. She spoke about how she has been doing what she can to cope and she does know \"that I have learned a lot and I have thought about calling you when I remember something that has helped me. \"  She spoke about how she knows she is doing well and is going to work on being emotionally prepared for discharge/graduation from program.

## 2022-03-10 DIAGNOSIS — Z92.29 HX OF LONG TERM USE OF BLOOD THINNERS: ICD-10-CM

## 2022-03-10 DIAGNOSIS — E11.9 WELL CONTROLLED TYPE 2 DIABETES MELLITUS (HCC): Primary | ICD-10-CM

## 2022-03-16 ENCOUNTER — HOSPITAL ENCOUNTER (OUTPATIENT)
Dept: BEHAVIORAL/MENTAL HEALTH | Age: 87
Discharge: HOME | End: 2022-03-16
Payer: MEDICARE

## 2022-03-16 PROCEDURE — 90853 GROUP PSYCHOTHERAPY: CPT

## 2022-03-16 NOTE — BH NOTES
[facility] Outpatient Program  Group Therapy    Date of Service: 3/16/2022  Start time: 1000  Stop time: 1050  Type of session: Therapy Group    Problem number: 1. Dep F 33.0    Short term goal (STG): Z. Pt will identify an activity that has a high likelihood for pleasure and mastery to build into her relapse prevention plan and take steps to beginning the activity as a habit.       Intervention/techniques: Informed, Validated/Supported, Prompted/Cued, Listened/Empathized, Promoted Peer Support and Provided Feedback    Patient mental status/affect: Anxious, Congruent and Preoccupied    Patient behavior/appearance: Neatly Groomed, Attentive, Cooperative, Needed Prompting and Withdrawn/Quiet    Special patient treatment accommodations provided (describe): None    Patient response and progress towards goals: Focus of session was based on information from [employer] [facility] [address] [place] [address] by Dr Dennis Luong. I shared the books process and thoughts about the importance of practice if you want to change and Dr. Oscar Sanchez stages of emotional re-education. I shared the states which include intellectual insight, practice (both mental and physical), emotional insight, and personality/trait formation. I also shared information on what cognitive-emotional dissonance is that generally we think if something feels wrong, it gives us the impression that it is wrong.   We spoke about how this is valuable to know that these challenges are normal and that we can push through them to actually form new traits and habits that will benefit us. Nic Mckeon participated in group well but she was very quiet. She did share how she has been struggling some with her physical health and she notices that is impacting her mental health. She spoke about how she knows that she has also developed a lot of good habits for herself.

## 2022-03-16 NOTE — BH NOTES
[facility] Outpatient Program  Group Therapy    Date of Service: 3/16/2022  Start time: 1200  Stop time: 1250  Type of session: Therapy Group    Problem number: 1. Dep F 33.0    Short term goal (STG): A2.  Pt will identify what she wants to be able to do or what her functioning level will be in order for her to know she is ready for discharge    Intervention/techniques: Informed, Validated/Supported, Prompted/Cued, Listened/Empathized, Promoted Peer Support and Provided Feedback    Patient mental status/affect: Anxious, Congruent and Preoccupied    Patient behavior/appearance: Neatly Groomed, Attentive, Cooperative, Needed Prompting and Withdrawn/Quiet    Special patient treatment accommodations provided (describe): None    Patient response and progress towards goals: Focus of session was based on information for the book The Daily Stoic about how we often take for granted what we have that are our resources and strengths. I shared a passage from the book about holding an understanding and appreciation of those things you are capable of and knowing not everyone has those same abilities. We take for granted those things others wouldnt even think to dream of.   We spoke about how this understanding and awareness can positively impact our everyday lives. Homa James participated in group with prompting. She spoke about how she is struggling right now because she cannot attend to her tracey bushes like she has in the past and she is feeling \"my age. \"  She spoke about her awareness of that bringing her down but she is trying to stay positive. She spoke about how she can see that there are resources and support she has that she may often take for granted and she can feel some relief when she recognizes that.

## 2022-03-16 NOTE — BH NOTES
[facility] Outpatient Program  Group Therapy  Date of service: 3/16/2022  Start time: 1100  Stop time: 1150  Type of session: Therapy Group    Problem number: 1Dep. F33.0    Short term goal (STG):Y Pt will verbalize what she learned in past 6 months that helps to build her confidence in her ability to manage herself, and her mental health, once discharged from program.   Intervention/techniques: Informed, Validated/Supported, Listened/Empathized, Reinforced and Provided Feedback    Patient mental status/affect: Calm and Congruent    Patient behavior/appearance: Attentive, Cooperative and Motivated    Special patient treatment accommodations provided (describe): none    Patient response and progress towards goals: Focus of session was on effective ways to control anxiety. We spoke about how these specific skills can have a very immediate effect on stress and anxiety and we spoke about how we can motivate to use them. We spoke about the following skills; dealing with problems on the spot, strong, confident relationships, slowing down, taking one thing at a time, coping with ruts, divide problems up, using past experiences, and building relaxation into our daily lives. We spoke about which skill we will start to incorporate into our daily use. Pt participated in the group activity and read aloud from the worksheet. She stated she belies that everyone has the right to [place] their mind\" and \"to be happy\"  She wishes that she could be happy all the time but knows that is unrealistic.

## 2022-03-17 ENCOUNTER — LAB ONLY (OUTPATIENT)
Dept: FAMILY MEDICINE CLINIC | Age: 87
End: 2022-03-17
Payer: MEDICARE

## 2022-03-17 DIAGNOSIS — E78.00 ELEVATED CHOLESTEROL: ICD-10-CM

## 2022-03-17 DIAGNOSIS — E11.9 WELL CONTROLLED TYPE 2 DIABETES MELLITUS (HCC): ICD-10-CM

## 2022-03-17 DIAGNOSIS — Z92.29 HX OF LONG TERM USE OF BLOOD THINNERS: ICD-10-CM

## 2022-03-17 DIAGNOSIS — I10 ESSENTIAL HYPERTENSION: ICD-10-CM

## 2022-03-17 PROCEDURE — 36415 COLL VENOUS BLD VENIPUNCTURE: CPT

## 2022-03-17 NOTE — PROGRESS NOTES
[facility] Patient tolerated well.

## 2022-03-18 PROBLEM — E11.9 WELL CONTROLLED TYPE 2 DIABETES MELLITUS (HCC): Status: ACTIVE | Noted: 2017-07-24

## 2022-03-18 PROBLEM — G45.9 TIA (TRANSIENT ISCHEMIC ATTACK): Status: ACTIVE | Noted: 2021-12-06

## 2022-03-18 PROBLEM — M15.9 PRIMARY OSTEOARTHRITIS INVOLVING MULTIPLE JOINTS: Status: ACTIVE | Noted: 2020-02-20

## 2022-03-18 PROBLEM — M15.0 PRIMARY OSTEOARTHRITIS INVOLVING MULTIPLE JOINTS: Status: ACTIVE | Noted: 2020-02-20

## 2022-03-18 LAB
ALBUMIN SERPL-MCNC: 3.4 G/DL (ref 3.5–5)
ALBUMIN/GLOB SERPL: 0.9 {RATIO} (ref 1.1–2.2)
ALP SERPL-CCNC: 112 U/L (ref 45–117)
ALT SERPL-CCNC: 26 U/L (ref 12–78)
ANION GAP SERPL CALC-SCNC: 3 MMOL/L (ref 5–15)
AST SERPL-CCNC: 14 U/L (ref 15–37)
BILIRUB SERPL-MCNC: 0.5 MG/DL (ref 0.2–1)
BUN SERPL-MCNC: 13 MG/DL (ref 6–20)
BUN/CREAT SERPL: 13 (ref 12–20)
CALCIUM SERPL-MCNC: 9 MG/DL (ref 8.5–10.1)
CHLORIDE SERPL-SCNC: 108 MMOL/L (ref 97–108)
CHOLEST SERPL-MCNC: 129 MG/DL
CO2 SERPL-SCNC: 30 MMOL/L (ref 21–32)
CREAT SERPL-MCNC: 1.04 MG/DL (ref 0.55–1.02)
ERYTHROCYTE [DISTWIDTH] IN BLOOD BY AUTOMATED COUNT: 12.4 % (ref 11.5–14.5)
EST. AVERAGE GLUCOSE BLD GHB EST-MCNC: 146 MG/DL
GLOBULIN SER CALC-MCNC: 4 G/DL (ref 2–4)
GLUCOSE SERPL-MCNC: 128 MG/DL (ref 65–100)
HBA1C MFR BLD: 6.7 % (ref 4–5.6)
HCT VFR BLD AUTO: 45.5 % (ref 35–47)
HDLC SERPL-MCNC: 51 MG/DL
HDLC SERPL: 2.5 {RATIO} (ref 0–5)
HGB BLD-MCNC: 14 G/DL (ref 11.5–16)
LDLC SERPL CALC-MCNC: 51.8 MG/DL (ref 0–100)
MCH RBC QN AUTO: 30.2 PG (ref 26–34)
MCHC RBC AUTO-ENTMCNC: 30.8 G/DL (ref 30–36.5)
MCV RBC AUTO: 98.3 FL (ref 80–99)
NRBC # BLD: 0 K/UL (ref 0–0.01)
NRBC BLD-RTO: 0 PER 100 WBC
PLATELET # BLD AUTO: 316 K/UL (ref 150–400)
PMV BLD AUTO: 10.4 FL (ref 8.9–12.9)
POTASSIUM SERPL-SCNC: 4.2 MMOL/L (ref 3.5–5.1)
PROT SERPL-MCNC: 7.4 G/DL (ref 6.4–8.2)
RBC # BLD AUTO: 4.63 M/UL (ref 3.8–5.2)
SODIUM SERPL-SCNC: 141 MMOL/L (ref 136–145)
TRIGL SERPL-MCNC: 131 MG/DL (ref ?–150)
VLDLC SERPL CALC-MCNC: 26.2 MG/DL
WBC # BLD AUTO: 8.5 K/UL (ref 3.6–11)

## 2022-03-18 NOTE — PROGRESS NOTES
Cholesterol looks good. Slightly dehydrated. Try to increase water intake daily. Cont current diabetic medications and diabetic diet.    F/U as scheduled on 3/30/22 with Guillermina Velasquez NP.

## 2022-03-19 PROBLEM — Z87.891 FORMER SMOKER: Status: ACTIVE | Noted: 2020-02-20

## 2022-03-19 PROBLEM — F33.2 MAJOR DEPRESSIVE DISORDER, RECURRENT EPISODE, SEVERE (HCC): Status: ACTIVE | Noted: 2018-09-28

## 2022-03-19 PROBLEM — H25.9 AGE-RELATED CATARACT OF BOTH EYES: Status: ACTIVE | Noted: 2020-05-05

## 2022-03-19 PROBLEM — Z86.73 HISTORY OF CVA (CEREBROVASCULAR ACCIDENT): Status: ACTIVE | Noted: 2020-10-24

## 2022-03-19 PROBLEM — J30.9 ALLERGIC RHINITIS: Status: ACTIVE | Noted: 2022-02-11

## 2022-03-19 PROBLEM — Z87.19 HISTORY OF GI BLEED: Status: ACTIVE | Noted: 2020-10-24

## 2022-03-19 PROBLEM — I10 ESSENTIAL HYPERTENSION: Status: ACTIVE | Noted: 2017-03-17

## 2022-03-20 PROBLEM — N18.9 CKD (CHRONIC KIDNEY DISEASE), SYMPTOM MANAGEMENT ONLY, UNSPECIFIED STAGE: Status: ACTIVE | Noted: 2022-02-23

## 2022-03-20 RX ORDER — METOPROLOL SUCCINATE 50 MG/1
50 TABLET, EXTENDED RELEASE ORAL DAILY
Qty: 90 TABLET | Refills: 3 | Status: SHIPPED | OUTPATIENT
Start: 2022-03-20

## 2022-03-30 ENCOUNTER — HOSPITAL ENCOUNTER (OUTPATIENT)
Dept: BEHAVIORAL/MENTAL HEALTH | Age: 87
Discharge: HOME | End: 2022-03-30
Payer: MEDICARE

## 2022-03-30 PROCEDURE — 90853 GROUP PSYCHOTHERAPY: CPT

## 2022-03-30 NOTE — BH NOTES
[facility]  Group Therapy  Date of service: 3/30/2022  Start time: 1100  Stop time: 1150  Type of session: Therapy Group    Problem number: 1Dep. F33.0    Short term goal (STG): Pt will identify what she wants to be able to do or what her functioning level will be in order for her to know she is ready for discharge.       Intervention/techniques: Informed, Validated/Supported and Listened/Empathized    Patient mental status/affect: Calm, Congruent and Happy    Patient behavior/appearance: Attentive, Cooperative and Motivated    Special patient treatment accommodations provided (describe): none    Patient response and progress towards goals: Focus of session was based on information from the book The Brain Fog Fix by Dr. Evert Lee. We spoke about what brain fog can feel like and what it can lead to which includes increased anxiety and depression as well as problems with memory and functioning. I focused on the information that book provides about how we eat, sleep, work, and live and how our choices disrupt our chemical levels of dopamine, serotonin, and cortisol and that unstable levels of these chemicals lead to brain fog.   We discussed what our brain needs and what fogs our brain that is clearly laid out in the book including diet, exercise, use of brain altering chemicals, and exposure to screens. Today was p'ts last day of group. She was sad but also feels happy that she has been able to come and participate.   We discussed activities that she would like to do in the community to still be involved and get out of the house

## 2022-03-30 NOTE — BH NOTES
[facility] Out[facility] Program  Group Therapy    Date of Service: 3/30/2022  Start time: 1200  Stop time: 1250  Type of session: Therapy Group    Problem number: 1. Dep F 33.0    Short term goal (STG): Z. Pt will identify an activity that has a high likelihood for pleasure and mastery to build into her relapse prevention plan and take steps to beginning the activity as a habit    Intervention/techniques: Informed, Validated/Supported, Modeled/Rehearsed, Prompted/Cued, Listened/Empathized, Promoted Peer Support and Provided Feedback    Patient mental status/affect: Anxious, Calm, Congruent and Preoccupied    Patient behavior/appearance: Neatly Groomed, Attentive, Cooperative and Needed Prompting    Special patient treatment accommodations provided (describe): None    Patient response and progress towards goals: Focus of session was from Yanet Jeffrey LCSW handout 67 Ways to Validate Yourself.   We spoke about the tendency we have to talk ourselves out of our feelings in many ways including feeling shame or embarrassment and a lot of negative self talk. We spoke about how this may be a valuable place to focus our effort on this moment of initially feeling our feelings and working to accept them. We went over the list of the statements and how they can help give us the moment to stop and pause and let our feelings start to work themselves out rather than engage in any impulsive behaviors or actions. Douglas Michael participated in group with prompting. She spoke about how she is going to stay positive about herself and her abilities. She spoke about how she is going to return to a women's group that meets and she will continue to have some connections with others. She was very positive about her plan for discharge today.

## 2022-03-30 NOTE — BH NOTES
[facility] Outpatient Program  Group Therapy    Date of Service: 3/30/2022  Start time: 1000  Stop time: 1050  Type of session: Therapy Group    Problem number: 1. Dep F 33.0    Short term goal (STG): Y.  Pt will verbalize what she learned in past 6 months that helps to build her confidence in her ability to manage herself, and her mental health, once discharged from program.     Intervention/techniques: Informed, Validated/Supported, Reframed, Modeled/Rehearsed, Promoted Peer Support and Provided Feedback    Patient mental status/affect: Anxious, Congruent and Preoccupied    Patient behavior/appearance: Neatly Groomed, Attentive, Cooperative and Needed Prompting    Special patient treatment accommodations provided (describe): None    Patient response and progress towards goals: Focus of session was on information from the 4 As of stress relief.   I shared the idea of minor and major stress relievers include the first two As of avoid and alter. I shared that avoid means to just not deal with the needless stressors and make plans to avoid them whenever possible. We spoke about how we need to learn to say no and focus on doing the priorities every day. We also spoke about the need to alter situations to manage stress and work to change situations for the better. We spoke about how these skills include asking others to change their behavior, communicating openly, managing time more effectively and setting limits in advance. I asked group members to share their thoughts about which skills they can use to manage their stress more effectively. Dwayne Matthews participated in group well. She spoke about how she has learned a lot in group and she sees this information as some of the most important information that she can actually use. She spoke about how she is anxious about leaving the group setting as she has benefited from the support of others in group.   She is willing to try and use the strategies that she has learned to help keep her on track.

## 2022-03-31 NOTE — BH NOTES
[facility] Structured Outpatient Program  Group Therapy  Discharge Summary    Date of Admission:  September 9, 2020  Date of Discharge: March 30, 2022        DISCHARGE DIAGNOSES (Include Psychiatric & Medical Diagnoses): Major Depression, recurrent, mild  Diabetes  Hyperlipidemia  Chronic Kidney Disease  Hypertension  Diabetic Neuropathy        REASON FOR DISCHARGE: [x]- Reached maximum benefit     []- Met treatment goals []- Patient request     []- Family request []- Against medical advice    []- Needed higher level of care    Other:       PRESENTING PROBLEMS:  Patient was hospitalized in [facility] from 8/17-9/7/2020 due to an exacerbation of depression after she stopped taking medications. She was having vegetative symptoms of depression and anxious thoughts were building. She stabilized well in the hospital and was referred here for ongoing help and support. PROGRESS TOWARDS Wade Hernandez made a lot of positive progress towards goals. She was responsive to group support and ideas presented. She struggled with maintaining due to some cognitive limitations. She was able to show stability and improvement. BRIEF MENTAL STATUS: Patient alert and oriented x 4. She reports her mood is good. She has no negative thought patterns. She has no SI or HI or any symptoms of psychosis. CURRENT MEDICAL PROBLEMS:Diabetes, Hyperlipidemia, Chronic Kidney Disease, Hypertension, Diabetic Neuropathy    DISPOSITION STATUS, LOCATION & ARRANGEMENTS FOR FUTURE TREATMENT SERVICES:  Patient has a follow up appointment with Dr. Tasneem Harvey for medication management on May 27, 2022. She will call here if she finds she needs resources for an individual therapist.        RECOMMENDATIONS, PROCEDURES, ACTIVITIES OR REFERRALS TO ASSIST PATIENT IN MAINTAINING FUNCTIONING:  Patient is encouraged to rejoin women's group that she belonged to that meets weekly and to consider volunteering one time a week. DISCHARGE MEDICATIONS:  Venlafaxine  mg Daily

## 2022-04-19 NOTE — MR AVS SNAPSHOT
[address] [phone] Patient: Alanna Coles MRN: [id number] [facility]:[birth year] Visit Information Date & Time Provider Department Dept. Phone Encounter #  
 7/24/2018  1:30 PM Christen Romberg,  [address] [facility] [phone] [id number] Follow-up Instructions Return if symptoms worsen or fail to improve. Upcoming Health Maintenance Date Due  
 EYE EXAM RETINAL OR DILATED Q1 6/30/1945 LIPID PANEL Q1 8/14/2018 Influenza Age 5 to Adult 8/1/2018 FOOT EXAM Q1 10/11/2018 MEDICARE YEARLY EXAM 10/12/2018 HEMOGLOBIN A1C Q6M 12/11/2018 GLAUCOMA SCREENING Q2Y 1/6/2019 MICROALBUMIN Q1 2/27/2019 DTaP/Tdap/Td series (2 - Td) 10/10/2026 Allergies as of 7/24/2018  Review Complete On: 6/21/2018 By: Sonali Figueroa RN No Known Allergies Current Immunizations  Reviewed on 10/17/2014 Name Date Influenza High Dose Vaccine PF 10/11/2017, 10/10/2016  4:51 PM  
 Influenza Vaccine 10/17/2014, 12/31/2013 Pneumococcal Conjugate (PCV-13) 10/11/2017 Pneumococcal Vaccine (Unspecified Type) 12/19/2006 Not reviewed this visit You Were Diagnosed With   
  
 Codes Comments Urinary frequency    -  Primary ICD-10-CM: R35.0 ICD-9-CM: 788.41   
 LLQ abdominal pain     ICD-10-CM: R10.32 
ICD-9-CM: 789.04 Environmental allergies     ICD-10-CM: Z91.09 
ICD-9-CM: V15.09 Vitals BP Pulse Temp Height(growth percentile) Weight(growth percentile) SpO2  
 (!) 159/92 (BP 1 Location: Left arm, BP Patient Position: Sitting) 85 97.3 °F (36.3 °C) (Oral) 5' 6\" (1.676 m) 156 lb 6 oz (70.9 kg) 96% BMI OB Status Smoking Status 25.24 kg/m2 Hysterectomy Current Every Day Smoker Vitals History BMI and BSA Data Body Mass Index Body Surface Area  
 25.24 kg/m 2 1.82 m 2 Preferred Pharmacy Pharmacy Name Phone [facility], [address] [place] [address] [address] Etta Romo Your Updated Medication List  
  
   
This list is accurate as of 7/24/18  2:27 PM.  Always use your most recent med list. amLODIPine 5 mg tablet Commonly known as:  Love Breach TAKE 1 TABLET BY MOUTH ONCE A DAY  Indications: hypertension  
  
 fexofenadine 180 mg tablet Commonly known as:  Melba Naegeli Take 1 Tab by mouth daily. Indications: Allergic Rhinitis  
  
 guaiFENesin-codeine 100-10 mg/5 mL solution Commonly known as:  ROBITUSSIN AC Take 5 mL by mouth three (3) times daily as needed for Cough. Max Daily Amount: 15 mL. ipratropium 42 mcg (0.06 %) nasal spray Commonly known as:  ATROVENT  
2 Sprays by Both Nostrils route four (4) times daily as needed for Rhinitis. omega-3 acid ethyl esters 1 gram capsule Commonly known as:  Jignaa Amada Take 2 Caps by mouth two (2) times a day. SITagliptin 100 mg tablet Commonly known as:  Dl Reyes TAKE 1 TABLET BY MOUTH EVERY DAY  
  
 triamcinolone 55 mcg nasal inhaler Commonly known as:  NASACORT  
1 [place] by Both Nostrils route two (2) times a day. Indications: ALLERGIC RHINITIS, ALLERGIC RHINITIS PREVENTION Prescriptions Sent to Pharmacy Refills  
 fexofenadine (ALLEGRA) 180 mg tablet 3 Sig: Take 1 Tab by mouth daily. Indications: Allergic Rhinitis Class: Normal  
 Pharmacy: [address] [place] [address] Ave, [address] Ph #: [phone] Route: Oral  
  
We Performed the Following AMB POC URINALYSIS DIP STICK AUTO W/O MICRO [38939 CPT(R)] Comments: AMB POC URINALYSIS DIP STICK AUTO W/O Follow-up Instructions Return if symptoms worsen or fail to improve. To-Do List   
 10/08/2018 9:40 AM  
  Appointment with Junito Mckeon MD at [address] [address] ([phone]) Patient Instructions Abdominal Pain: Care Instructions Your Care Instructions Abdominal pain has many possible causes. Some aren't serious and get better on their own in a few days. Others need more testing and treatment. If your pain continues or gets worse, you need to be rechecked and may need more tests to find out what is wrong. You may need surgery to correct the problem. Don't ignore new symptoms, such as fever, nausea and vomiting, urination problems, pain that gets worse, and dizziness. These may be signs of a more serious problem. Your doctor may have recommended a follow-up visit in the next 8 to 12 hours. If you are not getting better, you may need more tests or treatment. The doctor has checked you carefully, but problems can develop later. If you notice any problems or new symptoms, get medical treatment right away. Follow-up care is a key part of your treatment and safety. Be sure to make and go to all appointments, and call your doctor if you are having problems. It's also a good idea to know your test results and keep a list of the medicines you take. How can you care for yourself at home? · Rest until you feel better. · To prevent dehydration, drink plenty of fluids, enough so that your urine is light yellow or clear like water. Choose water and other caffeine-free clear liquids until you feel better. If you have kidney, heart, or liver disease and have to limit fluids, talk with your doctor before you increase the amount of fluids you drink. · If your stomach is upset, eat mild foods, such as rice, dry toast or crackers, bananas, and applesauce. Try eating several small meals instead of two or three large ones. · Wait until 48 hours after all symptoms have gone away before you have spicy foods, alcohol, and drinks that contain caffeine. · Do not eat foods that are high in fat. · Avoid anti-inflammatory medicines such as aspirin, ibuprofen (Advil, Motrin), and naproxen (Aleve). These can cause stomach upset.  Talk to your doctor if you take daily aspirin for another health problem. When should you call for help? Call 911 anytime you think you may need emergency care. For example, call if: 
  · You passed out (lost consciousness).  
  · You pass maroon or very bloody stools.  
  · You vomit blood or what looks like coffee grounds.  
  · You have new, severe belly pain.  
 Call your doctor now or seek immediate medical care if: 
  · Your pain gets worse, especially if it becomes focused in one area of your belly.  
  · You have a new or higher fever.  
  · Your stools are black and look like tar, or they have streaks of blood.  
  · You have unexpected vaginal bleeding.  
  · You have symptoms of a urinary tract infection. These may include: 
¨ Pain when you urinate. ¨ Urinating more often than usual. 
¨ Blood in your urine.  
  · You are dizzy or lightheaded, or you feel like you may faint.  
 Watch closely for changes in your health, and be sure to contact your doctor if: 
  · You are not getting better after 1 day (24 hours). Where can you learn more? Go to http://giovanni-lakisha.info/. Enter Z268 in the search box to learn more about \"Abdominal Pain: Care Instructions. \" Current as of: November 20, 2017 Content Version: 11.7 © 8522-5050 [employer]. Care instructions adapted under license by [employer] (which disclaims liability or warranty for this information). If you have questions about a medical condition or this instruction, always ask your healthcare professional. [place] [address] any warranty or liability for your use of this information. Introducing [facility]! [facility] introduces Hipcricket patient portal. Now you can access parts of your medical record, email your doctor's office, and request medication refills online. 1. In your internet browser, go to [email or web address] 2. Click on the First Time User? Click Here link in the Sign In box. You will see the New Member Sign Up page. 3. Enter your Vimty Access Code exactly as it appears below. You will not need to use this code after youve completed the sign-up process. If you do not sign up before the expiration date, you must request a new code. · Vimty Access Code: [email or web address] Expires: 10/22/2018  2:19 PM 
 
4. Enter the last four digits of your Social Security Number (xxxx) and Date of Birth (mm/dd/yyyy) as indicated and click Submit. You will be taken to the next sign-up page. 5. Create a Vimty ID. This will be your Vimty login ID and cannot be changed, so think of one that is secure and easy to remember. 6. Create a Vimty password. You can change your password at any time. 7. Enter your Password Reset Question and Answer. This can be used at a later time if you forget your password. 8. Enter your e-mail address. You will receive e-mail notification when new information is available in [address] 9. Click Sign Up. You can now view and download portions of your medical record. 10. Click the Download Summary menu link to download a portable copy of your medical information. If you have questions, please visit the Frequently Asked Questions section of the Vimty website. Remember, Vimty is NOT to be used for urgent needs. For medical emergencies, dial 911. Now available from your iPhone and Android! Please provide this summary of care documentation to your next provider. Your primary care clinician is listed as Dar Rizvi. If you have any questions after today's visit, please call [phone]. Post Op

## 2022-05-18 ENCOUNTER — OFFICE VISIT (OUTPATIENT)
Dept: FAMILY MEDICINE CLINIC | Age: 87
End: 2022-05-18
Payer: MEDICARE

## 2022-05-18 VITALS
SYSTOLIC BLOOD PRESSURE: 158 MMHG | WEIGHT: 171.5 LBS | HEIGHT: 66 IN | BODY MASS INDEX: 27.56 KG/M2 | DIASTOLIC BLOOD PRESSURE: 72 MMHG | OXYGEN SATURATION: 98 % | TEMPERATURE: 96.5 F | RESPIRATION RATE: 18 BRPM | HEART RATE: 63 BPM

## 2022-05-18 DIAGNOSIS — Z91.09 ENVIRONMENTAL ALLERGIES: ICD-10-CM

## 2022-05-18 DIAGNOSIS — I10 ESSENTIAL HYPERTENSION: ICD-10-CM

## 2022-05-18 DIAGNOSIS — E78.00 ELEVATED CHOLESTEROL: ICD-10-CM

## 2022-05-18 DIAGNOSIS — F32.1 MODERATE SINGLE CURRENT EPISODE OF MAJOR DEPRESSIVE DISORDER (HCC): ICD-10-CM

## 2022-05-18 DIAGNOSIS — N18.30 TYPE 2 DIABETES MELLITUS WITH STAGE 3 CHRONIC KIDNEY DISEASE, WITHOUT LONG-TERM CURRENT USE OF INSULIN, UNSPECIFIED WHETHER STAGE 3A OR 3B CKD (HCC): Primary | ICD-10-CM

## 2022-05-18 DIAGNOSIS — M15.9 PRIMARY OSTEOARTHRITIS INVOLVING MULTIPLE JOINTS: ICD-10-CM

## 2022-05-18 DIAGNOSIS — G45.9 TIA (TRANSIENT ISCHEMIC ATTACK): ICD-10-CM

## 2022-05-18 DIAGNOSIS — Z92.29 HX OF LONG TERM USE OF BLOOD THINNERS: ICD-10-CM

## 2022-05-18 DIAGNOSIS — N18.30 STAGE 3 CHRONIC KIDNEY DISEASE, UNSPECIFIED WHETHER STAGE 3A OR 3B CKD (HCC): ICD-10-CM

## 2022-05-18 DIAGNOSIS — E11.22 TYPE 2 DIABETES MELLITUS WITH STAGE 3 CHRONIC KIDNEY DISEASE, WITHOUT LONG-TERM CURRENT USE OF INSULIN, UNSPECIFIED WHETHER STAGE 3A OR 3B CKD (HCC): Primary | ICD-10-CM

## 2022-05-18 LAB — HGB BLD-MCNC: 13.8 G/DL

## 2022-05-18 PROCEDURE — 85018 HEMOGLOBIN: CPT | Performed by: NURSE PRACTITIONER

## 2022-05-18 PROCEDURE — 99214 OFFICE O/P EST MOD 30 MIN: CPT | Performed by: NURSE PRACTITIONER

## 2022-05-18 NOTE — PROGRESS NOTES
1. \"Have you been to the ER, urgent care clinic since your last visit? Hospitalized since your last visit? \" No    2. \"Have you seen or consulted any other health care providers outside of the [address] [place] [address] since your last visit? \" No     3. For patients aged 39-70: Has the patient had a colonoscopy / FIT/ Cologuard? NA - based on age      If the patient is female:    4. For patients aged 41-77: Has the patient had a mammogram within the past 2 years? NA - based on age or sex      11. For patients aged 21-65: Has the patient had a pap smear? NA - based on age or sex      Chief Complaint   Patient presents with    Hypertension    Diabetes    Anemia     Visit Vitals  BP (!) 158/72 (BP 1 Location: Left arm, BP Patient Position: Sitting)   Pulse 63   Temp (!) 96.5 °F (35.8 °C) (Temporal)   Resp 18   Ht 5' 6\" (1.676 m)   Wt 171 lb 8 oz (77.8 kg)   SpO2 98%   BMI 27.68 kg/m²     Hbg is 13.8 via fingerstick on left hand per Alanna Horner, NP's order. Patient tolerated well.

## 2022-05-18 NOTE — PROGRESS NOTES
Subjective:     CC: diabetes, HTN    Laura Caruso is a 82 y.o. female, accompanied by her daughter Alex Youssef, who presents today for a routine check up for diabetes, HTN, and other chronic medical issues. HTN  BP is elevated today but she has not taken her BP meds yet. She is on Metoprolol ad Lisinopril. She denies CP, SOB, dizziness, or swelling. HLD  Lab Results   Component Value Date/Time    Cholesterol, total 129 03/17/2022 08:22 AM    HDL Cholesterol 51 03/17/2022 08:22 AM    LDL, calculated 51.8 03/17/2022 08:22 AM    VLDL, calculated 26.2 03/17/2022 08:22 AM    Triglyceride 131 03/17/2022 08:22 AM    CHOL/HDL Ratio 2.5 03/17/2022 08:22 AM     Well controlled with Lipitor 80mg daily. Type 2 diabetes  Lab Results   Component Value Date/Time    Hemoglobin A1c 6.7 (H) 03/17/2022 08:22 AM     On Januvia 100mg daily. Home BS never over 200. Denies hypoglycemia. CKD, stage 3  Lab Results   Component Value Date/Time    Creatinine 1.04 (H) 03/17/2022 08:22 AM     She avoids NSAIDS. TIA  She has had multiple TIA's over the past couple of years. She was restarted on Plavix 3 months ago however she does have a hx of GI bleed. Denies any black or bloody stools. POC HGB is normal at 13.8. Lab Results   Component Value Date/Time    WBC 8.5 03/17/2022 08:22 AM    HGB 14.0 03/17/2022 08:22 AM    HCT 45.5 03/17/2022 08:22 AM    PLATELET 875 56/49/8283 08:22 AM    MCV 98.3 03/17/2022 08:22 AM     Seasonal Allergies   Symptoms stable with Xyzol and Flonase daily.     Vertigo   She takes Meclizine prn. She has seen ENT in the past and did not want to go back. Major Depression  She is followed by Dr Maria Eugenia Merrill. She is on Effexor XR 150mg daily. She has \"graduated\" and completed her therapy. She has been enjoying going to [place] and seeing her friends.     OA, hands  Takes Tylenol prn with good relief     Health maintenance  Covid vaccine- she rec'd both Moderna vaccines a year ago.  Advised to get the booster shot today. Patient Active Problem List   Diagnosis Code    Environmental allergies Z91.09    Elevated cholesterol E78.00    Essential hypertension I5    Well controlled type 2 diabetes mellitus ([address] E11.9    Major depressive disorder, recurrent episode, severe ([facility] [address] F33.2    Former smoker Z87.891    Primary osteoarthritis involving multiple joints M15.9    Age-related cataract of both eyes H25.9    History of GI bleed Z87.19    History of CVA (cerebrovascular accident) Z80.78    TIA (transient ischemic attack) G45.9    Allergic rhinitis J30.9    CKD (chronic kidney disease), symptom management only, unspecified stage N18.9       Past Medical History:   Diagnosis Date    Allergic rhinitis due to pollen     BPV (benign positional vertigo)     Change in bowel habits     Depression     Diabetes (HCC)     Dysuria     Hemorrhoids     Hypertension     IBS (irritable bowel syndrome)     Menopause     Other diseases of nasal cavity and sinuses(478.19)     Vertigo          Current Outpatient Medications:     atorvastatin (LIPITOR) 80 mg tablet, Take 1 Tablet by mouth daily. , Disp: 90 Tablet, Rfl: 3    Januvia 100 mg tablet, TAKE 1 TABLET BY MOUTH EVERY DAY, Disp: 90 Tablet, Rfl: 1    metoprolol succinate (TOPROL-XL) 50 mg XL tablet, Take 1 Tablet by mouth daily. , Disp: 90 Tablet, Rfl: 3    clopidogreL (PLAVIX) 75 mg tab, TAKE 1 TABLET BY MOUTH EVERY DAY, Disp: 90 Tablet, Rfl: 1    glucose blood VI test strips (OneTouch Ultra Test) strip, CHECK GLUCOSE ONCE DAILY. DX: E11.9, Disp: 100 Strip, Rfl: 3    fluticasone propionate (FLONASE) 50 mcg/actuation nasal spray, 2 Sprays by Both Nostrils route as needed for Allergies. ONLY AS NEEDED, Disp: 1 Each, Rfl: 0    aspirin delayed-release 81 mg tablet, Take 1 Tablet by mouth daily. , Disp: 90 Tablet, Rfl: 1    venlafaxine-SR (EFFEXOR-XR) 150 mg capsule, Take 150 mg by mouth daily. , Disp: , Rfl:     lisinopriL (PRINIVIL, ZESTRIL) 20 mg tablet, Take 1 Tablet by mouth two (2) times a day., Disp: 180 Tablet, Rfl: 3    levocetirizine (XYZAL) 5 mg tablet, TAKE 1 TABLET BY MOUTH DAILY AS NEEDED FOR ALLERGY SYMPTOMS, Disp: 90 Tablet, Rfl: 2    acetaminophen (TylenoL) 325 mg tablet, Take  by mouth every four (4) hours as needed for Pain., Disp: , Rfl:     No Known Allergies    Past Surgical History:   Procedure Laterality Date    COLONOSCOPY N/A 7/15/2020    COLONOSCOPY performed by [facility]qui [facility] at [facility] ENDOSCOPY    HX CHOLECYSTECTOMY  2016    HX COLONOSCOPY  2013?  HX HYSTERECTOMY      UPPER GI ENDOSCOPY,DIAGNOSIS  7/15/2020            Social History     Tobacco Use   Smoking Status Current Every Day Smoker    Packs/day: 0.25   Smokeless Tobacco Never Used       Social History     Socioeconomic History    Marital status:    Tobacco Use    Smoking status: Current Every Day Smoker     Packs/day: 0.25    Smokeless tobacco: Never Used   Vaping Use    Vaping Use: Never used   Substance and Sexual Activity    Alcohol use: No    Drug use: No       Family History   Problem Relation Age of Onset    No Known Problems Mother        ROS:  Gen: denies fever, chills, or fatigue   HEENT:denies H/A, vision changes, nasal congestion, ear pain, or sore throat  Resp: denies dyspnea, cough, or wheezing  CV: denies chest pain, pressure, or palpitations  Extremeties: denies edema  GI: denies abd pain, NVD, melena, or hematochezia  Neuro: denies numbness/tingling, +occasional vertigo. Denies unilateral weakness, facial drooping, AMS, or slurred speech   Skin: denies rashes or new lesions   Psych: +depression-stable no anxiety or brad, or other changes in mood      Objective:     Visit Vitals  BP (!) 158/72 (BP 1 Location: Left arm, BP Patient Position: Sitting)   Pulse 63   Temp (!) 96.5 °F (35.8 °C) (Temporal)   Resp 18   Ht 5' 6\" (1.676 m)   Wt 171 lb 8 oz (77.8 kg)   SpO2 98%   BMI 27.68 kg/m²       General: Alert and oriented. No acute distress. Well nourished. HEENT    Eyes: Sclera white, conjunctiva clear. PERRLA. Extra ocular movements intact. Neck: Supple with FROM. No carotid bruits  Lungs: Breathing even and unlabored. All lobes clear to auscultation bilaterally   Heart :RRR, S1 and S2 normal intensity, no extra heart sounds  Extremities: Non-edematous  Musculo: +intermittent swelling of joints in the hands  Neuro: Cranial nerves grossly normal. Answers questions appropriately. Normal gait. Moves all extremities freely. Psych: Mood and thought content appropriate for situation. Dressed appropriately and with good hygiene. Skin: Warm, dry, and intact. No lesions or discoloration. Assessment/ Plan:     HTN  BP elevated but she has not take her BP medications yet today. Cont current meds  Low-sodium diet  RTO or go to ER for any CP, SOB, dizziness, or swelling. F/U 3-4 months    HLD  Cont Lipitor 80mg daily with baby asa  Low fat diet    Type 2 diabetes  Home BS at goal  Cont Januvia 100mg daily. Watch the sugar and carbs    CKD stage 3  Recent creatinine ok  Avoids NSAIDS  Stay well hydrated  Keep tight control over BP and BS  F/U 3 months    TIA  Cont Plavix 75mg daily  Notify provider for s/s of abnormal bleeding  POC Hgb stable > 13 today  Cont ASA 81mg daily  Cont Lipitor 80mg daily  Keep tight control over BS and BP  Go back to ER for any unilateral numbness/tingling, unilateral weakness, facial drooping, slurred speech, confusion, AMS, or dizziness  F/U 3 months    Seasonal Allergies   Symptoms stable   Cont Xyzol and Flonase daily.     Vertigo  Cont Meclizine prn. Major Depression  Per psych     OA, hands  Cont Tylenol prn        Verbal and written instructions (see AVS) provided.  Patient expresses understanding of diagnosis and treatment plan.     Health Maintenance Due   Topic Date Due    COVID-19 Vaccine (3 - Booster for Moderna series) 09/14/2021    Foot Exam Q1  01/21/2022    Eye Exam Retinal or Dilated  05/01/2022 Jay Meier, NP

## 2022-05-27 ENCOUNTER — HOSPITAL ENCOUNTER (OUTPATIENT)
Dept: BEHAVIORAL/MENTAL HEALTH | Age: 87
Discharge: HOME | End: 2022-05-27
Payer: MEDICARE

## 2022-05-27 PROCEDURE — 99442 PR PHYS/QHP TELEPHONE EVALUATION 11-20 MIN: CPT | Performed by: PSYCHIATRY & NEUROLOGY

## 2022-05-27 RX ORDER — VENLAFAXINE HYDROCHLORIDE 150 MG/1
150 CAPSULE, EXTENDED RELEASE ORAL DAILY
Qty: 90 CAPSULE | Refills: 1 | Status: SHIPPED | OUTPATIENT
Start: 2022-05-27 | End: 2022-09-28 | Stop reason: SDUPTHER

## 2022-05-27 NOTE — PROGRESS NOTES
Consent: The patient and/or their healthcare decision maker is aware that they may receive a bill (including co-pays) for this audio only encounter, depending on their insurance coverage, and has provided verbal consent to proceed. The patient was located in [facility], where I am licensed to provide care. INTERVAL HISTORY (Audio Only): Ms. Praneeth Adhikari is an 71-year-old female wih is following up with me 3 months since I last treated her while a patient in the [address] [place] [address] program (2019--3/2022). She has a history of Major Depression, and has been hospitalized 3 times since 2018, the last time in 2020. She's responded well to the use of Effexor XR and particularly to the support and structure of the IOP program. She's been fairly euthymic and affectively stable since the last hospitalization. She states that she's been feeling \"OK\", but that she's having a \"blue day\" today and that she's been slightly more dysphoric recently. Most of that is due to feeling more isolated and alone--not going to the IOP program and most people on her street have  or moved away. Her daughter told her she soul sleep at her house some nights while she gets used to being a the house alone more, and I supported that. She initially wanted to decrease the dose of Effexor XR, but she was fine when I explained why that's not a good idea currently. No SE's noted or reported with the meds and her N/V functioning has still been fairly good--sleeping and eating well, energy is fair, and she's not had any feelings of hopelessness or SI at all. CURRENT MEDICATION:  1. Effexor  mg daily     MENTAL STATUS EXAM: Ms. Praneeth Adhikari was noted to be very pleasant and cooperative, but she exhibited a slightly more restricted affective range this time, and her mood has been slightly lower. However, her neurovegetative functioning was noted to be adequate, and she denied having any feelings of hopelessness or any suicidal thoughts.   There was also no evidence of any brad or hypomania and no symptoms of psychosis such as hallucinations or delusional thinking. Her concentration and attention span were a bit impaired, but consistent with her baseline, and her cognitive functioning appeared to be reasonably intact with a slightly below average fund of knowledge noted. DIAGNOSTIC IMPRESSION:  AXIS I:  Major depression, recurrent, mild--slightly worse (F33.0). AXIS II:  Deferred. AXIS III:  Diabetes mellitus; history of small CVA; diabetic neuropathy; hyperlipidemia; chronic kidney disease; hypertension. PLAN:  1. Continue the Effexor XR at 150 mg daily--#90 with 1 refill (90 day)  2. Follow up with me in 2 months, sooner if needed. I affirm this is a Patient-Initiated-Episode with a patient who has not had a related appointment within my department in the past 7 days or scheduled within the next 24 hours. Total Time: 18 minutes  Note: not billable if the call serves to triage the patient into an appointment for the relevant concern. Patient was evaluated by phone call and had no access to a computer or smart phone. Chart reviewed. Evaluated and management per the note above. POS: patient at home; provider in office.

## 2022-06-11 NOTE — PROGRESS NOTES
Patient contacted regarding recent discharge and COVID-19 risk. Discussed COVID-19 related testing which was not done at this time. Test results were not done. Patient informed of results, if available? Not done    Outreach made within 2 business days of discharge: Yes    Care Transition Nurse/ Ambulatory Care Manager/ LPN Care Coordinator contacted the patient by telephone to perform post discharge assessment. Verified name and  with patient as identifiers. ACM also spoke with Bhavana Villar, daughter, who is also on the [address] form and patient requested ACM speak with Ms Alana Calix. Patient has following risk factors of: diabetes. CTN/ACM/LPN reviewed discharge instructions, medical action plan and red flags related to discharge diagnosis. Reviewed and educated them on any new and changed medications related to discharge diagnosis. Advised obtaining a 90-day supply of all daily and as-needed medications. Advance Care Planning:   Does patient have an Advance Directive: not on file Bhavana Villar and Curly Cruz, daughters are on her medical agent and does not want to make changes. Education provided regarding infection prevention, and signs and symptoms of COVID-19 and when to seek medical attention with patient who verbalized understanding. Discussed exposure protocols and quarantine from [address] you at higher risk for severe illness 2019 and given an opportunity for questions and concerns. The patient agrees to contact the COVID-19 hotline [phone] or PCP office for questions related to their healthcare. CTN/ACM/LPN provided contact information for future reference. From CDC: Are you at higher risk for severe illness?  Wash your hands often.  Avoid close contact (6 feet, which is about two arm lengths) with people who are sick.  Put distance between yourself and other people if COVID-19 is spreading in your community.    Clean and disinfect frequently touched surfaces.  Avoid all cruise travel and non-essential air travel.  Call your healthcare professional if you have concerns about COVID-19 and your underlying condition or if you are sick. For more information on steps you can take to protect yourself, see CDC's How to Protect Yourself      Patient/family/caregiver given information for [facility] Loop and agrees to enroll no  Patient's preferred e-mail:  n/a  Patient's preferred phone number: n/a  Based on Loop alert triggers, patient will be contacted by nurse care manager for worsening symptoms. plan to follow up in 14 days based on severity of symptoms and risk factors. [facility]  2/2/21 ACM attempted to contact patient at numbers provided- left message with patient's Radha Pepper for patient call back.  DMITRY negative - no fever

## 2022-07-27 ENCOUNTER — NURSE TRIAGE (OUTPATIENT)
Dept: OTHER | Facility: CLINIC | Age: 87
End: 2022-07-27

## 2022-07-27 NOTE — TELEPHONE ENCOUNTER
Received call from [place] at [facility] with Red Flag Complaint. Subjective: Caller states \"Dizziness\"     Current Symptoms: Dizziness in the last 3-4 days. Cough, white phlegm. Feel weak when getting up. Onset: 4 days ago; gradual    Associated Symptoms: NA    Pain Severity: 0/10; Temperature: chills    What has been tried: Allergy med    LMP: NA Pregnant: NA    Recommended disposition: See PCP within 24 Hours    Care advice provided, patient verbalizes understanding; denies any other questions or concerns; instructed to call back for any new or worsening symptoms. Patient/Caller agrees with recommended disposition; writer provided warm transfer to [place] at [facility] for appointment scheduling    Attention Provider: Thank you for allowing me to participate in the care of your patient. The patient was connected to triage in response to information provided to the [facility]. Please do not respond through this encounter as the response is not directed to a shared pool.       Reason for Disposition   [1] MODERATE dizziness (e.g., interferes with normal activities) AND [2] has NOT been evaluated by physician for this  (Exception: dizziness caused by heat exposure, sudden standing, or poor fluid intake)    Protocols used: Dizziness - Lightheadedness-ADULT-

## 2022-07-28 ENCOUNTER — VIRTUAL VISIT (OUTPATIENT)
Dept: FAMILY MEDICINE CLINIC | Age: 87
End: 2022-07-28
Payer: MEDICARE

## 2022-07-28 DIAGNOSIS — R05.9 COUGH: Primary | ICD-10-CM

## 2022-07-28 PROCEDURE — 99442 PR PHYS/QHP TELEPHONE EVALUATION 11-20 MIN: CPT | Performed by: NURSE PRACTITIONER

## 2022-07-28 RX ORDER — BENZONATATE 100 MG/1
100 CAPSULE ORAL
Qty: 21 CAPSULE | Refills: 0 | Status: SHIPPED | OUTPATIENT
Start: 2022-07-28 | End: 2022-08-04

## 2022-07-28 NOTE — PROGRESS NOTES
Chief Complaint   Patient presents with    Cough    Excessive Sweating       1. \"Have you been to the ER, urgent care clinic since your last visit? Hospitalized since your last visit? \" No    2. \"Have you seen or consulted any other health care providers outside of the [address] [place] [address] since your last visit? \" No     3. For patients aged 39-70: Has the patient had a colonoscopy / FIT/ Cologuard? NA - based on age      If the patient is female:    4. For patients aged 41-77: Has the patient had a mammogram within the past 2 years? NA - based on age or sex      11. For patients aged 21-65: Has the patient had a pap smear? NA - based on age or sex      Identified pt with two pt identifiers(name and ). Reviewed record in preparation for visit and have obtained necessary documentation.     Symptom review:    NO  Fever   NO  Shaking chills  YES Cough  NO Headaches  NO  Body aches  NO  Coughing up blood  NO  Chest congestion  NO  Chest pain  NO  Shortness of breath  NO  Profound Loss of smell/taste  NO  Nausea/Vomiting   NO  Loose stool/Diarrhea  NO  any skin issues

## 2022-07-28 NOTE — PROGRESS NOTES
Consent:  She and/or her healthcare decision maker is aware that this patient-initiated Telehealth encounter is a billable service, with coverage as determined by her insurance carrier. She is aware that she may receive a bill and has provided verbal consent to proceed: Yes    I was in the office while conducting this encounter. Bruna Silverman is a 82 y.o. female who was seen by synchronous (real-time) audio technology on 7/28/2022. Pt was seen at home. No other participants in this encounter. Subjective:   Cough    Ms. Mattie Giraldo is a 81yo female who presents today via telephone call with c/o an intermittent cough. States it is a dry cough. She denies CP or SOB. Denies fever or chills. She has been using OTC cough syrup with minimal relief of her symptoms. On chart review she has had a cough for months now. She does state she stopped smoking cigarettes and she also had mold in her house which she states has been fixed. She was also given prednisone in February 2022, but she cannot remember if this helped her cough or not. PMH, SH, Medications/Allergies: reviewed, on chart. Current Outpatient Medications   Medication Sig    levocetirizine (XYZAL) 5 mg tablet TAKE 1 TABLET BY MOUTH DAILY AS NEEDED FOR ALLERGY SYMPTOMS    venlafaxine-SR (EFFEXOR-XR) 150 mg capsule Take 1 Capsule by mouth daily. Januvia 100 mg tablet TAKE 1 TABLET BY MOUTH EVERY DAY    metoprolol succinate (TOPROL-XL) 50 mg XL tablet Take 1 Tablet by mouth daily. clopidogreL (PLAVIX) 75 mg tab TAKE 1 TABLET BY MOUTH EVERY DAY    atorvastatin (LIPITOR) 80 mg tablet Take 1 Tablet by mouth daily. glucose blood VI test strips (OneTouch Ultra Test) strip CHECK GLUCOSE ONCE DAILY. DX: E11.9    fluticasone propionate (FLONASE) 50 mcg/actuation nasal spray 2 Sprays by Both Nostrils route as needed for Allergies. ONLY AS NEEDED    aspirin delayed-release 81 mg tablet Take 1 Tablet by mouth daily.     lisinopriL (PRINIVIL, ZESTRIL) 20 mg tablet Take 1 Tablet by mouth two (2) times a day. acetaminophen (TylenoL) 325 mg tablet Take  by mouth every four (4) hours as needed for Pain. No current facility-administered medications for this visit. No Known Allergies    ROS:  Constitutional: No fever, chills or abnormal weight loss  Respiratory: No cough, SOB   CV: No chest pain or Palpitations    VS review: Wt Readings from Last 3 Encounters:   05/18/22 171 lb 8 oz (77.8 kg)   02/23/22 165 lb (74.8 kg)   02/10/22 164 lb (74.4 kg)     BP Readings from Last 3 Encounters:   05/18/22 (!) 158/72   02/23/22 139/68   02/12/22 128/74       Objective:     General: alert, cooperative, no distress   Mental  status: mental status: alert, oriented to person, place, and time                   Assessment & Plan:   Cough  Start taking tessalon  Cont allergy medication   Take tylenol as needed  F/U in the office next week if symptoms do not improve. Time-based coding, delete if not needed: I spent at least 15 minutes with this established patient, and >50% of the time was spent counseling and/or coordinating care regarding see above  Sue Oliva NP      Due to this being a TeleHealth evaluation, many elements of the physical examination are unable to be assessed. We discussed the expected course, resolution and complications of the diagnosis(es) in detail. Medication risks, benefits, costs, interactions, and alternatives were discussed as indicated. I advised her to contact the office if her condition worsens, changes or fails to improve as anticipated. She expressed understanding with the diagnosis(es) and plan.      Pursuant to the emergency declaration under the [facility] waiver authority and the [employer] Act, this Virtual  Visit was conducted, with patient's consent, to reduce the patient's risk of exposure to COVID-19 and provide continuity of care for an established patient.

## 2022-08-01 ENCOUNTER — HOSPITAL ENCOUNTER (OUTPATIENT)
Dept: BEHAVIORAL/MENTAL HEALTH | Age: 87
Discharge: HOME | End: 2022-08-01
Payer: MEDICARE

## 2022-08-01 PROCEDURE — 99442 PR PHYS/QHP TELEPHONE EVALUATION 11-20 MIN: CPT | Performed by: PSYCHIATRY & NEUROLOGY

## 2022-08-01 NOTE — PROGRESS NOTES
Consent: The patient and/or their healthcare decision maker is aware that they may receive a bill (including co-pays) for this audio only encounter, depending on their insurance coverage, and has provided verbal consent to proceed. The patient was located in [place], where I am licensed to provide care. INTERVAL HISTORY (Audio Only): Ms. Mazin Maloney is an 82-year-old female wih is following up with me 3 months since I last treated her while a patient in the [address] [place] [address] program (4/2019--3/2022). She has a history of Major Depression, and has been hospitalized 3 times since 2018, the last time in 8/2020. She's responded well to the use of Effexor XR and particularly to the support and structure of the IOP program. She's been fairly euthymic and affectively stable since the last hospitalization, but was feeling slightly worse the last time. She states that she's been feeling \"pretty good\" now, and that her mood is back to \"normal\", with no further depressive symptoms noted or reported. She still tends to feel at her worst first thing in the AM, and better as the day goes on. Her N/V functioning has been fairly good at this point, and she states she's still enjoying a number of things. Tolerating the Effexor XR well, with no SE's noted or reported. Biggest stressor is still the social isolation. CURRENT MEDICATION:  1. Effexor  mg daily     MENTAL STATUS EXAM: Ms. Mazin Maloney was noted to be very pleasant and cooperative, and she exhibited a brighter affective range this time, and her mood has been slightly lower. However, her neurovegetative functioning was noted to be adequate, and she denied having any feelings of hopelessness or any suicidal thoughts. There was also no evidence of any brad or hypomania and no symptoms of psychosis such as hallucinations or delusional thinking.   Her concentration and attention span were a bit impaired, but consistent with her baseline, and her cognitive functioning appeared to be reasonably intact with a slightly below average fund of knowledge noted. 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 AXIS II:  Deferred. AXIS III:  DM; history of small CVA; diabetic neuropathy; HLD; CKD; HTN. PLAN:  1. Continue the Effexor XR at 150 mg daily--has 4 months of refills (90 day)  2. Follow up with me in 4 months, sooner if needed. I affirm this is a Patient-Initiated-Episode with a patient who has not had a related appointment within my department in the past 7 days or scheduled within the next 24 hours. Total Time: 12 minutes  Note: not billable if the call serves to triage the patient into an appointment for the relevant concern. Patient was evaluated by phone call and had no access to a computer or smart phone. Chart reviewed. Evaluated and management per the note above. POS: patient at home; provider in office.

## 2022-09-01 ENCOUNTER — VIRTUAL VISIT (OUTPATIENT)
Dept: FAMILY MEDICINE CLINIC | Age: 87
End: 2022-09-01
Payer: MEDICARE

## 2022-09-01 DIAGNOSIS — R06.2 WHEEZING: ICD-10-CM

## 2022-09-01 DIAGNOSIS — J06.9 URI WITH COUGH AND CONGESTION: Primary | ICD-10-CM

## 2022-09-01 PROCEDURE — 99442 PR PHYS/QHP TELEPHONE EVALUATION 11-20 MIN: CPT | Performed by: PHYSICIAN ASSISTANT

## 2022-09-01 RX ORDER — AZITHROMYCIN 250 MG/1
TABLET, FILM COATED ORAL
Qty: 6 TABLET | Refills: 0 | Status: SHIPPED | OUTPATIENT
Start: 2022-09-01

## 2022-09-01 RX ORDER — ALBUTEROL SULFATE 90 UG/1
2 AEROSOL, METERED RESPIRATORY (INHALATION)
Qty: 18 G | Refills: 0 | Status: SHIPPED | OUTPATIENT
Start: 2022-09-01

## 2022-09-01 NOTE — PROGRESS NOTES
I have attempted to call patient X 3 tries. No answer and VMB is full. 1. \"Have you been to the ER, urgent care clinic since your last visit? Hospitalized since your last visit? \" No    2. \"Have you seen or consulted any other health care providers outside of the [address] [place] [address] since your last visit? \" No     3. For patients aged 39-70: Has the patient had a colonoscopy / FIT/ Cologuard? Yes - no Care Gap present      If the patient is female:    4. For patients aged 41-77: Has the patient had a mammogram within the past 2 years? Yes - no Care Gap present      5. For patients aged 21-65: Has the patient had a pap smear?  Yes - no Care Gap present

## 2022-09-01 NOTE — PROGRESS NOTES
Bill Ryder is a 82 y.o. female, evaluated via audio-only technology on 9/1/2022 for Cough (Runny nose, fever, and sweats. Has had #2 Covid shots.)    Assessment & Plan:   Diagnoses and all orders for this visit:    1. URI with cough and congestion  -     azithromycin (ZITHROMAX) 250 mg tablet; Take 2 tablets today, then take 1 tablet daily    2. Wheezing  -     albuterol (PROVENTIL HFA, VENTOLIN HFA, PROAIR HFA) 90 mcg/actuation inhaler; Take 2 Puffs by inhalation every four to six (4-6) hours as needed for Wheezing. The complexity of medical decision making for this visit is moderate     Pt has been unable to come by for in person visit or testing. Told pt she needs to make apt TODAY for next available to be seen in person for further evaluation since she says her cough is not going away and feels she is getting worse. Pt verbalizes understanding and agrees with the plan. She will go to ER if any worsening or \"Red flag\" sxs as discussed in interim. 12  Subjective:   Pt c/o coughing and runny nose for several weeks. Has attributed to allergies in the past, but also says she is getting hot/cold chills now. Was seen virtually a few weeks ago and told to f/up in office, pt has not done so. She says the tessalon perles do not help. She has not checked her temperature. Cough seems to come/go, but now bringing up white phlegm. She had mentioned she felt \"feverish\" but then describes this as having [place] many year but getting worse all the time\"  As cough comes on says she \"Gets really warm and sweaty\"   Feels her symptoms have gotten worse lately. Denies any sob or chest pain, no n/v/d, ST, ear pian, HA, dizziness, but has had wheezing. Has not tried any inhalers. Has had two covid vaccines. Taking Tylenol. Quit smoking last year. Prior to Admission medications    Medication Sig Start Date End Date Taking?  Authorizing Provider   azithromycin (ZITHROMAX) 250 mg tablet Take 2 tablets today, then take 1 tablet daily 9/1/22  Yes Robbie Holt PA-C   albuterol (PROVENTIL HFA, VENTOLIN HFA, PROAIR HFA) 90 mcg/actuation inhaler Take 2 Puffs by inhalation every four to six (4-6) hours as needed for Wheezing. 9/1/22  Yes Robbie Holt PA-C   levocetirizine (XYZAL) 5 mg tablet TAKE 1 TABLET BY MOUTH DAILY AS NEEDED FOR ALLERGY SYMPTOMS 6/12/22  Yes Jaswinder VILLA, NP   venlafaxine-SR [facility] 150 mg capsule Take 1 Capsule by mouth daily. 5/27/22  Yes Sha Wing MD   Januvia 100 mg tablet TAKE 1 TABLET BY MOUTH EVERY DAY 5/15/22  Yes Jaswinder VILLA, NP   metoprolol succinate (TOPROL-XL) 50 mg XL tablet Take 1 Tablet by mouth daily. 3/20/22  Yes Jaswinder VILLA, NP   clopidogreL (PLAVIX) 75 mg tab TAKE 1 TABLET BY MOUTH EVERY DAY 3/9/22  Yes Jaswinder VILLA, NP   atorvastatin (LIPITOR) 80 mg tablet Take 1 Tablet by mouth daily. 3/1/22  Yes Jaswinder VILLA, NP   glucose blood VI test strips (OneTouch Ultra Test) strip CHECK GLUCOSE ONCE DAILY. DX: E11.9 2/20/22  Yes Jaswinder VILLA, NP   aspirin delayed-release 81 mg tablet Take 1 Tablet by mouth daily. 2/12/22  Yes Chacho Hamilton MD   lisinopriL (PRINIVIL, ZESTRIL) 20 mg tablet Take 1 Tablet by mouth two (2) times a day. 1/10/22  Yes Jaswinder VILLA, NP   acetaminophen (TYLENOL) 325 mg tablet Take  by mouth every four (4) hours as needed for Pain. Yes Provider, Historical   fluticasone propionate (FLONASE) 50 mcg/actuation nasal spray 2 Sprays by Both Nostrils route as needed for Allergies.  ONLY AS NEEDED  Patient not taking: Reported on 9/1/2022 2/12/22   Chacho Hamilton MD     Patient Active Problem List   Diagnosis Code    Environmental allergies Z91.09    Elevated cholesterol E78.00    Essential hypertension I10    Well controlled type 2 diabetes mellitus ([facility] E11.9    Major depressive disorder, recurrent episode, severe ([facility] F33.2    Former smoker Z87.891    Primary osteoarthritis involving multiple joints M15.9    Age-related cataract of both eyes H25.9    History of GI bleed Z87.19    History of CVA (cerebrovascular accident) Z86.73    TIA (transient ischemic attack) G45.9    Allergic rhinitis J30.9    CKD (chronic kidney disease), symptom management only, unspecified stage N18.9    Chronic renal disease, stage III N18.30    Type 2 diabetes mellitus with chronic kidney disease ([facility] E11.22     Patient Active Problem List    Diagnosis Date Noted    Chronic renal disease, stage III 05/18/2022    Type 2 diabetes mellitus with chronic kidney disease ([facility] 05/18/2022    CKD (chronic kidney disease), symptom management only, unspecified stage 02/23/2022    Allergic rhinitis 02/11/2022    TIA (transient ischemic attack) 12/06/2021    History of GI bleed 10/24/2020    History of CVA (cerebrovascular accident) 10/24/2020    Age-related cataract of both eyes 05/05/2020    Former smoker 02/20/2020    Primary osteoarthritis involving multiple joints 02/20/2020    Major depressive disorder, recurrent episode, severe ([facility] 09/28/2018    Well controlled type 2 diabetes mellitus ([facility] 07/24/2017    Essential hypertension 03/17/2017    Elevated cholesterol 08/04/2016    Environmental allergies 01/02/2014     Current Outpatient Medications   Medication Sig Dispense Refill    azithromycin (ZITHROMAX) 250 mg tablet Take 2 tablets today, then take 1 tablet daily 6 Tablet 0    albuterol (PROVENTIL HFA, VENTOLIN HFA, PROAIR HFA) 90 mcg/actuation inhaler Take 2 Puffs by inhalation every four to six (4-6) hours as needed for Wheezing. 18 g 0    levocetirizine (XYZAL) 5 mg tablet TAKE 1 TABLET BY MOUTH DAILY AS NEEDED FOR ALLERGY SYMPTOMS 90 Tablet 2    venlafaxine-SR (EFFEXOR-XR) 150 mg capsule Take 1 Capsule by mouth daily. 90 Capsule 1    Januvia 100 mg tablet TAKE 1 TABLET BY MOUTH EVERY DAY 90 Tablet 1    metoprolol succinate (TOPROL-XL) 50 mg XL tablet Take 1 Tablet by mouth daily.  90 Tablet 3    clopidogreL (PLAVIX) 75 mg tab TAKE 1 TABLET BY MOUTH EVERY DAY 90 Tablet 1    atorvastatin (LIPITOR) 80 mg tablet Take 1 Tablet by mouth daily. 90 Tablet 3    glucose blood VI test strips (OneTouch Ultra Test) strip CHECK GLUCOSE ONCE DAILY. DX: E11.9 100 Strip 3    aspirin delayed-release 81 mg tablet Take 1 Tablet by mouth daily. 90 Tablet 1    lisinopriL (PRINIVIL, ZESTRIL) 20 mg tablet Take 1 Tablet by mouth two (2) times a day. 180 Tablet 3    acetaminophen (TYLENOL) 325 mg tablet Take  by mouth every four (4) hours as needed for Pain. fluticasone propionate (FLONASE) 50 mcg/actuation nasal spray 2 Sprays by Both Nostrils route as needed for Allergies. ONLY AS NEEDED (Patient not taking: Reported on 9/1/2022) 1 Each 0     No Known Allergies  Past Medical History:   Diagnosis Date    Allergic rhinitis due to pollen     BPV (benign positional vertigo)     Change in bowel habits     Depression     Diabetes ([address]     Dysuria     Hemorrhoids     Hypertension     IBS (irritable bowel syndrome)     Menopause     Other diseases of nasal cavity and sinuses(478.19)     Vertigo      Past Surgical History:   Procedure Laterality Date    COLONOSCOPY N/A 7/15/2020    COLONOSCOPY performed by Lizbeth Conrad MD at [facility] ENDOSCOPY    HX CHOLECYSTECTOMY  2016    HX COLONOSCOPY  2013? HX HYSTERECTOMY      UPPER GI ENDOSCOPY,DIAGNOSIS  7/15/2020          Family History   Problem Relation Age of Onset    No Known Problems Mother      Social History     Tobacco Use    Smoking status: Every Day     Packs/day: 0.25     Types: Cigarettes    Smokeless tobacco: Never   Substance Use Topics    Alcohol use: No       ROS    No data recorded     Prudence Ruth was evaluated through a patient-initiated, synchronous (real-time) audio only encounter. She (or guardian if applicable) is aware that it is a billable service, which includes applicable co-pays, with coverage as determined by her insurance carrier. This visit was conducted with the patient's (and/or Dia Meryl guardian's) verbal consent. She has not had a related appointment within my department in the past 7 days or scheduled within the next 24 hours. The patient was located in a state where the provider was licensed to provide care.   The patient was located at: Home: [facility] [address]  The provider was located at: Home: in [address] [facility]    Total Time: minutes: 11-20 minutes    Lucy Bloom PA-C

## 2022-09-05 ENCOUNTER — APPOINTMENT (OUTPATIENT)
Dept: CT IMAGING | Age: 87
End: 2022-09-05
Attending: EMERGENCY MEDICINE
Payer: MEDICARE

## 2022-09-05 ENCOUNTER — HOSPITAL ENCOUNTER (EMERGENCY)
Age: 87
Discharge: HOME | End: 2022-09-05
Attending: EMERGENCY MEDICINE
Payer: MEDICARE

## 2022-09-05 VITALS
BODY MASS INDEX: 26.03 KG/M2 | HEIGHT: 66 IN | TEMPERATURE: 97.8 F | OXYGEN SATURATION: 97 % | WEIGHT: 162 LBS | SYSTOLIC BLOOD PRESSURE: 178 MMHG | HEART RATE: 70 BPM | RESPIRATION RATE: 18 BRPM | DIASTOLIC BLOOD PRESSURE: 89 MMHG

## 2022-09-05 DIAGNOSIS — R14.0 ABDOMINAL BLOATING: Primary | ICD-10-CM

## 2022-09-05 LAB
ALBUMIN SERPL-MCNC: 3.4 G/DL (ref 3.5–5)
ALBUMIN/GLOB SERPL: 0.8 {RATIO} (ref 1.1–2.2)
ALP SERPL-CCNC: 92 U/L (ref 45–117)
ALT SERPL-CCNC: 16 U/L (ref 12–78)
ANION GAP SERPL CALC-SCNC: 8 MMOL/L (ref 5–15)
APPEARANCE UR: CLEAR
AST SERPL-CCNC: 17 U/L (ref 15–37)
BACTERIA URNS QL MICRO: NEGATIVE /HPF
BASOPHILS # BLD: 0 K/UL (ref 0–0.1)
BASOPHILS NFR BLD: 1 % (ref 0–1)
BILIRUB SERPL-MCNC: 0.7 MG/DL (ref 0.2–1)
BILIRUB UR QL: NEGATIVE
BUN SERPL-MCNC: 12 MG/DL (ref 6–20)
BUN/CREAT SERPL: 10 (ref 12–20)
CALCIUM SERPL-MCNC: 9.6 MG/DL (ref 8.5–10.1)
CHLORIDE SERPL-SCNC: 102 MMOL/L (ref 97–108)
CO2 SERPL-SCNC: 31 MMOL/L (ref 21–32)
COLOR UR: ABNORMAL
CREAT SERPL-MCNC: 1.17 MG/DL (ref 0.55–1.02)
DIFFERENTIAL METHOD BLD: ABNORMAL
EOSINOPHIL # BLD: 0.3 K/UL (ref 0–0.4)
EOSINOPHIL NFR BLD: 3 % (ref 0–7)
EPITH CASTS URNS QL MICRO: ABNORMAL /LPF
ERYTHROCYTE [DISTWIDTH] IN BLOOD BY AUTOMATED COUNT: 12.3 % (ref 11.5–14.5)
GLOBULIN SER CALC-MCNC: 4.4 G/DL (ref 2–4)
GLUCOSE SERPL-MCNC: 197 MG/DL (ref 65–100)
GLUCOSE UR STRIP.AUTO-MCNC: NEGATIVE MG/DL
HCT VFR BLD AUTO: 42.4 % (ref 35–47)
HGB BLD-MCNC: 13.7 G/DL (ref 11.5–16)
HGB UR QL STRIP: NEGATIVE
IMM GRANULOCYTES # BLD AUTO: 0 K/UL (ref 0–0.04)
IMM GRANULOCYTES NFR BLD AUTO: 1 % (ref 0–0.5)
KETONES UR QL STRIP.AUTO: NEGATIVE MG/DL
LEUKOCYTE ESTERASE UR QL STRIP.AUTO: ABNORMAL
LIPASE SERPL-CCNC: 108 U/L (ref 73–393)
LYMPHOCYTES # BLD: 1.7 K/UL (ref 0.8–3.5)
LYMPHOCYTES NFR BLD: 22 % (ref 12–49)
MCH RBC QN AUTO: 30.4 PG (ref 26–34)
MCHC RBC AUTO-ENTMCNC: 32.3 G/DL (ref 30–36.5)
MCV RBC AUTO: 94 FL (ref 80–99)
MONOCYTES # BLD: 0.5 K/UL (ref 0–1)
MONOCYTES NFR BLD: 7 % (ref 5–13)
NEUTS SEG # BLD: 4.9 K/UL (ref 1.8–8)
NEUTS SEG NFR BLD: 66 % (ref 32–75)
NITRITE UR QL STRIP.AUTO: NEGATIVE
NRBC # BLD: 0 K/UL (ref 0–0.01)
NRBC BLD-RTO: 0 PER 100 WBC
PH UR STRIP: 6 [PH] (ref 5–8)
PLATELET # BLD AUTO: 309 K/UL (ref 150–400)
PMV BLD AUTO: 9.4 FL (ref 8.9–12.9)
POTASSIUM SERPL-SCNC: 3.4 MMOL/L (ref 3.5–5.1)
PROT SERPL-MCNC: 7.8 G/DL (ref 6.4–8.2)
PROT UR STRIP-MCNC: NEGATIVE MG/DL
RBC # BLD AUTO: 4.51 M/UL (ref 3.8–5.2)
RBC #/AREA URNS HPF: ABNORMAL /HPF (ref 0–5)
SODIUM SERPL-SCNC: 141 MMOL/L (ref 136–145)
SP GR UR REFRACTOMETRY: 1.01 (ref 1–1.03)
UROBILINOGEN UR QL STRIP.AUTO: 0.2 EU/DL (ref 0.2–1)
WBC # BLD AUTO: 7.4 K/UL (ref 3.6–11)
WBC URNS QL MICRO: ABNORMAL /HPF (ref 0–4)

## 2022-09-05 PROCEDURE — 36415 COLL VENOUS BLD VENIPUNCTURE: CPT

## 2022-09-05 PROCEDURE — 83690 ASSAY OF LIPASE: CPT

## 2022-09-05 PROCEDURE — 74011000636 HC RX REV CODE- 636: Performed by: EMERGENCY MEDICINE

## 2022-09-05 PROCEDURE — 93005 ELECTROCARDIOGRAM TRACING: CPT

## 2022-09-05 PROCEDURE — 99285 EMERGENCY DEPT VISIT HI MDM: CPT

## 2022-09-05 PROCEDURE — 74177 CT ABD & PELVIS W/CONTRAST: CPT

## 2022-09-05 PROCEDURE — 85025 COMPLETE CBC W/AUTO DIFF WBC: CPT

## 2022-09-05 PROCEDURE — 80053 COMPREHEN METABOLIC PANEL: CPT

## 2022-09-05 PROCEDURE — 81001 URINALYSIS AUTO W/SCOPE: CPT

## 2022-09-05 RX ADMIN — IOPAMIDOL 100 ML: 612 INJECTION, SOLUTION INTRAVENOUS at 10:23

## 2022-09-05 NOTE — ED PROVIDER NOTES
EMERGENCY DEPARTMENT HISTORY AND PHYSICAL EXAM          Date: 9/5/2022  Patient Name: Silvio Favre    History of Presenting Illness     Chief Complaint   Patient presents with    Rectal Pain       History Provided By: Patient    HPI: Silvio Favre is a 82 y.o. female, pmhx listed below, who presents to the ED c/o multiple complaints. Patient reports she has been alternating between loose stools and constipation. Had some loose stools yesterday but still feels like she is constipated. Reports her abdomen feels bloated. Also reports she has recently been more sweaty than usual when outside in the heat. Denies weight loss or night sweats. Denies fever. Reports she has been eating well, drinking well, urinating normally. No recent changes to diet or medication. No vomiting. PCP: Blaine Schwab NP    There are no other complaints, changes, or physical findings at this time. Past History       Past Medical History:  Past Medical History:   Diagnosis Date    Allergic rhinitis due to pollen     BPV (benign positional vertigo)     Change in bowel habits     Depression     Diabetes ([address]     Dysuria     Hemorrhoids     Hypertension     IBS (irritable bowel syndrome)     Menopause     Other diseases of nasal cavity and sinuses(478.19)     Vertigo        Past Surgical History:  Past Surgical History:   Procedure Laterality Date    COLONOSCOPY N/A 7/15/2020    COLONOSCOPY performed by Pedro Hill MD at [facility] END[facility]COPY    HX CHOLECYSTECTOMY  2016    HX COLONOSCOPY  2013? HX HYSTERECTOMY      UPPER GI ENDOSCOPY,DIAGNOSIS  7/15/2020            Family History:  Family History   Problem Relation Age of Onset    No Known Problems Mother        Social History:  Social History     Tobacco Use    Smoking status: Every Day     Packs/day: 0.25     Types: Cigarettes    Smokeless tobacco: Never   Vaping Use    Vaping Use: Never used   Substance Use Topics    Alcohol use: No    Drug use:  No Well Child Visit at 5 to 6 Years   AMBULATORY CARE:   A well child visit  is when your child sees a healthcare provider to prevent health problems  Well child visits are used to track your child's growth and development  It is also a time for you to ask questions and to get information on how to keep your child safe  Write down your questions so you remember to ask them  Your child should have regular well child visits from birth to 16 years  Development milestones your child may reach between 5 and 6 years:  Each child develops at his or her own pace  Your child might have already reached the following milestones, or he or she may reach them later:  · Balance on one foot, hop, and skip    · Tie a knot    · Hold a pencil correctly    · Draw a person with at least 6 body parts    · Print some letters and numbers, copy squares and triangles    · Tell simple stories using full sentences, and use appropriate tenses and pronouns    · Count to 10, and name at least 4 colors    · Listen and follow simple directions    · Dress and undress with minimal help    · Say his or her address and phone number    · Print his or her first name    · Start to lose baby teeth    · Ride a bicycle with training wheels or other help  Help prepare your child for school:   · Talk to your child about going to school  Talk about meeting new friends and having new activities at school  Take time to tour the school with your child and meet the teacher  · Begin to establish routines  Have your child go to bed at the same time every night  · Read with your child  Read books to your child  Point to the words as you read so your child begins to recognize words  Ways to help your child who is already in school:   · Limit your child's TV time as directed  Your child's brain will develop best through interaction with other people  This includes video chatting through a computer or phone with family or friends   Talk to your child's healthcare Current Outpatient Medications   Medication Sig Dispense Refill    azithromycin (ZITHROMAX) 250 mg tablet Take 2 tablets today, then take 1 tablet daily 6 Tablet 0    albuterol (PROVENTIL HFA, VENTOLIN HFA, PROAIR HFA) 90 mcg/actuation inhaler Take 2 Puffs by inhalation every four to six (4-6) hours as needed for Wheezing. 18 g 0    levocetirizine (XYZAL) 5 mg tablet TAKE 1 TABLET BY MOUTH DAILY AS NEEDED FOR ALLERGY SYMPTOMS 90 Tablet 2    venlafaxine-SR (EFFEXOR-XR) 150 mg capsule Take 1 Capsule by mouth daily. 90 Capsule 1    Januvia 100 mg tablet TAKE 1 TABLET BY MOUTH EVERY DAY 90 Tablet 1    metoprolol succinate (TOPROL-XL) 50 mg XL tablet Take 1 Tablet by mouth daily. 90 Tablet 3    clopidogreL (PLAVIX) 75 mg tab TAKE 1 TABLET BY MOUTH EVERY DAY 90 Tablet 1    atorvastatin (LIPITOR) 80 mg tablet Take 1 Tablet by mouth daily. 90 Tablet 3    glucose blood VI test strips (OneTouch Ultra Test) strip CHECK GLUCOSE ONCE DAILY. DX: E11.9 100 Strip 3    fluticasone propionate (FLONASE) 50 mcg/actuation nasal spray 2 Sprays by Both Nostrils route as needed for Allergies. ONLY AS NEEDED (Patient not taking: Reported on 9/1/2022) 1 Each 0    aspirin delayed-release 81 mg tablet Take 1 Tablet by mouth daily. 90 Tablet 1    lisinopriL (PRINIVIL, ZESTRIL) 20 mg tablet Take 1 Tablet by mouth two (2) times a day. 180 Tablet 3    acetaminophen (TYLENOL) 325 mg tablet Take  by mouth every four (4) hours as needed for Pain. Allergies:  No Known Allergies      Review of Systems   Review of Systems   Constitutional:  Negative for chills and fever. HENT:  Negative for congestion. Eyes:  Negative for pain. Respiratory:  Negative for shortness of breath. Cardiovascular:  Negative for chest pain. Gastrointestinal:  Positive for constipation and diarrhea. Negative for abdominal pain and blood in stool. Genitourinary:  Negative for flank pain. Musculoskeletal:  Negative for back pain.    Neurological: provider if you want to let your child watch TV  He or she can help you set healthy limits  Experts usually recommend 1 hour or less of TV per day for children aged 2 to 5 years  Your provider may also be able to recommend appropriate programs for your child  · Engage with your child if he or she watches TV  Do not let your child watch TV alone, if possible  You or another adult should watch with your child  Talk with your child about what he or she is watching  When TV time is done, try to apply what you and your child saw  For example, if your child saw someone print words, have your child print those same words  TV time should never replace active playtime  Turn the TV off when your child plays  Do not let your child watch TV during meals or within 1 hour of bedtime  · Read with your child  Read books to your child, or have him or her read to you  Also read words outside of your home, such as street signs  · Encourage your child to talk about school every day  Talk to your child about the good and bad things that happened during the school day  Encourage your child to tell you or a teacher if someone is being mean to him or her  What else you can do to support your child:   · Teach your child behaviors that are acceptable  This is the goal of discipline  Set clear limits that your child cannot ignore  Be consistent, and make sure everyone who cares for your child disciplines him or her the same way  · Help your child to be responsible  Give your child routine chores to do  Expect your child to do them  · Talk to your child about anger  Help manage anger without hitting, biting, or other violence  Show him or her positive ways you handle anger  Praise your child for self-control  · Encourage your child to have friendships  Meet your child's friends and their parents  Remember to set limits to encourage safety    Help your child stay healthy:   · Teach your child to care for his or her Negative for headaches. Psychiatric/Behavioral:  Negative for agitation. Physical Exam     Vital Signs-Reviewed the patient's vital signs. Patient Vitals for the past 12 hrs:   Temp Pulse Resp BP SpO2   09/05/22 1053 -- 70 18 (!) 178/89 97 %   09/05/22 0810 97.8 °F (36.6 °C) 72 18 (!) 216/98 97 %       Physical Exam  Constitutional:       Appearance: Normal appearance. HENT:      Head: Normocephalic and atraumatic. Mouth/Throat:      Mouth: Mucous membranes are moist.   Eyes:      Pupils: Pupils are equal, round, and reactive to light. Cardiovascular:      Rate and Rhythm: Normal rate and regular rhythm. Pulmonary:      Effort: Pulmonary effort is normal.      Breath sounds: Normal breath sounds. Abdominal:      General: Bowel sounds are normal. There is no distension. Tenderness: There is no abdominal tenderness. There is no guarding or rebound. Musculoskeletal:         General: No swelling. Skin:     General: Skin is warm and dry. Neurological:      Mental Status: She is alert and oriented to person, place, and time. Sensory: No sensory deficit. Motor: No weakness. Gait: Gait normal.   Psychiatric:         Mood and Affect: Mood normal.       Diagnostic Study Results     Labs -     Recent Results (from the past 12 hour(s))   CBC WITH AUTOMATED DIFF    Collection Time: 09/05/22  8:28 AM   Result Value Ref Range    WBC 7.4 3.6 - 11.0 K/uL    RBC 4.51 3.80 - 5.20 M/uL    HGB 13.7 11.5 - 16.0 g/dL    HCT 42.4 35.0 - 47.0 %    MCV 94.0 80.0 - 99.0 FL    MCH 30.4 26.0 - 34.0 PG    MCHC 32.3 30.0 - 36.5 g/dL    RDW 12.3 11.5 - 14.5 %    PLATELET 235 342 - 253 K/uL    MPV 9.4 8.9 - 12.9 FL    NRBC 0.0 0  WBC    ABSOLUTE NRBC 0.00 0.00 - 0.01 K/uL    NEUTROPHILS 66 32 - 75 %    LYMPHOCYTES 22 12 - 49 %    MONOCYTES 7 5 - 13 %    EOSINOPHILS 3 0 - 7 %    BASOPHILS 1 0 - 1 %    IMMATURE GRANULOCYTES 1 (H) 0.0 - 0.5 %    ABS. NEUTROPHILS 4.9 1.8 - 8.0 K/UL    ABS. teeth and gums  Have your child brush his or her teeth at least 2 times every day, and floss 1 time every day  Have your child see the dentist 2 times each year  · Make sure your child has a healthy breakfast every day  Breakfast can help your child learn and behave better in school  · Teach your child how to make healthy food choices at school  A healthy lunch may include a sandwich with lean meat, cheese, or peanut butter  It could also include a fruit, vegetable, and milk  Pack healthy foods if your child takes his or her own lunch  Pack baby carrots or pretzels instead of potato chips in your child's lunch box  You can also add fruit or low-fat yogurt instead of cookies  Keep his or her lunch cold with an ice pack so that it does not spoil  · Encourage physical activity  Your child needs 60 minutes of physical activity every day  The 60 minutes of physical activity does not need to be done all at once  It can be done in shorter blocks of time  Find family activities that encourage physical activity, such as walking the dog  Help your child get the right nutrition:  Offer your child a variety of foods from all the food groups  The number and size of servings that your child needs from each food group depends on his or her age and activity level  Ask your dietitian how much your child should eat from each food group  · Half of your child's plate should contain fruits and vegetables  Offer fresh, canned, or dried fruit instead of fruit juice as often as possible  Limit juice to 4 to 6 ounces each day  Offer more dark green, red, and orange vegetables  Dark green vegetables include broccoli, spinach, danielle lettuce, and zuly greens  Examples of orange and red vegetables are carrots, sweet potatoes, winter squash, and red peppers  · Offer whole grains to your child each day  Half of the grains your child eats each day should be whole grains   Whole grains include brown rice, whole-wheat LYMPHOCYTES 1.7 0.8 - 3.5 K/UL    ABS. MONOCYTES 0.5 0.0 - 1.0 K/UL    ABS. EOSINOPHILS 0.3 0.0 - 0.4 K/UL    ABS. BASOPHILS 0.0 0.0 - 0.1 K/UL    ABS. IMM. GRANS. 0.0 0.00 - 0.04 K/UL    DF AUTOMATED     METABOLIC PANEL, COMPREHENSIVE    Collection Time: 09/05/22  8:28 AM   Result Value Ref Range    Sodium 141 136 - 145 mmol/L    Potassium 3.4 (L) 3.5 - 5.1 mmol/L    Chloride 102 97 - 108 mmol/L    CO2 31 21 - 32 mmol/L    Anion gap 8 5 - 15 mmol/L    Glucose 197 (H) 65 - 100 mg/dL    BUN 12 6 - 20 MG/DL    Creatinine 1.17 (H) 0.55 - 1.02 MG/DL    BUN/Creatinine ratio 10 (L) 12 - 20      GFR est AA 53 (L) >60 ml/min/1.73m2    GFR est non-AA 44 (L) >60 ml/min/1.73m2    Calcium 9.6 8.5 - 10.1 MG/DL    Bilirubin, total 0.7 0.2 - 1.0 MG/DL    ALT (SGPT) 16 12 - 78 U/L    AST (SGOT) 17 15 - 37 U/L    Alk. phosphatase 92 45 - 117 U/L    Protein, total 7.8 6.4 - 8.2 g/dL    Albumin 3.4 (L) 3.5 - 5.0 g/dL    Globulin 4.4 (H) 2.0 - 4.0 g/dL    A-G Ratio 0.8 (L) 1.1 - 2.2     LIPASE    Collection Time: 09/05/22  8:28 AM   Result Value Ref Range    Lipase 108 73 - 393 U/L   URINALYSIS W/ RFLX MICROSCOPIC    Collection Time: 09/05/22  8:40 AM   Result Value Ref Range    Color YELLOW/STRAW      Appearance CLEAR CLEAR      Specific gravity 1.007 1.003 - 1.030      pH (UA) 6.0 5.0 - 8.0      Protein Negative NEG mg/dL    Glucose Negative NEG mg/dL    Ketone Negative NEG mg/dL    Bilirubin Negative NEG      Blood Negative NEG      Urobilinogen 0.2 0.2 - 1.0 EU/dL    Nitrites Negative NEG      Leukocyte Esterase TRACE (A) NEG     URINE MICROSCOPIC ONLY    Collection Time: 09/05/22  8:40 AM   Result Value Ref Range    WBC 0-4 0 - 4 /hpf    RBC 0-5 0 - 5 /hpf    Epithelial cells MODERATE (A) FEW /lpf    Bacteria Negative NEG /hpf       Radiologic Studies -   CT ABD PELV W CONT   Final Result      1. No acute findings. 2. Status post cholecystectomy and hysterectomy. 3. Small hiatal hernia. 4. Colonic diverticulosis.  Interval pasta, and whole-grain cereals and breads  · Make sure your child gets enough calcium  Calcium is needed to build strong bones and teeth  Children need about 2 to 3 servings of dairy each day to get enough calcium  Good sources of calcium are low-fat dairy foods (milk, cheese, and yogurt)  A serving of dairy is 8 ounces of milk or yogurt, or 1½ ounces of cheese  Other foods that contain calcium include tofu, kale, spinach, broccoli, almonds, and calcium-fortified orange juice  Ask your child's healthcare provider for more information about the serving sizes of these foods  · Offer lean meats, poultry, fish, and other protein foods  Other sources of protein include legumes (such as beans), soy foods (such as tofu), and peanut butter  Bake, broil, and grill meat instead of frying it to reduce the amount of fat  · Offer healthy fats in place of unhealthy fats  A healthy fat is unsaturated fat  It is found in foods such as soybean, canola, olive, and sunflower oils  It is also found in soft tub margarine that is made with liquid vegetable oil  Limit unhealthy fats such as saturated fat, trans fat, and cholesterol  These are found in shortening, butter, stick margarine, and animal fat  · Limit foods that contain sugar and are low in nutrition  Limit candy, soda, and fruit juice  Do not give your child fruit drinks  Limit fast food and salty snacks  Keep your child safe:   · Always have your child ride in a booster car seat,  and make sure everyone in your car wears a seatbelt  ¨ Children aged 3 to 8 years should ride in a booster car seat in the back seat  ¨ Booster seats come with and without a seat back  Your child will be secured in the booster seat with the regular seatbelt in your car  ¨ Your child must stay in the booster car seat until he or she is between 6and 15years old and 4 foot 9 inches (57 inches) tall   This is when a regular seatbelt should fit your child properly without resolution of sigmoid diverticulitis. 5. Nonspecific mild diffuse mural thickening of urinary bladder, for which   clinical correlation is recommended. CT Results  (Last 48 hours)                 09/05/22 1023  CT ABD PELV W CONT Final result    Impression:      1. No acute findings. 2. Status post cholecystectomy and hysterectomy. 3. Small hiatal hernia. 4. Colonic diverticulosis. Interval resolution of sigmoid diverticulitis. 5. Nonspecific mild diffuse mural thickening of urinary bladder, for which   clinical correlation is recommended. Narrative:  EXAM: CT ABD PELV W CONT       INDICATION: alternating constipation and diarrhea with associated bloating       COMPARISON: CT 1/27/2022        CONTRAST: 100 mL of Isovue-370. ORAL CONTRAST: None. The lack of oral contrast material diminishes the capacity   of CT to evaluate the bowel and adjacent structures. TECHNIQUE:    Following the uneventful intravenous administration of contrast, thin axial   images were obtained through the abdomen and pelvis. Coronal and sagittal   reconstructions were generated. CT dose reduction was achieved through use of a   standardized protocol tailored for this examination and automatic exposure   control for dose modulation. FINDINGS:    LOWER THORAX: Linear atelectasis in the inferior lingula. LIVER: No mass. BILIARY TREE: Status post cholecystectomy. CBD is not dilated. SPLEEN: within normal limits. PANCREAS: No mass or ductal dilatation. ADRENALS: Unremarkable. KIDNEYS: No mass, calculus, or hydronephrosis. STOMACH: Small hiatal hernia. SMALL BOWEL: No dilatation or wall thickening. COLON: Colonic diverticulosis. Interval resolution of sigmoid diverticulitis. APPENDIX: Normal.   PERITONEUM: No ascites or pneumoperitoneum. RETROPERITONEUM: Moderately extensive atherosclerotic calcifications. No   aneurysm. No retroperitoneal mass or adenopathy.    REPRODUCTIVE ORGANS: the booster seat  ¨ Your child should remain in a forward-facing car seat if you only have a lap belt seatbelt in your car  Some forward-facing car seats hold children who weigh more than 40 pounds  The harness on the forward-facing car seat will keep your child safer and more secure than a lap belt and booster seat  · Teach your child how to cross the street safely  Teach your child to stop at the curb, look left, then look right, and left again  Tell your child never to cross the street without an adult  Teach your child where the school bus will pick him or her up and drop him or her off  Always have adult supervision at your child's bus stop  · Teach your child to wear safety equipment  Make sure your child has on proper safety equipment when he or she plays sports and rides his or her bicycle  Your child should wear a helmet when he or she rides his or her bicycle  The helmet should fit properly  Never let your child ride his or her bicycle in the street  · Teach your child how to swim if he or she does not know how  Even if your child knows how to swim, do not let him or her play around water alone  An adult needs to be present and watching at all times  Make sure your child wears a safety vest when he or she is on a boat  · Put sunscreen on your child before he or she goes outside to play or swim  Use sunscreen with a SPF 15 or higher  Use as directed  Apply sunscreen at least 15 minutes before your child goes outside  Reapply sunscreen every 2 hours when outside  · Talk to your child about personal safety without making him or her anxious  Explain to him or her that no one has the right to touch his or her private parts  Also explain that no one should ask your child to touch their private parts  Let your child know that he or she should tell you even if he or she is told not to  · Teach your child fire safety  Do not leave matches or lighters within reach of your child  Make a family escape plan  Practice what to do in case of a fire  · Keep guns locked safely out of your child's reach  Guns in your home can be dangerous to your family  If you must keep a gun in your home, unload it and lock it up  Keep the ammunition in a separate locked place from the gun  Keep the keys out of your child's reach  Never  keep a gun in an area where your child plays  What you need to know about your child's next well child visit:  Your child's healthcare provider will tell you when to bring him or her in again  The next well child visit is usually at 7 to 8 years  Contact your child's healthcare provider if you have questions or concerns about his or her health or care before the next visit  Your child may need catch-up doses of the hepatitis B, hepatitis A, Tdap, MMR, or chickenpox vaccine  Remember to take your child in for a yearly flu vaccine  Follow up with your child's healthcare provider as directed:  Write down your questions so you remember to ask them during your child's visits  © 2017 [facility] Information is for End User's use only and may not be sold, redistributed or otherwise used for commercial purposes  All illustrations and images included in CareNotes® are the copyrighted property of [employer]  or [employer]  The above information is an  only  It is not intended as medical advice for individual conditions or treatments  Talk to your doctor, nurse or pharmacist before following any medical regimen to see if it is safe and effective for you  Status post hysterectomy. URINARY BLADDER: Nonspecific mild diffuse mural thickening of urinary bladder. Clinical correlation recommended. BONES: No destructive bone lesion. Moderate diffuse osteopenia. Diffuse   idiopathic skeletal hyperostosis. Moderate lumbar and bilateral hip   osteoarthrosis. ABDOMINAL WALL: Small bilateral inguinal hernias containing fat. ADDITIONAL COMMENTS: N/A                 CXR Results  (Last 48 hours)      None                Medical Decision Making   I am the first provider for this patient. I reviewed the vital signs, available nursing notes, past medical history, past surgical history, family history and social history. Records Reviewed: Nursing Notes and Old Medical Records    Provider Notes (Medical Decision Making):   MDM: 68-year-old female with benign exam but complaint of abdominal bloating concerning for obstruction, constipation, malignancy. We will plan for lab work and CT scan. Sensation of feeling sweaty, currently skin is warm and dry. No weight loss or night sweats and symptoms seem to happen mostly in the heat. We will check electrolytes and monitor while in the emergency department. Initial assessment performed. The patients presenting problems have been discussed, and they are in agreement with the care plan formulated and outlined with them. I have encouraged them to ask questions as they arise throughout their visit. PROGRESS NOTE:  Lab work and CT reviewed with patient. Given reassurance. Recommend follow-up with PCP for further assessment of complaints. Discharge note:  Pt re-evaluated and noted to be feeling better, ready for discharge. Updated pt on all final results. Will follow up as instructed. All questions have been answered, pt voiced understanding and agreement with plan. Specific return precautions provided as well as instructions to return to the ED should sx worsen at any time. Vital signs stable for discharge. Diagnosis     Clinical Impression:   1. Abdominal bloating            Disposition:  Discharged    Discharge Medication List as of 9/5/2022 11:24 AM            Please note, this dictation was completed with [employer], the computer voice recognition software. Quite often unanticipated grammatical, syntax, homophones, and other interpretive errors are inadvertently transcribed by the computer software. Please disregard these errors. Please excuse any errors that have escaped final proof reading.

## 2022-09-05 NOTE — ED TRIAGE NOTES
Pt arrived by POV for rectum itching and dizziness. Per pt she saw her PMD for a cough and was given a Z-pack and cough medication. Pt reports she still has a cough and at times feels dizzy, pt also complains of rectal itching and when she wakes up in the morning she feels sweaty.   Pt is awake alert and oriented x 4, pt educated on ER flow

## 2022-09-06 ENCOUNTER — TELEPHONE (OUTPATIENT)
Dept: FAMILY MEDICINE CLINIC | Age: 87
End: 2022-09-06

## 2022-09-06 NOTE — TELEPHONE ENCOUNTER
Received a call from pt's daughter stating she needs an order form home health to come in a few times a week. What are the next steps to get this started?

## 2022-09-07 LAB
ATRIAL RATE: 62 BPM
CALCULATED P AXIS, ECG09: 33 DEGREES
CALCULATED R AXIS, ECG10: 1 DEGREES
CALCULATED T AXIS, ECG11: 113 DEGREES
DIAGNOSIS, 93000: NORMAL
P-R INTERVAL, ECG05: 160 MS
Q-T INTERVAL, ECG07: 424 MS
QRS DURATION, ECG06: 84 MS
QTC CALCULATION (BEZET), ECG08: 430 MS
VENTRICULAR RATE, ECG03: 62 BPM

## 2022-09-19 RX ORDER — CLOPIDOGREL BISULFATE 75 MG/1
TABLET ORAL
Qty: 90 TABLET | Refills: 1 | Status: SHIPPED | OUTPATIENT
Start: 2022-09-19

## 2022-09-27 ENCOUNTER — OFFICE VISIT (OUTPATIENT)
Dept: FAMILY MEDICINE CLINIC | Age: 87
End: 2022-09-27
Payer: MEDICARE

## 2022-09-27 VITALS
SYSTOLIC BLOOD PRESSURE: 146 MMHG | RESPIRATION RATE: 16 BRPM | WEIGHT: 172 LBS | OXYGEN SATURATION: 96 % | HEART RATE: 54 BPM | DIASTOLIC BLOOD PRESSURE: 80 MMHG | HEIGHT: 66 IN | BODY MASS INDEX: 27.64 KG/M2

## 2022-09-27 DIAGNOSIS — R53.82 CHRONIC FATIGUE: ICD-10-CM

## 2022-09-27 DIAGNOSIS — J30.89 ENVIRONMENTAL AND SEASONAL ALLERGIES: ICD-10-CM

## 2022-09-27 DIAGNOSIS — E78.00 ELEVATED CHOLESTEROL: ICD-10-CM

## 2022-09-27 DIAGNOSIS — Z00.00 MEDICARE ANNUAL WELLNESS VISIT, SUBSEQUENT: Primary | ICD-10-CM

## 2022-09-27 DIAGNOSIS — G31.84 MCI (MILD COGNITIVE IMPAIRMENT): ICD-10-CM

## 2022-09-27 DIAGNOSIS — R05.3 CHRONIC COUGH: ICD-10-CM

## 2022-09-27 DIAGNOSIS — N18.30 TYPE 2 DIABETES MELLITUS WITH STAGE 3 CHRONIC KIDNEY DISEASE, WITHOUT LONG-TERM CURRENT USE OF INSULIN, UNSPECIFIED WHETHER STAGE 3A OR 3B CKD (HCC): ICD-10-CM

## 2022-09-27 DIAGNOSIS — K64.9 HEMORRHOIDS, UNSPECIFIED HEMORRHOID TYPE: ICD-10-CM

## 2022-09-27 DIAGNOSIS — R06.2 WHEEZING: ICD-10-CM

## 2022-09-27 DIAGNOSIS — M15.9 PRIMARY OSTEOARTHRITIS INVOLVING MULTIPLE JOINTS: ICD-10-CM

## 2022-09-27 DIAGNOSIS — Z86.73 HISTORY OF CVA (CEREBROVASCULAR ACCIDENT): ICD-10-CM

## 2022-09-27 DIAGNOSIS — I10 ESSENTIAL HYPERTENSION: ICD-10-CM

## 2022-09-27 DIAGNOSIS — F32.1 MODERATE SINGLE CURRENT EPISODE OF MAJOR DEPRESSIVE DISORDER (HCC): ICD-10-CM

## 2022-09-27 DIAGNOSIS — E11.22 TYPE 2 DIABETES MELLITUS WITH STAGE 3 CHRONIC KIDNEY DISEASE, WITHOUT LONG-TERM CURRENT USE OF INSULIN, UNSPECIFIED WHETHER STAGE 3A OR 3B CKD (HCC): ICD-10-CM

## 2022-09-27 LAB — HGB BLD-MCNC: 12.6 G/DL

## 2022-09-27 PROCEDURE — 85018 HEMOGLOBIN: CPT | Performed by: NURSE PRACTITIONER

## 2022-09-27 RX ORDER — PREDNISONE 20 MG/1
20 TABLET ORAL 2 TIMES DAILY
Qty: 10 TABLET | Refills: 0 | Status: SHIPPED | OUTPATIENT
Start: 2022-09-27 | End: 2022-10-02

## 2022-09-27 NOTE — PATIENT INSTRUCTIONS
Medicare Wellness Visit, Female     The best way to live healthy is to have a lifestyle where you eat a well-balanced diet, exercise regularly, limit alcohol use, and quit all forms of tobacco/nicotine, if applicable. Regular preventive services are another way to keep healthy. Preventive services (vaccines, screening tests, monitoring & exams) can help personalize your care plan, which helps you manage your own care. Screening tests can find health problems at the earliest stages, when they are easiest to treat. [facility] follows the current, evidence-based guidelines published by the [facility] ([facility]) when recommending preventive services for our patients. Because we follow these guidelines, sometimes recommendations change over time as research supports it. (For example, mammograms used to be recommended annually. Even though Medicare will still pay for an annual mammogram, the newer guidelines recommend a mammogram every two years for women of average risk). Of course, you and your doctor may decide to screen more often for some diseases, based on your risk and your co-morbidities (chronic disease you are already diagnosed with). Preventive services for you include:  - Medicare offers their members a free annual wellness visit, which is time for you and your primary care provider to discuss and plan for your preventive service needs. Take advantage of this benefit every year!  -All adults over the age of 72 should receive the recommended pneumonia vaccines. Current USPSTF guidelines recommend a series of two vaccines for the best pneumonia protection.   -All adults should have a flu vaccine yearly and a tetanus vaccine every 10 years.   -All adults age 48 and older should receive the shingles vaccines (series of two vaccines).       -All adults age 38-68 who are overweight should have a diabetes screening test once every three years.   -All adults born between [birth year] and 1963 should be screened once for Hepatitis C.  -Other screening tests and preventive services for persons with diabetes include: an eye exam to screen for diabetic retinopathy, a kidney function test, a foot exam, and stricter control over your cholesterol.   -Cardiovascular screening for adults with routine risk involves an electrocardiogram (ECG) at intervals determined by your doctor.   -Colorectal cancer screenings should be done for adults age 54-65 with no increased risk factors for colorectal cancer. There are a number of acceptable methods of screening for this type of cancer. Each test has its own benefits and drawbacks. Discuss with your doctor what is most appropriate for you during your annual wellness visit. The different tests include: colonoscopy (considered the best screening method), a fecal occult blood test, a fecal DNA test, and sigmoidoscopy.    -A bone mass density test is recommended when a woman turns 65 to screen for osteoporosis. This test is only recommended one time, as a screening. Some providers will use this same test as a disease monitoring tool if you already have osteoporosis. -Breast cancer screenings are recommended every other year for women of normal risk, age 54-69.  -Cervical cancer screenings for women over age 72 are only recommended with certain risk factors.      Here is a list of your current Health Maintenance items (your personalized list of preventive services) with a due date:  Health Maintenance Due   Topic Date Due    COVID-19 Vaccine (3 - Booster for Moderna series) 09/14/2021    Diabetic Foot Care  01/21/2022    Eye Exam  05/01/2022    Yearly Flu Vaccine (1) 08/01/2022    Hemoglobin A1C    09/17/2022

## 2022-09-27 NOTE — PROGRESS NOTES
Identified pt with two pt identifiers(name and ). Reviewed record in preparation for visit and have obtained necessary documentation. Chief Complaint   Patient presents with    Annual Wellness Visit    Other     Home Health referral       1. \"Have you been to the ER, urgent care clinic since your last visit? Hospitalized since your last visit? \" No    2. \"Have you seen or consulted any other health care providers outside of the [address] [place] [address] since your last visit? \" No     3. For patients aged 39-70: Has the patient had a colonoscopy / FIT/ Cologuard? NA - based on age      If the patient is female:    4. For patients aged 41-77: Has the patient had a mammogram within the past 2 years? NA - based on age or sex      11. For patients aged 21-65: Has the patient had a pap smear? NA - based on age or sex      Symptom review:    NO  Fever   NO  Shaking chills  YES  Cough  NO Headaches  NO  Body aches  NO  Coughing up blood  YES  Chest congestion  NO  Chest pain  NO  Shortness of breath  NO  Profound Loss of smell/taste  NO  Nausea/Vomiting   NO  Loose stool/Diarrhea  NO  any skin issues  This is the Subsequent Medicare Annual Wellness Exam, performed 12 months or more after the Initial AWV or the last Subsequent AWV    I have reviewed the patient's medical history in detail and updated the computerized patient record. Assessment/Plan   Education and counseling provided:  Are appropriate based on today's review and evaluation    1. Medicare annual wellness visit, subsequent   Major depression- followed by psych, will notify Dr Naty Vazquez and see what he recommends. Will then notify patient about plan of care. Covid vaccine- she rec'd both Moderna vaccines a year ago. Never had any boosters.   Advised to get new Bivalent vaccine from pharmacy  Flu shot- plans on getting this soon    Depression Risk Factor Screening     3 most recent PHQ Screens 2022   PHQ Not Done -   Little interest or pleasure in doing things Nearly every day   Feeling down, depressed, irritable, or hopeless More than half the days   Total Score PHQ 2 5   Trouble falling or staying asleep, or sleeping too much Several days   Feeling tired or having little energy Nearly every day   Poor appetite, weight loss, or overeating Nearly every day   Feeling bad about yourself - or that you are a failure or have let yourself or your family down More than half the days   Trouble concentrating on things such as school, work, reading, or watching TV Several days   Moving or speaking so slowly that other people could have noticed; or the opposite being so fidgety that others notice Several days   Thoughts of being better off dead, or hurting yourself in some way More than half the days   PHQ 9 Score 18   How difficult have these problems made it for you to do your work, take care of your home and get along with others Very difficult       Alcohol & Drug Abuse Risk Screen    Do you average more than 1 drink per night or more than 7 drinks a week:  No    On any one occasion in the past three months have you have had more than 3 drinks containing alcohol:  No          Functional Ability and Level of Safety    Hearing: Hearing is good. Activities of Daily Living: The home contains: no safety equipment. Patient needs help with:  transportation, shopping, preparing meals, laundry, housework, managing medications, managing money, eating, bathing, and hygiene      Ambulation: with no difficulty     Fall Risk:  Fall Risk Assessment, last 12 mths 7/28/2022   Able to walk? Yes   Fall in past 12 months? 1   Do you feel unsteady? 0   Are you worried about falling 1   Is TUG test greater than 12 seconds? -   Is the gait abnormal? 1   Number of falls in past 12 months 2   Fall with injury?  0      Abuse Screen:  Patient is not abused       Cognitive Screening    Has your family/caregiver stated any concerns about your memory: yes - daughter and patient Cognitive Screening: Abnormal - Clock Drawing Test,  Failed Clock, Remembering three words 1/3    Health Maintenance Due     Health Maintenance Due   Topic Date Due    COVID-19 Vaccine (3 - Booster for Moderna series) 09/14/2021    Foot Exam Q1  01/21/2022    Eye Exam Retinal or Dilated  05/01/2022    Flu Vaccine (1) 08/01/2022    A1C test (Diabetic or Prediabetic)  09/17/2022       Patient Care Team   Patient Care Team:  Darnell Shetty NP as PCP - General (Nurse Practitioner)  Darnell Shetty NP as PCP - [facility] Empaneled Provider  Latoya Madden MD (Inactive) (General Surgery)  Leonie OakleychMD jett (Family Medicine)    History     Patient Active Problem List   Diagnosis Code    Environmental allergies Z91.09    Elevated cholesterol E78.00    Essential hypertension I10    Well controlled type 2 diabetes mellitus ([address] E11.9    Major depressive disorder, recurrent episode, severe ([address] F33.2    Former smoker Z87.891    Primary osteoarthritis involving multiple joints M15.9    Age-related cataract of both eyes H25.9    History of GI bleed Z87.19    History of CVA (cerebrovascular accident) Z86.73    TIA (transient ischemic attack) G45.9    Allergic rhinitis J30.9    CKD (chronic kidney disease), symptom management only, unspecified stage N18.9    Chronic renal disease, stage III N18.30    Type 2 diabetes mellitus with chronic kidney disease ([address] E11.22     Past Medical History:   Diagnosis Date    Allergic rhinitis due to pollen     BPV (benign positional vertigo)     Change in bowel habits     Depression     Diabetes ([address]     Dysuria     Hemorrhoids     Hypertension     IBS (irritable bowel syndrome)     Menopause     Other diseases of nasal cavity and sinuses(478.19)     Vertigo       Past Surgical History:   Procedure Laterality Date    COLONOSCOPY N/A 7/15/2020    COLONOSCOPY performed by Nathalie Oneill MD at [facility] ENDOSCOPY    HX CHOLECYSTECTOMY  2016    HX COLONOSCOPY  2013?     HX HYSTERECTOMY UPPER GI ENDOSCOPY,DIAGNOSIS  7/15/2020          Current Outpatient Medications   Medication Sig Dispense Refill    clopidogreL (PLAVIX) 75 mg tab TAKE 1 TABLET BY MOUTH EVERY DAY 90 Tablet 1    azithromycin (ZITHROMAX) 250 mg tablet Take 2 tablets today, then take 1 tablet daily 6 Tablet 0    albuterol (PROVENTIL HFA, VENTOLIN HFA, PROAIR HFA) 90 mcg/actuation inhaler Take 2 Puffs by inhalation every four to six (4-6) hours as needed for Wheezing. 18 g 0    levocetirizine (XYZAL) 5 mg tablet TAKE 1 TABLET BY MOUTH DAILY AS NEEDED FOR ALLERGY SYMPTOMS 90 Tablet 2    venlafaxine-SR (EFFEXOR-XR) 150 mg capsule Take 1 Capsule by mouth daily. 90 Capsule 1    Januvia 100 mg tablet TAKE 1 TABLET BY MOUTH EVERY DAY 90 Tablet 1    metoprolol succinate (TOPROL-XL) 50 mg XL tablet Take 1 Tablet by mouth daily. 90 Tablet 3    atorvastatin (LIPITOR) 80 mg tablet Take 1 Tablet by mouth daily. 90 Tablet 3    glucose blood VI test strips (OneTouch Ultra Test) strip CHECK GLUCOSE ONCE DAILY. DX: E11.9 100 Strip 3    fluticasone propionate (FLONASE) 50 mcg/actuation nasal spray 2 Sprays by Both Nostrils route as needed for Allergies. ONLY AS NEEDED (Patient not taking: Reported on 9/1/2022) 1 Each 0    aspirin delayed-release 81 mg tablet Take 1 Tablet by mouth daily. 90 Tablet 1    lisinopriL (PRINIVIL, ZESTRIL) 20 mg tablet Take 1 Tablet by mouth two (2) times a day. 180 Tablet 3    acetaminophen (TYLENOL) 325 mg tablet Take  by mouth every four (4) hours as needed for Pain.        No Known Allergies    Family History   Problem Relation Age of Onset    No Known Problems Mother      Social History     Tobacco Use    Smoking status: Every Day     Packs/day: 0.25     Types: Cigarettes    Smokeless tobacco: Never   Substance Use Topics    Alcohol use: No         Bernadine Lindsey NP

## 2022-09-27 NOTE — PROGRESS NOTES
Subjective:     CC: diabetes, HTN    Celestine Gonzalez is a 82 y.o. female, accompanied by her daughter Tatiana Martinez, who presents today for a routine check up for diabetes, HTN, and other chronic medical issues. Type 2 diabetes  Last A1C was at goal in 3-2022. She is on Januvia 100mg daily. Home BS never over 200. Denies hypoglycemia. CKD, stage 3  Lab Results   Component Value Date/Time    Creatinine 1.17 (H) 09/05/2022 08:28 AM     She avoids NSAIDS. HTN  BP is elevated today but she has not taken her BP meds yet. She is on Metoprolol and Lisinopril. She denies CP, SOB, dizziness, or swelling. HLD  Lab Results   Component Value Date/Time    Cholesterol, total 129 03/17/2022 08:22 AM    HDL Cholesterol 51 03/17/2022 08:22 AM    LDL, calculated 51.8 03/17/2022 08:22 AM    VLDL, calculated 26.2 03/17/2022 08:22 AM    Triglyceride 131 03/17/2022 08:22 AM    CHOL/HDL Ratio 2.5 03/17/2022 08:22 AM       Well controlled with Lipitor 80mg daily. TIA  She has had multiple TIA's over the past couple of years. She is on Plavix. She does have a hx of GI bleed but without the Plavix she kept having TIAs. Denies any black or bloody stools. Lab Results   Component Value Date/Time    WBC 7.4 09/05/2022 08:28 AM    Hemoglobin (POC) 13.8 05/18/2022 10:40 AM    HGB 13.7 09/05/2022 08:28 AM    HCT 42.4 09/05/2022 08:28 AM    PLATELET 679 93/90/4135 08:28 AM    MCV 94.0 09/05/2022 08:28 AM     Hemorrhoids  She reports problems with hemorroids and just staretd using OTC Prep H.     MCI  She has had some memory issues lately. She often forgets to take her meds. Daughter concerned. Feels she needs a caregiver to keep track of her meds and ensures she takes them like she is supposed to. HX of tobacco use  Chronic cough with recent wheezing. She has quit smoking 5 weeks ago! Allergies  She takes Xyzol daily. She has not been using her Flonase daily. Advised to do so. Vertigo   She takes Meclizine prn.  She has seen ENT in the past and did not want to go back. Major Depression  She is followed by Dr Mazin Driver. Next appt 12-1-22. She is on Effexor XR 150mg daily. She has \"graduated\" and completed her therapy, however she is feeing very depressed today. Poor appetite although weight is up from earlier this month. Health maintenance  Covid vaccine- she rec'd both Moderna vaccines a year ago. Never had any boosters.   Advised to get new Bivalent vaccine from pharmacy  Flu shot- plans on getting this soon      Patient Active Problem List   Diagnosis Code    Environmental allergies Z91.09    Elevated cholesterol E78.00    Essential hypertension I10    Well controlled type 2 diabetes mellitus ([address] E11.9    Major depressive disorder, recurrent episode, severe ([address] F33.2    Former smoker Z87.891    Primary osteoarthritis involving multiple joints M15.9    Age-related cataract of both eyes H25.9    History of GI bleed Z87.19    History of CVA (cerebrovascular accident) Z86.73    TIA (transient ischemic attack) G45.9    Allergic rhinitis J30.9    CKD (chronic kidney disease), symptom management only, unspecified stage N18.9    Chronic renal disease, stage III N18.30    Type 2 diabetes mellitus with chronic kidney disease ([address] E11.22       Past Medical History:   Diagnosis Date    Allergic rhinitis due to pollen     BPV (benign positional vertigo)     Change in bowel habits     Depression     Diabetes ([address]     Dysuria     Hemorrhoids     Hypertension     IBS (irritable bowel syndrome)     Menopause     Other diseases of nasal cavity and sinuses(478.19)     Vertigo          Current Outpatient Medications:     clopidogreL (PLAVIX) 75 mg tab, TAKE 1 TABLET BY MOUTH EVERY DAY, Disp: 90 Tablet, Rfl: 1    azithromycin (ZITHROMAX) 250 mg tablet, Take 2 tablets today, then take 1 tablet daily, Disp: 6 Tablet, Rfl: 0    albuterol (PROVENTIL HFA, VENTOLIN HFA, PROAIR HFA) 90 mcg/actuation inhaler, Take 2 Puffs by inhalation every four to six (4-6) hours as needed for Wheezing., Disp: 18 g, Rfl: 0    levocetirizine (XYZAL) 5 mg tablet, TAKE 1 TABLET BY MOUTH DAILY AS NEEDED FOR ALLERGY SYMPTOMS, Disp: 90 Tablet, Rfl: 2    venlafaxine-SR (EFFEXOR-XR) 150 mg capsule, Take 1 Capsule by mouth daily. , Disp: 90 Capsule, Rfl: 1    Januvia 100 mg tablet, TAKE 1 TABLET BY MOUTH EVERY DAY, Disp: 90 Tablet, Rfl: 1    metoprolol succinate (TOPROL-XL) 50 mg XL tablet, Take 1 Tablet by mouth daily. , Disp: 90 Tablet, Rfl: 3    atorvastatin (LIPITOR) 80 mg tablet, Take 1 Tablet by mouth daily. , Disp: 90 Tablet, Rfl: 3    glucose blood VI test strips (OneTouch Ultra Test) strip, CHECK GLUCOSE ONCE DAILY. DX: E11.9, Disp: 100 Strip, Rfl: 3    fluticasone propionate (FLONASE) 50 mcg/actuation nasal spray, 2 Sprays by Both Nostrils route as needed for Allergies. ONLY AS NEEDED (Patient not taking: Reported on 9/1/2022), Disp: 1 Each, Rfl: 0    aspirin delayed-release 81 mg tablet, Take 1 Tablet by mouth daily. , Disp: 90 Tablet, Rfl: 1    lisinopriL (PRINIVIL, ZESTRIL) 20 mg tablet, Take 1 Tablet by mouth two (2) times a day., Disp: 180 Tablet, Rfl: 3    acetaminophen (TYLENOL) 325 mg tablet, Take  by mouth every four (4) hours as needed for Pain., Disp: , Rfl:     No Known Allergies    Past Surgical History:   Procedure Laterality Date    COLONOSCOPY N/A 7/15/2020    COLONOSCOPY performed by Barb Alcala MD at [facility] ENDOSCOPY    HX CHOLECYSTECTOMY  2016    HX COLONOSCOPY  2013? HX HYSTERECTOMY      UPPER GI ENDOSCOPY,DIAGNOSIS  7/15/2020            Social History     Tobacco Use   Smoking Status Every Day    Packs/day: 0.25    Types: Cigarettes   Smokeless Tobacco Never       Social History     Socioeconomic History    Marital status:    Tobacco Use    Smoking status: Every Day     Packs/day: 0.25     Types: Cigarettes    Smokeless tobacco: Never   Vaping Use    Vaping Use: Never used   Substance and Sexual Activity    Alcohol use: No    Drug use:  No Family History   Problem Relation Age of Onset    No Known Problems Mother        ROS:  Gen: denies fever, chills, or fatigue   HEENT:denies H/A, vision changes, ear pain, or sore throat +chronic runny nose  Resp: denies dyspnea, +chronic cough with new wheezing  CV: denies chest pain, pressure, or palpitations  Extremeties: denies edema  GI: denies abd pain, NVD, melena, or hematochezia +hemorroids  Neuro: +memory issues denies numbness/tingling, +occasional vertigo. Denies unilateral weakness, facial drooping, AMS, or slurred speech   Skin: denies rashes or new lesions   Psych: +depression-worsening  no anxiety or brad, or other changes in mood      Objective:     Visit Vitals  BP (!) 146/80 (BP 1 Location: Left arm)   Pulse (!) 54   Resp 16   Ht 5' 6\" (1.676 m)   Wt 172 lb (78 kg)   SpO2 96%   BMI 27.76 kg/m²       General: Alert and oriented. No acute distress. Well nourished. HEENT    Eyes: Sclera white, conjunctiva clear. PERRLA. Extra ocular movements intact. Nose: Patent, no edema, +clear drainage  Neck: Supple with FROM. No carotid bruits  Lungs: Breathing even and unlabored. All lobes clear to auscultation bilaterally   Heart :RRR, S1 and S2 normal intensity, no extra heart sounds  Extremities: Non-edematous  Musculo: +intermittent swelling of joints in the hands  Neuro: Cranial nerves grossly normal. Answers questions appropriately. Normal gait. Moves all extremities freely. Psych: Mood and thought content appropriate for situation. Dressed appropriately and with good hygiene. Skin: Warm, dry, and intact. No lesions or discoloration. Assessment/ Plan:     HTN  BP elevated but she has not take her BP medications yet today. Cont current meds  Low-sodium diet  RTO or go to ER for any CP, SOB, dizziness, or swelling. F/U 3 months    HLD  Cont Lipitor 80mg daily with baby asa  Low fat diet    Type 2 diabetes  Check A1C  Cont Januvia 100mg daily.   Watch the sugar and carbs    CKD stage 3  Recent creatinine ok  Avoids NSAIDS  Stay well hydrated  Keep tight control over BP and BS    TIA  Cont Plavix 75mg daily  Notify provider for s/s of abnormal bleeding  POC HGB today stable  Cont ASA 81mg daily  Cont Lipitor 80mg daily  Keep tight control over BS and BP  Go back to ER for any unilateral numbness/tingling, unilateral weakness, facial drooping, slurred speech, confusion, AMS, or dizziness    MCI  Referred to [facility] for med management    Hemorrhoids  Cont OTC Prep H. Prevent constipation (she denies any)  F/U prn if symptoms worsen or do not improve. Seasonal Allergies   Symptoms stable   Cont [employer] daily. Hx of tobacco use  Quit smoking 5 weeks ago     Chronic cough with wheezing  Start Prednisone 20mg BID x 5 days  Get PFTs     Vertigo  Cont Meclizine prn. Major Depression  Worsening  Cont current meds  Will notify her psychiatrist about her worsening depreson and let her know what he advises     OA, hands  Cont Tylenol prn        Verbal and written instructions (see AVS) provided. Patient expresses understanding of diagnosis and treatment plan.     Health Maintenance Due   Topic Date Due    COVID-19 Vaccine (3 - Booster for Moderna series) 09/14/2021    Foot Exam Q1  01/21/2022    Eye Exam Retinal or Dilated  05/01/2022    Flu Vaccine (1) 08/01/2022    Medicare Yearly Exam  08/04/2022    A1C test (Diabetic or Prediabetic)  09/17/2022               Bernarda Torrez NP

## 2022-09-28 ENCOUNTER — TELEPHONE (OUTPATIENT)
Dept: FAMILY MEDICINE CLINIC | Age: 87
End: 2022-09-28

## 2022-09-28 PROBLEM — G31.84 MCI (MILD COGNITIVE IMPAIRMENT): Chronic | Status: ACTIVE | Noted: 2022-09-28

## 2022-09-28 PROBLEM — K64.9 HEMORRHOIDS: Status: ACTIVE | Noted: 2022-09-28

## 2022-09-28 PROBLEM — G31.84 MCI (MILD COGNITIVE IMPAIRMENT): Status: ACTIVE | Noted: 2022-09-28

## 2022-09-28 LAB
EST. AVERAGE GLUCOSE BLD GHB EST-MCNC: 157 MG/DL
HBA1C MFR BLD: 7.1 % (ref 4–5.6)

## 2022-09-28 RX ORDER — VENLAFAXINE HYDROCHLORIDE 150 MG/1
CAPSULE, EXTENDED RELEASE ORAL
Qty: 90 CAPSULE | Refills: 1
Start: 2022-09-28

## 2022-09-28 RX ORDER — VENLAFAXINE HYDROCHLORIDE 75 MG/1
CAPSULE, EXTENDED RELEASE ORAL
Qty: 90 CAPSULE | Refills: 0 | Status: SHIPPED | OUTPATIENT
Start: 2022-09-28 | End: 2022-11-04

## 2022-09-28 NOTE — TELEPHONE ENCOUNTER
Called pt's daughter Rico Damon and left . Dr Serenity Smart would like her to increase her Effexor to 225mg daily. She can take an extra 75mg pill with her 150mg pill. I have sent over a new script for the 75mg tabs. She can follow up with him as scheduled.

## 2022-09-30 ENCOUNTER — DOCUMENTATION ONLY (OUTPATIENT)
Dept: FAMILY MEDICINE CLINIC | Age: 87
End: 2022-09-30

## 2022-09-30 ENCOUNTER — TELEPHONE (OUTPATIENT)
Dept: FAMILY MEDICINE CLINIC | Age: 87
End: 2022-09-30

## 2022-09-30 NOTE — PROGRESS NOTES
Spoke to Alena Valentine and [employer] are out of network with this pts insurance. Im going to try [facility].  Will fax over all of pts info

## 2022-10-10 ENCOUNTER — TELEPHONE (OUTPATIENT)
Dept: FAMILY MEDICINE CLINIC | Age: 87
End: 2022-10-10

## 2022-10-27 ENCOUNTER — DOCUMENTATION ONLY (OUTPATIENT)
Dept: FAMILY MEDICINE CLINIC | Age: 87
End: 2022-10-27

## 2022-11-04 ENCOUNTER — HOSPITAL ENCOUNTER (OUTPATIENT)
Dept: BEHAVIORAL/MENTAL HEALTH | Age: 87
Discharge: HOME | End: 2022-11-04
Payer: MEDICARE

## 2022-11-04 PROCEDURE — 90792 PSYCH DIAG EVAL W/MED SRVCS: CPT | Performed by: PSYCHIATRY & NEUROLOGY

## 2022-11-04 PROCEDURE — 90853 GROUP PSYCHOTHERAPY: CPT

## 2022-11-04 NOTE — BH NOTES
INTAKE SCREENING TOOL    Grecia Pollard  2022, 0900    Patient Information   Phone: [phone]    Address: [facility]   [address] [id number]   : 1935   Social Security #: NA   Age: 82   Sex: Female   Living Arrangements: Alone   Date of Last Inpatient Admission:    Legal Guardian/POA: None   Phone: None   Communicates Verbally: yes       Chief Complaint/Symptoms (Describe reason for seeking help and describe symptoms as per DSM V. Patient is an 82year old,  female who currently lives alone but has her family close by. She has been in both [employer] IP and SOP in the past and she recently finished SOP earlier this year. She has been doing fairly well but reports increased symptoms of depression and concerning signs such as having thoughts of giving up and stopping medications. She reached out for help in order to prevent any further setbacks that she has experienced in the past.  She stated she has been crying a lot and having very upsetting thoughts for her. She also reported her appetite has been poor and she has had more difficulty with day to day self care. Her PCP had let Raymond Ring know about the increased depression and he has recommended an increase in anti depressant but pt never started it. She said she thoughts she could benefit more from the increased emotional support of group. There has been some concerns about her decision making as she tried to walk to her son's house to get help with her cell phone but it was dusk and a neighbor stopped and got her and brought her there. Current Stressors Due to Mental Illness and/or Substance Use   illness or family illness and isolation at home    Explain all that is marked:  Patient is doing well overall physically but she is worried about her age and she has been struggling with being alone. Previous Mental Health Treatment     Last inpatient admission: 2020   Type: [facility] SOP. On and off since 2018. Last graduated in the spring of this year. Where:   When:   Reason:   LOS:  Outcome:   Type: [facility] IP. She has been hospitalized three times since 2018. Where:   When:   Reason:   LOS:  Outcome:   Type: Where:   When:   Reason:   LOS:  Outcome:   *Key: IP- Inpatient, OP- Outpatient, Res-Residential, NH- Nursing Home, AL- Assisted Living, Other   Current Psychiatrist: Dr Deb Parsons  Phone: [phone]   Current Therapist: None  Phone:      Trauma History   Abuse: None   If yes to any of the above describe: Other Traumatic Experience: No traumatic experiences in her past.       Counter-indications to Restraints:  None       Substance Use History   History of Substance Abuse: No:   Are you currently craving: NA    Key: Blackouts, Tremors, Sweats, Delirium, Seizures, Nausea, Vomiting, Diarrhea, Abdominal Cramps, Hallucinations, Loss of Job, Loss of Spouse, Loss of Memory, Mood Swings, DUI's    **Use within the last 12 months**    Substance Use Age of Onset Date of Last Use Length of Sobriety Amount Used Route of  Use Pattern of Use BioMed. Cons Psych. Cons Legal Cons   Alcohol            Amphet. Barbit. Benzo. Helen./Crk. Halluc. Heroin            Inhalant            Marijuana            Nicotine            Opiates            PCP            Prescript. Synthetic            Tranq.             Other              Substance Abuse Previous Treatment  Support Groups- AA: N/A, NA: N/A, Other: N/A   Sponsor:      LOS: Where: When: Reason: Type: Response:   LOS: Where:  When: Reason: Type: Response:   LOS: Where:  When: Reason: Type: Response:    *Key: IP-Inpatient, OP-Outpatient, Res- Residential, Other: Explain:     Family History of Mental Health and/or Substance Abuse   (Include Family History of Suicide Attempts)                       Support system/Recovery Environment:   Does individual live with other people who drink or use drugs? N/A for Substance Abuse issues  Explain:   Does individual have good supports in place (family, friends, coworkers, Mormonism, etc)? N/A for SA recovery needs. Explain:   Does individual understand and accept his/her illness? 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 Medical History:  See above   Surgical History:  NA   Diet: Diabetic   Current PCP:  KB Moran  Address:  Phone:  Date of last visit: in past month     Medical Conditions  Allergies: no COPD: no HIV/AIDS: no MRSA: no   Asthma: no CVA:yes Hepatitis A,B or C: no Pregnant: no   Bulimia/Anorexia: no Cardiac: no HTN: yes Prosthesis: no   Uses C-PAP: no Cirrhosis: no IDM/IIDDM: no Seizure Disorder: no   Other: yes Fractures: no Infectious Disease Type:         Vitals     BP: Temp: Pulse: Resp: O2: Ht: Wt:        Medications: Current Medications as Per Patient  Medication Dose/Last Dose Frequency Compliance Referring Physician                                      Current Medication List Attached: no  Pharmacy:  Phone:    Do you have a history of a positive TB skin test: no  Are you experiencing any of these symptoms:   Cough > 3 weeks: N/A  Bloody Sputum: N/A  Unwanted weight loss: N/A  Night Sweats: N/A  ADLS: self-feeding, grooming, bathing, upper body dressing, lower body dressing, toileting, toilet transfer, shower transfer, tub transfer, and simple home management  Ambulatory: yes  With Assistance: none    Current Review of Symptoms  Constitutional: na Genitourinary:  NA   Skin: none Musculoskeletal:  na   Eyes:  na Allergy/Heatologic/Endo: na   Cardiovascular: na Neurological:  na   Respiratory: na Psychiatric: depression and sleep disturbance   Gastrointestinal: none Appetite changes:  Patient reports appetite has decreased. Weight changes: None    Sleep pattern:  Poor sleep patterns and routine. Mental Status Exam   Suicidal Ideation: Yes  Means: None  Degree of SI/HI, behavior and/or intentions: low   Suicide Attempt: no  Explain:  Previous Attempts: no  Explain:    Protective Factors (list): Supportive family, benefits from clinical support and socialization of group. Risk Factors (list): Lives alone    Homicidal Ideation: no  Plans & Means: NA  Towards: NA  Depression:yes  Symptoms: depressed mood, insomnia, psychomotor retardation, feelings of worthlessness/guilt, difficulty concentrating, hopelessness, and suicidal thoughts without plan  Mary: no  Symptoms: None  Anxiety: yes  Symptoms: decreased sleep, feelings of apprehension/worry, poor concentration/attention, and racing thoughts  Hallucinations: N/A  Symptoms: None  Delusions: N/A  Symptoms: None  Explain:  Are hallucinations/delusions different form their norm: N/A  Explain:  Degree to which individual's impairment creates danger for themselves or others:   Aggression: No:   Explain:      3 [facility]    Part 1 of 2  Adapted from the [facility] Suicide Severity Rating Scale Since Last Visit     Ask questions that are bold and underlined. Place X in Box. YES NO   Ask Questions 1 and 2  1) Wish to be Dead:   Person endorses thoughts about a wish to be dead or not alive anymore, or wish to fall asleep and not wake up. Have you wished you were dead or wished you could go to sleep and not wake up? X      2) Suicidal Thoughts:  General non specific thoughts of wanting to end one's life/die by suicide, \"I've thought about killing myself\" without general thoughts of ways to kill oneself/associated methods, intent, or plan. Have you actually had any thoughts of killing yourself? X    If YES to 2, ask questions 3,4,5 and 6.  If NO to 2, go directly to question 6  3) Suicidal Thoughts with Method (without Specific Plan or Intent to Act):   Person endorses thoughts of suicide and has thought of at least one method during the assessment period. This is different than a specific plan with time, place or method details worked out. \"I thought about taking an overdose but I never made a specific plan as to when where or how I would actually do it. .. And I would never go through with it. \"    Have you been thinking about how you might kill yourself? X     4) Suicidal Intent (without Specific Plan): Active suicidal thoughts of killing oneself and patient reports having some intent to act on such thoughts, as opposed to \"I have the thoughts but I definitely will not do anything about them. \"    Have you had these thoughts and had some intention of acting on them? X     5) Suicide Intent with Specific Plan:  Thoughts of killing oneself with details of plan fully or partially worked out and person has some intent to carry it out. Have you started to work out or worked out the details of how to kill yourself and do you intend to carry out this plan? X     6) Suicide Behavior:  Have you done anything, started to do anything, or prepared to do anything to end your life? Examples: Collected pills, obtained a gun, gave away valuables, wrote a will or suicide note, took out pills but didn't swallow any, held a gun but changed your mind or it was grabbed from your hand, went to the roof but didn't jump; or actually took pills, tried to shoot yourself, cut yourself, tried to hang yourself, etc.         X     Part 2 of 2  SUICIDE RISK SCREEN   Place \"X\" by each necessary risk factor in \"Risk\" Box   RISK FACTOR LOWER RISK MILD RISK MODERATE RISK HIGH RISK   1. Intent/Ambience No intent to die Minimal Intent Moderate Intent Clear Intent   2. Lethality of attempt (or Plan) None/Ideation Only Halim.Butts ] Gesture [ ] Non-lethal [ ] Potentially lethal (lore., firearm, hanging, OD) [ ]   3.  Prior Attempts 2-10 years ago [ ]  6-12 mos ago [ ] 1wk-6 mos ago  [ ]   4. Hopelessness Hopeful [ ]  Ambivalent Sheng ] Hopeless [ ]   5. Substance Abuse None [ X]  Abuse [ ] Dependence [ ]   6. Support System Good Support [ X]  Conflicted [ ] None [ ]   7. Current Stressor Severity None [ X]  Moderate [ ] Severe [ ]   8. Loss & Trauma (Past 6 Mos) None [ X]  Serious [ ] Multiple [ ]   9. Gender Female [ Rosette Dies  Male [ ]    10. Age 4-12 [ ] 12-22 [ ] 23-67 [ ] 74+ [ Rosette Dies   6. Marital Status /Partner   [ ] Single [ ]  [ ]  [ Rosette Dies   15. Sexual Orientation Heterosexual [ X]  LBGQT [ ]    15. Ethnicity Non-white/Black Sheng ]  White [ ]    14. Chronic/Severe/Illness and/or Functional Impairment None [ ] Acute Illness and/or mild functional impairment Sheng ] Chronic Illness and/or mild functional impairment [ ] Chronic Illness and/or moderate-to-severe functional impairment [ ]   15. Level of Insomnia None [ ] 4-5 Hours of Sleep Sheng ] 1-3 Hours of Sleep  [ ] No Sleep [ ]     Note:  Any 2 or more in any category triggers the higher level of risk. Please summarize your findings and indicate what factors contributed to the patients level of risk:  Patient remains a low level of risk as she has always reached out for help with experiencing any suicidal thoughts. Alcohol Use Disorders Identification Test (Audit) Interview Version*   *Instructions:  Read the questions as written. Record the answers carefully. Begin the AUDIT by saying \"Now I am going to ask you some questions about your use of alcoholic beverages during the past year. \" Explain what is meant by \"alcoholic beverages\" by using local examples of beer, wine, vodka and so on. Record answers in terms of \"standard drinks. \" Place the correct answer number at the bottom under \"box score\". 1. How often do you have a drink containing alcohol?  (0) Never [Skip to Qs 9-10]  (1) Monthly or less  (2) 2 to 4 times a month  (3) 2 to 3 times a week  (4) 4 or more times a week      Box Score: na 6.  How often during the last year have you needed a first drink in the morning to get yourself going after a heavy drinking session?  (0) Never  (1) Less than monthly  (2) Monthly  (3) Weekly  (4) Daily or almost daily    Box Score: na   2. How many drinks containing alcohol do you have on a typical day when you are drinking?  (0) 1 or 2  (1) 3 or 4  (2) 5 or 6  (3) 7,8 or 9  (4) 10 or more    Box Score: na 7. How often during the last year have you had a feeling of guilt or remorse after drinking?  (0) Never  (1) Less than monthly  (2) Monthly  (3) Weekly  (4) Daily or almost daily      Box Score: na   3. How often do you have six or more drinks on one occasion?  (0) Never  (1) Less than monthly  (2) Monthly  (3) Weekly  (4) Daily or almost daily  Skip to Questions 9 and 10 if the total score for Questions 2 and 3= 0    Box Score: na 8. How often during the last year have you been unable to remember what happened the night before because you had been drinking?  (0) Never  (1) Less than monthly  (2) Monthly  (3) Weekly  (4) Daily or almost daily    Box Score: na   4. How often during the last year have you found that you were not able to stop drinking once you had started?  (0) Never  (1) Less than monthly  (2) Monthly  (3) Weekly  (4) Daily or almost daily    Box Score: na 9. Have you or someone else been injured as a result of your drinking?  (0) No  (2) Yes, but not in the last year  (4) Yes, during the last year          Box Score: na   5. How often during the last year have you failed to do what was normally expected from you because of drinking?  (0) Never  (1) Less than monthly  (2) Monthly  (3) Weekly  (4) Daily or almost daily    Box Score: na 10.  Has a relative, friend, doctor, or another health worker been concerned about your drinking or suggested you cut down?  (0) No  (2) Yes, but not in the last year  (4) Yes, during the last year        Box Score: na   Scoring Key  8 to 15- simple advice focused on the reduction of hazardous drinking  16 to 19- brief counseling and continued monitoring  20 or above- further diagnostic evaluation for alcohol dependence     Record total of specific items here: na     *This form is adapted from the [facility]) Alcohol Use Disorders Identification Test (AUDIT) form, For more information, please see \"AUDIT- The Alcohol Use Disorders Identification Test: Guidelines for Use in Primary Care at [facility].tn      Checklist for Aggression Risk     Present Status Yes/No Wt. Score   1. Admission symptoms related to violence (actual assault, specific threat, attempted assault) no (3)    2. Specific identified victim * N/A (3)    3. Specific identified plan: N/A (3)    4. Access to or possession of means to carry out plan (available weapons) * N/A (3)    5. Intoxicated (alcohol, cocaine, crack or hallucinogens) no (3)    6. Acts upon paranoid ideation no (2)    7. Exhibits command auditory hallucinations no (2)    8. Currently making threats* no (2)    9. Current physical agitation* no (2)    10. Non-communicative no (2)    11. Hallucinating no (1)    12. Paranoid Thoughts no (1)    13. Medication Non-compliance no (1)      Environmental Factors Yes/No Wt. Score   1. Aggressive behavior at time of admission* no (3)    2. Aggressive behavior within one week of admission no (2)    3. Aggression provoking factors wtihtin environment no (2)    4. Recent non-violent psychosocial stressor within environment no (1)    5. Recent victim of aggression within environment no (1)    6. Male no (1)      Previous Clinical History Yes/No Wt. Score   1. Diagnosis of neurological impairment* no (3)    2. History of arrest/conviction for violent act (including arson) no (3)    3. History of harming other clients or staff * no (3)    4. History of non-compliance no (2)    5.  History of arrest/conviction for violent crime within past year no (2)    6. History of paranoid diagnosis no (1)    7. History of antisocial, borderline, or paranoid personality diagnosis no (1)      TOTAL SCORE OF ASSESSMENT: na     (Score= [(1) Yes or (0) No] x Wt.)  *IF ITEM IS CHECKED \"YES\" TAKE IMMEDIATE PRECAUTIONS  SCORE KEY  PLEASE INDICATE SCORE WITH \"X\" IN APPROPRIATE BOX  High Probability 21 or Greater High Risk    Medium Probability 11 to 20 Moderate Risk    Low Probability 10 or Less Lower Risk      Attitude: cooperative   Eye Contact: appropriate   Hygiene: good   Consciousness: alert   Dress: appropriate   Orientation: Person, Place, Time, and Situation   Mood: depressed   Affect: depressed, flat, and sad   Self Concept[de-identified] helpless   Speech: shows no evidence of impairment   Thought[de-identified] is tangential   Processing: concrete   Memory: intact   Judgement/Insight: good   Fund of Knowledge: Intact   Intelligence: Average   SLUMS SCORE:                                (All patients with cognitive impairment and/or 65+ yo)     Initial Diagnostic Impression (Preliminary)   AXIS I:  Major depression, recurrent, moderate to severe (F33.2). AXIS II:  Deferred. AXIS III:  Diabetes mellitus; history of small cerebrovascular accident; hyperlipidemia; hypertension; chronic kidney disease; diabetic neuropathy. AXIS IV:  Stressors are mild (social isolation). AXIS V:  Global Assessment of Functioning currently is 45-50. Depression (0-10): 8   Anxiety (0-10): 4   Suicide Risk Score: Low   Violence/Aggression Risk Score: NA   Alcohol Audit Score: NA   Time Assessment Completed:  60 min         Psychiatrist Recommendation(s):  Psychiatrist Consulted: Dr. Jacinto Douglas Phone/Pager: [phone]   Date: 11/3/2022 Time: 1600   Patient Disposition Recommendation (IP, PHP, IOP, OP therapy):  Admit to SOP  Other:  Follow Up:  Name of Inpatient Facility/Program:  Outpatient Appointments:     Name of Psychiatrist/Agency:  Date of Appointment:  Time of Appointment:     Additional Follow Up Notes: (each staff that notes in this section, must sign and date)

## 2022-11-04 NOTE — H&P
[address] [place] [address] EVALUATION    Name:  Leonardo Kowalski  MR#:  [id number]  :  1935  ACCOUNT #:  [de-identified]  ADMIT DATE:  2022      [facility] OUTPATIENT PSYCHIATRIC EVALUATION    NOTE:  The patient is being readmitted to the [facility] intensive outpatient program.    HISTORY OF PRESENT ILLNESS:  Carine Naqvi is an 56-year-old female who is being readmitted to the SOP program due to worsening symptoms of major depression. She has a history of this illness and has been hospitalized three times since 2018, the last of which was in 2020. She has always responded well to the use of Effexor XR, and particularly to the support and socialization/structure of the IOP program, which she has been in several times. She    She was last here up until just a couple of months ago or more, but starting about 4-5 weeks after graduating, her mood began to deteriorate. I received a call from her PCP about 5 weeks ago that she was getting progressively more depressed, and I suggested increasing the Effexor XR up to 225 mg daily. A prescription was written for an extra 75 mg capsule to reach that dose, but the patient indicates that she is not taking an extra capsule, and when she went and got her medication from her purse, there does not appear to be evidence that she is taking the extra 75 mg. It is not clear if she took it for a while and then stopped it, or never started it. However, she states she does not want to increase any of the medications because she feels that she is already taking a lot of medicine to begin with, and that she expects that her mood will begin to improve due to the increased socialization and stimulation that she gets from the IOP program.    Her neurovegetative functioning has deteriorated to where she is having much more trouble sleeping, both initially and with regard to middle insomnia.   Her appetite has been very poor, and she thinks she has lost a little bit of weight. Her energy level is low, her concentration is slightly worse, and she definitely feels more dysphoric and anhedonic. She has been more withdrawn, but she states she has been trying to fight that over the last couple of weeks, and has actually gone to a Hinduism once or twice and gotten out of the house a few more times than usual.  All of which was therapeutic for her. There is no evidence or history to suggest any brad or hypomania nor any history of symptoms of psychosis. Similarly, she does not drink alcohol or use any drugs, and she states she has been very compliant with the medications noted below as her daughter sets up her pill box for her weekly. PAST PSYCHIATRIC HISTORY:  She has been hospitalized at [facility] three times starting in 09/2018 for bouts of depression. She has been enrolled in the SOP program since the second hospitalization in 04/2019, off and on. She has tried Zoloft, Wellbutrin, Ritalin, BuSpar, Xanax, and Remeron which was partially helpful, but the Effexor has been the one medicine that has helped her the most.    PAST MEDICAL HISTORY:  1.  Diabetes mellitus. 2.  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  3.  Hyperlipidemia. 4.  Hypertension. 5.  Chronic kidney disease, stage III. 5.  Diabetic neuropathy. MEDICATIONS ON ADMISSION:  1. Venlafaxine  mg daily. 2.  Plavix 75 mg daily. 3.  Metoprolol XL 50 mg daily. 4.  Lipitor or 80 mg daily. 5.  Januvia 100 mg daily. 6.  Aspirin 81 mg daily. 7.  Lisinopril 20 mg b.i.d.  8.  Flonase 50 mcg, 2 sprays bilaterally daily. 9.  Xyzal 5 mg daily p.r.n. ALLERGIES:  NO KNOWN ALLERGIES. FAMILY HISTORY:  She denies any family psychiatric history that she is aware of. SOCIAL HISTORY:  She is apparently  or possibly . She lives alone and has done so for many years. She has three children, and they appear to be fairly supportive.   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 She has essentially stopped smoking but was smoking up until a couple of years ago or less. MENTAL STATUS EXAM:  Jose Juan Wilhelm was noted to be a well-dressed and groomed 46-year-old, who appeared slightly younger than her stated age. She was pleasant, cooperative, and and exhibited a reasonably full affective range. However, she was more dysphoric and even tearful at one point, and her neurovegetative functioning has been moderately impaired as noted above. She denied feeling fully hopeless or having any suicidal thoughts at all, however. There is also no evidence of any brad or hypomania nor any emotional instability. Similarly, she is moderately anxious in a generalized sense, but it is clearly secondary to the depression. Her cognitive functioning shows a slightly below average fund of knowledge with some deficits in areas of concentration and attention span as well as processing abilities. However, these are not new deficits and are consistent with her prior baseline. DIAGNOSTIC IMPRESSION:  AXIS I:  Major depression, recurrent, moderate to severe (F33.2). AXIS II:  Deferred. AXIS III:  Diabetes mellitus; history of small cerebrovascular accident; hyperlipidemia; hypertension; chronic kidney disease; diabetic neuropathy. AXIS IV:  Stressors are mild (social isolation). AXIS V:  Global Assessment of Functioning currently is 45-50. PLAN:  1. We will admit the patient to the intensive outpatient program where she will come 3 days per week for three group sessions per day. 2.  We will continue the Effexor XR at 150 mg daily alone, as she does not want to increase any medication yet. 3.  Estimated length of stay in the IOP program would be 6 months.       Dara De Paz MD      [id number]  D:  11/04/2022 11:50  T:  11/04/2022 13:02  JOB #:  [id number]

## 2022-11-04 NOTE — BH NOTES
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 We spoke about setting goals and intentions for two things this weekend and they are identifying at least one thing that can be done this weekend to help ourselves and one thing that we won't do this weekend to help ourselves. I spoke with group members about the importance of picking things to do or accomplish that are not in their normal routine and are steps up in terms of activities or behaviors that they want to incorporate in their lives. We also spoke about how what they choose to not do needs to be something that they have the desire to give up in order to better themselves. I shared that if these goals can be accomplished, they can come back next week with energy and motivation to keep making positive changes. Group members shared their personal goals and objectives for the weekend. Shaheedpaige Orellana participated in group with prompting. She spoke about how she is going to work on challenging herself when she is worrying. She spoke about how she knows that Krysta Hylan is something that I have always done and I have lost my focus on how to handle it. \"

## 2022-11-04 NOTE — BH NOTES
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 Dar Syed and we focused on the Willingness and Action Plan. I shared with group members the importance to focus on goals that we want to change and the values that drive our goals. I shared the need to know the action steps to accomplish the goals that they want and to be aware of the thoughts, feelings, memories, sensations, and urges that can come up when focusing on achieving this goal.  We spoke about goal habits such as breaking them down into small steps and then taking one step at a time. We spoke about the most important goal group members have for themselves right now. Jose Juan Wilhelm participated in group with prompting. She spoke about how she knows that she has been more depressed and she stated that \"I have been crying, I am not eating again, and I have those bad thoughts again. \"  We spoke about how she did well in reaching out for help and recognizing that she had a need. She was given a lot of encouragement in the group. Behavioral Health Daily Check In form revealed that pt is not experiencing SI/HI thoughts or plans, remaining abstinent from drugs and alcohol, and reports goal today of  \"to eat more consistently. \"

## 2022-11-06 ENCOUNTER — TELEPHONE (OUTPATIENT)
Dept: FAMILY MEDICINE CLINIC | Age: 87
End: 2022-11-06

## 2022-11-06 NOTE — PROGRESS NOTES
HH under review and may not be covered. Can someone call patient or her daughter and ask them if she still drives>?

## 2022-11-07 NOTE — PROGRESS NOTES
ADL Assessment    Row Name 09/27/22 1118       Task   Feeding yourself No Help Needed  -TT at 09/27/22 1120       Getting from bed to chair No Help Needed  -TT at 09/27/22 1120       Getting dressed No Help Needed  -TT at 09/27/22 1120       Bathing or showering Help Needed  -TT at 09/27/22 1120       Walk across the room (includes cane/walker) No Help Needed  -TT at 09/27/22 1120       Using the telphone No Help Needed  -TT at 09/27/22 1120       Taking your medications Help Needed  -TT at 09/27/22 1120       Preparing meals Help Needed  -TT at 09/27/22 1120       Managing money (expenses/bills) Help Needed  -TT at 09/27/22 1120       Moderately strenuous housework (laundry) Help Needed  -TT at 09/27/22 1120       Shopping for personal items (toiletries/medicines) Help Needed  -TT at 09/27/22 1120       Shopping for groceries Help Needed  -TT at 09/27/22 1120       Driving Help Needed  -TT at 09/27/22 1120       Climbing a flight of stairs No Help Needed  -TT at 09/27/22 1120       Getting to places beyond walking distances Help Needed  -TT at 09/27/22 1120         User Key  (r) = Recorded By, (t) = Taken By, (c) = Cosigned By  [address] Name Provider Type   TT Milton Higgins LPN Licensed Nurse

## 2022-11-08 ENCOUNTER — HOSPITAL ENCOUNTER (OUTPATIENT)
Dept: BEHAVIORAL/MENTAL HEALTH | Age: 87
Discharge: HOME | End: 2022-11-08
Payer: MEDICARE

## 2022-11-08 PROCEDURE — 90853 GROUP PSYCHOTHERAPY: CPT

## 2022-11-08 NOTE — BH NOTES
[facility] Outpatient Program  Group Therapy      Date of service:11/8/2022  Start time: 1000  Stop time: 1050    Type of session: Therapy Group    Problem number: 1Dep. F33.2    Short term goal (STG): Pt will establish therapeutic rapport    Intervention/techniques: Informed, Validated/Supported, Prompted/Cued, Listened/Empathized, and Reinforced  Patient mental status/affect: Calm, Congruent, and Flat  Patient behavior/appearance: Attentive, Cooperative, Needed Prompting, and Withdrawn/Quiet    Special patient treatment accommodations provided (describe): None    Patient response and progress towards goals: Focus of session was on the importance of identifying triggers that lead to our negative emotions and then allowing ourselves the opportunity to have time to work through those emotions in a positive and productive way. We spoke about how we have to do our best to foresee situations where our emotions may be triggered and have many choices for how to cope in response to those emotions. We spoke about planning and using the phrases first I will, then I will, next I will, and finally I will  We spoke about how we will usually give ourselves enough time to work through the triggers through the process of taking time to work through four coping skills that we decide to use. Pt listened to the group discussion and concerns of other members.   She was attentive and cooperative but remained quiet throughout the group    [address] Daily Check In form revealed that pt is not experiencing SI/HI thoughts or plans, remaining abstinent from drugs and alcohol, and reports goal today of    to get better

## 2022-11-08 NOTE — BH NOTES
[facility] Outpatient Program  Group Therapy    Date of Service: 11/8/2022  Start time: 1200  Stop time: 1250  Type of session: Therapy Group    Problem number: 1Dep. F33.2    Short term goal (STG): Pt will establish treatment goals    Intervention/techniques: Informed, Validated/Supported, Listened/Empathized, Observed/Monitored, Clarified, and Reinforced    Patient mental status/affect: Congruent and Depressed    Patient behavior/appearance: Attentive, Cooperative, and Withdrawn/Quiet    Special patient treatment accommodations provided (describe): None    Patient response and progress towards goals: Focus of session was on article by Neetu Devries titled 69 things Its Time You Forgave Yourself For.   We spoke about the impact that a lack of forgiveness has on our functioning and well being. We spoke about the challenge that is added when we lack it towards ourselves. We spoke about the 14 things which are: big things you changed your mind about, the ways in which we fought through the pain, the person you could never love properly, all the ways in which you are not enough, the ways you treated your parents (or others), the way you treated yourself, the breaks you took from life, the chances you didn't take, the things you didn't say until it was too late, the disasters you didn't see coming, and whatever you are still not ready for. We spoke about what group members are going to focus on letting go of for their recovery Pt participated in the group activity and discussion. She shared that she had been feeling very lonely and depressed recently. She was not getting out  and socializing. She has started to go back to Religious and is glad she made the decision to return to [place]. She continues to want to help others when she can and continues to reach out to friends.

## 2022-11-08 NOTE — BH NOTES
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    Short term goal (STG): We will work to establish pt expectations from treatment    Intervention/techniques: Informed, Validated/Supported, Modeled/Rehearsed, Prompted/Cued, Promoted Peer Support, and Provided Feedback    Patient mental status/affect: Anxious, Congruent, and Preoccupied    Patient behavior/appearance: Neatly Groomed, Attentive, Cooperative, and Needed Prompting    Special patient treatment accommodations provided (describe): None    Patient response and progress towards goals: Focus of session was based on information from the book The Mindful Path to Self Compassion by Dr. Roxana Ho. I shared with group the way the book explains bring together mindfulness and self compassion through using [facility] your challenges.  I shared how F stands for feel the pain, A stands for accept it, C stands for compassionately respond, and E stands for expect skillful action. We spoke about how we have to use mindful awareness when feeling a lot of inner emotional turmoil and how we will benefit from focusing on behavior changes that will impact us in positive ways. I asked group members what their willingness is to use this skill and process to help themselves and how they see themselves using it. Rahel Asif participated in group with prompting. She spoke about how she knows that she needs to continue to work on accepting her emotions and having more patience with herself for experiencing them. She spoke about how she will continue to work to monitor her thoughts and come to terms with accepting them.

## 2022-11-09 NOTE — BH NOTES
PSYCHOSOCIAL ASSESSMENT  :Patient identifying info:   Karyn Meza is a 82 y.o., female admitted 11/4/2022     Presenting problem and precipitating factors:Patient is an 82year old,  female who currently lives alone but has her family close by. She has been in both [facility] in the past and she graduated 3/30/22  She has been doing fairly well but reports increased symptoms of depression and concerning signs such as having thoughts of giving up and stopping medications. She reached out for help in order to prevent any further setbacks that she has experienced in the past.  She stated she has been crying a lot and having very upsetting thoughts for her. She also reported her appetite has been poor and she has had more difficulty with day to day self care. Her PCP had let Ronen Ring know about the increased depression and he has recommended an increase in anti depressant but pt never started it. She said she thought she could benefit more from the increased emotional support of group. There has been some concerns about her decision making as she tried to walk to her son's house to get help with her cell phone but it was dusk and a neighbor stopped and got her and brought her there. Mental status assessment: Heidi Levi was noted to be a well-dressed and groomed woman, who appeared slightly younger than her stated age. She was pleasant, cooperative, and and exhibited a reasonably full affective range. However, she was more dysphoric and even tearful at one point, and her neurovegetative functioning has been moderately impaired as noted above. She denied feeling fully hopeless or having any suicidal thoughts at all, however. There is also no evidence of any brad or hypomania nor any emotional instability. Similarly, she is moderately anxious in a generalized sense, but it is clearly secondary to the depression.   Her cognitive functioning shows a slightly below average fund of knowledge with some deficits in areas of concentration and attention span as well as processing abilities. However, these are not new deficits and are consistent with her prior baseline. Strengths/Recreation/Coping Skills:Pt has a supportive family and is close to her friends and [employer] friends She states she \"loves all people\" and tries to have a positive attitude often helping others    Collateral information:N/A     Current psychiatric /substance abuse providers and contact info:     Previous psychiatric/substance abuse providers and response to treatment: She has been hospitalized at hospitals three times starting in 2018 for bouts of depression. She has been enrolled in the [facility] program since then on and off  Family history of mental illness or substance abuse: N/A    Substance abuse history:  She does not drink alcohol or use any drugs, and she states she has been very compliant with the medications as her daughter sets up her pill box for her weekly. History of biomedical complications associated with substance abuse: NA    Patient's current acceptance of treatment or motivation for change: Positive, motivated    Family constellation: Lives alone    Is significant other involved?  NA    Describe support system: Family Friends    Describe living arrangements and home environment: Lives alone with son nearby    GUARDIAN/POA: NO    Guardian Name:     Guardian Contact:     Health issues:  Diabetes mellitus, History of small CVA on 2020, Hyperlipidemia, Hypertension, Chronic kidney disease, stage III, Diabetic neuropathy  Hospital Problems  Date Reviewed: 2022   None      Trauma history: NA    Legal issues: NA    History of  service: NA    Financial status: NA    Caodaism/cultural factors: Episcopal    Education/work history: High School     Have you been licensed as a health care professional (current or ):     Describe coping skills:Pt enjoys coming to group and is willing to engage and participate with other members and in activities    Jorge L Marcelino  11/9/2022

## 2022-11-10 ENCOUNTER — HOSPITAL ENCOUNTER (OUTPATIENT)
Dept: BEHAVIORAL/MENTAL HEALTH | Age: 87
Discharge: HOME | End: 2022-11-10
Payer: MEDICARE

## 2022-11-10 PROCEDURE — 90832 PSYTX W PT 30 MINUTES: CPT

## 2022-11-10 PROCEDURE — 90853 GROUP PSYCHOTHERAPY: CPT

## 2022-11-10 NOTE — BH NOTES
[facility] Outpatient Program  Group Therapy    Date of Service: 11/10/2022  Start time: 1200  Stop time: 1250  Type of session: Therapy Group    Problem number: 1Dep. F33.2    Short term goal (STG): Pt will establish treatment goals    Intervention/techniques: Informed, Validated/Supported, and Observed/Monitored    Patient mental status/affect: Calm and Congruent    Patient behavior/appearance: Attentive, Cooperative, Needed Prompting, and Withdrawn/Quiet    Special patient treatment accommodations provided (describe): None    Patient response and progress towards goals: Focus of session was on the importance of identifying triggers that lead to our negative emotions and then allowing ourselves the opportunity to have time to work through those emotions in a positive and productive way. We spoke about how we have to do our best to foresee situations where our emotions may be triggered and have many choices for how to cope in response to those emotions. We spoke about planning and using the phrases first I will, then I will, next I will, and finally I will  We spoke about how we will usually give ourselves enough time to work through the triggers through the process of taking time to work through four coping skills that we decide to use. Pt observed the group discussion and activity. She shared her feelings of loneliness over the past few months. She wants to start reading the bible again as she does not like to watch tv. She has a friend that has begun calling her every morning and that has helped her depression as well.

## 2022-11-10 NOTE — BH NOTES
Intensive Outpatient Behavioral Health  Psychotherapy Note              General Information    * If patient is absent, state reason for absence, staff intervention, and staff signature. Psychotherapy Session    Start time: 1100  Stop time: 1130    Problem number: 1Dep. F33.2  Short term goal (STP): Will establish pt expectations from treatment    Patient Mental Status and Mood/Affect:Depressed and Congruent    Patient Behavior and Appearance: Attentive, Cooperative, and Motivated    Intervention/Techniques: Informed, Validated/Supported, Listened/Empathized, Observed/Monitored, and Reinforced    Focus of Session/Patient Response and Progress Towards Goal: I met with pt today to complete her safety plan. Pt stated that she does not consider suicide as an option and has only once had thoughts two years ago about taking pills. Pt stated she is glad to be back with [facility]. She stated she was feeling depressed and lonely living by herself. She had stopped having contact with others and going to [employer]. She has begun reaching out to others again and now has a friend that calls her every morning. She wants to find nandini and surround herself with people  for her remaining years. She does see her children and spends time with them. Pt will come to [place] [facility] 3 days a week.      Suicide/Homicide Risk Assessment:NA

## 2022-11-10 NOTE — BH NOTES
[facility] Outpatient Program  Group Therapy  Initial Treatment Plan      PROBLEM  NUMBER 1.  PROBLEM: Depression     LONG TERM GOALS (LTG) / DISCHARGE CRITERIA:  Develop the ability to recognize, accept, & cope with feelings of depression. Alleviate depressed mood & return to previous level of effective functioning   Date Established Date Met, Continued, Revised or Discontinued STG  Letter SHORT TERM GOALS (STG)  (What Client Will Do or Accomplish) By   Target Date   11/10/2022  A Pt will identify and share with group thoughts that contribute to depressed mood and outlook and identify a counter thought 12/9/2022   11/10/2022  B     Pt will identify and share with group behaviors that contribute to depressed mood and identify an alternative behavior. 12/7/2022   11/10/2022  C  Pt will identify triggers to recent setback and identify strategies that can help her manage setbacks like this in the future.     12/5/2022   11/10/2022  D  Pt will identify choices made in the past week and how they impact emotionally, physically and cognitively 12/8/2022   11/10/2022  E Pt will identify times she has been able to use willingness to turn towards emotions and symptoms and how acceptance of the moment has helped her get through crises 12/6/2022   11/10/2022  F  Pt will work to practice skills of optimism and being able to see challenges and see areas she can work to improve rather than engage in self-defeating self-talk  12/9/2022   11/10/2022  G Pt will identify to group members what she sees as the aspects of her daily life that she does have control over and can make a positive impact on 12/6/2022   11/10/2022  H    Pt will begin to participate in social contacts & initiate communication of needs & desires and report to group        her progress.    12/8/2022   11/10/2022  I . Pt will develop specific strategies and self-talk that can help her increase her awareness of emotional triggers and how she can cope specifically with emotional dependence.        12/7/2022                                                 INTERVENTIONS  (What Staff Will Do)  Ask client to make a list of what depressed about & process list  Monitor & evaluate medication compliance and effectiveness on level of functioning  Encourage sharing of feelings of depression in order to clarify them & gain insight as to causes  Assign chemically dependent client to read passages related to depression from the books One Day at a Time (Ervin Simpson) and Each Day a New Beginning ([address] Staff). Assign client to write at least one positive affirmation statement daily regarding self  Monitor & redirect client as needed daily on grooming  & hygiene. Assist in developing coping strategies (e.g. more physical exercise, less internal focus, increased social involvement, more assertiveness, greater need sharing, and more anger expression) for feelings of depression.

## 2022-11-10 NOTE — SUICIDE SAFETY PLAN
SAFETY PLAN    A suicide Safety Plan is a document that supports someone when they are having thoughts of suicide. Warning Signs that indicate a suicidal crisis may be developing: What (situations, thoughts, feelings, body sensations, behaviors, etc.) do you experience that lets you know you are beginning to think about suicide? 1. Doesn't think about suicide  2.   3. Internal Coping Strategies:  What things can I do (relaxation techniques, hobbies, physical activities, etc.) to take my mind off my problems without contacting another person? 1. Read the bible   2. Watch TV  3. People and social settings that provide distraction: Who can I call or where can I go to distract me? 1. Name:  [facility] Phone: [phone]  2. Name:   Phone:    3. Place:             4. Place:     People whom I can ask for help: Who can I call when I need help - for example, friends, family, clergy, someone else? 1. Name:  [facility]               Phone: [phone]  2. Name: [facility]  Phone: [phone]  3. Name: Chuy Cotton  Phone: [phone]    Professionals or [facility] agencies I can contact during a crisis: Who can I call for help - for example, my doctor, my psychiatrist, my psychologist, a mental health provider, a suicide hotline? 1. Clinician Name: Ailyn Rahman [phone]      Clinician Pager or Emergency Contact #:     2. Clinician Name:    Phone:       Clinician Pager or Emergency Contact #:     3. Suicide Prevention Lifeline: 8-697-931-TALK (3802)    4. [address] [place] [address] Emergency Services -  for example, [facility] suicide hotline, [facility]: 4-764.463.9588      Emergency Services Address:       Emergency Services Phone: [phone]    Making the environment safe: How can I make my environment (house/apartment/living space) safer? For example, can I remove guns, medications, and other items?   1.   2.

## 2022-11-10 NOTE — BH NOTES
[facility] Outpatient Program  Group Therapy      Date of service:11/10/2022  Start time: 1000  Stop time: 1050    Type of session: Therapy Group    Problem number: 1Dep. F33.2    Short term goal (STG): Pt will establish therapeutic rapport    Intervention/techniques: Informed, Validated/Supported, Guided, Prompted/Cued, Listened/Empathized, and Reinforced  Patient mental status/affect: Calm, Congruent, and Depressed  Patient behavior/appearance: Attentive, Cooperative, Needed Prompting, and Withdrawn/Quiet    Special patient treatment accommodations provided (describe): None    Patient response and progress towards goals: Focus of session was on information from [address] [place] [address].     I shared with group her thoughts on being our authentic selves and that is a choice we make rather than a natural born ability or characteristic. I shared with groups Dr. Sophia Adrian thoughts on how to be intentionally authentic and the struggle that comes with the urge to make others happy and to [facility] whoever others need us to be.   We spoke about the emotional and physical price we pay for being inauthentic and what is it that we need to challenge and overcome to be more authentic in our lives and our relationships  Pt listened to the group discussion and concerns of other members. Pt was attentive and cooperative but she remained quiet throughout he group.      Behavioral Health Daily Check In form revealed that pt is not experiencing SI/HI thoughts or plans, remaining abstinent from drugs and alcohol, and reports goal today of coming back to group

## 2022-11-11 ENCOUNTER — HOSPITAL ENCOUNTER (OUTPATIENT)
Dept: BEHAVIORAL/MENTAL HEALTH | Age: 87
Discharge: HOME | End: 2022-11-11
Payer: MEDICARE

## 2022-11-11 PROCEDURE — 90853 GROUP PSYCHOTHERAPY: CPT

## 2022-11-11 NOTE — BH NOTES
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 Pt will identify to group members what she sees as the aspects of her daily life that she does have control over and can make a positive impact on    Intervention/techniques: Informed, Prompted/Cued, Listened/Empathized, Promoted Peer Support, and Provided Feedback    Patient mental status/affect: Anxious, Congruent, and Preoccupied    Patient behavior/appearance: Neatly Groomed, Attentive, Cooperative, and Needed Prompting    Special patient treatment accommodations provided (describe): None    Patient response and progress towards goals: Focus of session was based on article by Terese Roche on 5 Practices for Surrendering to the Dance of Life.   I shared the practices which are: celebrate who you are right now, reflect on your path, put yourself into the experiment, surrender and trust, and repeat for the rest of your life.   We spoke about the power of acceptance and taking responsibility for where we are and where we are going. We spoke about how putting ourselves into the experience means taking some risks and trying things in a new way. We spoke about how group members may be able to benefit from these practices and what they are willing to try. Homa James participated in group with prompting. She spoke about how she knows that her blood sugar may be low and that may be contributing to feeling badly. She spoke about how she wants to figure out why she gets depressed and we spoke about how we sometimes have to focus on accepting what we are feeling and working from there and seeing if acceptance does help.

## 2022-11-11 NOTE — BH NOTES
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  Pt will identify times she has been able to use willingness to turn towards emotions and symptoms and how acceptance of the moment has helped her get through crises    Intervention/techniques: Informed, Modeled/Rehearsed, Prompted/Cued, Promoted Peer Support, and Provided Feedback  Patient mental status/affect: Anxious, Congruent, and Preoccupied  Patient behavior/appearance: Neatly Groomed, Attentive, Cooperative, and Needed Prompting    Special patient treatment accommodations provided (describe): None    Patient response and progress towards goals: Focus of session was based on DBT skills and focusing on developing interpersonal effectiveness and self-respect effectiveness. I shared the handout with group members on myths that get in the way of interpersonal effectiveness such as If I make a request, that will only show that I am a weak person and myths that get in the way of self-respect effectiveness like I shouldn't have to say no; they should know what I want.   We spoke about how much all of these myths, if we believe them, will stop us from being more effective in our relationships and from building necessary self-respect that enables us to strengthen our long-term recovery plan. We spoke about what myths group members can identify them and how to challenge them. Sigifredo Kent participated in group with prompting. She continues to be quiet and she stated she is not feeling too well and she is not sure why. She spoke about how she will continue to push herself as she did not want to come today but she pushed herself. She spoke about how she continues to work on finding ways to manage depressive feelings.       Behavioral Health Daily Check In form revealed that pt is not experiencing SI/HI thoughts or plans, remaining abstinent from drugs and alcohol, and reports goal today of  \"thinking about why I feel bad. \"

## 2022-11-14 ENCOUNTER — HOSPITAL ENCOUNTER (OUTPATIENT)
Dept: BEHAVIORAL/MENTAL HEALTH | Age: 87
Discharge: HOME | End: 2022-11-14
Payer: MEDICARE

## 2022-11-14 PROCEDURE — 90853 GROUP PSYCHOTHERAPY: CPT

## 2022-11-14 NOTE — BH NOTES
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  Pt will work to practice skills of optimism and being able to see challenges and see areas she can work to improve rather than engage in self-defeating self-talk     Intervention/techniques: Informed, Validated/Supported, Prompted/Cued, Listened/Empathized, Observed/Monitored, and Promoted Peer Support    Patient mental status/affect: Anxious, Congruent, and Preoccupied    Patient behavior/appearance: Neatly Groomed, Attentive, Cooperative, and Needed Prompting    Special patient treatment accommodations provided (describe): None    Patient response and progress towards goals: Focus of session was based on a handout called The Conflict Cycle.   We spoke about how conflict occurs either internally and interpersonally. We discussed how a conflict is when there are two or more opposing attitudes, values, or beliefs. We spoke about the recent examples of an interpersonal conflict and how group members and others responded. We spoke about the consequences, both negative and positive that came out of the conflict, and the change that can result from the conflict. We spoke about some ways in which conflict can be managed and the damage that can result from avoiding conflicts. Sridhar Quintana participated in group with prompting. She spoke about how she is battling herself right now about her eating and what she eats. She knows that she is eating things that impact her negatively in regards to her diabetes. She spoke about this gamboa and \"I feel like I am losing right now. \"

## 2022-11-14 NOTE — BH NOTES
[facility] Outpatient Program  Group Therapy    Date of Service: 11/14/2022  Start time: 1100  Stop time: 1150  Type of session: Therapy Group    Problem number: 1Dep. F33.2    Short term goal (STG): H    Pt will begin to participate in social contacts & initiate communication of needs & desires and report to group  her progress.     Intervention/techniques: Informed, Validated/Supported, Prompted/Cued, Listened/Empathized, Reinforced, and Provided Feedback    Patient mental status/affect: Anxious, Congruent, and Preoccupied    Patient behavior/appearance: Cooperative and Motivated    Special patient treatment accommodations provided (describe): None    Patient response and progress towards goals: Focus of session was on the DBT skill of radical acceptance. I shared with group the idea of acceptance has to happen in order for a person to move forward. I shared the skills set which includes radical acceptance, turning the mind, and being willing. I shared that radical acceptance is accepting reality as it is and that life can be worth  living even when there is pain. We spoke about how pain and not accepting it leads to more suffering. I shared that it is difficult to accept the things we don't like but it is necessary. I asked group members to share one thing in their life that they have accepted that has led to a release of pain and suffering and something that they need to work on accepting in their life so they can move on. Pt participated in the group activity and discussion. She stated that she didn't want to go home to her house and be alone. She did not go to [employer] yesterday and was trying to get herself motivated to do something for herself today.  She stated that she is glad that she made herself come to group today and be with other people

## 2022-11-14 NOTE — BH NOTES
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  Pt will identify choices made in the past week and how they impact emotionally, physically and cognitively    Intervention/techniques: Informed, Challenged, Reframed, Listened/Empathized, and Provided Feedback  Patient mental status/affect: Anxious, Congruent, Preoccupied, and Worried  Patient behavior/appearance: Neatly Groomed, Attentive, Cooperative, and Needed Prompting    Special patient treatment accommodations provided (describe): None    Patient response and progress towards goals: Focus of session was a review of the weekend and helping group members see their wins that they can celebrate from the past several days. We spoke about the things that occurred and choices that they had in front of them and helping everyone to see the positive impact that they had on their well being. We also spoke about what may have been the setbacks and how they coped and what they can plan to do for the future to help build on relapse prevention. Eri Schaefer participated in group with with prompting. She continues to be visibly anxious and is expressing her concern for herself and her well being. She spoke about how she knows that she is struggling with still having crying spells and feeling overwhelmed. She spoke about how she knows that she has some more energy today and we spoke about the impact of her being able to see people so this is why going to [employer] is something to push herself to do. Behavioral Health Daily Check In form revealed that pt is not experiencing SI/HI thoughts or plans, remaining abstinent from drugs and alcohol, and reports goal today of  \"to accept feelings. \"

## 2022-11-15 ENCOUNTER — APPOINTMENT (OUTPATIENT)
Dept: BEHAVIORAL/MENTAL HEALTH | Age: 87
End: 2022-11-15
Payer: MEDICARE

## 2022-11-16 ENCOUNTER — HOSPITAL ENCOUNTER (OUTPATIENT)
Dept: BEHAVIORAL/MENTAL HEALTH | Age: 87
Discharge: HOME | End: 2022-11-16
Payer: MEDICARE

## 2022-11-16 PROCEDURE — 90853 GROUP PSYCHOTHERAPY: CPT

## 2022-11-16 NOTE — BH NOTES
[facility] Outpatient Program  Group Therapy    Date of Service:2022   Start time: 1200  Stop time: 1250  Type of session: Therapy Group    Problem number: 1Dep. F33.2    Short term goal (STG): E Pt will identify times she has been able to use willingness to turn towards emotions and symptoms and how acceptance of the moment has helped her get through crises    Intervention/techniques: Informed, Validated/Supported, Listened/Empathized, Observed/Monitored, Clarified, Reinforced, Provided Feedback, and Problem Solved    Patient mental status/affect: Anxious, Congruent, and Worried    Patient behavior/appearance: Attentive, Cooperative, and Motivated    Special patient treatment accommodations provided (describe): None    Patient response and progress towards goals: Focus of session was on understanding and implementing the worry tree as a way to eliminate worry about things in our lives that we currently cannot do anything about. We discussed how about 90% of our current worries are about the unimportant, the unlikely, and the unresolved. I shared with group how the worry tree can be effective for dealing with worries to let them go, even if they have to do it over and over. I had group members share a worry that is able to be resolved and one that is not at this time and how to practice letting go of that worry in order to free up emotional energy and space in our minds. Group members were given an assignment to let go of one worry this week that is resolvable or one that is not. Pt shared with the group that she recently found out that a friend of hers  in her home and they didn't find her for hours. This recent event and her feelings of loneness have raised her anxiety levels regarding living alone. She told her children that they needed to be more concerned about her but did not really tell them what she meant or what they could do.   We discussed ways she could express to them what she has been feeling and what they could do to assist her.   She wants to talk with them about calling her in the am/pm just to [facility] in\"

## 2022-11-16 NOTE — BH NOTES
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  Pt will identify and share with group behaviors that contribute to depressed mood and identify an alternative behavior    Intervention/techniques: Informed, Validated/Supported, Modeled/Rehearsed, Promoted Peer Support, and Provided Feedback    Patient mental status/affect: Calm, Congruent, and Preoccupied    Patient behavior/appearance: Neatly Groomed, Attentive, Cooperative, and Needed Prompting    Special patient treatment accommodations provided (describe): None    Patient response and progress towards goals: Focus of session was a discussion on goal planning and was based on handout Creating the Life that you Want: Ten Steps to Accomplishing a Goal.  We spoke about those steps and being aware of the things you need to give up and avoid in order achieve the goal as well as what you need to start doing. We reviewed ideas for goals for lifetime, a 5 Year plan, and daily goals. We spoke about financial, physical, social, personal, and educational/vocational.  We went over goals that are most important right now and how to get started. Marzella Kocher participated in group with prompting. She spoke about how she knows that she wants to get started on a lot of goals and she does express feeling like \"I am stuck in the mud. \"  She was open to ideas presented in group.

## 2022-11-16 NOTE — BH NOTES
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 Pt will identify and share with group thoughts that contribute to depressed mood and outlook and identify a counter thought    Intervention/techniques: Informed, Validated/Supported, Prompted/Cued, Listened/Empathized, Promoted Peer Support, and Provided Feedback    Patient mental status/affect: Congruent and Preoccupied    Patient behavior/appearance: Neatly Groomed, Attentive, Cooperative, and Needed Prompting    Special patient treatment accommodations provided (describe): None    Patient response and progress towards goals: Focus of session was based on ideas developed from [address] of Highly Effective People.   I shared the concepts and habits that include being proactive, beginning with the end in mind, putting first things first, think win/win, seek to understand than to be understood, synergize, and sharpen the saw. We went over these concepts and how these can be developed into habits and how they can benefit  group members and improve their overall functioning. Bryan Tierney participated in group with prompting. She spoke about how she knows that she is struggling with her mood and is frustrated and worried about her progress. We spoke about how she believes that she is worse in regards to her ability to get better. She shared how she is worried that she has had a stroke. Behavioral Health Daily Check In form revealed that pt is not experiencing SI/HI thoughts or plans, remaining abstinent from drugs and alcohol, and reports goal today of  \"being proactive\" based on her conversations in group and how she can challenge her worries.

## 2022-11-17 ENCOUNTER — APPOINTMENT (OUTPATIENT)
Dept: BEHAVIORAL/MENTAL HEALTH | Age: 87
End: 2022-11-17
Payer: MEDICARE

## 2022-11-18 ENCOUNTER — HOSPITAL ENCOUNTER (OUTPATIENT)
Dept: BEHAVIORAL/MENTAL HEALTH | Age: 87
Discharge: HOME | End: 2022-11-18
Payer: MEDICARE

## 2022-11-18 PROCEDURE — 90853 GROUP PSYCHOTHERAPY: CPT

## 2022-11-18 NOTE — BH NOTES
[facility] Outpatient Program  Group Therapy    Date of Service: 11/18/2022  Start time: 1100  Stop time: 1150  Type of session: Therapy Group    Problem number: 1Dep. F33.2    Short term goal (STG): H    Pt will begin to participate in social contacts & initiate communication of needs & desires and report to group         her progress.     Intervention/techniques: Informed, Validated/Supported, Listened/Empathized, Observed/Monitored, and Reinforced    Patient mental status/affect: Calm and Congruent    Patient behavior/appearance: Attentive, Cooperative, and Motivated    Special patient treatment accommodations provided (describe): None    Patient response and progress towards goals: Focus of session was based on handout from Migel Perera LCSW about 8 Tips to Help you Heal Emotional Wounds. We spoke about the impact of not healing our past wounds and how much time that can take up. We spoke about the strategies which include taking things slow and not trying to change everything at once, remember that healing is not all or nothing, be patient and persistent, set realistic expectations, view setbacks as part of the process, be willing to process feelings about the past, and ask for help as needed. Pt participated in the group activity and discussion. She shared that she has struggled recently to find her \"purpose\" She is making attempts to reach out and do things again that she enjoys but finds it hard as she is lonely. She also stated that her doctor has told her that she has had a stroke but she doesn't remember it. She does wonder if it is true as she states she will call people and then not remember why she called them.

## 2022-11-18 NOTE — BH NOTES
[facility] Outpatient Program  Group Therapy      Date of service:11/18/2022  Start time: 1000  Stop time: 1050    Type of session: Therapy Group    Problem number: 1Dep. F33.2    Short term goal (STG): C Pt will identify triggers to recent setback and identify strategies that can help her manage setbacks like this in the future.     Intervention/techniques: Informed, Validated/Supported, Listened/Empathized, Observed/Monitored, and Reinforced  Patient mental status/affect: Calm and Congruent  Patient behavior/appearance: Attentive, Cooperative, and Motivated    Special patient treatment accommodations provided (describe): None    Patient response and progress towards goals: Focus of session was on developing a healthy stress management plan for dealing with triggers to stress, anxiety, and worry. We spoke about the importance of focusing on life experiences that have strengthened me and has taught us how to manage stress. We also spoke about the importance of having a positive support network of people who can help nurture us and encourage us, we spoke about attitudes and beliefs that have helped to protect us and help us view things differently, we addressed physical self care habits and that prepare us or help us relieve tension, as well as action skills that we can use to change the situation we are in right at the moment of experiencing stress. Pt listened to the group discussion and concerns of other members. She shared that her trigger has become living alone and being lonely. She stated some days she just puts her head down and cries. Other days she is feeling blessed and thankful for her friends and family. She is trying to make herself get back in activities that she enjoys to help with the loneliness.       Behavioral Health Daily Check In form revealed that pt is not experiencing SI/HI thoughts or plans, remaining abstinent from drugs and alcohol, and reports no goal today but states loneliness is her biggest stressor.

## 2022-11-21 ENCOUNTER — HOSPITAL ENCOUNTER (OUTPATIENT)
Dept: BEHAVIORAL/MENTAL HEALTH | Age: 87
Discharge: HOME | End: 2022-11-21
Payer: MEDICARE

## 2022-11-21 PROCEDURE — 90853 GROUP PSYCHOTHERAPY: CPT

## 2022-11-21 NOTE — BH NOTES
[facility] Outpatient Program  Group Therapy    Date of Service: 11/21/2022  Start time: 1100  Stop time: 1150  Type of session: Therapy Group    Problem number: 1Dep. F33.2    Short term goal (STG): F Pt will work to practice skills of optimism and being able to see challenges and see areas she can work to improve rather than engage in self-defeating self-talk    Intervention/techniques: Informed, Validated/Supported, Listened/Empathized, Observed/Monitored, and Reinforced    Patient mental status/affect: Calm and Congruent    Patient behavior/appearance: Attentive, Cooperative, Caretaking, and Withdrawn/Quiet    Special patient treatment accommodations provided (describe): None    Patient response and progress towards goals: Focus of session was on understanding the need for a healthy balance in our mood and our day to day functioning. We reviewed the handout on healthy balance from [facility] about being aware of low energy or mood signs and symptoms, well balanced and healthy signs and symptoms, and high energy or mood signs and symptoms. We went over thoughts, feelings (both emotions and physical sensations) and actions and behaviors for each level. We spoke about what group members were not aware of or didn't take note of in the past and how it can help them today maintain a healthy balance. Pt participated in the group activity and engaged with  Pt shared her experiences with her 's alcoholism and the effects it had on her and their relationship. It was a stressful situation and she has grieved his death.   She has learned that troubles don't last forever and you have to give yourself time to experience your feelings and heal

## 2022-11-21 NOTE — BH NOTES
[facility]  Group Therapy      Date of service:11/21/2022  Start time: 1000  Stop time: 1050    Type of session: Therapy Group    Problem number: 1Dep. F33.2    Short term goal (STG): I Pt will develop specific strategies and self-talk that can help her increase her awareness of emotional triggers and how she can cope specifically with emotional dependence    Intervention/techniques: Informed, Validated/Supported, Observed/Monitored, and Reinforced  Patient mental status/affect: Calm and Congruent  Patient behavior/appearance: Cooperative and Withdrawn/Quiet    Special patient treatment accommodations provided (describe): None    Patient response and progress towards goals: Focus of session was a review of the weekend and successes that can be identified in recognizing and managing triggers to negative feeling states. We spoke about what was done in managing triggers that led to success and what was done or not done that led to struggles in maintaining and getting through difficult moments. We also spoke about goals to work towards this week and what is reasonable to accomplish by the end of the week Pt listened to the group discussion and concerns of the other members. She shared how she has been feeling lonely and crying more recently. She stated that she has tried to think about all the things she has been through and focus on the fact that God has seen her through to age 82. She was able to be supportive to others through her sharing of coping strategies    Behavioral Health Daily Check In form revealed that pt is not experiencing SI/HI thoughts or plans, remaining abstinent from drugs and alcohol, and reports goal today of  trying to do my best with the help of [facility].

## 2022-11-23 ENCOUNTER — HOSPITAL ENCOUNTER (OUTPATIENT)
Dept: BEHAVIORAL/MENTAL HEALTH | Age: 87
Discharge: HOME | End: 2022-11-23
Payer: MEDICARE

## 2022-11-23 PROCEDURE — 90853 GROUP PSYCHOTHERAPY: CPT

## 2022-11-23 NOTE — BH NOTES
[facility] [address] Outpatient Program  Group Therapy    Date of Service: 11.23.2022  Start time: 1100  Stop time: 1150  Type of session: Therapy Group    Problem number: 1Dep, F33.2    Short term goal (STG): G Pt will identify to group members what she sees as the aspects of her daily life that she does have control over and can make a positive impact on    Intervention/techniques: Informed, Validated/Supported, Listened/Empathized, Observed/Monitored, Reinforced, Facilitated Disclosure, and Provided Feedback    Patient mental status/affect: Anxious and Depressed    Patient behavior/appearance: Attentive, Cooperative, and Motivated    Special patient treatment accommodations provided (describe): None    Patient response and progress towards goals: Focus of session was on understanding the need for a healthy balance in our mood and our day to day functioning. We reviewed the handout on healthy balance from [facility] about being aware of low energy or mood signs and symptoms, well balanced and healthy signs and symptoms, and high energy or mood signs and symptoms. We went over thoughts, feelings (both emotions and physical sensations) and actions and behaviors for each level. We spoke about what group members were not aware of or didn't take note of in the past and how it can help them today maintain a healthy balance. Pt participated in the group activity and engaged with the group members. She shared that she had tried to talk with her children about contacting her in the am and pm and they stated they are doing the best they can. She also discussed thoughts of maybe wanting to go live somewhere around people. However, she does not think she is ready for a nursing home.   She was able to speak with Dr. Raymond Nix regarding her depression and medication as she requested

## 2022-11-23 NOTE — BH NOTES
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  Pt will identify and share with group thoughts that contribute to depressed mood and outlook and identify a counter thought    Intervention/techniques: Informed, Modeled/Rehearsed, Prompted/Cued, Promoted Peer Support, and Provided Feedback  Patient mental status/affect: Anxious, Congruent, Preoccupied, and Worried  Patient behavior/appearance: Neatly Groomed, Attentive, and Cooperative    Special patient treatment accommodations provided (describe): None    Patient response and progress towards goals: Focus of session was information on the difference between a victim mentality, an survivor mentality, and a thriver mentality. We spoke about how attitude makes a lot of difference in what we do and how we choose to cope in those times when we are thinking negative thoughts that lead to a victim mentality. I shared that victim thinking and behavior includes negative self talk, isolating behavior, overwhelmed by the past, believes everyone else is better, and places own needs last.  A survivor mentality and behavior includes seeing oneself as wounded but healing, knows that they deserve to seek help, not afraid to tell their story, and are learning healthy needs. A thriver mentality includes gratitude, proud of self care, living in the present, and protects self from unsafe others. We spoke about what actions can be taken or what thoughts could be changed to develop a thriver mentality and being. Danielle Phillip participated in group well and she spoke about how she has been more and more \"anxious and I feel like I am crying still a lot. \"  We spoke about how she is getting frustrated with things she knows that she cannot control like getting older and having memory issues.   She spoke about how she is working to try strategies we have discussed about accepting and allowing her emotions to just be present. Behavioral Health Daily Check In form revealed that pt is not experiencing SI/HI thoughts or plans, remaining abstinent from drugs and alcohol, and reports goal today of  \"talking to Dr. Jennie Maradiaga.  I think I need a nerve medication.'

## 2022-11-23 NOTE — BH NOTES
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      Pt will identify and share with group behaviors that contribute to depressed mood and identify an alternative behavior. Intervention/techniques: Informed, Validated/Supported, Listened/Empathized, Observed/Monitored, and Provided Feedback    Patient mental status/affect: Anxious, Congruent, and Preoccupied    Patient behavior/appearance: Neatly Groomed, Attentive, Cooperative, and Needed Prompting    Special patient treatment accommodations provided (describe): None    Patient response and progress towards goals: Focus of session was on conflict resolution and strategies to help us keep calm in a conflict with others. I shared how conflicts can increase the production of cortisol and adrenaline and how that prepares us to either take flight or fight. We spoke about how conflicts can be over something pretty minor to values and very important issues in our lives. I shared how the increase in hormones affects us when we need to be clear minded and aware but those hormones can create a disorientation and confusion. I shared with group some strategies to help us stay calm in a conflict and they included taking deep breaths which actually interferes with the production of these hormones, focusing on body and where we can work to relax it, listen carefully and ask open ended questions, lower our voices, and work to let go and agree to disagree with others. We spoke about with whom group members can practice these strategies. [facility] participated in group with prompting. She was worried about how she is doing and she was tearful about it and not sure how she can get through this long weekend.   She spoke about how her thoughts were really dark before she came back here to group and she is benefiting from the support of group. She was able to get support about how she can manage her feelings and what she can try over the long weekend.

## 2022-11-28 ENCOUNTER — HOSPITAL ENCOUNTER (OUTPATIENT)
Dept: BEHAVIORAL/MENTAL HEALTH | Age: 87
Discharge: HOME | End: 2022-11-28
Payer: MEDICARE

## 2022-11-28 PROCEDURE — 90853 GROUP PSYCHOTHERAPY: CPT

## 2022-11-28 NOTE — BH NOTES
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   Pt will work to practice skills of optimism and being able to see challenges and see areas she can work to improve rather than engage in self-defeating self-talk     Intervention/techniques: Informed, Prompted/Cued, Listened/Empathized, Promoted Peer Support, and Provided Feedback    Patient mental status/affect: Calm, Congruent, and Preoccupied    Patient behavior/appearance: Neatly Groomed, Attentive, Cooperative, and Needed Prompting    Special patient treatment accommodations provided (describe): None    Patient response and progress towards goals: Focus of session was based on a handout from [employer] titled Keegan Daley is Anxiety?   We spoke about the differences between stress responses and anxiety and why it would be helpful to focus on understanding the difference and being able to notice it. We spoke about symptoms of anxiety, types of anxiety, and how it grows. We also spoke about the different forms of treatment that can be utilized and what group members see can benefit from this in addition to medications or instead of medications. Shu participated and was easy to engage. She spoke about how she wants to focus on making better choices for herself in regards to her diet. She spoke about how she knows that she struggles with sugar intake and she is motivated to do as well as she can with it.

## 2022-11-28 NOTE — BH NOTES
[facility] Outpatient Program  Group Therapy    Date of Service: 11/28/2022  Start time: 1100  Stop time: 1150  Type of session: Therapy Group    Problem number: 1Dep. F33.2    Short term goal (STG): G Pt will identify to group members what she sees as the aspects of her daily life that she does have control over and can make a positive impact on    Intervention/techniques: Informed, Validated/Supported, Observed/Monitored, and Reinforced    Patient mental status/affect: Calm and Congruent    Patient behavior/appearance: Attentive, Cooperative, and Withdrawn/Quiet    Special patient treatment accommodations provided (describe): None    Patient response and progress towards goals: Focus of group session was a discussion on anxiety and what triggers anxiety and what anxiety feels like for each group member. We spoke about grounding techniques and the importance of returning to the present moment when anxiety is high and thoughts are starting to race and become irrational and fearful. We spoke about the techniques shared in an article from \"[employer]\" and the techniques include square breathing, developing mantras, touching and describing objects around us, and using declarative memory (name all the dog breeds you know, colors.)  We spoke about what techniques can be helpful for each group member. Pt participated in the group activity and discussion. She stated she had a good Thanksgiving and feels very blessed in her life. She was supportive of another member that shared his concerns today.

## 2022-11-28 NOTE — BH NOTES
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  Pt will identify and share with group thoughts that contribute to depressed mood and outlook and identify a counter thought    Intervention/techniques: Informed, Validated/Supported, Prompted/Cued, Listened/Empathized, and Provided Feedback  Patient mental status/affect: Calm, Congruent, and Preoccupied  Patient behavior/appearance: Neatly Groomed, Attentive, Cooperative, and Needed Prompting    Special patient treatment accommodations provided (describe): None    Patient response and progress towards goals: Focus of session was a review of the weekend and helping group members see their wins that they can celebrate from the past several days. We spoke about the things that occurred and choices that they had in front of them and helping everyone to see the positive impact that they had on their well being. We also spoke about what may have been the setbacks and how they coped and what they can plan to do for the future to help build on relapse prevention. Dezmelissa Kent participated in group well and she was quiet listening to others but she then shared that \"I had a wonderful Thanksgiving and I was treated so great by my kids and grand kids. It felt great. \"  She said she also went to [employer] this weekend and that felt great to follow through with that as well. Behavioral Health Daily Check In form revealed that pt is not experiencing SI/HI thoughts or plans, remaining abstinent from drugs and alcohol, and reports goal today of  \"focusing on eating better. \"

## 2022-11-30 ENCOUNTER — HOSPITAL ENCOUNTER (OUTPATIENT)
Dept: BEHAVIORAL/MENTAL HEALTH | Age: 87
Discharge: HOME | End: 2022-11-30
Payer: MEDICARE

## 2022-11-30 PROCEDURE — 90853 GROUP PSYCHOTHERAPY: CPT

## 2022-11-30 NOTE — BH NOTES
[facility] Outpatient Program  Group Therapy    Date of Service: 11/30/2022  Start time: 1100  Stop time: 1150  Type of session: Therapy Group    Problem number: 1Dep. F33.2    Short term goal (STG): H Pt will begin to participate in social contacts & initiate communication of needs & desires and report to group         her progress    Intervention/techniques: Informed, Validated/Supported, Observed/Monitored, and Reinforced    Patient mental status/affect: Calm and Congruent    Patient behavior/appearance: Attentive, Cooperative, Motivated, and Caretaking    Special patient treatment accommodations provided (describe): None    Patient response and progress towards goals: Focus of session was based on an article from [employer] on 5 skills for conflict resolution. We reviewed the five skills which are: Avoiding, Competing, Accomodating, Collaborating and Compromising. We spoke about how these all have pros and cons and we spoke about group members particular skills that they use and how they work, and what they think they may need to change. We spoke about current conflicts that they are experiencing and how they can work to solve the compromise. Pt listened to the group discussion and engaged with the other members. She was supportive of another pt that had concerns and tried to help her find coping strategies for her anxiety that she is experiencing.   The other member was receptive to pt's support

## 2022-11-30 NOTE — BH NOTES
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 Pt will develop specific strategies and self-talk that can help her increase her awareness of emotional triggers and how she can cope specifically with emotional dependence.         Intervention/techniques: Informed, Refocused, Modeled/Rehearsed, Prompted/Cued, Queired/Probed, and Provided Feedback    Patient mental status/affect: Anxious, Congruent, and Preoccupied    Patient behavior/appearance: Neatly Groomed, Attentive, Cooperative, and Needed Prompting    Special patient treatment accommodations provided (describe): None    Patient response and progress towards goals: Focus of session was based on the article by Eliana Inman 3 Questions that Will Change Your Attitude (When You Can't Change Anything else). We spoke about the quote a peaceful person is not a person who is always in a good situation, but rather a person who always has a good attitude in every situation.   We spoke about how the one controllable thing in every situation is our attitude and making deliberate choices about it. We went through the questions in the article: who would you be and what else would you see, if you erased the thought that's worrying you, what could you be positive about right now if you really wanted to, how can you respond from a place of clarity and strength, rather than thoughtlessly reacting to this experience, and what can you let go of right now without losing anything. We spoke about how having moment to moment attitude checks can help us cope better in the day to day. Kumar Saul participated in group with prompting. She spoke about how she can try and check in on her attitude and she did say how she can see it can help her refocus. She was quiet in group but she responded well to group topic.

## 2022-11-30 NOTE — BH NOTES
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 Pt will identify and share with group behaviors that contribute to depressed mood and identify an alternative behavior    Intervention/techniques: Informed, Modeled/Rehearsed, Prompted/Cued, Promoted Peer Support, and Provided Feedback  Patient mental status/affect: Anxious, Congruent, and Preoccupied  Patient behavior/appearance: Neatly Groomed, Attentive, Cooperative, and Needed Prompting    Special patient treatment accommodations provided (describe): None    Patient response and progress towards goals: Focus of session was on understanding anger and find strategies for how to work through the typical phases of anger. We discussed how anger can include lesser feelings such as frustration, irritation, hurt, rage. We discussed the three phases of anger which can be viewed as the red zone, which is where are only focus needs to be on calming down. The orange zone, where our focus needs to be on thinking things through and processing our thoughts that may be increasing our anger and even stirring up more. The green zone is when we need to work through and manage the anger that we feel. We discussed strategies to use in the green zone such as expressing ourselves assertively, thinking of the other perspective, and relaxation skills. Andrew Bledsoe participated in group with prompting. She reports that she has been able to maintain her mood over past two days and is glad that there is not a dip back to the deep depression she was experiencing before Thanksgiving.   She spoke about her awareness of anger triggers and she spoke about how her biggest one is getting \"frustrated with myself and how I feel.'      Behavioral Health Daily Check In form revealed that pt is not experiencing SI/HI thoughts or plans, remaining abstinent from drugs and alcohol, and reports goal today of  \"eat healthy. \"  She also reported that \"I am doing what Dr. Asif Villasenor said and I am not skipping doses. \"

## 2022-12-02 ENCOUNTER — HOSPITAL ENCOUNTER (OUTPATIENT)
Dept: BEHAVIORAL/MENTAL HEALTH | Age: 87
Discharge: HOME | End: 2022-12-02
Payer: MEDICARE

## 2022-12-02 PROCEDURE — 90853 GROUP PSYCHOTHERAPY: CPT

## 2022-12-02 NOTE — BH NOTES
[facility] Outpatient Program  Group Therapy    Date of Service: 12/2/2022  Start time: 1100  Stop time: 1150  Type of session: Therapy Group    Problem number: 1Dep. F33.2    Short term goal (STG): C Pt will identify triggers to recent setback and identify strategies that can help her manage setbacks like this in the future.     Intervention/techniques: Informed, Validated/Supported, Guided, Prompted/Cued, Listened/Empathized, Clarified, and Reinforced    Patient mental status/affect: Calm and Congruent    Patient behavior/appearance: Attentive, Cooperative, and Withdrawn/Quiet    Special patient treatment accommodations provided (describe): None    Patient response and progress towards goals: Focus of session was based on an article from [employer] on 5 skills for conflict resolution. We reviewed the five skills which are: Avoiding, Competing, Accomodating, Collaborating and Compromising. We spoke about how these all have pros and cons and we spoke about group members particular skills that they use and how they work, and what they think they may need to change. We spoke about current conflicts that they are experiencing and how they can work to solve the compromise. Pt participated in the group activity and engaged with the other members. Pt shared that she was excited to be able to get out this weekend and be around others. She has been invited to a celebration for one of the youth in her neighborhood so she will attend Saturday. She stated she loves to support youth that want to make something of themselves.   It was evident through her actions that this was something that makes her happy and she enjoys

## 2022-12-02 NOTE — BH NOTES
[facility] Outpatient Program  Group Therapy      Date of service:12/2/2022  Start time: 1000  Stop time: 1050    Type of session: Therapy Group    Problem number: 1Dep. F33.2    Short term goal (STG): E Pt will identify times she has been able to use willingness to turn towards emotions and symptoms and how acceptance of the moment has helped her get through crises    Intervention/techniques: Informed, Validated/Supported, Observed/Monitored, and Reinforced  Patient mental status/affect: Calm and Congruent  Patient behavior/appearance: Attentive, Cooperative, and Withdrawn/Quiet    Special patient treatment accommodations provided (describe): None    Patient response and progress towards goals: Focus of session was on ways to challenge negative thinking and to run our negative thoughts through the filter of the following three questions:  Is my thinking based on fact, is my thinking leading me to feel what I want to feel, and is my thinking leading me towards my goals? We spoke about how if we answer no to any of these questions, we need to stop and take the time to challenge that thought or thoughts. We spoke about what recent negative thoughts we can recognize and we tested our thought out to see if it was a thought we should pay attention to. We addressed how to challenge and/or distract ourselves to move away from unnecessary and unhelpful thoughts  Pt listened tot he group discussion and the concerns of other members today. She stated that she had been practicing taking long breathes to help calm herself and her thoughts. She has felt like she needed to \"blow off some steam\" related to some of the things she has been thinking about.  Pt did not elaborate and became quiet during the group    Behavioral Health Daily Check In form revealed that pt is not experiencing SI/HI thoughts or plans, remaining abstinent from drugs and alcohol, and reports goal today of  dealing with the stress of her nerves and being alone most of the time

## 2022-12-05 ENCOUNTER — HOSPITAL ENCOUNTER (OUTPATIENT)
Dept: BEHAVIORAL/MENTAL HEALTH | Age: 87
Discharge: HOME | End: 2022-12-05
Payer: MEDICARE

## 2022-12-05 PROCEDURE — 90853 GROUP PSYCHOTHERAPY: CPT

## 2022-12-05 NOTE — BH NOTES
[facility] Outpatient Program  Group Therapy    Date of Service: 12/5/2022  Start time: 1100  Stop time: 1150  Type of session: Therapy Group    Problem number: 1Dep. F33.2    Short term goal (STG): H Pt will begin to participate in social contacts & initiate communication of needs & desires and report to group         her progress    Intervention/techniques: Informed, Validated/Supported, Observed/Monitored, and Reinforced    Patient mental status/affect: Calm and Congruent    Patient behavior/appearance: Attentive, Cooperative, Needed Prompting, and Withdrawn/Quiet    Special patient treatment accommodations provided (describe): None    Patient response and progress towards goals: Focus of session was on article titled Developing a Positive Mental Attitude.    We spoke about what needs to happen in order to develop a PMA and that includes changing the language to be more empowering and how simple changes to the words we use can impact our attitude. We spoke about the skill of shifting your physiology and how we can focus on breathing and tension in the body and how our body's physiology changes based on what emotions that we are feeling. We spoke about making self to self comparisons and how we can help ourselves more when we focus on developing a stronger self. We discussed cultivating present moment awareness and asking effective questions such as what's great about this or what opportunities exist here?   We spoke about what skills group members can develop. Pt listened to the group discussion and activity. She shared that she had woke up and was feeling like she didn't want to come today. Her friend called her and she was helpful in talking with pt this morning, She stated that she knows she feels better when she comes but has felt so depressed recently.   She did state that she feels better now and was glad she came

## 2022-12-05 NOTE — BH NOTES
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   Pt will work to practice skills of optimism and being able to see challenges and see areas she can work to improve rather than engage in self-defeating self-talk     Intervention/techniques: Informed, Modeled/Rehearsed, Prompted/Cued, Promoted Peer Support, and Provided Feedback    Patient mental status/affect: Congruent and Preoccupied    Patient behavior/appearance: Neatly Groomed, Attentive, Cooperative, and Needed Prompting    Special patient treatment accommodations provided (describe): None    Patient response and progress towards goals: Focus of session was based on information from website [employer] which created a useful list of lessons from cognitive behavioral therapy. I reviewed the lessons covered which are situations cannot create feelings, only your thoughts and the situation can, notice unhelpful thoughts and replace them with helpful thoughts, take note of which behaviors make you feel better or worse, turn your focus to the task or the environment, avoidance makes anxiety worse, act the way you want to be, no matter how you feel, and let go of expectations. Group members were asked to share their thoughts on which lesson they can learn to apply and why that one may be able to help them. Rahel Asif participated in group with prompting. She was quiet throughout group but she was responsive to questions and she spoke about how she can see that \"when I put off doing, I tend to feel that much worse. \"

## 2022-12-05 NOTE — BH NOTES
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  Pt will identify and share with group thoughts that contribute to depressed mood and outlook and identify a counter thought    Intervention/techniques: Informed, Reframed, Modeled/Rehearsed, Listened/Empathized, Promoted Peer Support, and Provided Feedback  Patient mental status/affect: Calm, Congruent, and Preoccupied  Patient behavior/appearance: Neatly Groomed, Attentive, Cooperative, and Needed Prompting    Special patient treatment accommodations provided (describe): None    Patient response and progress towards goals: Focus of session was on identifying healthy plans for the week ahead. We spoke about setting goals and intentions for two things this week and they are identifying at least one thing that can be done this week to help ourselves and one thing that we won't do this weekend to help ourselves. I spoke with group members about the importance of picking things to do or accomplish that are not in their normal routine and are steps up in terms of activities or behaviors that they want to incorporate in their lives. We also spoke about how what they choose to not do needs to be something that they have the desire to give up in order to better themselves. I shared that if these goals can be accomplished, they can come back next week with energy and motivation to keep making positive changes. Group members shared their personal goals and objectives for the week. Daina Wright participated in group well. She said she had some struggles again this weekend and she did not go to [place]. She did state she went to a party for a high schooler who got a scholarship to college and she was able to experience that nandini.   She didn't want to come today but she called her friend who encouraged her and told her she would feel better if she got out of the house today. She stated she did feel better as a result. Behavioral Health Daily Check In form revealed that pt is not experiencing SI/HI thoughts or plans, remaining abstinent from drugs and alcohol, and reports goal today of  \"prayer and talking to others. \"

## 2022-12-07 ENCOUNTER — HOSPITAL ENCOUNTER (OUTPATIENT)
Dept: BEHAVIORAL/MENTAL HEALTH | Age: 87
Discharge: HOME | End: 2022-12-07
Payer: MEDICARE

## 2022-12-07 PROCEDURE — 90853 GROUP PSYCHOTHERAPY: CPT

## 2022-12-07 NOTE — BH NOTES
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 Pt will identify choices made in the past week and how they impact emotionally, physically and cognitively    Intervention/techniques: Informed, Modeled/Rehearsed, Prompted/Cued, and Provided Feedback    Patient mental status/affect: Anxious, Congruent, and Preoccupied    Patient behavior/appearance: Neatly Groomed, Attentive, Cooperative, and Needed Prompting    Special patient treatment accommodations provided (describe): None    Patient response and progress towards goals: Focus of session was based on the book Rising Strong by Gaby Ulloa. I shared her concept of rising strong which includes three steps. Her process is the first step The Reckoning which is when we are learning to reckon with their emotions. We spoke about how this is where we recognize our triggers and their emotions are off kilter. We then get curious about what's happening and then try to get connected to thoughts and feelings. The second step is The Rumble which is where we rumble with our stories and moving to a deeper understanding about who we are and how we engage with others. The third step is The Revolution which is where we transform our thoughts and beliefs and write a new ending to our story. I asked group members to share their thoughts on where they may be in this process and where they would like to get to. Jordandenise Bronson participated in group with prompting. She continues to be very quiet in group but she is listening. She spoke about how she knows that \"coming here is what is helping me right now. \"

## 2022-12-07 NOTE — BH NOTES
[facility] Outpatient Program  Group Therapy    Date of Service: 12/7/2022  Start time: 1100  Stop time: 1150  Type of session: Therapy Group    Problem number: 1Dep. F33.2    Short term goal (STG): B     Pt will identify and share with group behaviors that contribute to depressed mood and identify an alternative behavior. Intervention/techniques: Informed, Validated/Supported, Listened/Empathized, Observed/Monitored, Reinforced, Provided Feedback, and Problem Solved    Patient mental status/affect: Anxious and Congruent    Patient behavior/appearance: Attentive, Cooperative, Motivated, and Caretaking    Special patient treatment accommodations provided (describe): None    Patient response and progress towards goals: Focus of session was based on the article by Jovi Owens 7 Questions that Will Change Your Attitude (When You Can't Change Anything else). We spoke about the quote a peaceful person is not a person who is always in a good situation, but rather a person who always has a good attitude in every situation.   We spoke about how the one controllable thing in every situation is our attitude and making deliberate choices about it. We went through the questions in the article: who would you be and what else would you see, if you erased the thought that's worrying you, what could you be positive about right now if you really wanted to, how can you respond from a place of clarity and strength, rather than thoughtlessly reacting to this experience, and what can you let go of right now without losing anything. We spoke about how having moment to moment attitude checks can help us cope better in the day to day. Pt participated in the group discussion.  She shared that at times she feels very lonely and has recently been scared when she has heard noises at night, The group discussed coping strategies She also shared her experience related to grief and how she smiles and prays each day to get through.

## 2022-12-07 NOTE — BH NOTES
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 Pt will identify triggers to recent setback and identify strategies that can help her manage setbacks like this in the future.     Intervention/techniques: Informed, Modeled/Rehearsed, Prompted/Cued, Promoted Peer Support, and Provided Feedback  Patient mental status/affect: Calm, Congruent, Flat, and Preoccupied  Patient behavior/appearance: Neatly Groomed, Attentive, Cooperative, Needed Prompting, and Withdrawn/Quiet    Special patient treatment accommodations provided (describe): None    Patient response and progress towards goals: Focus of session was based on information from [employer] to [address] [place] [address] by Dr Bibi Whitlock. I shared the ideas on how we look at and emotionally respond to problems in our lives. We spoke about how we have practical problems (real) and emotional ( our reaction to real problems.)  We spoke about how we have imagined problems which are fully created by us and can cause us a lot of anxiety and stress that is completely based on false beliefs. I shared that this concept is based on Stoicism which is that reality does not care about how we emotionally react to problems so we may as well focus on solutions to the real problems. We spoke about real vs imagined problems an worked on solutions to the real problems. Homa James participated in group with prompting. She was quiet and withdrawn during group and she spoke about how she is struggling with \"feeling like I want to cry all the time. \"  She said she is having a hard time being home alone. She knows she feels \"slightly better when I come here because of the support I get and the connection. \"  We spoke about how she can cope with the crying spells and try to lean into them rather than resisting them.      Behavioral Health Daily Check In form revealed that pt is not experiencing SI/HI thoughts or plans, remaining abstinent from drugs and alcohol, and reports goal today of  \"staying connected and I will try and do practiced breathing when I am crying. \"

## 2022-12-09 ENCOUNTER — HOSPITAL ENCOUNTER (OUTPATIENT)
Dept: BEHAVIORAL/MENTAL HEALTH | Age: 87
Discharge: HOME | End: 2022-12-09
Payer: MEDICARE

## 2022-12-09 PROCEDURE — 90853 GROUP PSYCHOTHERAPY: CPT

## 2022-12-09 NOTE — BH NOTES
[facility] Outpatient Program  Group Therapy    Date of Service: 12/9/2022  Start time: 1100  Stop time: 1150  Type of session: Therapy Group    Problem number: 1Dep. F33.2    Short term goal (STG): E Pt will identify times she has been able to use willingness to turn towards emotions and symptoms and how acceptance of the moment has helped her get through crises    Intervention/techniques: Informed, Validated/Supported, Listened/Empathized, Observed/Monitored, Clarified, Reinforced, and Provided Feedback    Patient mental status/affect: Calm and Congruent    Patient behavior/appearance: Attentive, Cooperative, and Motivated    Special patient treatment accommodations provided (describe): None    Patient response and progress towards goals: Focus of group session was a discussion on anxiety and what triggers anxiety and what anxiety feels like for each group member. We spoke about grounding techniques and the importance of returning to the present moment when anxiety is high and thoughts are starting to race and become irrational and fearful. We spoke about the techniques shared in an article from \"[employer]\" and the techniques include square breathing, developing mantras, touching and describing objects around us, and using declarative memory (name all the dog breeds you know, colors.)  We spoke about what techniques can be helpful for each group member. Pt participated in the group activity and discussion. She shared that she has a hard time \"saying no\". She expressed that her family has always come to her when they needed something. She will say yes or find someone to help them if she can't. She has been trying to work on not feeling guilty when she has to say no now.  She is dealing with the physical and mental effects of getting older and states it is an adjust,ment for her

## 2022-12-09 NOTE — BH NOTES
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  Pt will identify to group members what she sees as the aspects of her daily life that she does have control over and can make a positive impact on    Intervention/techniques: Informed, Prompted/Cued, Observed/Monitored, and Provided Feedback  Patient mental status/affect: Calm, Congruent, and Preoccupied  Patient behavior/appearance: Neatly Groomed, Attentive, Cooperative, Needed Prompting, and Withdrawn/Quiet    Special patient treatment accommodations provided (describe): None    Patient response and progress towards goals: Focus of session was a discussion on fear and asking the question about what we fear: is this fear protecting me or holding me back? We spoke about many fears we have are not based on fact but rather on stories we create about what we fear or the people who are involved. We spoke about how the best strategy we can use when we are trying to push through fear is to [facility] for the facts and to remember that feelings are not facts.   We discussed how we can identify what we fear, look for the facts, and then determine how we feel about those facts. We spoke about how pushing through fears we can safely do something about can help us move in the right direction. Laura Navarrodev participated in group with prompting. She spoke about how she knows that she is struggling with fears and she has been trying to push through now that she has the support of group. She spoke about how she knows that she has some fears of being alone just due to loneliness. We spoke about what she can keep doing to take steps forward.       Behavioral Health Daily Check In form revealed that pt is not experiencing SI/HI thoughts or plans, remaining abstinent from drugs and alcohol, and reports goal today of \"deep breathing. \"

## 2022-12-09 NOTE — BH NOTES
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 Pt will develop specific strategies and self-talk that can help her increase her awareness of emotional triggers and how she can cope specifically with emotional dependence.         Intervention/techniques: Informed, Validated/Supported, Prompted/Cued, Listened/Empathized, Promoted Peer Support, and Provided Feedback    Patient mental status/affect: Calm, Congruent, and Preoccupied    Patient behavior/appearance: Neatly Groomed, Attentive, Cooperative, and Needed Prompting    Special patient treatment accommodations provided (describe): None    Patient response and progress towards goals: Focus of session was on identifying healthy plans for the weekend. We spoke about setting goals and intentions for two things this weekend and they are identifying at least one thing that can be done this weekend to help ourselves and one thing that we won't do this weekend to help ourselves. I spoke with group members about the importance of picking things to do or accomplish that are not in their normal routine and are steps up in terms of activities or behaviors that they want to incorporate in their lives. We also spoke about how what they choose to not do needs to be something that they have the desire to give up in order to better themselves. I shared that if these goals can be accomplished, they can come back next week with energy and motivation to keep making positive changes. Group members shared their personal goals and objectives for the weekend. Juan Miguel Moore participated in group with prompting. She spoke about how she knows that she is going to \"not make an excuse to skip [employer]andaen this weekend. \"   She shared how she has been \"holding in this issue I had with one of my first cousins who refuses to talk to me.\" She shared how she has worked to let go of the need to fix it with her cousin and just let her \"be her and be unhappy. I cannot change that. \"

## 2022-12-12 ENCOUNTER — HOSPITAL ENCOUNTER (OUTPATIENT)
Dept: BEHAVIORAL/MENTAL HEALTH | Age: 87
Discharge: HOME | End: 2022-12-12
Payer: MEDICARE

## 2022-12-12 PROCEDURE — 90853 GROUP PSYCHOTHERAPY: CPT

## 2022-12-12 NOTE — BH NOTES
[facility] Outpatient Program  Group Therapy    Date of Service: 12/12/2022  Start time: 1100  Stop time: 1150  Type of session: Therapy Group    Problem number: 1Dep. F33.2    Short term goal (STG): G Pt will identify to group members what she sees as the aspects of her daily life that she does have control over and can make a positive impact on    Intervention/techniques: Informed, Validated/Supported, Guided, Listened/Empathized, and Observed/Monitored    Patient mental status/affect: Calm and Congruent    Patient behavior/appearance: Attentive, Cooperative, and Motivated    Special patient treatment accommodations provided (describe): None    Patient response and progress towards goals: Focus of session was on identifying core beliefs that we may have that lead us to increased feelings of depression, anxiety, anger, and low self esteem. We spoke about the need to understand our beliefs that lead us to these painful emotions and feeling states. We spoke about how these can be challenged by working to provide pieces of evidence to the contrary to our negative core beliefs. We also spoke about how we can put this into practice when we are out in the world being challenged with negative thinking patterns. Pt participated in the group activity and engaged with the other members. She chose gratitude and happiness from the affirmations worksheet. Pt stated that she has thought a lot recently about what makes her happy and grateful in her family. She has periods of being lonely and cries. She shared that she went to the [employer] and she wasn't going to go but her grandson asked her to.  She was glad that she did and it help her not to feel so depressed

## 2022-12-12 NOTE — BH NOTES
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  Pt will identify times she has been able to use willingness to turn towards emotions and symptoms and how acceptance of the moment has helped her get through crises    Intervention/techniques: Informed, Validated/Supported, Modeled/Rehearsed, Listened/Empathized, Promoted Peer Support, and Provided Feedback  Patient mental status/affect: Anxious, Congruent, and Preoccupied  Patient behavior/appearance: Neatly Groomed, Attentive, Cooperative, Needed Prompting, and Withdrawn/Quiet    Special patient treatment accommodations provided (describe): None    Patient response and progress towards goals: Focus of session was a review of the weekend and helping group members see their wins that they can celebrate from the past several days. We spoke about the things that occurred and choices that they had in front of them and helping everyone to see the positive impact that they had on their well being. We also spoke about what may have been the setbacks and how they coped and what they can plan to do for the future to help build on relapse prevention. Andrew Bledsoe did talk about how she had a good weekend and she pushed through the urge to say no when she was invited to the parade. She said her grandson told her she wanted her to go and she did. She said she enjoyed it. She also went to Religious this weekend so she sees this as a good accomplishment. Behavioral Health Daily Check In form revealed that pt is not experiencing SI/HI thoughts or plans, remaining abstinent from drugs and alcohol, and reports goal today of  \"continue to say yes to opportunities to get out of house and to keep up with praying. \"

## 2022-12-12 NOTE — BH NOTES
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  Pt will develop specific strategies and self-talk that can help her increase her awareness of emotional triggers and how she can cope specifically with emotional dependence.         Intervention/techniques: Informed, Validated/Supported, Prompted/Cued, Listened/Empathized, and Provided Feedback    Patient mental status/affect: Anxious, Congruent, and Preoccupied    Patient behavior/appearance: Neatly Groomed, Attentive, Cooperative, and Needed Prompting    Special patient treatment accommodations provided (describe): None    Patient response and progress towards goals: Focus of session was from [employer], [place] handout on Journal Prompts to help you set better boundaries.   We spoke about the impact of journaling and how it can be effective in reinforcing what we want to do and change. We discussed the prompts of what problems have you experienced related to your struggle with boundaries and how do you think your life will improve by setting and maintaining boundaries. We spoke specifically about the prompt of in what ways do we overextend ourselves to please and take care of others?  We discussed what boundaries are necessary at this time to develop. Lisa Dobson participated in group with prompting. She was quiet but she was engaged in discussion when we spoke about boundaries. She spoke about how she has had to set them with family members and how it felt to set them when she did.

## 2022-12-14 ENCOUNTER — HOSPITAL ENCOUNTER (OUTPATIENT)
Dept: BEHAVIORAL/MENTAL HEALTH | Age: 87
Discharge: HOME | End: 2022-12-14
Payer: MEDICARE

## 2022-12-14 PROCEDURE — 90853 GROUP PSYCHOTHERAPY: CPT

## 2022-12-14 RX ORDER — VENLAFAXINE HYDROCHLORIDE 150 MG/1
150 CAPSULE, EXTENDED RELEASE ORAL DAILY
Qty: 90 CAPSULE | Refills: 0 | Status: SHIPPED | OUTPATIENT
Start: 2022-12-14

## 2022-12-14 NOTE — BH NOTES
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  Pt will identify and share with group behaviors that contribute to depressed mood and identify an alternative behavior. Intervention/techniques: Informed, Validated/Supported, Prompted/Cued, Listened/Empathized, Promoted Peer Support, and Provided Feedback    Patient mental status/affect: Calm, Congruent, and Preoccupied    Patient behavior/appearance: Neatly Groomed, Attentive, Cooperative, and Needed Prompting    Special patient treatment accommodations provided (describe): None    Patient response and progress towards goals: Focus of session was based on an idea developed by Chelita Crespo (possibility therapy) and Hever Irving Asp, Ph.D. (solution-focused therapy). It is called Do One Thing Different and it is supported by 12 step's motto and definition of insanity is doing the same thing over and over again and expecting different results.   I spoke with group members about the idea is to think about the things you do in a problem situation and change any part you can. We spoke about how we can change our reaction, our attitude, our tone of voice, our behavior, we can think about what others do to help themselves and what works and what has worked for us in the past.  We also spoke about picturing life and what we would feel like without this problem in it. I asked group members to identify one small thing that they can do different and how it can impact their life. [facility] in group with prompting. She was brighter in this group and she engaged well with encouragement. She spoke about how she knows that she wants to continue to work on calling her friend in the mornings rather than relying on her friend to to call her.

## 2022-12-14 NOTE — BH NOTES
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  Pt will identify and share with group thoughts that contribute to depressed mood and outlook and identify a counter thought    Intervention/techniques: Informed, Modeled/Rehearsed, Prompted/Cued, Promoted Peer Support, and Provided Feedback  Patient mental status/affect: Calm, Congruent, and Preoccupied  Patient behavior/appearance: Neatly Groomed, Attentive, Cooperative, and Needed Prompting    Special patient treatment accommodations provided (describe): None    Patient response and progress towards goals: Focus of session was based on a handout from [employer] titled Janina Bill is Anxiety?   We spoke about the differences between stress responses and anxiety and why it would be helpful to focus on understanding the difference and being able to notice it. We spoke about symptoms of anxiety, types of anxiety, and how it grows. We also spoke about the different forms of treatment that can be utilized and what group members see can benefit from this in addition to medications or instead of medications. Neris Monet participated in group minimally. She would answer direct questions but she was limited in what she would say. She was attentive during group session.       Behavioral Health Daily Check In form revealed that pt is not experiencing SI/HI thoughts or plans, remaining abstinent from drugs and alcohol, and reports goal today of \"eating better and being more social.\"

## 2022-12-14 NOTE — BH NOTES
[facility] Outpatient Program  Group Therapy    Date of Service: 12/14/2022  Start time: 1100  Stop time: 1150  Type of session: Therapy Group    Problem number: 1Dep. F33.2    Short term goal (STG): F Pt will work to practice skills of optimism and being able to see challenges and see areas she can work to improve rather than engage in self-defeating self-talk     Intervention/techniques: Informed, Validated/Supported, Prompted/Cued, Listened/Empathized, Observed/Monitored, Clarified, and Reinforced    Patient mental status/affect: Anxious, Congruent, and Preoccupied    Patient behavior/appearance: Attentive, Cooperative, and Withdrawn/Quiet    Special patient treatment accommodations provided (describe): None    Patient response and progress towards goals: Focus of session was based on an idea developed by Bert Cm (possibility therapy) and Hever Fowler, Ph.D. (solution-focused therapy). It is called Do One Thing Different and it is supported by 12 step's motto and definition of insanity is doing the same thing over and over again and expecting different results.   I spoke with group members about the idea is to think about the things you do in a problem situation and change any part you can. We spoke about how we can change our reaction, our attitude, our tone of voice, our behavior, we can think about what others do to help themselves and what works and what has worked for us in the past.  We also spoke about picturing life and what we would feel like without this problem in it. I asked group members to identify one small thing that they can do different and how it can impact their life. Pt listened to the group discussion and the concerns of another member. Pt was concerned about other pt but became upset by this pt's display of depression, grief and agitation. She left the group toward the end. I checked on her and she stated she was ok.

## 2022-12-15 NOTE — BH NOTES
[facility]  Group Therapy  Treatment Plan Review         TREATMENT PLAN REVIEW REVIEW DATE:12/9/2022      summary of Ongoing issues with Symptoms/Functional Impairments Indicating Need for Continued Stay: Pt continues to come to group on a regular basis. She participtes in the activities and engages with the other members. She shares frequently that she feels alone and has started to experience anxiety related to living alone. She has thoughts regarding her living arrangements and is entertainiing her other options. She still experiences depression but has made an effort to get out and socialize as often as she can. TREATMENT & DISCHARGE PLANNING CHANGES    Modality or Intervention  Changes Pt will attend group three days a week for three hours per day   Psychotropic Medication Changes No changes since last review   Discharge  Planning or Target Date  Changes 2/28/2023   New Issues none     TREATMENT TEAM SIGNATURE / DATE   I have participated in the development of this plan of treatment and agreed to its implementation. Client Signature PRINTED Name                        Date & Time       Family / Legal Representative Signature (If Applicable) PRINTED Name & Relationship     Date & Time   Therapist Signature PRINTED Name & Nelly Glasgow LCSW Date & Time       Therapist Signature PRINTED Name & Yimi Snow LCSW   Date & Time       Other Signature PRINTED Name & Credential     Date & Time   Other Signature PRINTED Name & Credential or Relationship     Date & Time       PHYSICIAN CERTIFICATION OF THE LEVEL OF CARE:  I certify that these outpatient behavioral health services are medically necessary to improve and maintain the patient's condition and functional level and prevent relapse or admission to a higher level of care.     Physician Yulisa Yadav Dr. Laura Lawler M.D. Date & Time             Treatment Plan Review - Specific Goal Progress Review Date 12/9/2022    GOAL STATUS CODES:   M = Met     C = Continued     D/C = Discontinued     R = Revised     N = New Goal   PROBLEM  NUMBER: 1 PROBLEM: Depression   STG Goal Status Comments (Progress or Lack of Progress)   A C Pt is still working to develop counter thoughts Pt will identify and share with group thoughts that contribute to depressed mood and outlook and identify a counter thought By 1/9/2023   B C   Pt is beginning to share with group. Pt will identify and share with group behaviors that contribute to depressed mood and identify an alternative behavior   By 1/4/2023   C    M Pt identifies triggers to recent setbacks   D C Pt needs more time see how actions impact her emotionally, physically and cognitively. Pt will identify choices made in the past week and how they impact emotionally, physically and cognitively By 1/6/2023   PROBLEM  NUMBER:  PROBLEM:     STG Goal Status Comments (Progress or Lack of Progress)   E  C Pt is beginning to acknowledge her lonely and how it affects her mood Pt will identify times she has been able to use willingness to turn towards emotions and symptoms and how acceptance of the moment has helped her get through crises By 1/3/2023   F    C Pt attempts to state positives regarding herself and her life when she is feeling depressed. She feels this helps her mood Pt will work to practice skills of optimism and being able to see challenges and see areas she can work to improve rather than engage in self-defeating self-talk By 1/4/2023   G C Pt needs more time with this goal as she is having a hard time accepting her limitations now due to her age.  Pt will identify to group members what she sees as the aspects of her daily life that she does have control over and can make a positive impact on1/5/2023   H M Pt has returned to [employer] events and is going places when asked     PROBLEM  NUMBER: 2 PROBLEM:    STG Goal Status Comments (Progress or Lack of Progress)        I C Pt continues to develop strategies to recognize her triggers now Pt will develop specific strategies and self-talk that can help her increase her awareness of emotional triggers and how she can cope specifically with emotional dependence.   By 1/9/2023     Archana Green Pt will address anxiety provoking issues within 24 hours to prevent rumination and further physical, emotional and medical symptoms By1/4/2023               []- Check ONLY if Problem/Goal Progress Comments  Continued on Another Page

## 2022-12-16 ENCOUNTER — HOSPITAL ENCOUNTER (OUTPATIENT)
Dept: BEHAVIORAL/MENTAL HEALTH | Age: 87
Discharge: HOME | End: 2022-12-16
Payer: MEDICARE

## 2022-12-16 PROCEDURE — 90853 GROUP PSYCHOTHERAPY: CPT

## 2022-12-16 NOTE — BH NOTES
[facility] Outpatient Program  Group Therapy    Date of Service: 12/16/2022  Start time: 1100  Stop time: 1150  Type of session: Therapy Group    Problem number: 1Dep. F33.2    Short term goal (STG): D Pt will identify choices made in the past week and how they impact emotionally, physically and cognitively     Intervention/techniques: Informed, Validated/Supported, Listened/Empathized, Observed/Monitored, Clarified, and Reinforced    Patient mental status/affect: Calm and Congruent    Patient behavior/appearance: Attentive, Cooperative, and Motivated    Special patient treatment accommodations provided (describe): None    Patient response and progress towards goals: Focus of session was on handout titled 10 Rules for Happy Living.   We reviewed the rules which included: what other people think of you is none of your business, stop beating yourself up, you are a work in progress, don't compare your beginning to someone else's middle, don't stumble over something behind you, don't put the key to happiness in someone else's pocket, assume the good and doubt the bad, worrying will never change the outcome, and be yourself, everyone else is taken. We spoke about which rules group members can use and how ti can improve their day to day life emotionally, physically, and mentally if they follow this rule. Pt participated in the group activity and read aloud from the worksheet. Pt stated that she was feeling better emotionally today even thought she didn't feel good. She was supportive of another member discussing relationships and pt shared her experiences.  She stated that even within the last couple months of being back in group she has learned things about herself and her needs

## 2022-12-16 NOTE — BH NOTES
[facility] Outpatient Program  Group Therapy      Date of service: 12/16/2022  Start time: 1000  Stop time: 1050    Type of session: Therapy Group    Problem number: 1. Dep F 33.2    Short term goal (STG): F.  Pt will work to practice skills of optimism and being able to see challenges and see areas she can work to improve rather than engage in self-defeating self-talk    Intervention/techniques: Informed, Challenged, Modeled/Rehearsed, Prompted/Cued, Listened/Empathized, Promoted Peer Support, and Provided Feedback  Patient mental status/affect: Anxious, Congruent, and Preoccupied  Patient behavior/appearance: Neatly Groomed, Attentive, Cooperative, and Needed Prompting    Special patient treatment accommodations provided (describe): None    Patient response and progress towards goals: Focus of session was on the qualities and assets that we can possess in order to have a successful recovery. We focused this session on the importance of having a secure base which means that we have our basic needs met and that we have access to and utilize help when it is necessary. We spoke about how we also have to have a sense of self and positive self esteem and we spoke about specific ways in which we have grown in self esteem through the course of this treatment. We spoke about the need and importance of supportive relationships and that we benefit greatly from having others believe in us and that we have the potential to recover. We also spoke about the importance of feeling empowered to make good decisions for ourselves and can work through problems when they arise. Gale Alejandra participated in group well with prompting. She spoke about how she has not been \"crying a lot\" but she has been overwhelmed by a peer in group who is very negative and loud at times. \"  She spoke about how her goal is to try to stay connected as much as she can and she knows she has good relationships with many people which she is grateful for. 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 \"

## 2022-12-16 NOTE — BH NOTES
[facility] Outpatient Program  Group Therapy    Date of Service: 12/16/2022  Start time: 1200  Stop time: 1250  Type of session: Therapy Group    Problem number: 1. Dep F 33.2    Short term goal (STG): J.  Pt will address anxiety provoking issues within 24 hours to prevent rumination and further physical, emotional and medical symptoms    Intervention/techniques: Informed, Modeled/Rehearsed, Prompted/Cued, Promoted Peer Support, and Provided Feedback    Patient mental status/affect: Calm, Congruent, Preoccupied, and Worried    Patient behavior/appearance: Neatly Groomed, Attentive, Cooperative, and Needed Prompting    Special patient treatment accommodations provided (describe): None    Patient response and progress towards goals: Focus of session was on identifying healthy plans for the weekend. We spoke about setting goals and intentions for two things this weekend and they are identifying at least one thing that can be done this weekend to help ourselves and one thing that we won't do this weekend to help ourselves. I spoke with group members about the importance of picking things to do or accomplish that are not in their normal routine and are steps up in terms of activities or behaviors that they want to incorporate in their lives. We also spoke about how what they choose to not do needs to be something that they have the desire to give up in order to better themselves. I shared that if these goals can be accomplished, they can come back next week with energy and motivation to keep making positive changes. Group members shared their personal goals and objectives for the weekend. Jose De Jesus Sanchez participated in group with prompting. She spoke about how her goal is to clean up her house some this weekend as she recognizes that is partly why she is not feeling comfortable at home.  After talking about that, we spoke about her committing to go to [employer] this weekend and she said she might be too tired which led to a discussion about balancing things out. We spoke about how connecting to others seems to give her the most energy.

## 2022-12-19 ENCOUNTER — HOSPITAL ENCOUNTER (OUTPATIENT)
Dept: BEHAVIORAL/MENTAL HEALTH | Age: 87
Discharge: HOME | End: 2022-12-19
Payer: MEDICARE

## 2022-12-19 PROCEDURE — 90853 GROUP PSYCHOTHERAPY: CPT

## 2022-12-19 NOTE — BH NOTES
[facility] Out[facility] Program  Group Therapy    Date of Service: 12/19/2022  Start time: 1200  Stop time: 1250  Type of session: Therapy Group    Problem number: 1. Dep F 33.2    Short term goal (STG): J. Pt will address anxiety provoking issues within 24 hours to prevent rumination and further physical, emotional and medical symptoms    Intervention/techniques: Informed, Validated/Supported, Modeled/Rehearsed, Prompted/Cued, Promoted Peer Support, and Provided Feedback    Patient mental status/affect: Calm, Congruent, and Preoccupied    Patient behavior/appearance: Neatly Groomed, Attentive, Cooperative, and Needed Prompting    Special patient treatment accommodations provided (describe): None    Patient response and progress towards goals: Focus of session was on issues and emotional baggage from the past and how it may continue to be impacting our functioning today. We discussed how we can have anger towards family members and that may have been what has protected us from getting hurt again. I spoke with group about the need to accept other people's limitations and how if we don't accept that, then we get continuously hurt and angry towards others. We spoke about how we have to move forward and that may include accepting a need to forgive others and to accept them as they are. If we cannot be around them much, then that is how they take care of themselves. Group members were asked to share something that they can let go of today from the past so they can lighten their burden one step at a time. Laura Bullard participated in group with prompting. She was quiet but she was struggling with ideas in topic. She listened and engaged but she does not think there is much from her past that she carries now. Her struggle is accepting how she is changing as she is getting older.

## 2022-12-19 NOTE — BH NOTES
[facility] Outpatient Program  Group Therapy    Date of Service: 12/19/2022  Start time: 1100  Stop time: 1150  Type of session: Therapy Group    Problem number: 1Dep. F33.2    Short term goal (STG): I Pt will develop specific strategies and self-talk that can help her increase her awareness of emotional triggers and how she can cope specifically with emotional dependence    Intervention/techniques: Informed, Validated/Supported, and Observed/Monitored    Patient mental status/affect: Calm and Congruent    Patient behavior/appearance: Attentive, Cooperative, and Withdrawn/Quiet    Special patient treatment accommodations provided (describe): None    Patient response and progress towards goals: Focus of session was on Self Respect Essentials. We spoke about why self-respect is so important to recovery in mental health and substance abuse. We discussed the ideas of having a sense of strengths and weaknesses, that we are entitled to be treated with respect, acceptance of self, sense of self is strong enough to withstand bumps and bruises, that we can feel worthwhile even when we don't know something, and that we don't bolster our self-respect at the expense of others. Group members were asked to share their thoughts and feelings about this and how they can increase their ability to respect themselves. Pt listened to the group discussion and concerns stated by other members. She was attentive and cooperative but remained quiet for most of the session.

## 2022-12-19 NOTE — BH NOTES
[facility] Outpatient Program  Group Therapy      Date of service: 12/19/2022  Start time: 1000  Stop time: 1050    Type of session: Therapy Group    Problem number: 1. Dep F 33.2    Short term goal (STG): A.   Pt will identify and share with group thoughts that contribute to depressed mood and outlook and identify a counter thought    Intervention/techniques: Informed, Challenged, Modeled/Rehearsed, Listened/Empathized, and Provided Feedback  Patient mental status/affect: Calm, Congruent, and Preoccupied  Patient behavior/appearance: Neatly Groomed, Attentive, Cooperative, and Needed Prompting    Special patient treatment accommodations provided (describe): None    Patient response and progress towards goals: Focus of session was on discussing three different types of communication and identifying which ones we may engage in and the success it gives us. We spoke about what passive and aggressive communication is and how it proves to be ineffective for us to get what we need or want or to make our relationships better and healthier. We spoke about what we need to be assertive about can be broken down into different categories such as how to say no at the right time in the right way, telling people how you feel, or to get information or clarification of what you need or want. We spoke about what improvements can be made in our communication patterns to enrich our well being and our relationships. Brianne Cline participated in group with prompting. She spoke about how she ended up having a good weekend and that she has been \"thinking about you whenever I had success about what I was doing. \"  She spoke about how she has had success in going to [employer] and that she felt good about it. Behavioral Health Daily Check In form revealed that pt is not experiencing SI/HI thoughts or plans, remaining abstinent from drugs and alcohol, and reports goal today of \"maintaining the positive progress that have made. \"

## 2022-12-21 ENCOUNTER — HOSPITAL ENCOUNTER (OUTPATIENT)
Dept: BEHAVIORAL/MENTAL HEALTH | Age: 87
Discharge: HOME | End: 2022-12-21
Payer: MEDICARE

## 2022-12-21 PROCEDURE — 90853 GROUP PSYCHOTHERAPY: CPT

## 2022-12-21 PROCEDURE — 99213 OFFICE O/P EST LOW 20 MIN: CPT | Performed by: PSYCHIATRY & NEUROLOGY

## 2022-12-21 NOTE — BH NOTES
[facility] Outpatient Program  Group Therapy    Date of Service: 12/21/2022  Start time: 1100  Stop time: 1150  Type of session: Therapy Group    Problem number: 1. Dep F 33.2    Short term goal (STG): G. Pt will identify to group members what she sees as the aspects of her daily life that she does have control over and can make a positive impact on    Intervention/techniques: Informed, Reframed, Modeled/Rehearsed, Promoted Peer Support, and Provided Feedback    Patient mental status/affect: Calm, Congruent, and Preoccupied    Patient behavior/appearance: Neatly Groomed, Attentive, Cooperative, and Needed Prompting    Special patient treatment accommodations provided (describe): None    Patient response and progress towards goals: Focus of session was based on information from website [employer] com which created a useful list of lessons from cognitive behavioral therapy. I reviewed the lessons covered which are situations cannot create feelings, only your thoughts and the situation can, notice unhelpful thoughts and replace them with helpful thoughts, take note of which behaviors make you feel better or worse, turn your focus to the task or the environment, avoidance makes anxiety worse, act the way you want to be, no matter how you feel, and let go of expectations. Group members were asked to share their thoughts on which lesson they can learn to apply and why that one may be able to help them. Slick Adame participated in group with prompting. She spoke about how she has been more aware of how much being alone can impact her mood and her outlet. She is aware of the need to keep trying and finding a good balance in spending time alone and being with others as much as she can. She recognizes she is a person who benefits from being around others.

## 2022-12-21 NOTE — BH NOTES
[facility] Outpatient Program  Group Therapy      Date of service:12/21/2022  Start time: 1000  Stop time: 1050    Type of session: Therapy Group    Problem number: 1Dep. F33.2    Short term goal (STG): E Pt will identify times she has been able to use willingness to turn towards emotions and symptoms and how acceptance of the moment has helped her get through crises`    Intervention/techniques: Informed, Validated/Supported, Listened/Empathized, Observed/Monitored, Reinforced, and Provided Feedback  Patient mental status/affect: Calm and Congruent  Patient behavior/appearance: Attentive, Cooperative, and Withdrawn/Quiet    Special patient treatment accommodations provided (describe): None    Patient response and progress towards goals: Focus of session was based on handout from [employer] on exploring our personal strengths. We spoke about how this is a good strategy when we are dealing with a lot of critical self-talk and negative self-esteem. We discussed how this can help us to see what our strengths are and how we can use them in relationships and in our daily life. Group members were encouraged to see how they can use their strengths in new ways and help them see their potential to help them build up more positive self-talk and also improve their day to day functioning. Pt listened to the group activity and discussion. She appeared to be relaxing today and enjoying the company of others. She was quiet for most of the session but was attentive and smiling.      Behavioral Health Daily Check In form revealed that pt is not experiencing SI/HI thoughts or plans, remaining abstinent from drugs and alcohol, and reports goal today of  being with nice folks who tell me nice things to get through the day

## 2022-12-21 NOTE — BH NOTES
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    Short term goal (STG): B Pt will identify and share with group behaviors that contribute to depressed mood and identify an alternative behavior     Intervention/techniques: Informed, Validated/Supported, Observed/Monitored, and Reinforced    Patient mental status/affect: Calm and Congruent    Patient behavior/appearance: Attentive, Cooperative, and Withdrawn/Quiet    Special patient treatment accommodations provided (describe): None    Patient response and progress towards goals: Focus of session was on developing self monitoring skills to help prevent relapses and develop skills to be better able to manage circumstances and triggers to work through periods of depression, anxiety, or brad. We spoke about why it is important to manage and monitor symptoms as well as circumstances. We spoke monitoring mood and were we feeling low or high so we can see if we can notice any mood fluctuations and what they may be about. We spoke about symptom monitoring as well and when we need to take some action and/or get some intervention. We spoke about how to plan for early intervention and be specific about what to do when symptoms are present. We spoke about what we can do to develop specific plans of action for relapse prevention and how others around us can help. Pt participated in the group activity and listened to the concerns expressed by another member. The group shared strategies to help deal with anxiety and worry.   We reviewed a strategy  that list steps to assist you to work through your worry or let it go

## 2022-12-21 NOTE — PROGRESS NOTES
SOP PROGRESS NOTE    INTERVAL HISTORY/FINDINGS:  States she's been feeling \"fair\" overall, although she also reports feeling \"better\" in many ways. She's sleeping better and beginning to eat a little better, as well. She's also getting more things done, and notices that she's even enjoying some things again. This time of year is usually much more difficult for her as well, but overall she's pleased with how much better she's starting to feel. No SE's with the Effexor XR and she agrees with staying on it. MEDICATIONS:  Current Outpatient Medications   Medication Sig    venlafaxine-SR (EFFEXOR-XR) 150 mg capsule Take 1 Capsule by mouth daily. Januvia 100 mg tablet TAKE 1 TABLET BY MOUTH EVERY DAY    clopidogreL (PLAVIX) 75 mg tab TAKE 1 TABLET BY MOUTH EVERY DAY    azithromycin (ZITHROMAX) 250 mg tablet Take 2 tablets today, then take 1 tablet daily    albuterol (PROVENTIL HFA, VENTOLIN HFA, PROAIR HFA) 90 mcg/actuation inhaler Take 2 Puffs by inhalation every four to six (4-6) hours as needed for Wheezing. levocetirizine (XYZAL) 5 mg tablet TAKE 1 TABLET BY MOUTH DAILY AS NEEDED FOR ALLERGY SYMPTOMS    metoprolol succinate (TOPROL-XL) 50 mg XL tablet Take 1 Tablet by mouth daily. atorvastatin (LIPITOR) 80 mg tablet Take 1 Tablet by mouth daily. glucose blood VI test strips (OneTouch Ultra Test) strip CHECK GLUCOSE ONCE DAILY. DX: E11.9    fluticasone propionate (FLONASE) 50 mcg/actuation nasal spray 2 Sprays by Both Nostrils route as needed for Allergies. ONLY AS NEEDED (Patient not taking: Reported on 9/1/2022)    aspirin delayed-release 81 mg tablet Take 1 Tablet by mouth daily. lisinopriL (PRINIVIL, ZESTRIL) 20 mg tablet Take 1 Tablet by mouth two (2) times a day. acetaminophen (TYLENOL) 325 mg tablet Take  by mouth every four (4) hours as needed for Pain. No current facility-administered medications for this encounter.      MENTAL STATUS UPDATE: (Positives in [place])  Appearance:   Neat Disheveled  Odiferous   Appropriate  Orientation:   Time  Place  Person  Speech:   Coherent  Pressured  Garbled/Slurred  Loud   Soft  Mute  Memory:   Intact  Impaired (Recent  Remote)--Mild  Severe  Mood:   Euthymic  Depressed-mild  Anxious-mild  Elated  Affect:    Appropriate  Inappropriate  Labile  Blunted  Flat-mild  Thought Content:   Logical  Goal directed  Paranoid  Delusional  Illogical IOR  SI  HI  Thought Process:   Coherent/linear  Tangential  Loose/Disorganized   Hallucinations:   None  Auditory  Visual  Tactile  Olfactory  Gustatory  Insight:   Good  Fair  Impaired      Judgment:   Good  Fair  Impaired     CONTINUED NEED FOR TREATMENT: (Positives in [place])  1. Continued psychiatric symptoms causing impairment in IDL  or functioning  2. Continued non-compliance with meds resulting in decompensation  3. High level of debilitation and symptoms with inadequate support  4. Continued need for structured care to adjust medications  5. Continued presence of debilitating symptoms  6. Continued presence of risk factors     CONTINUED NEED FOR GROUP PSYCHOTHERAPY TO ADDRESS THE FOLLOWING: (Positives in [place])  Psychiatric symptom management       Psychiatric relapse prevention    Anxiety management   Self-esteem issues      Poor decision making       Recent decompensation       Safety issues   Anger management     Self care/ADL's     Med/treatment compliance      Impaired boundaries/relationships   Assertiveness      Grief/loss resolution       Guilt/shame issues     Realistic goal setting     DIAGNOSTIC IMPRESSION:  1. 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 Continue the Venlafaxine  mg daily--has 3- months of refills (90 day)  2. Continue SOP treatment 3 x/week.

## 2022-12-23 ENCOUNTER — HOSPITAL ENCOUNTER (OUTPATIENT)
Dept: BEHAVIORAL/MENTAL HEALTH | Age: 87
Discharge: HOME | End: 2022-12-23
Payer: MEDICARE

## 2022-12-23 PROCEDURE — 90853 GROUP PSYCHOTHERAPY: CPT

## 2022-12-23 NOTE — BH NOTES
[facility]  Group Therapy      Date of service: 12/23/2022  Start time: 1000  Stop time: 1050    Type of session: Therapy Group    Problem number: 1. Dep F 33.2    Short term goal (STG): F.  Pt will work to practice skills of optimism and being able to see challenges and see areas she can work to improve rather than engage in self-defeating self-talk    Intervention/techniques: Informed, Modeled/Rehearsed, Prompted/Cued, and Provided Feedback  Patient mental status/affect: Anxious, Congruent, and Preoccupied  Patient behavior/appearance: Neatly Groomed, Attentive, Cooperative, Needed Prompting, and Withdrawn/Quiet    Special patient treatment accommodations provided (describe): None    Patient response and progress towards goals: Focus of session was based on information from The CBT Toolbox by Dr. Lulu Hernandez. We discussed identifying autopilot unhealthy coping skills specifically when feeling a high level of stress. We spoke about having the awareness of our go to unhealthy skills can help us try to work through those moments. We also spoke about recognition of consequences of using those skills in the past to help get through stressful events. We also spoke about determining one healthy go to skill that group members are willing to put into action when under stress. Daina Wright participated in group with prompting. She was quiet but it was a talkative group today. She spoke about how she continues to feel more hopeful about her recovery and being able to feel good again. She does understand that moods and feelings will continue to come and go. Behavioral Health Daily Check In form revealed that pt is not experiencing SI/HI thoughts or plans, remaining abstinent from drugs and alcohol, and reports goal today of  \"socializing and praying. \"

## 2022-12-23 NOTE — BH NOTES
[facility] Outpatient Program  Group Therapy    Date of Service: 12/23/2022  Start time: 1200  Stop time: 1250  Type of session: Therapy Group    Problem number: 1. Dep F 33.2    Short term goal (STG): J.  Pt will address anxiety provoking issues within 24 hours to prevent rumination and further physical, emotional and medical symptoms    Intervention/techniques: Informed, Modeled/Rehearsed, Listened/Empathized, and Promoted Peer Support    Patient mental status/affect: Anxious and Congruent    Patient behavior/appearance: Neatly Groomed, Attentive, Cooperative, Needed Prompting, and Withdrawn/Quiet    Special patient treatment accommodations provided (describe): None    Patient response and progress towards goals: Focus of session was based on information for the book The Daily Stoic about how we often take for granted what we have that are our resources and strengths. I shared a passage from the book about holding an understanding and appreciation of those things you are capable of and knowing not everyone has those same abilities. We take for granted those things others wouldn't even think to dream of.   We spoke about how this understanding and awareness can positively impact our everyday lives. Zabrina Nick participated in group with prompting. She continued to be quiet but she was easy to engage with questions. She spoke about how she is aware that she can focus on being grateful that her mind is still very clear and that this leads to emotions but she can see it is a gift to have a mind \"that can make decisions and even recognize what I am feeling. \"

## 2022-12-23 NOTE — BH NOTES
[facility] Outpatient Program  Group Therapy    Date of Service: 12/23/2022  Start time: 1100  Stop time: 1150  Type of session: Therapy Group    Problem number:1Dep   F33.2    Short term goal (STG): D Pt will identify choices made in the past week and how they impact emotionally, physically and cognitively     Intervention/techniques: Informed, Validated/Supported, Listened/Empathized, Observed/Monitored, and Reinforced    Patient mental status/affect: Calm and Congruent    Patient behavior/appearance: Attentive, Cooperative, and Withdrawn/Quiet    Special patient treatment accommodations provided (describe): None    Patient response and progress towards goals: Focus of session was based on handout called [address] for Revitalizing and Energizing Yourself.   We reviewed the list of coping skills that people can use to do the following: develop self-understanding, improve your mind, develop healthy attitudes, control your emotions, strengthen your body, improve personal relationships, lift your spirit, improve your environment, and live a healthy lifestyle. We spoke about which coping skills they are using successfully and which ones they can see will benefit from using and how they can get started. Pt participated in the group activity and discussion. She shared things that she feels like she is doing that are positive in her life.  She is pleased that she is able to recognize these and also has a few things she would like to try in the future

## 2022-12-28 ENCOUNTER — HOSPITAL ENCOUNTER (OUTPATIENT)
Dept: BEHAVIORAL/MENTAL HEALTH | Age: 87
Discharge: HOME | End: 2022-12-28
Payer: MEDICARE

## 2022-12-28 PROCEDURE — 90853 GROUP PSYCHOTHERAPY: CPT

## 2022-12-28 NOTE — BH NOTES
[facility] Out[facility]  Group Therapy    Date of Service: 12/28/2022  Start time: 1100  Stop time: 1150  Type of session: Therapy Group    Problem number: 1Dep. F33.2    Short term goal (STG): D Pt will identify choices made in the past week and how they impact emotionally, physically and cognitively     Intervention/techniques: Informed, Validated/Supported, Guided, Listened/Empathized, Observed/Monitored, and Reinforced    Patient mental status/affect: Calm, Congruent, and Flat    Patient behavior/appearance: Attentive, Cooperative, and Withdrawn/Quiet    Special patient treatment accommodations provided (describe): None    Patient response and progress towards goals: Focus of session was on a strategy to help manage our communication and what we say when we are in emotional turmoil. We spoke about sometimes to avoid reacting and speaking before we think, we need to consider keeping our mouth shut when we are in the heat of anger, when you don't have all the facts, when you haven't verified the story, if the words will end up being a poor reflection of you, when you will be ashamed later, when you are feeling critical, when it is time to listen, and if you may have to eat your words later. We spoke about strategies to help with stop and think before we speak to help eliminate unnecessary further problems to resolve. Pt participated in the group activity and discussion when prompted. She shared that she had been confused again about her doctors appointment and it was not yesterday but Thursday. She stated that she did not feel very well today.  Pt also acknowledged that she is feeling lonely

## 2022-12-28 NOTE — BH NOTES
[facility] Outpatient Program  Group Therapy      Date of service:12/28/2022  Start time: 1000  Stop time: 1050    Type of session: Therapy Group    Problem number: 1Dep. F33.2    Short term goal (STG): G Pt will identify to group members what she sees as the aspects of her daily life that she does have control over and can make a positive impact on    Intervention/techniques: Informed, Validated/Supported, Observed/Monitored, and Reinforced  Patient mental status/affect: Calm and Congruent  Patient behavior/appearance: Attentive, Cooperative, and Withdrawn/Quiet    Special patient treatment accommodations provided (describe): None    Patient response and progress towards goals: Focus of session was a review of the weekend and successes that can be identified in recognizing and managing triggers to negative feeling states. We spoke about what was done in managing triggers that led to success and what was done or not done that led to struggles in maintaining and getting through difficult moments. We also spoke about goals to work towards this week and what is reasonable to accomplish by the end of the week. Pt listened to the group discussion and concerns of others. She was attentive and cooperative but remained quiet for most of the session.      Behavioral Health Daily Check In form revealed that pt is not experiencing SI/HI thoughts or plans, remaining abstinent from drugs and alcohol, and reports goal today of eating better, sleeping and praying

## 2022-12-29 ENCOUNTER — OFFICE VISIT (OUTPATIENT)
Dept: FAMILY MEDICINE CLINIC | Age: 87
End: 2022-12-29
Payer: MEDICARE

## 2022-12-29 ENCOUNTER — HOSPITAL ENCOUNTER (OUTPATIENT)
Dept: GENERAL RADIOLOGY | Age: 87
Discharge: HOME | End: 2022-12-29
Payer: MEDICARE

## 2022-12-29 VITALS
DIASTOLIC BLOOD PRESSURE: 82 MMHG | BODY MASS INDEX: 28.48 KG/M2 | TEMPERATURE: 98.4 F | WEIGHT: 177.2 LBS | HEART RATE: 92 BPM | HEIGHT: 66 IN | OXYGEN SATURATION: 97 % | RESPIRATION RATE: 18 BRPM | SYSTOLIC BLOOD PRESSURE: 138 MMHG

## 2022-12-29 DIAGNOSIS — N18.30 TYPE 2 DIABETES MELLITUS WITH STAGE 3 CHRONIC KIDNEY DISEASE, WITHOUT LONG-TERM CURRENT USE OF INSULIN, UNSPECIFIED WHETHER STAGE 3A OR 3B CKD (HCC): ICD-10-CM

## 2022-12-29 DIAGNOSIS — G31.84 MCI (MILD COGNITIVE IMPAIRMENT): ICD-10-CM

## 2022-12-29 DIAGNOSIS — F32.1 MODERATE SINGLE CURRENT EPISODE OF MAJOR DEPRESSIVE DISORDER (HCC): ICD-10-CM

## 2022-12-29 DIAGNOSIS — I10 ESSENTIAL HYPERTENSION: ICD-10-CM

## 2022-12-29 DIAGNOSIS — J44.9 CHRONIC OBSTRUCTIVE PULMONARY DISEASE, UNSPECIFIED COPD TYPE (HCC): ICD-10-CM

## 2022-12-29 DIAGNOSIS — E11.22 TYPE 2 DIABETES MELLITUS WITH STAGE 3 CHRONIC KIDNEY DISEASE, WITHOUT LONG-TERM CURRENT USE OF INSULIN, UNSPECIFIED WHETHER STAGE 3A OR 3B CKD (HCC): ICD-10-CM

## 2022-12-29 DIAGNOSIS — R06.2 WHEEZING: Primary | ICD-10-CM

## 2022-12-29 DIAGNOSIS — G45.9 TIA (TRANSIENT ISCHEMIC ATTACK): ICD-10-CM

## 2022-12-29 DIAGNOSIS — Z91.09 ENVIRONMENTAL ALLERGIES: ICD-10-CM

## 2022-12-29 LAB
ANION GAP SERPL CALC-SCNC: 3 MMOL/L (ref 5–15)
BUN SERPL-MCNC: 15 MG/DL (ref 6–20)
BUN/CREAT SERPL: 13 (ref 12–20)
CALCIUM SERPL-MCNC: 9.6 MG/DL (ref 8.5–10.1)
CHLORIDE SERPL-SCNC: 103 MMOL/L (ref 97–108)
CO2 SERPL-SCNC: 34 MMOL/L (ref 21–32)
CREAT SERPL-MCNC: 1.14 MG/DL (ref 0.55–1.02)
EST. AVERAGE GLUCOSE BLD GHB EST-MCNC: 171 MG/DL
GLUCOSE SERPL-MCNC: 158 MG/DL (ref 65–100)
HBA1C MFR BLD: 7.6 % (ref 4–5.6)
POTASSIUM SERPL-SCNC: 4.8 MMOL/L (ref 3.5–5.1)
SODIUM SERPL-SCNC: 140 MMOL/L (ref 136–145)

## 2022-12-29 PROCEDURE — G9717 DOC PT DX DEP/BP F/U NT REQ: HCPCS | Performed by: NURSE PRACTITIONER

## 2022-12-29 PROCEDURE — 3074F SYST BP LT 130 MM HG: CPT | Performed by: NURSE PRACTITIONER

## 2022-12-29 PROCEDURE — G8417 CALC BMI ABV UP PARAM F/U: HCPCS | Performed by: NURSE PRACTITIONER

## 2022-12-29 PROCEDURE — 1090F PRES/ABSN URINE INCON ASSESS: CPT | Performed by: NURSE PRACTITIONER

## 2022-12-29 PROCEDURE — 3051F HG A1C>EQUAL 7.0%<8.0%: CPT | Performed by: NURSE PRACTITIONER

## 2022-12-29 PROCEDURE — 99214 OFFICE O/P EST MOD 30 MIN: CPT | Performed by: NURSE PRACTITIONER

## 2022-12-29 PROCEDURE — 3078F DIAST BP <80 MM HG: CPT | Performed by: NURSE PRACTITIONER

## 2022-12-29 PROCEDURE — 36415 COLL VENOUS BLD VENIPUNCTURE: CPT | Performed by: NURSE PRACTITIONER

## 2022-12-29 PROCEDURE — G8536 NO DOC ELDER MAL SCRN: HCPCS | Performed by: NURSE PRACTITIONER

## 2022-12-29 PROCEDURE — 1123F ACP DISCUSS/DSCN MKR DOCD: CPT | Performed by: NURSE PRACTITIONER

## 2022-12-29 PROCEDURE — G8427 DOCREV CUR MEDS BY ELIG CLIN: HCPCS | Performed by: NURSE PRACTITIONER

## 2022-12-29 PROCEDURE — 71046 X-RAY EXAM CHEST 2 VIEWS: CPT

## 2022-12-29 PROCEDURE — 1101F PT FALLS ASSESS-DOCD LE1/YR: CPT | Performed by: NURSE PRACTITIONER

## 2022-12-29 PROCEDURE — G8400 PT W/DXA NO RESULTS DOC: HCPCS | Performed by: NURSE PRACTITIONER

## 2022-12-29 RX ORDER — FLUTICASONE FUROATE AND VILANTEROL 100; 25 UG/1; UG/1
1 POWDER RESPIRATORY (INHALATION) DAILY
Qty: 60 EACH | Refills: 0 | Status: SHIPPED | OUTPATIENT
Start: 2022-12-29

## 2022-12-29 RX ORDER — PREDNISONE 10 MG/1
TABLET ORAL
Qty: 21 TABLET | Refills: 0 | Status: SHIPPED | OUTPATIENT
Start: 2022-12-29

## 2022-12-29 NOTE — PROGRESS NOTES
Subjective:     CC: wheezing    Loly Flannery is a 82 y.o. female who presents today with c/o wheezing and SOB and cough for over 3 months. This is also a routine 3 month check up for diabetes, HTN, and other chronic medical issues. HX of tobacco use with suspected COPD  She quit smoking 5 months ago! Reports chronic cough, SOB, and wheezing. PFTs were ordered at the last OV but never completed. She was prescribed a ZPACK and prednisone but symptoms have persisted. She has not been using her Albuterol inhaler like she is supposed to. Will get CXR today and start her on another round of prednisone and a Breo inhaler. Allergies  She takes Xyzol daily. Reports ongoing runny nose. She is non-compliant with Flonase daily. Type 2 diabetes  Lab Results   Component Value Date/Time    Hemoglobin A1c 7.1 (H) 09/27/2022 11:53 AM     She is on Januvia 100mg daily. Has gained almost 20 pounds since the last visit 3 months ago since she quit smoking. Home BS have not been checked. Admits to eating 5 slices of bread sometimes with dinner. Also loves potatoes. She was educated on the diabetic diet today. Will check A1C today. CKD, stage 3  Lab Results   Component Value Date/Time    Creatinine 1.17 (H) 09/05/2022 08:28 AM     She avoids NSAIDS. HTN  BP today at goal.  She is on Metoprolol and Lisinopril. She denies CP, SOB, dizziness, or swelling. HLD  Lab Results   Component Value Date/Time    Cholesterol, total 129 03/17/2022 08:22 AM    HDL Cholesterol 51 03/17/2022 08:22 AM    LDL, calculated 51.8 03/17/2022 08:22 AM    VLDL, calculated 26.2 03/17/2022 08:22 AM    Triglyceride 131 03/17/2022 08:22 AM    CHOL/HDL Ratio 2.5 03/17/2022 08:22 AM       Well controlled with Lipitor 80mg daily. TIA  She has had multiple TIA's over the past couple of years. She is on Plavix. She does have a hx of GI bleed but without the Plavix she kept having TIAs. Denies any black or bloody stools.        Lab Results Component Value Date/Time    WBC 7.4 09/05/2022 08:28 AM    Hemoglobin (POC) 12.6 09/27/2022 04:44 PM    HGB 13.7 09/05/2022 08:28 AM    HCT 42.4 09/05/2022 08:28 AM    PLATELET 852 04/58/9535 08:28 AM    MCV 94.0 09/05/2022 08:28 AM     Hx of hemorrhoids  Reports no issues today. MCI  She has had some memory issues lately. She often forgets to take her meds. Daughter concerned. Feels she needs a caregiver to keep track of her meds and ensures she takes them like she is supposed to. At the last OV she was referred to [facility] for medication management but it was not covered by insurance. Daughter manages her meds currently. Vertigo   She takes Meclizine prn. She has seen ENT in the past and did not want to go back. Major Depression  She is followed by psychiatrist Dr Micaela Damian. She is on Effexor XR 150mg daily. She is doing outpatient therapy and feeling good today. Health maintenance  Covid vaccine- she rec'd both Moderna vaccines a year ago. Not interested in the boosters.   Flu shot- due, will hold off on this today given the wheezing in her lungs      Patient Active Problem List   Diagnosis Code    Environmental allergies Z91.09    Elevated cholesterol E78.00    Essential hypertension I10    Well controlled type 2 diabetes mellitus ([address] E11.9    Major depressive disorder, recurrent episode, severe ([address] F33.2    Former smoker Z87.891    Primary osteoarthritis involving multiple joints M15.9    Age-related cataract of both eyes H25.9    History of GI bleed Z87.19    History of CVA (cerebrovascular accident) Z86.73    TIA (transient ischemic attack) G45.9    Allergic rhinitis J30.9    CKD (chronic kidney disease), symptom management only, unspecified stage N18.9    Chronic renal disease, stage III N18.30    Type 2 diabetes mellitus with chronic kidney disease ([address] E11.22    MCI (mild cognitive impairment) G31.84    Hemorrhoids K64.9       Past Medical History:   Diagnosis Date    Allergic rhinitis due to pollen     BPV (benign positional vertigo)     Change in bowel habits     Depression     Diabetes (HCC)     Dysuria     Hemorrhoids     Hypertension     IBS (irritable bowel syndrome)     Menopause     Other diseases of nasal cavity and sinuses(478.19)     Vertigo          Current Outpatient Medications:     venlafaxine-SR (EFFEXOR-XR) 150 mg capsule, Take 1 Capsule by mouth daily. , Disp: 90 Capsule, Rfl: 0    Januvia 100 mg tablet, TAKE 1 TABLET BY MOUTH EVERY DAY, Disp: 90 Tablet, Rfl: 1    clopidogreL (PLAVIX) 75 mg tab, TAKE 1 TABLET BY MOUTH EVERY DAY, Disp: 90 Tablet, Rfl: 1    azithromycin (ZITHROMAX) 250 mg tablet, Take 2 tablets today, then take 1 tablet daily, Disp: 6 Tablet, Rfl: 0    albuterol (PROVENTIL HFA, VENTOLIN HFA, PROAIR HFA) 90 mcg/actuation inhaler, Take 2 Puffs by inhalation every four to six (4-6) hours as needed for Wheezing., Disp: 18 g, Rfl: 0    levocetirizine (XYZAL) 5 mg tablet, TAKE 1 TABLET BY MOUTH DAILY AS NEEDED FOR ALLERGY SYMPTOMS, Disp: 90 Tablet, Rfl: 2    metoprolol succinate (TOPROL-XL) 50 mg XL tablet, Take 1 Tablet by mouth daily. , Disp: 90 Tablet, Rfl: 3    atorvastatin (LIPITOR) 80 mg tablet, Take 1 Tablet by mouth daily. , Disp: 90 Tablet, Rfl: 3    glucose blood VI test strips (OneTouch Ultra Test) strip, CHECK GLUCOSE ONCE DAILY. DX: E11.9, Disp: 100 Strip, Rfl: 3    fluticasone propionate (FLONASE) 50 mcg/actuation nasal spray, 2 Sprays by Both Nostrils route as needed for Allergies. ONLY AS NEEDED (Patient not taking: Reported on 9/1/2022), Disp: 1 Each, Rfl: 0    aspirin delayed-release 81 mg tablet, Take 1 Tablet by mouth daily. , Disp: 90 Tablet, Rfl: 1    lisinopriL (PRINIVIL, ZESTRIL) 20 mg tablet, Take 1 Tablet by mouth two (2) times a day., Disp: 180 Tablet, Rfl: 3    acetaminophen (TYLENOL) 325 mg tablet, Take  by mouth every four (4) hours as needed for Pain., Disp: , Rfl:     No Known Allergies    Past Surgical History:   Procedure Laterality Date COLONOSCOPY N/A 7/15/2020    COLONOSCOPY performed by Homar Alejo MD at [facility] ENDOSCOPY    HX CHOLECYSTECTOMY  2016    HX COLONOSCOPY  2013? HX HYSTERECTOMY      UPPER GI ENDOSCOPY,DIAGNOSIS  7/15/2020            Social History     Tobacco Use   Smoking Status Every Day    Packs/day: 0.25    Types: Cigarettes   Smokeless Tobacco Never       Social History     Socioeconomic History    Marital status:    Tobacco Use    Smoking status: Every Day     Packs/day: 0.25     Types: Cigarettes    Smokeless tobacco: Never   Vaping Use    Vaping Use: Never used   Substance and Sexual Activity    Alcohol use: No    Drug use: No    Sexual activity: Not Currently     Social Determinants of Health     Financial Resource Strain: Low Risk     Difficulty of Paying Living Expenses: Not hard at all   Food Insecurity: No Food Insecurity    Worried About Running Out of Food in the Last Year: Never true    Ran Out of Food in the Last Year: Never true       Family History   Problem Relation Age of Onset    No Known Problems Mother        ROS:  Gen: denies fever, chills, or fatigue   HEENT:denies H/A, vision changes, ear pain, or sore throat +chronic runny nose  Resp: denies dyspnea, +chronic cough, SOB, and wheezing  CV: denies chest pain, pressure, or palpitations  Extremeties: denies edema  GI: denies abd pain, NVD, melena, or hematochezia +hemorroids  Neuro: +memory issues denies numbness/tingling, +occasional vertigo. Denies unilateral weakness, facial drooping, AMS, or slurred speech   Skin: denies rashes or new lesions   Psych: +depression, no anxiety or brad, or other changes in mood      Objective:     Visit Vitals  /82 (BP 1 Location: Left upper arm)   Pulse 92   Temp 98.4 °F (36.9 °C) (Oral)   Resp 18   Ht 5' 6\" (1.676 m)   Wt 177 lb 3.2 oz (80.4 kg)   SpO2 97%   BMI 28.60 kg/m²           General: Alert and oriented. No acute distress. Well nourished. HEENT    Eyes: Sclera white, conjunctiva clear. PERRLA.  Extra ocular movements intact. Nose: Patent, no edema, +clear drainage  Neck: Supple with FROM. No carotid bruits  Lungs: Breathing even and unlabored. +Wheezing to all lobes bilaterally   Heart :RRR, S1 and S2 normal intensity, no extra heart sounds  Extremities: Non-edematous  Musculo: +intermittent swelling of joints in the hands  Neuro: Cranial nerves grossly normal. Answers questions appropriately. Normal gait. Moves all extremities freely. Psych: Mood and thought content appropriate for situation. Dressed appropriately and with good hygiene. Skin: Warm, dry, and intact. No lesions or discoloration. Assessment/ Plan:     Chronic cough with wheezing  PFTs were ordered at the last visit and never completed  CXR ordered  Start prednisone 10mg- take 6 pills today then take 1 less pill every day until gone (#21, 0R)  Start Breo inhaler- take as prescribed  ConT Albuterol prn SOB or wheezing- advised to use this more frequently  F/U 3 months or sooner prn if symptoms worsen     Hx of tobacco use  Quit smoking 5 months ago! HTN  BP at goal  Cont current meds  Low-sodium diet  RTO or go to ER for any CP, SOB, dizziness, or swelling. F/U 3 months    HLD  Cont Lipitor 80mg daily with baby asa  Low fat diet    Type 2 diabetes  She has gained weight since quitting smoking  Check A1C  Cont Januvia 100mg daily. Cut back on sugar and carbs    CKD stage 3  Recheck CMP  Avoids NSAIDS  Stay well hydrated  Keep tight control over BP and BS    TIA  Cont Plavix 75mg daily  Notify provider for s/s of abnormal bleeding  Cont ASA 81mg daily  Cont Lipitor 80mg daily  Keep tight control over BS and BP  Go back to ER for any unilateral numbness/tingling, unilateral weakness, facial drooping, slurred speech, confusion, AMS, or dizziness    MCI  Daughter supervising and managing meds    Seasonal Allergies   Symptoms ongoing  Non-compliant with Flonase- advised to use every day   Cont Xyzol     Vertigo  Cont Meclizine prn.      Major Depression  Per psych     OA, hands  Cont Tylenol prn        Verbal and written instructions (see AVS) provided. Patient expresses understanding of diagnosis and treatment plan.     Health Maintenance Due   Topic Date Due    Shingles Vaccine (1 of 2) Never done    COVID-19 Vaccine (3 - Booster for Moderna series) 06/09/2021    Eye Exam Retinal or Dilated  05/01/2022    Flu Vaccine (1) 08/01/2022               Layton Moreau, NP

## 2022-12-29 NOTE — PROGRESS NOTES
CXR looks great, I'd like her to try a new inhaler called Breo = script has been sent to pharmacy, patient's daughter made aware

## 2022-12-29 NOTE — PROGRESS NOTES
Identified pt with two pt identifiers(name and ). Reviewed record in preparation for visit and have obtained necessary documentation. Chief Complaint   Patient presents with    Cold Symptoms       1. \"Have you been to the ER, urgent care clinic since your last visit? Hospitalized since your last visit? \" No    2. \"Have you seen or consulted any other health care providers outside of the [address] [place] [address] since your last visit? \" No     3. For patients aged 39-70: Has the patient had a colonoscopy / FIT/ Cologuard? NA - based on age      If the patient is female:    4. For patients aged 41-77: Has the patient had a mammogram within the past 2 years? NA - based on age or sex      11. For patients aged 21-65: Has the patient had a pap smear?  NA - based on age or sex

## 2022-12-30 ENCOUNTER — HOSPITAL ENCOUNTER (OUTPATIENT)
Dept: BEHAVIORAL/MENTAL HEALTH | Age: 87
End: 2022-12-30
Payer: MEDICARE

## 2022-12-30 PROCEDURE — 90853 GROUP PSYCHOTHERAPY: CPT

## 2022-12-30 NOTE — PROGRESS NOTES
Kidney function is unchanged, A1C is higher, recommend adding another diabetes pill called Tradjenta if insurance will cover. Work on diet. Cut back on carbs (bread, potatos, rice, pasta, cereal) and sugar (desserts, fruit, fruit juices, sodas, and sweet tea).  F/U 3 months

## 2022-12-30 NOTE — BH NOTES
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    Short term goal (STG): A Pt will identify and share with group thoughts that contribute to depressed mood and outlook and identify a counter thought     Intervention/techniques: Informed, Validated/Supported, Listened/Empathized, Observed/Monitored, and Reinforced    Patient mental status/affect: Calm and Congruent    Patient behavior/appearance: Attentive, Cooperative, Caretaking, and Withdrawn/Quiet    Special patient treatment accommodations provided (describe): None    Patient response and progress towards goals: Focus of session was on an article called Fear is Your Only Competitor.   The article spoke about valuable steps to overcome fear and how fear can build based on our choices. I shared how fear feeds on inaction and how the solution to that is to do something, anything to get started. We spoke about what is currently fueling our stress and worry and what can be done to start to tackle fears rather than let them build up. I also shared how fear feeds in indecision and how making a decision can help us break down fears and reduce our stress level. Pt participated in the group activity and engaged with the other members. She stated that fear has held her back in certain situations in her life.   She now encourages others to not let fear keep you from experiencing life

## 2022-12-30 NOTE — BH NOTES
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    Short term goal (STG): E Pt will identify times she has been able to use willingness to turn towards emotions and symptoms and how acceptance of the moment has helped her get through crises    Intervention/techniques: Informed, Validated/Supported, Listened/Empathized, Observed/Monitored, Reinforced, and Provided Feedback  Patient mental status/affect: Calm and Congruent  Patient behavior/appearance: Attentive, Cooperative, Motivated, and Caretaking    Special patient treatment accommodations provided (describe): None    Patient response and progress towards goals: Focus of session was on the article \"The Emotion Chart My Therapist Gave Me That I Didn't Know I Needed. \"  We spoke about how we often are not willing to identify and process emotions as they come up and we tend to suppress or avoid them. We discussed how this then leads to overreacting later to little things that occur and we are then overly emotional. We spoke about how the Wheel of Emotions can then lead to identifying broader emotions and then can help us specify the underlying emotions. Pt listened to the group discussion and the concerns of another member. She was attentive and supportive of another pt that was disclosing her feelings regarding her marriage.  Pt was able to share some of her experiences and strategies in her own relationships and pt was receptive    [facility] Daily Check In form revealed that pt is not experiencing SI/HI thoughts or plans, remaining abstinent from drugs and alcohol, and reports goal today of eating better and having a good friend to talk with

## 2023-01-03 ENCOUNTER — HOSPITAL ENCOUNTER (OUTPATIENT)
Dept: BEHAVIORAL/MENTAL HEALTH | Age: 88
Discharge: HOME | End: 2023-01-03
Payer: MEDICARE

## 2023-01-03 PROCEDURE — 90853 GROUP PSYCHOTHERAPY: CPT

## 2023-01-03 NOTE — BH NOTES
[facility] Outpatient Program  Group Therapy    Date of Service: 1/3/2023  Start time: 1100  Stop time: 1150  Type of session: Therapy Group    Problem number: 1. Dep F 33.0    Short term goal (STG): J.  Pt will address anxiety provoking issues within 24 hours to prevent rumination and further physical, emotional and medical symptoms    Intervention/techniques: Informed, Validated/Supported, Modeled/Rehearsed, Prompted/Cued, Observed/Monitored, and Provided Feedback    Patient mental status/affect: Anxious, Congruent, and Preoccupied    Patient behavior/appearance: Neatly Groomed, Attentive, Cooperative, and Needed Prompting    Special patient treatment accommodations provided (describe): None    Patient response and progress towards goals: Focus of session was on information from article on how complaining rewires our brain for negativity and how to break that habit. Group members were asked to share their thoughts about what it is like for them to be around someone who is consistently complaining and what that may do to them when they are consistently complaining. We spoke about how we can start to practice of not complaining by catching ourselves when we are doing it and also recognizing when others may be doing it around us. I shared how we can't really complain and be grateful at the same time, so focus on being grateful. I asked group members to share their awareness of what they may be complaining about right now the most. Rito Suazombard participated in group with prompting. She spoke about how she knows that she needs to stay focused on what is going well as her mind does go to what is wrong first.  She spoke about her increased awareness of how her brain works and how that has helped her more through recent dark moments.

## 2023-01-03 NOTE — BH NOTES
[facility] Outpatient Program  Group Therapy      Date of service:  1/3/2023  Start time: 1000  Stop time: 1050    Type of session: Therapy Group    Problem number: 1. Dep F 33.0    Short term goal (STG): B. Pt will identify and share with group behaviors that contribute to depressed mood and identify an alternative behavior     Intervention/techniques: Informed, Modeled/Rehearsed, Prompted/Cued, Promoted Peer Support, and Provided Feedback  Patient mental status/affect: Anxious, Congruent, and Preoccupied  Patient behavior/appearance: Neatly Groomed, Attentive, Cooperative, and Needed Prompting    Special patient treatment accommodations provided (describe): None    Patient response and progress towards goals: Focus of session was a review of the weekend and helping group members see their wins that they can celebrate from the past several days. We spoke about the things that occurred and choices that they had in front of them and helping everyone to see the positive impact that they had on their well being. We also spoke about what may have been the setbacks and how they coped and what they can plan to do for the future to help build on relapse prevention. Carlos Siddiqui participated in group well and she spoke about how she has been more aware of her choices of what she is focused on. She spoke about how the holidays went fairly well but she was worried about her kids not getting along. She spoke about how things got better but she is trying to focus on what she can do to help improve her mood and her functioning. She continues to talk about how coming here is her only outlet to talk about things in order to feel better.       [facility] Daily Check In form revealed that pt is not experiencing SI/HI thoughts or plans, remaining abstinent from drugs and alcohol, and reports goal today of  \"to pray more and to work on finding medication I am afraid that I have lost.\"

## 2023-01-06 ENCOUNTER — HOSPITAL ENCOUNTER (OUTPATIENT)
Dept: BEHAVIORAL/MENTAL HEALTH | Age: 88
Discharge: HOME | End: 2023-01-06
Payer: MEDICARE

## 2023-01-06 PROCEDURE — 90853 GROUP PSYCHOTHERAPY: CPT

## 2023-01-06 NOTE — BH NOTES
[facility] Outpatient Program  Group Therapy      Date of service:  1/6/2023  Start time: 1000  Stop time: 1050    Type of session: Therapy Group    Problem number: 1. Dep F 33.0    Short term goal (STG): I. Pt will develop specific strategies and self-talk that can help her increase her awareness of emotional triggers and how she can cope specifically with emotional dependence    Intervention/techniques: Informed, Validated/Supported, Reframed, Prompted/Cued, Listened/Empathized, Promoted Peer Support, and Provided Feedback  Patient mental status/affect: Anxious, Congruent, and Preoccupied  Patient behavior/appearance: Neatly Groomed, Attentive, Cooperative, and Needed Prompting    Special patient treatment accommodations provided (describe): None    Patient response and progress towards goals: Focus of session was based on understanding all or nothing thinking and how it can be challenged and changed. We spoke about the language that is used in this type of cognitive distortion and how to work to challenge these thoughts. Group members were asked if they can recognize all or nothing thinking patterns that they are experiencing and how to manage those thoughts. We spoke about the dos and don'ts of all or nothing thinking and what strategies group members can use to help challenge these distorted thoughts to help them maintain their mood and their wellbeing. Sigifredo Kent participated in group with prompting. She was quiet but she was listening to discussion. She did not share any specific thoughts that could be challenged but she was supportive of others. Behavioral Health Daily Check In form revealed that pt is not experiencing SI/HI thoughts or plans, remaining abstinent from drugs and alcohol, and reports goal today of  \"not to be nervous all the time. I'm alone most of the time. \"

## 2023-01-06 NOTE — BH NOTES
[facility] Outpatient Program  Group Therapy    Date of Service: 1/6/2023  Start time: 1200  Stop time: 1250  Type of session: Therapy Group    Problem number: 1. Dep F 33.0    Short term goal (STG): G. Pt will identify to group members what she sees as the aspects of her daily life that she does have control over and can make a positive impact on    Intervention/techniques: Informed, Prompted/Cued, Listened/Empathized, Promoted Peer Support, and Provided Feedback    Patient mental status/affect: Anxious, Congruent, and Preoccupied    Patient behavior/appearance: Neatly Groomed, Attentive, Cooperative, and Needed Prompting    Special patient treatment accommodations provided (describe): None    Patient response and progress towards goals: Focus of session was on identifying healthy plans for the weekend. We spoke about setting goals and intentions for two things this weekend and they are identifying at least one thing that can be done this weekend to help ourselves and one thing that we won't do this weekend to help ourselves. I spoke with group members about the importance of picking things to do or accomplish that are not in their normal routine and are steps up in terms of activities or behaviors that they want to incorporate in their lives. We also spoke about how what they choose to not do needs to be something that they have the desire to give up in order to better themselves. I shared that if these goals can be accomplished, they can come back next week with energy and motivation to keep making positive changes. Group members shared their personal goals and objectives for the weekend. Eri Schaefer participated in group with prompting. She spoke about how she plans to go to [place] and to be motivated to see people \"when I have the opportunities. \"  She spoke about how she has to recognize how much being alone is affecting her right now.

## 2023-01-06 NOTE — BH NOTES
[facility] Outpatient Program  Group Therapy    Date of Service: 1/6/2023  Start time: 1100  Stop time: 1150  Type of session: Therapy Group    Problem number: 1Dep. F33.0    Short term goal (STG): D . Pt will identify choices made in the past week and how they impact emotionally, physically and cognitively     Intervention/techniques: Informed, Validated/Supported, Listened/Empathized, Clarified, Reinforced, and Provided Feedback    Patient mental status/affect: Calm and Congruent    Patient behavior/appearance: Attentive, Cooperative, and Motivated    Special patient treatment accommodations provided (describe): None    Patient response and progress towards goals: Focus of session as on ways to increase and improve self esteem. We spoke about the importance of focusing on strengths and what we can do rather than what we can't. We spoke about controlling our inner critic and make an effort to fight negative judgments and beliefs about ourselves and to not compare ourselves to others and what we think they are accomplishing or capable of since those are mostly assumptions. We spoke about setting reasonable goals and expectations and not over or under estimating what we are capable of and allow us to stretch our abilities. We spoke about solving problems versus avoiding them and relying first and foremost on our own opinion of ourselves. Pt participated int he reading of the activity and engaged with the other members. She shared that she did not feel well this am and was not going to come but she decided to and was glad.   She shared strategies that she has learned that have helped her build her confidence overtime

## 2023-01-09 ENCOUNTER — HOSPITAL ENCOUNTER (OUTPATIENT)
Dept: BEHAVIORAL/MENTAL HEALTH | Age: 88
Discharge: HOME | End: 2023-01-09
Payer: MEDICARE

## 2023-01-09 PROCEDURE — 90853 GROUP PSYCHOTHERAPY: CPT

## 2023-01-09 NOTE — BH NOTES
[facility] Outpatient Program  Group Therapy    Date of Service: 1/6/2023  Start time: 1100  Stop time: 1150  Type of session: Therapy Group    Problem number: 1. Dep F 33.0    Short term goal (STG): J.  Pt will address anxiety provoking issues within 24 hours to prevent rumination and further physical, emotional and medical symptoms    Intervention/techniques: Informed, Guided, Prompted/Cued, Listened/Empathized, Promoted Peer Support, and Provided Feedback    Patient mental status/affect: Anxious, Congruent, and Preoccupied    Patient behavior/appearance: Neatly Groomed, Attentive, Cooperative, and Needed Prompting    Special patient treatment accommodations provided (describe): None    Patient response and progress towards goals: Focus of session was on the tool of journaling and how it can be a very important and effective tool to use daily in recovery. We spoke about the traditional concepts of how we view journaling and how there is a lot more to offer and help support us. We discussed a journal being a place to practice gratefulness and what we are aware of that we don't have to deal with, a place to practice assertiveness and resolving conflicts. It can be a place to plan for goals and dreams and a place to keep up organized. We generated as a group a list of ideas in addition to these that we can focus on in a journal.  I asked group members to share which idea they are willing to try. Nic Linda spoke about how she has continued to have crying spells especially at night when she is lying in bed. She says that she thinks about group and she will even pull out some of the handouts to get some reassurance. This writer encouraged her to keep a journal by her bed and write down thoughts when they come up and write down things she wants to share in group but she ends up forgetting. She agreed that may be a good strategy for her to try.

## 2023-01-09 NOTE — BH NOTES
[facility] Outpatient Program  Group Therapy      Date of service:1/9/2023  Start time: 1000  Stop time: 1050    Type of session: Therapy Group    Problem number: 1Dep. F33.0    Short term goal (STG): D . Pt will identify choices made in the past week and how they impact emotionally, physically and cognitively     Intervention/techniques: Informed, Validated/Supported, Listened/Empathized, Observed/Monitored, Reinforced, and Provided Feedback  Patient mental status/affect: Calm and Congruent  Patient behavior/appearance: Attentive, Cooperative, Motivated, and Caretaking    Special patient treatment accommodations provided (describe): None    Patient response and progress towards goals: Focus of session was a review of the weekend and successes that can be identified in recognizing and managing triggers to negative feeling states. We spoke about what was done in managing triggers that led to success and what was done or not done that led to struggles in maintaining and getting through difficult moments. We also spoke about goals to work towards this week and what is reasonable to accomplish by the end of the week. Pt listened to the group discussion and the concerns of other members. She was supportive of another member who recently lost her had. She shared her thoughts and experiences with other pt and pt  was grateful and  receptive.      Behavioral [facility] Daily Check In form revealed that pt is not experiencing SI/HI thoughts or plans, remaining abstinent from drugs and alcohol, and reports goal today of   taking her medication

## 2023-01-09 NOTE — BH NOTES
[facility] Outpatient Program  Group Therapy    Date of Service: 1/9/2023  Start time: 1200  Stop time: 1250  Type of session: Therapy Group    Problem number: 1Dep. F33.0    Short term goal (STG): A Pt will identify and share with group thoughts that contribute to depressed mood and outlook and identify a counter thought     Intervention/techniques: Informed, Validated/Supported, Listened/Empathized, Observed/Monitored, and Reinforced    Patient mental status/affect: Calm and Congruent    Patient behavior/appearance: Attentive, Cooperative, Caretaking, and Withdrawn/Quiet    Special patient treatment accommodations provided (describe): None    Patient response and progress towards goals: Focus of session was on ways to manage dysfunctional people in our lives. The information came from [place], [place]. We spoke about the ways to [facility] with love and I shared the strategies which includes focusing on what we can control, respond don't react, respond in a new way, allow others to make their own good or bad decisions, don't give advice or tell people what to do, and don't obsess about other people's problems. I shared as well the need to give our own expectations a reality check, walk away from an unproductive argument, and choose to not spend time with those who really trigger us. We spoke about strategies that group members can use today to help improve functioning. Pt participated in the group activity and read aloud from the worksheet related to toxic people. Pt shared that she feels that she can recognize people that are not heathy for her quicker than when she was young. Pt stated that she \"just loves people\" and at times that can make it hard to be so trusting.

## 2023-01-11 ENCOUNTER — HOSPITAL ENCOUNTER (OUTPATIENT)
Dept: BEHAVIORAL/MENTAL HEALTH | Age: 88
Discharge: HOME | End: 2023-01-11
Payer: MEDICARE

## 2023-01-11 PROCEDURE — 90853 GROUP PSYCHOTHERAPY: CPT

## 2023-01-11 NOTE — BH NOTES
[facility] Outpatient Program  Group Therapy      Date of service:1/11/2023  Start time: 1000  Stop time: 1050    Type of session: Therapy Group    Problem number: 1Dep. F33.0      Short term goal (STG): G Pt will identify to group members what she sees as the aspects of her daily life that she does have control over and can make a positive impact on    Intervention/techniques: Informed, Validated/Supported, Listened/Empathized, Observed/Monitored, and Reinforced  Patient mental status/affect: Calm and Congruent  Patient behavior/appearance: Attentive, Cooperative, and Withdrawn/Quiet    Special patient treatment accommodations provided (describe): None    Patient response and progress towards goals: Focus of session was on the article \"The Emotion Chart My Therapist Gave Me That I Didn't Know I Needed. \"  We spoke about how we often are not willing to identify and process emotions as they come up and we tend to suppress or avoid them. We discussed how this then leads to overreacting later to little things that occur and we are then overly emotional. We spoke about how the Wheel of Emotions can then lead to identifying broader emotions and then can help us specify the underlying emotions. Pt listened to group discussion and the concerns expressed by another pt. She was attentive and supportive of what pt was sharing. She shared a similar experience and how she was able to deal with it. Pt states that she wishes she could get a lot of money so she could then pay it forward to others.      Behavioral Health Daily Check In form revealed that pt is not experiencing SI/HI thoughts or plans, remaining abstinent from drugs and alcohol, and reports goal today of  taking her medicine and eating food that is best for her condition

## 2023-01-11 NOTE — BH NOTES
[facility] Outpatient Program  Group Therapy    Date of Service: 1/11/2023  Start time: 1100  Stop time: 1150  Type of session: Therapy Group    Problem number: 1Dep. F33.0    Short term goal (STG): B . Pt will identify and share with group behaviors that contribute to depressed mood and identify an alternative behavior     Intervention/techniques: Informed, Validated/Supported, Listened/Empathized, Observed/Monitored, Clarified, Reinforced, and Provided Feedback    Patient mental status/affect: Calm and Congruent    Patient behavior/appearance: Attentive, Cooperative, and Motivated    Special patient treatment accommodations provided (describe): None    Patient response and progress towards goals: Focus of session was on understanding anger and find strategies for how to work through the typical phases of anger. We discussed how anger can include lesser feelings such as frustration, irritation, hurt, rage. We discussed the three phases of anger which can be viewed as the red zone, which is where are only focus needs to be on calming down. The orange zone, where our focus needs to be on thinking things through and processing our thoughts that may be increasing our anger and even stirring up more. The green zone is when we need to work through and manage the anger that we feel. We discussed strategies to use in the green zone such as expressing ourselves assertively, thinking of the other perspective, and relaxation skills. Pt participated in the group activity and engaged with the other members. She shared that she feels happy today and is glad. She explained that more often now she will feel sad or depressed, realizing it  and \"the tears will come\"  Pt wants to share her knowledge to help others in the group and is a great source of strength for some.

## 2023-01-11 NOTE — BH NOTES
[facility] Outpatient Program  Group Therapy    Date of Service: 1/11/2023  Start time: 1200  Stop time: 1250  Type of session: Therapy Group    Problem number: 1. Dep F 33.0    Short term goal (STG): D.  . Pt will identify choices made in the past week and how they impact emotionally, physically and cognitively    Intervention/techniques: Informed, Validated/Supported, Modeled/Rehearsed, Promoted Peer Support, Facilitated Disclosure, and Provided Feedback    Patient mental status/affect: Anxious, Congruent, and Preoccupied    Patient behavior/appearance: Neatly Groomed, Attentive, Cooperative, and Needed Prompting    Special patient treatment accommodations provided (describe): None    Patient response and progress towards goals: Focus of session was on identifying coping skills and what categories they may fall under. We spoke about the different categories of distraction skills, grounding skills, emotional release skills, self care skills, thought challenge skills, and accessing our higher self skills. We spoke about what each of these mean and the pros and cons of each category. I asked group members to brainstorm skills for each category and how it can benefit them to use them. We also identified cons of each category and how that can lead them to choose another way to cope with certain situations. Lisa Dobson participated in group with prompting. She spoke about how she is doing well with reaching out for help as she needs it. She said her good friend calls her every day that she has to go somewhere and Leticia Alfred gives me the push that I need. \"  She spoke about how she still is experiencing a lot of crying spells and struggling with managing at times.

## 2023-01-13 ENCOUNTER — HOSPITAL ENCOUNTER (OUTPATIENT)
Dept: BEHAVIORAL/MENTAL HEALTH | Age: 88
Discharge: HOME | End: 2023-01-13
Payer: MEDICARE

## 2023-01-13 PROCEDURE — 90853 GROUP PSYCHOTHERAPY: CPT

## 2023-01-13 NOTE — BH NOTES
[facility] Outpatient Program  Group Therapy    Date of Service: 1/13/2023  Start time: 1200  Stop time: 1250  Type of session: Therapy Group    Problem number: 1Dep. F33.0    Short term goal (STG): E Pt will identify times she has been able to use willingness to turn towards emotions and symptoms and how acceptance of the moment has helped her get through crises    Intervention/techniques: Informed, Validated/Supported, Listened/Empathized, Observed/Monitored, Clarified, Reinforced, Facilitated Disclosure, Provided Feedback, and Evaluated/Interpreted    Patient mental status/affect: Calm and Congruent    Patient behavior/appearance: Attentive, Cooperative, and Motivated    Special patient treatment accommodations provided (describe): None    Patient response and progress towards goals: Focus of session was based on the quote of Respect yourself enough to walk away from anything that no longer serves you, grows you, or makes you happy as well as the quote of You will find it necessary to let things go; simply for the reason that they are heavy.   Group members were asked to share what they need to, or who they need to walk away from for their own recovery. We spoke about the struggles of letting go and how the thoughts or emotions tend to come back. I spoke with group members that [place] go does not mean we won't have to work on letting it go over and over and that it is okay to pop back in our minds and hearts. I asked group members what or who they need to walk away from starting today. Pt participated in the group activity and engaged with the other members. She followed along with the reading of the 12 tips. She shared how some of them have applied to her life. She currently feels that her children spend too much time worried about her. They will stop by ad check on her if she doesn't answer the phone.   She states she appreciates them but they work all day and she feels guilty for them having to worry about. Pt has been processing her feelings related to getting older and has tried to talk with her children about death and what she wants. Unfortunately, she feels they are not listening and she has had to let it go.

## 2023-01-13 NOTE — BH NOTES
[facility] Outpatient Program  Group Therapy    Date of Service: 1/13/2023  Start time: 1100  Stop time: 1150  Type of session: Therapy Group    Problem number: 1. Dep F 33.0    Short term goal (STG): F.  Pt will work to practice skills of optimism and being able to see challenges and see areas she can work to improve rather than engage in self-defeating self-talk    Intervention/techniques: Informed, Modeled/Rehearsed, Prompted/Cued, Promoted Peer Support, and Provided Feedback    Patient mental status/affect: Anxious, Congruent, and Preoccupied    Patient behavior/appearance: Neatly Groomed, Attentive, Cooperative, and Needed Prompting    Special patient treatment accommodations provided (describe): None    Patient response and progress towards goals: Focus of session was on the useful tool of creating a written crisis plan when experiencing negative and powerful emotions. We discussed the basic plan of if I feel blank, I will and discussing specifically what each person can do in response to the emotions that we are feeling. We spoke about when we are experiencing hopelessness, what can be done and who we can call if we are experiencing hopelessness or suicidal thoughts. We discussed that the 24 hour COPE line is available as well as they can always call here and report to us, whether over the phone or leaving a message to return the call, to report that they are not doing well and need some additional help. We spoke about the idea of ratting ourselves out and being the one responsible to express our feelings and ask for help when needed. Edwin Neither participated in group and she spoke about how her most problematic emotional struggle is her loneliness that she feels. We spoke about times in the past when she has fallen into depression because of the loneliness but that would lead her to hopelessness and she would stop her medications.   We spoke about what has has learned from dealing with depression for so long.

## 2023-01-13 NOTE — BH NOTES
[facility] Outpatient Program  Group Therapy      Date of service:1/13/2023  Start time: 1000  Stop time: 1050    Type of session: Therapy Group    Problem number: 1Dep. F33.0    Short term goal (STG): I Pt will develop specific strategies and self-talk that can help her increase her awareness of emotional triggers and how she can cope specifically with emotional dependence    Intervention/techniques: Informed, Validated/Supported, Listened/Empathized, Observed/Monitored, Reinforced, and Provided Feedback  Patient mental status/affect: Calm and Congruent  Patient behavior/appearance: Attentive, Cooperative, Motivated, and Caretaking    Special patient treatment accommodations provided (describe): None    Patient response and progress towards goals: Focus of session was on developing a healthy stress management plan for dealing with triggers to stress, anxiety, and worry. We spoke about the importance of focusing on life experiences that have strengthened me and has taught us how to manage stress. We also spoke about the importance of having a positive support network of people who can help nurture us and encourage us, we spoke about attitudes and beliefs that have helped to protect us and help us view things differently, we addressed physical self care habits and that prepare us or help us relieve tension, as well as action skills that we can use to change the situation we are in right at the moment of experiencing stress. Pt listened to the group discussion and was supportive of another member that was sharing his concerns. She was able to tell him her experience with getting to know him and how she has watched him come out of his darkness. She advised that he keep walking toward the light as he is doing so much better and she is proud of him.      Behavioral Health Daily Check In form revealed that pt is not experiencing SI/HI thoughts or plans, remaining abstinent from drugs and alcohol, and reports goal today of

## 2023-01-16 ENCOUNTER — HOSPITAL ENCOUNTER (OUTPATIENT)
Dept: BEHAVIORAL/MENTAL HEALTH | Age: 88
Discharge: HOME | End: 2023-01-16
Payer: MEDICARE

## 2023-01-16 PROCEDURE — 90853 GROUP PSYCHOTHERAPY: CPT

## 2023-01-16 NOTE — BH NOTES
[facility] Outpatient Program  Group Therapy      Date of service:1/16/2023  Start time: 1000  Stop time: 1050    Type of session: Therapy Group    Problem number: 1Dep. F33.0    Short term goal (STG): F Pt will work to practice skills of optimism and being able to see challenges and see areas she can work to improve rather than engage in self-defeating self-talk    Intervention/techniques: Informed, Validated/Supported, Listened/Empathized, Observed/Monitored, Reinforced, and Provided Feedback  Patient mental status/affect: Calm and Congruent  Patient behavior/appearance: Attentive, Cooperative, and Motivated    Special patient treatment accommodations provided (describe): None    Patient response and progress towards goals: Focus of session was a review of the weekend and successes that can be identified in recognizing and managing triggers to negative feeling states. We spoke about what was done in managing triggers that led to success and what was done or not done that led to struggles in maintaining and getting through difficult moments. We also spoke about goals to work towards this week and what is reasonable to accomplish by the end of the week. Pt listened to the group discussion and was supportive of another pt. She shared her experience in dealing with her children related to her expectations regarding drugs. It was helpful to the other pt to hear strategies that worked.      Behavioral Health Daily Check In form revealed that pt is not experiencing SI/HI thoughts or plans, remaining abstinent from drugs and alcohol, and reports goal today of meds, food, friends

## 2023-01-16 NOTE — BH NOTES
[facility] Outpatient Program  Group Therapy    Date of Service: 1/16/2023  Start time: 1200  Stop time: 1250  Type of session: Therapy Group    Problem number: 1Dep. F33.0    Short term goal (STG): D Pt will identify choices made in the past week and how they impact emotionally, physically and cognitively    Intervention/techniques: Informed, Validated/Supported, Listened/Empathized, Observed/Monitored, Clarified, Reinforced, and Provided Feedback    Patient mental status/affect: Calm and Congruent    Patient behavior/appearance: Neatly Groomed, Attentive, and Motivated    Special patient treatment accommodations provided (describe): None    Patient response and progress towards goals: Focus of session was on developing a healthy stress management plan for dealing with triggers to stress, anxiety, and worry. We spoke about the importance of focusing on life experiences that have strengthened me and has taught us how to manage stress. We also spoke about the importance of having a positive support network of people who can help nurture us and encourage us, we spoke about attitudes and beliefs that have helped to protect us and help us view things differently, we addressed physical self care habits and that prepare us or help us relieve tension, as well as action skills that we can use to change the situation we are in right at the moment of experiencing stress. Pt participated in the group activity and engaged with the other members. She read aloud from the [facility] Box\" worksheet. Pt stated that cigarettes are sometimes a trigger for her. She has not smoked in 14 months but sometimes she wants to. She ask her daughter to light one for her but then she changed her mind once her daughter did. She was proud of herself for dealing with the situation. and saying no.

## 2023-01-16 NOTE — BH NOTES
[facility] Outpatient Program  Group Therapy    Date of Service: 1/16/2023  Start time: 1100  Stop time: 1150  Type of session: Therapy Group    Problem number: 1. Dep F 33.0    Short term goal (STG): I. Pt will develop specific strategies and self-talk that can help her increase her awareness of emotional triggers and how she can cope specifically with emotional dependence    Intervention/techniques: Informed, Reflected, Prompted/Cued, Listened/Empathized, Observed/Monitored, and Facilitated Disclosure    Patient mental status/affect: Anxious, Congruent, Preoccupied, and Worried    Patient behavior/appearance: Neatly Groomed, Attentive, Cooperative, and Needed Prompting    Special patient treatment accommodations provided (describe): None    Patient response and progress towards goals: Focus of session was on rumination and what that means and how it can affect us. We spoke about how rumination is normal, and even healthy, when we can focus on the problems that can be solved. Rumination is unhealthy when we ask why questions and that can lead us to a vicious cycle. I spoke with group about how rumination can be helpful when we focus on how the problem can be solved and taking action to solve it as well as asking more how questions. I spoke with group about differences between why and how/what questions and how they affect our drive and motivation. I asked group members to share what they may be ruminating on and how they can help themselves solve problems rather than get stuck in a vicious cycle. Epi Alejo participated in group with prompting. She spoke about how she knows she has to work on \"being less dependent on coming here. \" She spoke about how she did not go to [employer] this weekend and she knows she has support and outlets. We spoke about how she is trying to cope with the anxiety that comes up and the depression that increases when she stays isolated.   She agreed it would be a good plan to set the goal of getting a ride to [employer] on Saturdays to help get a regular routine established.

## 2023-01-18 ENCOUNTER — HOSPITAL ENCOUNTER (OUTPATIENT)
Dept: BEHAVIORAL/MENTAL HEALTH | Age: 88
Discharge: HOME | End: 2023-01-18
Payer: MEDICARE

## 2023-01-18 PROCEDURE — 90853 GROUP PSYCHOTHERAPY: CPT

## 2023-01-18 NOTE — BH NOTES
[facility] Outpatient Program  Group Therapy    Date of Service: 1/18/2023  Start time: 1100  Stop time: 1150  Type of session: Therapy Group    Problem number: 1. Dep F 33.0    Short term goal (STG): B.  Pt will identify choices made in the past week and how they impact emotionally, physically and cognitively     Intervention/techniques: Informed, Validated/Supported, Modeled/Rehearsed, Prompted/Cued, Promoted Peer Support, and Provided Feedback    Patient mental status/affect: Anxious, Congruent, and Preoccupied    Patient behavior/appearance: Neatly Groomed, Attentive, Cooperative, and Needed Prompting    Special patient treatment accommodations provided (describe): None    Patient response and progress towards goals: Focus of session was based on information from Positive Psychology on Stress as a Stimulus for Change.   We spoke about how stress can be an indicator that something in our life needs to change and we spoke about the formula of in order for change to happen, our dissatisfaction times our vision of what is possible times our first steps towards it is greater than our resistance to change (C=(DxVxF greater than R.)  We spoke about what is causing high amounts of stress currently and thoughts on thinking about these three factors and how individuals can get moving forward on healthy changes. Yancy Escobedo participated in group with prompting. She spoke about how she knows that it helps her to connect and we continue to talk about how she can do that outside of coming to [place]. She is still adjusting to things coming back to normal after Covid and we spoke about how she can increase her connections at [employer].

## 2023-01-18 NOTE — BH NOTES
[facility] Outpatient Program  Group Therapy      Date of service:1/18/2023  Start time: 1000  Stop time: 1050    Type of session: Therapy Group    Problem number: 1Dep. F33.0    Short term goal (STG): G Pt will identify to group members what she sees as the aspects of her daily life that she does have control over and can make a positive impact on Intervention/techniques: Informed, Validated/Supported, Listened/Empathized, Observed/Monitored, Clarified, Reinforced, and Provided Feedback  Patient mental status/affect: Calm and Congruent  Patient behavior/appearance: Attentive, Cooperative, Caretaking, and Withdrawn/Quiet    Special patient treatment accommodations provided (describe): None    Patient response and progress towards goals: Focus of session was developing skills to identify and manage triggers to strong moods. We discussed the most common negative and overpowering emotions that can get in our way and get us stuck or taking steps back (depression, nervousness, anxious, anger, frustration, hurt, fear). We spoke about recognizing what was going on around us that led us to feel these intense feelings and look at what we did in response to those moods. We discussed alternative ways we could have responded and discussed what skills we need to practice in order to develop resilience to minor or major stressors in our lives. Pt listened tot he group discussion and concerns of other members. She was able to share that she feels that she is presently using  getting enough sleep, eating nourishing food and mediating/ relaxing as active coping strategies. Pt stated that it isn't always easy living by herself but she tries.    Behavioral Health Daily Check In form revealed that pt is not experiencing SI/HI thoughts or plans, remaining abstinent from drugs and alcohol, and reports goal today of taking her medication

## 2023-01-18 NOTE — BH NOTES
[facility] Outpatient Program  Group Therapy    Date of Service: 1/18/2023  Start time: 1200  Stop time: 1250  Type of session: Therapy Group    Problem number: 1Dep. F33.0    Short term goal (STG):J  Pt will address anxiety provoking issues within 24 hours to prevent rumination and further physical, emotional and medical symptoms    Intervention/techniques: Informed, Validated/Supported, Listened/Empathized, Clarified, Reinforced, and Provided Feedback    Patient mental status/affect: Calm and Congruent    Patient behavior/appearance: Attentive, Cooperative, Motivated, and Caretaking    Special patient treatment accommodations provided (describe): None    Patient response and progress towards goals: Focus of session was on a handout from [employer] on an Emotional Needs Checklist.  The handout asked questions to determine what needs may or may not be met at this time. It covered relationships that are healthy, safety and security, giving and receiving attention, making gains on goals, and feel in control most of the time. We spoke about what may be missing and how that impacts their functioning and well being. We spoke about strategies to incorporate to help meet the missing emotional needs. Pt participated in   the group activity and engaged with the other members. She read aloud from the \"Clarity\" worksheet. She shared that she wants to feel needed  and have a sense of accomplishment at the end of the day. She is not able to help people in the same way now that she can't drive but she still wants to show others that she cares.

## 2023-01-20 ENCOUNTER — HOSPITAL ENCOUNTER (OUTPATIENT)
Dept: BEHAVIORAL/MENTAL HEALTH | Age: 88
Discharge: HOME | End: 2023-01-20
Payer: MEDICARE

## 2023-01-20 PROCEDURE — 90853 GROUP PSYCHOTHERAPY: CPT

## 2023-01-20 NOTE — BH NOTES
[facility] Outpatient Program  Group Therapy      Date of service: 1/20/2023  Start time: 1000  Stop time: 1050    Type of session: Therapy Group    Problem number: 1. Dep F 33.0    Short term goal (STG): K. Pt will work on establishing her expectations of her children and share them with the group    Intervention/techniques: Informed, Validated/Supported, Prompted/Cued, Listened/Empathized, Promoted Peer Support, and Provided Feedback  Patient mental status/affect: Anxious, Congruent, Preoccupied, and Worried  Patient behavior/appearance: Neatly Groomed, Attentive, Cooperative, and Needed Prompting    Special patient treatment accommodations provided (describe): None    Patient response and progress towards goals: Focus of session was based on information from [facility], [facility] on three ways to stop overthinking. This writer shared with group the process of overthinking and we spoke about how to affects our mental and physical health. We spoke about the three strategies which include positive reframing, writing down thoughts and walking away in order to not respond during crisis mode, and practicing specific gratitude. We spoke about how they can help and how group members believe these strategies may help them stop overthinking. Mir Garay participated in group with prompting. She was quiet but she engaged with prompting. She spoke about how she knows that she over thinks things and \"I can sit there and worry and over think and I end up in tears. \"  We spoke about what can she do to help process her thoughts and work through them rather than get stuck in them. Behavioral Health Daily Check In form revealed that pt is not experiencing SI/HI thoughts or plans, remaining abstinent from drugs and alcohol, and reports goal today of  \"feeling better. \"

## 2023-01-20 NOTE — BH NOTES
[facility] Outpatient Program  Group Therapy    Date of Service: 1/20/2023  Start time: 1200  Stop time: 1250  Type of session: Therapy Group    Problem number: 1. Dep F 33.0    Short term goal (STG): M  Pt will share instances when she was able to step back and make a choice about how she managed her mood or her symptoms in order to gain more confidence in herself and her decisions     Intervention/techniques: Informed, Observed/Monitored, Queired/Probed, Facilitated Disclosure, and Provided Feedback    Patient mental status/affect: Anxious, Congruent, and Preoccupied    Patient behavior/appearance: Neatly Groomed, Attentive, Cooperative, and Needed Prompting    Special patient treatment accommodations provided (describe): None    Patient response and progress towards goals: Focus of session was based on information from [employer] on [facility] to be More Disciplined.   This writer shared with group members about how to break down how to be more disciplined in steps including discern how you struggle and how you strive, know your why, clarify your goals of all sizes, find accountability, remove distractions, start small, and forgive and reward. We discussed what all these concepts mean and where members believe it would benefit them to start creating more effective self discipline. Shu participated in group with prompting. She spoke about how she knows that she needs to reach out for help and support when she needs it rather than expecting others to just know when she needs it. She spoke about how she will work on reaching out for help and support rather than expecting others to give it to her.

## 2023-01-23 ENCOUNTER — HOSPITAL ENCOUNTER (OUTPATIENT)
Dept: BEHAVIORAL/MENTAL HEALTH | Age: 88
Discharge: HOME | End: 2023-01-23
Payer: MEDICARE

## 2023-01-23 PROCEDURE — 90853 GROUP PSYCHOTHERAPY: CPT

## 2023-01-23 NOTE — BH NOTES
[facility] Outpatient Program  Group Therapy      Date of service:1/23/2023  Start time: 1000  Stop time: 1050    Type of session: Therapy Group    Problem number:  1Dep. F33.0    Short term goal (STG): I Pt will develop specific strategies and self-talk that can help her increase her awareness of emotional triggers and how she can cope specifically with emotional dependence    Intervention/techniques: Informed, Validated/Supported, Prompted/Cued, Listened/Empathized, Observed/Monitored, and Reinforced  Patient mental status/affect: Calm and Congruent  Patient behavior/appearance: Attentive, Cooperative, Needed Prompting, and Withdrawn/Quiet    Special patient treatment accommodations provided (describe): None    Patient response and progress towards goals: Focus of session was a review of the weekend and successes that can be identified in recognizing and managing triggers to negative feeling states. We spoke about what was done in managing triggers that led to success and what was done or not done that led to struggles in maintaining and getting through difficult moments. We also spoke about goals to work towards this week and what is reasonable  Pt listened to the group discussion and the concerns of the others. She was quiet for moat of the group.   She was attentive and supportive of others through her gestures and eye contact    Behavioral Health Daily Check In form revealed that pt is not experiencing SI/HI thoughts or plans, remaining abstinent from drugs and alcohol, and reports goal today of  taking her medication

## 2023-01-23 NOTE — BH NOTES
[facility] Outpatient Program  Group Therapy    Date of Service: 1/23/2023  Start time: 1100  Stop time: 1150  Type of session: Therapy Group    Problem number: 1. Dep F 33.0    Short term goal (STG): J.  Pt will address anxiety provoking issues within 24 hours to prevent rumination and further physical, emotional and medical symptoms    Intervention/techniques: Informed, Reframed, Prompted/Cued, and Provided Feedback    Patient mental status/affect: Anxious, Congruent, and Preoccupied    Patient behavior/appearance: Neatly Groomed, Attentive, Cooperative, and Needed Prompting    Special patient treatment accommodations provided (describe): None    Patient response and progress towards goals: Focus of session was on the Three C's of Cognitive Therapy and how they are Catch the thought that came before the emotion, Check and reflect on how accurate and useful the thought was, and Change the thought to a more accurate or helpful one as needed. I shared how this is an easy way to remember these three strategies and how important they are to overcome a lot of unnecessary stress and negative thoughts. Group members processed their thoughts and worked to recognize the unhealthy thoughts that are also irrational and untrue that they can work to let go of for their recovery. Darion Rivera participated in group with prompting. She stated she was having a \"tough day today and I did not make it to [employer] yesterday.'  She spoke about how she knows that she needs to work through [employer]" and she was able to take in some feedback about how this particular coping skill could help her.

## 2023-01-23 NOTE — BH NOTES
[facility] Outpatient Program  Group Therapy    Date of Service: 1/23/2023  Start time: 1200  Stop time: 1250  Type of session: Therapy Group    Problem number: 1Dep. F33.0    Short term goal (STG): F Pt will work to practice skills of optimism and being able to see challenges and see areas she can work to improve rather than engage in self-defeating self-talk     Intervention/techniques: Informed, Validated/Supported, Prompted/Cued, Listened/Empathized, Observed/Monitored, Clarified, and Reinforced    Patient mental status/affect: Anxious, Congruent, and Preoccupied    Patient behavior/appearance: Attentive, Cooperative, Needed Prompting, and Withdrawn/Quiet    Special patient treatment accommodations provided (describe): None    Patient response and progress towards goals: Focus of session was on the article \"The Emotion Chart My Therapist Gave Me That I Didn't Know I Needed. \"  We spoke about how we often are not willing to identify and process emotions as they come up and we tend to suppress or avoid them. We discussed how this then leads to overreacting later to little things that occur and we are then overly emotional. We spoke about how the Wheel of Emotions can then lead to identifying broader emotions and then can help us specify the underlying emotions Pt participated int he group activity and engaged with the other members when prompted. She used the feelings wheel to help her sort out her feelings of loneliness . She discussed wanting to get out and do things but often feels unmotivated or doesn't have the energy. We discussed her group attendance and how she has help sometimes with motivation to come and could she use that resource to also help her go to [employer] or get put more.

## 2023-01-25 ENCOUNTER — HOSPITAL ENCOUNTER (OUTPATIENT)
Dept: BEHAVIORAL/MENTAL HEALTH | Age: 88
Discharge: HOME | End: 2023-01-25
Payer: MEDICARE

## 2023-01-25 PROCEDURE — 99213 OFFICE O/P EST LOW 20 MIN: CPT | Performed by: PSYCHIATRY & NEUROLOGY

## 2023-01-25 PROCEDURE — 90853 GROUP PSYCHOTHERAPY: CPT

## 2023-01-25 NOTE — BH NOTES
[facility] Outpatient Program  Group Therapy    Date of Service: 1/25/2023  Start time: 1200  Stop time: 1250  Type of session: Therapy Group    Problem number: 1Dep. F33.0    Short term goal (STG): G Pt will identify to group members what she sees as the aspects of her daily life that she does have control over and can make a positive impact on     Intervention/techniques: Informed, Validated/Supported, and Observed/Monitored    Patient mental status/affect: Calm and Congruent    Patient behavior/appearance: Attentive and Withdrawn/Quiet    Special patient treatment accommodations provided (describe): None    Patient response and progress towards goals: Focus of session was on the tool of journaling and how it can be a very important and effective tool to use daily in recovery. We spoke about the traditional concepts of how we view journaling and how there is a lot more to offer and help support us. We discussed a journal being a place to practice gratefulness and what we are aware of that we don't have to deal with, a place to practice assertiveness and resolving conflicts. It can be a place to plan for goals and dreams and a place to keep up organized. We generated as a group a list of ideas in addition to these that we can focus on in a journal.  I asked group members to share which idea they are willing to try. Pt followed along with the group activity and discussion. She was attentive and cooperative but she remained quiet during this session.

## 2023-01-25 NOTE — BH NOTES
[facility] Outpatient Program  Group Therapy    Date of Service: 1/25/2023  Start time: 1100  Stop time: 1150  Type of session: Therapy Group    Problem number: 1. Dep F 33.0    Short term goal (STG): B. Pt will identify and share with group behaviors that contribute to depressed mood and identify an alternative behavior     Intervention/techniques: Informed, Prompted/Cued, Listened/Empathized, Observed/Monitored, and Provided Feedback    Patient mental status/affect: Anxious, Congruent, and Preoccupied    Patient behavior/appearance: Neatly Groomed, Attentive, and Cooperative    Special patient treatment accommodations provided (describe): None    Patient response and progress towards goals: Focus of session was on identifying the rights that we all have as human beings and the need to keep these in mind when we may be struggling with communicating and relating to other people in our lives. We spoke about how when self esteem is low and anxiety and depression may be high, we have more internal conflict over what we feel comfortable expressing to others and we run the risk of not saying anything out of fear of rejection or being seen as selfish or wrong. We discussed the core rights that we have which include the right to say no, the right to negotiate for change, the right to ask for help and support, the right to be the final  of our own beliefs, and the right to our own experiences regardless of its different from other people's experiences. Sharmila Dickerson participated in group with prompting. She spoke about how she has been struggling with feeling overwhelmed this morning but she was able to problem solve the issue with her heat and she got very emotional.  We spoke about how the emotions passed and she was able to solve the problem. She agreed it was good to see and talk about how her emotions did lessen with time and patience.

## 2023-01-25 NOTE — PROGRESS NOTES
SOP PROGRESS NOTE    INTERVAL HISTORY/FINDINGS:  States she's been feeling \"pretty good\" overall, but \"not as good as it has been\" due to several deaths in her family as well as some other stressors. She's not been overly depressed, but it's easy for her to feel more distraught and even overwhelmed at times. However, she expects she'll start feeling better in the near future, and I agree that it's mostly situational so I don't want to adjust the meds yet. She's sleeping fairly well and eating adequately, and she denies having any SE's with the meds. This time of year is usually more difficult for her as well. MEDICATIONS:  Current Outpatient Medications   Medication Sig    empagliflozin (Jardiance) 10 mg tablet Take 1 Tablet by mouth daily. predniSONE (STERAPRED DS) 10 mg dose pack See administration instruction per 10mg dose pack    fluticasone furoate-vilanteroL (BREO ELLIPTA) 100-25 mcg/dose inhaler Take 1 Puff by inhalation daily. venlafaxine-SR (EFFEXOR-XR) 150 mg capsule Take 1 Capsule by mouth daily. Januvia 100 mg tablet TAKE 1 TABLET BY MOUTH EVERY DAY    clopidogreL (PLAVIX) 75 mg tab TAKE 1 TABLET BY MOUTH EVERY DAY    albuterol (PROVENTIL HFA, VENTOLIN HFA, PROAIR HFA) 90 mcg/actuation inhaler Take 2 Puffs by inhalation every four to six (4-6) hours as needed for Wheezing. levocetirizine (XYZAL) 5 mg tablet TAKE 1 TABLET BY MOUTH DAILY AS NEEDED FOR ALLERGY SYMPTOMS    metoprolol succinate (TOPROL-XL) 50 mg XL tablet Take 1 Tablet by mouth daily. atorvastatin (LIPITOR) 80 mg tablet Take 1 Tablet by mouth daily. glucose blood VI test strips (OneTouch Ultra Test) strip CHECK GLUCOSE ONCE DAILY. DX: E11.9    fluticasone propionate (FLONASE) 50 mcg/actuation nasal spray 2 Sprays by Both Nostrils route as needed for Allergies. ONLY AS NEEDED (Patient not taking: Reported on 9/1/2022)    aspirin delayed-release 81 mg tablet Take 1 Tablet by mouth daily.     lisinopriL (Tawny Espinoza) 20 mg tablet Take 1 Tablet by mouth two (2) times a day. acetaminophen (TYLENOL) 325 mg tablet Take  by mouth every four (4) hours as needed for Pain. No current facility-administered medications for this encounter. MENTAL STATUS UPDATE: ([facility] in [place])  Appearance:   Neat   Disheveled  Odiferous   Appropriate  Orientation:   Time  Place  Person  Speech:   Coherent  Pressured  Garbled/Slurred  Loud   Soft  Mute  Memory:   Intact  Impaired (Recent  Remote)--Mild  Severe  Mood:   Euthymic  Depressed-mild  Anxious-mild  Elated  Affect:    Appropriate  Inappropriate  Labile  Blunted  Flat-mild  Thought Content:   Logical  Goal directed  Paranoid  Delusional  Illogical IOR  SI  HI  Thought Process:   Coherent/linear  Tangential  Loose/Disorganized   Hallucinations:   None  Auditory  Visual  Tactile  Olfactory  Gustatory  Insight:   Good  Fair  Impaired      Judgment:   Good  Fair  Impaired     CONTINUED NEED FOR TREATMENT: ([facility] in [place])  1. Continued psychiatric symptoms causing impairment in IDL  or functioning  2. Continued non-compliance with meds resulting in decompensation  3. High level of debilitation and symptoms with inadequate support  4. Continued need for structured care to adjust medications  5. Continued presence of debilitating symptoms  6. Continued presence of risk factors     CONTINUED NEED FOR GROUP PSYCHOTHERAPY TO ADDRESS THE FOLLOWING: ([facility] [place])  Psychiatric symptom management       Psychiatric relapse prevention    Anxiety management   Self-esteem issues      Poor decision making       Recent decompensation       Safety issues   Anger management     Self care/ADL's     Med/treatment compliance      Impaired boundaries/relationships   Assertiveness      Grief/loss resolution       Guilt/shame issues     Realistic goal setting     DIAGNOSTIC IMPRESSION:  1. Major Depression, recurrent, mild to moderate (F33.0)     PLAN:   1.  Continue the Venlafaxine  mg daily--has 2- months of refills (90 day). May need to increase if still dysphoric. 2. Continue SOP treatment 3 x/week.

## 2023-01-25 NOTE — BH NOTES
[facility] Outpatient Program  Group Therapy      Date of service:1/25/2023  Start time: 1000  Stop time: 1050    Type of session: Therapy Group    Problem number: 1Dep. F33.0      Short term goal (STG): K Pt will work on establishing her expectations of her children and share them with the group    Intervention/techniques: Informed, Validated/Supported, Prompted/Cued, Listened/Empathized, Observed/Monitored, Reinforced, and Provided Feedback  Patient mental status/affect: Congruent and Flat  Patient behavior/appearance: Attentive, Cooperative, Needed Prompting, and Withdrawn/Quiet    Special patient treatment accommodations provided (describe): None    Patient response and progress towards goals: Focus of session was on understanding anger and find strategies for how to work through the typical phases of anger. We discussed how anger can include lesser feelings such as frustration, irritation, hurt, rage. We discussed the three phases of anger which can be viewed as the red zone, which is where are only focus needs to be on calming down. The orange zone, where our focus needs to be on thinking things through and processing our thoughts that may be increasing our anger and even stirring up more. The green zone is when we need to work through and manage the anger that we feel. We discussed strategies to use in the green zone such as expressing ourselves assertively, thinking of the other perspective, and relaxation skills. Pt attempted to listen to the group discussion and the others concerns. She stated that she was still upset but was getting better as the group went on. She ran out of fuel for heat last night and was worried. She called and spoke with someone and they stated she could get more and could pay at the end of the months She was relieved and wanted to make sure it didn't run out completely again as it cost more money that way .  She was trying to resolve her feelings related to her son as she had ask him to check it last week but he didn't.       Behavioral Health Daily Check In form revealed that pt is not experiencing SI/HI thoughts or plans, remaining abstinent from drugs and alcohol, and reports goal today of get better

## 2023-01-30 ENCOUNTER — HOSPITAL ENCOUNTER (OUTPATIENT)
Dept: BEHAVIORAL/MENTAL HEALTH | Age: 88
Discharge: HOME | End: 2023-01-30
Payer: MEDICARE

## 2023-01-30 PROCEDURE — 90853 GROUP PSYCHOTHERAPY: CPT

## 2023-01-30 NOTE — BH NOTES
[facility] Outpatient Program  Group Therapy      Date of service:1/30/2023  Start time: 1000  Stop time: 1050    Type of session: Therapy Group    Problem number:  1Dep. F33.0    Short term goal (STG): I Pt will develop specific strategies and self-talk that can help her increase her awareness of emotional triggers and how she can cope specifically with emotional dependence.       Intervention/techniques: Informed, Validated/Supported, Prompted/Cued, Listened/Empathized, Observed/Monitored, and Reinforced  Patient mental status/affect: Anxious, Congruent, and Preoccupied  Patient behavior/appearance: Attentive, Cooperative, and Withdrawn/Quiet    Special patient treatment accommodations provided (describe): None    Patient response and progress towards goals: Focus of session was a review of the weekend and successes that can be identified in recognizing and managing triggers to negative feeling states. We spoke about what was done in managing triggers that led to success and what was done or not done that led to struggles in maintaining and getting through difficult moments. We also spoke about goals to work towards this week and what is reasonable to accomplish by the end of the week. Pt listened to the group discussion and the concerns of other members. She also shared that she spent the night with daughter Saturday night and they had pizza . She knew she shouldn't have eaten it as it upset her medical condition and she didn't have her medicine. She stated she doesn't sleep as well away from home and likes to   be in the comfort of her own home. Yet, at times she feels anxious when she is there too. Behavioral Health Daily Check In form revealed that pt is not experiencing SI/HI thoughts or plans, remaining abstinent from drugs and alcohol, and reports goal today of working through things today.

## 2023-01-30 NOTE — BH NOTES
[facility] Outpatient Program  Group Therapy    Date of Service: 1/30/2023  Start time: 1100  Stop time: 1150  Type of session: Therapy Group    Problem number: 1Dep. F33.0    Short term goal (STG): D . Pt will identify choices made in the past week and how they impact emotionally, physically and cognitively     Intervention/techniques: Informed, Validated/Supported, Prompted/Cued, Listened/Empathized, Observed/Monitored, and Reinforced    Patient mental status/affect: Anxious and Congruent    Patient behavior/appearance: Attentive, Cooperative, and Motivated    Special patient treatment accommodations provided (describe): None    Patient response and progress towards goals: Focus of session was on the benefits of gratitude and how a daily focus of this can benefit our emotional well-being and our mental health. We spoke about the impact that this can have on life satisfaction. We spoke about the different impacts it can have on people including our personality, emotional, social life, our health, and our careers. We spoke about how group members can start to practice this and how they can work to understand and appreciate the impact. Pt participated in the gratitude activity and engaged with the other members. She shared that she is grateful that over time their has been more good in her life than bad. She continues to become sad with the many people her age that are dying. She thinks about it a lot and tries to stay positive in her ry but it hurts thinking about others and adds to her depression. They group was supportive and shared in her thoughts.

## 2023-02-01 ENCOUNTER — HOSPITAL ENCOUNTER (OUTPATIENT)
Dept: BEHAVIORAL/MENTAL HEALTH | Age: 88
Discharge: HOME | End: 2023-02-01
Payer: MEDICARE

## 2023-02-01 PROCEDURE — 90853 GROUP PSYCHOTHERAPY: CPT

## 2023-02-01 NOTE — BH NOTES
[facility] Outpatient Program  Group Therapy      Date of service:2/1/2023  Start time: 1000  Stop time: 1050    Type of session: Therapy Group    Problem number: 1Dep. F33.0    Short term goal (STG): J Pt will address anxiety provoking issues within 24 hours to prevent rumination and further physical, emotional and medical symptoms    Intervention/techniques: Informed, Validated/Supported, and Observed/Monitored  Patient mental status/affect: Calm and Congruent  Patient behavior/appearance: Attentive, Cooperative, Needed Prompting, and Withdrawn/Quiet    Special patient treatment accommodations provided (describe): None    Patient response and progress towards goals: Focus of session was a focus on identifying for group members their self-defeating habits and where they may have come from. We focused on the impact of these habits and thought processes and how to cope and challenge them. We went over the following specific habits that impact us including feeling guilt that is inappropriate, or unwarranted, thinking that we are a failure, being a perfectionist, living with regret, comparing yourself negatively to others, and people pleasing. We discussed how challenging these habits can help us build up or confidence as well as lighten our emotional load that we are carrying. Pt listened to the group activity and discussion. She was attentive and alert but remained quiet stating at one point she didn't have anything to share.       Behavioral Health Daily Check In form revealed that pt is not experiencing SI/HI thoughts or plans, remaining abstinent from drugs and alcohol, and reports goal today to eat and sleep right , praying and watching tv

## 2023-02-01 NOTE — BH NOTES
[facility] Outpatient Program  Group Therapy    Date of Service: 2/1/2023  Start time: 1200  Stop time: 1250  Type of session: Therapy Group    Problem number: 1Dep. F33.0    Short term goal (STG): B Pt will identify and share with group behaviors that contribute to depressed mood and identify an alternative behavior     Intervention/techniques: Informed, Validated/Supported, Prompted/Cued, Listened/Empathized, Observed/Monitored, Reinforced, Provided Feedback, and Evaluated/Interpreted    Patient mental status/affect: Calm and Congruent    Patient behavior/appearance: Attentive, Cooperative, Motivated, and Caretaking    Special patient treatment accommodations provided (describe): None    Patient response and progress towards goals: Focus of session was on developing healthy boundaries with others and the signs of healthy and unhealthy boundaries. We explored current relationships with group members and identified what kinds of boundaries are present and what boundaries need to be set. We spoke about physical, emotional, time, and intimate boundaries that we can set. We also spoke about how to allow others to set boundaries with us and to work to equip them with how to support in times of need and/or crisis. We identified what changes we can make today. Pt participated in the activity and discussion related to boundaries. She was supportive of another pt that was sharing boundary limits and questions related to her boyfriend.  The group discussed how to recognize appropriate boundaries and set limiots

## 2023-02-01 NOTE — BH NOTES
[facility] Outpatient Program  Group Therapy    Date of Service: 2/1/2023  Start time: 1100  Stop time: 1150  Type of session: Therapy Group    Problem number:1. Dep F 33.0     Short term goal (STG): F.  Pt will work to practice skills of optimism and being able to see challenges and see areas she can work to improve rather than engage in self-defeating self-talk    Intervention/techniques: Informed, Validated/Supported, Listened/Empathized, Observed/Monitored, and Provided Feedback    Patient mental status/affect: Calm, Congruent, Preoccupied, and Worried    Patient behavior/appearance: Neatly Groomed, Attentive, Cooperative, Needed Prompting, and Negative    Special patient treatment accommodations provided (describe): None    Patient response and progress towards goals: Focus of session was based on affirmations and they can help with success and the benefit of integrating affirmations with goals. We spoke about affirmations is about belief, first and foremost, that something is possible, and the need to convince your mind that it is that. We spoke about how affirmations help us to act and help accomplish big goals. We spoke about how the Patricia Roche of the article spoke about how they treat affirmations and goals as the same things. We discussed the two primary purposes for developing affirmations and they are: overcoming a negative trait, habit, or self-image and making big goals seem reasonable. We spoke about how to develop and write effective affirmations that can lead us to make positive changes. Heidi Levi participated in group with prompting. She spoke about how she is worried about how she is feeling and how she is crying still \"too much. \"  She spoke about how she is not sure why and she continues to be focused on asking and pondering that question.   She was responsive to feedback and ideas about how she can make the effort to do something once a day at home and if she has energy and her pain is tolerable, that she can push herself further.   She was in agreement to help her work towards that goal.

## 2023-02-03 ENCOUNTER — HOSPITAL ENCOUNTER (OUTPATIENT)
Dept: BEHAVIORAL/MENTAL HEALTH | Age: 88
Discharge: HOME | End: 2023-02-03
Payer: MEDICARE

## 2023-02-03 PROCEDURE — 90853 GROUP PSYCHOTHERAPY: CPT

## 2023-02-03 NOTE — BH NOTES
[facility] Program  Group Therapy    Date of Service: 2/3/2023  Start time: 1200  Stop time: 1250  Type of session: Therapy Group    Problem number: 1. Dep F 33.0    Short term goal (STG): L. Pt will identify one core belief or message that she holds that she recognizes fuels anxiety and/or worry and share with group and how the beliefs impact her.        Intervention/techniques: Informed, Listened/Empathized, Observed/Monitored, and Provided Feedback    Patient mental status/affect: Anxious, Congruent, Depressed, and Preoccupied    Patient behavior/appearance: Neatly Groomed, Attentive, and Cooperative    Special patient treatment accommodations provided (describe): None    Patient response and progress towards goals: Focus of session was on identifying healthy plans for the weekend. We spoke about setting goals and intentions for two things this weekend and they are identifying at least one thing that can be done this weekend to help ourselves and one thing that we won't do this weekend to help ourselves. I spoke with group members about the importance of picking things to do or accomplish that are not in their normal routine and are steps up in terms of activities or behaviors that they want to incorporate in their lives. We also spoke about how what they choose to not do needs to be something that they have the desire to give up in order to better themselves. I shared that if these goals can be accomplished, they can come back next week with energy and motivation to keep making positive changes. Group members shared their personal goals and objectives for the weekend. Ronaldo Roman participated in group more actively. She expressed a lot of worry about herself and her health and how she is is doing. She spoke about how she is concerned that she has had a stroke.   We spoke about the importance of her doing more to stay connected to others and we spoke about how it will help her emotionally and mentally to make the plan to go to [employer] this weekend.

## 2023-02-03 NOTE — BH NOTES
[facility] Outpatient Program  Group Therapy    Date of Service: 2/3/2023  Start time: 1100  Stop time: 1150  Type of session: Therapy Group    Problem number: 1Dep. F33.0    Short term goal (STG): M Pt will share instances when she was able to step back and make a choice about how she managed her mood or her symptoms in order to gain more confidence in herself and her decisions     Intervention/techniques: Informed, Validated/Supported, Listened/Empathized, Observed/Monitored, Reinforced, Facilitated Disclosure, Provided Feedback, and Problem Solved    Patient mental status/affect: Anxious, Congruent, and Depressed    Patient behavior/appearance: Attentive, Cooperative, and Motivated    Special patient treatment accommodations provided (describe): None    Patient response and progress towards goals: Focus of session was based on How to [employer] from [employer]. We spoke about the different strategies and they  include; Stop all Criticism, Don't Scare Yourself, Be Gentle and Kind and Patient, Be Kind to Your Mind, Praise Yourself, Support Yourself, Be [place] to your Negative, Take Care of Your Body, Mirror work, and Love Yourself and Do it Now.   We spoke about what strategies we can use to help build up this important skill and how to have self love. Pt participated in the group discussion and activity. She stated that she doesn't feel right today and has not been able to eat very much. She is confused as to whether her changes are due to mini strokes(has talked with her doctor) or the aging stages.  We discussed this further and encouraged her to keep sharing her thoughts and feeling and socializing with others and not isolate herself

## 2023-02-03 NOTE — BH NOTES
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  Pt will identify to group members what she sees as the aspects of her daily life that she does have control over and can make a positive impact on    Intervention/techniques: Informed, Validated/Supported, Modeled/Rehearsed, Reinforced, and Provided Feedback  Patient mental status/affect: Anxious, Congruent, Depressed, Preoccupied, and Worried  Patient behavior/appearance: Neatly Groomed, Attentive, Cooperative, Needed Prompting, and Withdrawn/Quiet    Special patient treatment accommodations provided (describe): None    Patient response and progress towards goals: Focus of session was based on information from [employer] about the types of coping skills that we develop over lifetime. We spoke about the need to have these different types and how they can help now at this phase of life. We spoke about active skills which are solving problems, asking for help, goal planning, and reframing thoughts and beliefs. Accommodative skills which are adjusting expectations and preferences to suit situations and people, emotional which are regulating emotional responses to stress, behavioral which is using healthy behaviors to manage stress, and cognitive which are mental activities that help manage stress. We spoke about categories that may be lacking and what can be tried to build up these skills. Edison Chilel is quiet in group but she spoke about how she knows that \"I am just not feeling well today. I can't seem to feel better. \"  We spoke about her self care and if she is doing what she can to eat and get things done at home. She is more concerned about her limitations and struggles and is having a hard time getting her mind off of them.       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 \"

## 2023-02-07 ENCOUNTER — HOSPITAL ENCOUNTER (OUTPATIENT)
Dept: BEHAVIORAL/MENTAL HEALTH | Age: 88
Discharge: HOME | End: 2023-02-07
Payer: MEDICARE

## 2023-02-07 PROCEDURE — 90853 GROUP PSYCHOTHERAPY: CPT

## 2023-02-07 NOTE — BH NOTES
[facility] Outpatient Program  Group Therapy    Date of Service: 2/7/2023  Start time: 1100  Stop time: 1150  Type of session: Therapy Group    Problem number: 1Dep. F33.0    Short term goal (STG): L Pt will identify one core belief or message that she holds that she recognizes fuels anxiety and/or worry and share with group and how the beliefs impact her.        Intervention/techniques: Informed, Validated/Supported, Listened/Empathized, Observed/Monitored, Clarified, Reinforced, Provided Feedback, and Evaluated/Interpreted    Patient mental status/affect: Calm and Congruent    Patient behavior/appearance: Neatly Groomed, Cooperative, Needed Prompting, and Caretaking    Special patient treatment accommodations provided (describe): None    Patient response and progress towards goals: Focus of session was on a handout called 10 signs of a strong sense of self.   I reviewed with group the 10 ideas which includes that we can consider the opinion of others but we make the final decisions in my life,  I am generally aware of me feelings, thoughts, needs, and wants, and I allow others to feel and think differently than I do, and I accept responsibility for the outcome of my choices. I had group review the ideas and which one they can work to build on specifically over the weekend and work to put into practice and develop it into a habit. Pt participated in the group activity and engaged with the other members. Pt was supportive of another pt that had concerns. She was able to share her experiences with her  and how she was finally able to have confidence in herself.   That enabled her to better take care of herself and set boundaries that were needed

## 2023-02-07 NOTE — BH NOTES
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  Pt will address anxiety provoking issues within 24 hours to prevent rumination and further physical, emotional and medical symptoms    Intervention/techniques: Informed, Validated/Supported, Modeled/Rehearsed, Prompted/Cued, Promoted Peer Support, and Provided Feedback    Patient mental status/affect: Anxious, Congruent, Preoccupied, and Worried    Patient behavior/appearance: Neatly Groomed, Attentive, Cooperative, and Needed Prompting    Special patient treatment accommodations provided (describe): None    Patient response and progress towards goals: Focus of session was on information about labeling and identifying emotions from the book The Mindful Path to Self Compassion by Johnny Campos. We discussed how it can be a positive skill to use daily since labeling emotions and sensations can keep you grounded in your body and out of our ruminating mind.   I shared with group the idea of noting an emotion which means to turn toward an inner experience with awareness. We can also label it with a word. I shared that we can also turn away from the difficult inner experience and turn towards an [address] such as breathing. We spoke about how anchoring can help with bringing some calm and peace of mind and then give us the opportunity to do something positive and effective about what we feel. Epi Alejo participated in group with prompting. She continues to be anxious and overwhelmed but she stated she \"feels better than I did this morning. \"  She spoke about the struggle of pushing herself and she spoke about the struggle of going back home when all she wants to do is be around others.

## 2023-02-07 NOTE — BH NOTES
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 Pt will verbalize belief in herself that she has the ability to make thought out decisions and it is a goal she is willing to work towards    Intervention/techniques: Informed, [address], Prompted/Cued, Listened/Empathized, Promoted Peer Support and Provided Feedback    Patient mental status/affect: Anxious, Calm, Congruent and Preoccupied    Patient behavior/appearance: Neatly Groomed, Attentive, Cooperative and Needed Prompting    Special patient treatment accommodations provided (describe): None    Patient response and progress towards goals: Focus of session was based on the handout Everything Is Awful and Im not Okay.   We spoke about how this handout leads us to ask questions to help us see what we can possibly do to take care of ourselves. We spoke about the questions range from physical needs to emotional and mental needs. We spoke about how this can be useful when we are in crisis and need some simple directions that we may be unable to even think about due to the crisis. We spoke about how group members can use this tool and how they may be willing to use it. Faviola Bright participated in group with prompting. She spoke about how she knows that she has a problem eating consistently right now but she does not think she is in any danger zone but it is something for her to be aware of. Jw Timmons(NP)

## 2023-02-07 NOTE — BH NOTES
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  Pt will identify and share with group behaviors that contribute to depressed mood and identify an alternative behavior     Intervention/techniques: Informed, Validated/Supported, Prompted/Cued, Listened/Empathized, Promoted Peer Support, and Provided Feedback  Patient mental status/affect: Calm, Congruent, and Preoccupied  Patient behavior/appearance: Neatly Groomed, Attentive, Cooperative, and Needed Prompting    Special patient treatment accommodations provided (describe): None    Patient response and progress towards goals: Focus of session was based on information from [employer] to WILFRID Sparrow" by [employer]. We spoke about the mental mistake of \"confusing inability with unwillingness. \"  We spoke about the thoughts and beliefs we can hold about our belief in ourselves and what we can do and not do. We spoke about how a person in unable to do something when her or she does not have the physical skills, knowledge, and mental ability. Ines Baneags is to do something when he or she has the physical and mental ability to do it, but chooses not to do it. We spoke about recognizing any \"I can't\" statements that patients are telling themselves and asking \"Do I have the physical and mental capacity to do it and If I absolutely had to, would I? \"  Yancy Escobedo participated in group and she was visibly anxious and upset stating she \"did not want to come but I know I would have just been stuck at home thinking and that makes no sense. \"  She spoke about her awareness of her thoughts and how they are affecting her and she is frustrated with feeling so alone.       Behavioral Health Daily Check In form revealed that pt is not experiencing SI/HI thoughts or plans, remaining abstinent from drugs and alcohol, and reports goal today of  \"eat better, sleep well, socialize. \"

## 2023-02-08 ENCOUNTER — HOSPITAL ENCOUNTER (OUTPATIENT)
Dept: BEHAVIORAL/MENTAL HEALTH | Age: 88
Discharge: HOME | End: 2023-02-08
Payer: MEDICARE

## 2023-02-08 NOTE — BH NOTES
[facility]  Group Therapy      Date of service:2/8/2023    Wilfred hickman went to  pt today and she stated she wasn't aware she was supposed to come today and did not feel well.   She did not attend today but is scheduled for 2/10/2023

## 2023-02-10 ENCOUNTER — HOSPITAL ENCOUNTER (OUTPATIENT)
Dept: BEHAVIORAL/MENTAL HEALTH | Age: 88
Discharge: HOME | End: 2023-02-10
Payer: MEDICARE

## 2023-02-10 PROCEDURE — 90853 GROUP PSYCHOTHERAPY: CPT

## 2023-02-10 NOTE — BH NOTES
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 Pt will identify to group members what she sees as the aspects of her daily life that she does have control over and can make a positive impact on    Intervention/techniques: Informed, Reframed, Modeled/Rehearsed, Observed/Monitored, Facilitated Disclosure, and Provided Feedback    Patient mental status/affect: Anxious, Congruent, and Preoccupied    Patient behavior/appearance: Neatly Groomed, Attentive, Cooperative, and Needed Prompting    Special patient treatment accommodations provided (describe): None    Patient response and progress towards goals: Focus of session was based on article 5 Crucial Coping Skills That Take Time in Sobriety.   I spoke with group members about how all these coping strategies apply to being in mental health recovery as well as working to make positive changes in our lives. The skills we spoke about were: Letting go of a victim mentality, learning to deal with resentment, taking ownership of mistakes, being honest, and practicing gratitude.   We spoke about the aspects of these skills and why they are more difficult to develop. We spoke about which skill group members are willing to work to develop. Molly Rosado participated in group with prompting. She spoke about how she struggles with saying no and not being able to set those boundaries. \"If someone asks me for help, it really bothers me if I can not help them. \"  We spoke about how she knows that she sometimes has to say no for her own sake.

## 2023-02-10 NOTE — BH NOTES
[facility] Outpatient Program  Group Therapy    Date of Service: 2/10/2023  Start time: 1100  Stop time: 1150  Type of session: Therapy Group    Problem number: 1Dep. F33.0    Short term goal (STG): I Pt will develop specific strategies and self-talk that can help her increase her awareness of emotional triggers and how she can cope specifically with emotional dependence.       Intervention/techniques: Informed, Validated/Supported, Listened/Empathized, Observed/Monitored, Clarified, Reinforced, Facilitated Disclosure, and Provided Feedback    Patient mental status/affect: Calm and Congruent    Patient behavior/appearance: Attentive, Cooperative, and Motivated    Special patient treatment accommodations provided (describe): None    Patient response and progress towards goals: Focus of group session was a discussion on anxiety and what triggers anxiety and what anxiety feels like for each group member. We spoke about grounding techniques and the importance of returning to the present moment when anxiety is high and thoughts are starting to race and become irrational and fearful. We spoke about the techniques shared in an article from \"[employer]\" and the techniques include square breathing, developing mantras, touching and describing objects around us, and using declarative memory (name all the dog breeds you know, colors.)  We spoke about what techniques can be helpful for each group member. Pt participated in the group activity and discussion. Pt shared that she has been having dreams lately about dead people. They are people that she has known and afterward she has a hard time going back to sleep. Pt has experienced many deaths recently and stated that she was hoping she would feel well enough to go to a  tomorrow. Pt is trying to balance her feelings related to her friends dying and her own issues regarding getting older herself.   Group members were supportive during the discussion

## 2023-02-10 NOTE — BH NOTES
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 Pt will work to practice skills of optimism and being able to see challenges and see areas she can work to improve rather than engage in self-defeating self-talk     Intervention/techniques: Informed, Validated/Supported, Modeled/Rehearsed, Prompted/Cued, Listened/Empathized, and Provided Feedback  Patient mental status/affect: Anxious, Congruent, and Preoccupied  Patient behavior/appearance: Neatly Groomed, Attentive, Cooperative, and Needed Prompting    Special patient treatment accommodations provided (describe): None    Patient response and progress towards goals: Focus of session was from Flakito Baldwin handout on 5 Tips That Make Setting Boundaries Easier.   We spoke about the importance of asserting boundaries in relationships to keep us healthy and well. We spoke about how the lack of boundaries affect us and our functioning. We spoke about the tips which are: be clear about what you want, be direct and don't apologize for your needs, expect resistance, and don't let it deter you, remember that setting boundaries is an ongoing process, and set boundaries for your own well being and not to control or punish others. Yordan Bundy participated in group with prompting. She spoke about how she is feeling better today than the other day she was here but she spoke about how she has learned that \"some other people who I know have either  or are dying. \"  She spoke about how she knows that she is struggling with managing those constant reminders and sadness but she is doing what can to talk about it here.       Behavioral Health Daily Check In form revealed that pt is not experiencing SI/HI thoughts or plans, remaining abstinent from drugs and alcohol, and reports goal today of  \"talking things out. \"

## 2023-02-14 ENCOUNTER — HOSPITAL ENCOUNTER (OUTPATIENT)
Dept: BEHAVIORAL/MENTAL HEALTH | Age: 88
Discharge: HOME | End: 2023-02-14
Payer: MEDICARE

## 2023-02-14 PROCEDURE — 90853 GROUP PSYCHOTHERAPY: CPT

## 2023-02-14 NOTE — BH NOTES
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  Pt will identify and share with group behaviors that contribute to depressed mood and identify an alternative behavior     Intervention/techniques: Informed, Prompted/Cued, Listened/Empathized, Promoted Peer Support, and Provided Feedback  Patient mental status/affect: Calm, Congruent, Preoccupied, and Worried  Patient behavior/appearance: Neatly Groomed, Attentive, Cooperative, and Needed Prompting    Special patient treatment accommodations provided (describe): None    Patient response and progress towards goals: Focus of session was based on a handout called The Conflict Cycle.   We spoke about how conflict occurs either internally and interpersonally. We discussed how a conflict is when there are two or more opposing attitudes, values, or beliefs. We spoke about the recent examples of an interpersonal conflict and how group members and others responded. We spoke about the consequences, both negative and positive that came out of the conflict, and the change that can result from the conflict. We spoke about some ways in which conflict can be managed and the damage that can result from avoiding conflicts. Jaimee Berger participated in group with prompting. She spoke about how she continues to struggle when she is home alone. She \"hates being in that house alone. \"  She spoke about how she has gone back to [facility] but not every week. We continue to talk about what she can do to move forward and she agreed that she has options but she is struggling with making efforts to set up activities to keep her connected.       Behavioral Health Daily Check In form revealed that pt is not experiencing SI/HI thoughts or plans, remaining abstinent from drugs and alcohol, and reports goal today of \"do better in my health choices. \"

## 2023-02-14 NOTE — BH NOTES
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  Pt will identify choices made in the past week and how they impact emotionally, physically and cognitively    Intervention/techniques: Informed, Modeled/Rehearsed, Prompted/Cued, Promoted Peer Support, and Provided Feedback    Patient mental status/affect: Anxious, Congruent, Preoccupied, and Worried    Patient behavior/appearance: Neatly Groomed, Attentive, and Cooperative    Special patient treatment accommodations provided (describe): None    Patient response and progress towards goals: Focus of session was on the DBT skill of radical acceptance. I shared with group the idea of acceptance has to happen in order for a person to move forward. I shared the skills set which includes radical acceptance, turning the mind, and being willing. I shared that radical acceptance is accepting reality as it is and that life can be worth  living even when there is pain. We spoke about how pain and not accepting it leads to more suffering. I shared that it is difficult to accept the things we don't like but it is necessary. I asked group members to share one thing in their life that they have accepted that has led to a release of pain and suffering and something that they need to work on accepting in their life so they can move on. Funmi Cunningham participated in group with prompting. She spoke about how she has struggled with acceptance at times and we spoke about being focused on what she is resisting she can do right now to help herself. She agreed she is continuing to avoid solutions that could help her feel better.

## 2023-02-14 NOTE — BH NOTES
[facility] Outpatient Program  Group Therapy    Date of Service: 2/14/2023  Start time: 1100  Stop time: 1150  Type of session: Therapy Group    Problem number: 1Dep. F33.0    Short term goal (STG): G Pt will identify to group members what she sees as the aspects of her daily life that she does have control over and can make a positive impact on     Intervention/techniques: Informed, Validated/Supported, Listened/Empathized, Observed/Monitored, Clarified, and Reinforced    Patient mental status/affect: Calm, Congruent, and Preoccupied    Patient behavior/appearance: Attentive, Cooperative, and Motivated    Special patient treatment accommodations provided (describe): None    Patient response and progress towards goals: Focus of session was based on How to [employer] from [employer]. We spoke about the different strategies and they  include; Stop all Criticism, Don't Scare Yourself, Be Gentle and Kind and Patient, Be Kind to Your Mind, Praise Yourself, Support Yourself, Be [place] to your Negative, Take Care of Your Body, Mirror work, and Love Yourself and Do it Now.   We spoke about what strategies we can use to help build up this important skill and how to have self love. Pt participated in the group discussion and activity. Pt shared that she was having a hard time with one of her friends that hurt her feelings recently. She actually hung up the phone on her and was feeling bad about.   The group helped her process her feelings and come up with possible strategies

## 2023-02-15 ENCOUNTER — HOSPITAL ENCOUNTER (OUTPATIENT)
Dept: BEHAVIORAL/MENTAL HEALTH | Age: 88
Discharge: HOME | End: 2023-02-15
Payer: MEDICARE

## 2023-02-15 PROCEDURE — 90853 GROUP PSYCHOTHERAPY: CPT

## 2023-02-15 NOTE — BH NOTES
[facility] Outpatient Program  Group Therapy      Date of service:2/15/2023  Start time: 1000  Stop time: 1050    Type of session: Therapy Group    Problem number: 1Dep. F33.0    Short term goal (STG): K Pt will work on establishing her expectations of her children and share them with the group    Intervention/techniques: Informed, Validated/Supported, Prompted/Cued, Listened/Empathized, Reinforced, and Facilitated Disclosure  Patient mental status/affect: Calm and Congruent  Patient behavior/appearance: Attentive, Cooperative, Needed Prompting, and Withdrawn/Quiet    Special patient treatment accommodations provided (describe): None    Patient response and progress towards goals: Focus of session was on developing positive thoughts to contribute to self esteem to help increase functioning and well being on a daily basis. We spoke about the negative thoughts that we can recognize that impact our self esteem and we spoke about specific challenges to these thoughts. We spoke about thoughts that can help us through difficult relationships and interactions with others, self doubt challenges, focusing on strengths rather than limitations, and focusing on the positive changes we are working on. We spoke about specific affirming thoughts each group member can use at this point in their recovery. Pt listened to the group discussion and engaged when prompted. Pt shared that she had not called the friend yet to discuss their previous interaction. She is still thinking about what she wants to say and how to stay positive.  She feels it is important to share her feelings with this friend    [facility] Daily Check In form revealed that pt is not experiencing SI/HI thoughts or plans, remaining abstinent from drugs and alcohol, and reports goal today of getting better

## 2023-02-15 NOTE — BH NOTES
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 Pt will work to practice skills of optimism and being able to see challenges and see areas she can work to improve rather than engage in self-defeating self-talk     Intervention/techniques: Informed, Prompted/Cued, Listened/Empathized, Promoted Peer Support, and Provided Feedback    Patient mental status/affect: Calm, Congruent, and Preoccupied    Patient behavior/appearance: Neatly Groomed, Attentive, Cooperative, and Needed Prompting    Special patient treatment accommodations provided (describe): None    Patient response and progress towards goals: Focus of session was on recognizing one of the biggest challenges that we face with changing is that it means that we have to give up something that we have become very used to. We spoke about how in order to move forward we are leaving behind old habits, past thinking patterns, former relationships, comfortable conditions, familiar attitudes, or established behaviors. We spoke about how in order live for the present moment, we have to be willing to start a new chapter. Sheeba Irvin participated in group with prompting. She spoke about how she knows she continues to have times in relationships with friends in which she does not get treated \"the same way that I treat them. \"  She spoke about the hurt that happens when she gets treated this way. We spoke about what changes she is willing to make in her relationships and she agreed she can see that she has options.

## 2023-02-15 NOTE — BH NOTES
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    Short term goal (STG): J Pt will address anxiety provoking issues within 24 hours to prevent rumination and further physical, emotional and medical symptoms    Intervention/techniques: Informed, Validated/Supported, Listened/Empathized, Observed/Monitored, Reinforced, Facilitated Disclosure, and Provided Feedback    Patient mental status/affect: Calm and Congruent    Patient behavior/appearance: Attentive, Cooperative, Needed Prompting, and Motivated    Special patient treatment accommodations provided (describe): None    Patient Focus of session was based on the information from the book The Five Languages by Behzad Engle. We reviewed the different languages in which we receive love and care and how we feel when we receive our love language. We spoke about the languages are: words of affirmation, acts of service, affection, quality time, and gifts. We spoke about how we can express our language and let those in our life know how we can feel appreciated and cared for. We spoke about how we can identify our love language in order to understand ourselves more. Pt participated in the group activity and discussion once prompted. She stated that she feels things have been fuzzy and confusing for her lately. She would like for things to settle down a bit. She shared that she is a person of gifts.   She enjoys giving gifts to others and receiving them as well

## 2023-02-17 ENCOUNTER — HOSPITAL ENCOUNTER (OUTPATIENT)
Dept: BEHAVIORAL/MENTAL HEALTH | Age: 88
Discharge: HOME | End: 2023-02-17
Payer: MEDICARE

## 2023-02-17 PROCEDURE — 90853 GROUP PSYCHOTHERAPY: CPT

## 2023-02-17 NOTE — BH NOTES
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    Short term goal (STG): O Pt will work to identify a sense of purpose for this phase of her life and how that purpose can increase her wellbeing and satisfaction and share with group    Intervention/techniques: Informed, Validated/Supported, Listened/Empathized, and Observed/Monitored    Patient mental status/affect: Calm and Congruent    Patient behavior/appearance: Attentive, Cooperative, and Withdrawn/Quiet    Special patient treatment accommodations provided (describe): None    Patient response and progress towards goals: Focus of session was based on the information from the book The Five Languages by Ardis Olszewski. We reviewed the different languages in which we receive love and care and how we feel when we receive our love language. We spoke about the languages are: words of affirmation, acts of service, affection, quality time, and gifts. We spoke about how we can express our language and let those in our life know how we can feel appreciated and cared for. We spoke about how we can identify our love language in order to understand ourselves more   Pt listened to the group activity and discussion. Her affect was brighter at this time.   She remained quiet throughout but was attentive

## 2023-02-17 NOTE — BH NOTES
[facility] Outpatient Program  Group Therapy    Date of Service: 2/17/2023  Start time: 1100  Stop time: 1150  Type of session: Therapy Group    Problem number: 1. Dep F 33.0    Short term goal (STG): L. Pt will identify one core belief or message that she holds that she recognizes fuels anxiety and/or worry and share with group and how the beliefs impact her    Intervention/techniques: Informed, Modeled/Rehearsed, Prompted/Cued, Promoted Peer Support, and Provided Feedback    Patient mental status/affect: Anxious, Congruent, Preoccupied, and Worried    Patient behavior/appearance: Neatly Groomed, Attentive, Cooperative, and Needed Prompting    Special patient treatment accommodations provided (describe): None    Patient response and progress towards goals: Focus of session was from Kenn Hurtado LCSW handout 14 Ways to Validate Yourself.   We spoke about the tendency we have to talk ourselves out of our feelings in many ways including feeling shame or embarrassment and a lot of negative self talk. We spoke about how this may be a valuable place to focus our effort on this moment of initially feeling our feelings and working to accept them. We went over the list of the statements and how they can help give us the moment to stop and pause and let our feelings start to work themselves out rather than engage in any impulsive behaviors or actions. Deo Lopezjose participated in group with prompting. She spoke about how she knows that she is struggling a great deal with allowing her feelings to be present. She is very worried about the fact that she is crying a lot. She spoke about how she will continue to work on accepting and working through the emotions rather than getting frustrated with herself.

## 2023-02-17 NOTE — BH NOTES
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    Short term goal (STG): N Pt will use stop, look, listen, think, and plan before acting once a day and identify healthy responses    Intervention/techniques: Informed, Validated/Supported, Prompted/Cued, Listened/Empathized, Observed/Monitored, Clarified, and Reinforced  Patient mental status/affect: Congruent and Depressed  Patient behavior/appearance: Attentive, Cooperative, and Withdrawn/Quiet    Special patient treatment accommodations provided (describe): None    Patient response and progress towards goals: Focus of session was on developing a healthy stress management plan for dealing with triggers to stress, anxiety, and worry. We spoke about the importance of focusing on life experiences that have strengthened me and has taught us how to manage stress. We also spoke about the importance of having a positive support network of people who can help nurture us and encourage us, we spoke about attitudes and beliefs that have helped to protect us and help us view things differently, we addressed physical self care habits and that prepare us or help us relieve tension, as well as action skills that we can use to change the situation we are in right at the moment of experiencing stress. Pt stated that she was not feeling well emotionally. She has felt like crying and wouldn't have come today without encouragement from her friend.  She was able to cry today in group which is positive as she always appears to \"hold back to be strong\"   The group listened and was supportive of her    [facility] Daily Check In form revealed that pt is not experiencing SI/HI thoughts or plans, remaining abstinent from drugs and alcohol, and reports goal today of  some days being better

## 2023-02-21 ENCOUNTER — HOSPITAL ENCOUNTER (EMERGENCY)
Age: 88
Discharge: HOME | End: 2023-02-21
Attending: EMERGENCY MEDICINE
Payer: MEDICARE

## 2023-02-21 ENCOUNTER — APPOINTMENT (OUTPATIENT)
Dept: GENERAL RADIOLOGY | Age: 88
End: 2023-02-21
Attending: EMERGENCY MEDICINE
Payer: MEDICARE

## 2023-02-21 VITALS
RESPIRATION RATE: 17 BRPM | BODY MASS INDEX: 28.73 KG/M2 | DIASTOLIC BLOOD PRESSURE: 87 MMHG | SYSTOLIC BLOOD PRESSURE: 150 MMHG | TEMPERATURE: 98.4 F | HEART RATE: 60 BPM | WEIGHT: 178 LBS | OXYGEN SATURATION: 95 %

## 2023-02-21 DIAGNOSIS — M19.012 OSTEOARTHRITIS OF LEFT SHOULDER, UNSPECIFIED OSTEOARTHRITIS TYPE: Primary | ICD-10-CM

## 2023-02-21 DIAGNOSIS — R06.2 WHEEZING: ICD-10-CM

## 2023-02-21 LAB
ANION GAP SERPL CALC-SCNC: 8 MMOL/L (ref 5–15)
BASOPHILS # BLD: 0.1 K/UL (ref 0–0.1)
BASOPHILS NFR BLD: 1 % (ref 0–1)
BUN SERPL-MCNC: 14 MG/DL (ref 6–20)
BUN/CREAT SERPL: 12 (ref 12–20)
CALCIUM SERPL-MCNC: 9.1 MG/DL (ref 8.5–10.1)
CHLORIDE SERPL-SCNC: 103 MMOL/L (ref 97–108)
CO2 SERPL-SCNC: 31 MMOL/L (ref 21–32)
CREAT SERPL-MCNC: 1.2 MG/DL (ref 0.55–1.02)
DIFFERENTIAL METHOD BLD: ABNORMAL
EOSINOPHIL # BLD: 0.2 K/UL (ref 0–0.4)
EOSINOPHIL NFR BLD: 2 % (ref 0–7)
ERYTHROCYTE [DISTWIDTH] IN BLOOD BY AUTOMATED COUNT: 12.2 % (ref 11.5–14.5)
GLUCOSE SERPL-MCNC: 138 MG/DL (ref 65–100)
HCT VFR BLD AUTO: 45.5 % (ref 35–47)
HGB BLD-MCNC: 14.5 G/DL (ref 11.5–16)
IMM GRANULOCYTES # BLD AUTO: 0.1 K/UL (ref 0–0.04)
IMM GRANULOCYTES NFR BLD AUTO: 1 % (ref 0–0.5)
LYMPHOCYTES # BLD: 2.9 K/UL (ref 0.8–3.5)
LYMPHOCYTES NFR BLD: 30 % (ref 12–49)
MCH RBC QN AUTO: 29.5 PG (ref 26–34)
MCHC RBC AUTO-ENTMCNC: 31.9 G/DL (ref 30–36.5)
MCV RBC AUTO: 92.7 FL (ref 80–99)
MONOCYTES # BLD: 0.6 K/UL (ref 0–1)
MONOCYTES NFR BLD: 6 % (ref 5–13)
NEUTS SEG # BLD: 5.7 K/UL (ref 1.8–8)
NEUTS SEG NFR BLD: 60 % (ref 32–75)
NRBC # BLD: 0 K/UL (ref 0–0.01)
NRBC BLD-RTO: 0 PER 100 WBC
PLATELET # BLD AUTO: 316 K/UL (ref 150–400)
PMV BLD AUTO: 9.4 FL (ref 8.9–12.9)
POTASSIUM SERPL-SCNC: 4.1 MMOL/L (ref 3.5–5.1)
RBC # BLD AUTO: 4.91 M/UL (ref 3.8–5.2)
SODIUM SERPL-SCNC: 142 MMOL/L (ref 136–145)
TROPONIN I SERPL HS-MCNC: 10 NG/L (ref 0–51)
WBC # BLD AUTO: 9.5 K/UL (ref 3.6–11)

## 2023-02-21 PROCEDURE — 36415 COLL VENOUS BLD VENIPUNCTURE: CPT

## 2023-02-21 PROCEDURE — 93005 ELECTROCARDIOGRAM TRACING: CPT

## 2023-02-21 PROCEDURE — 80048 BASIC METABOLIC PNL TOTAL CA: CPT

## 2023-02-21 PROCEDURE — 73030 X-RAY EXAM OF SHOULDER: CPT

## 2023-02-21 PROCEDURE — 85025 COMPLETE CBC W/AUTO DIFF WBC: CPT

## 2023-02-21 PROCEDURE — 71045 X-RAY EXAM CHEST 1 VIEW: CPT

## 2023-02-21 PROCEDURE — 84484 ASSAY OF TROPONIN QUANT: CPT

## 2023-02-21 PROCEDURE — 99285 EMERGENCY DEPT VISIT HI MDM: CPT

## 2023-02-21 RX ORDER — PREDNISONE 10 MG/1
TABLET ORAL
Qty: 21 TABLET | Refills: 0 | Status: SHIPPED | OUTPATIENT
Start: 2023-02-21

## 2023-02-21 NOTE — ED TRIAGE NOTES
Pt presents via EMS for left shoulder pain x 3 days. Pt denies any injury but states she has arthritis in her shoulder.

## 2023-02-21 NOTE — ED PROVIDER NOTES
EMERGENCY DEPARTMENT HISTORY AND PHYSICAL EXAM      Date: 2/21/2023  Patient Name: Valdez Botello    History of Presenting Illness     Chief Complaint   Patient presents with    Shoulder Pain       History Provided By: Patient and EMS    HPI: Valdez Botello, 82 y.o. female with PMHx significant for DM, hypertension, presents EMS to the ED with cc of left shoulder pain. EMS reported the patient's initial complaint was dizziness but she denies this and states that she has had intermittent dizzy spells for the past 2 years and they are relatively unchanged. She reports her main reason for calling EMS was some pain in her left shoulder for the past 3 days. She states she has known arthritis in her shoulder but has just been bothering her more than usual.  She denies any falls or trauma or known injury. Pain is a constant ache and worse with movement. She is currently denying any dizziness. She denies any chest pain or shortness of breath. She denies any known history of CAD. PMHx: Significant for DM, hypertension  PSHx: Significant for hysterectomy, cholecystectomy  Social Hx: Former smoker. Third of a pack a day for 60 years. Quit in 2021. Nondrinker. There are no other complaints, changes, or physical findings at this time. PCP: Sabrina Mills NP    No current facility-administered medications on file prior to encounter. Current Outpatient Medications on File Prior to Encounter   Medication Sig Dispense Refill    empagliflozin (Jardiance) 10 mg tablet Take 1 Tablet by mouth daily. 90 Tablet 0    fluticasone furoate-vilanteroL (BREO ELLIPTA) 100-25 mcg/dose inhaler Take 1 Puff by inhalation daily. 60 Each 0    [DISCONTINUED] predniSONE (STERAPRED DS) 10 mg dose pack See administration instruction per 10mg dose pack 21 Tablet 0    venlafaxine-SR (EFFEXOR-XR) 150 mg capsule Take 1 Capsule by mouth daily.  90 Capsule 0    Januvia 100 mg tablet TAKE 1 TABLET BY MOUTH EVERY DAY 90 Tablet 1    clopidogreL (PLAVIX) 75 mg tab TAKE 1 TABLET BY MOUTH EVERY DAY 90 Tablet 1    albuterol (PROVENTIL HFA, VENTOLIN HFA, PROAIR HFA) 90 mcg/actuation inhaler Take 2 Puffs by inhalation every four to six (4-6) hours as needed for Wheezing. 18 g 0    levocetirizine (XYZAL) 5 mg tablet TAKE 1 TABLET BY MOUTH DAILY AS NEEDED FOR ALLERGY SYMPTOMS 90 Tablet 2    metoprolol succinate (TOPROL-XL) 50 mg XL tablet Take 1 Tablet by mouth daily. 90 Tablet 3    atorvastatin (LIPITOR) 80 mg tablet Take 1 Tablet by mouth daily. 90 Tablet 3    glucose blood VI test strips (OneTouch Ultra Test) strip CHECK GLUCOSE ONCE DAILY. DX: E11.9 100 Strip 3    fluticasone propionate (FLONASE) 50 mcg/actuation nasal spray 2 Sprays by Both Nostrils route as needed for Allergies. ONLY AS NEEDED (Patient not taking: Reported on 9/1/2022) 1 Each 0    aspirin delayed-release 81 mg tablet Take 1 Tablet by mouth daily. 90 Tablet 1    lisinopriL (PRINIVIL, ZESTRIL) 20 mg tablet Take 1 Tablet by mouth two (2) times a day. 180 Tablet 3    acetaminophen (TYLENOL) 325 mg tablet Take  by mouth every four (4) hours as needed for Pain. Past History     Past Medical History:  Past Medical History:   Diagnosis Date    Allergic rhinitis due to pollen     BPV (benign positional vertigo)     Change in bowel habits     Depression     Diabetes ([address]     Dysuria     Hemorrhoids     Hypertension     IBS (irritable bowel syndrome)     Menopause     Other diseases of nasal cavity and sinuses(478.19)     Vertigo        Past Surgical History:  Past Surgical History:   Procedure Laterality Date    COLONOSCOPY N/A 7/15/2020    COLONOSCOPY performed by Roland Chaudhry MD at [facility] ENDOSCOPY    HX CHOLECYSTECTOMY  2016    HX COLONOSCOPY  2013?     HX HYSTERECTOMY      UPPER GI ENDOSCOPY,DIAGNOSIS  7/15/2020            Family History:  Family History   Problem Relation Age of Onset    No Known Problems Mother        Social History:  Social History     Tobacco Use Smoking status: Former     Packs/day: 0.35     Years: 60.00     Pack years: 21.00     Types: Cigarettes     Quit date:      Years since quittin.1    Smokeless tobacco: Never   Vaping Use    Vaping Use: Never used   Substance Use Topics    Alcohol use: No    Drug use: No       Allergies:  No Known Allergies      Review of Systems   Review of Systems   Constitutional:  Negative for activity change, chills and fever. HENT:  Negative for congestion and sore throat. Eyes:  Negative for pain and redness. Respiratory:  Negative for cough, chest tightness and shortness of breath. Cardiovascular:  Negative for chest pain and palpitations. Gastrointestinal:  Negative for abdominal pain, diarrhea, nausea and vomiting. Genitourinary:  Negative for dysuria, frequency and urgency. Musculoskeletal:  Negative for back pain and neck pain. Skin:  Negative for rash. Neurological:  Positive for dizziness. Negative for syncope, light-headedness and headaches. Psychiatric/Behavioral:  Negative for confusion. All other systems reviewed and are negative. Physical Exam   Physical Exam  Vitals and nursing note reviewed. Constitutional:       General: She is not in acute distress. Appearance: She is well-developed. She is not diaphoretic. HENT:      Head: Normocephalic. Nose: Nose normal.      Mouth/Throat:      Pharynx: No oropharyngeal exudate. Eyes:      General: No scleral icterus. Conjunctiva/sclera: Conjunctivae normal.      Pupils: Pupils are equal, round, and reactive to light. Neck:      Thyroid: No thyromegaly. Vascular: No JVD. Trachea: No tracheal deviation. Cardiovascular:      Rate and Rhythm: Normal rate and regular rhythm. Heart sounds: No murmur heard. No friction rub. No gallop. Pulmonary:      Effort: Pulmonary effort is normal. No respiratory distress. Breath sounds: Normal breath sounds. No stridor. No wheezing or rales.    Abdominal: General: Bowel sounds are normal. There is no distension. Palpations: Abdomen is soft. Tenderness: There is no abdominal tenderness. There is no guarding or rebound. Musculoskeletal:         General: Normal range of motion. Cervical back: Normal range of motion and neck supple. Lymphadenopathy:      Cervical: No cervical adenopathy. Skin:     General: Skin is warm and dry. Findings: No erythema or rash. Neurological:      Mental Status: She is alert and oriented to person, place, and time. Cranial Nerves: No cranial nerve deficit. Motor: No abnormal muscle tone.       Coordination: Coordination normal.   Psychiatric:         Behavior: Behavior normal.           Diagnostic Study Results     Labs -     Recent Results (from the past 12 hour(s))   EKG, 12 LEAD, INITIAL    Collection Time: 02/21/23  1:12 PM   Result Value Ref Range    Ventricular Rate 61 BPM    Atrial Rate 61 BPM    P-R Interval 156 ms    QRS Duration 78 ms    Q-T Interval 402 ms    QTC Calculation (Bezet) 404 ms    Calculated P Axis 62 degrees    Calculated R Axis 1 degrees    Calculated T Axis 157 degrees    Diagnosis       Normal sinus rhythm with sinus arrhythmia  ST & T wave abnormality, consider lateral ischemia  Abnormal ECG  When compared with ECG of 05-SEP-2022 08:12,  Borderline criteria for Inferior infarct are no longer present  Nonspecific T wave abnormality now evident in Inferior leads     CBC WITH AUTOMATED DIFF    Collection Time: 02/21/23  1:20 PM   Result Value Ref Range    WBC 9.5 3.6 - 11.0 K/uL    RBC 4.91 3.80 - 5.20 M/uL    HGB 14.5 11.5 - 16.0 g/dL    HCT 45.5 35.0 - 47.0 %    MCV 92.7 80.0 - 99.0 FL    MCH 29.5 26.0 - 34.0 PG    MCHC 31.9 30.0 - 36.5 g/dL    RDW 12.2 11.5 - 14.5 %    PLATELET 752 872 - 232 K/uL    MPV 9.4 8.9 - 12.9 FL    NRBC 0.0 0  WBC    ABSOLUTE NRBC 0.00 0.00 - 0.01 K/uL    NEUTROPHILS 60 32 - 75 %    LYMPHOCYTES 30 12 - 49 %    MONOCYTES 6 5 - 13 %    EOSINOPHILS 2 0 - 7 %    BASOPHILS 1 0 - 1 %    IMMATURE GRANULOCYTES 1 (H) 0.0 - 0.5 %    ABS. NEUTROPHILS 5.7 1.8 - 8.0 K/UL    ABS. LYMPHOCYTES 2.9 0.8 - 3.5 K/UL    ABS. MONOCYTES 0.6 0.0 - 1.0 K/UL    ABS. EOSINOPHILS 0.2 0.0 - 0.4 K/UL    ABS. BASOPHILS 0.1 0.0 - 0.1 K/UL    ABS. IMM. GRANS. 0.1 (H) 0.00 - 0.04 K/UL    DF AUTOMATED     METABOLIC PANEL, BASIC    Collection Time: 02/21/23  1:20 PM   Result Value Ref Range    Sodium 142 136 - 145 mmol/L    Potassium 4.1 3.5 - 5.1 mmol/L    Chloride 103 97 - 108 mmol/L    CO2 31 21 - 32 mmol/L    Anion gap 8 5 - 15 mmol/L    Glucose 138 (H) 65 - 100 mg/dL    BUN 14 6 - 20 MG/DL    Creatinine 1.20 (H) 0.55 - 1.02 MG/DL    BUN/Creatinine ratio 12 12 - 20      eGFR 44 (L) >60 ml/min/1.73m2    Calcium 9.1 8.5 - 10.1 MG/DL   TROPONIN-HIGH SENSITIVITY    Collection Time: 02/21/23  1:20 PM   Result Value Ref Range    Troponin-High Sensitivity 10 0 - 51 ng/L       Radiologic Studies -   XR CHEST SNGL V   Final Result   No acute cardiopulmonary disease radiographically confirmed. .  . XR SHOULDER LT AP/LAT MIN 2 V   Final Result   No acute bony abnormality. CT Results  (Last 48 hours)      None          CXR Results  (Last 48 hours)                 02/21/23 1329  XR CHEST SNGL V Final result    Impression:  No acute cardiopulmonary disease radiographically confirmed. .  . Narrative:  INDICATION:  Chest Pain        EXAM: Chest single view. COMPARISON: 12/29/2022. FINDINGS: A single frontal view of the chest at 1323 hours shows clear lungs,   allowing for densities over both lung bases caused by tight clothing and soft   tissue irregularity. .  The heart, mediastinum and pulmonary vasculature are   normal .  The bony thorax is unremarkable for age. .                     Medical Decision Making   I am the first provider for this patient.     I reviewed the vital signs, available nursing notes, past medical history, past surgical history, family history and social history. Vital Signs-Reviewed the patient's vital signs. Patient Vitals for the past 12 hrs:   Temp Pulse Resp BP SpO2   02/21/23 1303 98.4 °F (36.9 °C) 62 16 (!) 156/91 94 %       Pulse Oximetry Analysis - 94% on RA    Cardiac Monitor:   Rate: 62 bpm  Rhythm: Normal Sinus Rhythm        ED EKG interpretation:13:12  Rhythm: normal sinus rhythm; and regular . Rate (approx.): 61; Axis: normal; RI Interval: normal; QRS interval: normal ; ST/T wave: non-specific changes; This EKG was interpreted by Lesly Snyder MD,ED Provider. Records Reviewed: Nursing notes and EMS run sheet    Provider Notes (Medical Decision Making):   Patient with intermittent left shoulder pain for the past several days. Patient thinks it is probably related to her known osteoarthritis but wanted to make sure it was not anything more serious. No shortness of breath. No chest pain. Will obtain cardiac work-up with EKG, troponin and CBC and BMP. Will obtain plain films of chest and left shoulder. ED Course:   Initial assessment performed. The patients presenting problems have been discussed, and they are in agreement with the care plan formulated and outlined with them. I have encouraged them to ask questions as they arise throughout their visit. PROGRESS NOTE    Pt reevaluated. No acute EKG changes. Troponin negative. Clear chest x-ray. Shoulder plain films without any acute findings. Mild OA. Will discharge with prednisone taper. Avoiding NSAIDs as patient on Plavix. Written by Lesly Snyder MD     Progress note:    Pt noted to be feeling better , ready for discharge. Updated pt and/or family on all final lab and imaging findings. Will follow up as instructed. All questions have been answered, pt voiced understanding and agreement with plan. Specific return precautions provided as well as instructions to return to the ED should sx worsen at any time. Vital signs stable for discharge.        I have also put together some discharge instructions for them that include: 1) educational information regarding their diagnosis, 2) how to care for their diagnosis at home, as well a 3) list of reasons why they would want to return to the ED prior to their follow-up appointment, should their condition change. Written by Melody French MD        Critical Care Time:   0    Disposition:  Discharge    PLAN:  1. Current Discharge Medication List        CONTINUE these medications which have CHANGED    Details   predniSONE (STERAPRED DS) 10 mg dose pack See administration instruction per 10mg dose pack  Qty: 21 Tablet, Refills: 0  Start date: 2/21/2023    Associated Diagnoses: Wheezing           2. Follow-up Information       Follow up With Specialties Details Why Contact Info    Leilani Aparicio NP Nurse Practitioner Schedule an appointment as soon as possible for a visit in 1 week  [id number] [place] [address] [address] [facility].      [address] [facility] [address] [facility] Emergency Medicine  If symptoms worsen [address] [id number]          Return to ED if worse     Diagnosis     Clinical Impression:   1. Osteoarthritis of left shoulder, unspecified osteoarthritis type    2. Wheezing              Please note that this dictation was completed with [employer], the computer voice recognition software. Quite often unanticipated grammatical, syntax, homophones, and other interpretive errors are inadvertently transcribed by the computer software. Please disregard these errors. Please excuse any errors that have escaped final proofreading.

## 2023-02-22 ENCOUNTER — HOSPITAL ENCOUNTER (OUTPATIENT)
Dept: BEHAVIORAL/MENTAL HEALTH | Age: 88
Discharge: HOME | End: 2023-02-22
Payer: MEDICARE

## 2023-02-22 PROCEDURE — 90853 GROUP PSYCHOTHERAPY: CPT

## 2023-02-22 NOTE — BH NOTES
[facility] Outpatient Program  Group Therapy      Date of service:2/22/2023  Start time: 1000  Stop time: 1050    Type of session: Therapy Group    Problem number: :1Dep. F33.0     Short term goal (STG): L Pt will identify one core belief or message that she holds that she recognizes fuels anxiety and/or worry and share with group and how the beliefs impact her.        Intervention/techniques: Informed, Validated/Supported, Prompted/Cued, Listened/Empathized, and Observed/Monitored  Patient mental status/affect: Depressed, Flat, and Preoccupied  Patient behavior/appearance: Attentive, Cooperative, Needed Prompting, and Withdrawn/Quiet    Special patient treatment accommodations provided (describe): None    Patient response and progress towards goals: Focus of session was on positive self talk when feeling anger. We spoke about how we can develop the skill of helping ourselves out of moments of anger that have a tendency to then impact your day and your functioning and well being. We spoke about the usual self talk that goes with anger and how it can make things worse and asked group members to share their anger thoughts that are typical for them. I shared some ideas for self talk of managing anger such as I don't need to prove myself in this situation or as long as I keep my cool, I am in control or people are going to act the way they want to, not the way I want them to.   We spoke about how these ideas can better sooth our anger and help us refocus on healthy self care. Pt listened to the group discussion and others concerns. Pt stated that she did not feel great today and had gone to the ER yesterday regarding her shoulder. They are calling in a prescription for her today . She then remained quiet and observed the session.       Behavioral Health Daily Check In form revealed that pt is not experiencing SI/HI thoughts or plans, remaining abstinent from drugs and alcohol, and reports goal today of get my meds

## 2023-02-22 NOTE — BH NOTES
[facility] Outpatient Program  Group Therapy    Date of Service: 2/22/2023  Start time: 1100  Stop time: 1150  Type of session: Therapy Group    Problem number:1Dep. F33.0     Short term goal (STG): M Pt will share instances when she was able to step back and make a choice about how she managed her mood or her symptoms in order to gain more confidence in herself and her decisions     Intervention/techniques: Informed, Validated/Supported, Listened/Empathized, Observed/Monitored, Reinforced, and Provided Feedback    Patient mental status/affect: Depressed, Flat, and Worried    Patient behavior/appearance: Attentive, Cooperative, and Withdrawn/Quiet    Special patient treatment accommodations provided (describe): None    Patient response and progress towards goals: Focus of session was on identifying the difference between mindless responses and mindful responses. We spoke about the impact that either has on our moods and our functioning. We spoke about the benefits of mindfulness which included being non judging, based on facts, can distance self from thoughts, being in an approach mode, can let go, and are more calm and effective in any given moment. Pt participated in the group activity and engaged with others when prompted. She shared that she has been upset regarding her mindset for the last couple of weeks. She knows she is lonely but doesn't feel like going out. We discussed her feelings related to this and also adjusting to getting older.   Pt stated at times she feels like she is taking a seat someone else might need and I reassured her she is not

## 2023-02-23 LAB
ATRIAL RATE: 61 BPM
CALCULATED P AXIS, ECG09: 62 DEGREES
CALCULATED R AXIS, ECG10: 1 DEGREES
CALCULATED T AXIS, ECG11: 157 DEGREES
DIAGNOSIS, 93000: NORMAL
P-R INTERVAL, ECG05: 156 MS
Q-T INTERVAL, ECG07: 402 MS
QRS DURATION, ECG06: 78 MS
QTC CALCULATION (BEZET), ECG08: 404 MS
VENTRICULAR RATE, ECG03: 61 BPM

## 2023-02-28 ENCOUNTER — HOSPITAL ENCOUNTER (OUTPATIENT)
Dept: BEHAVIORAL/MENTAL HEALTH | Age: 88
Discharge: HOME | End: 2023-02-28
Payer: MEDICARE

## 2023-02-28 PROCEDURE — 90853 GROUP PSYCHOTHERAPY: CPT

## 2023-02-28 NOTE — BH NOTES
[facility] Outpatient Program  Group Therapy    Date of Service: 2/28/2023  Start time: 1200  Stop time: 1250  Type of session: Therapy Group    Problem number: 1. Dep F 33.0    Short term goal (STG): L. Pt will identify one core belief or message that she holds that she recognizes fuels anxiety and/or worry and share with group and how the beliefs impact her    Intervention/techniques: Informed, Modeled/Rehearsed, Prompted/Cued, Observed/Monitored, and Provided Feedback    Patient mental status/affect: Anxious, Congruent, Depressed, and Preoccupied    Patient behavior/appearance: Neatly Groomed, Attentive, Cooperative, Needed Prompting, and Withdrawn/Quiet    Special patient treatment accommodations provided (describe): None    Patient response and progress towards goals: Focus of session was on an article by Charli Knight LCSW titled End Self Criticism and Learn Self-Acceptance.   We spoke about where the habit of self-criticism can come from and we are often repeating what we heard in younger years. We also spoke about how it can stem from unrealistic expectations due to expecting perfection, which is unachievable. We spoke about the importance of taking this on as a goal for recovery as this habit is often what can derail a person back into unnecessary anxiety and depression as a result of low self-esteem. I shared with group the strategies shared in article such as challenge the inner critic with rational questions, practice helpful self-talk, tell yourself what you needed to hear as a child, and focus on self-acceptance. We spoke about what changes group members can and are willing to make. Rebeca Farfan participated in group with direct questions. She spoke about how she knows that she needs to be kinder to herself and work to accept the moment that she is in. She got a lot of support and encouragement from group today.

## 2023-02-28 NOTE — BH NOTES
[facility] Outpatient Program  Group Therapy      Date of service:  2/28/2023  Start time: 1000  Stop time: 1050    Type of session: Therapy Group    Problem number: 1. Dep F 33.0    Short term goal (STG): F.  Pt will work to practice skills of optimism and being able to see challenges and see areas she can work to improve rather than engage in self-defeating self-talk     Intervention/techniques: Informed, Prompted/Cued, Listened/Empathized, Promoted Peer Support, and Provided Feedback  Patient mental status/affect: Calm, Congruent, and Preoccupied  Patient behavior/appearance: Neatly Groomed, Attentive, Cooperative, Needed Prompting, and Needy    Special patient treatment accommodations provided (describe): None    Patient response and progress towards goals: Focus of session was based on DBT skills and developing the [facility] skills of DBT. We spoke about the [facility] skills are observing, describing, and participating and they are core mindfulness skills and these skills help up determine where we are going in our lives, knowing who we are, and learning how to control what is in our minds. We spoke about how these skills lead us to be able to learn how to observe and accept emotions, thoughts, situations, and experiences. We spoke about the observing is key as this allows us to step back and allow for needed space and lets us not get caught up in the experience and create additional stress. We spoke about how we can use these skills at this time and work to develop those valuable core mindfulness skills. Willy Beal participated in group and she expressed a lot of concern for herself and her functioning. She spoke about how she is struggling with getting her son to help get work done around the house. She is hesitant in expressing herself with him according to her. She received good support from group with ideas of how she can communicate her needs more directly.        Behavioral Health Daily Check In form revealed that pt is not experiencing SI/HI thoughts or plans, remaining abstinent from drugs and alcohol, and reports goal today of \"trying to share with my son what is most important that needs to get done. \"

## 2023-02-28 NOTE — BH NOTES
[facility] Outpatient Program  Group Therapy    Date of Service: 2/28/2023  Start time: 1100  Stop time: 1150  Type of session: Therapy Group    Problem number: 1Dep. F33.0    Short term goal (STG): J Pt will address anxiety provoking issues within 24 hours to prevent rumination and further physical, emotional and medical symptom    Intervention/techniques: Informed, Validated/Supported, Prompted/Cued, Listened/Empathized, and Observed/Monitored    Patient mental status/affect: Calm, Congruent, and Depressed    Patient behavior/appearance: Attentive, Cooperative, Needed Prompting, and Withdrawn/Quiet    Special patient treatment accommodations provided (describe): None    Patient response and progress towards goals: Focus of session was on the struggles we face emotionally when we hang on to hurts from the past and when we cannot forgive ourselves for our own past mistakes. We spoke about how lack of forgiveness for self and others can easily lead to depression, anger, low self esteem, isolation, impaired relationships, and unhealthy impulsive behavior. We spoke about how forgiveness is not amnesia and we do not necessarily have to forgive and forget but we can forgive and remember the mistakes we make or the people who hurt us are often our greatest teachers. We spoke about how forgiveness is not a one time decision but a process of letting go and it is not pardoning or condoning what we have done or others have done and it does not require the other person to apologize to you or agree with us. We spoke about focusing on one issue that this would be a better day than any to forgive and let go and we spoke about using the quotes of either I forgive others for the past.  Today I forgive _______for_________ and I am still mad at myself for _______ but today I am forgiving and I am letting go.   Pt listened tot he group discussion and activity.   She was alert and cooperative but remained quiet for most of the group unless prompted.   Pt called yesterday and stated she did know if she would feel like coming today but she did, which was positive toward her goal of not isolating

## 2023-03-01 ENCOUNTER — HOSPITAL ENCOUNTER (OUTPATIENT)
Dept: BEHAVIORAL/MENTAL HEALTH | Age: 88
Discharge: HOME | End: 2023-03-01
Payer: MEDICARE

## 2023-03-01 PROCEDURE — 99213 OFFICE O/P EST LOW 20 MIN: CPT | Performed by: PSYCHIATRY & NEUROLOGY

## 2023-03-01 PROCEDURE — 90853 GROUP PSYCHOTHERAPY: CPT

## 2023-03-01 RX ORDER — VENLAFAXINE HYDROCHLORIDE 75 MG/1
225 CAPSULE, EXTENDED RELEASE ORAL DAILY
Qty: 270 CAPSULE | Refills: 1 | Status: SHIPPED | OUTPATIENT
Start: 2023-03-01

## 2023-03-01 NOTE — BH NOTES
[facility] Outpatient Program  Group Therapy    Date of Service: 3/1/2023  Start time: 1100  Stop time: 1150  Type of session: Therapy Group    Problem number: 1. Dep F 33.0    Short term goal (STG): M. Pt will share instances when she was able to step back and make a choice about how she managed her mood or her symptoms in order to gain more confidence in herself and her decisions     Intervention/techniques: Informed, Reframed, Listened/Empathized, Observed/Monitored, Promoted Peer Support, and Provided Feedback    Patient mental status/affect: Anxious, Congruent, Depressed, Preoccupied, and Worried    Patient behavior/appearance: Neatly Groomed, Attentive, Cooperative, Negative, and Needy    Special patient treatment accommodations provided (describe): None    Patient response and progress towards goals: Focus of session was on a handout called 10 signs of a strong sense of self.   I reviewed with group the 10 ideas which includes that we can consider the opinion of others but we make the final decisions in my life,  I am generally aware of me feelings, thoughts, needs, and wants, and I allow others to feel and think differently than I do, and I accept responsibility for the outcome of my choices. I had group review the ideas and which one they can work to build on specifically over the weekend and work to put into practice and develop it into a habit. Epi Alejo participated in group with prompting. She is visibly anxious and is reporting depressive symptoms and thoughts. She expressed her willingness to try and stay focused on her ry. We spoke about how much she used to be able to do and we did talk about how her limitations have affected her abilities to go out and connect.

## 2023-03-01 NOTE — PROGRESS NOTES
SOP PROGRESS NOTE    INTERVAL HISTORY/FINDINGS:  States she's been \"crying all the time\", usually once a day or more. She's been more sensitive about certain types of stress--deaths in her family or Judaism members, as well as more minor issues, and these things tend to trigger her sadness. We discussed Rx options further, and she agreed to increasing the Effexor XR to 225 mg daily, even though she's concerned about what her daughter may say. Her N/V functioning has been slightly more impaired, but not severely so and she's not had any SE's with the Effexor XR to this point. MEDICATIONS:  Current Outpatient Medications   Medication Sig    predniSONE (STERAPRED DS) 10 mg dose pack See administration instruction per 10mg dose pack    empagliflozin (Jardiance) 10 mg tablet Take 1 Tablet by mouth daily. fluticasone furoate-vilanteroL (BREO ELLIPTA) 100-25 mcg/dose inhaler Take 1 Puff by inhalation daily. venlafaxine-SR (EFFEXOR-XR) 150 mg capsule Take 1 Capsule by mouth daily. Januvia 100 mg tablet TAKE 1 TABLET BY MOUTH EVERY DAY    clopidogreL (PLAVIX) 75 mg tab TAKE 1 TABLET BY MOUTH EVERY DAY    albuterol (PROVENTIL HFA, VENTOLIN HFA, PROAIR HFA) 90 mcg/actuation inhaler Take 2 Puffs by inhalation every four to six (4-6) hours as needed for Wheezing. levocetirizine (XYZAL) 5 mg tablet TAKE 1 TABLET BY MOUTH DAILY AS NEEDED FOR ALLERGY SYMPTOMS    metoprolol succinate (TOPROL-XL) 50 mg XL tablet Take 1 Tablet by mouth daily. atorvastatin (LIPITOR) 80 mg tablet Take 1 Tablet by mouth daily. glucose blood VI test strips (OneTouch Ultra Test) strip CHECK GLUCOSE ONCE DAILY. DX: E11.9    fluticasone propionate (FLONASE) 50 mcg/actuation nasal spray 2 Sprays by Both Nostrils route as needed for Allergies. ONLY AS NEEDED (Patient not taking: Reported on 9/1/2022)    aspirin delayed-release 81 mg tablet Take 1 Tablet by mouth daily.     lisinopriL (PRINIVIL, ZESTRIL) 20 mg tablet Take 1 Tablet by mouth two (2) times a day. acetaminophen (TYLENOL) 325 mg tablet Take  by mouth every four (4) hours as needed for Pain. No current facility-administered medications for this encounter. MENTAL STATUS UPDATE: (Positives in [place])  Appearance:   Neat   Disheveled  Odiferous   Appropriate  Orientation:   Time  Place  Person  Speech:   Coherent  Pressured  Garbled/Slurred  Loud   Soft  Mute  Memory:   Intact  Impaired (Recent  Remote)--Mild  Severe  Mood:   Euthymic  Depressed-increased  Anxious-mild  Elated  Affect:    Appropriate  Inappropriate  Labile  Blunted  Flat-mild  Thought Content:   Logical  Goal directed  Paranoid  Delusional  Illogical IOR  SI  HI  Thought Process:   Coherent/linear  Tangential  Loose/Disorganized   Hallucinations:   None  Auditory  Visual  Tactile  Olfactory  Gustatory  Insight:   Good  Fair  Impaired      Judgment:   Good  Fair  Impaired     CONTINUED NEED FOR TREATMENT: (Positives in [place])  1. Continued psychiatric symptoms causing impairment in IDL  or functioning  2. Continued non-compliance with meds resulting in decompensation  3. High level of debilitation and symptoms with inadequate support  4. Continued need for structured care to adjust medications  5. Continued presence of debilitating symptoms  6. Continued presence of risk factors     CONTINUED NEED FOR GROUP PSYCHOTHERAPY TO ADDRESS THE FOLLOWING: (Positives in [place])  Psychiatric symptom management       Psychiatric relapse prevention    Anxiety management   Self-esteem issues      Poor decision making       Recent decompensation       Safety issues   Anger management     Self care/ADL's     Med/treatment compliance      Impaired boundaries/relationships   Assertiveness      Grief/loss resolution       Guilt/shame issues     Realistic goal setting     DIAGNOSTIC IMPRESSION:  1. Major Depression, recurrent, mild to moderate (F33.0)     PLAN:   1.  Increase the Venlafaxine ER to 225 mg daily (75 mg)--#270 with 1 refill (90 day).   2. Continue SOP treatment 3 x/week.

## 2023-03-01 NOTE — BH NOTES
[facility] Outpatient Program  Group Therapy    Date of Service: 3/1/2023  Start time: 1200  Stop time: 1250  Type of session: Therapy Group    Problem number: 1Dep. F33.0    Short term goal (STG): K Pt will work on establishing her expectations of her children and share them with the group    Intervention/techniques: Informed, Validated/Supported, Prompted/Cued, Listened/Empathized, Observed/Monitored, Clarified, and Reinforced    Patient mental status/affect: Anxious, Congruent, and Depressed    Patient behavior/appearance: Attentive, Cooperative, and Withdrawn/Quiet    Special patient treatment accommodations provided (describe): None    Patient response and progress towards goals: Focus of session was on developing understanding of how thoughts lead to emotions and behaviors as taught in Cognitive Behavioral Therapy. We spoke about the technique of knowing your ABC's in CBT. Group members learned about how A stands for the activating events that is a real external event that has occurred, or a trigger.   B is for beliefs or thoughts, attitudes, meanings we attach to A, and C is for consequences and our behaviors that are either constructive or destructive as a result. We discussed recent triggers and what we thought, believed, and felt about it as well as what our choice is in regards to our actions Pt participated in the activity and engaged with the other members. She appeared to be in a brighter mood during this session. She shared how she understands what another member is going through and wants to be able to help him.   She wants to continue to feel useful even though there are days she doesn't feel like  getting out of the house or being social.

## 2023-03-01 NOTE — BH NOTES
[facility] Outpatient Program  Group Therapy      Date of service:3/1/2023  Start time: 1000  Stop time: 1050    Type of session: Therapy Group    Problem number: 1Dep. F33.0      Short term goal (STG): G Pt will identify to group members what she sees as the aspects of her daily life that she does have control over and can make a positive impact on     Intervention/techniques: Informed, Validated/Supported, Prompted/Cued, Listened/Empathized, Observed/Monitored, and Reinforced  Patient mental status/affect: Depressed and Flat  Patient behavior/appearance: Attentive, Cooperative, Needed Prompting, and Withdrawn/Quiet    Special patient treatment accommodations provided (describe): None    Patient response and progress towards goals: Focus of session was on the article \"The Emotion Chart My Therapist Gave Me That I Didn't Know I Needed. \"  We spoke about how we often are not willing to identify and process emotions as they come up and we tend to suppress or avoid them. We discussed how this then leads to overreacting later to little things that occur and we are then overly emotional. We spoke about how the Wheel of Emotions can then lead to identifying broader emotions and then can help us specify the underlying emotions. Pt shared that she has still been feeling down and is crying everyday. She is confused by this as it is a change of behavior for her. We discussed how she has dealt with her emotions and her changes of life over time.  She usually puts a smile on her face and goes on but she is having a harder time doing that and doesn't want to cry in public and then have to deal with others    Behavioral Health Daily Check In form revealed that pt is not experiencing SI/HI thoughts or plans, remaining abstinent from drugs and alcohol, and reports no goal

## 2023-03-03 ENCOUNTER — HOSPITAL ENCOUNTER (OUTPATIENT)
Dept: BEHAVIORAL/MENTAL HEALTH | Age: 88
Discharge: HOME | End: 2023-03-03
Payer: MEDICARE

## 2023-03-03 PROCEDURE — 90853 GROUP PSYCHOTHERAPY: CPT

## 2023-03-03 NOTE — BH NOTES
[facility] Outpatient Program  Group Therapy    Date of Service: 3/3/2023  Start time: 1100  Stop time: 1150  Type of session: Therapy Group    Problem number: 1Dep. F33.0    Short term goal (STG): N Pt will use stop, look, listen, think, and plan before acting once a day and identify healthy responses    Intervention/techniques: Informed, Validated/Supported, Listened/Empathized, Observed/Monitored, Clarified, and Reinforced    Patient mental status/affect: Calm and Congruent    Patient behavior/appearance: Attentive, Cooperative, Motivated, and Withdrawn/Quiet    Special patient treatment accommodations provided (describe): None    Patient response and progress towards goals: Focus of session was based on an idea developed by Nicolle Dang (possibility therapy) and Hever Hay, Ph.D. (solution-focused therapy). It is called Do One Thing Different and it is supported by 12 step's motto and definition of insanity is doing the same thing over and over again and expecting different results.   I spoke with group members about the idea is to think about the things you do in a problem situation and change any part you can. We spoke about how we can change our reaction, our attitude, our tone of voice, our behavior, we can think about what others do to help themselves and what works and what has worked for us in the past.  We also spoke about picturing life and what we would feel like without this problem in it. I asked group members to identify one small thing that they can do different and how it can impact their life. Pt participated in the group activity and read aloud from the worksheet. She listened to others and was attentive but quiet. She did say her one thing different she was going to try this weekend would be to clean. She may have company and wants to clean up her yard.

## 2023-03-03 NOTE — BH NOTES
[facility] Outpatient Program  Group Therapy    Date of Service: 3/3/2023  Start time: 1200  Stop time: 1250  Type of session: Therapy Group    Problem number: 1. Dep F 33.0    Short term goal (STG): D. Pt will identify choices made in the past week and how they impact emotionally, physically and cognitively     Intervention/techniques: Informed, Reframed, Modeled/Rehearsed, and Provided Feedback    Patient mental status/affect: Anxious, Calm, Congruent, and Preoccupied    Patient behavior/appearance: Neatly Groomed, Attentive, Cooperative, and Needed Prompting    Special patient treatment accommodations provided (describe): None    Patient response and progress towards goals: Focus of session was on identifying healthy plans for the weekend. We spoke about setting goals and intentions for two things this weekend and they are identifying at least one thing that can be done this weekend to help ourselves and one thing that we won't do this weekend to help ourselves. I spoke with group members about the importance of picking things to do or accomplish that are not in their normal routine and are steps up in terms of activities or behaviors that they want to incorporate in their lives. We also spoke about how what they choose to not do needs to be something that they have the desire to give up in order to better themselves. I shared that if these goals can be accomplished, they can come back next week with energy and motivation to keep making positive changes. Group members shared their personal goals and objectives for the weekend. Rick Casey participated in group with prompting. She spoke about how she knows that she wants to clean as she hopes she has some former classmates/cousins coming to see her this weekend. She is anxious that they will not come. We spoke about how she will call them this afternoon to make sure they are coming so she does not sit at home waiting for them.

## 2023-03-03 NOTE — BH NOTES
[facility] Outpatient Program  Group Therapy      Date of service:  3/3/2023  Start time: 1000  Stop time: 1050    Type of session: Therapy Group    Problem number: 1. Dep F 33.0    Short term goal (STG): B.  Pt will identify and share with group behaviors that contribute to depressed mood and identify an alternative behavior     Intervention/techniques: Informed, Refocused, Prompted/Cued, Listened/Empathized, Promoted Peer Support, and Provided Feedback  Patient mental status/affect: Anxious, Congruent, Preoccupied, and Worried  Patient behavior/appearance: Neatly Groomed, Attentive, Cooperative, and Needed Prompting    Special patient treatment accommodations provided (describe): None    Patient response and progress towards goals: Focus of session was based on information from the book The Disease to Please by Dr. Saba. I shared information about Emotophobia which is a phrase coined by Dr. Gigi Rivas and refers to an excessive or irrational fear of negative feelings. We spoke about how in the case of people pleasing, the fears encompass anger, conflict, aggression, or confrontations. We spoke about the damage this does to a person emotionally and mentally and what patient can do to work through this phobia and develop strategies to help in their long-term recovery. Tracy Garces participated in group with prompting. She spoke about how she has struggled with people pleasing for most of her life and she recognizes how much she hates to say no. She spoke about how she can easily tell others to take care of themselves but she does not allow herself to same allowance. She agreed she will look for areas where this can be improved and opportunities to say no. Behavioral Health Daily Check In form revealed that pt is not experiencing SI/HI thoughts or plans, remaining abstinent from drugs and alcohol, and reports goal today of  \"listen to my coworkers and Dr. Ely Clemente. \"

## 2023-03-08 ENCOUNTER — HOSPITAL ENCOUNTER (OUTPATIENT)
Dept: BEHAVIORAL/MENTAL HEALTH | Age: 88
Discharge: HOME | End: 2023-03-08
Payer: MEDICARE

## 2023-03-08 PROCEDURE — 90853 GROUP PSYCHOTHERAPY: CPT

## 2023-03-08 NOTE — BH NOTES
[facility] Outpatient Program  Group Therapy      Date of service:3/8/2023  Start time: 1000  Stop time: 1050    Type of session: Therapy Group    Problem number: 1Dep. F33.0    Short term goal (STG): G Pt will identify to group members what she sees as the aspects of her daily life that she does have control over and can make a positive impact on     Intervention/techniques: Informed, Validated/Supported, Prompted/Cued, Listened/Empathized, Observed/Monitored, Clarified, Reinforced, and Provided Feedback  Patient mental status/affect: Calm and Congruent  Patient behavior/appearance: Attentive, Cooperative, Needed Prompting, and Withdrawn/Quiet    Special patient treatment accommodations provided (describe): None    Patient response and progress towards goals: Focus of session was on the quote Each Day I Remember and we went over it and that it included the fact that each day we have choices, that taking care of ourselves can be our first choice, that it is okay to say no when necessary and to stand up for myself, that I don't have to please others or be everything to everyone, I can be honest and still kind and stick boundaries and stick with them, as well as I can honor myself and that is my responsibility and right to do so. We spoke about how this can be a quote that can help push us in the direction of self-care and we spoke about what stood out in particular to group members about self-care. Pt listened tot he group discussion and the concerns of another patient who is depressed and adjusting to his medication. Pt was supportive and shared her experiences that have gotten her to the point now of believing in regular medication management.   She also stated that is was good for her to come today and listen and share         Behavioral Health Daily Check In form revealed that pt is not experiencing SI/HI thoughts or plans, remaining abstinent from drugs and alcohol, and reports goal today of   being around people, talking, praying and deep breathing

## 2023-03-08 NOTE — BH NOTES
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    Short term goal (STG): D Pt will identify choices made in the past week and how they impact emotionally, physically and cognitively     Intervention/techniques: Informed, Validated/Supported, Listened/Empathized, Observed/Monitored, Reinforced, and Provided Feedback    Patient mental status/affect: Calm and Congruent    Patient behavior/appearance: Attentive, Cooperative, and Withdrawn/Quiet    Special patient treatment accommodations provided (describe): None    Patient response and progress towards goals: Focus of session was on procrastination and the possible reasons behind why we do it. We spoke about fear and lack of confidence and esteem can play a major role in our reasons for doing this and we spoke about how we can also let being stubborn get in our way and that affects our ability to move forward. We spoke about how we can be contributing the reason to not do these things by how we talk to ourselves and that we tell ourselves that we are not strong enough and we are not capable. We addressed effective ways to avoid procrastination and that we need to refute our arguments aggressively as to why we are putting things off. We spoke about addressing specifically our reason for delay and developing fair arguments against delay and work to move forward. Pt participated in the activity regarding procrastination. Pt agreed that she puts things off when she should go ahead and do them. The time it is most difficult is when she is feeling anxious and depressed.  The group discussed possible coping strategies

## 2023-03-08 NOTE — BH NOTES
[facility] Outpatient Program  Group Therapy    Date of Service: 3/8/2023  Start time: 1100  Stop time: 1150  Type of session: Therapy Group    Problem number: 1. Dep F 33.0    Short term goal (STG): M. Pt will share instances when she was able to step back and make a choice about how she managed her mood or her symptoms in order to gain more confidence in herself and her decisions     Intervention/techniques: Informed, Validated/Supported, Modeled/Rehearsed, Prompted/Cued, Promoted Peer Support, and Provided Feedback    Patient mental status/affect: Anxious, Congruent, and Preoccupied    Patient behavior/appearance: Neatly Groomed, Attentive, Cooperative, and Needed Prompting    Special patient treatment accommodations provided (describe): None    Patient response and progress towards goals: Focus of session was based on article 6 Steps for Personal Change.   We spoke about how the goal is to get rid on unhealthy behaviors. We spoke about the steps which include focusing on one change at a time, identifying why you want to change, understand how the behavior has served you, and to be able to sit with the discomfort of the process of change as well as take baby steps to change. We spoke about the importance of accepting and understanding why we have done the behavior or had this particular way for so long to help us move past having it serve us in some unhealthy way. Tracy Garces participated in group with prompting. She spoke about how she knows that she wants to return to [employer] regularly but she is stuck on not knowing where as she has thought more of going to her mothers [employer]. She is struggling today with processing information but with guidance, she is very responsive to ideas.

## 2023-03-10 ENCOUNTER — HOSPITAL ENCOUNTER (OUTPATIENT)
Dept: BEHAVIORAL/MENTAL HEALTH | Age: 88
Discharge: HOME | End: 2023-03-10
Payer: MEDICARE

## 2023-03-10 PROCEDURE — 90853 GROUP PSYCHOTHERAPY: CPT

## 2023-03-10 NOTE — BH NOTES
[facility] Outpatient Program  Group Therapy    Date of Service: 3/10/2023  Start time: 1100  Stop time: 1150  Type of session: Therapy Group    Problem number: 1. Dep F 33.0    Short term goal (STG): B.  . Pt will identify and share with group behaviors that contribute to depressed mood and identify an alternative behavior    Intervention/techniques: Informed, Validated/Supported, Challenged, Prompted/Cued, Listened/Empathized, and Promoted Peer Support    Patient mental status/affect: Anxious, Congruent, and Preoccupied    Patient behavior/appearance: Neatly Groomed, Attentive, Cooperative, and Needed Prompting    Special patient treatment accommodations provided (describe): None    Patient response and progress towards goals: Focus of session was on identifying appropriate social skills that children need to learn and why and we discussed why it is a good exercise to think about ourselves as adults and if we have these skills as well. We spoke about some of the more important skills to have in order to help us communicate with people and have healthy relationships. We discussed the skills of asking for what you want, answering questions, getting attention appropriately, accepting no for an answer, disagreeing appropriately, listening, giving constructive criticism, asking for help, making an apology, and staying on task. I asked group members for their feedback about these skills and what are they good at and where is the room for improvement. I asked group members to think about a goal in the area of social skill development. Abdulaziz Mueller participated in group with prompting. She spoke about how she is working on her strategies to cope with her emotions. She spoke about how she will continue to push herself to go to [employer] and she recognizes that she wants to go this weekend and needs to push herself to overcome this habit of being lazy about this.

## 2023-03-10 NOTE — BH NOTES
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      Intervention/techniques: Informed, Validated/Supported, Prompted/Cued, Listened/Empathized, Observed/Monitored, and Reinforced  Patient mental status/affect: Calm and Congruent  Patient behavior/appearance: Attentive, Cooperative, Needed Prompting, and Withdrawn/Quiet    Special patient treatment accommodations provided (describe): None    Patient response and progress towards goals: Focus of session was a focus on identifying for group members their self-defeating habits and where they may have come from. We focused on the impact of these habits and thought processes and how to cope and challenge them. We went over the following specific habits that impact us including feeling guilt that is inappropriate, or unwarranted, thinking that we are a failure, being a perfectionist, living with regret, comparing yourself negatively to others, and people pleasing. We discussed how challenging these habits can help us build up or confidence as well as lighten our emotional load that we are carrying. Pt listened tot he group discussion and concerns of other members. She was quiet during the group but was attentive.   She was able to talk with other members during break and stated that made her feel good       Behavioral Health Daily Check In form revealed that pt is not experiencing SI/HI thoughts or plans, remaining abstinent from drugs and alcohol, and reports no goal today

## 2023-03-10 NOTE — BH NOTES
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    Short term goal (STG): J Pt will address anxiety provoking issues within 24 hours to prevent rumination and further physical, emotional and medical symptoms     Intervention/techniques: Informed, Validated/Supported, Prompted/Cued, Listened/Empathized, Clarified, and Reinforced    Patient mental status/affect: Calm and Congruent    Patient behavior/appearance: Attentive, Cooperative, Needed Prompting, and Withdrawn/Quiet    Special patient treatment accommodations provided (describe): None    Patient response and progress towards goals: Focus of session was based on the handout Everything Is Awful and I'm not Okay.   We spoke about how this handout leads us to ask questions to help us see what we can possibly do to take care of ourselves. We spoke about the questions range from physical needs to emotional and mental needs. We spoke about how this can be useful when we are in crisis and need some simple directions that we may be unable to even think about due to the crisis. We spoke about how group members can use this tool and how they may be willing to use it. Pt participated in the group activity and read aloud from the worksheet. She stated that she tries to talk or touch somebody every day. She also likes to say something nice to others as it helps her too. She stated she was able to walk around her house this morning and look at all the flowers.   She has not done this in a while

## 2023-03-14 ENCOUNTER — TELEPHONE (OUTPATIENT)
Dept: FAMILY MEDICINE CLINIC | Age: 88
End: 2023-03-14

## 2023-03-14 NOTE — TELEPHONE ENCOUNTER
We referred this patient to [place] back in September but they didn't take her insurance.  [address] [place] [address] takes her insurance & her daughter is requesting a new referral

## 2023-03-15 ENCOUNTER — HOSPITAL ENCOUNTER (OUTPATIENT)
Dept: BEHAVIORAL/MENTAL HEALTH | Age: 88
Discharge: HOME | End: 2023-03-15
Payer: MEDICARE

## 2023-03-15 PROCEDURE — 90853 GROUP PSYCHOTHERAPY: CPT

## 2023-03-15 NOTE — BH NOTES
[facility] Out[facility]  Group Therapy    Date of Service: 3/15/2023  Start time: 1100  Stop time: 1150  Type of session: Therapy Group    Problem number: 1. Dep F 33.0    Short term goal (STG): B.  Pt will identify and share with group behaviors that contribute to depressed mood and identify an alternative behavior     Intervention/techniques: Informed, Prompted/Cued, Listened/Empathized, and Observed/Monitored    Patient mental status/affect: Anxious, Congruent, and Preoccupied    Patient behavior/appearance: Neatly Groomed, Attentive, Cooperative, and Needed Prompting    Special patient treatment accommodations provided (describe): None    Patient response and progress towards goals: Focus of session was on identifying core beliefs that we may have that lead us to increased feelings of depression, anxiety, anger, and low self esteem. We spoke about the need to understand our beliefs that lead us to these painful emotions and feeling states. We spoke about how these can be challenged by working to provide pieces of evidence to the contrary to our negative core beliefs. We also spoke about how we can put this into practice when we are out in the world being challenged with negative thinking patterns. Yvette Hennessy participated in group well with prompting. She was quiet and slightly overwhelmed with what others are talking about but she was trying to be supportive to others. She struggled with understanding what beliefs she may have that are holding her back. She was open to feedback and ideas of support.

## 2023-03-15 NOTE — BH NOTES
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       Intervention/techniques: Informed, Validated/Supported, Listened/Empathized, Observed/Monitored, Reinforced, and Provided Feedback    Patient mental status/affect: Calm and Congruent    Patient behavior/appearance: Attentive, Cooperative, Needed Prompting, and Withdrawn/Quiet    Special patient treatment accommodations provided (describe): None    Patient response and progress towards goals: Focus of session was on the quote Each Day I Remember and we went over it and that it included the fact that each day we have choices, that taking care of ourselves can be our first choice, that it is okay to say no when necessary and to stand up for myself, that I don't have to please others or be everything to everyone, I can be honest and still kind and stick boundaries and stick with them, as well as I can honor myself and that is my responsibility and right to do so. We spoke about how this can be a quote that can help push us in the direction of self-care and we spoke about what stood out in particular to group members about self-care. Pt participated in the group activity. She assisted another pt with his feelings regarding not being able to see his children.   She gave him hope and strategies to keep moving forward and not to give up on developing relationships with them when he is better

## 2023-03-15 NOTE — BH NOTES
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    Short term goal (STG): N Pt will use stop, look, listen, think, and plan before acting once a day and identify healthy responses    Intervention/techniques: Informed, Validated/Supported, Listened/Empathized, Observed/Monitored, Clarified, and Reinforced  Patient mental status/affect: Calm, Congruent, and Flat  Patient behavior/appearance: Attentive, Cooperative, and Withdrawn/Quiet    Special patient treatment accommodations provided (describe): None    Patient response and progress towards goals: Focus of session was a general overview of triggers, symptoms, and thoughts associated with anxiety. Group members spoke about recent experiences with anxiety and how they coped and reacted. I shared with group members positive coping thoughts to use in moments of anxiety. We spoke about the coping thoughts that include; I can handle this, my feelings won't kill me, my thoughts don't control my life, I do, I am strong enough to handle what is happening to me, I have survived other painful experiences, I can survive this one, and this is an opportunity for me to cope with my fears. We spoke about which coping strategies can be used to help group members at this time to help reduce anxiety and let the feelings and symptoms pass. Pt listened to the group discussion and engaged with others at times.  However, she was very quiet today and mostly listened and observed    Behavioral Health Daily Check In form revealed that pt is not experiencing SI/HI thoughts or plans, remaining abstinent from drugs and alcohol, and reports goal today to pray, eat better or meds

## 2023-03-17 ENCOUNTER — HOSPITAL ENCOUNTER (OUTPATIENT)
Dept: BEHAVIORAL/MENTAL HEALTH | Age: 88
Discharge: HOME | End: 2023-03-17
Payer: MEDICARE

## 2023-03-17 PROCEDURE — 90853 GROUP PSYCHOTHERAPY: CPT

## 2023-03-17 NOTE — BH NOTES
[facility] Out[facility] Program  Group Therapy    Date of Service: 3/17/2023  Start time: 1200  Stop time: 1250  Type of session: Therapy Group    Problem number: 1. Dep F 33.0    Short term goal (STG): G. Pt will identify to group members what she sees as the aspects of her daily life that she does have control over and can make a positive impact on    Intervention/techniques: Informed, Modeled/Rehearsed, Prompted/Cued, and Provided Feedback    Patient mental status/affect: Calm, Congruent, and Preoccupied    Patient behavior/appearance: Neatly Groomed, Attentive, Cooperative, and Needed Prompting    Special patient treatment accommodations provided (describe): None    Patient response and progress towards goals: Focus of session was on identifying what we are doing well and what we can identify that we will benefit from continuing to do in regard to coping with our emotions and responding to them. We also spoke about what we are doing that we know is not helping in our process of recovery and is even setting us back. We spoke how we feel when we are successful in implementing a change and how we can tap into that to try and increase our motivation to continue to change. Jocelynn Brown participated in group with prompting. She spoke about how she knows she is doing better with taking her medications and she spoke about how she knows she has to look out for the thought that she did not need her medications because she was better. \"  We also spoke about how she is going back to [employer] and that she has been able to ask for rides without issues.

## 2023-03-17 NOTE — BH NOTES
[facility] Out[facility] Program  Group Therapy      Date of service: 3/17/2023  Start time: 1000  Stop time: 1050    Type of session: Therapy Group    Problem number: 1. Dep F 33.0    Short term goal (STG): D. Pt will identify choices made in the past week and how they impact emotionally, physically and cognitively    Intervention/techniques: Informed, Prompted/Cued, Listened/Empathized, Promoted Peer Support, and Provided Feedback  Patient mental status/affect: Calm, Congruent, and Preoccupied  Patient behavior/appearance: Neatly Groomed, Attentive, Cooperative, and Needed Prompting    Special patient treatment accommodations provided (describe): None    Patient response and progress towards goals: Focus of session was based on book The Feeling Good Handbook by Dr. Lissett Mojica. I shared the ideas he presented for ways to work towards overcoming fears, phobias, and panic attacks. We reviewed the ideas which include: the experimental method, paradoxical techniques, shame attacking exercises, confronting fears through flooding, gradual exposure, and the partnership method, daily mood log, the cost-benefit analysis, positive imaging, distraction, the acceptance paradox, and getting in touch. Patient was allowed to share ideas of what could be beneficial or not work and we worked on creating a plan to practice. Emmanuel Ran participated in group with prompting. She spoke about how she is feeling better today and she can recognize how much she avoided things recently and that she did make easy things for her much more difficult. She is doing better overall and her affect is brighter. Behavioral Health Daily Check In form revealed that pt is not experiencing SI/HI thoughts or plans, remaining abstinent from drugs and alcohol, and reports goal today of \"take meds, use tools, and pray. \"

## 2023-03-17 NOTE — BH NOTES
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    Short term goal (STG): M Pt will share instances when she was able to step back and make a choice about how she managed her mood or her symptoms in order to gain more confidence in herself and her decisions     Intervention/techniques: Informed, Validated/Supported, Prompted/Cued, Listened/Empathized, Clarified, Reinforced, and Provided Feedback    Patient mental status/affect: Calm and Congruent    Patient behavior/appearance: Attentive, Cooperative, and Motivated    Special patient treatment accommodations provided (describe): None    Patient response and progress towards goals: Focus of session was on anxiety and coping strategies for how to manage the after effects of it once it passes. We spoke about how the thoughts and emotions that we have after we have intense anxiety and/or panic attacks and that we can often make things prolonged or even worse. We spoke about the scariest part of anxiety for most people is the fact that we feel no control. We discussed the ability to control our reaction to it and how we can cope and think through anxiety and panic to just allow it to pass so we can move on. We spoke about ways to minimize even the worst anxiety that we can experience in terms of how we think about it. Group members were asked to discuss their coping skills and how to manage and make things better. Pt participated in the group activity and discussion. She shared that she is having a hard time staying alone in her house. She is extremely upset about people breaking into churches and stealing from them . However, she knows that her son is right down the street to help her if needed.

## 2023-03-21 ENCOUNTER — HOSPITAL ENCOUNTER (OUTPATIENT)
Dept: BEHAVIORAL/MENTAL HEALTH | Age: 88
Discharge: HOME | End: 2023-03-21
Payer: MEDICARE

## 2023-03-21 PROCEDURE — 90853 GROUP PSYCHOTHERAPY: CPT

## 2023-03-21 NOTE — BH NOTES
[facility] Outpatient Program  Group Therapy      Date of service:  3/21/2023  Start time: 1000  Stop time: 1050    Type of session: Therapy Group    Problem number: 1. Dep F 33.0    Short term goal (STG): L. Pt will identify one core belief or message that she holds that she recognizes fuels anxiety and/or worry and share with group and how the beliefs impact her.        Intervention/techniques: Informed, Validated/Supported, Prompted/Cued, Listened/Empathized, and Promoted Peer Support  Patient mental status/affect: Calm, Congruent, and Preoccupied  Patient behavior/appearance: Neatly Groomed, Attentive, Cooperative, and Needed Prompting    Special patient treatment accommodations provided (describe): None    Patient response and progress towards goals: Focus of session was a review of the weekend and helping group members see their wins that they can celebrate from the past several days. We spoke about the things that occurred and choices that they had in front of them and helping everyone to see the positive impact that they had on their well being. We also spoke about what may have been the setbacks and how they coped and what they can plan to do for the future to help build on relapse prevention. Jocelynn Brown participated in group and she spoke about how she went to the viewing this weekend of her friend from [employer]. She spoke about how she decided to not go to the  but went to viewing and she saw people who she knows and she was able to connect. We spoke about how making the compromise with herself was a great strategy and she was able to feel good for going and paying her respects to someone she was very close to. Behavioral Health Daily Check In form revealed that pt is not experiencing SI/HI thoughts or plans, remaining abstinent from drugs and alcohol, and reports goal today of  \"eating well and taking meds on time. \"

## 2023-03-21 NOTE — BSMART NOTE
[facility]  Group Therapy  Treatment Plan Review     TREATMENT PLAN REVIEW REVIEW DATE: 3/9/2023      summary of Ongoing issues with Symptoms/Functional Impairments Indicating Need for Continued Stay:  Pt comes to group and interact s with the satff and group members. Pt reguarly attends group. Pt will call in each week and recenlty is missing one day a week. Pt is experiencing depression, medical ailments AS RESULT. She will often say she doesn't feel WELL WE continue to encourage her to come to group and she states she benefits from IT WE have decided for her to start coming 2 days a week starting 3/28/2023. TREATMENT & DISCHARGE PLANNING CHANGES    Modality or Intervention  Changes Pt will attend group two days a week for three hours per day   Psychotropic Medication Changes Increase the Venlafaxine ER to 225 mg daily 3/1/2023   Discharge  Planning or Target Date  Changes 5/30/2023   New Issues none     TREATMENT TEAM SIGNATURE / DATE   I have participated in the development of this plan of treatment and agreed to its implementation. Client Signature PRINTED Name                        Date & Time       Family / Legal Representative Signature (If Applicable) PRINTED Name & Relationship     Date & Time   Therapist Signature PRINTED Name & Maximo Baldwin, [facility] Date & Time       Therapist Signature PRINTED Name & Stefania Ko, LCSW   Date & Time       Other Signature PRINTED Name & Credential     Date & Time   Other Signature PRINTED Name & Credential or Relationship     Date & Time       PHYSICIAN CERTIFICATION OF THE LEVEL OF CARE:  I certify that these outpatient behavioral health services are medically necessary to improve and maintain the patient's condition and functional level and prevent relapse or admission to a higher level of care.     Physician Signature PRINTED Name & Credential Dr. Filipe Guzman M.D. Date & Time             Treatment Plan Review - Specific Goal Progress Review Date 3/10/2022    GOAL STATUS CODES:   M = Met     C = Continued     D/C = Discontinued     R = Revised     N = New Goal   PROBLEM  NUMBER: 1 PROBLEM: Depression   STG Goal Status Comments (Progress or Lack of Progress)   D  M Pt has met this goal   G C Pt is making some progress toward this goal Pt will identify to group members what she sees as the aspects of her daily life that she does have control over and can make a positive impact on By 4/6/2023   I        M Pt states strategies to assist with her emotional triggers   J        C Pt has brought to group things that she has tried to work on within 24 hours Pt will address anxiety provoking issues within 24 hours to prevent rumination and further physical, emotional and medical symptoms By4/3/2023     PROBLEM:       Comments (Progress or Lack of Progress)   K C Pt is beginning to share her feelings at times with her children Pt will work on establishing her expectations of her children and share them with the group. By 4/8/2023   L        C Pt acknowledges that people's death brings her anxiety in relation to her own life and is exploring ways to process this Pt will identify one core belief or message that she holds that she recognizes fuels anxiety and/or worry and share with group and how the beliefs impact her.    By 4/8/2023   Jade Mckee Pt manages her mood when she doesn't feel like coming to group but does anyway and states she feels better   N C She is trying practice these strategies Pt will use stop, look, listen, think, and plan before acting once a day and identify healthy responses By 4/6/2023     PROBLEM:      Comments (Progress or Lack of Progress)      O C Pt is exploring ways to find purpose at this stage of her life Pt will work to identify a sense of purpose for this phase of her life and how that purpose can increase her wellbeing and satisfaction and share with group.   By 4/3/2023                    []- Check ONLY if Problem/Goal Progress Comments  Continued on Another Page

## 2023-03-21 NOTE — BH NOTES
[facility] Outpatient Program  Group Therapy    Date of Service: 3/21/2023  Start time: 1200  Stop time: 1250  Type of session: Therapy Group    Problem number: 1. Dep F 33.0    Short term goal (STG): M. Pt will share instances when she was able to step back and make a choice about how she managed her mood or her symptoms in order to gain more confidence in herself and her decisions     Intervention/techniques: Informed, Challenged, Reframed, Observed/Monitored, and Provided Feedback    Patient mental status/affect: Calm, Congruent, and Preoccupied    Patient behavior/appearance: Neatly Groomed, Attentive, Cooperative, and Needed Prompting    Special patient treatment accommodations provided (describe): None    Patient response and progress towards goals: Focus of session was on information from article on how complaining rewires our brain for negativity and how to break that habit. Group members were asked to share their thoughts about what it is like for them to be around someone who is consistently complaining and what that may do to them when they are consistently complaining. We spoke about how we can start to practice of not complaining by catching ourselves when we are doing it and also recognizing when others may be doing it around us. I shared how we can't really complain and be grateful at the same time, so focus on being grateful. I asked group members to share their awareness of what they may be complaining about right now the most.  Ronaldo Roman participated in group with prompting. She was quiet but she was responsive to topic. She was able to share some thoughts about how people who tend to complain affect her.

## 2023-03-22 ENCOUNTER — HOSPITAL ENCOUNTER (OUTPATIENT)
Dept: BEHAVIORAL/MENTAL HEALTH | Age: 88
Discharge: HOME | End: 2023-03-22
Payer: MEDICARE

## 2023-03-22 ENCOUNTER — OFFICE VISIT (OUTPATIENT)
Dept: FAMILY MEDICINE CLINIC | Age: 88
End: 2023-03-22
Payer: MEDICARE

## 2023-03-22 ENCOUNTER — TELEPHONE (OUTPATIENT)
Dept: FAMILY MEDICINE CLINIC | Age: 88
End: 2023-03-22

## 2023-03-22 VITALS
HEIGHT: 66 IN | DIASTOLIC BLOOD PRESSURE: 80 MMHG | HEART RATE: 78 BPM | BODY MASS INDEX: 28.28 KG/M2 | SYSTOLIC BLOOD PRESSURE: 155 MMHG | RESPIRATION RATE: 18 BRPM | OXYGEN SATURATION: 98 % | WEIGHT: 176 LBS

## 2023-03-22 DIAGNOSIS — E78.00 ELEVATED CHOLESTEROL: ICD-10-CM

## 2023-03-22 DIAGNOSIS — N18.30 TYPE 2 DIABETES MELLITUS WITH STAGE 3 CHRONIC KIDNEY DISEASE, WITHOUT LONG-TERM CURRENT USE OF INSULIN, UNSPECIFIED WHETHER STAGE 3A OR 3B CKD (HCC): Primary | ICD-10-CM

## 2023-03-22 DIAGNOSIS — I10 ESSENTIAL HYPERTENSION: ICD-10-CM

## 2023-03-22 DIAGNOSIS — J30.89 ENVIRONMENTAL AND SEASONAL ALLERGIES: ICD-10-CM

## 2023-03-22 DIAGNOSIS — E11.22 TYPE 2 DIABETES MELLITUS WITH STAGE 3 CHRONIC KIDNEY DISEASE, WITHOUT LONG-TERM CURRENT USE OF INSULIN, UNSPECIFIED WHETHER STAGE 3A OR 3B CKD (HCC): Primary | ICD-10-CM

## 2023-03-22 DIAGNOSIS — F32.1 MODERATE SINGLE CURRENT EPISODE OF MAJOR DEPRESSIVE DISORDER (HCC): ICD-10-CM

## 2023-03-22 DIAGNOSIS — J44.9 CHRONIC OBSTRUCTIVE PULMONARY DISEASE, UNSPECIFIED COPD TYPE (HCC): ICD-10-CM

## 2023-03-22 DIAGNOSIS — R35.0 URINARY FREQUENCY: ICD-10-CM

## 2023-03-22 LAB
BILIRUB UR QL STRIP: NEGATIVE
GLUCOSE UR-MCNC: NEGATIVE MG/DL
KETONES P FAST UR STRIP-MCNC: NORMAL MG/DL
PH UR STRIP: 5.5 [PH] (ref 4.6–8)
PROT UR QL STRIP: NORMAL
SP GR UR STRIP: 1.02 (ref 1–1.03)
UA UROBILINOGEN AMB POC: NORMAL (ref 0.2–1)
URINALYSIS CLARITY POC: CLEAR
URINALYSIS COLOR POC: YELLOW
URINE BLOOD POC: NEGATIVE
URINE LEUKOCYTES POC: NEGATIVE
URINE NITRITES POC: NEGATIVE

## 2023-03-22 PROCEDURE — 3077F SYST BP >= 140 MM HG: CPT | Performed by: NURSE PRACTITIONER

## 2023-03-22 PROCEDURE — G8400 PT W/DXA NO RESULTS DOC: HCPCS | Performed by: NURSE PRACTITIONER

## 2023-03-22 PROCEDURE — 99214 OFFICE O/P EST MOD 30 MIN: CPT | Performed by: NURSE PRACTITIONER

## 2023-03-22 PROCEDURE — 81003 URINALYSIS AUTO W/O SCOPE: CPT | Performed by: NURSE PRACTITIONER

## 2023-03-22 PROCEDURE — G9717 DOC PT DX DEP/BP F/U NT REQ: HCPCS | Performed by: NURSE PRACTITIONER

## 2023-03-22 PROCEDURE — 90853 GROUP PSYCHOTHERAPY: CPT

## 2023-03-22 PROCEDURE — G8536 NO DOC ELDER MAL SCRN: HCPCS | Performed by: NURSE PRACTITIONER

## 2023-03-22 PROCEDURE — 1123F ACP DISCUSS/DSCN MKR DOCD: CPT | Performed by: NURSE PRACTITIONER

## 2023-03-22 PROCEDURE — G8417 CALC BMI ABV UP PARAM F/U: HCPCS | Performed by: NURSE PRACTITIONER

## 2023-03-22 PROCEDURE — G8427 DOCREV CUR MEDS BY ELIG CLIN: HCPCS | Performed by: NURSE PRACTITIONER

## 2023-03-22 PROCEDURE — 3078F DIAST BP <80 MM HG: CPT | Performed by: NURSE PRACTITIONER

## 2023-03-22 PROCEDURE — 1101F PT FALLS ASSESS-DOCD LE1/YR: CPT | Performed by: NURSE PRACTITIONER

## 2023-03-22 PROCEDURE — 1090F PRES/ABSN URINE INCON ASSESS: CPT | Performed by: NURSE PRACTITIONER

## 2023-03-22 RX ORDER — LISINOPRIL 20 MG/1
20 TABLET ORAL 2 TIMES DAILY
Qty: 180 TABLET | Refills: 3 | Status: SHIPPED | OUTPATIENT
Start: 2023-03-22

## 2023-03-22 RX ORDER — FLUTICASONE FUROATE AND VILANTEROL 100; 25 UG/1; UG/1
1 POWDER RESPIRATORY (INHALATION) DAILY
Qty: 60 EACH | Refills: 5 | Status: SHIPPED | OUTPATIENT
Start: 2023-03-22

## 2023-03-22 RX ORDER — ALBUTEROL SULFATE 90 UG/1
2 AEROSOL, METERED RESPIRATORY (INHALATION)
Qty: 18 G | Refills: 0 | Status: SHIPPED | OUTPATIENT
Start: 2023-03-22

## 2023-03-22 RX ORDER — METOPROLOL SUCCINATE 50 MG/1
50 TABLET, EXTENDED RELEASE ORAL DAILY
Qty: 90 TABLET | Refills: 3 | Status: SHIPPED | OUTPATIENT
Start: 2023-03-22

## 2023-03-22 RX ORDER — ATORVASTATIN CALCIUM 80 MG/1
80 TABLET, FILM COATED ORAL DAILY
Qty: 90 TABLET | Refills: 3 | Status: SHIPPED | OUTPATIENT
Start: 2023-03-22

## 2023-03-22 RX ORDER — LEVOCETIRIZINE DIHYDROCHLORIDE 5 MG/1
TABLET, FILM COATED ORAL
Qty: 90 TABLET | Refills: 3 | Status: SHIPPED | OUTPATIENT
Start: 2023-03-22

## 2023-03-22 RX ORDER — CLOPIDOGREL BISULFATE 75 MG/1
75 TABLET ORAL DAILY
Qty: 90 TABLET | Refills: 1 | Status: SHIPPED | OUTPATIENT
Start: 2023-03-22

## 2023-03-22 NOTE — BH NOTES
[facility] Outpatient Program  Group Therapy    Date of Service: 3/22/2023  Start time: 1200  Stop time: 1250  Type of session: Therapy Group    Problem number:  1. Dep F 33.0     Short term goal (STG): N. Pt will use stop, look, listen, think, and plan before acting once a day and identify healthy responses    Intervention/techniques: Informed, Validated/Supported, Prompted/Cued, Listened/Empathized, Promoted Peer Support, and Provided Feedback    Patient mental status/affect: Anxious, Congruent, and Preoccupied    Patient behavior/appearance: Neatly Groomed, Attentive, Cooperative, and Needed Prompting    Special patient treatment accommodations provided (describe): None    Patient response and progress towards goals: Focus of session was based on the concept of the 5 Second Rule that was created in her book by [employer]. I shared the concept of taking 5 seconds to fight procrastination in order to move right into action before our thinking tries to stop us from what we want to do to make healthy changes in our live. We spoke about the struggle of getting stuck in inaction over simple daily choices and how this simple tool can help up make progress towards these changes and goals. I asked group members to share times when they can try this skill and be able to use it for their own growth and recovery. Maris Stone participated in group with prompting. She spoke about how she is willing to think about practicing this skill and how she can use it. She is willing to practice this to help her get up and do something productive.

## 2023-03-22 NOTE — BH NOTES
[facility] Outpatient Program  Group Therapy      Date of service:3/22/2023  Start time: 1000  Stop time: 1050    Type of session: Therapy Group    Problem number:  1Dep. F33.0    Short term goal (STG): NPt will use stop, look, listen, think, and plan before acting once a day and identify healthy responses      Intervention/techniques: Informed, Validated/Supported, Prompted/Cued, Listened/Empathized, Observed/Monitored, Clarified, and Reinforced  Patient mental status/affect: Calm, Congruent, and Flat  Patient behavior/appearance: Attentive, Cooperative, Needed Prompting, and Withdrawn/Quiet    Special patient treatment accommodations provided (describe): None    Patient response and progress towards goals: Focus of session was on information in regard to the 90 second rule developed by Neuroanatomist Dr. Saman Erickson. I shared the idea that chemicals hit our body and that if we allow them to process, it only takes 90 seconds for them to work through our body and then we can return to a calm state. We spoke about the things that we do that add to our stress including thinking patterns, reactions, and attitudes and how it we can develop the ability to work through the 90 seconds, we can have a better handle on our reactions and responses. We spoke about what strategies that can help us to make it through that 90 seconds Pt listened tot he group activity and discussion. She did engage when prompted but was quiet for most of the group. She stated that she prays for each of the members of the group for healing and to do better. Behavioral Health Daily Check In form revealed that pt is not experiencing SI/HI thoughts or plans, remaining abstinent from drugs and alcohol, and reports goal today talking to [facility] and Dr. Jesika Cool.   Eating better

## 2023-03-22 NOTE — PROGRESS NOTES
Subjective:     CC: diabetes, allergies, HTN, CKD    Kitty Mann is a 82 y.o. female who presents today for a 3 month follow up for diabetes, HTN, allergies, CKD, and other chronic medical issues. Type 2 diabetes  Lab Results   Component Value Date/Time    Hemoglobin A1c 7.6 (H) 12/29/2022 10:22 AM     After the last OV she was started on Jardiance due to elevated A1C. Now c/o nocturia. UA today is negative. She is also on Januvia 100mg daily so we will switch Jardiance and Januvia to [place] . Home BS have not been checked. She loves bread and potatoes. Will check A1C today. CKD, stage 3  Lab Results   Component Value Date/Time    Creatinine 1.20 (H) 02/21/2023 01:20 PM     She avoids NSAIDS. HTN  BP elevated today, HR tachy, she has not yet taken her Metoprolol today. She is also on Lisinopril. She denies CP, SOB, dizziness, or swelling. HLD  Lab Results   Component Value Date/Time    Cholesterol, total 129 03/17/2022 08:22 AM    HDL Cholesterol 51 03/17/2022 08:22 AM    LDL, calculated 51.8 03/17/2022 08:22 AM    VLDL, calculated 26.2 03/17/2022 08:22 AM    Triglyceride 131 03/17/2022 08:22 AM    CHOL/HDL Ratio 2.5 03/17/2022 08:22 AM       Well controlled with Lipitor 80mg daily. Allergies  She takes Xyzol daily. Reports ongoing runny nose. She is non-compliant with Flonase daily. HX of tobacco use with suspected COPD  She quit smoking 8 months ago! Reports a chronic cough, SOB, and wheezing. CXR normal. PFTs were ordered but never completed. She was prescribed a ZPACK and prednisone without improvement. She had not been using her Albuterol inhaler like she was supposed to. She was then prescribed Breo and this has helped reduced her cough and SOB. TIA  She has had multiple TIA's over the past couple of years. She is on Plavix. She does have a hx of GI bleed but without the Plavix she kept having TIAs. Denies any black or bloody stools.        Lab Results   Component Value Date/Time    WBC 9.5 02/21/2023 01:20 PM    Hemoglobin (POC) 12.6 09/27/2022 04:44 PM    HGB 14.5 02/21/2023 01:20 PM    HCT 45.5 02/21/2023 01:20 PM    PLATELET 437 28/73/5257 01:20 PM    MCV 92.7 02/21/2023 01:20 PM     Hx of hemorrhoids  Reports no issues today. MCI  She has had some memory issues lately. She often forgets to take her meds. Daughter concerned. Feels she needs a caregiver to keep track of her meds and ensures she takes them like she is supposed to. At the last OV she was referred to [facility] for medication management but it was not covered by insurance. Daughter manages her meds currently. Vertigo   She takes Meclizine prn. She has seen ENT in the past and did not want to go back. Major Depression  She is followed by psychiatrist Dr Paul Roman. She is on Effexor XR 150mg daily. She is doing outpatient therapy and feeling good today. Health maintenance  Covid vaccine- she rec'd both Moderna vaccines a year ago. Not interested in the boosters.         Patient Active Problem List   Diagnosis Code    Environmental allergies Z91.09    Elevated cholesterol E78.00    Essential hypertension I10    Well controlled type 2 diabetes mellitus ([address] E11.9    Major depressive disorder, recurrent episode, severe ([address] F33.2    Former smoker Z87.891    Primary osteoarthritis involving multiple joints M15.9    Age-related cataract of both eyes H25.9    History of GI bleed Z87.19    History of CVA (cerebrovascular accident) Z86.73    TIA (transient ischemic attack) G45.9    Allergic rhinitis J30.9    CKD (chronic kidney disease), symptom management only, unspecified stage N18.9    Chronic renal disease, stage III N18.30    Type 2 diabetes mellitus with chronic kidney disease (HCC) E11.22    MCI (mild cognitive impairment) G31.84    Hemorrhoids K64.9    Chronic obstructive pulmonary disease, unspecified J44.9       Past Medical History:   Diagnosis Date    Allergic rhinitis due to pollen     BPV (benign positional vertigo)     Change in bowel habits     Depression     Diabetes (HCC)     Dysuria     Hemorrhoids     Hypertension     IBS (irritable bowel syndrome)     Menopause     Other diseases of nasal cavity and sinuses(478.19)     Vertigo          Current Outpatient Medications:     venlafaxine-SR (EFFEXOR-XR) 75 mg capsule, Take 3 Capsules by mouth daily. , Disp: 270 Capsule, Rfl: 1    predniSONE (STERAPRED DS) 10 mg dose pack, See administration instruction per 10mg dose pack, Disp: 21 Tablet, Rfl: 0    empagliflozin (Jardiance) 10 mg tablet, Take 1 Tablet by mouth daily. , Disp: 90 Tablet, Rfl: 0    fluticasone furoate-vilanteroL (BREO ELLIPTA) 100-25 mcg/dose inhaler, Take 1 Puff by inhalation daily. , Disp: 60 Each, Rfl: 0    Januvia 100 mg tablet, TAKE 1 TABLET BY MOUTH EVERY DAY, Disp: 90 Tablet, Rfl: 1    clopidogreL (PLAVIX) 75 mg tab, TAKE 1 TABLET BY MOUTH EVERY DAY, Disp: 90 Tablet, Rfl: 1    albuterol (PROVENTIL HFA, VENTOLIN HFA, PROAIR HFA) 90 mcg/actuation inhaler, Take 2 Puffs by inhalation every four to six (4-6) hours as needed for Wheezing., Disp: 18 g, Rfl: 0    levocetirizine (XYZAL) 5 mg tablet, TAKE 1 TABLET BY MOUTH DAILY AS NEEDED FOR ALLERGY SYMPTOMS, Disp: 90 Tablet, Rfl: 2    metoprolol succinate (TOPROL-XL) 50 mg XL tablet, Take 1 Tablet by mouth daily. , Disp: 90 Tablet, Rfl: 3    atorvastatin (LIPITOR) 80 mg tablet, Take 1 Tablet by mouth daily. , Disp: 90 Tablet, Rfl: 3    glucose blood VI test strips (OneTouch Ultra Test) strip, CHECK GLUCOSE ONCE DAILY. DX: E11.9, Disp: 100 Strip, Rfl: 3    fluticasone propionate (FLONASE) 50 mcg/actuation nasal spray, 2 Sprays by Both Nostrils route as needed for Allergies. ONLY AS NEEDED (Patient not taking: Reported on 9/1/2022), Disp: 1 Each, Rfl: 0    aspirin delayed-release 81 mg tablet, Take 1 Tablet by mouth daily. , Disp: 90 Tablet, Rfl: 1    lisinopriL (PRINIVIL, ZESTRIL) 20 mg tablet, Take 1 Tablet by mouth two (2) times a day., Disp: 180 Tablet, Rfl: 3    acetaminophen (TYLENOL) 325 mg tablet, Take  by mouth every four (4) hours as needed for Pain., Disp: , Rfl:     No Known Allergies    Past Surgical History:   Procedure Laterality Date    COLONOSCOPY N/A 7/15/2020    COLONOSCOPY performed by Vicente Hughes MD at [facility] ENDOSCOPY    HX CHOLECYSTECTOMY      HX COLONOSCOPY  ? HX HYSTERECTOMY      UPPER GI ENDOSCOPY,DIAGNOSIS  7/15/2020            Social History     Tobacco Use   Smoking Status Former    Packs/day: 0.35    Years: 60.00    Pack years: 21.00    Types: Cigarettes    Quit date:     Years since quittin.2   Smokeless Tobacco Never       Social History     Socioeconomic History    Marital status:    Tobacco Use    Smoking status: Former     Packs/day: 0.35     Years: 60.00     Pack years: 21.00     Types: Cigarettes     Quit date:      Years since quittin.2    Smokeless tobacco: Never   Vaping Use    Vaping Use: Never used   Substance and Sexual Activity    Alcohol use: No    Drug use: No    Sexual activity: Not Currently     Social Determinants of Health     Financial Resource Strain: Low Risk     Difficulty of Paying Living Expenses: Not hard at all   Food Insecurity: No Food Insecurity    Worried About Running Out of Food in the Last Year: Never true    Ran Out of Food in the Last Year: Never true       Family History   Problem Relation Age of Onset    No Known Problems Mother        ROS:  Gen: denies fever, chills, or fatigue   HEENT:denies H/A, vision changes, ear pain, or sore throat +chronic runny nose  Resp: denies dyspnea, cough, or wheezing  CV: denies chest pain, pressure, or palpitations  Extremeties: denies edema  GI: denies abd pain, NVD, melena, or hematochezia +hemorroids  : +nocturia, denies dysuria or hematuria or incontinence  Neuro: +memory issues denies numbness/tingling, +occasional vertigo.  Denies unilateral weakness, facial drooping, AMS, or slurred speech   Skin: denies rashes or new lesions   Psych: +depression, no anxiety or brad, or other changes in mood      Objective:     Visit Vitals  BP (!) 155/80   Pulse 78   Resp 18   Ht 5' 6\" (1.676 m)   Wt 176 lb (79.8 kg)   SpO2 98%   BMI 28.41 kg/m²       General: Alert and oriented. No acute distress. Well nourished. HEENT    Eyes: Sclera white, conjunctiva clear. PERRLA. Extra ocular movements intact. Nose: Patent, no edema, +clear drainage  Neck: Supple with FROM. No carotid bruits  Lungs: Breathing even and unlabored. All lobes clear to auscultation bilaterally   Heart :RRR, S1 and S2 normal intensity, no extra heart sounds  Extremities: Non-edematous  Musculo: +intermittent swelling of joints in the hands  Neuro: Cranial nerves grossly normal. She does lose her train of thought at times and gets her meds mixed up. Normal gait. Moves all extremities freely. Psych: Mood and thought content appropriate for situation. Dressed appropriately and with good hygiene. Skin: Warm, dry, and intact. No lesions or discoloration. Assessment/ Plan:     HTN  BP elevated but she has not taken Metoprolol today  She took it here in the office once we reminded her  Cont current meds  Low-sodium diet  RTO or go to ER for any CP, SOB, dizziness, or swelling. F/U 3 months    HLD  Check lipids today  Cont Lipitor 80mg daily with baby asa  Low fat diet    Type 2 diabetes  Check A1C  Jardiance is causing nocturia  Stop Jardiance and Januvia  Start Janumet 50-500mg BID with meals  Call for any problems  F/U 3 months    CKD stage 3  Avoids NSAIDS  Stay well hydrated  Keep tight control over BP and BS    COPD  PFTs were ordered but never completed  Symptoms have improved with Breo  Cont Breo daily  Cont Albuterol prn SOB or wheezing  F/U 3 months or sooner prn if symptoms worsen     Hx of tobacco use  Quit smoking 8 months ago!      TIA  Cont Plavix 75mg daily  Notify provider for s/s of abnormal bleeding  Cont ASA 81mg daily  Cont Lipitor 80mg daily  Keep tight control over BS and BP  Go back to ER for any unilateral numbness/tingling, unilateral weakness, facial drooping, slurred speech, confusion, AMS, or dizziness    MCI  Daughter supervising and managing meds    Seasonal Allergies   Symptoms ongoing  Non-compliant with Flonase- advised to use every day   Cont Xyzol     Vertigo  Cont Meclizine prn. Major Depression  Per psych     OA, hands  Cont Tylenol prn        Verbal and written instructions (see AVS) provided. Patient expresses understanding of diagnosis and treatment plan.     Health Maintenance Due   Topic Date Due    Shingles Vaccine (1 of 2) Never done    COVID-19 Vaccine (3 - Booster for Moderna series) 06/09/2021    Eye Exam Retinal or Dilated  05/01/2022    Flu Vaccine (1) 08/01/2022    Lipid Screen  03/17/2023               Hailey Less, NP

## 2023-03-22 NOTE — TELEPHONE ENCOUNTER
Called pt's daughter Brenda Caceres who is on her disclosure to make her aware of diabetes med changes today.

## 2023-03-22 NOTE — PROGRESS NOTES
1. \"Have you been to the ER, urgent care clinic since your last visit? Hospitalized since your last visit? \" No    2. \"Have you seen or consulted any other health care providers outside of the [address] [place] [address] since your last visit? \" No     3. For patients aged 39-70: Has the patient had a colonoscopy / FIT/ Cologuard? Yes - no Care Gap present      If the patient is female:    4. For patients aged 41-77: Has the patient had a mammogram within the past 2 years? Yes - no Care Gap present      5. For patients aged 21-65: Has the patient had a pap smear?  Yes - no Care Gap present

## 2023-03-22 NOTE — BH NOTES
[facility] Outpatient Program  Group Therapy    Date of Service: 3/22/2023  Start time: 1100  Stop time: 1150  Type of session: Therapy Group    Problem number: 1. Dep F 33.0    Short term goal (STG): G. Pt will identify to group members what she sees as the aspects of her daily life that she does have control over and can make a positive impact on    Intervention/techniques: Informed, Reframed, Modeled/Rehearsed, Listened/Empathized, Queired/Probed, Promoted Peer Support, and Provided Feedback    Patient mental status/affect: Calm, Congruent, and Preoccupied    Patient behavior/appearance: Neatly Groomed, Attentive, Cooperative, and Needed Prompting    Special patient treatment accommodations provided (describe): None    Patient response and progress towards goals: Focus of session was on ways to boost resilience. Group members were asked to share what their definition of resiliency and how to develop and strengthen it. I shared how being a resilient person is something that can be developed and turned into a skill. We discussed how we can work to boost our ability to bounce back including making sure that we are communicating regularly and effectively, Avoiding seeing crises as unconquerable, being acceptance of change, taking positive and decisive actions, looking for opportunities of self discovery, keeping things in perspective, and nurturing a positive view of ourselves. I asked group members to share how they can strengthen their resiliency. Ronaldo Roman spoke about how she has been doing better over the past few days with being positive and focusing on what is good. She is progressing well towards goal and she spoke about how she will continue to work on building her resiliency.

## 2023-03-23 ENCOUNTER — TELEPHONE (OUTPATIENT)
Dept: FAMILY MEDICINE CLINIC | Age: 88
End: 2023-03-23

## 2023-03-23 DIAGNOSIS — G31.84 MCI (MILD COGNITIVE IMPAIRMENT): Primary | ICD-10-CM

## 2023-03-23 DIAGNOSIS — Z79.899 MEDICATION MANAGEMENT: ICD-10-CM

## 2023-03-23 LAB
CHOLEST SERPL-MCNC: 232 MG/DL
EST. AVERAGE GLUCOSE BLD GHB EST-MCNC: 183 MG/DL
HBA1C MFR BLD: 8 % (ref 4–5.6)
HDLC SERPL-MCNC: 65 MG/DL
HDLC SERPL: 3.6 (ref 0–5)
LDLC SERPL CALC-MCNC: 99.8 MG/DL (ref 0–100)
TRIGL SERPL-MCNC: 336 MG/DL (ref ?–150)
VLDLC SERPL CALC-MCNC: 67.2 MG/DL

## 2023-03-23 RX ORDER — OMEGA-3-ACID ETHYL ESTERS 1 G/1
1 CAPSULE, LIQUID FILLED ORAL 2 TIMES DAILY
Qty: 180 CAPSULE | Refills: 1 | Status: SHIPPED | OUTPATIENT
Start: 2023-03-23

## 2023-03-23 NOTE — PROGRESS NOTES
A1C is elevated, she is about to switch to Janumet. Cholesterol is elevated, she needs to start a fish oil pill twice daily. Script sent. Follow up as planned.

## 2023-03-23 NOTE — TELEPHONE ENCOUNTER
Tried to call patients daughter she will need to reach out to an agency that does this or try [facility] as home health will not cover this per Tomy Henry and she had explained this before

## 2023-03-23 NOTE — TELEPHONE ENCOUNTER
Daughter called. Stated a referral was to be done yesterday for her mom giving her help at least 3 days a week. Wants to know status.

## 2023-03-24 ENCOUNTER — HOSPITAL ENCOUNTER (OUTPATIENT)
Dept: BEHAVIORAL/MENTAL HEALTH | Age: 88
Discharge: HOME | End: 2023-03-24
Payer: MEDICARE

## 2023-03-24 PROCEDURE — 90853 GROUP PSYCHOTHERAPY: CPT

## 2023-03-24 NOTE — TELEPHONE ENCOUNTER
She can get this over the counter- omega 3 fatty acid fish oil 1gm twice daily.  Let her daughter Dayana Akhtar know

## 2023-03-24 NOTE — BH NOTES
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  Pt will address anxiety provoking issues within 24 hours to prevent rumination and further physical, emotional and medical symptoms    Intervention/techniques: Informed, Prompted/Cued, Listened/Empathized, Promoted Peer Support, and Provided Feedback  Patient mental status/affect: Calm, Congruent, and Preoccupied  Patient behavior/appearance: Neatly Groomed, Attentive, Cooperative, and Needed Prompting    Special patient treatment accommodations provided (describe): None    Patient response and progress towards goals: Focus of session was based on information on diet and nutrition and how it relates to brain health. We spoke about how there is a lot of progress to be had about making better food choices and will help lead to improved mental well-being and even help to manage mental health symptoms. We discussed the recommended foods that lead to better brain health due to their nutrients and how much our digestive system plays a part in our brain functioning. We discussed current food choices and what patient is willing to adjust and work on.  Rick Casey was active in group and she spoke about how she struggles at times with eating because of \"loneliness and eating alone. \" We spoke about how she can try and focus on how eating and planning what she can eat can be a positive she can focus on every day. She agreed to the fact that she can feel the difference in her thinking and clarity when she eats well vs when she makes poor choices. Behavioral Health Daily Check In form revealed that pt is not experiencing SI/HI thoughts or plans, remaining abstinent from drugs and alcohol, and reports goal today of \"praying, thinking about finding volunteer job. \"

## 2023-03-24 NOTE — BH NOTES
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 Pt will use stop, look, listen, think, and plan before acting once a day and identify healthy responses    Intervention/techniques: Informed, Guided, Challenged, Promoted Peer Support, Facilitated Disclosure, and Provided Feedback    Patient mental status/affect: Calm, Congruent, and Preoccupied    Patient behavior/appearance: Neatly Groomed, Attentive, Cooperative, and Needed Prompting    Special patient treatment accommodations provided (describe): None    Patient response and progress towards goals: Focus of session was on emotional intelligence and the skills that we can build to increase emotional strength and competence. We spoke about the components of EI (Francia Magaña) and they include the ability to identify and discuss emotions, ability to regulate and control emotions, ability to read, label, and interpret other people's emotions, and the ability to sustain healthy interpersonal relationships. I addressed with group members 5 skills that they can focus on to increase EI and they include the ability to quickly reduce stress, to recognize the emotions that is being felt, the ability to connect with others using non verbal's that are healthy, the ability to use humor to deal with challenges, and the ability to resolve conflicts positively and with confidence. Group members discussed which skill they can practice and improve upon in this upcoming week. Roseanne Heartmay participated in group with prompting. She spoke about how she continues to have a lot of anger towards herself about when she stopped her medications in the past.  She spoke about how she continues to need to work on that process of forgiveness which is a repeated action.

## 2023-03-28 ENCOUNTER — DOCUMENTATION ONLY (OUTPATIENT)
Dept: FAMILY MEDICINE CLINIC | Age: 88
End: 2023-03-28

## 2023-03-28 ENCOUNTER — HOSPITAL ENCOUNTER (OUTPATIENT)
Dept: BEHAVIORAL/MENTAL HEALTH | Age: 88
Discharge: HOME | End: 2023-03-28
Payer: MEDICARE

## 2023-03-28 PROCEDURE — 90853 GROUP PSYCHOTHERAPY: CPT

## 2023-03-28 NOTE — BH NOTES
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 Pt will use stop, look, listen, think, and plan before acting once a day and identify healthy responses    Intervention/techniques: Informed, Validated/Supported, Prompted/Cued, Listened/Empathized, Promoted Peer Support, and Provided Feedback    Patient mental status/affect: Calm, Congruent, and Preoccupied    Patient behavior/appearance: Neatly Groomed, Attentive, Cooperative, and Needed Prompting    Special patient treatment accommodations provided (describe): None    Patient response and progress towards goals: Focus of session was based on information from article Psychologist Shares the Number 1 Highly Successful People Use to be Happier.   I shared information from the article that was based on understanding the concept of having an energy credit vs debit mindset just like we do for managing our money in the same way. We spoke about understanding what drains our energy and what we do that can create energy or earn it. We spoke about developing the awareness and even writing down our activities and how we spend our time just like we do for a bank account. Kevin Buenrostro participated in group with prompting. She spoke about how she knows that she wants to continue to work on finding things that help her feel energized. She spoke about how she continues to not [place] going home and I will stay with anyone who will have me. \"  She spoke about how she will continue to work on things she can do to help her feel more energized and she spoke about prayer \"is keeping me going right now. \" sudden onset

## 2023-03-28 NOTE — BH NOTES
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  Pt will identify one core belief or message that she holds that she recognizes fuels anxiety and/or worry and share with group and how the beliefs impact her    Intervention/techniques: Informed, Validated/Supported, Prompted/Cued, Listened/Empathized, Promoted Peer Support, and Provided Feedback  Patient mental status/affect: Calm, Congruent, and Preoccupied  Patient behavior/appearance: Neatly Groomed, Attentive, Cooperative, and Needed Prompting    Special patient treatment accommodations provided (describe): None    Patient response and progress towards goals: Focus of session was on identifying core beliefs that we may have that lead us to increased feelings of depression, anxiety, anger, and low self esteem. We spoke about the need to understand our beliefs that lead us to these painful emotions and feeling states. We spoke about how these can be challenged by working to provide pieces of evidence to the contrary to our negative core beliefs. We also spoke about how we can put this into practice when we are out in the world being challenged with negative thinking patterns. Abhinav Serra participated in group with prompting. She spoke about how she is doing better overall and is focusing on how she stopped her medications before and how big a mistake that was for her. We spoke about how she has the new belief that she knows how important it is to communicate with her doctors that prescribe her medications to her. We also spoke about what my be getting in her way of volunteering at the [employer]. She is still hesitant to talk about it.       Behavioral Health Daily Check In form revealed that pt is not experiencing SI/HI thoughts or plans, remaining abstinent from drugs and alcohol, and Subjective:       Patient ID: Marquita Camp is a 78 y.o. female.    Chief Complaint: Follow-up    Pt is a 78 y.o. female who presents for evaluation and management of   Encounter Diagnosis   Name Primary?    Chest pain, unspecified type Yes   .episode of chest pain Friday while at physical therapy. Center of chest. Heaviness. Radiated to the back   Lasted most of the day. Resolved spontaneously.   She seemed to be SOB when she would ambulate during the period of time that her CP lasted   No CP today  She denies CP or SOB with exertion  Of note she had her first Repatha dose on Wednesday.     Doing well on current meds. Denies any side effects. Prevention is up to date.  Review of Systems   Respiratory: Positive for shortness of breath.    Cardiovascular: Positive for chest pain.       Objective:      Physical Exam  Constitutional:       Appearance: She is well-developed.   HENT:      Head: Normocephalic and atraumatic.      Right Ear: External ear normal.      Left Ear: External ear normal.      Nose: Nose normal.   Eyes:      Pupils: Pupils are equal, round, and reactive to light.   Neck:      Musculoskeletal: Normal range of motion and neck supple.      Thyroid: No thyromegaly.      Vascular: No JVD.      Trachea: No tracheal deviation.   Cardiovascular:      Rate and Rhythm: Normal rate.      Heart sounds: Normal heart sounds. No murmur.   Pulmonary:      Effort: Pulmonary effort is normal. No respiratory distress.      Breath sounds: Normal breath sounds. No wheezing or rales.   Chest:      Chest wall: No tenderness.   Abdominal:      General: Bowel sounds are normal. There is no distension.      Palpations: Abdomen is soft. There is no mass.      Tenderness: There is no abdominal tenderness. There is no guarding or rebound.   Musculoskeletal: Normal range of motion.         General: No tenderness.   Lymphadenopathy:      Cervical: No cervical adenopathy.   Skin:     General: Skin is warm and dry.       Coloration: Skin is not pale.      Findings: No erythema or rash.   Neurological:      Mental Status: She is alert and oriented to person, place, and time.      Cranial Nerves: No cranial nerve deficit.      Motor: No abnormal muscle tone.      Coordination: Coordination normal.      Deep Tendon Reflexes: Reflexes are normal and symmetric. Reflexes normal.   Psychiatric:         Behavior: Behavior normal.         Thought Content: Thought content normal.         Judgment: Judgment normal.         Assessment:       1. Chest pain, unspecified type        Plan:   Marquita was seen today for follow-up.    Diagnoses and all orders for this visit:    Chest pain, unspecified type  -     Ambulatory referral/consult to Cardiology; Future      Problem List Items Addressed This Visit     None      Visit Diagnoses     Chest pain, unspecified type    -  Primary    Relevant Orders    Ambulatory referral/consult to Cardiology        If stress test/cardiac work up is negative, consider her transient chest pain a side effect of repatha?   No follow-ups on file.         reports goal today of \"taking my medications as my doctors want me to. \"

## 2023-03-29 NOTE — TELEPHONE ENCOUNTER
Pt daughter aware 2 identifiers verified name and  she says her mother has cholesterol meds she just will not take them so she is not going to buy the fish oil pills she's going to try to get her mom to take the pills she has

## 2023-03-30 ENCOUNTER — HOSPITAL ENCOUNTER (OUTPATIENT)
Dept: BEHAVIORAL/MENTAL HEALTH | Age: 88
Discharge: HOME | End: 2023-03-30
Payer: MEDICARE

## 2023-03-30 NOTE — BH NOTES
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 Pt will work to identify a sense of purpose for this phase of her life and how that purpose can increase her wellbeing and satisfaction and share with group.       Intervention/techniques: Informed, Validated/Supported, Listened/Empathized, and Provided Feedback  Patient mental status/affect: Calm, Congruent, and Preoccupied  Patient behavior/appearance: Neatly Groomed, Attentive, Cooperative, and Needed Prompting    Special patient treatment accommodations provided (describe): None    Patient response and progress towards goals: Focus of session was on the Three C's of Cognitive Therapy and how they are Catch the thought that came before the emotion, Check and reflect on how accurate and useful the thought was, and Change the thought to a more accurate or helpful one as needed. I shared how this is an easy way to remember these three strategies and how important they are to overcome a lot of unnecessary stress and negative thoughts. Group members processed their thoughts and worked to recognize the unhealthy thoughts that are also irrational and untrue that they can work to let go of for their recovery. Deo To participated in group well with prompting. She continues to focus on her past thoughts and how she decided to go off her medications in the past and \"how wrong that thought was. \"  We continue to refocus her on what she can do to move forward now.       Behavioral Health Daily Check In form revealed that pt is not experiencing SI/HI thoughts or plans, remaining abstinent from drugs and alcohol, and reports goal today of \"continuing to come here each week and not having the thought of stopping my medications.'
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  Pt will identify to group members what she sees as the aspects of her daily life that she does have control over and can make a positive impact on    Intervention/techniques: Informed, Validated/Supported, Modeled/Rehearsed, Prompted/Cued, Promoted Peer Support, and Provided Feedback    Patient mental status/affect: Anxious, Congruent, and Preoccupied    Patient behavior/appearance: Neatly Groomed, Attentive, Cooperative, and Needed Prompting    Special patient treatment accommodations provided (describe): None    Patient response and progress towards goals: Focus of session was on developing healthy boundaries with others and the signs of healthy and unhealthy boundaries. We explored current relationships with group members and identified what kinds of boundaries are present and what boundaries need to be set. We spoke about physical, emotional, time, and intimate boundaries that we can set. We also spoke about how to allow others to set boundaries with us and to work to equip them with how to support in times of need and/or crisis. We identified what changes we can make today. Iris Rojas participated in group with prompting. She was quiet but she listened to discussion and she had some good ideas for how she can cope. She spoke about times in the past where she would get emotionally hurt by others and how she is trying to continue to overcome past hurts.
[facility] Outpatient Program  Group Therapy    Date of Service: 3/30/2023  Start time: 1100  Stop time: 1150  Type of session: Therapy Group    Problem number: 1Dep. F33.0    Short term goal (STG): O Pt will work to identify a sense of purpose for this phase of her life and how that purpose can increase her wellbeing and satisfaction and share with group.       Intervention/techniques: Informed, Validated/Supported, and Observed/Monitored    Patient mental status/affect: Calm and Congruent    Patient behavior/appearance: Attentive, Cooperative, Needed Prompting, and Withdrawn/Quiet    Special patient treatment accommodations provided (describe): None    Patient response and progress towards goals: Focus of session was based on article 5 Ways to Deal with Urges and Cravings by Flaco Maxwell. We spoke about how these skills can be useful, not just for drugs or alcohol which is usually what we associate with urges, but all behaviors that we determine are our quick fixes and cause us damage because we engage in them. We spoke about the 5 ways are Delay, Escape, Accept, Dispute, and Substitute. We spoke about the strategies in detail and group members shared what they are willing to focus on for their own well being to have a higher level of control over. Pt listened to others share their experiences with learning new words, concepts , strategies in group and how it can be  difficult to practice those  around others at times. We discussed communication strategies to help those that care about you better understand the changes you are going through.   Group members feel comfortable in group as they understand each other but it is hard sometimes to stay positive   with family and friends
normal...

## 2023-04-13 ENCOUNTER — HOSPITAL ENCOUNTER (OUTPATIENT)
Dept: BEHAVIORAL/MENTAL HEALTH | Age: 88
Discharge: HOME | End: 2023-04-13
Payer: MEDICARE

## 2023-04-13 PROCEDURE — 90853 GROUP PSYCHOTHERAPY: CPT

## 2023-04-18 ENCOUNTER — HOSPITAL ENCOUNTER (OUTPATIENT)
Dept: BEHAVIORAL/MENTAL HEALTH | Age: 88
Discharge: HOME | End: 2023-04-18
Payer: MEDICARE

## 2023-04-18 PROCEDURE — 90853 GROUP PSYCHOTHERAPY: CPT

## 2023-04-18 RX ORDER — VENLAFAXINE HYDROCHLORIDE 150 MG/1
150 CAPSULE, EXTENDED RELEASE ORAL DAILY
Qty: 90 CAPSULE | Refills: 1 | Status: SHIPPED | OUTPATIENT
Start: 2023-04-18

## 2023-04-18 RX ORDER — VENLAFAXINE HYDROCHLORIDE 75 MG/1
75 CAPSULE, EXTENDED RELEASE ORAL DAILY
Qty: 90 CAPSULE | Refills: 1 | Status: SHIPPED | OUTPATIENT
Start: 2023-04-18

## 2023-04-18 NOTE — BH NOTES
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  Pt will identify to group members what she sees as the aspects of her daily life that she does have control over and can make a positive impact on    Intervention/techniques: Informed, Challenged, Reframed, Listened/Empathized, Observed/Monitored, and Provided Feedback  Patient mental status/affect: Calm, Congruent, and Preoccupied  Patient behavior/appearance: Neatly Groomed, Attentive, Cooperative, and Needed Prompting    Special patient treatment accommodations provided (describe): None    Patient response and progress towards goals: Focus of session was based on information from website [employer] which created a useful list of lessons from cognitive behavioral therapy. I reviewed the lessons covered which are situations cannot create feelings, only your thoughts and the situation can, notice unhelpful thoughts and replace them with helpful thoughts, take note of which behaviors make you feel better or worse, turn your focus to the task or the environment, avoidance makes anxiety worse, act the way you want to be, no matter how you feel, and let go of expectations. Group members were asked to share their thoughts on which lesson they can learn to apply and why that one may be able to help them. Lexi Stein participated in group with prompting. She spoke about how she did some work outside last night and she is struggling physically today. She was very positive about being here today though and spoke about how she is aware of how important it is for her to stay consistent with her medications and talking about her feelings.       Behavioral Health Daily Check In form revealed that pt is not experiencing SI/HI thoughts or plans, remaining abstinent from drugs and alcohol, and reports goal today of \"to stay positive and focus on what I have in my life. \"  She did not complete a sheet but answered questions verbally.

## 2023-04-18 NOTE — BH NOTES
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 Pt will identify one core belief or message that she holds that she recognizes fuels anxiety and/or worry and share with group and how the beliefs impact her.     Intervention/techniques: Informed, Challenged, Reframed, Observed/Monitored, Promoted Peer Support, and Problem Solved    Patient mental status/affect: Anxious, Calm, Congruent, and Preoccupied    Patient behavior/appearance: Neatly Groomed, Attentive, Cooperative, and Needed Prompting    Special patient treatment accommodations provided (describe): None    Patient response and progress towards goals: Focus of session was on the benefits of gratitude and how a daily focus of this can benefit our emotional well-being and our mental health. We spoke about the impact that this can have on life satisfaction. We spoke about the different impacts it can have on people including our personality, emotional, social life, our health, and our careers. We spoke about how group members can start to practice this and how they can work to understand and appreciate the impact. Abhinav Serra participated in group with prompting. She spoke about how she knows that she is focusing on what she can do and she has been helped by group to keep reminding her of this skill. She spoke about how she is \"glad to be alive and she is willing to keep trying to get adjusted to the feelings of loneliness that I have. \" She shared how little she has been alone in her life but she is coming to some terms of acceptance.

## 2023-04-20 ENCOUNTER — HOSPITAL ENCOUNTER (OUTPATIENT)
Dept: BEHAVIORAL/MENTAL HEALTH | Age: 88
Discharge: HOME | End: 2023-04-20
Payer: MEDICARE

## 2023-04-20 PROCEDURE — 90853 GROUP PSYCHOTHERAPY: CPT

## 2023-05-01 NOTE — BH NOTES
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  Pt will develop written continuing aftercare plan with focus on coping with family & other stressors once discharged and not receiving support and direction of group. Intervention/techniques: Informed, Validated/Supported, Prompted/Cued, Listened/Empathized, Promoted Peer Support and Provided Feedback    Patient mental status/affect: Anxious, Congruent and Preoccupied    Patient behavior/appearance: Neatly Groomed, Attentive, Cooperative, Needed Prompting and Withdrawn/Quiet    Special patient treatment accommodations provided (describe): None    Patient response and progress towards goals: Focus of session was on procrastination and the possible reasons behind why we do it. We spoke about fear and lack of confidence and esteem can play a major role in our reasons for doing this and we spoke about how we can also let being stubborn get in our way and that affects our ability to move forward. We spoke about how we can be contributing the reason to not do these things by how we talk to ourselves and that we tell ourselves that we are not strong enough and we are not capable. We addressed effective ways to avoid procrastination and that we need to refute our arguments aggressively as to why we are putting things off. We spoke about addressing specifically our reason for delay and developing fair arguments against delay and work to move forward. Royal Reyes was quiet in group but she did engage with prompting. She said she was not feeling well today emotionally or physically. She spoke about how she has been struggling with her thoughts and that confusion has been increasing. She spoke about how she will continue to work on her daily routine and make sure she is doing something productive.   She spoke Oral airway dc'd. No noted problems to oral mucous. O2 3 liters nc remains in use.   about how the heat does not help her feel good in any way.

## 2023-05-09 ENCOUNTER — HOSPITAL ENCOUNTER (OUTPATIENT)
Facility: HOSPITAL | Age: 88
Discharge: HOME | End: 2023-05-12

## 2023-05-09 NOTE — PSYCHOTHERAPY
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  Pt will establish a log to write down when she had taken her medication each day and share with the group    Intervention/techniques: Informed, Validated/Supported, Modeled/Rehearsed, Prompted/Cued, and Provided Feedback    Patient mental status/affect: Calm, Congruent, and Preoccupied    Patient behavior/appearance: Neatly Groomed, Attentive, Cooperative, and Needed Prompting    Special patient treatment accommodations provided (describe): None    Patient response and progress towards goals: Focus of session was based on handout from [facility] on [address] [place] [address] and Anger.   We spoke about the impact that little and big frustrations and triggers to anger can have on us and how processing our thoughts and emotions is helpful to let things go more quickly. We spoke about things that we can do to feel better and how we can ask others to do things to help us feel better. We spoke about what group members will work on in regards to their own triggers and what is going on in their life right now and how they can learn the three step process of asking ourselves what happened, what do I feel, and what do I need? Alfredo Mejia participated in group with prompting. She was able to think about issues that have caused her frustration and anger and how she can continue to work to move on in her mind and in her body.
[facility] Outpatient Program  Group Therapy    Date of Service: 5/9/2023  Start time: 1100  Stop time: 1150  Type of session: Therapy Group    Problem number: 1Dep. F33.0    Short term goal (STG): O .  Pt will work to identify a sense of purpose for this phase of her life and how that purpose can increase her wellbeing and satisfaction and share with group    Intervention/techniques: Informed, Validated/Supported, Prompted/Cued, Observed/Monitored, Clarified, and Reinforced    Patient mental status/affect: Calm and Congruent    Patient behavior/appearance: Attentive, Cooperative, and Motivated    Special patient treatment accommodations provided (describe): None    Patient response and progress towards goals: Focus of session was on identifying the difference between mindless responses and mindful responses. We spoke about the impact that either has on our moods and our functioning. We spoke about the benefits of mindfulness which included being non judging, based on facts, can distance self from thoughts, being in an approach mode, can let go, and are more calm and effective in any given moment. Pt listened to and participated in the group discussion. She shared that she has not been feeling well lately emotionally and physically with her arthritis. Pt states she feels most content when people come by and see her and call to check with her. She feels that she is still having \"crying spells\" at times but thinks that has to do with her getting older. She wants to continue with group two days a week.
alcohol, and reports goal today of  (pt did not write down a specific goal today.)

## 2023-05-11 ENCOUNTER — HOSPITAL ENCOUNTER (OUTPATIENT)
Facility: HOSPITAL | Age: 88
Discharge: HOME | End: 2023-05-14

## 2023-05-15 RX ORDER — SITAGLIPTIN AND METFORMIN HYDROCHLORIDE 500; 50 MG/1; MG/1
TABLET, FILM COATED ORAL
Qty: 90 TABLET | Refills: 1 | Status: SHIPPED | OUTPATIENT
Start: 2023-05-15

## 2023-05-16 ENCOUNTER — HOSPITAL ENCOUNTER (OUTPATIENT)
Facility: HOSPITAL | Age: 88
Discharge: HOME | End: 2023-05-19

## 2023-05-16 NOTE — BH NOTE
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 Pt will use stop, look, listen, think, and plan before acting once a day and identify healthy responses    Intervention/techniques: Informed, Prompted/Cued, Listened/Empathized, Promoted Peer Support, and Provided Feedback    Patient mental status/affect: Calm, Congruent, and Preoccupied    Patient behavior/appearance: Neatly Groomed, Attentive, Cooperative, and Needed Prompting    Special patient treatment accommodations provided (describe): None    Patient response and progress towards goals: Focus of session was on conflict resolution and strategies to help us keep calm in a conflict with others. I shared how conflicts can increase the production of cortisol and adrenaline and how that prepares us to either take flight or fight. We spoke about how conflicts can be over something pretty minor to values and very important issues in our lives. I shared how the increase in hormones affects us when we need to be clear minded and aware but those hormones can create a disorientation and confusion. I shared with group some strategies to help us stay calm in a conflict and they included taking deep breaths which actually interferes with the production of these hormones, focusing on body and where we can work to relax it, listen carefully and ask open ended questions, lower our voices, and work to let go and agree to disagree with others. We spoke about with whom group members can practice these strategies. Kings Janet participated in group with prompting. She spoke about how she is working to build up her skills to manage conflicts. She spoke about how it has bothered her a great deal in the past when others don't like her or are upset with her.   She spoke about how she is making good choices moving

## 2023-05-16 NOTE — PSYCHOTHERAPY
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    Short term goal (STG):P  Pt will establish a log to write down when she had taken her medication each day and share with the group    Intervention/techniques: Informed, Validated/Supported, Listened/Empathized, Observed/Monitored, and Reinforced    Patient mental status/affect: Calm and Congruent    Patient behavior/appearance: Attentive, Cooperative, Needed Prompting, and Withdrawn/Quiet    Special patient treatment accommodations provided (describe): None    Patient response and progress towards goals: Focus of session was on issues and emotional baggage from the past and how it may continue to be impacting our functioning today. We discussed how we can have anger towards family members and that may have been what has protected us from getting hurt again. I spoke with group about the need to accept other people's limitations and how if we don't accept that, then we get continuously hurt and angry towards others. We spoke about how we have to move forward and that may include accepting a need to forgive others and to accept them as they are. If we cannot be around them much, then that is how they take care of themselves. Pt listened to the group discussion and activity. She followed along with the worksheet discussing emotional baggage. She was alert but observed most of the group remaining quiet.

## 2023-05-16 NOTE — BH NOTE
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  Pt will work to identify a sense of purpose for this phase of her life and how that purpose can increase her wellbeing and satisfaction and share with group    Intervention/techniques: Informed, Validated/Supported, Prompted/Cued, Listened/Empathized, Promoted Peer Support, and Provided Feedback    Patient mental status/affect: Anxious, Congruent, and Preoccupied    Patient behavior/appearance: Neatly Groomed, Attentive, Cooperative, and Needed Prompting    Special patient treatment accommodations provided (describe): None    Patient response and progress towards goals: Focus of session was on identifying core beliefs that we may have that lead us to increased feelings of depression, anxiety, anger, and low self esteem. We spoke about the need to understand our beliefs that lead us to these painful emotions and feeling states. We spoke about how these can be challenged by working to provide pieces of evidence to the contrary to our negative core beliefs. We also spoke about how we can put this into practice when we are out in the world being challenged with negative thinking patterns. Susie Horton participated in group and she spoke about how she struggles a great deal with negative thoughts. She spoke about how she knows that she has found ways to push through before. She spoke about how she thought about not coming today because her stomach hurt but she took some medicine and is glad she pushed through to come. Behavioral Health Daily Check In form revealed that pt is not experiencing SI/HI thoughts or plans, remaining abstinent from drugs and alcohol, and reports goal today of  \"to talk to God about all things. \"

## 2023-05-18 ENCOUNTER — HOSPITAL ENCOUNTER (OUTPATIENT)
Facility: HOSPITAL | Age: 88
Discharge: HOME | End: 2023-05-21
Payer: MEDICARE

## 2023-05-18 PROCEDURE — 90853 GROUP PSYCHOTHERAPY: CPT

## 2023-05-18 PROCEDURE — 99213 OFFICE O/P EST LOW 20 MIN: CPT | Performed by: PSYCHIATRY & NEUROLOGY

## 2023-05-24 ENCOUNTER — HOSPITAL ENCOUNTER (OUTPATIENT)
Facility: HOSPITAL | Age: 88
Discharge: HOME | End: 2023-05-27
Payer: MEDICARE

## 2023-05-24 PROCEDURE — 90853 GROUP PSYCHOTHERAPY: CPT

## 2023-05-24 NOTE — BH NOTE
[facility] Outpatient Program  Group Therapy        Date of Service: 5/24/2023  Start time: 1000  Stop time: 1050  Type of session: Therapy Group    Problem number: 1 Dep F33.0    Short term goal (STG): O Pt will work to identify a sense of purpose for this phase of her life and how that purpose can increase her wellbeing and satisfaction and share with group.       Intervention/techniques: Informed, Validated/Supported, Prompted/Cued, Listened/Empathized, Observed/Monitored, Clarified, and Reinforced    Patient mental status/affect: Calm and Congruent    Patient behavior/appearance: Attentive, Cooperative, Needed Prompting, Caretaking, and Withdrawn/Quiet    Special patient treatment accommodations provided (describe): None    Patient response and progress towards goals: Focus of session was based on the handout Everything Is Awful and I'm not Okay.   We spoke about how this handout leads us to ask questions to help us see what we can possibly do to take care of ourselves. We spoke about the questions range from physical needs to emotional and mental needs. We spoke about how this can be useful when we are in crisis and need some simple directions that we may be unable to even think about due to the crisis. We spoke about how group members can use this tool and how they may be willing to use it. Pt shared that she will be having a birthday soon and she in many ways has had a good life and feels blessed. She has been bothered lately by the fact that she lives alone and has not experienced that in the past.She has thought about going to stay somewhere else but has decided to work through her feelings and do things to make herself feel safe.      Behavioral Health Daily Check In form revealed that pt is not experiencing SI/HI thoughts or plans, remaining abstinent from drugs and alcohol, and reports goal today call to folks

## 2023-05-24 NOTE — BH NOTE
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 Pt will work to identify a sense of purpose for this phase of her life and how that purpose can increase her wellbeing and satisfaction and share with group    Intervention/techniques: Informed, Reflected, Prompted/Cued, Promoted Peer Support, and Provided Feedback    Patient mental status/affect: Anxious, Congruent, and Preoccupied    Patient behavior/appearance: Neatly Groomed, Attentive, Cooperative, and Needed Prompting    Special patient treatment accommodations provided (describe): None    Patient response and progress towards goals: Focus of session was a discussion on what are essentially three stages of treatment/therapy. I discussed with group that the first stage is typically developing insight and awareness about symptoms, reactions and behaviors that we engage in, what triggers us, how we may be vulnerable to triggers and emotions, and looking inside to see how we impact our day to day life in negative and positive ways. We spoke about the 2nd phase is typically gaining knowledge essentially about how to do things in positive ways, how we can change our reactions, behaviors, and set goals for change and how to go about it. Third phase is typically implementation of coping skills and creating positive changes. I spoke with group about how many people get stuck in entering the third phase of doing the work to change and why and we discussed where group members believe that they are and assisted in finding ways forward in their progress of treatment. Candice Dia participated in group with prompting. She spoke about how she is going to her mothers [employer] every week and she has a ride set up.   She spoke about how she has not

## 2023-05-24 NOTE — BH NOTE
[facility] Outpatient Program  Group Therapy    Date of Service: 5/24/2023  Start time: 1200  Stop time: 1250  Type of session: Therapy Group    Problem number: 1 Dep F33.0    Short term goal N Pt will use stop, look, listen, think, and plan before acting once a day and identify healthy responses    (STG):Intervention/techniques: Informed, Validated/Supported, Prompted/Cued, Listened/Empathized, Clarified, and Reinforced    Patient mental status/affect: Calm and Congruent    Patient behavior/appearance: Attentive, Cooperative, and Withdrawn/Quiet    Special patient treatment accommodations provided (describe): None    Patient response and progress towards goals: Focus of session was based on the information from the book The Five Languages by Vivek Alicea. We reviewed the different languages in which we receive love and care and how we feel when we receive our love language. We spoke about the languages are: words of affirmation, acts of service, affection, quality time, and gifts. We spoke about how we can express our language and let those in our life know how we can feel appreciated and cared for. We spoke about how we can identify our love language in order to understand ourselves more. Pt participated in the group activity by reading aloud from the worksheet. She stated that she tries to keep moving each day. She will get up and view her roses as they are so pretty. She can't walk as far as she could in the past but wants her body to keep working so she is using the strategy of exercise to help herself.

## 2023-05-31 ENCOUNTER — HOSPITAL ENCOUNTER (OUTPATIENT)
Facility: HOSPITAL | Age: 88
Discharge: HOME | End: 2023-06-03

## 2023-05-31 NOTE — BH NOTE
Group Therapy Note    Date: 5/31/2023    Group Start Time: 10:00 AM  Group End Time: 10:50 AM  Group Topic: Psychotherapy    [address] OP    Fela Quiroga, \[facility] [place] [facility]\""W        Group Therapy Note    Focus of session was on the different kinds of problems that we face and that our reactions to our real problems often create other problems as well. I discussed with group the concept of types of problems that is based from Rational Living Therapy and that they are practical, emotional, and imagined. We spoke about how practical problems are where many emotions begin and we discussed our oftentimes intense feelings about our problems and they lead us to feel anxious, depressed, worried and miserable. We discussed the idea of having a practical problem but working towards leaving the emotions out of it and accept it with calmness. We also spoke about imagined problems and that we can believe that we have more of a problem than we really do and how can we work towards putting problems into perspective. Behavioral Health Daily Check In form revealed that patient is not experiencing SI/HI thoughts or plans, remaining abstinent from drugs and alcohol, and report's goal today of   \"to eat today. I have not been eating well. \"         Patient's Goal:  1. Dep F 33.0  Q. Pt will identify when she has been using avoidance as a strategy to cope and how she has tried to overcome it and focus on getting something small accomplished    Notes:  Esther participated in group with prompting. She spoke about how she knows that she needs to get back to focusing on self care. She is not sure what is getting in her way but she is motivated to work on it. Status After Intervention:  Improved    Participation Level:  Active Listener and Minimal    Participation Quality: Appropriate and Attentive      Speech:  normal      Thought Process/Content:
Group Therapy Note    Date: 5/31/2023    Group Start Time: 11:00 AM  Group End Time: 11:50 AM  Group Topic: Psychotherapy    [address] [facility]    Cyndy Herzog LCSW        Group Therapy Note    Focus of session was based on concept of being addicted to thoughts.   We spoke about the consequences of negative thinking patterns and then on top of that, believing everything we think. I shared ideas of how we can recognize having an addiction to thoughts such as going down a rabbit hole trail of thought often, you take everything that crosses your mind seriously, you think about life more than you live it, you don't know how to ignore unhelpful or destructive thoughts. We spoke about some strategies to incorporate to help with this issue such as setting aside time to think and REFLECT, start to work to tell the difference between thoughts and feelings, and not spending time figuring out what you want and how to go towards it. Patient's Goal:   1Dep. F33.0  L Pt will identify one core belief or message that she holds that she recognizes fuels anxiety and/or worry and share with group and how the beliefs impact her.        Notes:  Pt listened to the group discussion and concerns. She was attentive and cooperative but remained quiet as we reviewed thinking patterns and how they effect our mood and behaviors    Status After Intervention:  Unchanged    Participation Level:  Active Listener    Participation Quality: Appropriate      Speech:  normal      Thought Process/Content: Logical      Affective Functioning: Congruent and Flat      Mood: depressed      Level of consciousness:  Oriented x4      Response to Learning: Able to verbalize current knowledge/experience      Endings: None Reported    Modes of Intervention: Education, Support, Socialization, Exploration, Clarifying, Problem-solving, and Activity      Discipline Responsible:
/Counselor      Signature:  Lexii Arroyo LCSW

## 2023-06-02 ENCOUNTER — HOSPITAL ENCOUNTER (OUTPATIENT)
Facility: HOSPITAL | Age: 88
Discharge: HOME | End: 2023-06-05
Payer: MEDICARE

## 2023-06-02 PROCEDURE — 90853 GROUP PSYCHOTHERAPY: CPT

## 2023-06-05 ENCOUNTER — HOSPITAL ENCOUNTER (EMERGENCY)
Facility: HOSPITAL | Age: 88
Discharge: HOME | End: 2023-06-05
Attending: EMERGENCY MEDICINE
Payer: MEDICARE

## 2023-06-05 VITALS
DIASTOLIC BLOOD PRESSURE: 88 MMHG | OXYGEN SATURATION: 98 % | RESPIRATION RATE: 20 BRPM | TEMPERATURE: 98.1 F | WEIGHT: 200 LBS | HEIGHT: 66 IN | HEART RATE: 62 BPM | BODY MASS INDEX: 32.14 KG/M2 | SYSTOLIC BLOOD PRESSURE: 147 MMHG

## 2023-06-05 DIAGNOSIS — N63.21 MASS OF UPPER OUTER QUADRANT OF LEFT BREAST: Primary | ICD-10-CM

## 2023-06-05 PROCEDURE — 93005 ELECTROCARDIOGRAM TRACING: CPT | Performed by: EMERGENCY MEDICINE

## 2023-06-05 RX ORDER — CLINDAMYCIN HYDROCHLORIDE 300 MG/1
300 CAPSULE ORAL 3 TIMES DAILY
Qty: 30 CAPSULE | Refills: 0 | Status: SHIPPED | OUTPATIENT
Start: 2023-06-05 | End: 2023-06-15

## 2023-06-05 ASSESSMENT — PAIN - FUNCTIONAL ASSESSMENT
PAIN_FUNCTIONAL_ASSESSMENT: ACTIVITIES ARE NOT PREVENTED
PAIN_FUNCTIONAL_ASSESSMENT: 0-10

## 2023-06-05 ASSESSMENT — ENCOUNTER SYMPTOMS
SORE THROAT: 0
ABDOMINAL PAIN: 0
DIARRHEA: 0
SHORTNESS OF BREATH: 0
NAUSEA: 0
EYE REDNESS: 0
COUGH: 0
VOMITING: 0

## 2023-06-05 ASSESSMENT — LIFESTYLE VARIABLES
HOW MANY STANDARD DRINKS CONTAINING ALCOHOL DO YOU HAVE ON A TYPICAL DAY: PATIENT DOES NOT DRINK
HOW OFTEN DO YOU HAVE A DRINK CONTAINING ALCOHOL: NEVER

## 2023-06-05 ASSESSMENT — PAIN DESCRIPTION - ORIENTATION: ORIENTATION: LEFT

## 2023-06-05 ASSESSMENT — PAIN SCALES - GENERAL
PAINLEVEL_OUTOF10: 6
PAINLEVEL_OUTOF10: 0

## 2023-06-05 ASSESSMENT — PAIN DESCRIPTION - LOCATION: LOCATION: BREAST

## 2023-06-05 ASSESSMENT — PAIN DESCRIPTION - DESCRIPTORS: DESCRIPTORS: ACHING;DISCOMFORT

## 2023-06-05 NOTE — ED PROVIDER NOTES
is not ill-appearing. HENT:      Head: Normocephalic. Eyes:      Conjunctiva/sclera: Conjunctivae normal.      Pupils: Pupils are equal, round, and reactive to light. Cardiovascular:      Rate and Rhythm: Normal rate and regular rhythm. Pulses: Normal pulses. Heart sounds: Normal heart sounds. Pulmonary:      Effort: Pulmonary effort is normal.      Breath sounds: Normal breath sounds. Abdominal:      Palpations: Abdomen is soft. Tenderness: There is no abdominal tenderness. Musculoskeletal:      Comments: Left breast: There is a 3 cm diameter firm nodule in the upper outer quadrant of the left breast.  Minimal tenderness. Nonfluctuant. No external skin changes. No induration. No erythema. No axillary lymphadenopathy. Left arm with full range of motion and nontender. 2+ radial and ulnar pulses. Hand motor and sensory grossly intact. Skin:     General: Skin is warm and dry. Findings: No rash. Neurological:      General: No focal deficit present. Mental Status: She is alert and oriented to person, place, and time. Psychiatric:         Behavior: Behavior normal.             Diagnostic Study Results     Labs -   No results found for this or any previous visit (from the past 12 hour(s)). Radiologic Studies -   No orders to display     [unfilled]  [unfilled]      Medical Decision Making   I am the first provider for this patient. I reviewed the vital signs, available nursing notes, past medical history, past surgical history, family history and social history. Vital Signs-Reviewed the patient's vital signs. Patient Vitals for the past 12 hrs:   Temp Pulse Resp BP SpO2   06/05/23 1036 98.1 °F (36.7 °C) 62 20 (!) 162/93 96 %           Records Reviewed: Nursing notes reviewed    Provider Notes (Medical Decision Making):   DDX: 82-year-old female with left breast lump. Patient is been able to palpate it for at least 3 weeks.   Only started causing pain in the past

## 2023-06-05 NOTE — ED TRIAGE NOTES
Pt arrived by EMS for left breast and arm pain. Pt reports she has a lump in her left breast that is painful and causing pain in breast and arm.   Pt is awake alert and oriented X 4, pt educated on ER flow

## 2023-06-05 NOTE — ED NOTES
D/C instructions provided and reviewed with patient. Opportunity provided for discussion. All questions answered nothing further at this time.        Mahnaz Odonnell RN  06/05/23 3062

## 2023-06-06 ENCOUNTER — HOSPITAL ENCOUNTER (OUTPATIENT)
Facility: HOSPITAL | Age: 88
Discharge: HOME | End: 2023-06-09
Payer: MEDICARE

## 2023-06-06 PROCEDURE — 90853 GROUP PSYCHOTHERAPY: CPT

## 2023-06-06 NOTE — BH NOTE
Group Therapy Note    Date: 6/6/2023    Group Start Time: 10:00 AM  Group End Time: 10:50 AM  Group Topic: Psychotherapy    [address] OP    Fela Quiroga, HENRYW        Group Therapy Note    Focus of session was based on information from Acceptance and Commitment Therapy and Aaron Choudhary. We spoke about the skills and strategies for what to do in an emotional crisis. We spoke about the types of major crises we can face as well as the painful emotions that come along with these crises. We spoke about the strategy of STOP (slow your breathing, take note, open up, and pursue your values. We spoke about the outcomes that using this process can bring when we are feeling overwhelmed emotionally. We discussed other coping skills such as identifying who we can talk to when in crisis, taking note and being aware of emotions, allowing the feelings to happen. We spoke about how this skill can help them now and how group members will be willing to use these skills to improve their functioning and stability. Behavioral Health Daily Check In form revealed that patient is not experiencing SI/HI thoughts or plans, remaining abstinent from drugs and alcohol, and report's goal today of \"pray for more peace on my health. \"      Patient's Goal:  1. Dep F 33.0 Q.  Pt will identify when she has been using avoidance as a strategy to cope and how she has tried to overcome it and focus on getting something small accomplished.     Notes:  Meggan participated in group with prompting. She spoke about how she knows that she is worrying about having a lump in her breast but she went to the ER to get some help. She has been started on an antibiotic and she stated it is feeling better but she needs ot go to her PCP this week. She is worried about it but she was able to get some reassurance.   She was also able to talk about being able to sit with her
Group Therapy Note    Date: 6/6/2023    Group Start Time: 12:00 PM  Group End Time: 12:50 PM  Group Topic: Psychotherapy    [address] OP    Fela Quiroga, HENRYW        Group Therapy Note    Focus of session was based on information from [facility] about what Boundaries with Yourself Look Like.   We discussed how boundaries are rules that we want to set for ourselves in order to have success to reach our goals. We spoke about some basics like having a bedtime, limiting screen time, having an amount of time each week dedicated to exercise, keeping a budget, saying no to yourself, and limiting what we consider treats and rewards such as a drink or a unhealthy meal.  We spoke about how patient thinks a rule or boundary for themselves could help and what it can be. Patient's Goal:  1. Dep F 33.0 O.  Pt will work to identify a sense of purpose for this phase of her life and how that purpose can increase her wellbeing and satisfaction and share with group    Notes:  Esther participated in group with prompting. She continues to be focused on her health but she did talk about how she is trying to stay connected and to stay busy when she is home. She spoke about how she knows that she would benefit from finding something to do at home as she does not feel comfortable being at home for long periods of time. We talked about some ideas she can pursue. Status After Intervention:  Improved    Participation Level:  Active Listener    Participation Quality: Appropriate, Attentive, Sharing, and Supportive      Speech:  normal      Thought Process/Content: Logical      Affective Functioning: Congruent      Mood: anxious and depressed      Level of consciousness:  Alert, Oriented x4, and Attentive      Response to Learning: Able to verbalize current knowledge/experience      Endings: None Reported    Modes of Intervention:
Responsible: /Counselor      Signature:  Ruby Mckeon LCSW

## 2023-06-08 ENCOUNTER — HOSPITAL ENCOUNTER (OUTPATIENT)
Facility: HOSPITAL | Age: 88
End: 2023-06-08
Payer: MEDICARE

## 2023-06-08 LAB
EKG ATRIAL RATE: 60 BPM
EKG DIAGNOSIS: NORMAL
EKG P AXIS: 77 DEGREES
EKG P-R INTERVAL: 148 MS
EKG Q-T INTERVAL: 440 MS
EKG QRS DURATION: 82 MS
EKG QTC CALCULATION (BAZETT): 440 MS
EKG R AXIS: 23 DEGREES
EKG T AXIS: 104 DEGREES
EKG VENTRICULAR RATE: 60 BPM

## 2023-06-08 NOTE — GROUP NOTE
Group Therapy Note  Pt called this morning and stated that she would not be attending her scheduled treatment day. I called pt and spoke with her to see how she was feeling. She was feeling better than this morning and plans on coming to group next week.

## 2023-06-15 ENCOUNTER — APPOINTMENT (OUTPATIENT)
Facility: HOSPITAL | Age: 88
End: 2023-06-15
Payer: MEDICARE

## 2023-06-16 ENCOUNTER — HOSPITAL ENCOUNTER (OUTPATIENT)
Facility: HOSPITAL | Age: 88
Discharge: HOME | End: 2023-06-19
Payer: MEDICARE

## 2023-06-16 PROCEDURE — 90853 GROUP PSYCHOTHERAPY: CPT

## 2023-06-19 ENCOUNTER — OFFICE VISIT (OUTPATIENT)
Age: 88
End: 2023-06-19
Payer: MEDICARE

## 2023-06-19 ENCOUNTER — TRANSCRIBE ORDERS (OUTPATIENT)
Facility: HOSPITAL | Age: 88
End: 2023-06-19

## 2023-06-19 VITALS
HEART RATE: 74 BPM | OXYGEN SATURATION: 91 % | TEMPERATURE: 98.6 F | WEIGHT: 177 LBS | HEIGHT: 66 IN | SYSTOLIC BLOOD PRESSURE: 158 MMHG | DIASTOLIC BLOOD PRESSURE: 90 MMHG | BODY MASS INDEX: 28.45 KG/M2 | RESPIRATION RATE: 16 BRPM

## 2023-06-19 DIAGNOSIS — N63.23 MASS OF LOWER OUTER QUADRANT OF LEFT BREAST: Primary | ICD-10-CM

## 2023-06-19 DIAGNOSIS — N63.20 MASS OF LEFT BREAST, UNSPECIFIED QUADRANT: Primary | ICD-10-CM

## 2023-06-19 DIAGNOSIS — N64.4 PAIN OF LEFT BREAST: ICD-10-CM

## 2023-06-19 DIAGNOSIS — N63.24 UNSPECIFIED LUMP IN THE LEFT BREAST, LOWER INNER QUADRANT: ICD-10-CM

## 2023-06-19 PROCEDURE — 99213 OFFICE O/P EST LOW 20 MIN: CPT | Performed by: NURSE PRACTITIONER

## 2023-06-19 PROCEDURE — 3080F DIAST BP >= 90 MM HG: CPT | Performed by: NURSE PRACTITIONER

## 2023-06-19 PROCEDURE — 1123F ACP DISCUSS/DSCN MKR DOCD: CPT | Performed by: NURSE PRACTITIONER

## 2023-06-19 PROCEDURE — 3077F SYST BP >= 140 MM HG: CPT | Performed by: NURSE PRACTITIONER

## 2023-06-19 SDOH — ECONOMIC STABILITY: HOUSING INSECURITY
IN THE LAST 12 MONTHS, WAS THERE A TIME WHEN YOU DID NOT HAVE A STEADY PLACE TO SLEEP OR SLEPT IN A SHELTER (INCLUDING NOW)?: NO

## 2023-06-19 SDOH — ECONOMIC STABILITY: INCOME INSECURITY: HOW HARD IS IT FOR YOU TO PAY FOR THE VERY BASICS LIKE FOOD, HOUSING, MEDICAL CARE, AND HEATING?: SOMEWHAT HARD

## 2023-06-19 SDOH — ECONOMIC STABILITY: FOOD INSECURITY: WITHIN THE PAST 12 MONTHS, YOU WORRIED THAT YOUR FOOD WOULD RUN OUT BEFORE YOU GOT MONEY TO BUY MORE.: SOMETIMES TRUE

## 2023-06-19 SDOH — ECONOMIC STABILITY: FOOD INSECURITY: WITHIN THE PAST 12 MONTHS, THE FOOD YOU BOUGHT JUST DIDN'T LAST AND YOU DIDN'T HAVE MONEY TO GET MORE.: NEVER TRUE

## 2023-06-19 ASSESSMENT — ENCOUNTER SYMPTOMS
ABDOMINAL DISTENTION: 0
EYE DISCHARGE: 0
APNEA: 0

## 2023-06-19 NOTE — PROGRESS NOTES
Christopher Aguilar is a 82 y.o. female who presents today with c/o   Chief Complaint   Patient presents with    Breast Mass     LEFT              Assessment/ Plan:       ASSESSMENT AND PLAN    Diagnoses:  1. Left breast mass  Lower outer quadrant mass felt at 4 oclock position  Mammo and ultrasound ordered. Mammo scheduled for 6/22/2023 at 1 pm      Orders Placed This Encounter   Procedures    US BREAST COMPLETE LEFT     Standing Status:   Future     Standing Expiration Date:   6/19/2024     Order Specific Question:   Reason for exam:     Answer:   small moveable mass felt at 4 oclock position on left breast lower quadrant    GISELLE LUDMILA DIGITAL DIAGNOSTIC BILATERAL     Standing Status:   Future     Standing Expiration Date:   8/19/2024     Order Specific Question:   Reason for exam:     Answer:   lower outer quadrant mass felt at the 4 oclock position     Return if symptoms worsen or fail to improve. Subjective:  Christopher Aguilar is a 82 y.o. female here today for a left breast mass. She tells me she felt the lump about one week ago and she started to worry because she is getting older. On chart review she was seen on 6/5/2023 and she reported a painful left breast mass for three weeks that started to cause more pain. She was started on antibiotics. She denies pain to the area today, but she is nervous about it. Last mammo 8/2019-->negative.  No evidence of malignancy    Patient Active Problem List   Diagnosis    Primary osteoarthritis involving multiple joints    Environmental allergies    TIA (transient ischemic attack)    Well controlled type 2 diabetes mellitus ([address]    History of GI bleed    History of CVA (cerebrovascular accident)    Major depressive disorder, recurrent episode, severe (HCC)    Elevated cholesterol    Age-related cataract of both eyes    Former smoker    Allergic rhinitis    Essential hypertension    CKD (chronic kidney disease), symptom management only, unspecified stage    Chronic

## 2023-06-19 NOTE — PROGRESS NOTES
1. \"Have you been to the ER, urgent care clinic since your last visit? Hospitalized since your last visit? \" No    2. \"Have you seen or consulted any other health care providers outside of the [address] [place] [address] since your last visit? \" No     3. For patients aged 39-70: Has the patient had a colonoscopy / FIT/ Cologuard? NA - based on age     If the patient is female:    4. For patients aged 41-77: Has the patient had a mammogram within the past 2 years? NA - based on age    11. For patients aged 21-65: Has the patient had a pap smear?  NA - based on age

## 2023-06-20 ENCOUNTER — HOSPITAL ENCOUNTER (OUTPATIENT)
Facility: HOSPITAL | Age: 88
Discharge: HOME | End: 2023-06-23
Payer: MEDICARE

## 2023-06-20 PROCEDURE — 90853 GROUP PSYCHOTHERAPY: CPT

## 2023-06-20 NOTE — BH NOTE
Group Therapy Note    Date: 6/20/2023    Group Start Time: 11:00 AM  Group End Time: 11:50 AM  Group Topic: Psychotherapy    [facility]    Fela CUELLAR Junaid, LCSW        Group Therapy Note  Focus of session was on the struggle with trying to control people and things in our life that we cannot control. We spoke about how our focus on others may often be an excuse to not take care of ourselves. We tend to also ignore or avoid what is plainly in our control that can be fixed. We spoke about some red flag questions to ask ourselves to help us see if we are focusing on things we can change which include  being unable to say no, felt responsible for someone else's misfortune or problem, a need to find solutions for someone else's problems, and become easily upset if someone is upset around us. Patient's Goal:  1. Dep F 33.0 P.  Pt will establish a log to write down when she had taken her medication each day and share with the group    Notes:  Esther participated in group with prompting. She spoke about how she is doing well overall today. She spoke about her concerns for her kids and how she should rely on them. She spoke about how she feels like she relies too much on her one daughter and \"it seems my other daughter does not care if I live or die. \" We spoke about what she can do in her control and she agreed she can talk to her children all at once and have an honest discussion about it. Status After Intervention:  Improved    Participation Level:  Active Listener and Interactive    Participation Quality: Appropriate, Attentive, Sharing, and Supportive      Speech:  normal      Thought Process/Content: Logical      Affective Functioning: Congruent      Mood: anxious and depressed      Level of consciousness:  Alert, Oriented x4, and Attentive      Response to Learning: Able to verbalize current

## 2023-06-20 NOTE — BH NOTE
Group Therapy Note    Date: 6/20/2023    Group Start Time: 10:00 AM  Group End Time: 10:50 AM  Group Topic: Psychotherapy    [facility]    Janeen Clark LCSW        Group Therapy Note  Focus of session was based on information from the book The Brain Fog Fix by Dr. Royer Schmidt. We spoke about what brain fog can feel like and what it can lead to which includes increased anxiety and depression as well as problems with memory and functioning. I focused on the information that book provides about how we eat, sleep, work, and live and how our choices disrupt our chemical levels of dopamine, serotonin, and cortisol and that unstable levels of these chemicals lead to brain fog.   We discussed what our brain needs and what fogs our brain that is clearly laid out in the book including diet, exercise, use of brain altering chemicals, and exposure to screens. Behavioral Health Daily Check In form revealed that pt is not experiencing SI/HI thoughts or plans, remaining abstinent from drugs and alcohol, and reports goal today to take her medication longer     Patient's Goal:  1Dep. F33.0 Q Pt will identify when she has been using avoidance as a strategy to cope and how she has tried to overcome it and focus on getting something small accomplished    Notes:  Pt participated in the group discussion and shared that she feels very good today. She ate breakfast, took her medication and cleaned a little bit before . She feels that maybe she should get in this routine on a regular basis to help her attitude and emotional/physical needs. The group was supportive of her goal    Status After Intervention:  Improved    Participation Level:  Active Listener and Interactive    Participation Quality: Appropriate, Attentive, Sharing, and Supportive      Speech:  normal      Thought Process/Content: Logical      Affective Functioning:

## 2023-06-20 NOTE — BH NOTE
Group Therapy Note    Date: 6/20/2023    Group Start Time: 12:00 PM  Group End Time: 12:50 PM  Group Topic: Psychotherapy    [address] OP    Pamella Gonzalez LCSW        Group Therapy Note    Focus of session was based on the article 10 Signs you are Starting to Heal.  We spoke about the following: you no longer dwell on negative thoughts from others, you no longer feel afraid to ask for help, you are starting to feel again, you no longer beat yourself up about everything, you experience more better days that bad ones, you are gaining more control over your life, you are regaining your appetite, you no longer rely on others to make you happy, and you look forward to the future. We spoke about where group members are and what they are noticing are positive signs of recovery. Patient's Goal:  1Dep. F33.0 L Pt will identify one core belief or message that she holds that she recognizes fuels anxiety and/or worry and share with group and how the beliefs impact her    Notes:  Pt participated in the group activity and engaged with the other members. She stated that she has gotten better at not dwelling on negative thoughts from others. She feels more confident in herself and tries to focus on the good in people. She also has started to separate herself from situations when she can that are unhealthy or negative for her. Status After Intervention:  Improved    Participation Level:  Active Listener    Participation Quality: Appropriate, Attentive, Sharing, and Supportive      Speech:  normal      Thought Process/Content: Logical      Affective Functioning: Congruent      Mood: Bright      Level of consciousness:  Alert, Oriented x4, and Attentive      Response to Learning: Able to verbalize current knowledge/experience, Able to verbalize/acknowledge new learning, Able to retain information, Capable of insight, Able to change

## 2023-06-21 NOTE — BH NOTE
[facility]  Group Therapy  Initial Treatment Plan       TREATMENT PLAN REVIEW REVIEW DATE: 6/5/2023      summary of Ongoing issues with Symptoms/Functional Impairments Indicating Need for Continued Stay:  Pt has been coming to group on a consistent basis without calling in. Pt still struggles with loneliness at times and attempts to allow herself time to grieve then move forward. SHe has been concerned regarding a medical issue but has worked toward her goal of going to the doctor and TAKING HER medication on a regular basis. We will continue to encourage strategies to support pt      TREATMENT & DISCHARGE PLANNING CHANGES    Modality or Intervention  Changes Pt will attend group two days a week for three hours per day   Psychotropic Medication Changes  No new updates since last review     Discharge  Planning or Target Date  Changes 8/31/2023   New Issues none     TREATMENT TEAM SIGNATURE / DATE   I have participated in the development of this plan of treatment and agreed to its implementation. Client Signature PRINTED Name                        Date & Time       Family / Legal Representative Signature (If Applicable) PRINTED Name & Relationship     Date & Time   Therapist Signature PRINTED Name & Qing Mistry LCSW Date & Time       Therapist Signature PRINTED Name & Ariel Arevalo LCSW   Date & Time       Other Signature PRINTED Name & Credential     Date & Time   Other Signature PRINTED Name & Credential or Relationship     Date & Time       PHYSICIAN CERTIFICATION OF THE LEVEL OF CARE:  I certify that these outpatient behavioral health services are medically necessary to improve and maintain the patient's condition and functional level and prevent relapse or admission to a higher level of care. Physician Tonya Grewal Name Michelle Green M.D.  Date & Time

## 2023-06-29 ENCOUNTER — HOSPITAL ENCOUNTER (OUTPATIENT)
Facility: HOSPITAL | Age: 88
End: 2023-06-29
Payer: MEDICARE

## 2023-07-06 ENCOUNTER — HOSPITAL ENCOUNTER (OUTPATIENT)
Facility: HOSPITAL | Age: 88
Discharge: HOME | End: 2023-07-06
Payer: MEDICARE

## 2023-07-06 ENCOUNTER — HOSPITAL ENCOUNTER (OUTPATIENT)
Facility: HOSPITAL | Age: 88
End: 2023-07-06
Payer: MEDICARE

## 2023-07-06 ENCOUNTER — TELEPHONE (OUTPATIENT)
Age: 88
End: 2023-07-06

## 2023-07-06 DIAGNOSIS — N63.23 MASS OF LOWER OUTER QUADRANT OF LEFT BREAST: ICD-10-CM

## 2023-07-06 PROCEDURE — G0279 TOMOSYNTHESIS, MAMMO: HCPCS

## 2023-07-06 PROCEDURE — 90853 GROUP PSYCHOTHERAPY: CPT

## 2023-07-06 PROCEDURE — 76641 ULTRASOUND BREAST COMPLETE: CPT

## 2023-07-06 NOTE — TELEPHONE ENCOUNTER
Juana Root from Mammography at [facility] called and stated patient has a solid mass in left breast. She is putting orders in for other test at [facility].

## 2023-07-06 NOTE — PROGRESS NOTES
Dr Bhumi Barrientos spoke with pt and rec left breast usbx, pt wants to go to [facility] and I have emailed Terry Amaya RN at [facility] and she will call the pt and set up an appt. I have spoken with Robinson Torres RN at [facility] NP office and she will sign order in CC. They use [facility] if surgeon is needed.   dsw

## 2023-07-10 ENCOUNTER — TELEPHONE (OUTPATIENT)
Age: 88
End: 2023-07-10

## 2023-07-10 NOTE — TELEPHONE ENCOUNTER
Patient is having a procedure on 7/18/23 & [facility]'s needs permission for her to stop her Plavix & aspirin 7 days before her procedure

## 2023-07-11 NOTE — TELEPHONE ENCOUNTER
Margaux Lopez with imaging is aware per Naz Jackson it is okay to stop the plavix and aspirin 7 days prior to procedure

## 2023-07-13 ENCOUNTER — APPOINTMENT (OUTPATIENT)
Facility: HOSPITAL | Age: 88
End: 2023-07-13
Payer: MEDICARE

## 2023-07-14 ENCOUNTER — HOSPITAL ENCOUNTER (OUTPATIENT)
Facility: HOSPITAL | Age: 88
Discharge: HOME | End: 2023-07-17
Payer: MEDICARE

## 2023-07-14 PROCEDURE — 90853 GROUP PSYCHOTHERAPY: CPT

## 2023-07-14 PROCEDURE — 99213 OFFICE O/P EST LOW 20 MIN: CPT | Performed by: PSYCHIATRY & NEUROLOGY

## 2023-07-18 ENCOUNTER — HOSPITAL ENCOUNTER (OUTPATIENT)
Facility: HOSPITAL | Age: 88
Discharge: HOME | End: 2023-07-21
Payer: MEDICARE

## 2023-07-18 DIAGNOSIS — N63.21 MASS OF UPPER OUTER QUADRANT OF LEFT BREAST: ICD-10-CM

## 2023-07-18 PROCEDURE — 2500000003 HC RX 250 WO HCPCS: Performed by: RADIOLOGY

## 2023-07-18 PROCEDURE — A4648 IMPLANTABLE TISSUE MARKER: HCPCS

## 2023-07-18 PROCEDURE — 88305 TISSUE EXAM BY PATHOLOGIST: CPT

## 2023-07-18 PROCEDURE — 88360 TUMOR IMMUNOHISTOCHEM/MANUAL: CPT

## 2023-07-18 RX ORDER — LIDOCAINE HYDROCHLORIDE 10 MG/ML
5 INJECTION, SOLUTION INFILTRATION; PERINEURAL ONCE
Status: COMPLETED | OUTPATIENT
Start: 2023-07-18 | End: 2023-07-18

## 2023-07-18 RX ORDER — LIDOCAINE HYDROCHLORIDE AND EPINEPHRINE 10; 10 MG/ML; UG/ML
10 INJECTION, SOLUTION INFILTRATION; PERINEURAL ONCE
Status: COMPLETED | OUTPATIENT
Start: 2023-07-18 | End: 2023-07-18

## 2023-07-18 RX ADMIN — LIDOCAINE HYDROCHLORIDE,EPINEPHRINE BITARTRATE 10 ML: 10; .01 INJECTION, SOLUTION INFILTRATION; PERINEURAL at 11:15

## 2023-07-18 RX ADMIN — LIDOCAINE HYDROCHLORIDE 5 ML: 10 INJECTION, SOLUTION INFILTRATION; PERINEURAL at 11:15

## 2023-07-18 NOTE — PROGRESS NOTES
Patient tolerated left breast ultrasound biopsy well with scant bleeding. Biopsy site was bandaged in the usual fashion and discharge instructions were reviewed with the patient. She was provided with a written copy as well. Advised patient that results should be available in 2-3 business days and that she will receive a phone call. Encouraged her to call with any questions or concerns.

## 2023-07-19 ENCOUNTER — HOSPITAL ENCOUNTER (OUTPATIENT)
Facility: HOSPITAL | Age: 88
Discharge: HOME | End: 2023-07-22
Payer: MEDICARE

## 2023-07-19 PROCEDURE — 90853 GROUP PSYCHOTHERAPY: CPT

## 2023-07-20 ENCOUNTER — HOSPITAL ENCOUNTER (OUTPATIENT)
Facility: HOSPITAL | Age: 88
Discharge: HOME | End: 2023-07-20
Payer: MEDICARE

## 2023-07-20 PROCEDURE — 90853 GROUP PSYCHOTHERAPY: CPT

## 2023-07-20 NOTE — PROGRESS NOTES
Dr. Johnson Galaviz called patient's daughter, Lance Lagos,  with pathology. Appointment was made with Dr. Maria Luisa Zarate on  8/7 at 0830 for surgical consultation. Called patient's daughter with appointment information. She reports that the biopsy site is healing well and she has no concerns. Encouraged her to call with any questions.

## 2023-07-25 ENCOUNTER — HOSPITAL ENCOUNTER (OUTPATIENT)
Facility: HOSPITAL | Age: 88
Discharge: HOME | End: 2023-07-28
Payer: MEDICARE

## 2023-07-25 PROCEDURE — 90853 GROUP PSYCHOTHERAPY: CPT

## 2023-07-27 ENCOUNTER — HOSPITAL ENCOUNTER (OUTPATIENT)
Facility: HOSPITAL | Age: 88
End: 2023-07-27
Payer: MEDICARE

## 2023-07-27 PROCEDURE — 90853 GROUP PSYCHOTHERAPY: CPT

## 2023-07-31 RX ORDER — VENLAFAXINE HYDROCHLORIDE 75 MG/1
75 CAPSULE, EXTENDED RELEASE ORAL DAILY
Qty: 90 CAPSULE | Refills: 3 | Status: SHIPPED | OUTPATIENT
Start: 2023-07-31

## 2023-07-31 RX ORDER — VENLAFAXINE HYDROCHLORIDE 150 MG/1
150 CAPSULE, EXTENDED RELEASE ORAL DAILY
Qty: 90 CAPSULE | Refills: 3 | Status: SHIPPED | OUTPATIENT
Start: 2023-07-31

## 2023-08-01 ENCOUNTER — HOSPITAL ENCOUNTER (OUTPATIENT)
Facility: HOSPITAL | Age: 88
Setting detail: RECURRING SERIES
Discharge: HOME | End: 2023-08-04
Payer: MEDICARE

## 2023-08-01 PROCEDURE — 90853 GROUP PSYCHOTHERAPY: CPT

## 2023-08-02 ENCOUNTER — TELEPHONE (OUTPATIENT)
Facility: HOSPITAL | Age: 88
End: 2023-08-02

## 2023-08-02 NOTE — TELEPHONE ENCOUNTER
[facility]  Breast Navigator Encounter    Name:    Prince Mckeon  Age:    82 y.o. Diagnosis:   LEFT breast cancer    Attempted to reach out to patient's daughters, Ana Valdovinos and [facility], prior to her appointment next week. They were not available, so I left a message on the voicemail for [facility]. Mellisa's voicemail was full, so I could not leave a message. Mentioned that I just wanted to introduce myself and to see if they had questions prior to the visit. I left my number so that they can call me back.                                Cassie Cai, RN, BSN, [facility]  Oncology Breast Navigator     [place] [address], [address]  W: [phone]  F: [phone]  [email or web address]  Good Help to Those in [facility] SPECIALTY [facility]

## 2023-08-02 NOTE — TELEPHONE ENCOUNTER
[facility]  Breast Navigator Encounter    Name:    Christin Cummings  Age:    82 y.o. Diagnosis:     LEFT breast cancer    Daughter, Emelina Paris, called me back. I explained how the visit next week with Dr. Kin Cardona will go. I asked if she had questions, and she did not. I asked her if her mom was in good health other than the breast cancer, and she says that she is. I will plan to meet Emelina Paris and her mom on Monday. She was appreciative.                                    Luisa Goldberg, RN, BSN, [facility]  Oncology Breast Navigator     [address] [facility], [email or web address] [facility]  W: [phone]  F: [phone]  [email or web address]  Good Help to Those in [facility] SPECIALTY [facility]

## 2023-08-03 ENCOUNTER — APPOINTMENT (OUTPATIENT)
Facility: HOSPITAL | Age: 88
End: 2023-08-03
Payer: MEDICARE

## 2023-08-07 ENCOUNTER — TELEPHONE (OUTPATIENT)
Age: 88
End: 2023-08-07

## 2023-08-07 ENCOUNTER — OFFICE VISIT (OUTPATIENT)
Age: 88
End: 2023-08-07
Payer: MEDICARE

## 2023-08-07 ENCOUNTER — CLINICAL DOCUMENTATION (OUTPATIENT)
Facility: HOSPITAL | Age: 88
End: 2023-08-07

## 2023-08-07 VITALS
SYSTOLIC BLOOD PRESSURE: 133 MMHG | HEART RATE: 98 BPM | BODY MASS INDEX: 28.28 KG/M2 | WEIGHT: 176 LBS | HEIGHT: 66 IN | DIASTOLIC BLOOD PRESSURE: 78 MMHG

## 2023-08-07 DIAGNOSIS — Z17.0 MALIGNANT NEOPLASM OF UPPER-OUTER QUADRANT OF LEFT BREAST IN FEMALE, ESTROGEN RECEPTOR POSITIVE (HCC): Primary | ICD-10-CM

## 2023-08-07 DIAGNOSIS — C50.412 MALIGNANT NEOPLASM OF UPPER-OUTER QUADRANT OF LEFT BREAST IN FEMALE, ESTROGEN RECEPTOR POSITIVE (HCC): Primary | ICD-10-CM

## 2023-08-07 PROCEDURE — 76642 ULTRASOUND BREAST LIMITED: CPT | Performed by: SURGERY

## 2023-08-07 PROCEDURE — 99205 OFFICE O/P NEW HI 60 MIN: CPT | Performed by: SURGERY

## 2023-08-07 ASSESSMENT — ENCOUNTER SYMPTOMS
GASTROINTESTINAL NEGATIVE: 1
RESPIRATORY NEGATIVE: 1
EYES NEGATIVE: 1
ALLERGIC/IMMUNOLOGIC NEGATIVE: 1

## 2023-08-07 NOTE — PROGRESS NOTES
[facility]  Breast Navigator Encounter    Name:  Mimi Munguia      Age:  82 y.o. Diagnosis:    LEFT breast cancer      Interdisciplinary Team:  Med-Onc:                          Surg-Onc:            Dr. Miguel Polo:         Plastics:           :     Nurse Navigator:  Dash Casas RN, BSN, [facility] [place]    Encounter type:  []Patient Initiated  [x]Navigator Follow-up []Pre-op  []Post-op  []Check-in Prior to First Treatment []Treatment Modality Change  []Initial Navigator Encounter []Other:       Narrative:    Met patient and two daughters at the time of her appointment with Dr. Charu Perdue. They felt like all of their questions were answered. She would like to move forward with surgery, a lumpectomy. I explained that she would get a call from the surgery scheduler regarding a date/time. Provided the patient with my contact information and discussed my role in her care. I will continue to follow the patient. Referrals/Handouts:   Business card, [employer].             Dash Casas RN, BSN, [facility]  Oncology Breast Navigator     [address] [place], [address] [place] [address]  W: [phone]  F: [phone]  [email or web address]  Good Help to Those in [facility] SPECIALTY [facility]

## 2023-08-07 NOTE — PROGRESS NOTES
Suspicious abnormality. . Solid suspicious mass in the  left breast at the site of the palpable abnormality at 3:00. Sonographically  guided biopsy is recommended at this time. No obvious lymphadenopathy. 2. Incidental simple cyst detected in the right breast mammographically and  sonographically. No short-term follow-up recommended. . Follow-up mammography on  the right in one year. 3. These findings have been discussed with the patient and called to the office  of Dr. Bernabe Ferrer). Specimen Collected: 23 14:18 EDT           Past Medical History:   Diagnosis Date    Allergic rhinitis due to pollen     BPV (benign positional vertigo)     Change in bowel habits     Depression     Diabetes ([address])     Dysuria     Hemorrhoids     Hypertension     IBS (irritable bowel syndrome)     Menopause     Other diseases of nasal cavity and sinuses(478.19)     Vertigo      Past Surgical History:   Procedure Laterality Date    CHOLECYSTECTOMY      COLONOSCOPY  ? COLONOSCOPY N/A 7/15/2020    COLONOSCOPY performed by Maegan Dietrich MD at [facility] ENDOSCOPY    HYSTERECTOMY (CERVIX STATUS UNKNOWN)      UPPER GI ENDOSCOPY,DIAGNOSIS  7/15/2020         US BREAST BIOPSY W LOC DEVICE 1ST LESION LEFT Left 2023    US BREAST BIOPSY W LOC DEVICE 1ST LESION LEFT 2023 [facility] RAD MAMMO     Social History     Socioeconomic History    Marital status:       Spouse name: Not on file    Number of children: Not on file    Years of education: Not on file    Highest education level: Not on file   Occupational History    Not on file   Tobacco Use    Smoking status: Former     Packs/day: 0.35     Years: 25.00     Pack years: 8.75     Types: Cigarettes     Quit date: 2021     Years since quittin.5    Smokeless tobacco: Never   Vaping Use    Vaping Use: Never used   Substance and Sexual Activity    Alcohol use: No    Drug use: No    Sexual activity: Yes   Other Topics Concern    Not on file   Social History

## 2023-08-07 NOTE — BH NOTES
Aki Singletary maintained on ongoing assault, fall and SAFE precaution without incident on this shift.  Awake, alert, suspicious,paranoid.  Attended and participated in 4 out of 9 groups.  Continue to be compliant with meds and snack. At 2130 PRN melatonin 6mg PO rendered for insomnia.  Denies depression or anxiety at this time.  No delusion or A/T/V hallucination noted. Aki Singletary was noted asleep in bed with his eye closed, breath even and easy after receiving PRN melatonin.  Behavior control. [facility]  Group Therapy    Date of Service: 3/21/2023  Start time: 1100  Stop time: 1150  Type of session: Therapy Group    Problem number: 1Dep. F 33.0    Short term goal (STG): O Pt will work to identify a sense of purpose for this phase of her life and how that purpose can increase her wellbeing and satisfaction and share with group    Intervention/techniques: Informed, Validated/Supported, Listened/Empathized, Observed/Monitored, Reinforced, and Provided Feedback    Patient mental status/affect: Calm and Congruent    Patient behavior/appearance: Attentive, Cooperative, Needed Prompting, and Withdrawn/Quiet    Special patient treatment accommodations provided (describe): None    Patient response and progress towards goals: Focus of session was on the difference between isolation and being comfortable with oneself and preferring time to be alone. We spoke about the differences we have to note in avoiding situations and/ore people because we lack self esteem and not being around difficult or toxic people and the difference lies in self care. We spoke about how isolating ourselves from others get us into a comfortable rut and we have to be aware if we are really enjoying being alone or it is just more comfortable. We spoke about one of the keys to be aware of is if we are fine alone but then really fall apart emotionally when around others or even doing day to day activities. Pt participated in the group activity and discussion. She shared that this morning she shared with her daughter that she was lonely. Her daughter responded by telling her that she needed to get used to it and that she can't have people around all the time. Pt states that she understands this to some extend but still has these feelings.  The group was supportive and discussed possible coping strategies

## 2023-08-08 ENCOUNTER — CLINICAL DOCUMENTATION (OUTPATIENT)
Age: 88
End: 2023-08-08

## 2023-08-08 NOTE — PROGRESS NOTES
I called patient and left her a voicemail to return my call to schedule a date for her surgery.  [phone]

## 2023-08-15 ENCOUNTER — HOSPITAL ENCOUNTER (INPATIENT)
Facility: HOSPITAL | Age: 88
LOS: 16 days | Discharge: HOME | DRG: 885 | End: 2023-08-31
Attending: EMERGENCY MEDICINE | Admitting: PSYCHIATRY & NEUROLOGY
Payer: MEDICARE

## 2023-08-15 DIAGNOSIS — R45.851 SUICIDAL IDEATION: Primary | ICD-10-CM

## 2023-08-15 PROBLEM — F33.2 SEVERE EPISODE OF RECURRENT MAJOR DEPRESSIVE DISORDER, WITHOUT PSYCHOTIC FEATURES (HCC): Status: ACTIVE | Noted: 2018-09-28

## 2023-08-15 PROBLEM — F32.A DEPRESSION: Status: RESOLVED | Noted: 2023-08-15 | Resolved: 2023-08-15

## 2023-08-15 PROBLEM — F32.A DEPRESSION: Status: ACTIVE | Noted: 2023-08-15

## 2023-08-15 LAB
AMPHET UR QL SCN: NEGATIVE
ANION GAP SERPL CALC-SCNC: 12 MMOL/L (ref 5–15)
APAP SERPL-MCNC: <2 UG/ML (ref 10–30)
APPEARANCE UR: CLEAR
BACTERIA URNS QL MICRO: NEGATIVE /HPF
BARBITURATES UR QL SCN: NEGATIVE
BASOPHILS # BLD: 0.1 K/UL (ref 0–0.1)
BASOPHILS NFR BLD: 1 % (ref 0–1)
BENZODIAZ UR QL: NEGATIVE
BILIRUB UR QL: NEGATIVE
BUN SERPL-MCNC: 15 MG/DL (ref 6–20)
BUN/CREAT SERPL: 11 (ref 12–20)
CALCIUM SERPL-MCNC: 8.8 MG/DL (ref 8.5–10.1)
CANNABINOIDS UR QL SCN: NEGATIVE
CHLORIDE SERPL-SCNC: 102 MMOL/L (ref 97–108)
CO2 SERPL-SCNC: 27 MMOL/L (ref 21–32)
COCAINE UR QL SCN: NEGATIVE
COLOR UR: ABNORMAL
CREAT SERPL-MCNC: 1.31 MG/DL (ref 0.55–1.02)
DIFFERENTIAL METHOD BLD: ABNORMAL
EKG ATRIAL RATE: 78 BPM
EKG DIAGNOSIS: NORMAL
EKG P AXIS: 80 DEGREES
EKG P-R INTERVAL: 148 MS
EKG Q-T INTERVAL: 394 MS
EKG QRS DURATION: 84 MS
EKG QTC CALCULATION (BAZETT): 449 MS
EKG R AXIS: 10 DEGREES
EKG T AXIS: 97 DEGREES
EKG VENTRICULAR RATE: 78 BPM
EOSINOPHIL # BLD: 0.2 K/UL (ref 0–0.4)
EOSINOPHIL NFR BLD: 2 % (ref 0–7)
EPITH CASTS URNS QL MICRO: ABNORMAL /LPF
ERYTHROCYTE [DISTWIDTH] IN BLOOD BY AUTOMATED COUNT: 12.2 % (ref 11.5–14.5)
ETHANOL SERPL-MCNC: <10 MG/DL (ref 0–0.08)
FLUAV RNA SPEC QL NAA+PROBE: NOT DETECTED
FLUBV RNA SPEC QL NAA+PROBE: NOT DETECTED
GLUCOSE BLD STRIP.AUTO-MCNC: 132 MG/DL (ref 65–117)
GLUCOSE SERPL-MCNC: 202 MG/DL (ref 65–100)
GLUCOSE UR STRIP.AUTO-MCNC: NEGATIVE MG/DL
HCT VFR BLD AUTO: 44.1 % (ref 35–47)
HGB BLD-MCNC: 14 G/DL (ref 11.5–16)
HGB UR QL STRIP: NEGATIVE
IMM GRANULOCYTES # BLD AUTO: 0 K/UL (ref 0–0.04)
IMM GRANULOCYTES NFR BLD AUTO: 1 % (ref 0–0.5)
KETONES UR QL STRIP.AUTO: NEGATIVE MG/DL
LEUKOCYTE ESTERASE UR QL STRIP.AUTO: ABNORMAL
LYMPHOCYTES # BLD: 2.4 K/UL (ref 0.8–3.5)
LYMPHOCYTES NFR BLD: 28 % (ref 12–49)
Lab: NORMAL
MCH RBC QN AUTO: 29.7 PG (ref 26–34)
MCHC RBC AUTO-ENTMCNC: 31.7 G/DL (ref 30–36.5)
MCV RBC AUTO: 93.4 FL (ref 80–99)
METHADONE UR QL: NEGATIVE
MONOCYTES # BLD: 0.5 K/UL (ref 0–1)
MONOCYTES NFR BLD: 6 % (ref 5–13)
NEUTS SEG # BLD: 5.3 K/UL (ref 1.8–8)
NEUTS SEG NFR BLD: 62 % (ref 32–75)
NITRITE UR QL STRIP.AUTO: NEGATIVE
NRBC # BLD: 0 K/UL (ref 0–0.01)
NRBC BLD-RTO: 0 PER 100 WBC
OPIATES UR QL: NEGATIVE
PCP UR QL: NEGATIVE
PH UR STRIP: 6 (ref 5–8)
PLATELET # BLD AUTO: 317 K/UL (ref 150–400)
PMV BLD AUTO: 9.4 FL (ref 8.9–12.9)
POTASSIUM SERPL-SCNC: 3.6 MMOL/L (ref 3.5–5.1)
PROT UR STRIP-MCNC: NEGATIVE MG/DL
RBC # BLD AUTO: 4.72 M/UL (ref 3.8–5.2)
RBC #/AREA URNS HPF: ABNORMAL /HPF (ref 0–5)
SALICYLATES SERPL-MCNC: <1.7 MG/DL (ref 2.8–20)
SARS-COV-2 RNA RESP QL NAA+PROBE: NOT DETECTED
SERVICE CMNT-IMP: ABNORMAL
SODIUM SERPL-SCNC: 141 MMOL/L (ref 136–145)
SP GR UR REFRACTOMETRY: 1.01 (ref 1–1.03)
URINE CULTURE IF INDICATED: ABNORMAL
UROBILINOGEN UR QL STRIP.AUTO: 0.2 EU/DL (ref 0.2–1)
WBC # BLD AUTO: 8.5 K/UL (ref 3.6–11)
WBC URNS QL MICRO: ABNORMAL /HPF (ref 0–4)

## 2023-08-15 PROCEDURE — 99285 EMERGENCY DEPT VISIT HI MDM: CPT

## 2023-08-15 PROCEDURE — 82077 ASSAY SPEC XCP UR&BREATH IA: CPT

## 2023-08-15 PROCEDURE — 80307 DRUG TEST PRSMV CHEM ANLYZR: CPT

## 2023-08-15 PROCEDURE — 1240000000 HC EMOTIONAL WELLNESS R&B

## 2023-08-15 PROCEDURE — 36415 COLL VENOUS BLD VENIPUNCTURE: CPT

## 2023-08-15 PROCEDURE — 85025 COMPLETE CBC W/AUTO DIFF WBC: CPT

## 2023-08-15 PROCEDURE — 80143 DRUG ASSAY ACETAMINOPHEN: CPT

## 2023-08-15 PROCEDURE — 81001 URINALYSIS AUTO W/SCOPE: CPT

## 2023-08-15 PROCEDURE — 80179 DRUG ASSAY SALICYLATE: CPT

## 2023-08-15 PROCEDURE — 80048 BASIC METABOLIC PNL TOTAL CA: CPT

## 2023-08-15 PROCEDURE — 82962 GLUCOSE BLOOD TEST: CPT

## 2023-08-15 PROCEDURE — 93005 ELECTROCARDIOGRAM TRACING: CPT | Performed by: EMERGENCY MEDICINE

## 2023-08-15 PROCEDURE — 6370000000 HC RX 637 (ALT 250 FOR IP): Performed by: PSYCHIATRY & NEUROLOGY

## 2023-08-15 PROCEDURE — 87636 SARSCOV2 & INF A&B AMP PRB: CPT

## 2023-08-15 PROCEDURE — 94640 AIRWAY INHALATION TREATMENT: CPT

## 2023-08-15 RX ORDER — ALBUTEROL SULFATE 90 UG/1
2 AEROSOL, METERED RESPIRATORY (INHALATION) EVERY 4 HOURS PRN
Status: DISCONTINUED | OUTPATIENT
Start: 2023-08-15 | End: 2023-08-31 | Stop reason: HOSPADM

## 2023-08-15 RX ORDER — ATORVASTATIN CALCIUM 40 MG/1
80 TABLET, FILM COATED ORAL DAILY
Status: DISCONTINUED | OUTPATIENT
Start: 2023-08-15 | End: 2023-08-31 | Stop reason: HOSPADM

## 2023-08-15 RX ORDER — TRAZODONE HYDROCHLORIDE 50 MG/1
50 TABLET ORAL NIGHTLY
Status: DISCONTINUED | OUTPATIENT
Start: 2023-08-15 | End: 2023-08-16

## 2023-08-15 RX ORDER — ACETAMINOPHEN 325 MG/1
650 TABLET ORAL EVERY 4 HOURS PRN
Status: DISCONTINUED | OUTPATIENT
Start: 2023-08-15 | End: 2023-08-31 | Stop reason: HOSPADM

## 2023-08-15 RX ORDER — ALOGLIPTIN 6.25 MG/1
6.25 TABLET, FILM COATED ORAL 2 TIMES DAILY WITH MEALS
Status: DISCONTINUED | OUTPATIENT
Start: 2023-08-15 | End: 2023-08-31 | Stop reason: HOSPADM

## 2023-08-15 RX ORDER — FLUTICASONE PROPIONATE 50 MCG
2 SPRAY, SUSPENSION (ML) NASAL DAILY
Status: DISCONTINUED | OUTPATIENT
Start: 2023-08-15 | End: 2023-08-31 | Stop reason: HOSPADM

## 2023-08-15 RX ORDER — METOPROLOL SUCCINATE 50 MG/1
50 TABLET, EXTENDED RELEASE ORAL DAILY
Status: DISCONTINUED | OUTPATIENT
Start: 2023-08-15 | End: 2023-08-31 | Stop reason: HOSPADM

## 2023-08-15 RX ORDER — ASPIRIN 81 MG/1
81 TABLET ORAL DAILY
Status: DISCONTINUED | OUTPATIENT
Start: 2023-08-15 | End: 2023-08-31 | Stop reason: HOSPADM

## 2023-08-15 RX ORDER — CLOPIDOGREL BISULFATE 75 MG/1
75 TABLET ORAL DAILY
Status: DISCONTINUED | OUTPATIENT
Start: 2023-08-15 | End: 2023-08-31 | Stop reason: HOSPADM

## 2023-08-15 RX ORDER — HYDROXYZINE PAMOATE 25 MG/1
25 CAPSULE ORAL 3 TIMES DAILY PRN
Status: DISCONTINUED | OUTPATIENT
Start: 2023-08-15 | End: 2023-08-31 | Stop reason: HOSPADM

## 2023-08-15 RX ORDER — LISINOPRIL 10 MG/1
20 TABLET ORAL 2 TIMES DAILY
Status: DISCONTINUED | OUTPATIENT
Start: 2023-08-15 | End: 2023-08-24

## 2023-08-15 RX ORDER — CHLORAL HYDRATE 500 MG
1 CAPSULE ORAL 2 TIMES DAILY
Status: DISCONTINUED | OUTPATIENT
Start: 2023-08-15 | End: 2023-08-15

## 2023-08-15 RX ORDER — CETIRIZINE HYDROCHLORIDE 10 MG/1
5 TABLET ORAL DAILY
Status: DISCONTINUED | OUTPATIENT
Start: 2023-08-15 | End: 2023-08-31 | Stop reason: HOSPADM

## 2023-08-15 RX ADMIN — CLOPIDOGREL BISULFATE 75 MG: 75 TABLET ORAL at 17:54

## 2023-08-15 RX ADMIN — LISINOPRIL 20 MG: 10 TABLET ORAL at 21:01

## 2023-08-15 RX ADMIN — ALOGLIPTIN 6.25 MG: 6.25 TABLET, FILM COATED ORAL at 17:53

## 2023-08-15 RX ADMIN — CETIRIZINE HYDROCHLORIDE 5 MG: 10 TABLET, FILM COATED ORAL at 17:54

## 2023-08-15 RX ADMIN — METOPROLOL SUCCINATE 50 MG: 50 TABLET, EXTENDED RELEASE ORAL at 17:54

## 2023-08-15 RX ADMIN — FLUTICASONE PROPIONATE 2 SPRAY: 50 SPRAY, METERED NASAL at 17:00

## 2023-08-15 RX ADMIN — VENLAFAXINE HYDROCHLORIDE 225 MG: 75 CAPSULE, EXTENDED RELEASE ORAL at 17:53

## 2023-08-15 RX ADMIN — MOMETASONE FUROATE AND FORMOTEROL FUMARATE DIHYDRATE 2 PUFF: 100; 5 AEROSOL RESPIRATORY (INHALATION) at 21:01

## 2023-08-15 RX ADMIN — ASPIRIN 81 MG: 81 TABLET, COATED ORAL at 17:54

## 2023-08-15 RX ADMIN — ATORVASTATIN CALCIUM 80 MG: 40 TABLET, FILM COATED ORAL at 17:54

## 2023-08-15 RX ADMIN — METFORMIN HYDROCHLORIDE 500 MG: 500 TABLET ORAL at 17:54

## 2023-08-15 RX ADMIN — TRAZODONE HYDROCHLORIDE 50 MG: 50 TABLET ORAL at 21:01

## 2023-08-15 ASSESSMENT — SLEEP AND FATIGUE QUESTIONNAIRES
SLEEP PATTERN: INSOMNIA
DO YOU HAVE DIFFICULTY SLEEPING: YES
DO YOU USE A SLEEP AID: YES
AVERAGE NUMBER OF SLEEP HOURS: 6

## 2023-08-15 ASSESSMENT — LIFESTYLE VARIABLES
HOW OFTEN DO YOU HAVE A DRINK CONTAINING ALCOHOL: NEVER
HOW MANY STANDARD DRINKS CONTAINING ALCOHOL DO YOU HAVE ON A TYPICAL DAY: PATIENT DOES NOT DRINK

## 2023-08-15 ASSESSMENT — ENCOUNTER SYMPTOMS
EYE REDNESS: 0
SORE THROAT: 0
COUGH: 0
DIARRHEA: 0
SHORTNESS OF BREATH: 0
NAUSEA: 0
VOMITING: 0
ABDOMINAL PAIN: 0

## 2023-08-15 ASSESSMENT — PAIN - FUNCTIONAL ASSESSMENT: PAIN_FUNCTIONAL_ASSESSMENT: NONE - DENIES PAIN

## 2023-08-15 NOTE — BSMART NOTE
Comprehensive Assessment Form Part 1      Section I - Disposition    Major Depression, recurrent, very mild     Past Medical History:   Diagnosis Date    Allergic rhinitis due to pollen     BPV (benign positional vertigo)     Change in bowel habits     Depression     Diabetes (HCC)     Dysuria     Hemorrhoids     Hypertension     IBS (irritable bowel syndrome)     Menopause     Other diseases of nasal cavity and sinuses(478.19)     Vertigo         The Medical Doctor to Psychiatrist conference was notcompleted. The Medical Doctor is in agreement with Psychiatrist disposition because of (reason) pt expressed SI and is agreeable to inpatient admission BHU. The plan is admit to inpatient BHU. The on-call Psychiatrist consulted was Dr. Vadim Vigil. The admitting Psychiatrist will be Dr. Edson Jonas. The admitting Diagnosis is MDD, recurrent, very mild. The Payor source is   [address]    This writer reviewed the [place] Suicide Severity Rating Scale in nursing flowsheet and the risk level assigned is high risk. Based on this assessment, the risk of suicide is moderate risk and the plan is to admit to inpatient BHU. Section II - Integrated Summary  Summary:  Per triage, \"Pt arrived from outpatient [facility] with c/o feeling nervous and SI with a plan to [facility] out into the water\". Pt alert and oriented and cooperative. Pt was sent by MD Elli Smith for admission to inpatient [facility] after medical clearance. \"     Patient is an 82year old female that arrived to the ED for SI. This writer met with patient via teledoc. Pt was alert and oriented x4. Pt stated she woke up this morning and \"a nerve was jumping around my rectum. \" Pt then shared she began feeling forgetful and \"thinking a little bit about trying to destroy myself. \" Pt endorsed SI earlier with a plan to \"walk overboard\" in water near her house.  Currently, when pt was asked about SI, she \"not as much as before\" but remains feeling like wants to end her

## 2023-08-15 NOTE — ED NOTES
Spoke with [employer]. Consult to be done once Pt has been medically cleared.      Keiry Hubbard, RN  08/15/23 7999

## 2023-08-15 NOTE — ED TRIAGE NOTES
Pt arrived from outpatient [facility] with c/o feeling nervous and SI with a plan to [facility] out into the water\". Pt alert and oriented and cooperative. Pt was sent by MD Jax Rosado for admission to inpatient [facility] after medical clearance.

## 2023-08-15 NOTE — ED NOTES
TRANSFER - OUT REPORT:    Verbal report given to Kristin on Nikolay Puls  being transferred to [facility] for routine progression of patient care       Report consisted of patient's Situation, Background, Assessment and   Recommendations(SBAR). Information from the following report(s) Nurse Handoff Report, ED Encounter Summary, ED SBAR, MAR, Recent Results, and Med Rec Status was reviewed with the receiving nurse. [facility] Fall Assessment:    Presents to emergency department  because of falls (Syncope, seizure, or loss of consciousness): No  Age > 70: Yes  Altered Mental Status, Intoxication with alcohol or substance confusion (Disorientation, impaired judgment, poor safety awaremess, or inability to follow instructions): No  Impaired Mobility: Ambulates or transfers with assistive devices or assistance; Unable to ambulate or transer.: No  Nursing Judgement: Yes          Lines:       Opportunity for questions and clarification was provided.       Patient transported with:  Sarah Salgado  08/15/23 9128

## 2023-08-15 NOTE — ED PROVIDER NOTES
EMERGENCY DEPARTMENT HISTORY AND PHYSICAL EXAM      Date: 8/15/2023  Patient Name: Ayaka Alvarez    History of Presenting Illness     Chief Complaint   Patient presents with    Suicidal       History Provided By: Patient    HPI: Ayaka Alvarez, 82 y.o. female with past medical history listed below, presents via private vehicle to the ED with cc of health problem. Patient sent here by Dr. Gurinder Callahan for medical clearance, BS MRT evaluation and probable inpatient psychiatric admission. Patient reports she has had worsening anxiety over the past week. She is been experiencing suicidal thoughts with a plan to [facility] out into the water. \". Denies any ingestions. No homicidal ideations. Denies any auditory or visual hallucinations. She has a history of depression and has been seeing group therapy here at [facility] psychiatry. There are no other complaints, changes, or physical findings at this time. PCP: LEANDRA Benítez NP    No current facility-administered medications on file prior to encounter.      Current Outpatient Medications on File Prior to Encounter   Medication Sig Dispense Refill    venlafaxine (EFFEXOR XR) 75 MG extended release capsule Take 1 capsule by mouth daily Take with 150 mg capsule (Patient not taking: Reported on 8/7/2023) 90 capsule 3    venlafaxine (EFFEXOR XR) 150 MG extended release capsule Take 1 capsule by mouth daily Take with 75 mg capsule 90 capsule 3    JANUMET  MG per tablet TAKE 1 TABLET BY MOUTH TWICE A DAY WITH MEALS 90 tablet 1    acetaminophen (TYLENOL) 325 MG tablet Take by mouth every 4 hours as needed      albuterol sulfate HFA (PROVENTIL;VENTOLIN;PROAIR) 108 (90 Base) MCG/ACT inhaler Inhale 2 puffs into the lungs      aspirin 81 MG EC tablet Take 1 tablet by mouth daily      atorvastatin (LIPITOR) 80 MG tablet Take 1 tablet by mouth daily      clopidogrel (PLAVIX) 75 MG tablet Take 1 tablet by mouth daily      fluticasone furoate-vilanterol (BREO ELLIPTA)

## 2023-08-15 NOTE — ED NOTES
Pts family stopped this writer and ask how much longer it was going to be because she was told to bring pt to the ER to be admitted to [facility]. . this writer educated pts family on the procedure for evaluation and admission to [facility] and apologized for any misinformation she was given DEISY Mckeon RN  08/15/23 9523

## 2023-08-15 NOTE — ED NOTES
Pt speaking with Tom from [employer] at this time.       Maryan Bowen RN  08/15/23 9484

## 2023-08-15 NOTE — BSMART NOTE
BSMART assessment completed, and suicide risk level noted to be moderate risk. Primary Nurse Mary Oglesby and [facility] and Physician Dr Shannan Holt notified. Concerns not observed. Security/Off- has not been notified.      Tom Woods LCSW

## 2023-08-16 PROBLEM — K64.9 HEMORRHOIDS: Status: ACTIVE | Noted: 2022-09-28

## 2023-08-16 PROBLEM — Z86.73 HISTORY OF CVA (CEREBROVASCULAR ACCIDENT): Status: ACTIVE | Noted: 2020-10-24

## 2023-08-16 PROBLEM — J44.9 CHRONIC OBSTRUCTIVE PULMONARY DISEASE, UNSPECIFIED (HCC): Status: ACTIVE | Noted: 2022-12-29

## 2023-08-16 PROBLEM — I10 ESSENTIAL HYPERTENSION: Status: ACTIVE | Noted: 2017-03-17

## 2023-08-16 PROBLEM — E11.9 WELL CONTROLLED TYPE 2 DIABETES MELLITUS (HCC): Status: ACTIVE | Noted: 2017-07-24

## 2023-08-16 PROBLEM — N18.9 CKD (CHRONIC KIDNEY DISEASE), SYMPTOM MANAGEMENT ONLY, UNSPECIFIED STAGE: Status: ACTIVE | Noted: 2022-02-23

## 2023-08-16 LAB
CHOLEST SERPL-MCNC: 193 MG/DL
EST. AVERAGE GLUCOSE BLD GHB EST-MCNC: 157 MG/DL
GLUCOSE BLD STRIP.AUTO-MCNC: 117 MG/DL (ref 65–117)
GLUCOSE BLD STRIP.AUTO-MCNC: 89 MG/DL (ref 65–117)
HBA1C MFR BLD: 7.1 % (ref 4–5.6)
HDLC SERPL-MCNC: 56 MG/DL
HDLC SERPL: 3.4 (ref 0–5)
LDLC SERPL CALC-MCNC: 109 MG/DL (ref 0–100)
SERVICE CMNT-IMP: NORMAL
SERVICE CMNT-IMP: NORMAL
TRIGL SERPL-MCNC: 140 MG/DL
VLDLC SERPL CALC-MCNC: 28 MG/DL

## 2023-08-16 PROCEDURE — 6370000000 HC RX 637 (ALT 250 FOR IP): Performed by: INTERNAL MEDICINE

## 2023-08-16 PROCEDURE — 36415 COLL VENOUS BLD VENIPUNCTURE: CPT

## 2023-08-16 PROCEDURE — 90792 PSYCH DIAG EVAL W/MED SRVCS: CPT | Performed by: PSYCHIATRY & NEUROLOGY

## 2023-08-16 PROCEDURE — 83036 HEMOGLOBIN GLYCOSYLATED A1C: CPT

## 2023-08-16 PROCEDURE — 1240000000 HC EMOTIONAL WELLNESS R&B

## 2023-08-16 PROCEDURE — 6370000000 HC RX 637 (ALT 250 FOR IP): Performed by: PSYCHIATRY & NEUROLOGY

## 2023-08-16 PROCEDURE — 82962 GLUCOSE BLOOD TEST: CPT

## 2023-08-16 PROCEDURE — 80061 LIPID PANEL: CPT

## 2023-08-16 RX ORDER — TRAZODONE HYDROCHLORIDE 50 MG/1
50 TABLET ORAL NIGHTLY PRN
Status: DISCONTINUED | OUTPATIENT
Start: 2023-08-16 | End: 2023-08-31 | Stop reason: HOSPADM

## 2023-08-16 RX ORDER — HYDROCORTISONE 25 MG/G
CREAM TOPICAL 2 TIMES DAILY
Status: DISCONTINUED | OUTPATIENT
Start: 2023-08-16 | End: 2023-08-28

## 2023-08-16 RX ORDER — MAGNESIUM HYDROXIDE/ALUMINUM HYDROXICE/SIMETHICONE 120; 1200; 1200 MG/30ML; MG/30ML; MG/30ML
30 SUSPENSION ORAL EVERY 6 HOURS PRN
Status: DISCONTINUED | OUTPATIENT
Start: 2023-08-16 | End: 2023-08-31 | Stop reason: HOSPADM

## 2023-08-16 RX ORDER — POLYETHYLENE GLYCOL 3350 17 G/17G
17 POWDER, FOR SOLUTION ORAL DAILY
Status: DISCONTINUED | OUTPATIENT
Start: 2023-08-16 | End: 2023-08-20

## 2023-08-16 RX ADMIN — LISINOPRIL 20 MG: 10 TABLET ORAL at 20:30

## 2023-08-16 RX ADMIN — METFORMIN HYDROCHLORIDE 500 MG: 500 TABLET ORAL at 17:19

## 2023-08-16 RX ADMIN — POLYETHYLENE GLYCOL 3350 17 G: 17 POWDER, FOR SOLUTION ORAL at 09:17

## 2023-08-16 RX ADMIN — HYDROCORTISONE: 25 CREAM TOPICAL at 09:18

## 2023-08-16 RX ADMIN — HYDROCORTISONE: 25 CREAM TOPICAL at 20:36

## 2023-08-16 RX ADMIN — ACETAMINOPHEN 650 MG: 325 TABLET ORAL at 09:02

## 2023-08-16 RX ADMIN — LISINOPRIL 20 MG: 10 TABLET ORAL at 09:01

## 2023-08-16 RX ADMIN — CETIRIZINE HYDROCHLORIDE 5 MG: 10 TABLET, FILM COATED ORAL at 09:00

## 2023-08-16 RX ADMIN — MOMETASONE FUROATE AND FORMOTEROL FUMARATE DIHYDRATE 2 PUFF: 100; 5 AEROSOL RESPIRATORY (INHALATION) at 20:30

## 2023-08-16 RX ADMIN — TRAZODONE HYDROCHLORIDE 50 MG: 50 TABLET ORAL at 20:30

## 2023-08-16 RX ADMIN — METOPROLOL SUCCINATE 50 MG: 50 TABLET, EXTENDED RELEASE ORAL at 09:02

## 2023-08-16 RX ADMIN — ALBUTEROL SULFATE 2 PUFF: 90 AEROSOL, METERED RESPIRATORY (INHALATION) at 00:54

## 2023-08-16 RX ADMIN — ATORVASTATIN CALCIUM 80 MG: 40 TABLET, FILM COATED ORAL at 09:00

## 2023-08-16 RX ADMIN — METFORMIN HYDROCHLORIDE 500 MG: 500 TABLET ORAL at 08:59

## 2023-08-16 RX ADMIN — MOMETASONE FUROATE AND FORMOTEROL FUMARATE DIHYDRATE 2 PUFF: 100; 5 AEROSOL RESPIRATORY (INHALATION) at 09:03

## 2023-08-16 RX ADMIN — ASPIRIN 81 MG: 81 TABLET, COATED ORAL at 09:00

## 2023-08-16 RX ADMIN — ALOGLIPTIN 6.25 MG: 6.25 TABLET, FILM COATED ORAL at 08:58

## 2023-08-16 RX ADMIN — FLUTICASONE PROPIONATE 2 SPRAY: 50 SPRAY, METERED NASAL at 09:01

## 2023-08-16 RX ADMIN — VENLAFAXINE HYDROCHLORIDE 225 MG: 75 CAPSULE, EXTENDED RELEASE ORAL at 09:02

## 2023-08-16 RX ADMIN — ALOGLIPTIN 6.25 MG: 6.25 TABLET, FILM COATED ORAL at 17:19

## 2023-08-16 RX ADMIN — CLOPIDOGREL BISULFATE 75 MG: 75 TABLET ORAL at 08:58

## 2023-08-16 ASSESSMENT — PAIN SCALES - GENERAL
PAINLEVEL_OUTOF10: 0
PAINLEVEL_OUTOF10: 3

## 2023-08-16 ASSESSMENT — PAIN DESCRIPTION - LOCATION: LOCATION: HEAD

## 2023-08-16 ASSESSMENT — PAIN DESCRIPTION - DESCRIPTORS: DESCRIPTORS: ACHING

## 2023-08-16 NOTE — CONSULTS
disorder, without psychotic features ([address])  Pt admitted to [facility] for monitoring and adjusted tx  Per Psychiatry    Active Problems:    Well controlled type 2 diabetes mellitus ([address])  Monitor POC  Cont Alogliptin bid, Metformin      Elevated cholesterol  Cont Lipitor      Essential hypertension  Monitor   Cont Lisinopril, Metoprolol      Hemorrhoids  Hygiene  Miralax, avoid straining  Anusol Cr bid      History of CVA (cerebrovascular accident)  Stable  ASA, Plavix, control risk factors      CKD (chronic kidney disease), symptom management only, unspecified stage  Avoid toxins  Encourage fluids      Chronic obstructive pulmonary disease, unspecified (HCC)  Dulera bid, Albuterol prn.   Stable        SAFETY:   Code Status: full code  DVT prophylaxis: no anticoagulation allowed on [facility]  Stress Ulcer prophylaxis: Not Indicated  Bladder catheter: no  Family Contact Info:  Primary Emergency Contact: [address], Home Phone: [phone]: [facility] Room 232/01  Disposition: TBD, likely home when stable  Admission status: Inpatient Aylin Buckley MD  [facility] Hospitalist  [phone]

## 2023-08-16 NOTE — H&P
[facility]  Psychiatric HISTORY AND PHYSICAL    Name:  Jaclyn Martinez  MR#:  [id number]  :  1935  ACCOUNT #:  [de-identified]  ADMIT DATE:  08/15/2023      [facility] ADMISSION NOTE    HISTORY OF PRESENT ILLNESS:  The patient is an 82-year-old  female who has a lengthy past psychiatric history of recurrent major depression. She has been actively involved in our intensive outpatient program recently, but was just diagnosed as having breast cancer a number of weeks ago. Although it is stage I, this likely has created a great deal of anxiety and a sense of distress for her, despite the fact that she states she has really not felt overtly depressed, and has not been able to cry. However, she started to feel more withdrawn and isolative, and objectively she has been much less animated and much more emotionally flat and dysphoric. She also has begun to have some somatic manifestations of her depression and anxiety, specifically some emerging GI symptoms. She had reported to the therapist that she was having some suicidal thoughts because she was feeling so hopeless, including thoughts about possibly trying to drown herself or to take an overdose. She has never made any attempts to actually try to end her life before, but this was certainly very concerning. She was referred to the emergency room and hospitalization was certainly recommended. Her neurovegetative functioning has been worsening to the point where she has not been sleeping well, she has not been eating well, and she is very anergic and dysphoric. She also has been increasingly anhedonic, withdrawn, and isolative, and has even been feeling hopeless as well as having some passive thoughts of wanting to die and even some more recent suicidal thoughts. She told staff that she was \"ready to go,\" and that she almost wished she could pass.     When I talked to her this morning, her mood is already a bit brighter just

## 2023-08-16 NOTE — GROUP NOTE
Group Therapy Note    Date: 8/15/2023    Group Start Time: 2000  Group End Time: 2030  Group Topic: Community Meeting    [facility]        Group Therapy Note    Attendees: 4       Patient's Goal:  stop feeling lonely    Notes: Shaylee Lewis was out for the [employer] tonight. She stated that her day is fine, ate good, and will sleep good tonight. She is having no anxiety nor depression. Something good that happened today was having spending time with peers and staff talking since she got her, but nothing bad has happened since here. She is having no thoughts of hurting herself nor anyone else and not pain but misery from hemorrhoids. Status After Intervention:  Unchanged    Participation Level:  Active Listener    Participation Quality: Appropriate      Speech:  normal      Thought Process/Content: Logical      Affective Functioning: Congruent      Mood:  relaxed      Level of consciousness:  Alert      Response to Learning: Able to verbalize current knowledge/experience, Able to verbalize/acknowledge new learning, and Able to retain information      Endings: None Reported    Modes of Intervention: Support      Discipline Responsible: Be[employer]      Signature:  Darlyn Ballesteros CNA

## 2023-08-17 ENCOUNTER — CLINICAL DOCUMENTATION (OUTPATIENT)
Age: 88
End: 2023-08-17

## 2023-08-17 LAB
GLUCOSE BLD STRIP.AUTO-MCNC: 106 MG/DL (ref 65–117)
GLUCOSE BLD STRIP.AUTO-MCNC: 126 MG/DL (ref 65–117)
SERVICE CMNT-IMP: ABNORMAL
SERVICE CMNT-IMP: NORMAL

## 2023-08-17 PROCEDURE — 82962 GLUCOSE BLOOD TEST: CPT

## 2023-08-17 PROCEDURE — 99231 SBSQ HOSP IP/OBS SF/LOW 25: CPT | Performed by: PSYCHIATRY & NEUROLOGY

## 2023-08-17 PROCEDURE — 6370000000 HC RX 637 (ALT 250 FOR IP): Performed by: INTERNAL MEDICINE

## 2023-08-17 PROCEDURE — 6370000000 HC RX 637 (ALT 250 FOR IP): Performed by: PSYCHIATRY & NEUROLOGY

## 2023-08-17 PROCEDURE — 1240000000 HC EMOTIONAL WELLNESS R&B

## 2023-08-17 RX ADMIN — VENLAFAXINE HYDROCHLORIDE 225 MG: 75 CAPSULE, EXTENDED RELEASE ORAL at 10:03

## 2023-08-17 RX ADMIN — METFORMIN HYDROCHLORIDE 500 MG: 500 TABLET ORAL at 17:43

## 2023-08-17 RX ADMIN — METOPROLOL SUCCINATE 50 MG: 50 TABLET, EXTENDED RELEASE ORAL at 10:03

## 2023-08-17 RX ADMIN — ALOGLIPTIN 6.25 MG: 6.25 TABLET, FILM COATED ORAL at 10:07

## 2023-08-17 RX ADMIN — ASPIRIN 81 MG: 81 TABLET, COATED ORAL at 10:02

## 2023-08-17 RX ADMIN — TRAZODONE HYDROCHLORIDE 50 MG: 50 TABLET ORAL at 21:58

## 2023-08-17 RX ADMIN — HYDROCORTISONE: 25 CREAM TOPICAL at 10:08

## 2023-08-17 RX ADMIN — FLUTICASONE PROPIONATE 2 SPRAY: 50 SPRAY, METERED NASAL at 10:08

## 2023-08-17 RX ADMIN — HYDROCORTISONE: 25 CREAM TOPICAL at 21:02

## 2023-08-17 RX ADMIN — ALUMINUM HYDROXIDE, MAGNESIUM HYDROXIDE, AND SIMETHICONE 30 ML: 200; 200; 20 SUSPENSION ORAL at 19:48

## 2023-08-17 RX ADMIN — CETIRIZINE HYDROCHLORIDE 5 MG: 10 TABLET, FILM COATED ORAL at 10:02

## 2023-08-17 RX ADMIN — POLYETHYLENE GLYCOL 3350 17 G: 17 POWDER, FOR SOLUTION ORAL at 10:07

## 2023-08-17 RX ADMIN — METFORMIN HYDROCHLORIDE 500 MG: 500 TABLET ORAL at 10:04

## 2023-08-17 RX ADMIN — MOMETASONE FUROATE AND FORMOTEROL FUMARATE DIHYDRATE 2 PUFF: 100; 5 AEROSOL RESPIRATORY (INHALATION) at 10:16

## 2023-08-17 RX ADMIN — LISINOPRIL 20 MG: 10 TABLET ORAL at 10:02

## 2023-08-17 RX ADMIN — CLOPIDOGREL BISULFATE 75 MG: 75 TABLET ORAL at 10:04

## 2023-08-17 RX ADMIN — ALOGLIPTIN 6.25 MG: 6.25 TABLET, FILM COATED ORAL at 17:43

## 2023-08-17 RX ADMIN — LISINOPRIL 20 MG: 10 TABLET ORAL at 20:17

## 2023-08-17 RX ADMIN — MOMETASONE FUROATE AND FORMOTEROL FUMARATE DIHYDRATE 2 PUFF: 100; 5 AEROSOL RESPIRATORY (INHALATION) at 20:16

## 2023-08-17 RX ADMIN — ATORVASTATIN CALCIUM 80 MG: 40 TABLET, FILM COATED ORAL at 10:02

## 2023-08-17 ASSESSMENT — PAIN SCALES - GENERAL: PAINLEVEL_OUTOF10: 0

## 2023-08-17 NOTE — GROUP NOTE
Group Therapy Note    I was able to briefly talk with pt. She stated that. she did not feel well today and was not coming to group.  I encouraged her to come in the future

## 2023-08-17 NOTE — PROGRESS NOTES
I called patient at ([phone]) I even reached out to patient daughter Marla Giang that's in her chart. Both mailboxes are full so I was unable to leave a message.  I'll continue calling patient to get her scheduled

## 2023-08-18 LAB
GLUCOSE BLD STRIP.AUTO-MCNC: 171 MG/DL (ref 65–117)
GLUCOSE BLD STRIP.AUTO-MCNC: 93 MG/DL (ref 65–117)
SERVICE CMNT-IMP: ABNORMAL
SERVICE CMNT-IMP: NORMAL

## 2023-08-18 PROCEDURE — 82962 GLUCOSE BLOOD TEST: CPT

## 2023-08-18 PROCEDURE — 6370000000 HC RX 637 (ALT 250 FOR IP): Performed by: PSYCHIATRY & NEUROLOGY

## 2023-08-18 PROCEDURE — 1240000000 HC EMOTIONAL WELLNESS R&B

## 2023-08-18 PROCEDURE — 99231 SBSQ HOSP IP/OBS SF/LOW 25: CPT | Performed by: PSYCHIATRY & NEUROLOGY

## 2023-08-18 RX ADMIN — MOMETASONE FUROATE AND FORMOTEROL FUMARATE DIHYDRATE 2 PUFF: 100; 5 AEROSOL RESPIRATORY (INHALATION) at 21:12

## 2023-08-18 RX ADMIN — ALOGLIPTIN 6.25 MG: 6.25 TABLET, FILM COATED ORAL at 17:34

## 2023-08-18 RX ADMIN — METOPROLOL SUCCINATE 50 MG: 50 TABLET, EXTENDED RELEASE ORAL at 08:44

## 2023-08-18 RX ADMIN — ALOGLIPTIN 6.25 MG: 6.25 TABLET, FILM COATED ORAL at 08:42

## 2023-08-18 RX ADMIN — CLOPIDOGREL BISULFATE 75 MG: 75 TABLET ORAL at 08:43

## 2023-08-18 RX ADMIN — CETIRIZINE HYDROCHLORIDE 5 MG: 10 TABLET, FILM COATED ORAL at 08:44

## 2023-08-18 RX ADMIN — FLUTICASONE PROPIONATE 2 SPRAY: 50 SPRAY, METERED NASAL at 08:48

## 2023-08-18 RX ADMIN — ATORVASTATIN CALCIUM 80 MG: 40 TABLET, FILM COATED ORAL at 08:42

## 2023-08-18 RX ADMIN — HYDROCORTISONE: 25 CREAM TOPICAL at 21:11

## 2023-08-18 RX ADMIN — ASPIRIN 81 MG: 81 TABLET, COATED ORAL at 08:42

## 2023-08-18 RX ADMIN — HYDROCORTISONE: 25 CREAM TOPICAL at 08:48

## 2023-08-18 RX ADMIN — VENLAFAXINE HYDROCHLORIDE 225 MG: 75 CAPSULE, EXTENDED RELEASE ORAL at 08:41

## 2023-08-18 RX ADMIN — METFORMIN HYDROCHLORIDE 500 MG: 500 TABLET ORAL at 17:35

## 2023-08-18 RX ADMIN — LISINOPRIL 20 MG: 10 TABLET ORAL at 21:06

## 2023-08-18 RX ADMIN — MOMETASONE FUROATE AND FORMOTEROL FUMARATE DIHYDRATE 2 PUFF: 100; 5 AEROSOL RESPIRATORY (INHALATION) at 08:48

## 2023-08-18 RX ADMIN — METFORMIN HYDROCHLORIDE 500 MG: 500 TABLET ORAL at 08:43

## 2023-08-18 RX ADMIN — TRAZODONE HYDROCHLORIDE 50 MG: 50 TABLET ORAL at 21:09

## 2023-08-18 RX ADMIN — LISINOPRIL 20 MG: 10 TABLET ORAL at 08:41

## 2023-08-18 ASSESSMENT — PAIN SCALES - GENERAL: PAINLEVEL_OUTOF10: 0

## 2023-08-18 NOTE — GROUP NOTE
Group Therapy Note    Date: 8/17/2023    Group Start Time: 2000  Group End Time: 2030  Group Topic: Community Meeting    [facility]        Group Therapy Note    Attendees: 4       Notes:  Ibrahima Kelly was out for the [employer] tonight. She stated that she slept most the day and hopes tomorrow will be better, ate good, and not sure how she will slept tonight. She is having no anxiety but depression at the level of a 6. Something that was good that happened today was catching up on sleep, bad is she is sluggish. She is having no thoughts of hurting herself nor anyone else and no pain. Status After Intervention:  Improved    Participation Level:  Active Listener    Participation Quality: Appropriate      Speech:  normal      Thought Process/Content: Logical  Perseverating      Affective Functioning: Congruent      Mood: depressed      Level of consciousness:  Alert      Response to Learning: Able to verbalize current knowledge/experience, Able to verbalize/acknowledge new learning, and Able to retain information      Endings: None Reported    Modes of Intervention: Support      Discipline Responsible: Behavorial Health Tech      Signature:  Selene Brice CNA

## 2023-08-19 LAB
GLUCOSE BLD STRIP.AUTO-MCNC: 101 MG/DL (ref 65–117)
GLUCOSE BLD STRIP.AUTO-MCNC: 111 MG/DL (ref 65–117)
SERVICE CMNT-IMP: NORMAL
SERVICE CMNT-IMP: NORMAL

## 2023-08-19 PROCEDURE — 1240000000 HC EMOTIONAL WELLNESS R&B

## 2023-08-19 PROCEDURE — 6370000000 HC RX 637 (ALT 250 FOR IP): Performed by: INTERNAL MEDICINE

## 2023-08-19 PROCEDURE — 82962 GLUCOSE BLOOD TEST: CPT

## 2023-08-19 PROCEDURE — 6370000000 HC RX 637 (ALT 250 FOR IP): Performed by: PSYCHIATRY & NEUROLOGY

## 2023-08-19 RX ADMIN — MOMETASONE FUROATE AND FORMOTEROL FUMARATE DIHYDRATE 2 PUFF: 100; 5 AEROSOL RESPIRATORY (INHALATION) at 21:23

## 2023-08-19 RX ADMIN — METFORMIN HYDROCHLORIDE 500 MG: 500 TABLET ORAL at 09:06

## 2023-08-19 RX ADMIN — TRAZODONE HYDROCHLORIDE 50 MG: 50 TABLET ORAL at 21:08

## 2023-08-19 RX ADMIN — POLYETHYLENE GLYCOL 3350 17 G: 17 POWDER, FOR SOLUTION ORAL at 09:09

## 2023-08-19 RX ADMIN — ASPIRIN 81 MG: 81 TABLET, COATED ORAL at 09:07

## 2023-08-19 RX ADMIN — CETIRIZINE HYDROCHLORIDE 5 MG: 10 TABLET, FILM COATED ORAL at 09:06

## 2023-08-19 RX ADMIN — LISINOPRIL 20 MG: 10 TABLET ORAL at 21:07

## 2023-08-19 RX ADMIN — FLUTICASONE PROPIONATE 2 SPRAY: 50 SPRAY, METERED NASAL at 09:08

## 2023-08-19 RX ADMIN — METFORMIN HYDROCHLORIDE 500 MG: 500 TABLET ORAL at 17:37

## 2023-08-19 RX ADMIN — HYDROCORTISONE: 25 CREAM TOPICAL at 21:08

## 2023-08-19 RX ADMIN — CLOPIDOGREL BISULFATE 75 MG: 75 TABLET ORAL at 09:06

## 2023-08-19 RX ADMIN — MOMETASONE FUROATE AND FORMOTEROL FUMARATE DIHYDRATE 2 PUFF: 100; 5 AEROSOL RESPIRATORY (INHALATION) at 09:09

## 2023-08-19 RX ADMIN — ATORVASTATIN CALCIUM 80 MG: 40 TABLET, FILM COATED ORAL at 09:08

## 2023-08-19 RX ADMIN — ALUMINUM HYDROXIDE, MAGNESIUM HYDROXIDE, AND SIMETHICONE 30 ML: 200; 200; 20 SUSPENSION ORAL at 18:00

## 2023-08-19 RX ADMIN — VENLAFAXINE HYDROCHLORIDE 225 MG: 75 CAPSULE, EXTENDED RELEASE ORAL at 09:07

## 2023-08-19 RX ADMIN — METOPROLOL SUCCINATE 50 MG: 50 TABLET, EXTENDED RELEASE ORAL at 09:07

## 2023-08-19 RX ADMIN — LISINOPRIL 20 MG: 10 TABLET ORAL at 09:07

## 2023-08-19 RX ADMIN — ALOGLIPTIN 6.25 MG: 6.25 TABLET, FILM COATED ORAL at 09:07

## 2023-08-19 RX ADMIN — ALOGLIPTIN 6.25 MG: 6.25 TABLET, FILM COATED ORAL at 17:37

## 2023-08-20 LAB
GLUCOSE BLD STRIP.AUTO-MCNC: 132 MG/DL (ref 65–117)
GLUCOSE BLD STRIP.AUTO-MCNC: 141 MG/DL (ref 65–117)
SERVICE CMNT-IMP: ABNORMAL
SERVICE CMNT-IMP: ABNORMAL

## 2023-08-20 PROCEDURE — 82962 GLUCOSE BLOOD TEST: CPT

## 2023-08-20 PROCEDURE — 6370000000 HC RX 637 (ALT 250 FOR IP): Performed by: PSYCHIATRY & NEUROLOGY

## 2023-08-20 PROCEDURE — 1240000000 HC EMOTIONAL WELLNESS R&B

## 2023-08-20 RX ORDER — POLYETHYLENE GLYCOL 3350 17 G/17G
17 POWDER, FOR SOLUTION ORAL DAILY PRN
Status: DISCONTINUED | OUTPATIENT
Start: 2023-08-20 | End: 2023-08-31 | Stop reason: HOSPADM

## 2023-08-20 RX ADMIN — LISINOPRIL 20 MG: 10 TABLET ORAL at 21:22

## 2023-08-20 RX ADMIN — CETIRIZINE HYDROCHLORIDE 5 MG: 10 TABLET, FILM COATED ORAL at 08:59

## 2023-08-20 RX ADMIN — METFORMIN HYDROCHLORIDE 500 MG: 500 TABLET ORAL at 08:58

## 2023-08-20 RX ADMIN — ALUMINUM HYDROXIDE, MAGNESIUM HYDROXIDE, AND SIMETHICONE 30 ML: 200; 200; 20 SUSPENSION ORAL at 01:01

## 2023-08-20 RX ADMIN — ALOGLIPTIN 6.25 MG: 6.25 TABLET, FILM COATED ORAL at 17:41

## 2023-08-20 RX ADMIN — HYDROCORTISONE: 25 CREAM TOPICAL at 21:23

## 2023-08-20 RX ADMIN — ASPIRIN 81 MG: 81 TABLET, COATED ORAL at 09:00

## 2023-08-20 RX ADMIN — ALOGLIPTIN 6.25 MG: 6.25 TABLET, FILM COATED ORAL at 08:59

## 2023-08-20 RX ADMIN — METOPROLOL SUCCINATE 50 MG: 50 TABLET, EXTENDED RELEASE ORAL at 08:57

## 2023-08-20 RX ADMIN — FLUTICASONE PROPIONATE 2 SPRAY: 50 SPRAY, METERED NASAL at 09:01

## 2023-08-20 RX ADMIN — LISINOPRIL 20 MG: 10 TABLET ORAL at 09:00

## 2023-08-20 RX ADMIN — METFORMIN HYDROCHLORIDE 500 MG: 500 TABLET ORAL at 17:41

## 2023-08-20 RX ADMIN — VENLAFAXINE HYDROCHLORIDE 225 MG: 75 CAPSULE, EXTENDED RELEASE ORAL at 08:59

## 2023-08-20 RX ADMIN — CLOPIDOGREL BISULFATE 75 MG: 75 TABLET ORAL at 09:00

## 2023-08-20 RX ADMIN — ATORVASTATIN CALCIUM 80 MG: 40 TABLET, FILM COATED ORAL at 09:00

## 2023-08-20 RX ADMIN — HYDROCORTISONE: 25 CREAM TOPICAL at 09:06

## 2023-08-20 RX ADMIN — TRAZODONE HYDROCHLORIDE 50 MG: 50 TABLET ORAL at 21:22

## 2023-08-21 LAB
GLUCOSE BLD STRIP.AUTO-MCNC: 112 MG/DL (ref 65–117)
GLUCOSE BLD STRIP.AUTO-MCNC: 99 MG/DL (ref 65–117)
SERVICE CMNT-IMP: NORMAL
SERVICE CMNT-IMP: NORMAL

## 2023-08-21 PROCEDURE — 82962 GLUCOSE BLOOD TEST: CPT

## 2023-08-21 PROCEDURE — 6370000000 HC RX 637 (ALT 250 FOR IP): Performed by: PSYCHIATRY & NEUROLOGY

## 2023-08-21 PROCEDURE — 1240000000 HC EMOTIONAL WELLNESS R&B

## 2023-08-21 PROCEDURE — 99232 SBSQ HOSP IP/OBS MODERATE 35: CPT | Performed by: PSYCHIATRY & NEUROLOGY

## 2023-08-21 RX ORDER — VENLAFAXINE HYDROCHLORIDE 150 MG/1
300 CAPSULE, EXTENDED RELEASE ORAL DAILY
Status: DISCONTINUED | OUTPATIENT
Start: 2023-08-22 | End: 2023-08-22

## 2023-08-21 RX ADMIN — CLOPIDOGREL BISULFATE 75 MG: 75 TABLET ORAL at 08:59

## 2023-08-21 RX ADMIN — ALOGLIPTIN 6.25 MG: 6.25 TABLET, FILM COATED ORAL at 08:59

## 2023-08-21 RX ADMIN — METFORMIN HYDROCHLORIDE 500 MG: 500 TABLET ORAL at 08:59

## 2023-08-21 RX ADMIN — HYDROCORTISONE: 25 CREAM TOPICAL at 21:37

## 2023-08-21 RX ADMIN — HYDROCORTISONE: 25 CREAM TOPICAL at 09:01

## 2023-08-21 RX ADMIN — TRAZODONE HYDROCHLORIDE 50 MG: 50 TABLET ORAL at 21:30

## 2023-08-21 RX ADMIN — ALOGLIPTIN 6.25 MG: 6.25 TABLET, FILM COATED ORAL at 17:00

## 2023-08-21 RX ADMIN — LISINOPRIL 20 MG: 10 TABLET ORAL at 21:30

## 2023-08-21 RX ADMIN — VENLAFAXINE HYDROCHLORIDE 225 MG: 75 CAPSULE, EXTENDED RELEASE ORAL at 08:58

## 2023-08-21 RX ADMIN — ASPIRIN 81 MG: 81 TABLET, COATED ORAL at 08:59

## 2023-08-21 RX ADMIN — METFORMIN HYDROCHLORIDE 500 MG: 500 TABLET ORAL at 17:00

## 2023-08-21 RX ADMIN — METOPROLOL SUCCINATE 50 MG: 50 TABLET, EXTENDED RELEASE ORAL at 08:59

## 2023-08-21 RX ADMIN — LISINOPRIL 20 MG: 10 TABLET ORAL at 09:00

## 2023-08-21 RX ADMIN — FLUTICASONE PROPIONATE 2 SPRAY: 50 SPRAY, METERED NASAL at 09:01

## 2023-08-21 RX ADMIN — CETIRIZINE HYDROCHLORIDE 5 MG: 10 TABLET, FILM COATED ORAL at 09:00

## 2023-08-21 RX ADMIN — ATORVASTATIN CALCIUM 80 MG: 40 TABLET, FILM COATED ORAL at 08:59

## 2023-08-21 ASSESSMENT — PAIN SCALES - GENERAL
PAINLEVEL_OUTOF10: 0
PAINLEVEL_OUTOF10: 0

## 2023-08-22 ENCOUNTER — CLINICAL DOCUMENTATION (OUTPATIENT)
Age: 88
End: 2023-08-22

## 2023-08-22 LAB
GLUCOSE BLD STRIP.AUTO-MCNC: 103 MG/DL (ref 65–117)
GLUCOSE BLD STRIP.AUTO-MCNC: 95 MG/DL (ref 65–117)
SERVICE CMNT-IMP: NORMAL
SERVICE CMNT-IMP: NORMAL

## 2023-08-22 PROCEDURE — 82962 GLUCOSE BLOOD TEST: CPT

## 2023-08-22 PROCEDURE — 6370000000 HC RX 637 (ALT 250 FOR IP): Performed by: PSYCHIATRY & NEUROLOGY

## 2023-08-22 PROCEDURE — 99232 SBSQ HOSP IP/OBS MODERATE 35: CPT | Performed by: PSYCHIATRY & NEUROLOGY

## 2023-08-22 PROCEDURE — 1240000000 HC EMOTIONAL WELLNESS R&B

## 2023-08-22 RX ADMIN — ASPIRIN 81 MG: 81 TABLET, COATED ORAL at 09:21

## 2023-08-22 RX ADMIN — FLUTICASONE PROPIONATE 2 SPRAY: 50 SPRAY, METERED NASAL at 09:23

## 2023-08-22 RX ADMIN — LISINOPRIL 20 MG: 10 TABLET ORAL at 21:33

## 2023-08-22 RX ADMIN — METFORMIN HYDROCHLORIDE 500 MG: 500 TABLET ORAL at 17:18

## 2023-08-22 RX ADMIN — METOPROLOL SUCCINATE 50 MG: 50 TABLET, EXTENDED RELEASE ORAL at 09:22

## 2023-08-22 RX ADMIN — CLOPIDOGREL BISULFATE 75 MG: 75 TABLET ORAL at 09:21

## 2023-08-22 RX ADMIN — LISINOPRIL 20 MG: 10 TABLET ORAL at 09:22

## 2023-08-22 RX ADMIN — ALOGLIPTIN 6.25 MG: 6.25 TABLET, FILM COATED ORAL at 17:18

## 2023-08-22 RX ADMIN — HYDROCORTISONE: 25 CREAM TOPICAL at 09:23

## 2023-08-22 RX ADMIN — ALOGLIPTIN 6.25 MG: 6.25 TABLET, FILM COATED ORAL at 09:22

## 2023-08-22 RX ADMIN — VENLAFAXINE HYDROCHLORIDE 300 MG: 150 CAPSULE, EXTENDED RELEASE ORAL at 09:21

## 2023-08-22 RX ADMIN — CETIRIZINE HYDROCHLORIDE 5 MG: 10 TABLET, FILM COATED ORAL at 09:21

## 2023-08-22 RX ADMIN — METFORMIN HYDROCHLORIDE 500 MG: 500 TABLET ORAL at 09:21

## 2023-08-22 RX ADMIN — ATORVASTATIN CALCIUM 80 MG: 40 TABLET, FILM COATED ORAL at 09:22

## 2023-08-22 ASSESSMENT — PAIN SCALES - GENERAL
PAINLEVEL_OUTOF10: 0
PAINLEVEL_OUTOF10: 0

## 2023-08-22 NOTE — PROGRESS NOTES
Called patient daughter ([phone]) I left a detailed voicemail message for patient to return my call when possible to get scheduled for surgery.

## 2023-08-23 LAB
GLUCOSE BLD STRIP.AUTO-MCNC: 88 MG/DL (ref 65–117)
GLUCOSE BLD STRIP.AUTO-MCNC: 93 MG/DL (ref 65–117)
SERVICE CMNT-IMP: NORMAL
SERVICE CMNT-IMP: NORMAL

## 2023-08-23 PROCEDURE — 99231 SBSQ HOSP IP/OBS SF/LOW 25: CPT | Performed by: PSYCHIATRY & NEUROLOGY

## 2023-08-23 PROCEDURE — 82962 GLUCOSE BLOOD TEST: CPT

## 2023-08-23 PROCEDURE — 6370000000 HC RX 637 (ALT 250 FOR IP): Performed by: PSYCHIATRY & NEUROLOGY

## 2023-08-23 PROCEDURE — 1240000000 HC EMOTIONAL WELLNESS R&B

## 2023-08-23 RX ADMIN — METFORMIN HYDROCHLORIDE 500 MG: 500 TABLET ORAL at 09:17

## 2023-08-23 RX ADMIN — VENLAFAXINE HYDROCHLORIDE 225 MG: 75 CAPSULE, EXTENDED RELEASE ORAL at 09:17

## 2023-08-23 RX ADMIN — METOPROLOL SUCCINATE 50 MG: 50 TABLET, EXTENDED RELEASE ORAL at 09:17

## 2023-08-23 RX ADMIN — ALOGLIPTIN 6.25 MG: 6.25 TABLET, FILM COATED ORAL at 09:17

## 2023-08-23 RX ADMIN — CETIRIZINE HYDROCHLORIDE 5 MG: 10 TABLET, FILM COATED ORAL at 09:18

## 2023-08-23 RX ADMIN — ATORVASTATIN CALCIUM 80 MG: 40 TABLET, FILM COATED ORAL at 09:17

## 2023-08-23 RX ADMIN — ALBUTEROL SULFATE 2 PUFF: 90 AEROSOL, METERED RESPIRATORY (INHALATION) at 01:04

## 2023-08-23 RX ADMIN — CLOPIDOGREL BISULFATE 75 MG: 75 TABLET ORAL at 09:18

## 2023-08-23 RX ADMIN — LISINOPRIL 20 MG: 10 TABLET ORAL at 09:18

## 2023-08-23 RX ADMIN — METFORMIN HYDROCHLORIDE 500 MG: 500 TABLET ORAL at 17:30

## 2023-08-23 RX ADMIN — HYDROCORTISONE: 25 CREAM TOPICAL at 09:22

## 2023-08-23 RX ADMIN — TRAZODONE HYDROCHLORIDE 50 MG: 50 TABLET ORAL at 20:58

## 2023-08-23 RX ADMIN — ALOGLIPTIN 6.25 MG: 6.25 TABLET, FILM COATED ORAL at 17:30

## 2023-08-23 RX ADMIN — ASPIRIN 81 MG: 81 TABLET, COATED ORAL at 09:17

## 2023-08-23 RX ADMIN — LISINOPRIL 20 MG: 10 TABLET ORAL at 20:58

## 2023-08-23 RX ADMIN — FLUTICASONE PROPIONATE 2 SPRAY: 50 SPRAY, METERED NASAL at 09:22

## 2023-08-23 ASSESSMENT — PAIN SCALES - GENERAL: PAINLEVEL_OUTOF10: 0

## 2023-08-23 NOTE — GROUP NOTE
Group Therapy Note    Date: 8/23/2023    Group Start Time: 1030  Group End Time: 1100  Group Topic: Community Meeting    [address]        Group Therapy Note    Attendees: 1       Patient's Goal:  To feel Better    Notes:  Patient appetite was good, no physical pain,no suicidal or self harm thoughts. Patient stated that she has depression level 2 and no anxiety. Status After Intervention:  Unchanged    Participation Level:  Active Listener    Participation Quality: Appropriate      Speech:  normal      Thought Process/Content: Logical      Affective Functioning: Congruent      Mood: depressed      Level of consciousness:  Alert      Response to Learning: Able to verbalize current knowledge/experience      Endings: None Reported    Modes of Intervention: Support      Discipline Responsible: [address] [employer]      Signature:  Bisi Mendoza

## 2023-08-24 ENCOUNTER — PREP FOR PROCEDURE (OUTPATIENT)
Age: 88
End: 2023-08-24

## 2023-08-24 ENCOUNTER — CLINICAL DOCUMENTATION (OUTPATIENT)
Age: 88
End: 2023-08-24

## 2023-08-24 DIAGNOSIS — C50.412 MALIGNANT NEOPLASM OF UPPER-OUTER QUADRANT OF LEFT FEMALE BREAST, UNSPECIFIED ESTROGEN RECEPTOR STATUS (HCC): Primary | ICD-10-CM

## 2023-08-24 LAB
GLUCOSE BLD STRIP.AUTO-MCNC: 110 MG/DL (ref 65–117)
SERVICE CMNT-IMP: NORMAL

## 2023-08-24 PROCEDURE — 99231 SBSQ HOSP IP/OBS SF/LOW 25: CPT | Performed by: PSYCHIATRY & NEUROLOGY

## 2023-08-24 PROCEDURE — 6370000000 HC RX 637 (ALT 250 FOR IP): Performed by: PSYCHIATRY & NEUROLOGY

## 2023-08-24 PROCEDURE — 82962 GLUCOSE BLOOD TEST: CPT

## 2023-08-24 PROCEDURE — 1240000000 HC EMOTIONAL WELLNESS R&B

## 2023-08-24 RX ORDER — ARIPIPRAZOLE 2 MG/1
2 TABLET ORAL DAILY
Status: DISCONTINUED | OUTPATIENT
Start: 2023-08-24 | End: 2023-08-31 | Stop reason: HOSPADM

## 2023-08-24 RX ORDER — LISINOPRIL 10 MG/1
20 TABLET ORAL 2 TIMES DAILY
Status: DISCONTINUED | OUTPATIENT
Start: 2023-08-24 | End: 2023-08-26

## 2023-08-24 RX ADMIN — LISINOPRIL 20 MG: 10 TABLET ORAL at 17:52

## 2023-08-24 RX ADMIN — ALOGLIPTIN 6.25 MG: 6.25 TABLET, FILM COATED ORAL at 16:34

## 2023-08-24 RX ADMIN — VENLAFAXINE HYDROCHLORIDE 225 MG: 75 CAPSULE, EXTENDED RELEASE ORAL at 09:13

## 2023-08-24 RX ADMIN — TRAZODONE HYDROCHLORIDE 50 MG: 50 TABLET ORAL at 21:29

## 2023-08-24 RX ADMIN — METFORMIN HYDROCHLORIDE 500 MG: 500 TABLET ORAL at 09:15

## 2023-08-24 RX ADMIN — METFORMIN HYDROCHLORIDE 500 MG: 500 TABLET ORAL at 16:34

## 2023-08-24 RX ADMIN — ARIPIPRAZOLE 2 MG: 2 TABLET ORAL at 09:22

## 2023-08-24 RX ADMIN — CETIRIZINE HYDROCHLORIDE 5 MG: 10 TABLET, FILM COATED ORAL at 09:14

## 2023-08-24 RX ADMIN — ALUMINUM HYDROXIDE, MAGNESIUM HYDROXIDE, AND SIMETHICONE 30 ML: 200; 200; 20 SUSPENSION ORAL at 20:14

## 2023-08-24 RX ADMIN — CLOPIDOGREL BISULFATE 75 MG: 75 TABLET ORAL at 09:14

## 2023-08-24 RX ADMIN — ALOGLIPTIN 6.25 MG: 6.25 TABLET, FILM COATED ORAL at 09:15

## 2023-08-24 RX ADMIN — METOPROLOL SUCCINATE 50 MG: 50 TABLET, EXTENDED RELEASE ORAL at 09:15

## 2023-08-24 RX ADMIN — LISINOPRIL 20 MG: 10 TABLET ORAL at 09:15

## 2023-08-24 RX ADMIN — ATORVASTATIN CALCIUM 80 MG: 40 TABLET, FILM COATED ORAL at 09:15

## 2023-08-24 RX ADMIN — FLUTICASONE PROPIONATE 2 SPRAY: 50 SPRAY, METERED NASAL at 09:19

## 2023-08-24 RX ADMIN — ASPIRIN 81 MG: 81 TABLET, COATED ORAL at 09:14

## 2023-08-24 RX ADMIN — ALBUTEROL SULFATE 2 PUFF: 90 AEROSOL, METERED RESPIRATORY (INHALATION) at 09:19

## 2023-08-24 RX ADMIN — HYDROCORTISONE: 25 CREAM TOPICAL at 09:16

## 2023-08-24 ASSESSMENT — PAIN SCALES - GENERAL: PAINLEVEL_OUTOF10: 0

## 2023-08-24 NOTE — PROGRESS NOTES
Patient Surgery Information Sheet      Patient Name:  Christin Cummings  Surgery Date:  September 7, 2023    Type of Surgery:  LEFT BREAST ULTRASOUND GUIDED LUMPECTOMY     Estimated arrival time 7:00 AM    Arrival time will be confirmed the afternoon before your surgery. Pre-procedure: Not Applicable    Pre-Operative Testing Department will call to schedule pre-op testing appointment if needed before surgery    Hospital:  [address] [place] [address]   Address:  [place] [address]  Check in location:  Medical Office Building [facility], the second surgery center past the Emergency Room    Pre-Operative Instructions: Will be given at the pre-op appointment.     Special Instructions if needed:     NPO (nothing by mouth) or drinking after midnight the night before Surgery  Patient may shower the morning of, do not use any lotion, deodorant, powders, perfumes or makeup  Patient will need  the morning of surgery     POST OPERATIVE VISIT: 9/27/2023 AT 2:15 PM DR. Kin Cardona AT [facility]     Surgery Scheduler: Amber Maurer

## 2023-08-24 NOTE — PROGRESS NOTES
Attempted to call patient to give her pre-op instructions for surgery, both mailboxes in patient chart are full.

## 2023-08-24 NOTE — GROUP NOTE
Group Therapy Note    Date: 8/24/2023    Radha Cantrell was in bed asleep and I woke her up. She asked to be allowed to sleep. Signature:   Ky Quiroga LCSW

## 2023-08-25 LAB
GLUCOSE BLD STRIP.AUTO-MCNC: 116 MG/DL (ref 65–117)
GLUCOSE BLD STRIP.AUTO-MCNC: 163 MG/DL (ref 65–117)
SERVICE CMNT-IMP: ABNORMAL
SERVICE CMNT-IMP: NORMAL

## 2023-08-25 PROCEDURE — 6370000000 HC RX 637 (ALT 250 FOR IP): Performed by: PSYCHIATRY & NEUROLOGY

## 2023-08-25 PROCEDURE — 1240000000 HC EMOTIONAL WELLNESS R&B

## 2023-08-25 PROCEDURE — 82962 GLUCOSE BLOOD TEST: CPT

## 2023-08-25 PROCEDURE — 99231 SBSQ HOSP IP/OBS SF/LOW 25: CPT | Performed by: PSYCHIATRY & NEUROLOGY

## 2023-08-25 RX ADMIN — FLUTICASONE PROPIONATE 2 SPRAY: 50 SPRAY, METERED NASAL at 10:16

## 2023-08-25 RX ADMIN — ALOGLIPTIN 6.25 MG: 6.25 TABLET, FILM COATED ORAL at 08:59

## 2023-08-25 RX ADMIN — ALOGLIPTIN 6.25 MG: 6.25 TABLET, FILM COATED ORAL at 17:35

## 2023-08-25 RX ADMIN — CETIRIZINE HYDROCHLORIDE 5 MG: 10 TABLET, FILM COATED ORAL at 10:13

## 2023-08-25 RX ADMIN — ASPIRIN 81 MG: 81 TABLET, COATED ORAL at 10:13

## 2023-08-25 RX ADMIN — METFORMIN HYDROCHLORIDE 500 MG: 500 TABLET ORAL at 17:35

## 2023-08-25 RX ADMIN — METFORMIN HYDROCHLORIDE 500 MG: 500 TABLET ORAL at 08:58

## 2023-08-25 RX ADMIN — METOPROLOL SUCCINATE 50 MG: 50 TABLET, EXTENDED RELEASE ORAL at 09:00

## 2023-08-25 RX ADMIN — TRAZODONE HYDROCHLORIDE 50 MG: 50 TABLET ORAL at 20:40

## 2023-08-25 RX ADMIN — LISINOPRIL 20 MG: 10 TABLET ORAL at 17:35

## 2023-08-25 RX ADMIN — VENLAFAXINE HYDROCHLORIDE 225 MG: 75 CAPSULE, EXTENDED RELEASE ORAL at 10:15

## 2023-08-25 RX ADMIN — HYDROCORTISONE: 25 CREAM TOPICAL at 10:16

## 2023-08-25 RX ADMIN — ATORVASTATIN CALCIUM 80 MG: 40 TABLET, FILM COATED ORAL at 10:15

## 2023-08-25 RX ADMIN — CLOPIDOGREL BISULFATE 75 MG: 75 TABLET ORAL at 10:16

## 2023-08-25 RX ADMIN — ARIPIPRAZOLE 2 MG: 2 TABLET ORAL at 10:15

## 2023-08-25 RX ADMIN — HYDROCORTISONE: 25 CREAM TOPICAL at 20:40

## 2023-08-25 RX ADMIN — LISINOPRIL 20 MG: 10 TABLET ORAL at 08:59

## 2023-08-25 ASSESSMENT — PAIN SCALES - GENERAL
PAINLEVEL_OUTOF10: 0
PAINLEVEL_OUTOF10: 0

## 2023-08-25 ASSESSMENT — PAIN - FUNCTIONAL ASSESSMENT: PAIN_FUNCTIONAL_ASSESSMENT: ACTIVITIES ARE NOT PREVENTED

## 2023-08-26 PROCEDURE — 6370000000 HC RX 637 (ALT 250 FOR IP): Performed by: PSYCHIATRY & NEUROLOGY

## 2023-08-26 PROCEDURE — 82962 GLUCOSE BLOOD TEST: CPT

## 2023-08-26 PROCEDURE — 1240000000 HC EMOTIONAL WELLNESS R&B

## 2023-08-26 RX ORDER — LOSARTAN POTASSIUM 25 MG/1
50 TABLET ORAL DAILY
Status: DISCONTINUED | OUTPATIENT
Start: 2023-08-27 | End: 2023-08-29

## 2023-08-26 RX ADMIN — CLOPIDOGREL BISULFATE 75 MG: 75 TABLET ORAL at 08:54

## 2023-08-26 RX ADMIN — METOPROLOL SUCCINATE 50 MG: 50 TABLET, EXTENDED RELEASE ORAL at 08:55

## 2023-08-26 RX ADMIN — CETIRIZINE HYDROCHLORIDE 5 MG: 10 TABLET, FILM COATED ORAL at 08:54

## 2023-08-26 RX ADMIN — METFORMIN HYDROCHLORIDE 500 MG: 500 TABLET ORAL at 17:40

## 2023-08-26 RX ADMIN — METFORMIN HYDROCHLORIDE 500 MG: 500 TABLET ORAL at 08:54

## 2023-08-26 RX ADMIN — ATORVASTATIN CALCIUM 80 MG: 40 TABLET, FILM COATED ORAL at 08:54

## 2023-08-26 RX ADMIN — ASPIRIN 81 MG: 81 TABLET, COATED ORAL at 08:55

## 2023-08-26 RX ADMIN — ALOGLIPTIN 6.25 MG: 6.25 TABLET, FILM COATED ORAL at 17:40

## 2023-08-26 RX ADMIN — FLUTICASONE PROPIONATE 2 SPRAY: 50 SPRAY, METERED NASAL at 08:55

## 2023-08-26 RX ADMIN — ALOGLIPTIN 6.25 MG: 6.25 TABLET, FILM COATED ORAL at 08:55

## 2023-08-26 RX ADMIN — VENLAFAXINE HYDROCHLORIDE 225 MG: 75 CAPSULE, EXTENDED RELEASE ORAL at 08:54

## 2023-08-26 RX ADMIN — LISINOPRIL 20 MG: 10 TABLET ORAL at 08:53

## 2023-08-26 RX ADMIN — LISINOPRIL 20 MG: 10 TABLET ORAL at 17:40

## 2023-08-26 RX ADMIN — HYDROCORTISONE: 25 CREAM TOPICAL at 21:09

## 2023-08-26 RX ADMIN — ARIPIPRAZOLE 2 MG: 2 TABLET ORAL at 08:54

## 2023-08-26 RX ADMIN — TRAZODONE HYDROCHLORIDE 50 MG: 50 TABLET ORAL at 21:09

## 2023-08-26 ASSESSMENT — PAIN SCALES - GENERAL
PAINLEVEL_OUTOF10: 0
PAINLEVEL_OUTOF10: 0

## 2023-08-26 ASSESSMENT — PAIN - FUNCTIONAL ASSESSMENT: PAIN_FUNCTIONAL_ASSESSMENT: ACTIVITIES ARE NOT PREVENTED

## 2023-08-27 LAB
GLUCOSE BLD STRIP.AUTO-MCNC: 176 MG/DL (ref 65–117)
GLUCOSE BLD STRIP.AUTO-MCNC: 94 MG/DL (ref 65–117)
SERVICE CMNT-IMP: ABNORMAL
SERVICE CMNT-IMP: NORMAL

## 2023-08-27 PROCEDURE — 1240000000 HC EMOTIONAL WELLNESS R&B

## 2023-08-27 PROCEDURE — 6370000000 HC RX 637 (ALT 250 FOR IP): Performed by: INTERNAL MEDICINE

## 2023-08-27 PROCEDURE — 82962 GLUCOSE BLOOD TEST: CPT

## 2023-08-27 PROCEDURE — 6370000000 HC RX 637 (ALT 250 FOR IP): Performed by: PSYCHIATRY & NEUROLOGY

## 2023-08-27 RX ADMIN — FLUTICASONE PROPIONATE 2 SPRAY: 50 SPRAY, METERED NASAL at 08:48

## 2023-08-27 RX ADMIN — HYDROCORTISONE: 25 CREAM TOPICAL at 21:42

## 2023-08-27 RX ADMIN — ARIPIPRAZOLE 2 MG: 2 TABLET ORAL at 08:41

## 2023-08-27 RX ADMIN — METOPROLOL SUCCINATE 50 MG: 50 TABLET, EXTENDED RELEASE ORAL at 08:42

## 2023-08-27 RX ADMIN — ACETAMINOPHEN 650 MG: 325 TABLET ORAL at 14:26

## 2023-08-27 RX ADMIN — VENLAFAXINE HYDROCHLORIDE 225 MG: 75 CAPSULE, EXTENDED RELEASE ORAL at 08:42

## 2023-08-27 RX ADMIN — ALOGLIPTIN 6.25 MG: 6.25 TABLET, FILM COATED ORAL at 08:41

## 2023-08-27 RX ADMIN — METFORMIN HYDROCHLORIDE 500 MG: 500 TABLET ORAL at 08:41

## 2023-08-27 RX ADMIN — ATORVASTATIN CALCIUM 80 MG: 40 TABLET, FILM COATED ORAL at 08:41

## 2023-08-27 RX ADMIN — METFORMIN HYDROCHLORIDE 500 MG: 500 TABLET ORAL at 17:30

## 2023-08-27 RX ADMIN — CETIRIZINE HYDROCHLORIDE 5 MG: 10 TABLET, FILM COATED ORAL at 08:42

## 2023-08-27 RX ADMIN — ASPIRIN 81 MG: 81 TABLET, COATED ORAL at 08:41

## 2023-08-27 RX ADMIN — CLOPIDOGREL BISULFATE 75 MG: 75 TABLET ORAL at 08:41

## 2023-08-27 RX ADMIN — LOSARTAN POTASSIUM 50 MG: 25 TABLET, FILM COATED ORAL at 08:49

## 2023-08-27 RX ADMIN — ALOGLIPTIN 6.25 MG: 6.25 TABLET, FILM COATED ORAL at 17:30

## 2023-08-27 RX ADMIN — TRAZODONE HYDROCHLORIDE 50 MG: 50 TABLET ORAL at 21:42

## 2023-08-27 ASSESSMENT — PAIN SCALES - GENERAL
PAINLEVEL_OUTOF10: 0
PAINLEVEL_OUTOF10: 0
PAINLEVEL_OUTOF10: 3
PAINLEVEL_OUTOF10: 0

## 2023-08-27 ASSESSMENT — PAIN DESCRIPTION - LOCATION
LOCATION: HEAD
LOCATION: HEAD

## 2023-08-27 ASSESSMENT — PAIN DESCRIPTION - DESCRIPTORS: DESCRIPTORS: ACHING

## 2023-08-27 ASSESSMENT — PAIN - FUNCTIONAL ASSESSMENT: PAIN_FUNCTIONAL_ASSESSMENT: ACTIVITIES ARE NOT PREVENTED

## 2023-08-28 LAB
GLUCOSE BLD STRIP.AUTO-MCNC: 102 MG/DL (ref 65–117)
GLUCOSE BLD STRIP.AUTO-MCNC: 93 MG/DL (ref 65–117)
SERVICE CMNT-IMP: NORMAL
SERVICE CMNT-IMP: NORMAL

## 2023-08-28 PROCEDURE — 82962 GLUCOSE BLOOD TEST: CPT

## 2023-08-28 PROCEDURE — 6370000000 HC RX 637 (ALT 250 FOR IP): Performed by: INTERNAL MEDICINE

## 2023-08-28 PROCEDURE — 1240000000 HC EMOTIONAL WELLNESS R&B

## 2023-08-28 PROCEDURE — 6370000000 HC RX 637 (ALT 250 FOR IP): Performed by: PSYCHIATRY & NEUROLOGY

## 2023-08-28 PROCEDURE — 99232 SBSQ HOSP IP/OBS MODERATE 35: CPT | Performed by: PSYCHIATRY & NEUROLOGY

## 2023-08-28 RX ADMIN — ARIPIPRAZOLE 2 MG: 2 TABLET ORAL at 08:42

## 2023-08-28 RX ADMIN — ALOGLIPTIN 6.25 MG: 6.25 TABLET, FILM COATED ORAL at 08:42

## 2023-08-28 RX ADMIN — METFORMIN HYDROCHLORIDE 500 MG: 500 TABLET ORAL at 17:30

## 2023-08-28 RX ADMIN — CLOPIDOGREL BISULFATE 75 MG: 75 TABLET ORAL at 08:42

## 2023-08-28 RX ADMIN — METOPROLOL SUCCINATE 50 MG: 50 TABLET, EXTENDED RELEASE ORAL at 08:43

## 2023-08-28 RX ADMIN — FLUTICASONE PROPIONATE 2 SPRAY: 50 SPRAY, METERED NASAL at 08:44

## 2023-08-28 RX ADMIN — METFORMIN HYDROCHLORIDE 500 MG: 500 TABLET ORAL at 08:42

## 2023-08-28 RX ADMIN — CETIRIZINE HYDROCHLORIDE 5 MG: 10 TABLET, FILM COATED ORAL at 08:43

## 2023-08-28 RX ADMIN — ALOGLIPTIN 6.25 MG: 6.25 TABLET, FILM COATED ORAL at 17:30

## 2023-08-28 RX ADMIN — VENLAFAXINE HYDROCHLORIDE 225 MG: 75 CAPSULE, EXTENDED RELEASE ORAL at 08:42

## 2023-08-28 RX ADMIN — LOSARTAN POTASSIUM 50 MG: 25 TABLET, FILM COATED ORAL at 08:43

## 2023-08-28 RX ADMIN — ATORVASTATIN CALCIUM 80 MG: 40 TABLET, FILM COATED ORAL at 08:42

## 2023-08-28 RX ADMIN — ASPIRIN 81 MG: 81 TABLET, COATED ORAL at 08:43

## 2023-08-28 ASSESSMENT — PAIN SCALES - GENERAL: PAINLEVEL_OUTOF10: 0

## 2023-08-28 NOTE — GROUP NOTE
Group Therapy Note    Date: 8/28/2023    Group Start Time: 0900  Group End Time: 0832  Group Topic: Process Group - Inpatient    [address] [place],Third [facility]        Group Therapy Note    Attendees: 7 scheduled    Focus of session was from Psychology Today about [facility] and the idea that we can become boxed into a way of thinking that leads to increased anxiety, depression and other emotional setbacks. We spoke about the idea is that we want to work to always seeing an alternative way of interpreting a given set of facts and ideas for how we can put our thinking patterns and thoughts, in particular, to the test so we can look for faulty thinking patterns. We discussed in detail that a belief is not a fact but an opinion and what beliefs patient holds right now that may be holding patient back and how to challenge that belief. Patient's Goal:    Will report anxiety at manageable levels             Notes:  Carlo Kebede participated in group with prompting. She spoke about how she got some rest this weekend which is helping her to feel better. She was frustrated with a peer in group who was manic and would not stop talking. She was able to sit throughout group and she tried to listen to the topic. Status After Intervention:  Unchanged    Participation Level:  Active Listener and Interactive    Participation Quality: Appropriate, Attentive, Sharing, and Supportive      Speech:  normal      Thought Process/Content: Logical  Linear      Affective Functioning: Congruent      Mood: depressed      Level of consciousness:  Alert, Oriented x4, and Attentive      Response to Learning: Able to verbalize current knowledge/experience, Able to retain information, and Capable of insight      Endings: None Reported    Modes of Intervention: Education, Support, Socialization, Exploration, and Clarifying      Discipline

## 2023-08-28 NOTE — GROUP NOTE
Group Therapy Note    Date: 8/28/2023    Group Start Time: 1100  Group End Time: 3957  Group Topic: Group Documentation    [facility]        Group Therapy Note    Attendees: 6       Patient's Goal:  Pt stated she is working towards going home. She stated she is trying to think good thoughts and she is feeling better today than she has in a long time. Notes:  Pt stated she is hopeful to go home soon and she is feeling much better today. She thinks her medications are beginning to work     Status After Intervention:  Unchanged    Participation Level:  Active Listener    Participation Quality: Appropriate      Speech:  normal      Thought Process/Content: Logical      Affective Functioning: Congruent      Mood: euthymic      Level of consciousness:  Alert      Response to Learning: Able to verbalize current knowledge/experience      Endings: None Reported    Modes of Intervention: Support      Discipline Responsible: Behavorial Health Tech      Signature:  Lexy Sheikh

## 2023-08-29 LAB
GLUCOSE BLD STRIP.AUTO-MCNC: 105 MG/DL (ref 65–117)
GLUCOSE BLD STRIP.AUTO-MCNC: 111 MG/DL (ref 65–117)
SERVICE CMNT-IMP: NORMAL
SERVICE CMNT-IMP: NORMAL

## 2023-08-29 PROCEDURE — 82962 GLUCOSE BLOOD TEST: CPT

## 2023-08-29 PROCEDURE — 6370000000 HC RX 637 (ALT 250 FOR IP): Performed by: PSYCHIATRY & NEUROLOGY

## 2023-08-29 PROCEDURE — 99232 SBSQ HOSP IP/OBS MODERATE 35: CPT | Performed by: PSYCHIATRY & NEUROLOGY

## 2023-08-29 PROCEDURE — 6370000000 HC RX 637 (ALT 250 FOR IP): Performed by: INTERNAL MEDICINE

## 2023-08-29 PROCEDURE — 1240000000 HC EMOTIONAL WELLNESS R&B

## 2023-08-29 RX ORDER — LOSARTAN POTASSIUM 25 MG/1
100 TABLET ORAL DAILY
Status: DISCONTINUED | OUTPATIENT
Start: 2023-08-29 | End: 2023-08-31 | Stop reason: HOSPADM

## 2023-08-29 RX ADMIN — ATORVASTATIN CALCIUM 80 MG: 40 TABLET, FILM COATED ORAL at 09:21

## 2023-08-29 RX ADMIN — METFORMIN HYDROCHLORIDE 500 MG: 500 TABLET ORAL at 09:20

## 2023-08-29 RX ADMIN — CLOPIDOGREL BISULFATE 75 MG: 75 TABLET ORAL at 09:21

## 2023-08-29 RX ADMIN — METOPROLOL SUCCINATE 50 MG: 50 TABLET, EXTENDED RELEASE ORAL at 09:22

## 2023-08-29 RX ADMIN — ALOGLIPTIN 6.25 MG: 6.25 TABLET, FILM COATED ORAL at 17:31

## 2023-08-29 RX ADMIN — VENLAFAXINE HYDROCHLORIDE 225 MG: 75 CAPSULE, EXTENDED RELEASE ORAL at 09:20

## 2023-08-29 RX ADMIN — CETIRIZINE HYDROCHLORIDE 5 MG: 10 TABLET, FILM COATED ORAL at 09:21

## 2023-08-29 RX ADMIN — FLUTICASONE PROPIONATE 2 SPRAY: 50 SPRAY, METERED NASAL at 09:24

## 2023-08-29 RX ADMIN — ARIPIPRAZOLE 2 MG: 2 TABLET ORAL at 09:20

## 2023-08-29 RX ADMIN — METFORMIN HYDROCHLORIDE 500 MG: 500 TABLET ORAL at 17:31

## 2023-08-29 RX ADMIN — ASPIRIN 81 MG: 81 TABLET, COATED ORAL at 09:20

## 2023-08-29 RX ADMIN — ALOGLIPTIN 6.25 MG: 6.25 TABLET, FILM COATED ORAL at 09:20

## 2023-08-29 RX ADMIN — HYDROXYZINE PAMOATE 25 MG: 25 CAPSULE ORAL at 20:13

## 2023-08-29 RX ADMIN — LOSARTAN POTASSIUM 100 MG: 25 TABLET, FILM COATED ORAL at 09:22

## 2023-08-29 ASSESSMENT — PAIN SCALES - GENERAL: PAINLEVEL_OUTOF10: 0

## 2023-08-30 ENCOUNTER — CLINICAL DOCUMENTATION (OUTPATIENT)
Age: 88
End: 2023-08-30

## 2023-08-30 LAB
GLUCOSE BLD STRIP.AUTO-MCNC: 106 MG/DL (ref 65–117)
GLUCOSE BLD STRIP.AUTO-MCNC: 138 MG/DL (ref 65–117)
SERVICE CMNT-IMP: ABNORMAL
SERVICE CMNT-IMP: NORMAL

## 2023-08-30 PROCEDURE — 99232 SBSQ HOSP IP/OBS MODERATE 35: CPT | Performed by: PSYCHIATRY & NEUROLOGY

## 2023-08-30 PROCEDURE — 82962 GLUCOSE BLOOD TEST: CPT

## 2023-08-30 PROCEDURE — 6370000000 HC RX 637 (ALT 250 FOR IP): Performed by: INTERNAL MEDICINE

## 2023-08-30 PROCEDURE — 1240000000 HC EMOTIONAL WELLNESS R&B

## 2023-08-30 PROCEDURE — 6370000000 HC RX 637 (ALT 250 FOR IP): Performed by: PSYCHIATRY & NEUROLOGY

## 2023-08-30 RX ADMIN — CLOPIDOGREL BISULFATE 75 MG: 75 TABLET ORAL at 09:00

## 2023-08-30 RX ADMIN — METFORMIN HYDROCHLORIDE 500 MG: 500 TABLET ORAL at 17:30

## 2023-08-30 RX ADMIN — METFORMIN HYDROCHLORIDE 500 MG: 500 TABLET ORAL at 09:00

## 2023-08-30 RX ADMIN — ASPIRIN 81 MG: 81 TABLET, COATED ORAL at 09:00

## 2023-08-30 RX ADMIN — CETIRIZINE HYDROCHLORIDE 5 MG: 10 TABLET, FILM COATED ORAL at 09:01

## 2023-08-30 RX ADMIN — FLUTICASONE PROPIONATE 2 SPRAY: 50 SPRAY, METERED NASAL at 09:03

## 2023-08-30 RX ADMIN — ALOGLIPTIN 6.25 MG: 6.25 TABLET, FILM COATED ORAL at 17:30

## 2023-08-30 RX ADMIN — ATORVASTATIN CALCIUM 80 MG: 40 TABLET, FILM COATED ORAL at 09:00

## 2023-08-30 RX ADMIN — ALOGLIPTIN 6.25 MG: 6.25 TABLET, FILM COATED ORAL at 09:21

## 2023-08-30 RX ADMIN — VENLAFAXINE HYDROCHLORIDE 225 MG: 75 CAPSULE, EXTENDED RELEASE ORAL at 09:00

## 2023-08-30 RX ADMIN — LOSARTAN POTASSIUM 100 MG: 25 TABLET, FILM COATED ORAL at 09:02

## 2023-08-30 RX ADMIN — METOPROLOL SUCCINATE 50 MG: 50 TABLET, EXTENDED RELEASE ORAL at 09:02

## 2023-08-30 RX ADMIN — ARIPIPRAZOLE 2 MG: 2 TABLET ORAL at 09:00

## 2023-08-30 RX ADMIN — TRAZODONE HYDROCHLORIDE 50 MG: 50 TABLET ORAL at 20:56

## 2023-08-30 ASSESSMENT — PAIN SCALES - GENERAL
PAINLEVEL_OUTOF10: 0
PAINLEVEL_OUTOF10: 0

## 2023-08-30 NOTE — PROGRESS NOTES
Nurse Gary Valdovinos informed me that patient is currently in the hospital and her surgery will need to be re-scheduled to a later date. I called patient (both numbers in chart) but I was not able to leave a voicemail because the mailboxes are full.  I'll continue reaching out

## 2023-08-30 NOTE — GROUP NOTE
Group Therapy Note    Date: 8/30/2023    Gallo Junior did not want to come to group today. She stated she was not feeling well and she was aching all over. Signature:   Liza Quiroga LCSW

## 2023-08-31 ENCOUNTER — HOSPITAL ENCOUNTER (OUTPATIENT)
Facility: HOSPITAL | Age: 88
Setting detail: RECURRING SERIES
End: 2023-08-31
Payer: MEDICARE

## 2023-08-31 VITALS
HEIGHT: 66 IN | OXYGEN SATURATION: 97 % | DIASTOLIC BLOOD PRESSURE: 77 MMHG | HEART RATE: 80 BPM | RESPIRATION RATE: 18 BRPM | BODY MASS INDEX: 27.19 KG/M2 | TEMPERATURE: 98.1 F | WEIGHT: 169.2 LBS | SYSTOLIC BLOOD PRESSURE: 150 MMHG

## 2023-08-31 LAB
GLUCOSE BLD STRIP.AUTO-MCNC: 117 MG/DL (ref 65–117)
SERVICE CMNT-IMP: NORMAL

## 2023-08-31 PROCEDURE — 90853 GROUP PSYCHOTHERAPY: CPT

## 2023-08-31 PROCEDURE — 6370000000 HC RX 637 (ALT 250 FOR IP): Performed by: INTERNAL MEDICINE

## 2023-08-31 PROCEDURE — 82962 GLUCOSE BLOOD TEST: CPT

## 2023-08-31 PROCEDURE — 6370000000 HC RX 637 (ALT 250 FOR IP): Performed by: PSYCHIATRY & NEUROLOGY

## 2023-08-31 RX ORDER — ARIPIPRAZOLE 2 MG/1
2 TABLET ORAL DAILY
Qty: 30 TABLET | Refills: 2 | Status: SHIPPED | OUTPATIENT
Start: 2023-09-01

## 2023-08-31 RX ORDER — LOSARTAN POTASSIUM 100 MG/1
100 TABLET ORAL DAILY
Qty: 30 TABLET | Refills: 2 | Status: SHIPPED | OUTPATIENT
Start: 2023-09-01

## 2023-08-31 RX ADMIN — METFORMIN HYDROCHLORIDE 500 MG: 500 TABLET ORAL at 08:23

## 2023-08-31 RX ADMIN — CLOPIDOGREL BISULFATE 75 MG: 75 TABLET ORAL at 08:22

## 2023-08-31 RX ADMIN — ASPIRIN 81 MG: 81 TABLET, COATED ORAL at 08:23

## 2023-08-31 RX ADMIN — FLUTICASONE PROPIONATE 2 SPRAY: 50 SPRAY, METERED NASAL at 08:23

## 2023-08-31 RX ADMIN — ARIPIPRAZOLE 2 MG: 2 TABLET ORAL at 08:23

## 2023-08-31 RX ADMIN — ALOGLIPTIN 6.25 MG: 6.25 TABLET, FILM COATED ORAL at 08:23

## 2023-08-31 RX ADMIN — CETIRIZINE HYDROCHLORIDE 5 MG: 10 TABLET, FILM COATED ORAL at 08:23

## 2023-08-31 RX ADMIN — LOSARTAN POTASSIUM 100 MG: 25 TABLET, FILM COATED ORAL at 08:22

## 2023-08-31 RX ADMIN — ATORVASTATIN CALCIUM 80 MG: 40 TABLET, FILM COATED ORAL at 08:22

## 2023-08-31 RX ADMIN — VENLAFAXINE HYDROCHLORIDE 225 MG: 75 CAPSULE, EXTENDED RELEASE ORAL at 08:23

## 2023-08-31 RX ADMIN — METOPROLOL SUCCINATE 50 MG: 50 TABLET, EXTENDED RELEASE ORAL at 08:23

## 2023-08-31 ASSESSMENT — PAIN SCALES - GENERAL: PAINLEVEL_OUTOF10: 0

## 2023-08-31 NOTE — DISCHARGE SUMMARY
[facility] DISCHARGE    Name:  Mimi Munguia  MR#:  [id number]  :  1935  ACCOUNT #:  [de-identified]  ADMIT DATE:  08/15/2023  DISCHARGE DATE:  2023    [facility]CHAR[facility] SUMMARY    NOTE:  Greater than 35 minutes was spent in preparation of this discharge. DISCHARGE DIAGNOSES:  AXIS I:  1. Major depression, recurrent, severe (F33.2). 2.  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 AXIS II:  Deferred. AXIS III:  Breast cancer stage I; diabetes mellitus; history of small cerebrovascular accident; diabetic neuropathy; hyperlipidemia; chronic kidney disease; hypertension. AXIS IV:  Stressors are moderate to severe (new diagnosis of cancer). AXIS V:  Global Assessment of Functioning at discharge is 72. DISCHARGE MEDICATIONS:  1. Abilify 2 mg daily (new) - - #30 pills with two refills. 2.  Effexor  mg daily - - has 3 months of refills of 150 and 75 mg dosages. 3.  Cozaar 100 mg daily (new) - - #30 pills with two refills. This replaces lisinopril. 4.  Janumet 50 - 500 b.i.d. with meals. 5.  Plavix 75 mg daily. 6.  Aspirin 81 mg daily. 7.  Lipitor 80 mg daily. 8.  Metoprolol XL 50 mg daily. 9.  Fish oil one pill b.i.d. 10.  Breo Ellipta 100 - 25 one puff daily. 11.  Xyzal 5 mg daily. 12.  Flonase 2 sprays in each nostril daily p.r.n. 13.  Albuterol inhaler 2 puffs q.4 h. p.r.n. DISPOSITION/FOLLOWUP PLANS:  The patient is being discharged in stable condition this morning on 2023. She will be transitioned directly to the [address] [place] [address] intensive outpatient program where she has already been an active patient, and I will be following her psychiatrically in that setting. HOSPITAL COURSE:  As noted in the admission note, the patient is an 82-year-old  female who has a lengthy past psychiatric history of major depression, who was an active patient in our SOP program recently.   She had started to become increasingly depressed and

## 2023-08-31 NOTE — DISCHARGE INSTRUCTIONS
BEHAVIORAL HEALTH NURSING DISCHARGE NOTE      The following personal items collected during your admission are returned to you:   Dental Appliance:    Vision:    Hearing Aid:    Jewelry:    Clothing:    Other Valuables:    Valuables sent to safe:        PATIENT INSTRUCTIONS:    What to do at [employer] may operate a vehicle is you can. You may engage in an occupation involving machinery or appliances. You may not drink any alcoholic beverages. You may resume normal activities. Avoid making any critical decisions for at least 24-hours. Recommended diet: regular diet, diabetic diet      Recommended activity: activity as tolerated    You are referred to [facility] outpatient. Follow-up with [facility] Outpatient today in group. If you have problems relating to your recovery, call your physician. The discharge information has been reviewed with the patient. The patient verbalized understanding.

## 2023-08-31 NOTE — GROUP NOTE
Group Therapy Note    Date: 8/31/2023    Group Start Time: 10:00 AM  Group End Time: 10:50 AM  Group Topic: Psychotherapy    [facility]    [address], LCSW        Group Therapy Note    Attendees: 11 scheduled    Focus of session was on information about being a highly sensitive person ([address]). We discussed how a HSP reacts to minor stressors and triggers to the same degree they would react in a very dangerous situation. I shared how if the amygdala in the brain detects a threat of any kind, the hormones are released and we are feeling it both physically and mentally. We spoke about how the HSP will add negative thoughts about self and our abilities and thoughts about the past and the future in there as well. I shared 4 steps from the [address] of how to manage episodes of stress and they include staying in the moment, practice being a witness, tuning into the body, and focusing on the breath. We spoke about regulating our breathing can help us return our bodies and mind to a pre triggered state. Behavioral Health Daily Check In form revealed that pt is not experiencing SI/HI thoughts or plans, remaining abstinent from drugs and alcohol, and reports goal today to better eating and sleeping    Patient's Goal:  1Dep. F33.2 T Pt will share with the group medical updates to assist in her ability to process information and establish coping strategies      Notes:  Pt came to group today after being discharged from [facility] inpatient. Everyone greeted her and expressed their excitement that she was back. She shared her experience and felt it was a good choice to go to inpatient. She stated that she was not looking forward to going home and being alone though.  She is going to try and have a positive attitude and reach out to others when she is feeling lonely    Status After Intervention:

## 2023-09-01 ENCOUNTER — HOSPITAL ENCOUNTER (OUTPATIENT)
Facility: HOSPITAL | Age: 88
Setting detail: RECURRING SERIES
Discharge: HOME | End: 2023-09-04
Payer: MEDICARE

## 2023-09-01 PROCEDURE — 90853 GROUP PSYCHOTHERAPY: CPT

## 2023-09-05 ENCOUNTER — HOSPITAL ENCOUNTER (OUTPATIENT)
Facility: HOSPITAL | Age: 88
Setting detail: RECURRING SERIES
Discharge: HOME | End: 2023-09-08
Payer: MEDICARE

## 2023-09-05 PROCEDURE — 90853 GROUP PSYCHOTHERAPY: CPT

## 2023-09-05 NOTE — BH NOTE
Group Therapy Note    Date: 9/5/2023    Group Start Time: 10:00 AM  Group End Time: 10:50 AM  Group Topic: Psychotherapy    [address] [place],Third Floor, LCSW        Group Therapy Note    Attendees: 8 scheduled    Focus of session was a review of the weekend and helping group members see their wins that they can celebrate from the past several days. We spoke about the things that occurred and choices that they had in front of them and helping everyone to see the positive impact that they had on their well being. We also spoke about what may have been the setbacks and how they coped and what they can plan to do for the future to help build on relapse prevention. Behavioral Health Daily Check In form revealed that patient is not experiencing SI/HI thoughts or plans, remaining abstinent from drugs and alcohol, and report's goal today of \"to eat better. To get ready for Thursday. \"      Patient's Goal:  1. Dep F 33.2  R. Pt will verbalize strategies for self-care and forgiveness when she is struggling with setbacks to help her push forward again    Notes:  Henrietta participated in group with prompting. She was quiet but she spoke about how she did get some things done around the house this weekend. She is preoccupied with her upcoming lumpectomy this week. Status After Intervention:  Unchanged    Participation Level:  Active Listener and Interactive    Participation Quality: Appropriate, Attentive, and Sharing      Speech:  normal      Thought Process/Content: Logical  Linear      Affective Functioning: Congruent      Mood: anxious      Level of consciousness:  Alert, Oriented x4, and Attentive      Response to Learning: Able to verbalize current knowledge/experience, Able to retain information, Capable of insight, and Able to change behavior      Modes of Intervention: Education, Support, Socialization, and

## 2023-09-06 ENCOUNTER — HOSPITAL ENCOUNTER (OUTPATIENT)
Facility: HOSPITAL | Age: 88
Setting detail: RECURRING SERIES
Discharge: HOME | End: 2023-09-09
Payer: MEDICARE

## 2023-09-06 ENCOUNTER — CLINICAL DOCUMENTATION (OUTPATIENT)
Age: 88
End: 2023-09-06

## 2023-09-06 PROCEDURE — 90853 GROUP PSYCHOTHERAPY: CPT

## 2023-09-06 NOTE — PROGRESS NOTES
I've been trying to reach patient since last week in regards to cancelling her surgery that was scheduled for 9/7/2023. I've tried contacting patient on her mobile number ([phone]) I've also tried calling patients daughter Marla Giang". I was unable to leave messages on either line because both mailboxes are currently full. Nurse Angel from [facility] reached out to see if patient surgery was being cancelled for tomorrow. I explained to her that my  and Dr. Claudeen Oram were in the process of making the decision. Dr. Claudeen Oram decided to cancel patient's surgery. I called our scheduling department and I cancelled the procedure. Nurse Angel mentioned that patient had requested that we give her daughter Sohan Schmitz" a call. She provided me with number new number([phone]). I called this number twice, I also left a detailed voicemail for her to return my call. I also added this number to patients chart for future reference.

## 2023-09-07 NOTE — BH NOTE
[facility]  Group Therapy  Treatment Plan Review     TREATMENT PLAN REVIEW REVIEW DATE: 9/4/2023      summary of Ongoing issues with Symptoms/Functional Impairments Indicating Need for Continued Stay:  Pt was admitted to [facility] on 8/15/2023 for increasing depressive symptoms. She was dischrged on 8/31/2023 and attended [employer] groups that day. She states that she is feeling better but is still experiencing anxiety related to her medical condition. Pt is scheduled to have surgery on 9/7/2023 and her family will be her support system. We will continue to support patient in her medical and emotionals needs and discuss supportive strategies      TREATMENT & DISCHARGE PLANNING CHANGES    Modality or Intervention  Changes Pt will attend group two days a week for three hours per day   Psychotropic Medication Changes  No changes since last review   Discharge  Planning or [employer] Date  Changes 10/30/2023   New Issues Breast cancer     TREATMENT TEAM SIGNATURE / DATE   I have participated in the development of this plan of treatment and agreed to its implementation. Client Signature PRINTED Name                        Date & Time       Family / Legal Representative Signature (If Applicable) PRINTED Name & Relationship     Date & Time   Therapist Signature PRINTED Name & Darion Gates LCSW Date & Time       Therapist Signature PRINTED Name & Ioana Adjutant LCSW   Date & Time       Other Signature PRINTED Name & Credential     Date & Time   Other Signature PRINTED Name & Credential or Relationship     Date & Time       PHYSICIAN CERTIFICATION OF THE LEVEL OF CARE:  I certify that these outpatient behavioral health services are medically necessary to improve and maintain the patient's condition and functional level and prevent relapse or admission to a higher level of care.     Physician Signature PRINTED Name &

## 2023-09-12 ENCOUNTER — HOSPITAL ENCOUNTER (OUTPATIENT)
Facility: HOSPITAL | Age: 88
Setting detail: RECURRING SERIES
Discharge: HOME | End: 2023-09-15
Payer: MEDICARE

## 2023-09-12 PROCEDURE — 90853 GROUP PSYCHOTHERAPY: CPT

## 2023-09-14 ENCOUNTER — HOSPITAL ENCOUNTER (OUTPATIENT)
Facility: HOSPITAL | Age: 88
Setting detail: RECURRING SERIES
Discharge: HOME | End: 2023-09-17
Payer: MEDICARE

## 2023-09-14 PROCEDURE — 90853 GROUP PSYCHOTHERAPY: CPT

## 2023-09-15 ENCOUNTER — HOSPITAL ENCOUNTER (OUTPATIENT)
Facility: HOSPITAL | Age: 88
Setting detail: RECURRING SERIES
Discharge: HOME | End: 2023-09-18
Payer: MEDICARE

## 2023-09-15 PROCEDURE — 90853 GROUP PSYCHOTHERAPY: CPT

## 2023-09-19 ENCOUNTER — HOSPITAL ENCOUNTER (OUTPATIENT)
Facility: HOSPITAL | Age: 88
Setting detail: RECURRING SERIES
Discharge: HOME | End: 2023-09-22
Payer: MEDICARE

## 2023-09-19 PROCEDURE — 90853 GROUP PSYCHOTHERAPY: CPT

## 2023-09-21 ENCOUNTER — HOSPITAL ENCOUNTER (OUTPATIENT)
Facility: HOSPITAL | Age: 88
Setting detail: RECURRING SERIES
Discharge: HOME | End: 2023-09-24
Payer: MEDICARE

## 2023-09-21 ENCOUNTER — CLINICAL DOCUMENTATION (OUTPATIENT)
Age: 88
End: 2023-09-21

## 2023-09-21 ENCOUNTER — PREP FOR PROCEDURE (OUTPATIENT)
Age: 88
End: 2023-09-21

## 2023-09-21 DIAGNOSIS — C50.412 MALIGNANT NEOPLASM OF UPPER-OUTER QUADRANT OF LEFT FEMALE BREAST, UNSPECIFIED ESTROGEN RECEPTOR STATUS (HCC): Primary | ICD-10-CM

## 2023-09-21 PROCEDURE — 99213 OFFICE O/P EST LOW 20 MIN: CPT | Performed by: PSYCHIATRY & NEUROLOGY

## 2023-09-21 PROCEDURE — 90853 GROUP PSYCHOTHERAPY: CPT

## 2023-09-21 NOTE — PROGRESS NOTES
SOP PROGRESS NOTE    INTERVAL HISTORY/FINDINGS:  Recently hospitalized at [facility] (8/15--8/31/23) due  to an exacerbation of depression, including some emerging SI to drown herself. She'd been Dx'ed with stage 1 breast CA, and was clearly stressed by that, but it had reached to point that she was also endogenously depressed. I continued the higher dose of Effexor XR (225 mg), and added Abilify at 2 mg daily. She states that today she's feeling \"real good\", but that she's still been having some depressed days off and on. However, she's definitely not dealing with the more severe depression, and her N/V functioning has been fairly good. Surgery to resect the tumor is now set for 10/12 and I tried to (again) reassure her that the surgery should be a complete cure for the CA. Tolerating the meds well--no sedation, akathisia, GI symptoms, etc. Daughter still sets up her meds weekly and she's very compliant with taking them. Her N/V functioning has been good.     MEDICATIONS:  Current Outpatient Medications   Medication Sig    losartan (COZAAR) 100 MG tablet Take 1 tablet by mouth daily    ARIPiprazole (ABILIFY) 2 MG tablet Take 1 tablet by mouth daily (Patient taking differently: Take 1 tablet by mouth every morning)    venlafaxine (EFFEXOR XR) 75 MG extended release capsule Take 1 capsule by mouth daily Take with 150 mg capsule (Patient taking differently: Take 1 capsule by mouth every morning Take with 150 mg capsule)    venlafaxine (EFFEXOR XR) 150 MG extended release capsule Take 1 capsule by mouth daily Take with 75 mg capsule (Patient taking differently: Take 1 capsule by mouth every morning Take with 75 mg capsule)    JANUMET  MG per tablet TAKE 1 TABLET BY MOUTH TWICE A DAY WITH MEALS    albuterol sulfate HFA (PROVENTIL;VENTOLIN;PROAIR) 108 (90 Base) MCG/ACT inhaler Inhale 2 puffs into the lungs    aspirin 81 MG EC tablet Take 1 tablet by mouth every morning Indications: one of the doctors from hospital about a

## 2023-09-21 NOTE — PROGRESS NOTES
Pre-op instructions for patient surgery on 10/12/23 have been discussed with patient daughter Annette Spivey.

## 2023-09-21 NOTE — PROGRESS NOTES
Patient Surgery Information Sheet      Patient Name:  Llay Allen  Surgery Date:  October 12, 2023    Type of Surgery:  LEFT BREAST ULTRASOUND GUIDED LUMPECTOMY     Estimated arrival time 9:00 AM    Arrival time will be confirmed the afternoon before your surgery. Pre-procedure: Not Applicable    Pre-Operative Testing Department will call to schedule pre-op testing appointment if needed before surgery    Hospital:  [address] [place] [address]   Address:  [place] [address]  Check in location:  Medical Office Building III, the second surgery center past the Emergency Room    Pre-Operative Instructions: Will be given at the pre-op appointment.     Special Instructions if needed:     NPO (nothing by mouth) or drinking after midnight the night before Surgery  Patient may shower the morning of, do not use any lotions, deodorant, powders, perfumes or makeup  Patient will need  the morning of surgery     POST OPERATIVE VISIT: 11/1/2023 at 10:15 am with Dr. Blu Morrow     Surgery Scheduler:   Winsome Aguirre

## 2023-09-22 ENCOUNTER — HOSPITAL ENCOUNTER (OUTPATIENT)
Facility: HOSPITAL | Age: 88
Setting detail: RECURRING SERIES
Discharge: HOME | End: 2023-09-25
Payer: MEDICARE

## 2023-09-22 PROCEDURE — 90853 GROUP PSYCHOTHERAPY: CPT

## 2023-09-26 ENCOUNTER — HOSPITAL ENCOUNTER (OUTPATIENT)
Facility: HOSPITAL | Age: 88
Setting detail: RECURRING SERIES
Discharge: HOME | End: 2023-09-29
Payer: MEDICARE

## 2023-09-26 PROCEDURE — 90853 GROUP PSYCHOTHERAPY: CPT

## 2023-09-28 ENCOUNTER — HOSPITAL ENCOUNTER (OUTPATIENT)
Facility: HOSPITAL | Age: 88
Setting detail: RECURRING SERIES
End: 2023-09-28
Payer: MEDICARE

## 2023-09-28 PROCEDURE — 90853 GROUP PSYCHOTHERAPY: CPT

## 2023-09-28 NOTE — BH NOTE
Group Therapy Note    Date: 9/28/2023    Group Start Time: 12:00 PM  Group End Time: 12:50 PM  Group Topic: Psychotherapy    [address] [place],[address], LCSW        Group Therapy Note    Attendees: 12 scheduled     Focus of session was on what we can do to de-clutter our minds and our environment around us. We spoke about the idea of throwing away things, whether it is thoughts in our head, beliefs that hold us back, or the things around us that are adding weight to us and holding us back from making positive changes. We spoke about how the mental clutter holds us back the most and adds to the need to surround ourselves with things to make us feel better. We spoke about not listening to the voices of self doubt that tell us to not throw those things away and how they really do help us in some way. We spoke about how it is very important to write down what we have thrown away or have agreed to let go of since it can be very helpful to review that every day to recommit ourselves to what we have done. Patient's Goal:  1. Dep F 33.0  U. Pt will increase statements of acceptance of her illness and the work that needs to be done to manage it and identify how acceptance impacts her stress level    Notes:  Henrietta participated in group with prompting. She spoke about how she is trying to work at her house and get things more organized. She spoke about how she will continue to work on her strategies for managing her clutter and what is adding to her stress at this time. Status After Intervention:  Unchanged    Participation Level:  Active Listener and Interactive    Participation Quality: Appropriate, Attentive, Sharing, and Supportive      Speech:  normal      Thought Process/Content: Logical  Perseverating      Affective Functioning: Congruent      Mood: anxious      Level of consciousness:  Alert, Oriented x4, and

## 2023-09-29 ENCOUNTER — HOSPITAL ENCOUNTER (OUTPATIENT)
Facility: HOSPITAL | Age: 88
Setting detail: RECURRING SERIES
End: 2023-09-29
Payer: MEDICARE

## 2023-09-29 PROCEDURE — 90853 GROUP PSYCHOTHERAPY: CPT

## 2023-09-29 RX ORDER — ARIPIPRAZOLE 2 MG/1
2 TABLET ORAL DAILY
Qty: 90 TABLET | Refills: 0 | Status: SHIPPED | OUTPATIENT
Start: 2023-09-29

## 2023-09-29 NOTE — BH NOTE
knowledge/experience, Capable of insight, and Progressing to goal      Endings: None Reported    Modes of Intervention: Education, Support, Socialization, Exploration, and Clarifying      Discipline Responsible: /Counselor      Signature:   Macario Quiroga LCSW

## 2023-09-29 NOTE — BH NOTE
Group Therapy Note    Date: 9/29/2023    Group Start Time: 12:00 PM  Group End Time: 12:50 PM  Group Topic: Psychotherapy    [address] [facility]    Joey Lugo, WILIAN        Group Therapy Note    Attendees: 8 scheduled    Focus of session was on identifying coping skills and what categories they may fall under. We spoke about the different categories of distraction skills, grounding skills, emotional release skills, self care skills, thought challenge skills, and accessing our higher self skills. We spoke about what each of these mean and the pros and cons of each category. I asked group members to brainstorm skills for each category and how it can benefit them to use them. We also identified cons of each category and how that can lead them to choose another way to cope with certain situations. Patient's Goal:  1Dep. F33.0 Q Pt will identify when she has been using avoidance as a strategy to cope and how she has tried to overcome it and focus on getting something small accomplished    Notes:  Pt participated in the group activity and discussion. She shared she was feeling well today and wanted to share that with everyone. She is unsure why but she has had 4 good days. She is trying to reach out to others for social contact and have a more positive attitude about living alone. She acknowledged today that her children do live close by    Status After Intervention:  Improved    Participation Level:  Active Listener and Interactive    Participation Quality: Appropriate and Attentive      Speech:  normal      Thought Process/Content: Logical      Affective Functioning: Congruent      Mood: bright      Level of consciousness:  Alert and Attentive      Response to Learning: Able to verbalize current knowledge/experience, Able to verbalize/acknowledge new learning, Able to retain information, Capable of insight, Able to change

## 2023-10-02 ENCOUNTER — HOSPITAL ENCOUNTER (OUTPATIENT)
Facility: HOSPITAL | Age: 88
Setting detail: RECURRING SERIES
Discharge: HOME | End: 2023-10-05
Payer: MEDICARE

## 2023-10-02 PROCEDURE — 90853 GROUP PSYCHOTHERAPY: CPT

## 2023-10-03 ENCOUNTER — TELEPHONE (OUTPATIENT)
Age: 88
End: 2023-10-03

## 2023-10-03 NOTE — TELEPHONE ENCOUNTER
Unable to reach patient but spoke to her daughter Juan Butt who is listed as an approved alternate contact in the chart. I confirmed with her that her mother would not be taking her aspirin or plavix starting one week before surgery. She understood and said that she and her mother were aware.

## 2023-10-05 ENCOUNTER — HOSPITAL ENCOUNTER (OUTPATIENT)
Facility: HOSPITAL | Age: 88
Setting detail: RECURRING SERIES
Discharge: HOME | End: 2023-10-08
Payer: MEDICARE

## 2023-10-05 PROCEDURE — 90853 GROUP PSYCHOTHERAPY: CPT

## 2023-10-09 ENCOUNTER — HOSPITAL ENCOUNTER (OUTPATIENT)
Facility: HOSPITAL | Age: 88
Setting detail: RECURRING SERIES
Discharge: HOME | End: 2023-10-12
Payer: MEDICARE

## 2023-10-09 PROCEDURE — 90853 GROUP PSYCHOTHERAPY: CPT

## 2023-10-10 ENCOUNTER — HOSPITAL ENCOUNTER (OUTPATIENT)
Facility: HOSPITAL | Age: 88
Setting detail: RECURRING SERIES
Discharge: HOME | End: 2023-10-13
Payer: MEDICARE

## 2023-10-10 PROCEDURE — 90853 GROUP PSYCHOTHERAPY: CPT

## 2023-10-11 ENCOUNTER — ANESTHESIA EVENT (OUTPATIENT)
Facility: HOSPITAL | Age: 88
End: 2023-10-11
Payer: MEDICARE

## 2023-10-12 ENCOUNTER — HOSPITAL ENCOUNTER (OUTPATIENT)
Facility: HOSPITAL | Age: 88
Setting detail: OUTPATIENT SURGERY
Discharge: HOME | End: 2023-10-12
Attending: SURGERY | Admitting: SURGERY
Payer: MEDICARE

## 2023-10-12 ENCOUNTER — ANESTHESIA (OUTPATIENT)
Facility: HOSPITAL | Age: 88
End: 2023-10-12
Payer: MEDICARE

## 2023-10-12 ENCOUNTER — HOSPITAL ENCOUNTER (OUTPATIENT)
Facility: HOSPITAL | Age: 88
Setting detail: RECURRING SERIES
End: 2023-10-12
Payer: MEDICARE

## 2023-10-12 VITALS
HEART RATE: 70 BPM | HEIGHT: 66 IN | OXYGEN SATURATION: 100 % | SYSTOLIC BLOOD PRESSURE: 136 MMHG | DIASTOLIC BLOOD PRESSURE: 70 MMHG | WEIGHT: 174 LBS | TEMPERATURE: 98.1 F | RESPIRATION RATE: 24 BRPM | BODY MASS INDEX: 27.97 KG/M2

## 2023-10-12 DIAGNOSIS — G89.18 PAIN FOLLOWING SURGERY OR PROCEDURE: Primary | ICD-10-CM

## 2023-10-12 DIAGNOSIS — C50.412 MALIGNANT NEOPLASM OF UPPER-OUTER QUADRANT OF LEFT FEMALE BREAST, UNSPECIFIED ESTROGEN RECEPTOR STATUS (HCC): ICD-10-CM

## 2023-10-12 LAB
GLUCOSE BLD STRIP.AUTO-MCNC: 174 MG/DL (ref 65–117)
SERVICE CMNT-IMP: ABNORMAL

## 2023-10-12 PROCEDURE — 6360000002 HC RX W HCPCS: Performed by: SURGERY

## 2023-10-12 PROCEDURE — 6360000002 HC RX W HCPCS: Performed by: NURSE ANESTHETIST, CERTIFIED REGISTERED

## 2023-10-12 PROCEDURE — 7100000010 HC PHASE II RECOVERY - FIRST 15 MIN: Performed by: SURGERY

## 2023-10-12 PROCEDURE — 3600000003 HC SURGERY LEVEL 3 BASE: Performed by: SURGERY

## 2023-10-12 PROCEDURE — 2720000010 HC SURG SUPPLY STERILE: Performed by: SURGERY

## 2023-10-12 PROCEDURE — 3600000013 HC SURGERY LEVEL 3 ADDTL 15MIN: Performed by: SURGERY

## 2023-10-12 PROCEDURE — 3700000001 HC ADD 15 MINUTES (ANESTHESIA): Performed by: SURGERY

## 2023-10-12 PROCEDURE — 2709999900 HC NON-CHARGEABLE SUPPLY: Performed by: SURGERY

## 2023-10-12 PROCEDURE — 2500000003 HC RX 250 WO HCPCS: Performed by: NURSE ANESTHETIST, CERTIFIED REGISTERED

## 2023-10-12 PROCEDURE — 82962 GLUCOSE BLOOD TEST: CPT

## 2023-10-12 PROCEDURE — 7100000000 HC PACU RECOVERY - FIRST 15 MIN: Performed by: SURGERY

## 2023-10-12 PROCEDURE — 88307 TISSUE EXAM BY PATHOLOGIST: CPT

## 2023-10-12 PROCEDURE — 2500000003 HC RX 250 WO HCPCS: Performed by: SURGERY

## 2023-10-12 PROCEDURE — 3700000000 HC ANESTHESIA ATTENDED CARE: Performed by: SURGERY

## 2023-10-12 PROCEDURE — 7100000011 HC PHASE II RECOVERY - ADDTL 15 MIN: Performed by: SURGERY

## 2023-10-12 PROCEDURE — 2580000003 HC RX 258: Performed by: ANESTHESIOLOGY

## 2023-10-12 PROCEDURE — 7100000001 HC PACU RECOVERY - ADDTL 15 MIN: Performed by: SURGERY

## 2023-10-12 RX ORDER — SODIUM CHLORIDE, SODIUM LACTATE, POTASSIUM CHLORIDE, CALCIUM CHLORIDE 600; 310; 30; 20 MG/100ML; MG/100ML; MG/100ML; MG/100ML
INJECTION, SOLUTION INTRAVENOUS CONTINUOUS
Status: DISCONTINUED | OUTPATIENT
Start: 2023-10-12 | End: 2023-10-12 | Stop reason: HOSPADM

## 2023-10-12 RX ORDER — ONDANSETRON 2 MG/ML
INJECTION INTRAMUSCULAR; INTRAVENOUS PRN
Status: DISCONTINUED | OUTPATIENT
Start: 2023-10-12 | End: 2023-10-12 | Stop reason: SDUPTHER

## 2023-10-12 RX ORDER — LIDOCAINE HYDROCHLORIDE 10 MG/ML
1 INJECTION, SOLUTION EPIDURAL; INFILTRATION; INTRACAUDAL; PERINEURAL
Status: DISCONTINUED | OUTPATIENT
Start: 2023-10-12 | End: 2023-10-12 | Stop reason: HOSPADM

## 2023-10-12 RX ORDER — PROPOFOL 10 MG/ML
INJECTION, EMULSION INTRAVENOUS CONTINUOUS PRN
Status: DISCONTINUED | OUTPATIENT
Start: 2023-10-12 | End: 2023-10-12 | Stop reason: SDUPTHER

## 2023-10-12 RX ORDER — EPHEDRINE SULFATE/0.9% NACL/PF 50 MG/5 ML
SYRINGE (ML) INTRAVENOUS PRN
Status: DISCONTINUED | OUTPATIENT
Start: 2023-10-12 | End: 2023-10-12 | Stop reason: SDUPTHER

## 2023-10-12 RX ORDER — SODIUM CHLORIDE 9 MG/ML
INJECTION, SOLUTION INTRAVENOUS PRN
Status: DISCONTINUED | OUTPATIENT
Start: 2023-10-12 | End: 2023-10-12 | Stop reason: HOSPADM

## 2023-10-12 RX ORDER — SODIUM CHLORIDE 0.9 % (FLUSH) 0.9 %
5-40 SYRINGE (ML) INJECTION PRN
Status: DISCONTINUED | OUTPATIENT
Start: 2023-10-12 | End: 2023-10-12 | Stop reason: HOSPADM

## 2023-10-12 RX ORDER — DEXAMETHASONE SODIUM PHOSPHATE 4 MG/ML
INJECTION, SOLUTION INTRA-ARTICULAR; INTRALESIONAL; INTRAMUSCULAR; INTRAVENOUS; SOFT TISSUE PRN
Status: DISCONTINUED | OUTPATIENT
Start: 2023-10-12 | End: 2023-10-12 | Stop reason: SDUPTHER

## 2023-10-12 RX ORDER — HYDROMORPHONE HYDROCHLORIDE 1 MG/ML
0.5 INJECTION, SOLUTION INTRAMUSCULAR; INTRAVENOUS; SUBCUTANEOUS EVERY 5 MIN PRN
Status: DISCONTINUED | OUTPATIENT
Start: 2023-10-12 | End: 2023-10-12 | Stop reason: HOSPADM

## 2023-10-12 RX ORDER — TRAMADOL HYDROCHLORIDE 50 MG/1
50 TABLET ORAL EVERY 6 HOURS PRN
Qty: 20 TABLET | Refills: 0 | Status: SHIPPED | OUTPATIENT
Start: 2023-10-12 | End: 2023-10-17

## 2023-10-12 RX ORDER — ACETAMINOPHEN 500 MG
1000 TABLET ORAL
Status: DISCONTINUED | OUTPATIENT
Start: 2023-10-12 | End: 2023-10-12 | Stop reason: HOSPADM

## 2023-10-12 RX ORDER — ONDANSETRON 4 MG/1
4 TABLET, FILM COATED ORAL 3 TIMES DAILY PRN
Qty: 15 TABLET | Refills: 0 | Status: SHIPPED | OUTPATIENT
Start: 2023-10-12

## 2023-10-12 RX ORDER — MEPERIDINE HYDROCHLORIDE 25 MG/ML
12.5 INJECTION INTRAMUSCULAR; INTRAVENOUS; SUBCUTANEOUS EVERY 5 MIN PRN
Status: DISCONTINUED | OUTPATIENT
Start: 2023-10-12 | End: 2023-10-12 | Stop reason: HOSPADM

## 2023-10-12 RX ORDER — OXYCODONE HYDROCHLORIDE 5 MG/1
5 TABLET ORAL
Status: DISCONTINUED | OUTPATIENT
Start: 2023-10-12 | End: 2023-10-12 | Stop reason: HOSPADM

## 2023-10-12 RX ORDER — DROPERIDOL 2.5 MG/ML
0.62 INJECTION, SOLUTION INTRAMUSCULAR; INTRAVENOUS
Status: DISCONTINUED | OUTPATIENT
Start: 2023-10-12 | End: 2023-10-12 | Stop reason: HOSPADM

## 2023-10-12 RX ORDER — FENTANYL CITRATE 50 UG/ML
INJECTION, SOLUTION INTRAMUSCULAR; INTRAVENOUS PRN
Status: DISCONTINUED | OUTPATIENT
Start: 2023-10-12 | End: 2023-10-12 | Stop reason: SDUPTHER

## 2023-10-12 RX ORDER — FENTANYL CITRATE 50 UG/ML
25 INJECTION, SOLUTION INTRAMUSCULAR; INTRAVENOUS EVERY 5 MIN PRN
Status: DISCONTINUED | OUTPATIENT
Start: 2023-10-12 | End: 2023-10-12 | Stop reason: HOSPADM

## 2023-10-12 RX ORDER — DIPHENHYDRAMINE HYDROCHLORIDE 50 MG/ML
12.5 INJECTION INTRAMUSCULAR; INTRAVENOUS
Status: DISCONTINUED | OUTPATIENT
Start: 2023-10-12 | End: 2023-10-12 | Stop reason: HOSPADM

## 2023-10-12 RX ADMIN — PROPOFOL 20 MG: 10 INJECTION, EMULSION INTRAVENOUS at 09:02

## 2023-10-12 RX ADMIN — Medication 10 MG: at 09:21

## 2023-10-12 RX ADMIN — PROPOFOL 30 MG: 10 INJECTION, EMULSION INTRAVENOUS at 08:56

## 2023-10-12 RX ADMIN — DEXAMETHASONE SODIUM PHOSPHATE 10 MG: 4 INJECTION, SOLUTION INTRAMUSCULAR; INTRAVENOUS at 09:07

## 2023-10-12 RX ADMIN — ONDANSETRON 4 MG: 2 INJECTION INTRAMUSCULAR; INTRAVENOUS at 09:07

## 2023-10-12 RX ADMIN — FENTANYL CITRATE 25 MCG: 50 INJECTION, SOLUTION INTRAMUSCULAR; INTRAVENOUS at 08:57

## 2023-10-12 RX ADMIN — SODIUM CHLORIDE, POTASSIUM CHLORIDE, SODIUM LACTATE AND CALCIUM CHLORIDE: 600; 310; 30; 20 INJECTION, SOLUTION INTRAVENOUS at 08:11

## 2023-10-12 RX ADMIN — PROPOFOL 75 MCG/KG/MIN: 10 INJECTION, EMULSION INTRAVENOUS at 08:55

## 2023-10-12 RX ADMIN — PROPOFOL 20 MG: 10 INJECTION, EMULSION INTRAVENOUS at 08:58

## 2023-10-12 ASSESSMENT — PAIN - FUNCTIONAL ASSESSMENT: PAIN_FUNCTIONAL_ASSESSMENT: 0-10

## 2023-10-12 NOTE — BH NOTE
[facility]  Group Therapy  Absence Note      Date of Service:  10/12/2023    Pt is unable to attend her scheduled treatment day as she is having surgery today

## 2023-10-12 NOTE — BRIEF OP NOTE
Brief Postoperative Note      Patient: Ellie Mcneil  YOB: 1935  MRN: [id number]    Date of Procedure: 10/12/2023    Pre-Op Diagnosis Codes:     * Malignant neoplasm of upper-outer quadrant of left female breast, unspecified estrogen receptor status ([address]) [C50.412]    Post-Op Diagnosis: Same       Procedure(s):  LEFT BREAST ULTRASOUND GUIDED LUMPECTOMY    Surgeon(s):   Wilma Snow MD    Assistant:  Surgical Assistant: Dick Escudero    Anesthesia: Monitor Anesthesia Care    Estimated Blood Loss (mL): Minimal    Complications: None    Specimens:   ID Type Source Tests Collected by Time Destination   1 : Left breast lumpectomy short stitch superior, long stitch lateral Tissue Breast SURGICAL PATHOLOGY Wilma Snow MD 10/12/2023 4892    2 : Left breast lateral margin stitch on new margin Tissue Breast SURGICAL PATHOLOGY Wilma Snow MD 10/12/2023 9149    3 : Left breast superior margin stitch on new margin Tissue Breast SURGICAL PATHOLOGY Wilma Snow MD 10/12/2023 7954    4 : Left breast medial margin stitch on new margin Tissue Breast SURGICAL PATHOLOGY Wilma Snow MD 10/12/2023 2722    5 : Left breast inferior margin stitch on new margin Tissue Breast SURGICAL PATHOLOGY Wilma Snow MD 10/12/2023 7542    6 : Left breast anterior margin stitch on new margin Tissue Breast SURGICAL PATHOLOGY Wilma Snow MD 10/12/2023 9174    7 : Left breast deep margin stitch on new margin Tissue Breast SURGICAL PATHOLOGY Wilma Snow MD 10/12/2023 9867        Implants:  * No implants in log *      Drains: * No LDAs found *    Findings: mass removed            Electronically signed by Chela Jang MD on 10/12/2023 at 9:36 AM

## 2023-10-12 NOTE — PERIOP NOTE
Permission received to review discharge instructions and discuss private health information with daughters. Patient states that family/friend will be with them for at least 24 hours following today's procedure.    Air Warming blanket placed on pt; turned on for comfort    Glasses,dentures in [facility]

## 2023-10-12 NOTE — PERIOP NOTE
Patient received to PACU, VSS. Patient awake and alert with no complaints of pain. Incision to left breast intact. 1586: Patient tolerating liquids. 4522: Discharge instructions given. Patient and daughter verbalize understanding of instructions and follow up appointment. Patient states ready for discharge, IV removed. Patient discharged by wheelchair with all belongings (dentures/glasses). Daughter Adrien Thompson to provide transportation home.

## 2023-10-12 NOTE — ANESTHESIA POSTPROCEDURE EVALUATION
Department of Anesthesiology  Postprocedure Note    Patient: Naomie Mcdaniel  MRN: [id number]  YOB: 1935  Date of evaluation: 10/12/2023      Procedure Summary     Date: 10/12/23 Room / Location: [facility] B4 / [facility]    Anesthesia Start: 0848 Anesthesia Stop: 2954    Procedure: LEFT BREAST ULTRASOUND GUIDED LUMPECTOMY (Left: Breast) Diagnosis:       Malignant neoplasm of upper-outer quadrant of left female breast, unspecified estrogen receptor status ([address])      (Malignant neoplasm of upper-outer quadrant of left female breast, unspecified estrogen receptor status ([address]) [C50.412])    Surgeons:  Val Tolliver MD Responsible Provider: Zuri Lala MD    Anesthesia Type: MAC ASA Status: 3          Anesthesia Type: MAC    Paul Phase I: Paul Score: 10    Paul Phase II: Paul Score: 10      Anesthesia Post Evaluation    Patient location during evaluation: PACU  Patient participation: complete - patient participated  Level of consciousness: awake and alert  Pain score: 0  Airway patency: patent  Nausea & Vomiting: no nausea and no vomiting  Complications: no  Cardiovascular status: hemodynamically stable  Respiratory status: acceptable  Hydration status: euvolemic  Multimodal analgesia pain management approach  Pain management: satisfactory to patient

## 2023-10-12 NOTE — DISCHARGE INSTRUCTIONS
Discharge Instructions from Dr. Blu Morrow    I will call you with the pathology results, typically within 1 week from today. You may shower, but no hot tubs, swimming pools, or baths until your incision is healed. No heavy lifting with the affected extremity (nothing greater than 5 pounds), and limit its use for the next 4-5 days. You may use an ice pack for comfort for the next couple of days, but do not place ice directly on the skin. Rather, use a towel or clothing to serve as a barrier between skin and ice to prevent injury. If I placed a drain, follow the drain instructions provided, especially as you keep a record of the drain output. Follow medication instructions carefully. No aspirin, ibuprofen or aleve x 1 week. RESTART PLAVIX IN 3 DAYS. Wear surgical bra for 24 hours, then remove. Wear supportive bra at all times. You will have bruising and swelling  Watch for signs of infection as listed below. Redness  Swelling  Drainage from the incision or from your nipple that appears infected  Fever over 101.5 degrees for consecutive readings, or over 99.5 if you are currently undergoing chemotherapy. Call our office (number is below) for a follow-up appointment. If you have any problems, our phone number is [phone] PAIN MEDICATIONS WITH FOOD     Narcotics tend to be constipating, we suggest taking a stool softener such as Colace or Miralax (follow package instructions). DO NOT DRIVE WHILE TAKING NARCOTIC PAIN MEDICATIONS. DO NOT TAKE SLEEPING MEDICATIONS OR ANTIANXIETY MEDICATIONS WHILE TAKING NARCOTIC PAIN MEDICATIONS,  ESPECIALLY THE NIGHT OF ANESTHESIA! CPAP PATIENTS BE SURE TO WEAR MACHINE WHENEVER NAPPING OR SLEEPING! PATIENT INSTRUCTIONS:    After General Anesthesia or Intravenous Sedation, for 24 hours or while taking prescription Narcotics:        Someone should be with you for the next 24 hours.         For your own safety, a responsible adult must drive you home.  Limit your activities  Recommended activity: Rest today, up with assistance today. Do not climb stairs or shower unattended for the next 24 hours. Please start with a soft bland diet and advance as tolerated (no nausea) to regular diet. If you have a sore throat you should try the following: fluids, warm salt water gargles, or throat lozenges. If it does not improve after several days please follow up with your primary physician. Do not drive and operate hazardous machinery  Do not make important personal or business decisions  Do  not drink alcoholic beverages  If you have not urinated within 8 hours after discharge, please contact your surgeon on call. Report the following to your surgeon:  Excessive pain, swelling, redness or odor of or around the surgical area  Temperature over 100.5  Nausea and vomiting lasting longer than 4 hours or if unable to take medications  Any signs of decreased circulation or nerve impairment to extremity: change in color, persistent  numbness, tingling, coldness or increase pain      You will receive a Post Operative Call from one of the Recovery Room Nurses on the day after your surgery to check on you. It is very important for us to know how you are recovering after your surgery. If you have an issue or need to speak with someone, please call your surgeon, do not wait for the post operative call. You may receive an e-mail or letter in the mail from [employer] regarding your experience with us in the Ambulatory Surgery Unit. Your feedback is valuable to us and we appreciate your participation in the survey. If the above instructions are not adequate or you are having problems after your surgery, call the physician at their office number. We wish you a speedy recovery ? What to do at Home:      *  Please give a list of your current medications to your Primary Care Provider.     *  Please update this list whenever your medications are discontinued, doses

## 2023-10-12 NOTE — H&P
HISTORY OF PRESENT ILLNESS  Nikolay Venegas is a 82 y.o. female      Other   NEW patient referral for consultation by Dr. Lisset Perez for a palpable lump that led to an abnormal mammogram and biopsy. The patient is here today to discuss her new breast cancer diagnosis and surgical options. She is accompanied by her 2 daughters. The patient is on Plavix and a daily aspirin for a history of a stroke 1.5 years ago per the daughter. Her PCP manages her Plavix. Has not seen a neurologist. The patient sees a specialist due to depression. Family history - no history of breast or ovarian cancer. Imaging- 7/6/23  INDICATION: Diagnostic mammography for palpable left upper outer breast mass. EXAM: Bilateral   Digital diagnostic Mammography. Targeted bilateral breast  ultrasonography. COMPARISON: Prior studies dating back to . PROCEDURE: Tomosynthesis (3D-mammography) with CAD overview. Bilateral targeted  ultrasonography was performed. FINDINGS:   BREAST COMPOSITION: There are scattered fibroglandular densities (approximately  25-50% glandular). There is mild asymmetry. A dominant asymmetry appears in the left outer quadrant  at approximately 2:00, corresponding with the palpable abnormality. On the  right, a subcentimeters asymmetry in the outer breast at approximately 8:00 is  new since prior studies. There are no new dominant clustered calcifications,  skin changes or nipple changes which suggest malignancy. Real-time ultrasonography of the left breast  palpable abnormality reveals a  solid hypoechoic and the parallel mass with posterior shadowing, measuring 1.5 x  1.6 x 1.1 cm and showing internal blood flow by color Doppler. Real-time ultrasonography of the left axilla reveals no lymphadenopathy. Real-time ultrasonography of the right breast reveals a simple cyst 0.6 x 0.6 x  0.4 cm, corresponding with the mammographic abnormality. IMPRESSION:  1.  Assessment Category 4: 1 tablet by mouth daily, Disp: , Rfl:     atorvastatin (LIPITOR) 80 MG tablet, Take 1 tablet by mouth daily, Disp: , Rfl:     clopidogrel (PLAVIX) 75 MG tablet, Take 1 tablet by mouth daily, Disp: , Rfl:     fluticasone furoate-vilanterol (BREO ELLIPTA) 100-25 MCG/ACT inhaler, Inhale 1 puff into the lungs daily, Disp: , Rfl:     fluticasone (FLONASE) 50 MCG/ACT nasal spray, 2 sprays by Nasal route as needed, Disp: , Rfl:     levocetirizine (XYZAL) 5 MG tablet, Take 1 tab daily, Disp: , Rfl:     lisinopril (PRINIVIL;ZESTRIL) 20 MG tablet, Take 1 tablet by mouth 2 times daily, Disp: , Rfl:     metoprolol succinate (TOPROL XL) 50 MG extended release tablet, Take 1 tablet by mouth daily, Disp: , Rfl:     Omega-3 Fatty Acids (FISH OIL) 1000 MG capsule, Take 1 capsule by mouth 2 times daily, Disp: , Rfl:     venlafaxine (EFFEXOR XR) 75 MG extended release capsule, Take 1 capsule by mouth daily Take with 150 mg capsule (Patient not taking: Reported on 8/7/2023), Disp: 90 capsule, Rfl: 3     No Known Allergies      Review of Systems   Constitutional: Negative. HENT: Negative. Eyes: Negative. Respiratory: Negative. Cardiovascular: Negative. Gastrointestinal: Negative. Endocrine: Negative. Genitourinary: Negative. Musculoskeletal: Negative. Allergic/Immunologic: Negative. Neurological: Negative. Hematological: Negative. Psychiatric/Behavioral: Negative. Depression   All other systems reviewed and are negative. Physical Exam  Vitals and nursing note reviewed. Chest:   Breasts:     Right: No swelling, bleeding, inverted nipple, mass, nipple discharge, skin change or tenderness. Left: Mass present. No swelling, bleeding, inverted nipple, nipple discharge, skin change or tenderness. Lymphadenopathy:      Upper Body:      Right upper body: No axillary adenopathy. Left upper body: No axillary adenopathy.       BREAST ULTRASOUND, Preop planning  Indication:preop

## 2023-10-12 NOTE — PERIOP NOTE
Timo Raya  1935  [id number]    Situation:  Verbal report given from: Eduardo Schwab RN and Eder Garg CRNA  Procedure: Procedure(s):  LEFT BREAST ULTRASOUND GUIDED LUMPECTOMY    Background:    Preoperative diagnosis: Malignant neoplasm of upper-outer quadrant of left female breast, unspecified estrogen receptor status ([address]) [C50.412]    Postoperative diagnosis: * No post-op diagnosis entered *    :  Dr. Jaime Hernandes    Assistant(s): Circulator: Hector Eric RN  Surgical Assistant: Kevin Sanderson [employer]: Syed Gearing; Raiza Braga  Circulator Assist: Lela Wu RN    Specimens:   ID Type Source Tests Collected by Time Destination   1 : Left breast lumpectomy short stitch superior, long stitch lateral Tissue Breast SURGICAL PATHOLOGY Mary Holman MD 10/12/2023 5417    2 : Left breast lateral margin stitch on new margin Tissue Breast SURGICAL PATHOLOGY Mary Holman MD 10/12/2023 5633    3 : Left breast superior margin stitch on new margin Tissue Breast SURGICAL PATHOLOGY Mary Holman MD 10/12/2023 1650    4 : Left breast medial margin stitch on new margin Tissue Breast SURGICAL PATHOLOGY Mary Holman MD 10/12/2023 0930    5 : Left breast inferior margin stitch on new margin Tissue Breast SURGICAL PATHOLOGY Mary Holman MD 10/12/2023 8617    6 : Left breast anterior margin stitch on new margin Tissue Breast SURGICAL PATHOLOGY Mary Holman MD 10/12/2023 9657    7 : Left breast deep margin stitch on new margin Tissue Breast SURGICAL PATHOLOGY Mary Holman MD 10/12/2023 3312        Assessment:  Intra-procedure medications         Anesthesia gave intra-procedure sedation and medications, see anesthesia flow sheet     Intravenous fluids: LR@ KVO     Vital signs stable       Recommendation:    Permission to share finding with daughter

## 2023-10-12 NOTE — ANESTHESIA PRE PROCEDURE
psychiatric history:depression/anxiety              ROS comment: Mild cognitive impairment GI/Hepatic/Renal:   (+) GERD (occasionally):, renal disease (stage III): CRI,           Endo/Other:    (+) DiabetesType II DM, , blood dyscrasia: anticoagulation therapy, arthritis: OA., .                  ROS comment: Left breast Cancer Abdominal:             Vascular: negative vascular ROS. Other Findings:           Anesthesia Plan      MAC     ASA 3       Induction: intravenous. MIPS: Postoperative opioids intended and Prophylactic antiemetics administered. Anesthetic plan and risks discussed with patient. Plan discussed with CRNA.                     Ester Alvarez MD   10/12/2023

## 2023-10-12 NOTE — OP NOTE
[facility]  OPERATIVE REPORT    Name:  Aubree Matias  MR#:  [id number]  :  1935  ACCOUNT #:  [de-identified]  DATE OF SERVICE:  10/12/2023      PREOPERATIVE DIAGNOSIS:  Left breast cancer, upper outer quadrant. POSTOPERATIVE DIAGNOSIS:  Left breast cancer, upper outer quadrant. PROCEDURE PERFORMED:  Left breast ultrasound-guided lumpectomy, margin assessment using RF spectroscopy. SURGEON:  Milagros Crowder MD    ASSISTANT:  Daron Coley    ANESTHESIA:  MAC.    COMPLICATIONS:  None. SPECIMENS REMOVED:  1. Left breast lumpectomy. 2.  Lateral margin. 3.  Superior margin. 4.  Medial margin. 5. Inferior margin. 6.  Anterior margin. 7.  Deep margin. IMPLANTS:  No implants. ESTIMATED BLOOD LOSS:  Minimal.    DRAINS:  No drains. FINDINGS:  Mass removed. INDICATIONS FOR PROCEDURE:  This is an 82year-old with left breast cancer, wished to have a lumpectomy for her treatment. PROCEDURE IN DETAIL:  The patient was seen in the preop holding area, where surgical site was marked by surgeon. Informed consent was obtained. She was taken to the operating room and laid in supine position, where MAC anesthesia was induced. Left breast was prepped and draped in the usual fashion and a time-out was performed. Attention was turned to the left breast at 3 o'clock, where the lesion and clip were identified using ultrasound guidance. A 20 mL of local anesthetic was injected in left breast and a 15-blade was used to make a radial incision. Bovie cautery was used to dissect down and around the lesion of the chest wall. This was excised and marked short stitch superior and long stitch lateral.  The MarginProbe device was plugged in and calibrated. All six margins were assessed. For additional margin, please see above. Total intraop time was 2 minutes. Cavity was irrigated. Another 20 mL of local anesthetic was injected in breast tissue and skin.   The deeper

## 2023-10-13 ENCOUNTER — TELEPHONE (OUTPATIENT)
Age: 88
End: 2023-10-13

## 2023-10-13 NOTE — GROUP NOTE
Group Therapy Note    Date: 10/12/2023    Group Start Time: 11:00 AM  Group End Time: 11:50 AM  Group Topic: Process Group - Inpatient    [address] OP    [facility], [facility]        Group Therapy Note    Attendees: 10 scheduled    Focus of session as on ways to increase and improve self esteem. We spoke about the importance of focusing on strengths and what we can do rather than what we can't. We spoke about controlling our inner critic and make an effort to fight negative judgments and beliefs about ourselves and to not compare ourselves to others and what we think they are accomplishing or capable of since those are mostly assumptions. We spoke about setting reasonable goals and expectations and not over or under estimating what we are capable of and allow us to stretch our abilities. We spoke about solving problems versus avoiding them and relying first and foremost on our own opinion of ourselves.             Patient's Goal:  ***    Notes:  ***    Status After Intervention:  {Status After Intervention:420805217}    Participation Level: {Participation Level:798430899}    Participation Quality: {[facility] PARTICIPATION QUALITY:835464393}      Speech:  {ED  CD_SPEECH:58480}      Thought Process/Content: {Thought Process/Content:372475153}      Affective Functioning: {Affective Functionin}      Mood: {Mood:618278811}      Level of consciousness:  {Level of consciousness:470538224}      Response to [facility] Responses to [facility]}      Endings: {[facility] Endings:23344}    Modes of Intervention: {[facility] BHI Modes of Intervention:473909784}      Discipline Responsible: {[facility] Multidisciplinary:192937791}      Signature:  Tee Moore LCSW

## 2023-10-13 NOTE — TELEPHONE ENCOUNTER
I attempted to call patient for post op wellness check. No answer and mail box full, attempted to call patient's daughter and no answer. I left a voicemail letting her know to call us back if there are any issues, questions or concerns.

## 2023-10-14 DIAGNOSIS — R06.2 WHEEZING: ICD-10-CM

## 2023-10-16 ENCOUNTER — APPOINTMENT (OUTPATIENT)
Facility: HOSPITAL | Age: 88
End: 2023-10-16
Payer: MEDICARE

## 2023-10-16 NOTE — BH NOTE
[facility]  Group Therapy  Absence Note      Date of Service:  10/16/2023    Pt called to state that she was not feeling well today and would not be keeping her scheduled treatment day

## 2023-10-16 NOTE — TELEPHONE ENCOUNTER
Patient requesting refill on     Requested Prescriptions     Pending Prescriptions Disp Refills    albuterol sulfate HFA (PROVENTIL;VENTOLIN;PROAIR) 108 (90 Base) MCG/ACT inhaler [Pharmacy Med Name: ALBUTEROL HFA (VENTOLIN) INH] 18 each      Sig: TAKE 2 PUFFS BY INHALATION EVERY FOUR TO SIX (4-6) HOURS AS NEEDED FOR WHEEZING.     clopidogrel (PLAVIX) 75 MG tablet [Pharmacy Med Name: CLOPIDOGREL 75 MG TABLET] 90 tablet 1     Sig: TAKE 1 TABLET BY MOUTH EVERY DAY        Last OV 3/22/2023

## 2023-10-17 ENCOUNTER — TELEPHONE (OUTPATIENT)
Age: 88
End: 2023-10-17

## 2023-10-17 ENCOUNTER — APPOINTMENT (OUTPATIENT)
Facility: HOSPITAL | Age: 88
End: 2023-10-17
Payer: MEDICARE

## 2023-10-17 PROCEDURE — 90853 GROUP PSYCHOTHERAPY: CPT

## 2023-10-17 RX ORDER — ALBUTEROL SULFATE 90 UG/1
AEROSOL, METERED RESPIRATORY (INHALATION)
Qty: 18 EACH | Refills: 0 | Status: SHIPPED | OUTPATIENT
Start: 2023-10-17

## 2023-10-17 RX ORDER — LEVOCETIRIZINE DIHYDROCHLORIDE 5 MG/1
5 TABLET, FILM COATED ORAL NIGHTLY
Qty: 90 TABLET | Refills: 1 | Status: SHIPPED | OUTPATIENT
Start: 2023-10-17

## 2023-10-17 RX ORDER — CLOPIDOGREL BISULFATE 75 MG/1
75 TABLET ORAL DAILY
Qty: 30 TABLET | Refills: 0 | Status: SHIPPED | OUTPATIENT
Start: 2023-10-17

## 2023-10-17 NOTE — TELEPHONE ENCOUNTER
Medication Refill Request    Jaclyn Martinez is requesting a refill of the following medication(s)    Levocetirizine 5 MG    Please send refill to:     [facility]/pharmacy [address] [place] [address], [address] [place], [place] [address]tony Swan, [address] [place] [address] [phone] - F [phone]  [address] [facility]  Phone: [phone] Fax: [phone]

## 2023-10-19 ENCOUNTER — APPOINTMENT (OUTPATIENT)
Facility: HOSPITAL | Age: 88
End: 2023-10-19
Payer: MEDICARE

## 2023-10-19 PROBLEM — C50.912 INVASIVE DUCTAL CARCINOMA OF LEFT BREAST (HCC): Status: ACTIVE | Noted: 2023-10-19

## 2023-10-20 ENCOUNTER — APPOINTMENT (OUTPATIENT)
Facility: HOSPITAL | Age: 88
End: 2023-10-20
Payer: MEDICARE

## 2023-10-24 ENCOUNTER — HOSPITAL ENCOUNTER (OUTPATIENT)
Facility: HOSPITAL | Age: 88
Setting detail: RECURRING SERIES
Discharge: HOME | End: 2023-10-27
Payer: MEDICARE

## 2023-10-24 PROCEDURE — 90853 GROUP PSYCHOTHERAPY: CPT

## 2023-10-30 RX ORDER — CLOPIDOGREL BISULFATE 75 MG/1
75 TABLET ORAL DAILY
Qty: 30 TABLET | Refills: 0 | OUTPATIENT
Start: 2023-10-30

## 2023-11-01 ENCOUNTER — OFFICE VISIT (OUTPATIENT)
Age: 88
End: 2023-11-01

## 2023-11-01 ENCOUNTER — TELEPHONE (OUTPATIENT)
Age: 88
End: 2023-11-01

## 2023-11-01 VITALS — HEIGHT: 66 IN | WEIGHT: 174 LBS | BODY MASS INDEX: 27.97 KG/M2

## 2023-11-01 DIAGNOSIS — C50.912 INVASIVE DUCTAL CARCINOMA OF LEFT BREAST (HCC): Primary | ICD-10-CM

## 2023-11-01 PROCEDURE — 99024 POSTOP FOLLOW-UP VISIT: CPT | Performed by: SURGERY

## 2023-11-01 NOTE — PROGRESS NOTES
HISTORY OF PRESENT ILLNESS  Rawleigh Bloch is a 82 y.o. female     HPI ESTABLISHED patient here for POST OP visit for S/P LEFT breast lumpectomy done on 10/12/23. Pt feels great about wound recovery. Denies any issues today. Family history - no history of breast or ovarian cancer. Imaging- 7/6/23  INDICATION: Diagnostic mammography for palpable left upper outer breast mass. EXAM: Bilateral   Digital diagnostic Mammography. Targeted bilateral breast  ultrasonography. COMPARISON: Prior studies dating back to . PROCEDURE: Tomosynthesis (3D-mammography) with CAD overview. Bilateral targeted  ultrasonography was performed. FINDINGS:   BREAST COMPOSITION: There are scattered fibroglandular densities (approximately  25-50% glandular). There is mild asymmetry. A dominant asymmetry appears in the left outer quadrant  at approximately 2:00, corresponding with the palpable abnormality. On the  right, a subcentimeters asymmetry in the outer breast at approximately 8:00 is  new since prior studies. There are no new dominant clustered calcifications,  skin changes or nipple changes which suggest malignancy. Real-time ultrasonography of the left breast  palpable abnormality reveals a  solid hypoechoic and the parallel mass with posterior shadowing, measuring 1.5 x  1.6 x 1.1 cm and showing internal blood flow by color Doppler. Real-time ultrasonography of the left axilla reveals no lymphadenopathy. Real-time ultrasonography of the right breast reveals a simple cyst 0.6 x 0.6 x  0.4 cm, corresponding with the mammographic abnormality. IMPRESSION:  1. Assessment Category 4: Suspicious abnormality. . Solid suspicious mass in the  left breast at the site of the palpable abnormality at 3:00. Sonographically  guided biopsy is recommended at this time. No obvious lymphadenopathy. 2. Incidental simple cyst detected in the right breast mammographically and  sonographically.  No short-term

## 2023-11-06 RX ORDER — CLOPIDOGREL BISULFATE 75 MG/1
75 TABLET ORAL DAILY
Qty: 30 TABLET | Refills: 0 | Status: SHIPPED | OUTPATIENT
Start: 2023-11-06

## 2023-11-06 NOTE — TELEPHONE ENCOUNTER
Patient requesting refill on     Requested Prescriptions     Pending Prescriptions Disp Refills    clopidogrel (PLAVIX) 75 MG tablet [Pharmacy Med Name: CLOPIDOGREL 75 MG TABLET] 30 tablet 0     Sig: TAKE 1 TABLET BY MOUTH EVERY DAY        Last OV 3/22/2023

## 2023-11-07 ENCOUNTER — HOSPITAL ENCOUNTER (OUTPATIENT)
Facility: HOSPITAL | Age: 88
Setting detail: RECURRING SERIES
Discharge: HOME | End: 2023-11-10
Payer: MEDICARE

## 2023-11-07 PROCEDURE — 90853 GROUP PSYCHOTHERAPY: CPT

## 2023-11-09 ENCOUNTER — HOSPITAL ENCOUNTER (OUTPATIENT)
Facility: HOSPITAL | Age: 88
Setting detail: RECURRING SERIES
End: 2023-11-09
Payer: MEDICARE

## 2023-11-09 PROCEDURE — 99214 OFFICE O/P EST MOD 30 MIN: CPT | Performed by: PSYCHIATRY & NEUROLOGY

## 2023-11-09 PROCEDURE — 90853 GROUP PSYCHOTHERAPY: CPT

## 2023-11-09 NOTE — PROGRESS NOTES
SOP PROGRESS NOTE    INTERVAL HISTORY/FINDINGS:   States she's been feeling a little more down/dysphoric lately. Her kids are \"putting me down\" more, believing that she should be able to function better than she does. They've been more assertive with her and usually \"tell\" her what she needs to do, etc. Ledy Patient seems to have an accurate perspective on this dynamic and we discussed ways to process and cope with it. Her N/V functioning has been adequate although she states she's not sleeping quite as well as she was (some terminal insomnia). No hopelessness or SI at all, and she still exhibits a full affect and appears close to euthymic most of the time, objectively. She needs to check with her daughter to make sure she's getting both capsules of the Effexor XR--the 150 mg isn't listed in the [facility] currently. Had the surgery to resect the breast tumor, and she's not overly worried about it now. Tolerating the meds well--no sedation, akathisia, GI symptoms, etc. Daughter still sets up her meds weekly and she's very compliant with taking them. MEDICATIONS:  Current Outpatient Medications   Medication Sig    clopidogrel (PLAVIX) 75 MG tablet TAKE 1 TABLET BY MOUTH EVERY DAY    albuterol sulfate HFA (PROVENTIL;VENTOLIN;PROAIR) 108 (90 Base) MCG/ACT inhaler TAKE 2 PUFFS BY INHALATION EVERY FOUR TO SIX (4-6) HOURS AS NEEDED FOR WHEEZING.     levocetirizine (XYZAL) 5 MG tablet Take 1 tablet by mouth nightly    ondansetron (ZOFRAN) 4 MG tablet Take 1 tablet by mouth 3 times daily as needed for Nausea or Vomiting    ARIPiprazole (ABILIFY) 2 MG tablet Take 1 tablet by mouth daily    losartan (COZAAR) 100 MG tablet Take 1 tablet by mouth daily    venlafaxine (EFFEXOR XR) 75 MG extended release capsule Take 1 capsule by mouth daily Take with 150 mg capsule (Patient taking differently: Take 1 capsule by mouth every morning Take with 150 mg capsule)    JANUMET  MG per tablet TAKE 1 TABLET BY MOUTH TWICE A DAY WITH MEALS atorvastatin (LIPITOR) 80 MG tablet Take 1 tablet by mouth daily    fluticasone furoate-vilanterol (BREO ELLIPTA) 100-25 MCG/ACT inhaler Inhale 1 puff into the lungs daily    fluticasone (FLONASE) 50 MCG/ACT nasal spray 2 sprays by Nasal route as needed    metoprolol succinate (TOPROL XL) 50 MG extended release tablet Take 1 tablet by mouth every morning     No current facility-administered medications for this encounter. MENTAL STATUS UPDATE: (Positives in [facility])  Appearance:   Neat   Disheveled  Odiferous   Appropriate  Orientation:   Time  Place  Person  Speech:   Coherent  Pressured  Garbled/Slurred  Loud   Soft  Mute  Memory:   Intact  Impaired (Recent  Remote)--Mild  Severe  Mood:   Euthymic (mostly)  Depressed-mild  Anxious  Elated  Affect:    Appropriate  Inappropriate  Labile  Blunted  Flat  Thought Content:   Logical  Goal directed  Paranoid  Delusional  Illogical IOR  SI  HI  Thought Process:   Coherent/linear  Tangential  Loose/Disorganized   Hallucinations:   None  Auditory  Visual  Tactile  Olfactory  Gustatory  Insight:   Good  Fair  Impaired      Judgment:   Good  Fair  Impaired     CONTINUED NEED FOR TREATMENT: (Positives in [facility])  1. Continued psychiatric symptoms causing impairment in IDL  or functioning  2. Continued non-compliance with meds resulting in decompensation  3. High level of debilitation and symptoms with inadequate support  4. Continued need for structured care to adjust medications  5. Continued presence of debilitating symptoms  6.  Continued presence of risk factors     CONTINUED NEED FOR GROUP PSYCHOTHERAPY TO ADDRESS THE FOLLOWING: (Positives in [facility])  Psychiatric symptom management       Psychiatric relapse prevention    Anxiety management   Self-esteem issues      Poor decision making       Recent decompensation       Safety issues   Anger management     Self care/ADL's     Med/treatment compliance      Impaired boundaries/relationships   Assertiveness      Grief/loss

## 2023-11-09 NOTE — BH NOTE
Group Therapy Note    Date: 11/9/2023    Group Start Time: 10:00 AM  Group End Time: 10:50 AM  Group Topic: Psychotherapy    [address] [facility]    Junaid, Liza Khan, [facility]        Group Therapy Note    Attendees: 12 scheduled    Focus of session was based on information on emotional literacy and intelligence by Keiry Booker. We discussed why it is important to read and understand what we are feeling and to be as specific as possible. We discussed the difference and how it helps from identifying we are feeling anxious rather than feeling mistrusted. We spoke about the different categories of emotions which are based on our emotional needs and the main categories are: dignity and respect/self worth, freedom and control, love and connection, justice and truth, safety, and trust.  We spoke about how identifying as specifically as we can what we are feeling can lead us to be able to process those feelings more effectively and communicate them as needed. Behavioral Health Daily Check In form revealed that patient is not experiencing SI/HI thoughts or plans, remaining abstinent from drugs and alcohol, and report's goal today of       Patient's Goal: 1. Dep F 33.0  O. Pt will work to identify a sense of purpose for this phase of her life and how that purpose can increase her wellbeing and satisfaction and share with group    Notes:  Henrietta participated in group with prompting. She spoke about how her Carlus Stapler are really getting to me today. \"  She spoke about how she is feeling unsettled but that \"I am glad that I came here today to be around other people who can support me. \"      Status After Intervention:  Unchanged    Participation Level:  Active Listener and Interactive    Participation Quality: Appropriate, Attentive, Sharing, and Supportive      Speech:  normal      Thought Process/Content: Logical  Linear      Affective Functioning:

## 2023-11-10 ENCOUNTER — HOSPITAL ENCOUNTER (OUTPATIENT)
Facility: HOSPITAL | Age: 88
Setting detail: RECURRING SERIES
End: 2023-11-10
Payer: MEDICARE

## 2023-11-10 PROCEDURE — 90853 GROUP PSYCHOTHERAPY: CPT

## 2023-11-10 NOTE — BH NOTE
normal      Thought Process/Content: Logical  Linear      Affective Functioning: Congruent      Mood: anxious and depressed      Level of consciousness:  Alert, Oriented x4, and Attentive      Response to Learning: Able to verbalize current knowledge/experience, Able to retain information, Capable of insight, and Progressing to goal      Modes of Intervention: Education, Support, Socialization, Exploration, and Clarifying      Discipline Responsible: /Counselor      Signature:   Gary Quiroga LCSW

## 2023-11-11 DIAGNOSIS — R06.2 WHEEZING: ICD-10-CM

## 2023-11-13 ENCOUNTER — APPOINTMENT (OUTPATIENT)
Facility: HOSPITAL | Age: 88
End: 2023-11-13
Payer: MEDICARE

## 2023-11-13 ENCOUNTER — CLINICAL DOCUMENTATION (OUTPATIENT)
Age: 88
End: 2023-11-13

## 2023-11-13 PROCEDURE — 90853 GROUP PSYCHOTHERAPY: CPT

## 2023-11-13 RX ORDER — ALBUTEROL SULFATE 90 UG/1
AEROSOL, METERED RESPIRATORY (INHALATION)
Qty: 8.5 EACH | Refills: 3 | Status: SHIPPED | OUTPATIENT
Start: 2023-11-13

## 2023-11-13 NOTE — TELEPHONE ENCOUNTER
Patient requesting refill on     Requested Prescriptions     Pending Prescriptions Disp Refills    albuterol sulfate HFA (PROVENTIL;VENTOLIN;PROAIR) 108 (90 Base) MCG/ACT inhaler [Pharmacy Med Name: ALBUTEROL HFA (PROAIR) INHALER] 8.5 each 3     Sig: TAKE 2 PUFFS BY INHALATION EVERY FOUR TO SIX (4-6) HOURS AS NEEDED FOR WHEEZING.         Last OV 3/22/2023

## 2023-11-13 NOTE — PROGRESS NOTES
Called Clive and relayed his results. He verbalized understanding.   Patient has an appointment with Dr. Ruthann Cagle scheduled for 12/1/2023 at 1:45 pm. The appointment was made with patient daughter also.

## 2023-11-14 ENCOUNTER — APPOINTMENT (OUTPATIENT)
Facility: HOSPITAL | Age: 88
End: 2023-11-14
Payer: MEDICARE

## 2023-11-14 NOTE — BH NOTE
[facility]  Group Therapy  Treatment Plan Review     TREATMENT PLAN REVIEW REVIEW DATE: 11/3/2023      summary of Ongoing issues with Symptoms/Functional Impairments Indicating Need for Continued Stay:  Pt has begun to struggle since returning from her surgery to attend group on a regular basis. Pt will call and state she does not feel well but can't explain why. She is encouraged to attend, sometimes agreeing and others not. She states that she knows she feels better when she attends group but appears to lack motivation at times and continues to expereince anxiety and depression as a result. Pt's family is aware and confirms this behavior. We will continue to discuss strategies to assist pt and act as a continued support. TREATMENT & DISCHARGE PLANNING CHANGES    Modality or Intervention  Changes Pt will attend group two days a week for three hours per day   Psychotropic Medication Changes  No changes since last review   Discharge  Planning or Target Date  Changes 1/31/2024   New Issues none     TREATMENT TEAM SIGNATURE / DATE   I have participated in the development of this plan of treatment and agreed to its implementation.    Client Signature PRINTED Name                        Date & Time       Family / Legal Representative Signature (If Applicable) PRINTED Name & Relationship     Date & Time   Therapist Signature PRINTED Name & Quincy Wallace LCSW Date & Time       Therapist Signature PRINTED Name & Anat Hernandez LCSW   Date & Time       Other Signature PRINTED Name & Credential     Date & Time   Other Signature PRINTED Name & Credential or Relationship     Date & Time       PHYSICIAN CERTIFICATION OF THE LEVEL OF CARE:  I certify that these outpatient behavioral health services are medically necessary to improve and maintain the patient's condition and functional level and prevent relapse or

## 2023-11-16 ENCOUNTER — APPOINTMENT (OUTPATIENT)
Facility: HOSPITAL | Age: 88
End: 2023-11-16
Payer: MEDICARE

## 2023-11-30 ENCOUNTER — HOSPITAL ENCOUNTER (OUTPATIENT)
Facility: HOSPITAL | Age: 88
Setting detail: RECURRING SERIES
End: 2023-11-30
Payer: MEDICARE

## 2023-11-30 NOTE — BH NOTE
[facility]  Group Therapy  Absence Note      Date of Service:  11/30/2023    Pt was not able to keep her scheduled treatment day as she had a doctors appointment today.

## 2023-12-01 ENCOUNTER — OFFICE VISIT (OUTPATIENT)
Age: 88
End: 2023-12-01
Payer: MEDICARE

## 2023-12-01 VITALS
RESPIRATION RATE: 16 BRPM | HEART RATE: 74 BPM | HEIGHT: 66 IN | OXYGEN SATURATION: 95 % | DIASTOLIC BLOOD PRESSURE: 77 MMHG | SYSTOLIC BLOOD PRESSURE: 125 MMHG | TEMPERATURE: 98.2 F | BODY MASS INDEX: 27.32 KG/M2 | WEIGHT: 170 LBS

## 2023-12-01 DIAGNOSIS — Z17.0 MALIGNANT NEOPLASM OF UPPER-OUTER QUADRANT OF LEFT BREAST IN FEMALE, ESTROGEN RECEPTOR POSITIVE (HCC): Primary | ICD-10-CM

## 2023-12-01 DIAGNOSIS — C50.412 MALIGNANT NEOPLASM OF UPPER-OUTER QUADRANT OF LEFT BREAST IN FEMALE, ESTROGEN RECEPTOR POSITIVE (HCC): Primary | ICD-10-CM

## 2023-12-01 PROCEDURE — 1123F ACP DISCUSS/DSCN MKR DOCD: CPT | Performed by: INTERNAL MEDICINE

## 2023-12-01 PROCEDURE — 99205 OFFICE O/P NEW HI 60 MIN: CPT | Performed by: INTERNAL MEDICINE

## 2023-12-01 RX ORDER — LISINOPRIL 20 MG/1
20 TABLET ORAL 2 TIMES DAILY
COMMUNITY
Start: 2023-10-18

## 2023-12-01 RX ORDER — LETROZOLE 2.5 MG/1
2.5 TABLET, FILM COATED ORAL DAILY
Qty: 30 TABLET | Refills: 5 | Status: SHIPPED | OUTPATIENT
Start: 2023-12-01 | End: 2024-05-29

## 2023-12-04 ENCOUNTER — CLINICAL DOCUMENTATION (OUTPATIENT)
Age: 88
End: 2023-12-04

## 2023-12-04 NOTE — PROGRESS NOTES
Caregiver [place]            Narrative:  Met with patient to introduce social work navigator role and supports. PT  and lives in [place]. PT has 2 dtrs, Aime Lees who is in waiting area and dtr [facility] and 1 son Apolonia Campos. PT has been  for 24 years. Pt has recurrent depression and see Dr. Noni Cobian. Plan:   Introduce self and role of the  in the [employer]. Informed the patient of the [facility] and available resources there. Continue to meet with the patient when she returns to the clinic for ongoing assessment of the patient's adjustment to her diagnosis and treatment. Ongoing psychosocial support as desired by patient. Referral/Handouts:         Complementary therapies referral  Insurance/Entitlements referral  Financial/Medication assistance referral       Dub José Manuel.  GRAHAM Richey/MAKAYLA

## 2023-12-05 ENCOUNTER — HOSPITAL ENCOUNTER (EMERGENCY)
Facility: HOSPITAL | Age: 88
Discharge: HOME | End: 2023-12-05
Attending: EMERGENCY MEDICINE
Payer: MEDICARE

## 2023-12-05 ENCOUNTER — APPOINTMENT (OUTPATIENT)
Facility: HOSPITAL | Age: 88
End: 2023-12-05
Payer: MEDICARE

## 2023-12-05 VITALS
HEART RATE: 78 BPM | HEIGHT: 66 IN | RESPIRATION RATE: 18 BRPM | TEMPERATURE: 97.7 F | DIASTOLIC BLOOD PRESSURE: 90 MMHG | OXYGEN SATURATION: 99 % | BODY MASS INDEX: 27.32 KG/M2 | SYSTOLIC BLOOD PRESSURE: 172 MMHG | WEIGHT: 170 LBS

## 2023-12-05 DIAGNOSIS — R13.19 ESOPHAGEAL DYSPHAGIA: Primary | ICD-10-CM

## 2023-12-05 LAB
EKG ATRIAL RATE: 75 BPM
EKG DIAGNOSIS: NORMAL
EKG P AXIS: 78 DEGREES
EKG P-R INTERVAL: 158 MS
EKG Q-T INTERVAL: 408 MS
EKG QRS DURATION: 82 MS
EKG QTC CALCULATION (BAZETT): 455 MS
EKG R AXIS: -1 DEGREES
EKG T AXIS: 88 DEGREES
EKG VENTRICULAR RATE: 75 BPM

## 2023-12-05 PROCEDURE — 93005 ELECTROCARDIOGRAM TRACING: CPT | Performed by: EMERGENCY MEDICINE

## 2023-12-05 PROCEDURE — 71045 X-RAY EXAM CHEST 1 VIEW: CPT

## 2023-12-05 PROCEDURE — 6370000000 HC RX 637 (ALT 250 FOR IP): Performed by: EMERGENCY MEDICINE

## 2023-12-05 PROCEDURE — 99284 EMERGENCY DEPT VISIT MOD MDM: CPT

## 2023-12-05 RX ORDER — MAGNESIUM HYDROXIDE/ALUMINUM HYDROXICE/SIMETHICONE 120; 1200; 1200 MG/30ML; MG/30ML; MG/30ML
30 SUSPENSION ORAL EVERY 6 HOURS PRN
Status: DISCONTINUED | OUTPATIENT
Start: 2023-12-05 | End: 2023-12-05 | Stop reason: HOSPADM

## 2023-12-05 RX ORDER — LIDOCAINE HYDROCHLORIDE 20 MG/ML
10 SOLUTION OROPHARYNGEAL
Status: COMPLETED | OUTPATIENT
Start: 2023-12-05 | End: 2023-12-05

## 2023-12-05 RX ORDER — SUCRALFATE ORAL 1 G/10ML
1 SUSPENSION ORAL 4 TIMES DAILY
Qty: 1200 ML | Refills: 3 | Status: SHIPPED | OUTPATIENT
Start: 2023-12-05

## 2023-12-05 RX ADMIN — LIDOCAINE HYDROCHLORIDE 10 ML: 20 SOLUTION ORAL at 16:15

## 2023-12-05 RX ADMIN — ALUMINUM HYDROXIDE, MAGNESIUM HYDROXIDE, AND SIMETHICONE 30 ML: 200; 200; 20 SUSPENSION ORAL at 16:15

## 2023-12-05 ASSESSMENT — PAIN - FUNCTIONAL ASSESSMENT: PAIN_FUNCTIONAL_ASSESSMENT: 0-10

## 2023-12-05 ASSESSMENT — PAIN SCALES - GENERAL
PAINLEVEL_OUTOF10: 0
PAINLEVEL_OUTOF10: 0

## 2023-12-05 NOTE — ED NOTES
Pt continues to wait on MD reassessment, no complaints at this time     Omar Linda, ELI  12/05/23 3567

## 2023-12-05 NOTE — ED NOTES
Pt ambulated to restroo independently     Bran Vera, RN  12/05/23 [address], [address] [place], RN  12/05/23 1475  [address] [facility], [address] [place], RN  12/05/23 1727

## 2023-12-05 NOTE — ED TRIAGE NOTES
Pt arrived with c/o possibly swallowing an unknown substance in her water cup last night. Pt states when she went to finish the last bit of water in her cup she noticed \"something grey\" in the bottom and felt a \"small slippery thing\" go down her throat.  Pt states she feels like it is still stuck in her throat at this time but has not had any difficulty swallowing, drinking, eating, talking or breathing since the incident occurred

## 2023-12-08 NOTE — TELEPHONE ENCOUNTER
Medication Refill Request    Annabella Conway is requesting a refill of the following medication(s):   Clopidogrel 75 mg  Please send refill to:     [facility]/pharmacy [address]  [address] [address]  Phone: [phone] Fax: [phone]

## 2023-12-08 NOTE — TELEPHONE ENCOUNTER
She has an appt scheduled for January looks like when you sent it last time saying pt needed an appt before any refills, no one called her.

## 2023-12-09 RX ORDER — CLOPIDOGREL BISULFATE 75 MG/1
75 TABLET ORAL DAILY
Qty: 30 TABLET | Refills: 0 | Status: SHIPPED | OUTPATIENT
Start: 2023-12-09

## 2024-01-03 ENCOUNTER — HOSPITAL ENCOUNTER (EMERGENCY)
Facility: HOSPITAL | Age: 89
Discharge: HOME | End: 2024-01-03
Attending: EMERGENCY MEDICINE
Payer: MEDICARE

## 2024-01-03 ENCOUNTER — APPOINTMENT (OUTPATIENT)
Facility: HOSPITAL | Age: 89
End: 2024-01-03
Payer: MEDICARE

## 2024-01-03 VITALS
RESPIRATION RATE: 18 BRPM | OXYGEN SATURATION: 93 % | TEMPERATURE: 97.7 F | WEIGHT: 176 LBS | HEART RATE: 70 BPM | HEIGHT: 66 IN | SYSTOLIC BLOOD PRESSURE: 172 MMHG | BODY MASS INDEX: 28.28 KG/M2 | DIASTOLIC BLOOD PRESSURE: 86 MMHG

## 2024-01-03 DIAGNOSIS — R51.9 ACUTE NONINTRACTABLE HEADACHE, UNSPECIFIED HEADACHE TYPE: Primary | ICD-10-CM

## 2024-01-03 PROCEDURE — 70450 CT HEAD/BRAIN W/O DYE: CPT

## 2024-01-03 PROCEDURE — 99284 EMERGENCY DEPT VISIT MOD MDM: CPT

## 2024-01-03 PROCEDURE — 71046 X-RAY EXAM CHEST 2 VIEWS: CPT

## 2024-01-03 ASSESSMENT — PAIN SCALES - GENERAL: PAINLEVEL_OUTOF10: 0

## 2024-01-03 ASSESSMENT — PAIN - FUNCTIONAL ASSESSMENT: PAIN_FUNCTIONAL_ASSESSMENT: 0-10

## 2024-01-03 NOTE — ED NOTES
MD at bedside. Instructions provided to patient in regards to discharge. Patient verbalized understanding. No questions at time of discharge. Ambulated out without any difficulty. Vital signs stable at time of discharge.

## 2024-01-03 NOTE — ED TRIAGE NOTES
Pt arrived by POV for headache.  Pt reports that around 230 pm she felt a severe thump in her head and felt like something burst, pt denies headache at this time.  Pt speaking in full complete sentences  NAD.  Pt moving all extremities.  Pt also reports she has a bad cough in the evening time but it is better during the day.  Pt is awake and alert,  pt educated on ER flow. This writer apologized for any delay that may occur, pt and/or family educated on acuity of the unit at this time.  Pt placed back in waiting room at this time

## 2024-01-04 NOTE — ED PROVIDER NOTES
past 24 hour(s)).         RADIOLOGY:  Non-plain film images such as CT, Ultrasound and MRI are read by the radiologist. Plain radiographic images are visualized and preliminarily interpreted by the ED Provider with the below findings:        Interpretation per the Radiologist below, if available at the time of this note:     XR CHEST (2 VW)   Final Result      No acute cardiopulmonary process identified.         CT Head W/O Contrast   Final Result   No acute intracranial abnormality. Chronic ischemic findings.                  PROCEDURES   Unless otherwise noted below, none  Procedures          EMERGENCY DEPARTMENT COURSE and DIFFERENTIAL DIAGNOSIS/MDM   Vitals:    Vitals:    01/03/24 1532   BP: (!) 172/86   Pulse: 70   Resp: 18   Temp: 97.7 °F (36.5 °C)   TempSrc: Oral   SpO2: 93%   Weight: 79.8 kg (176 lb)   Height: 1.676 m (5' 6\")            Patient was given the following medications:  Medications - No data to display    CONSULTS: (Who and What was discussed)  None      Social Determinants affecting Dx or Tx: None    Records Reviewed (source and summary of external notes): Nursing Notes and Old Medical Records    CC/HPI Summary, DDx, ED Course, and Reassessment: 88-year-old female with sudden onset of headache earlier that spontaneously resolved.  Concern for intracranial hemorrhage based on history.  CT scan performed shortly after onset of headache is benign.  No neck stiffness, abnormal neurological findings, persistent headache.  Chest x-ray also benign.  Discussed results with patient, also discussed reasons to return to the emergency department especially if she has a recurrent headache.  Will discharge home.         FINAL IMPRESSION     1. Acute nonintractable headache, unspecified headache type          DISPOSITION/PLAN   DISPOSITION Decision To Discharge 01/03/2024 05:42:25 PM      Discharge Note: The patient is stable for discharge home. The signs, symptoms, diagnosis, and discharge instructions have

## 2024-01-09 RX ORDER — ARIPIPRAZOLE 2 MG/1
2 TABLET ORAL DAILY
Qty: 90 TABLET | Refills: 1 | Status: SHIPPED | OUTPATIENT
Start: 2024-01-09

## 2024-01-22 NOTE — TELEPHONE ENCOUNTER
Patient requesting refill on     Requested Prescriptions     Pending Prescriptions Disp Refills    clopidogrel (PLAVIX) 75 MG tablet [Pharmacy Med Name: CLOPIDOGREL 75 MG TABLET] 30 tablet 0     Sig: TAKE 1 TABLET BY MOUTH EVERY DAY        Last OV 3/22/2023     Next appt  1/23/2024

## 2024-01-23 RX ORDER — CLOPIDOGREL BISULFATE 75 MG/1
75 TABLET ORAL DAILY
Qty: 30 TABLET | Refills: 0 | Status: SHIPPED | OUTPATIENT
Start: 2024-01-23

## 2024-01-23 NOTE — PROGRESS NOTES
INTERVAL HISTORY (In Person): Ms. Hall is an 88-year-old female wih is following up with me 3- months since I last treated her while a patient in the SOP program (11/2022--12/2023). She has a history of Major Depression, and has been hospitalized 4 times since 2018, the last time in 8/2023 for 2 weeks. She's responded well to the use of Effexor XR and particularly to the support and structure of the IOP program. I most recently increased the Effexor XR to 225 mg and later added Abilify, and she was stable and euthymic when she graduated from the [facility].     She comes in today and states that she's been having some up and down days, and even some times when she's quite tearful and dysphoric. She struggles with being  alone, yet she also doesn't want to pursue going into an MIS-type setting yet. She seems to be feeling and functioning better than she thinks at times. Her N/V functioning has been at least fair, and she denies feeling fully hopeless at all. She's taking the meds consistently because her daughter sets them up for her weekly, and she denies having any SE's with them. No sedation, increased appetite, EPSE's, etc. Also no TD symptoms noted at all. Discussed Tx plans and some coping strategies and the importance of socialization and continuing to stay active.       CURRENT MEDICATION:  1. Effexor  mg daily (150 and 75)  2. Abilify 2 mg daily     MENTAL STATUS EXAM: Dannielle was noted to be a casually dressed, adequately groomed 88-year-old who appeared younger than her stated age. She was very pleasant and cooperative, but she exhibited a slightly more restricted affective range this time. Her mood has been slightly lower at times, but not consistently. She also denied having any feelings of hopelessness or any suicidal thoughts. There was also no evidence of any sydni or hypomania and no symptoms of psychosis such as hallucinations or delusional thinking. Her concentration and attention span were a

## 2024-02-01 ENCOUNTER — HOSPITAL ENCOUNTER (OUTPATIENT)
Facility: HOSPITAL | Age: 89
Discharge: HOME | End: 2024-02-04
Payer: MEDICARE

## 2024-02-01 PROBLEM — F33.0 MAJOR DEPRESSIVE DISORDER, RECURRENT EPISODE, MILD (HCC): Status: ACTIVE | Noted: 2018-09-28

## 2024-02-01 PROCEDURE — 99214 OFFICE O/P EST MOD 30 MIN: CPT | Performed by: PSYCHIATRY & NEUROLOGY

## 2024-02-19 RX ORDER — CLOPIDOGREL BISULFATE 75 MG/1
75 TABLET ORAL DAILY
Qty: 30 TABLET | Refills: 0 | Status: SHIPPED | OUTPATIENT
Start: 2024-02-19

## 2024-02-19 NOTE — TELEPHONE ENCOUNTER
Medication Refill Request    Dannielle Hall is requesting a refill of the following medication(s):     Clopidogrel 75 MG Tab    Please send refill to:     [facility]/pharmacy #[id number] - [place], [place] - [address] [facility] [phone] - F [phone]  [address] [facility] [address]  Phone: [phone] Fax: [phone]

## 2024-02-19 NOTE — TELEPHONE ENCOUNTER
Patient requesting refill on     Requested Prescriptions     Pending Prescriptions Disp Refills    clopidogrel (PLAVIX) 75 MG tablet 30 tablet 0     Sig: Take 1 tablet by mouth daily        Last OV 3/22/2023

## 2024-02-26 RX ORDER — LISINOPRIL 20 MG/1
20 TABLET ORAL 2 TIMES DAILY
Qty: 180 TABLET | Refills: 3 | OUTPATIENT
Start: 2024-02-26

## 2024-02-26 RX ORDER — METOPROLOL SUCCINATE 50 MG/1
50 TABLET, EXTENDED RELEASE ORAL DAILY
Qty: 30 TABLET | Refills: 0 | Status: SHIPPED | OUTPATIENT
Start: 2024-02-26

## 2024-02-26 RX ORDER — ATORVASTATIN CALCIUM 80 MG/1
80 TABLET, FILM COATED ORAL DAILY
Qty: 90 TABLET | Refills: 3 | Status: SHIPPED | OUTPATIENT
Start: 2024-02-26

## 2024-02-26 NOTE — TELEPHONE ENCOUNTER
She should not be taking lisinopril since she is taking Losartan. (Looks like this was prescribed by Dr Langley). Will give 30 day supply on metoprolol  but future refills will require an appt as I have not seen her in a long time

## 2024-02-27 ENCOUNTER — TELEPHONE (OUTPATIENT)
Age: 89
End: 2024-02-27

## 2024-02-27 NOTE — TELEPHONE ENCOUNTER
Patient's daughter called asking for a refill of a medication.  I called her back and asked which medication, she said it was the letrozole.  I advised her to call the oncologist as this medication was not prescribed by us. She was appreciative.

## 2024-03-22 ENCOUNTER — APPOINTMENT (OUTPATIENT)
Facility: HOSPITAL | Age: 89
End: 2024-03-22
Payer: MEDICARE

## 2024-03-22 ENCOUNTER — HOSPITAL ENCOUNTER (EMERGENCY)
Facility: HOSPITAL | Age: 89
Discharge: HOME | End: 2024-03-22
Attending: EMERGENCY MEDICINE
Payer: MEDICARE

## 2024-03-22 VITALS
DIASTOLIC BLOOD PRESSURE: 91 MMHG | HEART RATE: 66 BPM | SYSTOLIC BLOOD PRESSURE: 170 MMHG | TEMPERATURE: 98.1 F | BODY MASS INDEX: 28.41 KG/M2 | RESPIRATION RATE: 18 BRPM | HEIGHT: 66 IN | OXYGEN SATURATION: 97 %

## 2024-03-22 DIAGNOSIS — S70.01XA CONTUSION OF RIGHT HIP, INITIAL ENCOUNTER: Primary | ICD-10-CM

## 2024-03-22 PROCEDURE — 99283 EMERGENCY DEPT VISIT LOW MDM: CPT

## 2024-03-22 PROCEDURE — 73502 X-RAY EXAM HIP UNI 2-3 VIEWS: CPT

## 2024-03-22 ASSESSMENT — ENCOUNTER SYMPTOMS
COUGH: 0
DIARRHEA: 0
SORE THROAT: 0
ABDOMINAL PAIN: 0
NAUSEA: 0
VOMITING: 0
EYE REDNESS: 0

## 2024-03-22 ASSESSMENT — PAIN DESCRIPTION - ORIENTATION: ORIENTATION: RIGHT

## 2024-03-22 ASSESSMENT — PAIN SCALES - GENERAL: PAINLEVEL_OUTOF10: 7

## 2024-03-22 ASSESSMENT — PAIN DESCRIPTION - LOCATION: LOCATION: HIP

## 2024-03-22 ASSESSMENT — PAIN - FUNCTIONAL ASSESSMENT: PAIN_FUNCTIONAL_ASSESSMENT: 0-10

## 2024-03-22 NOTE — ED PROVIDER NOTES
EMERGENCY DEPARTMENT HISTORY AND PHYSICAL EXAM      Date: 3/22/2024  Patient Name: Dannielle Hall    History of Presenting Illness     Chief Complaint   Patient presents with    Hip Pain       History Provided By: Patient    HPI: Dannielle Hall, 88 y.o. female with past medical history listed below, presents via EMS to the ED with cc of fall.  Patient reports she had a ground-level fall outside her home around 10:30 PM.  She reports she was walking down the road to her son's house to get some help with her phone.  She reports she tripped and fell on the side of the road into some gravel and grass.  She complains of some right hip pain.  Her son came out and helped her back to her home.  She has been able to walk and bear weight although painful.  She complains of right hip pain.  She has been able to ambulate and bear weight.  EMS reports she requested to ambulate to the stretcher.    She reports she lives alone and has been very anxious living alone and feels like she should be in a facility.  She reports that every branch that hits her window makes her very anxious and scared.  She reports she has been depressed lately.  She denies any suicidal or homicidal ideations.  Denies any hallucinations.  She is followed by Dr. Langley with psychiatry here.        There are no other complaints, changes, or physical findings at this time.    PCP: Tracy Banuelos APRN - NP    No current facility-administered medications on file prior to encounter.     Current Outpatient Medications on File Prior to Encounter   Medication Sig Dispense Refill    atorvastatin (LIPITOR) 80 MG tablet TAKE 1 TABLET BY MOUTH EVERY DAY 90 tablet 3    metoprolol succinate (TOPROL XL) 50 MG extended release tablet TAKE 1 TABLET BY MOUTH EVERY DAY 30 tablet 0    clopidogrel (PLAVIX) 75 MG tablet Take 1 tablet by mouth daily 30 tablet 0    ARIPiprazole (ABILIFY) 2 MG tablet Take 1 tablet by mouth daily 90 tablet 1    sucralfate (CARAFATE) 1

## 2024-03-22 NOTE — ED TRIAGE NOTES
Pt arrives EMS with c/o right hip pain. Pt had a GLF mechanical, last night at approx 2230. Pt was able to ambulate into home last night,  and was able to ambulate to squad upon arrival.

## 2024-03-31 NOTE — PROGRESS NOTES
Dannielle Hall is a 88 y.o. female who presents today with c/o   Chief Complaint   Patient presents with    Breast Problem     Right lump/fall    Medicare AW           Assessment/ Plan:       ASSESSMENT AND PLAN    Diagnoses:  1.  Right breast hematoma  Educated patient this will heal on its own  Recommend follow up if the hematoma does not continue to resolve    No orders of the defined types were placed in this encounter.    Return if symptoms worsen or fail to improve.       Subjective:  Dannielle Hall is a 88 y.o. female here today for a bruise to her right breast. She fell about two weeks ago and landed on her right breast. She has a history of breast cancer to her left breast and therefore was worried about the bruise to her right breast. She denies pain to the area.       On chart review-->  Breast US completed on 7/6/2023-->solid suspicious mass in the left breasts at the site of palpable abnormality 3:00.     Sonographically guided biopsy completed 7/18/2023 with clip placement.     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 She saw Dr. Linder and underwent left breast lumpectomy on 10/12/2023.       Patient Active Problem List   Diagnosis    Primary osteoarthritis involving multiple joints    Environmental allergies    TIA (transient ischemic attack)    Well controlled type 2 diabetes mellitus (HCC)    History of GI bleed    History of CVA (cerebrovascular accident)    Major depressive disorder, recurrent episode, mild (HCC)    Elevated cholesterol    Age-related cataract of both eyes    Former smoker    Allergic rhinitis    Essential hypertension    CKD (chronic kidney disease), symptom management only, unspecified stage    Chronic renal disease, stage III (HCC)    Type 2 diabetes mellitus with chronic kidney disease (HCC)    MCI (mild cognitive impairment)    Hemorrhoids    Chronic obstructive pulmonary disease, unspecified (HCC)    Pain following surgery

## 2024-04-01 ENCOUNTER — OFFICE VISIT (OUTPATIENT)
Age: 89
End: 2024-04-01
Payer: MEDICARE

## 2024-04-01 VITALS
DIASTOLIC BLOOD PRESSURE: 80 MMHG | RESPIRATION RATE: 18 BRPM | HEART RATE: 70 BPM | SYSTOLIC BLOOD PRESSURE: 137 MMHG | HEIGHT: 66 IN | OXYGEN SATURATION: 98 % | WEIGHT: 176 LBS | TEMPERATURE: 97 F | BODY MASS INDEX: 28.28 KG/M2

## 2024-04-01 DIAGNOSIS — T14.8XXA HEMATOMA: ICD-10-CM

## 2024-04-01 DIAGNOSIS — Z00.00 MEDICARE ANNUAL WELLNESS VISIT, SUBSEQUENT: Primary | ICD-10-CM

## 2024-04-01 PROCEDURE — G0439 PPPS, SUBSEQ VISIT: HCPCS | Performed by: NURSE PRACTITIONER

## 2024-04-01 PROCEDURE — 1123F ACP DISCUSS/DSCN MKR DOCD: CPT | Performed by: NURSE PRACTITIONER

## 2024-04-01 ASSESSMENT — PATIENT HEALTH QUESTIONNAIRE - PHQ9
8. MOVING OR SPEAKING SO SLOWLY THAT OTHER PEOPLE COULD HAVE NOTICED. OR THE OPPOSITE, BEING SO FIGETY OR RESTLESS THAT YOU HAVE BEEN MOVING AROUND A LOT MORE THAN USUAL: NOT AT ALL
7. TROUBLE CONCENTRATING ON THINGS, SUCH AS READING THE NEWSPAPER OR WATCHING TELEVISION: NOT AT ALL
3. TROUBLE FALLING OR STAYING ASLEEP: NOT AT ALL
6. FEELING BAD ABOUT YOURSELF - OR THAT YOU ARE A FAILURE OR HAVE LET YOURSELF OR YOUR FAMILY DOWN: NOT AT ALL
SUM OF ALL RESPONSES TO PHQ9 QUESTIONS 1 & 2: 0
5. POOR APPETITE OR OVEREATING: NOT AT ALL
SUM OF ALL RESPONSES TO PHQ QUESTIONS 1-9: 0
10. IF YOU CHECKED OFF ANY PROBLEMS, HOW DIFFICULT HAVE THESE PROBLEMS MADE IT FOR YOU TO DO YOUR WORK, TAKE CARE OF THINGS AT HOME, OR GET ALONG WITH OTHER PEOPLE: NOT DIFFICULT AT ALL
SUM OF ALL RESPONSES TO PHQ QUESTIONS 1-9: 0
1. LITTLE INTEREST OR PLEASURE IN DOING THINGS: NOT AT ALL
2. FEELING DOWN, DEPRESSED OR HOPELESS: NOT AT ALL
4. FEELING TIRED OR HAVING LITTLE ENERGY: NOT AT ALL
9. THOUGHTS THAT YOU WOULD BE BETTER OFF DEAD, OR OF HURTING YOURSELF: NOT AT ALL

## 2024-04-01 ASSESSMENT — ENCOUNTER SYMPTOMS
ABDOMINAL DISTENTION: 0
EYE DISCHARGE: 0
CHEST TIGHTNESS: 0
EYE ITCHING: 0

## 2024-04-01 NOTE — PROGRESS NOTES
\"Have you been to the ER, urgent care clinic since your last visit?  Hospitalized since your last visit?\"    NO    “Have you seen or consulted any other health care providers outside of [facility] since your last visit?”    NO            Click Here for Release of Records Request

## 2024-04-02 ENCOUNTER — HOSPITAL ENCOUNTER (OUTPATIENT)
Facility: HOSPITAL | Age: 89
Discharge: HOME | End: 2024-04-05
Payer: MEDICARE

## 2024-04-02 ENCOUNTER — TELEPHONE (OUTPATIENT)
Age: 89
End: 2024-04-02

## 2024-04-02 PROCEDURE — 99214 OFFICE O/P EST MOD 30 MIN: CPT | Performed by: PSYCHIATRY & NEUROLOGY

## 2024-04-02 RX ORDER — CLOPIDOGREL BISULFATE 75 MG/1
75 TABLET ORAL DAILY
Qty: 30 TABLET | Refills: 0 | OUTPATIENT
Start: 2024-04-02

## 2024-04-02 RX ORDER — CLOPIDOGREL BISULFATE 75 MG/1
75 TABLET ORAL DAILY
Qty: 60 TABLET | Refills: 0 | Status: SHIPPED | OUTPATIENT
Start: 2024-04-02

## 2024-04-02 NOTE — TELEPHONE ENCOUNTER
Medication Refill Request    Dannielle Hall is requesting a refill of the following medication(s):   Clopidogrel 75 mg (Please send to Joan)  Please send refill to:     [facility]/pharmacy #[id number] - [place], [place] - [address] [facility] [phone] - F [phone]  [address] [facility] [address]  Phone: [phone] Fax: [phone]

## 2024-04-02 NOTE — PROGRESS NOTES
INTERVAL HISTORY (In Person): Ms. Hall is an 88-year-old female wih is following up with me 2 months since her last appointment re: a history of Major Depression. She has been hospitalized 4 times since 2018, the last time in 8/2023 for 2 weeks, and has also been in the SOP program (mot recently, 11/2022--12/2023). She's responded well to the use of Effexor XR and particularly to the support and structure of the IOP program. I most recently increased the Effexor XR to 225 mg and later added Abilify, and she was stable and euthymic when she graduated from the [facility] as well as at the last appt.     She comes in today and states that she's been mostly \"feeling fair\" emotionally. She presents with a brighter mood than the last time, but she also notes that she occasionally has some moments of dysphoria and mild to moderate anxiety at times. These moments are often fueled by being alone--both the dysphoria and the anxiety.  She struggles with being  alone, yet she also doesn't want to pursue going into an [facility]-type setting yet. She talks with others fairly often--roughly 8-10 people a day. She's generally feeling and functioning better than she thinks she is, at times. Her N/V functioning has been fairly good, and she denies feeling fully hopeless at all. She's taking the meds consistently because her daughter sets them up for her weekly, and she denies having any SE's with them--no sedation, increased appetite, EPSE's, Td symptoms, etc. Discussed Tx plans and some coping strategies and the importance of socialization and continuing to stay active.       CURRENT MEDICATION:  1. Effexor  mg daily (150 and 75)  2. Abilify 2 mg daily     MENTAL STATUS EXAM: Dannielle was noted to be a casually dressed, adequately groomed 88-year-old who appeared younger than her stated age. She was again very pleasant and cooperative, and she exhibited a johnson affective range this time. Her mood is not quite as low this time, but she

## 2024-05-16 ENCOUNTER — HOSPITAL ENCOUNTER (OUTPATIENT)
Facility: HOSPITAL | Age: 89
Discharge: HOME | End: 2024-05-19
Payer: MEDICARE

## 2024-05-16 PROCEDURE — 99442 PR PHYS/QHP TELEPHONE EVALUATION 11-20 MIN: CPT | Performed by: PSYCHIATRY & NEUROLOGY

## 2024-05-16 NOTE — PROGRESS NOTES
Consent: The patient and/or their healthcare decision maker is aware that they may receive a bill (including co-pays) for this audio only encounter, depending on their insurance coverage, and has provided verbal consent to proceed. The patient was located in [place], where I am licensed to provide care.     INTERVAL HISTORY (Audio Only): Ms. Hall is an 88-year-old female wih is following up with me 6 weeks since her last appointment re: a history of Major Depression. She has been hospitalized 4 times since 2018, the last time in 8/2023 for 2 weeks, and has also been in the SOP program (most recently, 11/2022--12/2023). She's responded well to the use of Effexor XR and particularly to the support and structure of the IOP program. I most recently increased the Effexor XR to 225 mg and later added Abilify, and she was stable and euthymic when she graduated from the IOP as well as at the last 2 appts.     She states that she's been feeling \"fair\" most of the time, but that today is a slightly down day. However, she notes that those down days are less frequent and not quite as severe as they had been, and that overall she's been feeling fairly good. She has someone coming to her house 5 days a week to help, but she actually doesn't feel the Aide needs to come that often, and that she doesn't do all that much. Her N/V functioning has been relatively good, and she denies having any SE's with the meds. Daughter still sets up her weekly meds, and she's very compliant with taking them. She doesn't feel that she needs to come back to the SOP program right now, which is also an indicator that she's feeling and functioning adequately. Still talks with others fairly often--roughly 8-10 people a day, and she doesn't feel as lonely as she did last time.        CURRENT MEDICATION:  1. Effexor  mg daily (150 and 75)  2. Abilify 2 mg daily     MENTAL STATUS EXAM: Dannielle was noted to be very pleasant and cooperative, and

## 2024-05-20 ENCOUNTER — APPOINTMENT (OUTPATIENT)
Facility: HOSPITAL | Age: 89
End: 2024-05-20
Payer: MEDICARE

## 2024-05-20 ENCOUNTER — HOSPITAL ENCOUNTER (EMERGENCY)
Facility: HOSPITAL | Age: 89
Discharge: ANOTHER HOSPITAL | End: 2024-05-21
Attending: EMERGENCY MEDICINE
Payer: MEDICARE

## 2024-05-20 DIAGNOSIS — K62.5 RECTAL BLEEDING: Primary | ICD-10-CM

## 2024-05-20 DIAGNOSIS — R10.31 ABDOMINAL PAIN, RIGHT LOWER QUADRANT: ICD-10-CM

## 2024-05-20 LAB
ABO + RH BLD: NORMAL
ALBUMIN SERPL-MCNC: 3.4 G/DL (ref 3.5–5)
ALBUMIN/GLOB SERPL: 0.8 (ref 1.1–2.2)
ALP SERPL-CCNC: 106 U/L (ref 45–117)
ALT SERPL-CCNC: 26 U/L (ref 12–78)
ANION GAP SERPL CALC-SCNC: 7 MMOL/L (ref 5–15)
APPEARANCE UR: CLEAR
APTT PPP: 23.9 SEC (ref 22.1–31)
AST SERPL-CCNC: 28 U/L (ref 15–37)
BACTERIA URNS QL MICRO: NEGATIVE /HPF
BASOPHILS # BLD: 0.1 K/UL (ref 0–0.1)
BASOPHILS NFR BLD: 0 % (ref 0–1)
BILIRUB SERPL-MCNC: 0.7 MG/DL (ref 0.2–1)
BILIRUB UR QL: NEGATIVE
BLOOD GROUP ANTIBODIES SERPL: NORMAL
BUN SERPL-MCNC: 14 MG/DL (ref 6–20)
BUN/CREAT SERPL: 14 (ref 12–20)
CALCIUM SERPL-MCNC: 9.4 MG/DL (ref 8.5–10.1)
CHLORIDE SERPL-SCNC: 98 MMOL/L (ref 97–108)
CO2 SERPL-SCNC: 34 MMOL/L (ref 21–32)
COLOR UR: ABNORMAL
CREAT SERPL-MCNC: 1 MG/DL (ref 0.55–1.02)
DIFFERENTIAL METHOD BLD: ABNORMAL
EOSINOPHIL # BLD: 0.1 K/UL (ref 0–0.4)
EOSINOPHIL NFR BLD: 1 % (ref 0–7)
EPITH CASTS URNS QL MICRO: ABNORMAL /LPF
ERYTHROCYTE [DISTWIDTH] IN BLOOD BY AUTOMATED COUNT: 12 % (ref 11.5–14.5)
GLOBULIN SER CALC-MCNC: 4.2 G/DL (ref 2–4)
GLUCOSE SERPL-MCNC: 152 MG/DL (ref 65–100)
GLUCOSE UR STRIP.AUTO-MCNC: NEGATIVE MG/DL
HCT VFR BLD AUTO: 39.8 % (ref 35–47)
HEMOCCULT STL QL: POSITIVE
HGB BLD-MCNC: 13.3 G/DL (ref 11.5–16)
HGB UR QL STRIP: ABNORMAL
IMM GRANULOCYTES # BLD AUTO: 0.1 K/UL (ref 0–0.04)
IMM GRANULOCYTES NFR BLD AUTO: 1 % (ref 0–0.5)
INR PPP: 1 (ref 0.9–1.1)
KETONES UR QL STRIP.AUTO: NEGATIVE MG/DL
LACTATE SERPL-SCNC: 1.4 MMOL/L (ref 0.4–2)
LEUKOCYTE ESTERASE UR QL STRIP.AUTO: NEGATIVE
LIPASE SERPL-CCNC: 25 U/L (ref 13–75)
LYMPHOCYTES # BLD: 3.2 K/UL (ref 0.8–3.5)
LYMPHOCYTES NFR BLD: 28 % (ref 12–49)
MAGNESIUM SERPL-MCNC: 1.8 MG/DL (ref 1.6–2.4)
MCH RBC QN AUTO: 30.4 PG (ref 26–34)
MCHC RBC AUTO-ENTMCNC: 33.4 G/DL (ref 30–36.5)
MCV RBC AUTO: 90.9 FL (ref 80–99)
MONOCYTES # BLD: 0.7 K/UL (ref 0–1)
MONOCYTES NFR BLD: 6 % (ref 5–13)
NEUTS SEG # BLD: 7.4 K/UL (ref 1.8–8)
NEUTS SEG NFR BLD: 64 % (ref 32–75)
NITRITE UR QL STRIP.AUTO: NEGATIVE
NRBC # BLD: 0 K/UL (ref 0–0.01)
NRBC BLD-RTO: 0 PER 100 WBC
PH UR STRIP: 6 (ref 5–8)
PLATELET # BLD AUTO: 337 K/UL (ref 150–400)
PMV BLD AUTO: 9.7 FL (ref 8.9–12.9)
POTASSIUM SERPL-SCNC: 4.2 MMOL/L (ref 3.5–5.1)
PROT SERPL-MCNC: 7.6 G/DL (ref 6.4–8.2)
PROT UR STRIP-MCNC: NEGATIVE MG/DL
PROTHROMBIN TIME: 9.9 SEC (ref 9–11.1)
RBC # BLD AUTO: 4.38 M/UL (ref 3.8–5.2)
RBC #/AREA URNS HPF: ABNORMAL /HPF (ref 0–5)
SODIUM SERPL-SCNC: 139 MMOL/L (ref 136–145)
SP GR UR REFRACTOMETRY: 1.01 (ref 1–1.03)
SPECIMEN EXP DATE BLD: NORMAL
THERAPEUTIC RANGE: NORMAL SECS (ref 58–77)
TROPONIN I SERPL HS-MCNC: 16 NG/L (ref 0–51)
URINE CULTURE IF INDICATED: ABNORMAL
UROBILINOGEN UR QL STRIP.AUTO: 0.2 EU/DL (ref 0.2–1)
WBC # BLD AUTO: 11.6 K/UL (ref 3.6–11)
WBC URNS QL MICRO: ABNORMAL /HPF (ref 0–4)

## 2024-05-20 PROCEDURE — 96361 HYDRATE IV INFUSION ADD-ON: CPT

## 2024-05-20 PROCEDURE — 84484 ASSAY OF TROPONIN QUANT: CPT

## 2024-05-20 PROCEDURE — 93005 ELECTROCARDIOGRAM TRACING: CPT | Performed by: EMERGENCY MEDICINE

## 2024-05-20 PROCEDURE — 83735 ASSAY OF MAGNESIUM: CPT

## 2024-05-20 PROCEDURE — 86850 RBC ANTIBODY SCREEN: CPT

## 2024-05-20 PROCEDURE — 80053 COMPREHEN METABOLIC PANEL: CPT

## 2024-05-20 PROCEDURE — 99285 EMERGENCY DEPT VISIT HI MDM: CPT

## 2024-05-20 PROCEDURE — 85025 COMPLETE CBC W/AUTO DIFF WBC: CPT

## 2024-05-20 PROCEDURE — 36415 COLL VENOUS BLD VENIPUNCTURE: CPT

## 2024-05-20 PROCEDURE — 6360000004 HC RX CONTRAST MEDICATION: Performed by: EMERGENCY MEDICINE

## 2024-05-20 PROCEDURE — 74178 CT ABD&PLV WO CNTR FLWD CNTR: CPT

## 2024-05-20 PROCEDURE — 83690 ASSAY OF LIPASE: CPT

## 2024-05-20 PROCEDURE — 86900 BLOOD TYPING SEROLOGIC ABO: CPT

## 2024-05-20 PROCEDURE — 85730 THROMBOPLASTIN TIME PARTIAL: CPT

## 2024-05-20 PROCEDURE — 85610 PROTHROMBIN TIME: CPT

## 2024-05-20 PROCEDURE — 81001 URINALYSIS AUTO W/SCOPE: CPT

## 2024-05-20 PROCEDURE — 82272 OCCULT BLD FECES 1-3 TESTS: CPT

## 2024-05-20 PROCEDURE — 83605 ASSAY OF LACTIC ACID: CPT

## 2024-05-20 PROCEDURE — 86901 BLOOD TYPING SEROLOGIC RH(D): CPT

## 2024-05-20 PROCEDURE — 2580000003 HC RX 258: Performed by: EMERGENCY MEDICINE

## 2024-05-20 RX ORDER — LETROZOLE 2.5 MG/1
2.5 TABLET, FILM COATED ORAL DAILY
Qty: 90 TABLET | Refills: 1 | Status: ON HOLD | OUTPATIENT
Start: 2024-05-20

## 2024-05-20 RX ORDER — 0.9 % SODIUM CHLORIDE 0.9 %
1000 INTRAVENOUS SOLUTION INTRAVENOUS ONCE
Status: COMPLETED | OUTPATIENT
Start: 2024-05-20 | End: 2024-05-20

## 2024-05-20 RX ADMIN — IOPAMIDOL 100 ML: 755 INJECTION, SOLUTION INTRAVENOUS at 22:15

## 2024-05-20 RX ADMIN — SODIUM CHLORIDE 1000 ML: 9 INJECTION, SOLUTION INTRAVENOUS at 20:19

## 2024-05-20 ASSESSMENT — PAIN - FUNCTIONAL ASSESSMENT: PAIN_FUNCTIONAL_ASSESSMENT: NONE - DENIES PAIN

## 2024-05-20 ASSESSMENT — PAIN SCALES - GENERAL: PAINLEVEL_OUTOF10: 0

## 2024-05-20 ASSESSMENT — LIFESTYLE VARIABLES
HOW MANY STANDARD DRINKS CONTAINING ALCOHOL DO YOU HAVE ON A TYPICAL DAY: 1 OR 2
HOW OFTEN DO YOU HAVE A DRINK CONTAINING ALCOHOL: MONTHLY OR LESS

## 2024-05-21 ENCOUNTER — HOSPITAL ENCOUNTER (INPATIENT)
Facility: HOSPITAL | Age: 89
LOS: 8 days | Discharge: HOME | DRG: 377 | End: 2024-05-29
Attending: STUDENT IN AN ORGANIZED HEALTH CARE EDUCATION/TRAINING PROGRAM | Admitting: INTERNAL MEDICINE
Payer: MEDICARE

## 2024-05-21 VITALS
DIASTOLIC BLOOD PRESSURE: 79 MMHG | RESPIRATION RATE: 14 BRPM | SYSTOLIC BLOOD PRESSURE: 147 MMHG | HEIGHT: 66 IN | BODY MASS INDEX: 26.84 KG/M2 | TEMPERATURE: 98.2 F | WEIGHT: 167 LBS | OXYGEN SATURATION: 100 % | HEART RATE: 65 BPM

## 2024-05-21 PROBLEM — K92.2 GI BLEED: Status: ACTIVE | Noted: 2024-05-21

## 2024-05-21 PROCEDURE — 6360000002 HC RX W HCPCS: Performed by: INTERNAL MEDICINE

## 2024-05-21 PROCEDURE — 1100000003 HC PRIVATE W/ TELEMETRY

## 2024-05-21 PROCEDURE — 87209 SMEAR COMPLEX STAIN: CPT

## 2024-05-21 PROCEDURE — 2580000003 HC RX 258: Performed by: INTERNAL MEDICINE

## 2024-05-21 PROCEDURE — 87506 IADNA-DNA/RNA PROBE TQ 6-11: CPT

## 2024-05-21 PROCEDURE — 6360000002 HC RX W HCPCS: Performed by: EMERGENCY MEDICINE

## 2024-05-21 PROCEDURE — 96365 THER/PROPH/DIAG IV INF INIT: CPT

## 2024-05-21 PROCEDURE — 87177 OVA AND PARASITES SMEARS: CPT

## 2024-05-21 PROCEDURE — 87329 GIARDIA AG IA: CPT

## 2024-05-21 PROCEDURE — A4216 STERILE WATER/SALINE, 10 ML: HCPCS | Performed by: INTERNAL MEDICINE

## 2024-05-21 PROCEDURE — 6370000000 HC RX 637 (ALT 250 FOR IP): Performed by: EMERGENCY MEDICINE

## 2024-05-21 PROCEDURE — C9113 INJ PANTOPRAZOLE SODIUM, VIA: HCPCS | Performed by: INTERNAL MEDICINE

## 2024-05-21 PROCEDURE — 87449 NOS EACH ORGANISM AG IA: CPT

## 2024-05-21 PROCEDURE — 2580000003 HC RX 258: Performed by: EMERGENCY MEDICINE

## 2024-05-21 PROCEDURE — 99223 1ST HOSP IP/OBS HIGH 75: CPT | Performed by: SURGERY

## 2024-05-21 PROCEDURE — 89055 LEUKOCYTE ASSESSMENT FECAL: CPT

## 2024-05-21 PROCEDURE — 87324 CLOSTRIDIUM AG IA: CPT

## 2024-05-21 PROCEDURE — 87186 SC STD MICRODIL/AGAR DIL: CPT

## 2024-05-21 RX ORDER — SODIUM CHLORIDE 9 MG/ML
INJECTION, SOLUTION INTRAVENOUS PRN
Status: DISCONTINUED | OUTPATIENT
Start: 2024-05-21 | End: 2024-05-29 | Stop reason: HOSPADM

## 2024-05-21 RX ORDER — METOPROLOL SUCCINATE 50 MG/1
50 TABLET, EXTENDED RELEASE ORAL
Status: COMPLETED | OUTPATIENT
Start: 2024-05-21 | End: 2024-05-21

## 2024-05-21 RX ORDER — METRONIDAZOLE 500 MG/100ML
500 INJECTION, SOLUTION INTRAVENOUS EVERY 8 HOURS
Status: DISCONTINUED | OUTPATIENT
Start: 2024-05-21 | End: 2024-05-23 | Stop reason: ALTCHOICE

## 2024-05-21 RX ORDER — CLOPIDOGREL BISULFATE 75 MG/1
75 TABLET ORAL DAILY
Qty: 60 TABLET | Refills: 0 | OUTPATIENT
Start: 2024-05-21

## 2024-05-21 RX ORDER — ACETAMINOPHEN 325 MG/1
650 TABLET ORAL EVERY 6 HOURS PRN
Status: DISCONTINUED | OUTPATIENT
Start: 2024-05-21 | End: 2024-05-29 | Stop reason: HOSPADM

## 2024-05-21 RX ORDER — SODIUM CHLORIDE 0.9 % (FLUSH) 0.9 %
5-40 SYRINGE (ML) INJECTION PRN
Status: DISCONTINUED | OUTPATIENT
Start: 2024-05-21 | End: 2024-05-29 | Stop reason: HOSPADM

## 2024-05-21 RX ORDER — ONDANSETRON 2 MG/ML
4 INJECTION INTRAMUSCULAR; INTRAVENOUS EVERY 6 HOURS PRN
Status: DISCONTINUED | OUTPATIENT
Start: 2024-05-21 | End: 2024-05-29 | Stop reason: HOSPADM

## 2024-05-21 RX ORDER — LEVOFLOXACIN 5 MG/ML
250 INJECTION, SOLUTION INTRAVENOUS EVERY 24 HOURS
Status: DISCONTINUED | OUTPATIENT
Start: 2024-05-21 | End: 2024-05-23 | Stop reason: ALTCHOICE

## 2024-05-21 RX ORDER — SODIUM CHLORIDE 9 MG/ML
INJECTION, SOLUTION INTRAVENOUS CONTINUOUS
Status: DISCONTINUED | OUTPATIENT
Start: 2024-05-21 | End: 2024-05-24

## 2024-05-21 RX ORDER — ONDANSETRON 4 MG/1
4 TABLET, ORALLY DISINTEGRATING ORAL EVERY 8 HOURS PRN
Status: DISCONTINUED | OUTPATIENT
Start: 2024-05-21 | End: 2024-05-29 | Stop reason: HOSPADM

## 2024-05-21 RX ORDER — ACETAMINOPHEN 650 MG/1
650 SUPPOSITORY RECTAL EVERY 6 HOURS PRN
Status: DISCONTINUED | OUTPATIENT
Start: 2024-05-21 | End: 2024-05-29 | Stop reason: HOSPADM

## 2024-05-21 RX ORDER — SODIUM CHLORIDE 0.9 % (FLUSH) 0.9 %
5-40 SYRINGE (ML) INJECTION EVERY 12 HOURS SCHEDULED
Status: DISCONTINUED | OUTPATIENT
Start: 2024-05-21 | End: 2024-05-29 | Stop reason: HOSPADM

## 2024-05-21 RX ADMIN — PANTOPRAZOLE SODIUM 40 MG: 40 INJECTION, POWDER, FOR SOLUTION INTRAVENOUS at 15:51

## 2024-05-21 RX ADMIN — PIPERACILLIN AND TAZOBACTAM 4500 MG: 4; .5 INJECTION, POWDER, LYOPHILIZED, FOR SOLUTION INTRAVENOUS at 02:04

## 2024-05-21 RX ADMIN — METRONIDAZOLE 500 MG: 500 INJECTION, SOLUTION INTRAVENOUS at 20:58

## 2024-05-21 RX ADMIN — LEVOFLOXACIN 250 MG: 5 INJECTION, SOLUTION INTRAVENOUS at 14:24

## 2024-05-21 RX ADMIN — METOPROLOL SUCCINATE 50 MG: 50 TABLET, EXTENDED RELEASE ORAL at 03:27

## 2024-05-21 RX ADMIN — SODIUM CHLORIDE: 9 INJECTION, SOLUTION INTRAVENOUS at 14:22

## 2024-05-21 RX ADMIN — METRONIDAZOLE 500 MG: 500 INJECTION, SOLUTION INTRAVENOUS at 15:52

## 2024-05-21 RX ADMIN — SODIUM CHLORIDE, PRESERVATIVE FREE 10 ML: 5 INJECTION INTRAVENOUS at 20:57

## 2024-05-21 ASSESSMENT — PAIN SCALES - GENERAL: PAINLEVEL_OUTOF10: 0

## 2024-05-21 NOTE — CONSULTS
Consult Note            Date:5/21/2024        Patient Name:Dannielle Hall     YOB: 1935     Age:88 y.o.    Inpatient consult to General Surgery  Consult performed by: Baltazar Erickson MD  Consult ordered by: Cintia Lemon MD  Reason for consult: acute appendicitis?        Has cecal inflammation on the CT scan consistent with her exam.  We discussed a differential.  I agree with GI that appendicitis typically does not present as lower GI bleeding.    Plan as already underway by GI to evaluate.  Colonoscopy planned after Plavix is stopped.    Cont Abxs.    Repeat CT scan if patient's condition deteriorates.    Thank you for this consult.            History Obtained From   patient, electronic medical record    History of Present Illness   89yo takes Plavix. Right lower quadrant abdominal pain that began on Sunday.  Started mild and progressively got worse.  Transferred here from [facility] for lower GI bleeding.    Reports having 1 similar episode in the past.    Reports a history of colon polyps however last colonoscopy here was in 2020 with Dr Henriquez and was clean.  Episodes of imaging confirmed sigmoid diverticulitis in January 2022      Past Medical History     Past Medical History:   Diagnosis Date    Allergic rhinitis due to pollen     Arthritis     BPV (benign positional vertigo)     Change in bowel habits     Depression     Diabetes (HCC)     Dysuria     Hemorrhoids     Hypertension     IBS (irritable bowel syndrome)     Menopause     Other diseases of nasal cavity and sinuses(478.19)     TIA (transient ischemic attack)     Vertigo         Past Surgical History     Past Surgical History:   Procedure Laterality Date    BREAST LUMPECTOMY Left 10/12/2023    LEFT BREAST ULTRASOUND GUIDED LUMPECTOMY performed by Susana Linder MD at [facility] AMBULATORY OR    CHOLECYSTECTOMY  2016    COLONOSCOPY  2013?    COLONOSCOPY N/A 7/15/2020    COLONOSCOPY performed by Alonzo Henriquez MD at [facility] ENDOSCOPY

## 2024-05-21 NOTE — ED PROVIDER NOTES
and hemorrhoids presents to the ED with rectal bleeding that started last night and then occurred again today.  Last night it was a small amount but today it was a much larger amount.  She reports lower abdominal pain/cramping/bloating.  Denies any nausea or vomiting.  Denies any urinary symptoms.  Denies any fevers or chills.  She has been eating and drinking normally.    On exam, she was hypertensive and tachycardic on arrival.  She is obese.  Abdomen is soft and protuberant but tender, especially in right lower quadrant.  Lungs are clear.    Differential diagnosis includes diverticulitis, diverticulosis, AVM, hemorrhoidal bleeding, anemia.    Will check labs and imaging including CBC, CMP, lactate, type and screen, UA, abdominal pelvis CT/bleeding scan.    Labs reviewed.  Hemoglobin is normal.  UA is clear.  Chemistries unremarkable.    ED Course as of 05/21/24 0532   Tue May 21, 2024   0157 Preliminary CT report reviewed:  **PRELIMINARY REPORT**     No active GI bleed. Inflammation surrounding the cecum where there is wall  thickening, diverticula, and a minimally dilated appendix. Differential  considerations include cecal diverticulitis or mild acute appendicitis. An  underlying cecal mass lesion is not entirely excluded.     Preliminary report was provided by Dr. Mercado, the on-call radiologist, on  5/20/2024 at 2340 hours.    Patient has not had any more episodes of GI bleeding while in the ED.  Hemoglobin is stable.  She has slightly elevated white count.  Will treat with Zosyn for possible diverticulitis versus appendicitis.  Case discussed with Dr. Kartik Preciado (hospitalist at [facility]) who accepts patient as a transfer.   [AO]   0532 Transport team here to take patient to [facility]. [AO]      ED Course User Index  [AO] Angie Soto MD         FINAL IMPRESSION     1. Rectal bleeding    2. Abdominal pain, right lower quadrant          DISPOSITION/PLAN   DISPOSITION        Transferred to [facility]     I am the

## 2024-05-21 NOTE — CONSULTS
Vicky Montejo, NP-C                       [phone] cell                  Monday-Thursday 7:30-5:00                           Gastroenterology Consultation Note      Admit Date: 5/21/2024  Consult Date: 5/21/2024   I greatly appreciate your asking me to see Dannielle Hall, thank you very much for the opportunity to participate in her care.    Narrative Assessment and Plan   GI consultation for rectal bleeding while on Plavix.  88-year-old female with PMH arthritis, depression, hypertension.  She takes Plavix but is unsure why, last dose was yesterday (5/20/2024).  She reports a single episode of watery dark bloody bowel movement yesterday after which she presented to [facility] ER.  She had RLQ pain but no fever.  CT shows pancolonic diverticulosis with wall thickening and inflammatory stranding surrounding the cecum.  No abscess or fluid collection, no active hemorrhage.  Underlying mass is not excluded.  Last EGD and colonoscopy were 7/2020 by Dr. Henriquez with no bleeding source and no polyps.  Hgb is 13.3.  Stool is heme positive.  No family history of CRC or colon polyps.  She has had no further bowel movements.    Impression:  Rectal bleeding  Antithrombotic use  Arthritis  Hypertension  Abnormal CT    Recommend colonoscopy after Plavix has been stopped at least 5 days (so 5/27/2024 at the soonest). If she has more diarrhea recommend stool studies, I will order them.  Follow H&H, transfuse prn    Subjective:     Chief Complaint: Rectal bleeding    History of Present Illness: GI consultation for rectal bleeding while on Plavix. 89 y/o female with PMH arthritis, depression, hypertension. She takes Plavix but is unsure why, last dose was yesterday. She reports a single episode of watery dark bloody bowel movement yesterday after which she presented to [facility] ER. She had RLQ pain but no fever. CT shows pancolonic

## 2024-05-21 NOTE — ED NOTES
PO meds given with sips of water, SpO2 decreased to 68%. No s/s of aspiration. SpO2 increased to 90% when placed back on BIPAP at 65% FiO2 Report provided to Jatin Miller RN, [address]

## 2024-05-21 NOTE — ED TRIAGE NOTES
Patient came in with c/o rectal bleeding. She states that she has had to BM that she saw blood. Her BM have been normal in consistency and she is not on any iron supplements. She also stated that she is not having any abdominal pain but she feels bloated.

## 2024-05-21 NOTE — PROGRESS NOTES
Arranged BLS transportation from [facility]#ed 4 to  [facility] 3 MED #.[id number]  Arranged BLS transport w/[facility] -   S poke with José Miguel.advised to bring appropiate equipment - ETA of 30  minutes given - updated ED staff w/ETA

## 2024-05-21 NOTE — H&P
Hospitalist Admission Note    NAME:   Dannielle Hall   : 1935   MRN: [id number]     Date/Time: 2024 12:41 PM    Patient PCP: Tracy Banuelos, LEANDRA - NP    ______________________________________________________________________  Given the patient's current clinical presentation, I have a high level of concern for decompensation if discharged from the emergency department.  Complex decision making was performed, which includes reviewing the patient's available past medical records, laboratory results, and x-ray films.       My assessment of this patient's clinical condition and my plan of care is as follows.    Assessment / Plan:    GI bleeding    GI consultation  Start patient on PPI and  Monitor hemoglobin  Plan for colonoscopy tomorrow  CT abdomen show possible diverticulitis/appendicitis  Start broad-spectrum antibiotic Zosyn  Follow-up stool study    DM   Start on sliding scale     HTN   Resume home med       Hyperlipidemia  Continue Lipitor    Depression   Resume home med            Medical Decision Making:   I personally reviewed labs: CBC ,BMP  I personally reviewed imaging:CT abdomen   I personally reviewed EKG:Yes  Toxic drug monitoring:   Discussed case with: ED provider. After discussion I am in agreement that acuity of patient's medical condition necessitates hospital stay.      Code Status: Full  DVT Prophylaxis: SCD  Baseline:     Subjective:   CHIEF COMPLAINT: Rectal bleeding     HISTORY OF PRESENT ILLNESS:     Dannielle Hall is a 88 y.o.  female with PMHx significant for Depression , DM ,HTN ,TIA ,Vertigo ,DM presented [facility] Rectal bleeding started last night associated with abdominal pain  , denies any fever any chills , blood work was done and show no acute changes , CT abdomen was done and show inflammation around cecum and appendix .  We were asked to admit for work up and evaluation of the above problems.     Past Medical History:   Diagnosis Date

## 2024-05-21 NOTE — H&P
Hospitalist Admission Note    NAME:   Dannielle Hall   : 1935   MRN: [id number]     Date/Time: 2024 12:30 PM    Patient PCP: Tracy Banuelos APRN - NP    ______________________________________________________________________  Given the patient's current clinical presentation, I have a high level of concern for decompensation if discharged from the emergency department.  Complex decision making was performed, which includes reviewing the patient's available past medical records, laboratory results, and x-ray films.       My assessment of this patient's clinical condition and my plan of care is as follows.    Assessment / Plan:                        Medical Decision Making:   I personally reviewed labs: ***  I personally reviewed imaging:***  I personally reviewed EKG:  Toxic drug monitoring: ***  Discussed case with: ED provider. After discussion I am in agreement that acuity of patient's medical condition necessitates hospital stay.      Code Status:   DVT Prophylaxis:   Baseline:     Subjective:   CHIEF COMPLAINT: ***    HISTORY OF PRESENT ILLNESS:     Dannielle Hall is a 88 y.o.  female with PMHx significant for  We were asked to admit for work up and evaluation of the above problems.     Past Medical History:   Diagnosis Date    Allergic rhinitis due to pollen     Arthritis     BPV (benign positional vertigo)     Change in bowel habits     Depression     Diabetes (HCC)     Dysuria     Hemorrhoids     Hypertension     IBS (irritable bowel syndrome)     Menopause     Other diseases of nasal cavity and sinuses(478.19)     TIA (transient ischemic attack)     Vertigo         Past Surgical History:   Procedure Laterality Date    BREAST LUMPECTOMY Left 10/12/2023    LEFT BREAST ULTRASOUND GUIDED LUMPECTOMY performed by Susana Linder MD at [facility] AMBULATORY OR    CHOLECYSTECTOMY      COLONOSCOPY  ?    COLONOSCOPY N/A 7/15/2020    COLONOSCOPY performed by Alonzo Henriquez MD at [facility] ENDOSCOPY

## 2024-05-22 LAB
APTT PPP: <20 SEC (ref 22.1–31)
C COLI+JEJUNI TUF STL QL NAA+PROBE: NEGATIVE
C DIFF GDH STL QL: NEGATIVE
C DIFF TOX A+B STL QL IA: NEGATIVE
C DIFF TOXIN INTERPRETATION: NORMAL
EC STX1+STX2 GENES STL QL NAA+PROBE: NEGATIVE
EKG ATRIAL RATE: 97 BPM
EKG DIAGNOSIS: NORMAL
EKG P AXIS: 74 DEGREES
EKG P-R INTERVAL: 148 MS
EKG Q-T INTERVAL: 348 MS
EKG QRS DURATION: 82 MS
EKG QTC CALCULATION (BAZETT): 441 MS
EKG R AXIS: -18 DEGREES
EKG T AXIS: 79 DEGREES
EKG VENTRICULAR RATE: 97 BPM
ERYTHROCYTE [DISTWIDTH] IN BLOOD BY AUTOMATED COUNT: 11.9 % (ref 11.5–14.5)
ETEC ELTA+ESTB GENES STL QL NAA+PROBE: NEGATIVE
GLUCOSE BLD STRIP.AUTO-MCNC: 120 MG/DL (ref 65–117)
GLUCOSE BLD STRIP.AUTO-MCNC: 154 MG/DL (ref 65–117)
HCT VFR BLD AUTO: 38.3 % (ref 35–47)
HCT VFR BLD AUTO: 38.6 % (ref 35–47)
HGB BLD-MCNC: 12.2 G/DL (ref 11.5–16)
HGB BLD-MCNC: 12.4 G/DL (ref 11.5–16)
INR PPP: 1 (ref 0.9–1.1)
IRON SATN MFR SERPL: 17 % (ref 20–50)
IRON SERPL-MCNC: 37 UG/DL (ref 35–150)
MCH RBC QN AUTO: 29.9 PG (ref 26–34)
MCHC RBC AUTO-ENTMCNC: 32.4 G/DL (ref 30–36.5)
MCV RBC AUTO: 92.3 FL (ref 80–99)
NRBC # BLD: 0 K/UL (ref 0–0.01)
NRBC BLD-RTO: 0 PER 100 WBC
P SHIGELLOIDES DNA STL QL NAA+PROBE: NEGATIVE
PLATELET # BLD AUTO: 299 K/UL (ref 150–400)
PMV BLD AUTO: 9.5 FL (ref 8.9–12.9)
PROTHROMBIN TIME: 10.6 SEC (ref 9–11.1)
RBC # BLD AUTO: 4.15 M/UL (ref 3.8–5.2)
SALMONELLA SP SPAO STL QL NAA+PROBE: NEGATIVE
SERVICE CMNT-IMP: ABNORMAL
SERVICE CMNT-IMP: ABNORMAL
SHIGELLA SP+EIEC IPAH STL QL NAA+PROBE: NEGATIVE
THERAPEUTIC RANGE: ABNORMAL SECS (ref 58–77)
TIBC SERPL-MCNC: 220 UG/DL (ref 250–450)
V CHOL+PARA+VUL DNA STL QL NAA+NON-PROBE: NEGATIVE
WBC # BLD AUTO: 9.3 K/UL (ref 3.6–11)
Y ENTEROCOL DNA STL QL NAA+NON-PROBE: NEGATIVE

## 2024-05-22 PROCEDURE — 36415 COLL VENOUS BLD VENIPUNCTURE: CPT

## 2024-05-22 PROCEDURE — 83540 ASSAY OF IRON: CPT

## 2024-05-22 PROCEDURE — 85027 COMPLETE CBC AUTOMATED: CPT

## 2024-05-22 PROCEDURE — 99232 SBSQ HOSP IP/OBS MODERATE 35: CPT | Performed by: SURGERY

## 2024-05-22 PROCEDURE — 85730 THROMBOPLASTIN TIME PARTIAL: CPT

## 2024-05-22 PROCEDURE — 2580000003 HC RX 258: Performed by: INTERNAL MEDICINE

## 2024-05-22 PROCEDURE — 6360000002 HC RX W HCPCS: Performed by: INTERNAL MEDICINE

## 2024-05-22 PROCEDURE — 6370000000 HC RX 637 (ALT 250 FOR IP): Performed by: STUDENT IN AN ORGANIZED HEALTH CARE EDUCATION/TRAINING PROGRAM

## 2024-05-22 PROCEDURE — 85610 PROTHROMBIN TIME: CPT

## 2024-05-22 PROCEDURE — 85014 HEMATOCRIT: CPT

## 2024-05-22 PROCEDURE — 82962 GLUCOSE BLOOD TEST: CPT

## 2024-05-22 PROCEDURE — C9113 INJ PANTOPRAZOLE SODIUM, VIA: HCPCS | Performed by: INTERNAL MEDICINE

## 2024-05-22 PROCEDURE — 1100000003 HC PRIVATE W/ TELEMETRY

## 2024-05-22 PROCEDURE — A4216 STERILE WATER/SALINE, 10 ML: HCPCS | Performed by: INTERNAL MEDICINE

## 2024-05-22 PROCEDURE — 83550 IRON BINDING TEST: CPT

## 2024-05-22 PROCEDURE — 85018 HEMOGLOBIN: CPT

## 2024-05-22 RX ORDER — LISINOPRIL 20 MG/1
20 TABLET ORAL DAILY
Status: DISCONTINUED | OUTPATIENT
Start: 2024-05-22 | End: 2024-05-24

## 2024-05-22 RX ORDER — VENLAFAXINE HYDROCHLORIDE 150 MG/1
150 CAPSULE, EXTENDED RELEASE ORAL DAILY
Status: DISCONTINUED | OUTPATIENT
Start: 2024-05-22 | End: 2024-05-29 | Stop reason: HOSPADM

## 2024-05-22 RX ORDER — INSULIN LISPRO 100 [IU]/ML
0-4 INJECTION, SOLUTION INTRAVENOUS; SUBCUTANEOUS NIGHTLY
Status: DISCONTINUED | OUTPATIENT
Start: 2024-05-22 | End: 2024-05-29 | Stop reason: HOSPADM

## 2024-05-22 RX ORDER — ATORVASTATIN CALCIUM 40 MG/1
80 TABLET, FILM COATED ORAL DAILY
Status: DISCONTINUED | OUTPATIENT
Start: 2024-05-22 | End: 2024-05-29 | Stop reason: HOSPADM

## 2024-05-22 RX ORDER — ARIPIPRAZOLE 2 MG/1
2 TABLET ORAL DAILY
Status: DISCONTINUED | OUTPATIENT
Start: 2024-05-22 | End: 2024-05-23

## 2024-05-22 RX ORDER — VENLAFAXINE HYDROCHLORIDE 150 MG/1
150 CAPSULE, EXTENDED RELEASE ORAL DAILY
COMMUNITY

## 2024-05-22 RX ORDER — DEXTROSE MONOHYDRATE 100 MG/ML
INJECTION, SOLUTION INTRAVENOUS CONTINUOUS PRN
Status: DISCONTINUED | OUTPATIENT
Start: 2024-05-22 | End: 2024-05-29 | Stop reason: HOSPADM

## 2024-05-22 RX ORDER — SUCRALFATE 1 G/1
1 TABLET ORAL
Status: DISCONTINUED | OUTPATIENT
Start: 2024-05-22 | End: 2024-05-29 | Stop reason: HOSPADM

## 2024-05-22 RX ORDER — INSULIN LISPRO 100 [IU]/ML
0-8 INJECTION, SOLUTION INTRAVENOUS; SUBCUTANEOUS
Status: DISCONTINUED | OUTPATIENT
Start: 2024-05-22 | End: 2024-05-29 | Stop reason: HOSPADM

## 2024-05-22 RX ORDER — LISINOPRIL 20 MG/1
20 TABLET ORAL DAILY
COMMUNITY

## 2024-05-22 RX ORDER — METOPROLOL SUCCINATE 50 MG/1
50 TABLET, EXTENDED RELEASE ORAL DAILY
Status: DISCONTINUED | OUTPATIENT
Start: 2024-05-22 | End: 2024-05-29 | Stop reason: HOSPADM

## 2024-05-22 RX ADMIN — SUCRALFATE 1 G: 1 TABLET ORAL at 20:47

## 2024-05-22 RX ADMIN — SODIUM CHLORIDE, PRESERVATIVE FREE 10 ML: 5 INJECTION INTRAVENOUS at 09:23

## 2024-05-22 RX ADMIN — SUCRALFATE 1 G: 1 TABLET ORAL at 16:35

## 2024-05-22 RX ADMIN — METRONIDAZOLE 500 MG: 500 INJECTION, SOLUTION INTRAVENOUS at 20:48

## 2024-05-22 RX ADMIN — PANTOPRAZOLE SODIUM 40 MG: 40 INJECTION, POWDER, FOR SOLUTION INTRAVENOUS at 13:15

## 2024-05-22 RX ADMIN — METOPROLOL SUCCINATE 50 MG: 50 TABLET, EXTENDED RELEASE ORAL at 15:01

## 2024-05-22 RX ADMIN — ARIPIPRAZOLE 2 MG: 2 TABLET ORAL at 16:32

## 2024-05-22 RX ADMIN — PANTOPRAZOLE SODIUM 40 MG: 40 INJECTION, POWDER, FOR SOLUTION INTRAVENOUS at 00:51

## 2024-05-22 RX ADMIN — SODIUM CHLORIDE: 9 INJECTION, SOLUTION INTRAVENOUS at 13:17

## 2024-05-22 RX ADMIN — LEVOFLOXACIN 250 MG: 5 INJECTION, SOLUTION INTRAVENOUS at 13:15

## 2024-05-22 RX ADMIN — METRONIDAZOLE 500 MG: 500 INJECTION, SOLUTION INTRAVENOUS at 15:01

## 2024-05-22 RX ADMIN — METRONIDAZOLE 500 MG: 500 INJECTION, SOLUTION INTRAVENOUS at 06:18

## 2024-05-22 RX ADMIN — VENLAFAXINE HYDROCHLORIDE 150 MG: 150 CAPSULE, EXTENDED RELEASE ORAL at 15:01

## 2024-05-22 RX ADMIN — ATORVASTATIN CALCIUM 80 MG: 40 TABLET, FILM COATED ORAL at 15:01

## 2024-05-22 RX ADMIN — LISINOPRIL 20 MG: 20 TABLET ORAL at 15:01

## 2024-05-22 ASSESSMENT — PAIN SCALES - GENERAL
PAINLEVEL_OUTOF10: 0

## 2024-05-22 NOTE — CARE COORDINATION
Care Management Initial Assessment       RUR: 11%  Readmission? No  1st IM letter given? Yes   1st  letter given: No     Chart reviewed. Call placed to pt's daughter, Le, to complete assessment. Verified contact information and demographics. Pt lives alone in a one level home with 2 RUPERTO. Pt has Medicaid caregivers daily from 9a-2p. Pt is independent with ADLs and ambulatory without a device. Caregiver provides assistance with IADLs. No prior hx of SNF/rehab or HHC reported. Preferred pharmacy is [facility] in [place], [place]. Family will transport home. Will continue to follow.       05/22/24 1616   Service Assessment   Patient Orientation Unable to Assess   Cognition Alert   History Provided By Child/Family   Primary Caregiver Private caregiver  (has Medicaid caregiver  9a-2p daily)   Support Systems Family Members;Home Care Staff   Patient's Healthcare Decision Maker is: Named in Scanned ACP Document   PCP Verified by CM Yes   Last Visit to PCP Within last 3 months   Prior Functional Level Assistance with the following:;Cooking;Housework;Shopping   Current Functional Level Assistance with the following:;Cooking;Housework;Shopping   Can patient return to prior living arrangement Yes   Ability to make needs known: Good   Family able to assist with home care needs: Yes   Would you like for me to discuss the discharge plan with any other family members/significant others, and if so, who? Yes  (daughter)   Financial Resources Medicare;Medicaid   Social/Functional History   Lives With Alone   Type of Home House   Home Layout One level   Home Access Stairs to enter with rails   Entrance Stairs - Number of Steps 2   Bathroom Accessibility Accessible   Home Equipment None   Receives Help From Personal care attendant;Family   ADL Assistance Independent   Homemaking Assistance Needs assistance   Ambulation Assistance Independent   Transfer Assistance Independent   Active  No   Occupation Retired   Discharge

## 2024-05-22 NOTE — H&P
Hospitalist Admission Note    NAME:   Dannielle Hall   : 1935   MRN: [id number]     Date/Time: 2024 11:59 PM    Patient PCP: Tracy Banuelos, LEANDRA - NP    ______________________________________________________________________  Given the patient's current clinical presentation, I have a high level of concern for decompensation if discharged from the emergency department.  Complex decision making was performed, which includes reviewing the patient's available past medical records, laboratory results, and x-ray films.       My assessment of this patient's clinical condition and my plan of care is as follows.    Assessment / Plan:    GI bleeding    GI consultation  Start patient on PPI and  Monitor hemoglobin  Plan for colonoscopy tomorrow  CT abdomen show possible diverticulitis/appendicitis  Start broad-spectrum antibiotic Zosyn  Follow-up stool study    DM   Start on sliding scale     HTN   Resume home med       Hyperlipidemia  Continue Lipitor    Depression   Resume home med            Medical Decision Making:   I personally reviewed labs: CBC ,BMP  I personally reviewed imaging:CT abdomen   I personally reviewed EKG:Yes  Toxic drug monitoring:   Discussed case with: ED provider. After discussion I am in agreement that acuity of patient's medical condition necessitates hospital stay.      Code Status: Full  DVT Prophylaxis: SCD  Baseline:     Subjective:   CHIEF COMPLAINT: Rectal bleeding     HISTORY OF PRESENT ILLNESS:     Dannielle Hall is a 88 y.o.  female with PMHx significant for Depression , DM ,HTN ,TIA ,Vertigo ,DM presented to the [facility] Rectal bleeding started last night associated with abdominal pain  , denies any fever any chills , blood work was done and show no acute changes , CT abdomen was done and show inflammation around cecum and appendix .  We were asked to admit for work up and evaluation of the above problems.     Past Medical History:   Diagnosis Date

## 2024-05-23 ENCOUNTER — APPOINTMENT (OUTPATIENT)
Facility: HOSPITAL | Age: 89
DRG: 377 | End: 2024-05-23
Attending: STUDENT IN AN ORGANIZED HEALTH CARE EDUCATION/TRAINING PROGRAM
Payer: MEDICARE

## 2024-05-23 LAB
ARTERIAL PATENCY WRIST A: POSITIVE
BASE DEFICIT BLD-SCNC: 0.6 MMOL/L
BDY SITE: ABNORMAL
ERYTHROCYTE [DISTWIDTH] IN BLOOD BY AUTOMATED COUNT: 11.8 % (ref 11.5–14.5)
GAS FLOW.O2 O2 DELIVERY SYS: ABNORMAL
GLUCOSE BLD STRIP.AUTO-MCNC: 137 MG/DL (ref 65–117)
GLUCOSE BLD STRIP.AUTO-MCNC: 180 MG/DL (ref 65–117)
GLUCOSE BLD STRIP.AUTO-MCNC: 228 MG/DL (ref 65–117)
GLUCOSE BLD STRIP.AUTO-MCNC: 231 MG/DL (ref 65–117)
GLUCOSE BLD STRIP.AUTO-MCNC: 248 MG/DL (ref 65–117)
HCO3 BLD-SCNC: 22.2 MMOL/L (ref 22–26)
HCT VFR BLD AUTO: 36.2 % (ref 35–47)
HGB BLD-MCNC: 11.5 G/DL (ref 11.5–16)
MCH RBC QN AUTO: 29.3 PG (ref 26–34)
MCHC RBC AUTO-ENTMCNC: 31.8 G/DL (ref 30–36.5)
MCV RBC AUTO: 92.1 FL (ref 80–99)
NRBC # BLD: 0 K/UL (ref 0–0.01)
NRBC BLD-RTO: 0 PER 100 WBC
O2/TOTAL GAS SETTING VFR VENT: 2 %
PCO2 BLD: 30.3 MMHG (ref 35–45)
PH BLD: 7.47 (ref 7.35–7.45)
PLATELET # BLD AUTO: 300 K/UL (ref 150–400)
PMV BLD AUTO: 9.6 FL (ref 8.9–12.9)
PO2 BLD: 130 MMHG (ref 80–100)
RBC # BLD AUTO: 3.93 M/UL (ref 3.8–5.2)
SAO2 % BLD: 99.2 % (ref 92–97)
SERVICE CMNT-IMP: ABNORMAL
SPECIMEN TYPE: ABNORMAL
WBC # BLD AUTO: 8.3 K/UL (ref 3.6–11)
WBC #/AREA STL HPF: NORMAL /HPF (ref 0–4)

## 2024-05-23 PROCEDURE — 2580000003 HC RX 258: Performed by: INTERNAL MEDICINE

## 2024-05-23 PROCEDURE — 36415 COLL VENOUS BLD VENIPUNCTURE: CPT

## 2024-05-23 PROCEDURE — 1100000000 HC RM PRIVATE

## 2024-05-23 PROCEDURE — C9113 INJ PANTOPRAZOLE SODIUM, VIA: HCPCS | Performed by: INTERNAL MEDICINE

## 2024-05-23 PROCEDURE — 70450 CT HEAD/BRAIN W/O DYE: CPT

## 2024-05-23 PROCEDURE — 6360000002 HC RX W HCPCS: Performed by: INTERNAL MEDICINE

## 2024-05-23 PROCEDURE — 6370000000 HC RX 637 (ALT 250 FOR IP): Performed by: STUDENT IN AN ORGANIZED HEALTH CARE EDUCATION/TRAINING PROGRAM

## 2024-05-23 PROCEDURE — 82803 BLOOD GASES ANY COMBINATION: CPT

## 2024-05-23 PROCEDURE — 6370000000 HC RX 637 (ALT 250 FOR IP): Performed by: NURSE PRACTITIONER

## 2024-05-23 PROCEDURE — 71045 X-RAY EXAM CHEST 1 VIEW: CPT

## 2024-05-23 PROCEDURE — 6360000002 HC RX W HCPCS: Performed by: STUDENT IN AN ORGANIZED HEALTH CARE EDUCATION/TRAINING PROGRAM

## 2024-05-23 PROCEDURE — 2580000003 HC RX 258: Performed by: STUDENT IN AN ORGANIZED HEALTH CARE EDUCATION/TRAINING PROGRAM

## 2024-05-23 PROCEDURE — 2700000000 HC OXYGEN THERAPY PER DAY

## 2024-05-23 PROCEDURE — 82962 GLUCOSE BLOOD TEST: CPT

## 2024-05-23 PROCEDURE — A4216 STERILE WATER/SALINE, 10 ML: HCPCS | Performed by: INTERNAL MEDICINE

## 2024-05-23 PROCEDURE — 36600 WITHDRAWAL OF ARTERIAL BLOOD: CPT

## 2024-05-23 PROCEDURE — 85027 COMPLETE CBC AUTOMATED: CPT

## 2024-05-23 RX ORDER — HYDRALAZINE HYDROCHLORIDE 20 MG/ML
10 INJECTION INTRAMUSCULAR; INTRAVENOUS EVERY 6 HOURS PRN
Status: DISCONTINUED | OUTPATIENT
Start: 2024-05-23 | End: 2024-05-23

## 2024-05-23 RX ORDER — QUETIAPINE FUMARATE 25 MG/1
25 TABLET, FILM COATED ORAL NIGHTLY
Status: DISCONTINUED | OUTPATIENT
Start: 2024-05-23 | End: 2024-05-29 | Stop reason: HOSPADM

## 2024-05-23 RX ORDER — ASPIRIN 81 MG/1
324 TABLET, CHEWABLE ORAL ONCE
Status: COMPLETED | OUTPATIENT
Start: 2024-05-23 | End: 2024-05-23

## 2024-05-23 RX ORDER — 0.9 % SODIUM CHLORIDE 0.9 %
1000 INTRAVENOUS SOLUTION INTRAVENOUS ONCE
Status: DISCONTINUED | OUTPATIENT
Start: 2024-05-23 | End: 2024-05-29 | Stop reason: HOSPADM

## 2024-05-23 RX ADMIN — METOPROLOL SUCCINATE 50 MG: 50 TABLET, EXTENDED RELEASE ORAL at 08:04

## 2024-05-23 RX ADMIN — METRONIDAZOLE 500 MG: 500 INJECTION, SOLUTION INTRAVENOUS at 06:27

## 2024-05-23 RX ADMIN — ARIPIPRAZOLE 2 MG: 2 TABLET ORAL at 08:05

## 2024-05-23 RX ADMIN — SUCRALFATE 1 G: 1 TABLET ORAL at 17:26

## 2024-05-23 RX ADMIN — SUCRALFATE 1 G: 1 TABLET ORAL at 06:26

## 2024-05-23 RX ADMIN — PANTOPRAZOLE SODIUM 40 MG: 40 INJECTION, POWDER, FOR SOLUTION INTRAVENOUS at 01:22

## 2024-05-23 RX ADMIN — ATORVASTATIN CALCIUM 80 MG: 40 TABLET, FILM COATED ORAL at 08:04

## 2024-05-23 RX ADMIN — HYDRALAZINE HYDROCHLORIDE 10 MG: 20 INJECTION, SOLUTION INTRAMUSCULAR; INTRAVENOUS at 19:35

## 2024-05-23 RX ADMIN — SUCRALFATE 1 G: 1 TABLET ORAL at 22:12

## 2024-05-23 RX ADMIN — SODIUM CHLORIDE: 9 INJECTION, SOLUTION INTRAVENOUS at 22:25

## 2024-05-23 RX ADMIN — PIPERACILLIN AND TAZOBACTAM 3375 MG: 3; .375 INJECTION, POWDER, LYOPHILIZED, FOR SOLUTION INTRAVENOUS at 10:38

## 2024-05-23 RX ADMIN — ASPIRIN 81 MG CHEWABLE TABLET 324 MG: 81 TABLET CHEWABLE at 22:11

## 2024-05-23 RX ADMIN — SUCRALFATE 1 G: 1 TABLET ORAL at 10:35

## 2024-05-23 RX ADMIN — SODIUM CHLORIDE: 9 INJECTION, SOLUTION INTRAVENOUS at 04:14

## 2024-05-23 RX ADMIN — VENLAFAXINE HYDROCHLORIDE 150 MG: 150 CAPSULE, EXTENDED RELEASE ORAL at 08:05

## 2024-05-23 RX ADMIN — LISINOPRIL 20 MG: 20 TABLET ORAL at 08:04

## 2024-05-23 RX ADMIN — PIPERACILLIN AND TAZOBACTAM 3375 MG: 3; .375 INJECTION, POWDER, LYOPHILIZED, FOR SOLUTION INTRAVENOUS at 17:29

## 2024-05-23 RX ADMIN — PANTOPRAZOLE SODIUM 40 MG: 40 INJECTION, POWDER, FOR SOLUTION INTRAVENOUS at 13:37

## 2024-05-23 RX ADMIN — QUETIAPINE FUMARATE 25 MG: 25 TABLET ORAL at 17:26

## 2024-05-23 RX ADMIN — INSULIN LISPRO 2 UNITS: 100 INJECTION, SOLUTION INTRAVENOUS; SUBCUTANEOUS at 13:37

## 2024-05-23 ASSESSMENT — PAIN SCALES - GENERAL: PAINLEVEL_OUTOF10: 0

## 2024-05-24 LAB
ANION GAP SERPL CALC-SCNC: 6 MMOL/L (ref 5–15)
APTT PPP: 25.6 SEC (ref 22.1–31)
BUN SERPL-MCNC: 9 MG/DL (ref 6–20)
BUN/CREAT SERPL: 8 (ref 12–20)
CALCIUM SERPL-MCNC: 8.5 MG/DL (ref 8.5–10.1)
CHLORIDE SERPL-SCNC: 108 MMOL/L (ref 97–108)
CO2 SERPL-SCNC: 26 MMOL/L (ref 21–32)
CREAT SERPL-MCNC: 1.1 MG/DL (ref 0.55–1.02)
EKG ATRIAL RATE: 74 BPM
EKG DIAGNOSIS: NORMAL
EKG P AXIS: 78 DEGREES
EKG P-R INTERVAL: 160 MS
EKG Q-T INTERVAL: 360 MS
EKG QRS DURATION: 86 MS
EKG QTC CALCULATION (BAZETT): 399 MS
EKG R AXIS: 29 DEGREES
EKG T AXIS: 90 DEGREES
EKG VENTRICULAR RATE: 74 BPM
G LAMBLIA AG STL QL IA: NEGATIVE
GLUCOSE BLD STRIP.AUTO-MCNC: 148 MG/DL (ref 65–117)
GLUCOSE BLD STRIP.AUTO-MCNC: 163 MG/DL (ref 65–117)
GLUCOSE BLD STRIP.AUTO-MCNC: 168 MG/DL (ref 65–117)
GLUCOSE BLD STRIP.AUTO-MCNC: 198 MG/DL (ref 65–117)
GLUCOSE SERPL-MCNC: 194 MG/DL (ref 65–100)
HCT VFR BLD AUTO: 36.6 % (ref 35–47)
HGB BLD-MCNC: 11.6 G/DL (ref 11.5–16)
INR PPP: 1 (ref 0.9–1.1)
MAGNESIUM SERPL-MCNC: 1.8 MG/DL (ref 1.6–2.4)
O+P STL CONC: NORMAL
POTASSIUM SERPL-SCNC: 3.2 MMOL/L (ref 3.5–5.1)
PROTHROMBIN TIME: 10.3 SEC (ref 9–11.1)
SERVICE CMNT-IMP: ABNORMAL
SODIUM SERPL-SCNC: 140 MMOL/L (ref 136–145)
SPECIMEN SOURCE: NORMAL
THERAPEUTIC RANGE: NORMAL SECS (ref 58–77)
TROPONIN I SERPL HS-MCNC: 19 NG/L (ref 0–51)

## 2024-05-24 PROCEDURE — 6370000000 HC RX 637 (ALT 250 FOR IP): Performed by: STUDENT IN AN ORGANIZED HEALTH CARE EDUCATION/TRAINING PROGRAM

## 2024-05-24 PROCEDURE — 6360000002 HC RX W HCPCS: Performed by: STUDENT IN AN ORGANIZED HEALTH CARE EDUCATION/TRAINING PROGRAM

## 2024-05-24 PROCEDURE — 2580000003 HC RX 258: Performed by: STUDENT IN AN ORGANIZED HEALTH CARE EDUCATION/TRAINING PROGRAM

## 2024-05-24 PROCEDURE — 85018 HEMOGLOBIN: CPT

## 2024-05-24 PROCEDURE — 85014 HEMATOCRIT: CPT

## 2024-05-24 PROCEDURE — 1100000000 HC RM PRIVATE

## 2024-05-24 PROCEDURE — 83735 ASSAY OF MAGNESIUM: CPT

## 2024-05-24 PROCEDURE — A4216 STERILE WATER/SALINE, 10 ML: HCPCS | Performed by: INTERNAL MEDICINE

## 2024-05-24 PROCEDURE — 2700000000 HC OXYGEN THERAPY PER DAY

## 2024-05-24 PROCEDURE — 36415 COLL VENOUS BLD VENIPUNCTURE: CPT

## 2024-05-24 PROCEDURE — 80048 BASIC METABOLIC PNL TOTAL CA: CPT

## 2024-05-24 PROCEDURE — 85730 THROMBOPLASTIN TIME PARTIAL: CPT

## 2024-05-24 PROCEDURE — 6360000002 HC RX W HCPCS: Performed by: INTERNAL MEDICINE

## 2024-05-24 PROCEDURE — 84484 ASSAY OF TROPONIN QUANT: CPT

## 2024-05-24 PROCEDURE — 82962 GLUCOSE BLOOD TEST: CPT

## 2024-05-24 PROCEDURE — C9113 INJ PANTOPRAZOLE SODIUM, VIA: HCPCS | Performed by: INTERNAL MEDICINE

## 2024-05-24 PROCEDURE — 85610 PROTHROMBIN TIME: CPT

## 2024-05-24 PROCEDURE — 93005 ELECTROCARDIOGRAM TRACING: CPT | Performed by: NURSE PRACTITIONER

## 2024-05-24 PROCEDURE — 2580000003 HC RX 258: Performed by: INTERNAL MEDICINE

## 2024-05-24 RX ORDER — INSULIN GLARGINE 100 [IU]/ML
15 INJECTION, SOLUTION SUBCUTANEOUS NIGHTLY
Status: DISCONTINUED | OUTPATIENT
Start: 2024-05-24 | End: 2024-05-24

## 2024-05-24 RX ORDER — INSULIN GLARGINE 100 [IU]/ML
10 INJECTION, SOLUTION SUBCUTANEOUS NIGHTLY
Status: DISCONTINUED | OUTPATIENT
Start: 2024-05-24 | End: 2024-05-29 | Stop reason: HOSPADM

## 2024-05-24 RX ORDER — LISINOPRIL 20 MG/1
20 TABLET ORAL DAILY
Status: DISCONTINUED | OUTPATIENT
Start: 2024-05-24 | End: 2024-05-29 | Stop reason: HOSPADM

## 2024-05-24 RX ORDER — POTASSIUM CHLORIDE 20 MEQ/1
20 TABLET, EXTENDED RELEASE ORAL EVERY 6 HOURS
Status: COMPLETED | OUTPATIENT
Start: 2024-05-24 | End: 2024-05-24

## 2024-05-24 RX ORDER — LANOLIN ALCOHOL/MO/W.PET/CERES
3 CREAM (GRAM) TOPICAL NIGHTLY PRN
Status: DISCONTINUED | OUTPATIENT
Start: 2024-05-24 | End: 2024-05-29 | Stop reason: HOSPADM

## 2024-05-24 RX ADMIN — METOPROLOL SUCCINATE 50 MG: 50 TABLET, EXTENDED RELEASE ORAL at 09:09

## 2024-05-24 RX ADMIN — PIPERACILLIN AND TAZOBACTAM 3375 MG: 3; .375 INJECTION, POWDER, LYOPHILIZED, FOR SOLUTION INTRAVENOUS at 18:34

## 2024-05-24 RX ADMIN — SUCRALFATE 1 G: 1 TABLET ORAL at 06:45

## 2024-05-24 RX ADMIN — ATORVASTATIN CALCIUM 80 MG: 40 TABLET, FILM COATED ORAL at 09:09

## 2024-05-24 RX ADMIN — SODIUM CHLORIDE, PRESERVATIVE FREE 10 ML: 5 INJECTION INTRAVENOUS at 09:19

## 2024-05-24 RX ADMIN — PIPERACILLIN AND TAZOBACTAM 3375 MG: 3; .375 INJECTION, POWDER, LYOPHILIZED, FOR SOLUTION INTRAVENOUS at 09:18

## 2024-05-24 RX ADMIN — LISINOPRIL 20 MG: 20 TABLET ORAL at 12:54

## 2024-05-24 RX ADMIN — VENLAFAXINE HYDROCHLORIDE 150 MG: 150 CAPSULE, EXTENDED RELEASE ORAL at 09:09

## 2024-05-24 RX ADMIN — SUCRALFATE 1 G: 1 TABLET ORAL at 12:38

## 2024-05-24 RX ADMIN — SODIUM CHLORIDE, PRESERVATIVE FREE 10 ML: 5 INJECTION INTRAVENOUS at 20:39

## 2024-05-24 RX ADMIN — INSULIN GLARGINE 10 UNITS: 100 INJECTION, SOLUTION SUBCUTANEOUS at 20:38

## 2024-05-24 RX ADMIN — SUCRALFATE 1 G: 1 TABLET ORAL at 18:33

## 2024-05-24 RX ADMIN — POTASSIUM CHLORIDE 20 MEQ: 1500 TABLET, EXTENDED RELEASE ORAL at 20:39

## 2024-05-24 RX ADMIN — PIPERACILLIN AND TAZOBACTAM 3375 MG: 3; .375 INJECTION, POWDER, LYOPHILIZED, FOR SOLUTION INTRAVENOUS at 01:41

## 2024-05-24 RX ADMIN — POTASSIUM CHLORIDE 20 MEQ: 1500 TABLET, EXTENDED RELEASE ORAL at 15:40

## 2024-05-24 RX ADMIN — SUCRALFATE 1 G: 1 TABLET ORAL at 20:39

## 2024-05-24 RX ADMIN — PANTOPRAZOLE SODIUM 40 MG: 40 INJECTION, POWDER, FOR SOLUTION INTRAVENOUS at 12:36

## 2024-05-24 RX ADMIN — PANTOPRAZOLE SODIUM 40 MG: 40 INJECTION, POWDER, FOR SOLUTION INTRAVENOUS at 01:39

## 2024-05-24 RX ADMIN — QUETIAPINE FUMARATE 25 MG: 25 TABLET ORAL at 20:39

## 2024-05-24 ASSESSMENT — PAIN SCALES - GENERAL
PAINLEVEL_OUTOF10: 0
PAINLEVEL_OUTOF10: 0

## 2024-05-24 NOTE — SIGNIFICANT EVENT
RAPID RESPONSE TEAM    Overhead rapid response paged to room # 3241  at 2002    Reason for rapid response: Change in neuro status     Per Primary RN, patient was walking in hallway and room when she suddenly developed right sided weakness. Patient was assisted to chair and then became unresponsive. Primary RN reports patients baseline is alert and oriented to self with periodic episodes of delirium. Patient had also been hypertensive, SBP >190 and was treated with Hydralazine 10 mg IV at 1935.     Initial assessment:   Upon my arrival, patient is in the recliner, unresponsive to painful stimuli. pupils equal, sluggish. Respirations even and unlabored, lung sounds diminished. + pulses.   VS: T 98.8, HR 70- NSR on monitor, /63, RR 16, Spo2 98% on 2L NC, .     Clawson, NP at bedside, orders received for 1L NS bolus and ABG.    2009- Code Stroke - Level 1 called over head   Patient transferred to bed by staff members and myself via lift sheet. Patient connected to lifepak. BVM and suction set up in preparation for potential airway compromise. ABG collected, NS infusing by gravity.     2011- Patient is slowly becoming more arousable, now grimaces to pain, +cough/gag, successfully maintaining airway.   VS: HR 72, /64, RR 16, Spo2 98% on 2L NC.     2015- Tele-Neuro paged to KOKO Perez phone. Patient transported to CT by myself, Primary RN and NP with lifepak, o2 and BVM.     2019- Arrived at CT - patient is more alert, with garbled speech and minimal right sided weakness.   VS: HR 80, /69, RR 18, Spo2 99% on RA.     2040- CXR completed, awaiting tele-neuro.     2046- Tele Neurologist on monitor for Neuro assessment.   Patient is alert, disoriented to person, place and time with garbled speech, follows commands, moves all extremities, no focal deficit noted at this time. No further recommendations from Tele-Neuro -- Clawson, NP aware.   VS: HR 92, /69, RR 16, Spo2 98% on 2L NC.     Outcome:

## 2024-05-24 NOTE — SIGNIFICANT EVENT
Nocturnist/cross cover provider called to room at approximately 2002 as RRT was called for unresponsiveness.     Patient Vitals for the past 8 hrs:   BP Temp Temp src Pulse Resp SpO2   05/23/24 1545 (!) 191/87 98 °F (36.7 °C) Oral 71 16 98 %       Intake/Output Summary (Last 24 hours) at 5/23/2024 2010  Last data filed at 5/23/2024 0012  Gross per 24 hour   Intake --   Output 300 ml   Net -300 ml         BACKGROUND/ SITUATION:  Attending provider following patient: Dr Prado    88 y.o. female h/o  has a past medical history of Allergic rhinitis due to pollen, Arthritis, BPV (benign positional vertigo), Change in bowel habits, Depression, Diabetes (HCC), Dysuria, Hemorrhoids, Hypertension, IBS (irritable bowel syndrome), Menopause, Other diseases of nasal cavity and sinuses(478.19), TIA (transient ischemic attack), and Vertigo.   admitted 5/21/2024 for GI bleed [K92.2].  See prior hospitalist group notes for complete details of course of treatment.      FINDINGS:  Nurse reported pt was talking, walking, only a+ox1 to self, but able to speak, sbp 191, and was moved to the chair, walk walking around prior to that also. Received 10mg iv hydralazine per ordered prn w/parameters. On bedside exam pt is sitting up in the chair, obtunded, not responsive with sternal rub, PERRL, glucose 243, sbp 109, o2 sat on low flow o2 wnl, pt has tongue sticking out, doesn't appear to have increased work of breathing. Doesn't appear to have any seizure like activity, was found in the chair like this after she was shortly alert and moving, was found during shift change report by nursing. States family recently left bedside. Code stroke called out of abundance of caution, however highly suspicious that pt due to extreme drop in bp s/p hydralazine is the contributing factor. Pt manually lifted back to bed by x5 assistance, and started on IVF, and teleneuro due to code stroke was paged- he called me back and will look at pt after stat

## 2024-05-24 NOTE — CARE COORDINATION
Transition of Care Plan:    RUR: 13% (low RUR)  Prior Level of Functioning: Independent  Disposition: Home with caregivers and follow-ups  If SNF or IPR: Date FOC offered:   Date FOC received:   Accepting facility:   Date authorization started with reference number:   Date authorization received and expires:   Follow up appointments: PCP/Specialists as indicated  DME needed: None at this time  Transportation at discharge: Family to transport  IM/IMM Medicare/ letter given: 2nd needed prior to d/c  Is patient a [place] and connected with [facility]? N/a   If yes, was [place] transfer form completed and VA notified? N/a  Caregiver Contact: Le Hawkins; DTR; [phone]  Discharge Caregiver contacted prior to discharge? CM to contact  Care Conference needed? Not at this time   Barriers to discharge: Medical clearance, GI clearance, Colonoscopy 05/28    0846 - Assumed transitions of care planning from GREGG Lopez. Current plan is for pt to return home with continue caregiver support (Medicaid caregivers 9a-2p daily). Plan for Family to transport at d/c. CM following for any additional needs.     1405 - Per IDRs, plan for procedure on 05/28, ARMAND 05/29. CM following for any additional needs.     GRAHAM Mann  Care Management  [facility]  [phone]

## 2024-05-25 LAB
ANION GAP SERPL CALC-SCNC: 2 MMOL/L (ref 5–15)
BUN SERPL-MCNC: 9 MG/DL (ref 6–20)
BUN/CREAT SERPL: 8 (ref 12–20)
CALCIUM SERPL-MCNC: 8.3 MG/DL (ref 8.5–10.1)
CHLORIDE SERPL-SCNC: 108 MMOL/L (ref 97–108)
CO2 SERPL-SCNC: 27 MMOL/L (ref 21–32)
CREAT SERPL-MCNC: 1.08 MG/DL (ref 0.55–1.02)
ERYTHROCYTE [DISTWIDTH] IN BLOOD BY AUTOMATED COUNT: 12.2 % (ref 11.5–14.5)
GLUCOSE BLD STRIP.AUTO-MCNC: 118 MG/DL (ref 65–117)
GLUCOSE BLD STRIP.AUTO-MCNC: 145 MG/DL (ref 65–117)
GLUCOSE BLD STRIP.AUTO-MCNC: 160 MG/DL (ref 65–117)
GLUCOSE BLD STRIP.AUTO-MCNC: 207 MG/DL (ref 65–117)
GLUCOSE SERPL-MCNC: 199 MG/DL (ref 65–100)
HCT VFR BLD AUTO: 36.3 % (ref 35–47)
HGB BLD-MCNC: 11.7 G/DL (ref 11.5–16)
MCH RBC QN AUTO: 29.8 PG (ref 26–34)
MCHC RBC AUTO-ENTMCNC: 32.2 G/DL (ref 30–36.5)
MCV RBC AUTO: 92.6 FL (ref 80–99)
NRBC # BLD: 0 K/UL (ref 0–0.01)
NRBC BLD-RTO: 0 PER 100 WBC
PLATELET # BLD AUTO: 322 K/UL (ref 150–400)
PMV BLD AUTO: 9.5 FL (ref 8.9–12.9)
POTASSIUM SERPL-SCNC: 3.6 MMOL/L (ref 3.5–5.1)
RBC # BLD AUTO: 3.92 M/UL (ref 3.8–5.2)
SERVICE CMNT-IMP: ABNORMAL
SODIUM SERPL-SCNC: 137 MMOL/L (ref 136–145)
WBC # BLD AUTO: 8.9 K/UL (ref 3.6–11)

## 2024-05-25 PROCEDURE — 6370000000 HC RX 637 (ALT 250 FOR IP): Performed by: STUDENT IN AN ORGANIZED HEALTH CARE EDUCATION/TRAINING PROGRAM

## 2024-05-25 PROCEDURE — 2580000003 HC RX 258: Performed by: INTERNAL MEDICINE

## 2024-05-25 PROCEDURE — 36415 COLL VENOUS BLD VENIPUNCTURE: CPT

## 2024-05-25 PROCEDURE — 6360000002 HC RX W HCPCS: Performed by: STUDENT IN AN ORGANIZED HEALTH CARE EDUCATION/TRAINING PROGRAM

## 2024-05-25 PROCEDURE — 1100000000 HC RM PRIVATE

## 2024-05-25 PROCEDURE — 2580000003 HC RX 258: Performed by: STUDENT IN AN ORGANIZED HEALTH CARE EDUCATION/TRAINING PROGRAM

## 2024-05-25 PROCEDURE — 6360000002 HC RX W HCPCS: Performed by: INTERNAL MEDICINE

## 2024-05-25 PROCEDURE — 85027 COMPLETE CBC AUTOMATED: CPT

## 2024-05-25 PROCEDURE — A4216 STERILE WATER/SALINE, 10 ML: HCPCS | Performed by: INTERNAL MEDICINE

## 2024-05-25 PROCEDURE — C9113 INJ PANTOPRAZOLE SODIUM, VIA: HCPCS | Performed by: INTERNAL MEDICINE

## 2024-05-25 PROCEDURE — 82962 GLUCOSE BLOOD TEST: CPT

## 2024-05-25 PROCEDURE — 6370000000 HC RX 637 (ALT 250 FOR IP): Performed by: NURSE PRACTITIONER

## 2024-05-25 PROCEDURE — 6370000000 HC RX 637 (ALT 250 FOR IP): Performed by: INTERNAL MEDICINE

## 2024-05-25 PROCEDURE — 80048 BASIC METABOLIC PNL TOTAL CA: CPT

## 2024-05-25 RX ORDER — POTASSIUM CHLORIDE 20 MEQ/1
40 TABLET, EXTENDED RELEASE ORAL ONCE
Status: COMPLETED | OUTPATIENT
Start: 2024-05-25 | End: 2024-05-25

## 2024-05-25 RX ADMIN — INSULIN LISPRO 2 UNITS: 100 INJECTION, SOLUTION INTRAVENOUS; SUBCUTANEOUS at 10:54

## 2024-05-25 RX ADMIN — PANTOPRAZOLE SODIUM 40 MG: 40 INJECTION, POWDER, FOR SOLUTION INTRAVENOUS at 00:43

## 2024-05-25 RX ADMIN — PIPERACILLIN AND TAZOBACTAM 3375 MG: 3; .375 INJECTION, POWDER, LYOPHILIZED, FOR SOLUTION INTRAVENOUS at 09:37

## 2024-05-25 RX ADMIN — POTASSIUM CHLORIDE 40 MEQ: 1500 TABLET, EXTENDED RELEASE ORAL at 09:32

## 2024-05-25 RX ADMIN — INSULIN GLARGINE 10 UNITS: 100 INJECTION, SOLUTION SUBCUTANEOUS at 20:38

## 2024-05-25 RX ADMIN — SUCRALFATE 1 G: 1 TABLET ORAL at 10:40

## 2024-05-25 RX ADMIN — SUCRALFATE 1 G: 1 TABLET ORAL at 20:38

## 2024-05-25 RX ADMIN — LISINOPRIL 20 MG: 20 TABLET ORAL at 08:38

## 2024-05-25 RX ADMIN — QUETIAPINE FUMARATE 25 MG: 25 TABLET ORAL at 20:38

## 2024-05-25 RX ADMIN — PANTOPRAZOLE SODIUM 40 MG: 40 INJECTION, POWDER, FOR SOLUTION INTRAVENOUS at 13:58

## 2024-05-25 RX ADMIN — VENLAFAXINE HYDROCHLORIDE 150 MG: 150 CAPSULE, EXTENDED RELEASE ORAL at 08:38

## 2024-05-25 RX ADMIN — ATORVASTATIN CALCIUM 80 MG: 40 TABLET, FILM COATED ORAL at 08:38

## 2024-05-25 RX ADMIN — METOPROLOL SUCCINATE 50 MG: 50 TABLET, EXTENDED RELEASE ORAL at 08:38

## 2024-05-25 RX ADMIN — PIPERACILLIN AND TAZOBACTAM 3375 MG: 3; .375 INJECTION, POWDER, LYOPHILIZED, FOR SOLUTION INTRAVENOUS at 17:32

## 2024-05-25 RX ADMIN — PIPERACILLIN AND TAZOBACTAM 3375 MG: 3; .375 INJECTION, POWDER, LYOPHILIZED, FOR SOLUTION INTRAVENOUS at 00:53

## 2024-05-25 RX ADMIN — SUCRALFATE 1 G: 1 TABLET ORAL at 06:41

## 2024-05-25 RX ADMIN — SODIUM CHLORIDE, PRESERVATIVE FREE 10 ML: 5 INJECTION INTRAVENOUS at 08:38

## 2024-05-25 RX ADMIN — SUCRALFATE 1 G: 1 TABLET ORAL at 17:14

## 2024-05-25 RX ADMIN — SODIUM CHLORIDE: 9 INJECTION, SOLUTION INTRAVENOUS at 00:52

## 2024-05-25 RX ADMIN — MELATONIN 3 MG: at 00:43

## 2024-05-25 RX ADMIN — SODIUM CHLORIDE, PRESERVATIVE FREE 10 ML: 5 INJECTION INTRAVENOUS at 20:40

## 2024-05-25 ASSESSMENT — PAIN SCALES - GENERAL: PAINLEVEL_OUTOF10: 0

## 2024-05-26 LAB
GLUCOSE BLD STRIP.AUTO-MCNC: 111 MG/DL (ref 65–117)
GLUCOSE BLD STRIP.AUTO-MCNC: 146 MG/DL (ref 65–117)
GLUCOSE BLD STRIP.AUTO-MCNC: 148 MG/DL (ref 65–117)
GLUCOSE BLD STRIP.AUTO-MCNC: 180 MG/DL (ref 65–117)
SERVICE CMNT-IMP: ABNORMAL
SERVICE CMNT-IMP: NORMAL

## 2024-05-26 PROCEDURE — 6370000000 HC RX 637 (ALT 250 FOR IP): Performed by: NURSE PRACTITIONER

## 2024-05-26 PROCEDURE — C9113 INJ PANTOPRAZOLE SODIUM, VIA: HCPCS | Performed by: INTERNAL MEDICINE

## 2024-05-26 PROCEDURE — 6370000000 HC RX 637 (ALT 250 FOR IP): Performed by: STUDENT IN AN ORGANIZED HEALTH CARE EDUCATION/TRAINING PROGRAM

## 2024-05-26 PROCEDURE — A4216 STERILE WATER/SALINE, 10 ML: HCPCS | Performed by: INTERNAL MEDICINE

## 2024-05-26 PROCEDURE — 2580000003 HC RX 258: Performed by: STUDENT IN AN ORGANIZED HEALTH CARE EDUCATION/TRAINING PROGRAM

## 2024-05-26 PROCEDURE — 1100000000 HC RM PRIVATE

## 2024-05-26 PROCEDURE — 82962 GLUCOSE BLOOD TEST: CPT

## 2024-05-26 PROCEDURE — 2580000003 HC RX 258: Performed by: INTERNAL MEDICINE

## 2024-05-26 PROCEDURE — 6360000002 HC RX W HCPCS: Performed by: INTERNAL MEDICINE

## 2024-05-26 PROCEDURE — 6360000002 HC RX W HCPCS: Performed by: STUDENT IN AN ORGANIZED HEALTH CARE EDUCATION/TRAINING PROGRAM

## 2024-05-26 RX ORDER — LISINOPRIL 20 MG/1
20 TABLET ORAL ONCE
Status: COMPLETED | OUTPATIENT
Start: 2024-05-26 | End: 2024-05-26

## 2024-05-26 RX ORDER — GUAIFENESIN 600 MG/1
600 TABLET, EXTENDED RELEASE ORAL 2 TIMES DAILY PRN
Status: DISCONTINUED | OUTPATIENT
Start: 2024-05-26 | End: 2024-05-29 | Stop reason: HOSPADM

## 2024-05-26 RX ADMIN — LISINOPRIL 20 MG: 20 TABLET ORAL at 09:39

## 2024-05-26 RX ADMIN — MELATONIN 3 MG: at 19:37

## 2024-05-26 RX ADMIN — PIPERACILLIN AND TAZOBACTAM 3375 MG: 3; .375 INJECTION, POWDER, LYOPHILIZED, FOR SOLUTION INTRAVENOUS at 09:43

## 2024-05-26 RX ADMIN — PANTOPRAZOLE SODIUM 40 MG: 40 INJECTION, POWDER, FOR SOLUTION INTRAVENOUS at 01:01

## 2024-05-26 RX ADMIN — SUCRALFATE 1 G: 1 TABLET ORAL at 17:42

## 2024-05-26 RX ADMIN — SUCRALFATE 1 G: 1 TABLET ORAL at 05:34

## 2024-05-26 RX ADMIN — GUAIFENESIN 600 MG: 600 TABLET, EXTENDED RELEASE ORAL at 04:17

## 2024-05-26 RX ADMIN — PANTOPRAZOLE SODIUM 40 MG: 40 INJECTION, POWDER, FOR SOLUTION INTRAVENOUS at 14:01

## 2024-05-26 RX ADMIN — LISINOPRIL 20 MG: 20 TABLET ORAL at 00:59

## 2024-05-26 RX ADMIN — ATORVASTATIN CALCIUM 80 MG: 40 TABLET, FILM COATED ORAL at 09:39

## 2024-05-26 RX ADMIN — INSULIN GLARGINE 10 UNITS: 100 INJECTION, SOLUTION SUBCUTANEOUS at 19:37

## 2024-05-26 RX ADMIN — QUETIAPINE FUMARATE 25 MG: 25 TABLET ORAL at 19:37

## 2024-05-26 RX ADMIN — VENLAFAXINE HYDROCHLORIDE 150 MG: 150 CAPSULE, EXTENDED RELEASE ORAL at 09:38

## 2024-05-26 RX ADMIN — PIPERACILLIN AND TAZOBACTAM 3375 MG: 3; .375 INJECTION, POWDER, LYOPHILIZED, FOR SOLUTION INTRAVENOUS at 17:44

## 2024-05-26 RX ADMIN — METOPROLOL SUCCINATE 50 MG: 50 TABLET, EXTENDED RELEASE ORAL at 09:39

## 2024-05-26 RX ADMIN — GUAIFENESIN 600 MG: 600 TABLET, EXTENDED RELEASE ORAL at 19:37

## 2024-05-26 RX ADMIN — SUCRALFATE 1 G: 1 TABLET ORAL at 19:37

## 2024-05-26 RX ADMIN — MELATONIN 3 MG: at 00:59

## 2024-05-26 RX ADMIN — SODIUM CHLORIDE, PRESERVATIVE FREE 10 ML: 5 INJECTION INTRAVENOUS at 19:38

## 2024-05-26 RX ADMIN — SODIUM CHLORIDE, PRESERVATIVE FREE 10 ML: 5 INJECTION INTRAVENOUS at 09:39

## 2024-05-26 RX ADMIN — SUCRALFATE 1 G: 1 TABLET ORAL at 09:39

## 2024-05-26 RX ADMIN — PIPERACILLIN AND TAZOBACTAM 3375 MG: 3; .375 INJECTION, POWDER, LYOPHILIZED, FOR SOLUTION INTRAVENOUS at 01:06

## 2024-05-26 ASSESSMENT — PAIN SCALES - GENERAL: PAINLEVEL_OUTOF10: 0

## 2024-05-27 LAB
ANION GAP SERPL CALC-SCNC: 5 MMOL/L (ref 5–15)
BUN SERPL-MCNC: 9 MG/DL (ref 6–20)
BUN/CREAT SERPL: 9 (ref 12–20)
CALCIUM SERPL-MCNC: 8.3 MG/DL (ref 8.5–10.1)
CHLORIDE SERPL-SCNC: 107 MMOL/L (ref 97–108)
CO2 SERPL-SCNC: 28 MMOL/L (ref 21–32)
CREAT SERPL-MCNC: 1.03 MG/DL (ref 0.55–1.02)
ERYTHROCYTE [DISTWIDTH] IN BLOOD BY AUTOMATED COUNT: 12 % (ref 11.5–14.5)
GLUCOSE BLD STRIP.AUTO-MCNC: 119 MG/DL (ref 65–117)
GLUCOSE BLD STRIP.AUTO-MCNC: 120 MG/DL (ref 65–117)
GLUCOSE BLD STRIP.AUTO-MCNC: 141 MG/DL (ref 65–117)
GLUCOSE BLD STRIP.AUTO-MCNC: 175 MG/DL (ref 65–117)
GLUCOSE SERPL-MCNC: 127 MG/DL (ref 65–100)
HCT VFR BLD AUTO: 38.4 % (ref 35–47)
HGB BLD-MCNC: 12 G/DL (ref 11.5–16)
MAGNESIUM SERPL-MCNC: 1.9 MG/DL (ref 1.6–2.4)
MCH RBC QN AUTO: 29.7 PG (ref 26–34)
MCHC RBC AUTO-ENTMCNC: 31.3 G/DL (ref 30–36.5)
MCV RBC AUTO: 95 FL (ref 80–99)
NRBC # BLD: 0 K/UL (ref 0–0.01)
NRBC BLD-RTO: 0 PER 100 WBC
PHOSPHATE SERPL-MCNC: 3.3 MG/DL (ref 2.6–4.7)
PLATELET # BLD AUTO: 317 K/UL (ref 150–400)
PMV BLD AUTO: 9.5 FL (ref 8.9–12.9)
POTASSIUM SERPL-SCNC: 3.3 MMOL/L (ref 3.5–5.1)
RBC # BLD AUTO: 4.04 M/UL (ref 3.8–5.2)
SERVICE CMNT-IMP: ABNORMAL
SODIUM SERPL-SCNC: 140 MMOL/L (ref 136–145)
WBC # BLD AUTO: 9.5 K/UL (ref 3.6–11)

## 2024-05-27 PROCEDURE — 82962 GLUCOSE BLOOD TEST: CPT

## 2024-05-27 PROCEDURE — 2580000003 HC RX 258: Performed by: STUDENT IN AN ORGANIZED HEALTH CARE EDUCATION/TRAINING PROGRAM

## 2024-05-27 PROCEDURE — 6370000000 HC RX 637 (ALT 250 FOR IP): Performed by: INTERNAL MEDICINE

## 2024-05-27 PROCEDURE — A4216 STERILE WATER/SALINE, 10 ML: HCPCS | Performed by: INTERNAL MEDICINE

## 2024-05-27 PROCEDURE — C9113 INJ PANTOPRAZOLE SODIUM, VIA: HCPCS | Performed by: INTERNAL MEDICINE

## 2024-05-27 PROCEDURE — 84100 ASSAY OF PHOSPHORUS: CPT

## 2024-05-27 PROCEDURE — 80048 BASIC METABOLIC PNL TOTAL CA: CPT

## 2024-05-27 PROCEDURE — 6370000000 HC RX 637 (ALT 250 FOR IP): Performed by: STUDENT IN AN ORGANIZED HEALTH CARE EDUCATION/TRAINING PROGRAM

## 2024-05-27 PROCEDURE — 6360000002 HC RX W HCPCS: Performed by: INTERNAL MEDICINE

## 2024-05-27 PROCEDURE — 36415 COLL VENOUS BLD VENIPUNCTURE: CPT

## 2024-05-27 PROCEDURE — 6370000000 HC RX 637 (ALT 250 FOR IP): Performed by: NURSE PRACTITIONER

## 2024-05-27 PROCEDURE — 1100000000 HC RM PRIVATE

## 2024-05-27 PROCEDURE — 85027 COMPLETE CBC AUTOMATED: CPT

## 2024-05-27 PROCEDURE — 83735 ASSAY OF MAGNESIUM: CPT

## 2024-05-27 PROCEDURE — 6360000002 HC RX W HCPCS: Performed by: STUDENT IN AN ORGANIZED HEALTH CARE EDUCATION/TRAINING PROGRAM

## 2024-05-27 PROCEDURE — 2580000003 HC RX 258: Performed by: INTERNAL MEDICINE

## 2024-05-27 RX ORDER — HYDRALAZINE HYDROCHLORIDE 20 MG/ML
10 INJECTION INTRAMUSCULAR; INTRAVENOUS ONCE
Status: COMPLETED | OUTPATIENT
Start: 2024-05-27 | End: 2024-05-27

## 2024-05-27 RX ORDER — HYDRALAZINE HYDROCHLORIDE 20 MG/ML
10 INJECTION INTRAMUSCULAR; INTRAVENOUS EVERY 6 HOURS PRN
Status: DISCONTINUED | OUTPATIENT
Start: 2024-05-27 | End: 2024-05-29 | Stop reason: HOSPADM

## 2024-05-27 RX ORDER — POTASSIUM CHLORIDE 20 MEQ/1
40 TABLET, EXTENDED RELEASE ORAL ONCE
Status: COMPLETED | OUTPATIENT
Start: 2024-05-27 | End: 2024-05-27

## 2024-05-27 RX ADMIN — LISINOPRIL 20 MG: 20 TABLET ORAL at 08:25

## 2024-05-27 RX ADMIN — INSULIN GLARGINE 10 UNITS: 100 INJECTION, SOLUTION SUBCUTANEOUS at 21:10

## 2024-05-27 RX ADMIN — METOPROLOL SUCCINATE 50 MG: 50 TABLET, EXTENDED RELEASE ORAL at 08:25

## 2024-05-27 RX ADMIN — SODIUM CHLORIDE, PRESERVATIVE FREE 10 ML: 5 INJECTION INTRAVENOUS at 08:29

## 2024-05-27 RX ADMIN — PANTOPRAZOLE SODIUM 40 MG: 40 INJECTION, POWDER, FOR SOLUTION INTRAVENOUS at 14:12

## 2024-05-27 RX ADMIN — SUCRALFATE 1 G: 1 TABLET ORAL at 06:26

## 2024-05-27 RX ADMIN — SUCRALFATE 1 G: 1 TABLET ORAL at 11:17

## 2024-05-27 RX ADMIN — POTASSIUM CHLORIDE 40 MEQ: 1500 TABLET, EXTENDED RELEASE ORAL at 09:07

## 2024-05-27 RX ADMIN — PANTOPRAZOLE SODIUM 40 MG: 40 INJECTION, POWDER, FOR SOLUTION INTRAVENOUS at 00:22

## 2024-05-27 RX ADMIN — GUAIFENESIN 600 MG: 600 TABLET, EXTENDED RELEASE ORAL at 21:47

## 2024-05-27 RX ADMIN — PIPERACILLIN AND TAZOBACTAM 3375 MG: 3; .375 INJECTION, POWDER, LYOPHILIZED, FOR SOLUTION INTRAVENOUS at 17:11

## 2024-05-27 RX ADMIN — HYDRALAZINE HYDROCHLORIDE 10 MG: 20 INJECTION INTRAMUSCULAR; INTRAVENOUS at 10:10

## 2024-05-27 RX ADMIN — POLYETHYLENE GLYCOL-3350 AND ELECTROLYTES 2000 ML: 236; 6.74; 5.86; 2.97; 22.74 POWDER, FOR SOLUTION ORAL at 11:17

## 2024-05-27 RX ADMIN — SUCRALFATE 1 G: 1 TABLET ORAL at 17:12

## 2024-05-27 RX ADMIN — SUCRALFATE 1 G: 1 TABLET ORAL at 21:10

## 2024-05-27 RX ADMIN — PIPERACILLIN AND TAZOBACTAM 3375 MG: 3; .375 INJECTION, POWDER, LYOPHILIZED, FOR SOLUTION INTRAVENOUS at 01:08

## 2024-05-27 RX ADMIN — ACETAMINOPHEN 650 MG: 325 TABLET ORAL at 21:10

## 2024-05-27 RX ADMIN — VENLAFAXINE HYDROCHLORIDE 150 MG: 150 CAPSULE, EXTENDED RELEASE ORAL at 08:25

## 2024-05-27 RX ADMIN — QUETIAPINE FUMARATE 25 MG: 25 TABLET ORAL at 21:10

## 2024-05-27 RX ADMIN — HYDRALAZINE HYDROCHLORIDE 10 MG: 20 INJECTION, SOLUTION INTRAMUSCULAR; INTRAVENOUS at 09:08

## 2024-05-27 RX ADMIN — PIPERACILLIN AND TAZOBACTAM 3375 MG: 3; .375 INJECTION, POWDER, LYOPHILIZED, FOR SOLUTION INTRAVENOUS at 09:15

## 2024-05-27 RX ADMIN — ATORVASTATIN CALCIUM 80 MG: 40 TABLET, FILM COATED ORAL at 08:25

## 2024-05-27 ASSESSMENT — PAIN SCALES - GENERAL
PAINLEVEL_OUTOF10: 0
PAINLEVEL_OUTOF10: 3
PAINLEVEL_OUTOF10: 0

## 2024-05-27 ASSESSMENT — PAIN DESCRIPTION - DESCRIPTORS: DESCRIPTORS: DULL

## 2024-05-27 ASSESSMENT — PAIN DESCRIPTION - LOCATION: LOCATION: HEAD

## 2024-05-27 ASSESSMENT — PAIN DESCRIPTION - ORIENTATION: ORIENTATION: ANTERIOR

## 2024-05-28 ENCOUNTER — ANESTHESIA EVENT (OUTPATIENT)
Facility: HOSPITAL | Age: 89
DRG: 377 | End: 2024-05-28
Payer: MEDICARE

## 2024-05-28 ENCOUNTER — ANESTHESIA (OUTPATIENT)
Facility: HOSPITAL | Age: 89
DRG: 377 | End: 2024-05-28
Payer: MEDICARE

## 2024-05-28 LAB
ANION GAP SERPL CALC-SCNC: 6 MMOL/L (ref 5–15)
BUN SERPL-MCNC: 6 MG/DL (ref 6–20)
BUN/CREAT SERPL: 6 (ref 12–20)
CALCIUM SERPL-MCNC: 8.9 MG/DL (ref 8.5–10.1)
CHLORIDE SERPL-SCNC: 108 MMOL/L (ref 97–108)
CO2 SERPL-SCNC: 27 MMOL/L (ref 21–32)
CREAT SERPL-MCNC: 1.01 MG/DL (ref 0.55–1.02)
ERYTHROCYTE [DISTWIDTH] IN BLOOD BY AUTOMATED COUNT: 12.2 % (ref 11.5–14.5)
GLUCOSE BLD STRIP.AUTO-MCNC: 135 MG/DL (ref 65–117)
GLUCOSE BLD STRIP.AUTO-MCNC: 149 MG/DL (ref 65–117)
GLUCOSE BLD STRIP.AUTO-MCNC: 150 MG/DL (ref 65–117)
GLUCOSE SERPL-MCNC: 135 MG/DL (ref 65–100)
HCT VFR BLD AUTO: 37.4 % (ref 35–47)
HGB BLD-MCNC: 11.9 G/DL (ref 11.5–16)
MAGNESIUM SERPL-MCNC: 1.9 MG/DL (ref 1.6–2.4)
MCH RBC QN AUTO: 29.5 PG (ref 26–34)
MCHC RBC AUTO-ENTMCNC: 31.8 G/DL (ref 30–36.5)
MCV RBC AUTO: 92.8 FL (ref 80–99)
NRBC # BLD: 0 K/UL (ref 0–0.01)
NRBC BLD-RTO: 0 PER 100 WBC
PHOSPHATE SERPL-MCNC: 2.4 MG/DL (ref 2.6–4.7)
PLATELET # BLD AUTO: 347 K/UL (ref 150–400)
PMV BLD AUTO: 9.3 FL (ref 8.9–12.9)
POTASSIUM SERPL-SCNC: 3.1 MMOL/L (ref 3.5–5.1)
RBC # BLD AUTO: 4.03 M/UL (ref 3.8–5.2)
SERVICE CMNT-IMP: ABNORMAL
SODIUM SERPL-SCNC: 141 MMOL/L (ref 136–145)
WBC # BLD AUTO: 10.8 K/UL (ref 3.6–11)

## 2024-05-28 PROCEDURE — 2580000003 HC RX 258: Performed by: STUDENT IN AN ORGANIZED HEALTH CARE EDUCATION/TRAINING PROGRAM

## 2024-05-28 PROCEDURE — 6370000000 HC RX 637 (ALT 250 FOR IP): Performed by: NURSE PRACTITIONER

## 2024-05-28 PROCEDURE — 2500000003 HC RX 250 WO HCPCS: Performed by: INTERNAL MEDICINE

## 2024-05-28 PROCEDURE — 2580000003 HC RX 258: Performed by: INTERNAL MEDICINE

## 2024-05-28 PROCEDURE — 2580000003 HC RX 258: Performed by: SPECIALIST

## 2024-05-28 PROCEDURE — C9113 INJ PANTOPRAZOLE SODIUM, VIA: HCPCS | Performed by: INTERNAL MEDICINE

## 2024-05-28 PROCEDURE — 6360000002 HC RX W HCPCS

## 2024-05-28 PROCEDURE — 2580000003 HC RX 258

## 2024-05-28 PROCEDURE — 0W3P8ZZ CONTROL BLEEDING IN GASTROINTESTINAL TRACT, VIA NATURAL OR ARTIFICIAL OPENING ENDOSCOPIC: ICD-10-PCS | Performed by: SPECIALIST

## 2024-05-28 PROCEDURE — 82962 GLUCOSE BLOOD TEST: CPT

## 2024-05-28 PROCEDURE — 6360000002 HC RX W HCPCS: Performed by: INTERNAL MEDICINE

## 2024-05-28 PROCEDURE — 3700000001 HC ADD 15 MINUTES (ANESTHESIA): Performed by: SPECIALIST

## 2024-05-28 PROCEDURE — 3700000000 HC ANESTHESIA ATTENDED CARE: Performed by: SPECIALIST

## 2024-05-28 PROCEDURE — A4216 STERILE WATER/SALINE, 10 ML: HCPCS | Performed by: INTERNAL MEDICINE

## 2024-05-28 PROCEDURE — 6360000002 HC RX W HCPCS: Performed by: STUDENT IN AN ORGANIZED HEALTH CARE EDUCATION/TRAINING PROGRAM

## 2024-05-28 PROCEDURE — 88305 TISSUE EXAM BY PATHOLOGIST: CPT

## 2024-05-28 PROCEDURE — 3600007502: Performed by: SPECIALIST

## 2024-05-28 PROCEDURE — 1100000000 HC RM PRIVATE

## 2024-05-28 PROCEDURE — C1889 IMPLANT/INSERT DEVICE, NOC: HCPCS | Performed by: SPECIALIST

## 2024-05-28 PROCEDURE — 6370000000 HC RX 637 (ALT 250 FOR IP): Performed by: INTERNAL MEDICINE

## 2024-05-28 PROCEDURE — 2500000003 HC RX 250 WO HCPCS

## 2024-05-28 PROCEDURE — 80048 BASIC METABOLIC PNL TOTAL CA: CPT

## 2024-05-28 PROCEDURE — 7100000011 HC PHASE II RECOVERY - ADDTL 15 MIN: Performed by: SPECIALIST

## 2024-05-28 PROCEDURE — 0DBN8ZZ EXCISION OF SIGMOID COLON, VIA NATURAL OR ARTIFICIAL OPENING ENDOSCOPIC: ICD-10-PCS | Performed by: SPECIALIST

## 2024-05-28 PROCEDURE — 84100 ASSAY OF PHOSPHORUS: CPT

## 2024-05-28 PROCEDURE — 2709999900 HC NON-CHARGEABLE SUPPLY: Performed by: SPECIALIST

## 2024-05-28 PROCEDURE — 85027 COMPLETE CBC AUTOMATED: CPT

## 2024-05-28 PROCEDURE — 7100000010 HC PHASE II RECOVERY - FIRST 15 MIN: Performed by: SPECIALIST

## 2024-05-28 PROCEDURE — 6370000000 HC RX 637 (ALT 250 FOR IP): Performed by: STUDENT IN AN ORGANIZED HEALTH CARE EDUCATION/TRAINING PROGRAM

## 2024-05-28 PROCEDURE — 3600007512: Performed by: SPECIALIST

## 2024-05-28 PROCEDURE — 83735 ASSAY OF MAGNESIUM: CPT

## 2024-05-28 PROCEDURE — 36415 COLL VENOUS BLD VENIPUNCTURE: CPT

## 2024-05-28 DEVICE — WORKING LENGTH 235CM, WORKING CHANNEL 2.8MM
Type: IMPLANTABLE DEVICE | Site: SIGMOID COLON | Status: FUNCTIONAL
Brand: RESOLUTION 360 CLIP

## 2024-05-28 RX ORDER — SODIUM CHLORIDE 9 MG/ML
25 INJECTION, SOLUTION INTRAVENOUS PRN
Status: DISCONTINUED | OUTPATIENT
Start: 2024-05-28 | End: 2024-05-28 | Stop reason: HOSPADM

## 2024-05-28 RX ORDER — SODIUM CHLORIDE 0.9 % (FLUSH) 0.9 %
5-40 SYRINGE (ML) INJECTION PRN
Status: DISCONTINUED | OUTPATIENT
Start: 2024-05-28 | End: 2024-05-28 | Stop reason: HOSPADM

## 2024-05-28 RX ORDER — POTASSIUM CHLORIDE 20 MEQ/1
40 TABLET, EXTENDED RELEASE ORAL
Status: COMPLETED | OUTPATIENT
Start: 2024-05-28 | End: 2024-05-28

## 2024-05-28 RX ORDER — SODIUM CHLORIDE 0.9 % (FLUSH) 0.9 %
5-40 SYRINGE (ML) INJECTION EVERY 12 HOURS SCHEDULED
Status: DISCONTINUED | OUTPATIENT
Start: 2024-05-28 | End: 2024-05-28 | Stop reason: HOSPADM

## 2024-05-28 RX ORDER — SODIUM CHLORIDE 9 MG/ML
INJECTION, SOLUTION INTRAVENOUS CONTINUOUS
Status: DISCONTINUED | OUTPATIENT
Start: 2024-05-28 | End: 2024-05-29

## 2024-05-28 RX ORDER — 0.9 % SODIUM CHLORIDE 0.9 %
INTRAVENOUS SOLUTION INTRAVENOUS PRN
Status: DISCONTINUED | OUTPATIENT
Start: 2024-05-28 | End: 2024-05-28 | Stop reason: SDUPTHER

## 2024-05-28 RX ORDER — LIDOCAINE HYDROCHLORIDE 20 MG/ML
INJECTION, SOLUTION EPIDURAL; INFILTRATION; INTRACAUDAL; PERINEURAL PRN
Status: DISCONTINUED | OUTPATIENT
Start: 2024-05-28 | End: 2024-05-28 | Stop reason: SDUPTHER

## 2024-05-28 RX ADMIN — POTASSIUM & SODIUM PHOSPHATES POWDER PACK 280-160-250 MG 1000 MG: 280-160-250 PACK at 09:06

## 2024-05-28 RX ADMIN — SODIUM CHLORIDE, PRESERVATIVE FREE 10 ML: 5 INJECTION INTRAVENOUS at 19:55

## 2024-05-28 RX ADMIN — PIPERACILLIN AND TAZOBACTAM 3375 MG: 3; .375 INJECTION, POWDER, LYOPHILIZED, FOR SOLUTION INTRAVENOUS at 01:55

## 2024-05-28 RX ADMIN — HYDRALAZINE HYDROCHLORIDE 10 MG: 20 INJECTION INTRAMUSCULAR; INTRAVENOUS at 19:52

## 2024-05-28 RX ADMIN — POTASSIUM PHOSPHATE, MONOBASIC POTASSIUM PHOSPHATE, DIBASIC INJECTION, 30 MMOL: 236; 224 SOLUTION, CONCENTRATE INTRAVENOUS at 13:35

## 2024-05-28 RX ADMIN — SUCRALFATE 1 G: 1 TABLET ORAL at 17:02

## 2024-05-28 RX ADMIN — SODIUM CHLORIDE: 9 INJECTION, SOLUTION INTRAVENOUS at 11:00

## 2024-05-28 RX ADMIN — PANTOPRAZOLE SODIUM 40 MG: 40 INJECTION, POWDER, FOR SOLUTION INTRAVENOUS at 01:46

## 2024-05-28 RX ADMIN — PIPERACILLIN AND TAZOBACTAM 3375 MG: 3; .375 INJECTION, POWDER, LYOPHILIZED, FOR SOLUTION INTRAVENOUS at 19:52

## 2024-05-28 RX ADMIN — LIDOCAINE HYDROCHLORIDE 50 MG: 20 INJECTION, SOLUTION EPIDURAL; INFILTRATION; INTRACAUDAL; PERINEURAL at 11:21

## 2024-05-28 RX ADMIN — POTASSIUM CHLORIDE 40 MEQ: 1500 TABLET, EXTENDED RELEASE ORAL at 10:05

## 2024-05-28 RX ADMIN — MELATONIN 3 MG: at 19:55

## 2024-05-28 RX ADMIN — QUETIAPINE FUMARATE 25 MG: 25 TABLET ORAL at 19:55

## 2024-05-28 RX ADMIN — PANTOPRAZOLE SODIUM 40 MG: 40 INJECTION, POWDER, FOR SOLUTION INTRAVENOUS at 13:35

## 2024-05-28 RX ADMIN — SODIUM CHLORIDE, PRESERVATIVE FREE 10 ML: 5 INJECTION INTRAVENOUS at 01:45

## 2024-05-28 RX ADMIN — LISINOPRIL 20 MG: 20 TABLET ORAL at 08:50

## 2024-05-28 RX ADMIN — METOPROLOL SUCCINATE 50 MG: 50 TABLET, EXTENDED RELEASE ORAL at 08:50

## 2024-05-28 RX ADMIN — POTASSIUM CHLORIDE 40 MEQ: 1500 TABLET, EXTENDED RELEASE ORAL at 09:07

## 2024-05-28 RX ADMIN — PROPOFOL 120 MG: 10 INJECTION, EMULSION INTRAVENOUS at 11:40

## 2024-05-28 RX ADMIN — SODIUM CHLORIDE: 9 INJECTION, SOLUTION INTRAVENOUS at 19:50

## 2024-05-28 RX ADMIN — PIPERACILLIN AND TAZOBACTAM 3375 MG: 3; .375 INJECTION, POWDER, LYOPHILIZED, FOR SOLUTION INTRAVENOUS at 08:56

## 2024-05-28 RX ADMIN — SUCRALFATE 1 G: 1 TABLET ORAL at 19:55

## 2024-05-28 RX ADMIN — SODIUM CHLORIDE 300 ML: 900 INJECTION, SOLUTION INTRAVENOUS at 11:40

## 2024-05-28 RX ADMIN — VENLAFAXINE HYDROCHLORIDE 150 MG: 150 CAPSULE, EXTENDED RELEASE ORAL at 08:50

## 2024-05-28 RX ADMIN — ATORVASTATIN CALCIUM 80 MG: 40 TABLET, FILM COATED ORAL at 08:50

## 2024-05-28 RX ADMIN — INSULIN GLARGINE 10 UNITS: 100 INJECTION, SOLUTION SUBCUTANEOUS at 20:44

## 2024-05-28 ASSESSMENT — PAIN - FUNCTIONAL ASSESSMENT: PAIN_FUNCTIONAL_ASSESSMENT: 0-10

## 2024-05-28 ASSESSMENT — PAIN SCALES - GENERAL
PAINLEVEL_OUTOF10: 0
PAINLEVEL_OUTOF10: 0

## 2024-05-28 NOTE — CARE COORDINATION
Transition of Care Plan:     RUR: 13% (low RUR)    Prior Level of Functioning: Independent    Disposition: Home with caregivers and follow-ups    4:12 PM   CM completed chart review.   ARMAND: 5/29?    If SNF or IPR: Date FOC offered:   Date FOC received:   Accepting facility:   Date authorization started with reference number:   Date authorization received and expires:   .  Follow up appointments: PCP/Specialists as indicated  DME needed: None at this time  Transportation at discharge: Family to transport  IM/IMM Medicare/ letter given: 2nd needed prior to d/c  Is patient a  and connected with [facility]? N/a              If yes, was [place] transfer form completed and VA notified? N/a  Caregiver Contact: Le Hawkins; DTR; [phone]  Discharge Caregiver contacted prior to discharge? CM to contact  Care Conference needed? Not at this time   Barriers to discharge: Medical clearance, GI clearance,     Luma Restrepo, CM  [phone]

## 2024-05-28 NOTE — OP NOTE
Colonoscopy Procedure Note    Indications:   Lower GI bleeding    Referring Physician: Tracy Banuelos, APRN - NP  Anesthesia/Sedation: MAC anesthesia Propofol  Endoscopist:  Dr. Benny Artis    Procedure in Detail:  Informed consent was obtained for the procedure, including sedation.  Risks of perforation, hemorrhage, adverse drug reaction, and aspiration were discussed. The patient was placed in the left lateral decubitus position.  Based on the pre-procedure assessment, including review of the patient's medical history, medications, allergies, and review of systems, she had been deemed to be an appropriate candidate for moderate sedation; she was therefore sedated with the medications listed above.   The patient was monitored continuously with ECG tracing, pulse oximetry, blood pressure monitoring, and direct observations.      A rectal examination was performed. The [id number] was inserted into the rectum and advanced under direct vision to the terminal ileum.  The quality of the colonic preparation was adequate.  A careful inspection was made as the colonoscope was withdrawn, including a retroflexed view of the rectum; findings and interventions are described below.  Appropriate photodocumentation was obtained.    Findings:    Scope advanced to the cecum and terminal ileum.   Preparation adequate.   Diffuse diverticulosis in the sigmoid, descending, transverse and cecum.   Single sessile polyp 5 mm in the sigmoid colon s/p cold snare removal in one piece (placed single hemoclip on site)   Small internal hemorrhoids.    Therapies:  see above    Specimen: Specimens were collected as described above and sent to pathology.     Complications: None were encountered during the procedure.     EBL: < 10 ml.    Recommendations:   -Resume Plavix tomorrow.  -advance diet    Signed By: Benny Artis MD                        May 28, 2024      [FreeTextEntry1] : \par 84 y/o lived in [place]  has PMH of PVD stents legs, HTN,. 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 Uses walker at home with mild MORRIS.  She has home care 8 hours. 5 days.\par Pt had several days of increased SOB no chest pain and was admitted 12/2 and Disch 12/7 with Pul Edema  and CHF. BNP 13,605. ECHO 12/5/22: EF 35%LAE, mod AI, Severe MR,Mod thickening and calcification of ant and post mitral valve leaflets.12/6  BUN/cr 29/0.8 K 3.5   H/H 10.2/33. Pt here w daughter Ela . \par She returns for f/u. Pt was started on low dose entresto . Her labs reviewed show pancytopenia. She has persistently low ALB at 2.5\par 3/29/23 Pt had stage 2 ulcer left metatarsal area w osteomyelitis and is s/p 2nd digit amputation 1/18/23. She also had left tibial angioplasty  1/23 . Followed by podiatry and vasc Pt with infection  in toes on AB , She is in w/c awake but looks fatigued. She is not SOB . Pt denies chest pain, shortness of breath,  palpitations,dizziness, syncope or near syncope.. She has splints both wrists and jose manuel boots both feet. She has lost 20 lbs b/c she has been unable to eat due to an infected tooth that needs to be extracted. . Her fingers are warm but cyanotic. . BP is stable\par

## 2024-05-28 NOTE — ANESTHESIA POSTPROCEDURE EVALUATION
Department of Anesthesiology  Postprocedure Note    Patient: Dannielle Hall  MRN: [id number]  YOB: 1935  Date of evaluation: 5/28/2024    Procedure Summary       Date: 05/28/24 Room / Location: [facility] ENDO 02 / [facility] ENDOSCOPY    Anesthesia Start: 1121 Anesthesia Stop: 1143    Procedure: COLONOSCOPY BIOPSY (Lower GI Region) Diagnosis:       Colitis      Rectal bleeding      (Colitis [K52.9])      (Rectal bleeding [K62.5])    Surgeons: Benny Artis MD Responsible Provider: John Mayfield MD    Anesthesia Type: MAC ASA Status: 3            Anesthesia Type: No value filed.    Paul Phase I: Paul Score: 10    Paul Phase II: Paul Score: 10    Anesthesia Post Evaluation    Patient location during evaluation: PACU  Patient participation: complete - patient participated  Level of consciousness: awake and alert  Airway patency: patent  Nausea & Vomiting: no nausea and no vomiting  Cardiovascular status: hemodynamically stable  Respiratory status: acceptable  Hydration status: stable    No notable events documented.

## 2024-05-28 NOTE — ANESTHESIA PRE PROCEDURE
antiemetics administered.  Anesthetic plan and risks discussed with patient.      Plan discussed with CRNA.                    John Mayfield MD   5/28/2024

## 2024-05-29 ENCOUNTER — TELEPHONE (OUTPATIENT)
Age: 89
End: 2024-05-29

## 2024-05-29 VITALS
SYSTOLIC BLOOD PRESSURE: 145 MMHG | WEIGHT: 167 LBS | OXYGEN SATURATION: 96 % | HEIGHT: 66 IN | RESPIRATION RATE: 18 BRPM | BODY MASS INDEX: 26.84 KG/M2 | DIASTOLIC BLOOD PRESSURE: 72 MMHG | TEMPERATURE: 98.2 F | HEART RATE: 74 BPM

## 2024-05-29 LAB
ANION GAP SERPL CALC-SCNC: 4 MMOL/L (ref 5–15)
BUN SERPL-MCNC: 8 MG/DL (ref 6–20)
BUN/CREAT SERPL: 7 (ref 12–20)
CALCIUM SERPL-MCNC: 8.5 MG/DL (ref 8.5–10.1)
CHLORIDE SERPL-SCNC: 110 MMOL/L (ref 97–108)
CO2 SERPL-SCNC: 25 MMOL/L (ref 21–32)
CREAT SERPL-MCNC: 1.16 MG/DL (ref 0.55–1.02)
ERYTHROCYTE [DISTWIDTH] IN BLOOD BY AUTOMATED COUNT: 12.6 % (ref 11.5–14.5)
GLUCOSE BLD STRIP.AUTO-MCNC: 131 MG/DL (ref 65–117)
GLUCOSE BLD STRIP.AUTO-MCNC: 154 MG/DL (ref 65–117)
GLUCOSE SERPL-MCNC: 179 MG/DL (ref 65–100)
HCT VFR BLD AUTO: 38.4 % (ref 35–47)
HGB BLD-MCNC: 12.1 G/DL (ref 11.5–16)
MAGNESIUM SERPL-MCNC: 1.9 MG/DL (ref 1.6–2.4)
MCH RBC QN AUTO: 29.7 PG (ref 26–34)
MCHC RBC AUTO-ENTMCNC: 31.5 G/DL (ref 30–36.5)
MCV RBC AUTO: 94.1 FL (ref 80–99)
NRBC # BLD: 0 K/UL (ref 0–0.01)
NRBC BLD-RTO: 0 PER 100 WBC
PHOSPHATE SERPL-MCNC: 2.7 MG/DL (ref 2.6–4.7)
PLATELET # BLD AUTO: 400 K/UL (ref 150–400)
PMV BLD AUTO: 9.5 FL (ref 8.9–12.9)
POTASSIUM SERPL-SCNC: 3.7 MMOL/L (ref 3.5–5.1)
RBC # BLD AUTO: 4.08 M/UL (ref 3.8–5.2)
SERVICE CMNT-IMP: ABNORMAL
SERVICE CMNT-IMP: ABNORMAL
SODIUM SERPL-SCNC: 139 MMOL/L (ref 136–145)
WBC # BLD AUTO: 11.2 K/UL (ref 3.6–11)

## 2024-05-29 PROCEDURE — 36415 COLL VENOUS BLD VENIPUNCTURE: CPT

## 2024-05-29 PROCEDURE — 85027 COMPLETE CBC AUTOMATED: CPT

## 2024-05-29 PROCEDURE — C9113 INJ PANTOPRAZOLE SODIUM, VIA: HCPCS | Performed by: INTERNAL MEDICINE

## 2024-05-29 PROCEDURE — 6370000000 HC RX 637 (ALT 250 FOR IP): Performed by: NURSE PRACTITIONER

## 2024-05-29 PROCEDURE — 6370000000 HC RX 637 (ALT 250 FOR IP): Performed by: INTERNAL MEDICINE

## 2024-05-29 PROCEDURE — 6360000002 HC RX W HCPCS: Performed by: INTERNAL MEDICINE

## 2024-05-29 PROCEDURE — 83735 ASSAY OF MAGNESIUM: CPT

## 2024-05-29 PROCEDURE — A4216 STERILE WATER/SALINE, 10 ML: HCPCS | Performed by: INTERNAL MEDICINE

## 2024-05-29 PROCEDURE — 80048 BASIC METABOLIC PNL TOTAL CA: CPT

## 2024-05-29 PROCEDURE — 6370000000 HC RX 637 (ALT 250 FOR IP): Performed by: STUDENT IN AN ORGANIZED HEALTH CARE EDUCATION/TRAINING PROGRAM

## 2024-05-29 PROCEDURE — 6360000002 HC RX W HCPCS: Performed by: STUDENT IN AN ORGANIZED HEALTH CARE EDUCATION/TRAINING PROGRAM

## 2024-05-29 PROCEDURE — 82962 GLUCOSE BLOOD TEST: CPT

## 2024-05-29 PROCEDURE — 2580000003 HC RX 258: Performed by: INTERNAL MEDICINE

## 2024-05-29 PROCEDURE — 84100 ASSAY OF PHOSPHORUS: CPT

## 2024-05-29 PROCEDURE — 2580000003 HC RX 258: Performed by: STUDENT IN AN ORGANIZED HEALTH CARE EDUCATION/TRAINING PROGRAM

## 2024-05-29 RX ORDER — DOCUSATE SODIUM 100 MG/1
100 CAPSULE, LIQUID FILLED ORAL DAILY
Qty: 30 CAPSULE | Refills: 0 | Status: SHIPPED | OUTPATIENT
Start: 2024-05-29 | End: 2024-06-28

## 2024-05-29 RX ORDER — AMOXICILLIN AND CLAVULANATE POTASSIUM 500; 125 MG/1; MG/1
1 TABLET, FILM COATED ORAL 2 TIMES DAILY
Qty: 8 TABLET | Refills: 0 | Status: SHIPPED | OUTPATIENT
Start: 2024-05-29 | End: 2024-06-02

## 2024-05-29 RX ORDER — GLUCAGON 1 MG/ML
1 KIT INJECTION PRN
Status: DISCONTINUED | OUTPATIENT
Start: 2024-05-29 | End: 2024-05-29 | Stop reason: HOSPADM

## 2024-05-29 RX ADMIN — PIPERACILLIN AND TAZOBACTAM 3375 MG: 3; .375 INJECTION, POWDER, LYOPHILIZED, FOR SOLUTION INTRAVENOUS at 10:06

## 2024-05-29 RX ADMIN — SUCRALFATE 1 G: 1 TABLET ORAL at 10:01

## 2024-05-29 RX ADMIN — LISINOPRIL 20 MG: 20 TABLET ORAL at 10:00

## 2024-05-29 RX ADMIN — METOPROLOL SUCCINATE 50 MG: 50 TABLET, EXTENDED RELEASE ORAL at 10:00

## 2024-05-29 RX ADMIN — ATORVASTATIN CALCIUM 80 MG: 40 TABLET, FILM COATED ORAL at 10:00

## 2024-05-29 RX ADMIN — PANTOPRAZOLE SODIUM 40 MG: 40 INJECTION, POWDER, FOR SOLUTION INTRAVENOUS at 00:39

## 2024-05-29 RX ADMIN — SUCRALFATE 1 G: 1 TABLET ORAL at 05:21

## 2024-05-29 RX ADMIN — SODIUM CHLORIDE, PRESERVATIVE FREE 10 ML: 5 INJECTION INTRAVENOUS at 09:52

## 2024-05-29 RX ADMIN — VENLAFAXINE HYDROCHLORIDE 150 MG: 150 CAPSULE, EXTENDED RELEASE ORAL at 10:00

## 2024-05-29 RX ADMIN — ACETAMINOPHEN 650 MG: 325 TABLET ORAL at 00:53

## 2024-05-29 RX ADMIN — GUAIFENESIN 600 MG: 600 TABLET, EXTENDED RELEASE ORAL at 00:53

## 2024-05-29 ASSESSMENT — PAIN DESCRIPTION - LOCATION: LOCATION: ABDOMEN;NOSE

## 2024-05-29 ASSESSMENT — PAIN DESCRIPTION - ORIENTATION: ORIENTATION: MID

## 2024-05-29 ASSESSMENT — PAIN DESCRIPTION - DESCRIPTORS: DESCRIPTORS: ACHING;DISCOMFORT

## 2024-05-29 ASSESSMENT — PAIN SCALES - GENERAL: PAINLEVEL_OUTOF10: 5

## 2024-05-29 NOTE — DISCHARGE INSTRUCTIONS
Physician's or R.N.'s Signature                                                                  Date/Time                                                                                                                                              Patient or Representative Signature

## 2024-05-29 NOTE — PROGRESS NOTES
Johanna Choi PA-C                       [phone] cell                      Friday 7:30 am- 4:30 pm         GI PROGRESS NOTE        NAME:   Dannielle Hall       :    1935       MRN:    [id number]     Assessment/Plan     GI consultation for rectal bleeding while on Plavix.  88-year-old female with PMH arthritis, depression, hypertension.  She takes Plavix but is unsure why, last dose was yesterday (2024).  She reports a single episode of watery dark bloody bowel movement yesterday after which she presented to [facility] ER.  She had RLQ pain but no fever.  CT shows pancolonic diverticulosis with wall thickening and inflammatory stranding surrounding the cecum.  No abscess or fluid collection, no active hemorrhage.  Underlying mass is not excluded.  Last EGD and colonoscopy were 2020 by Dr. Henriquez with no bleeding source and no polyps.  Hgb is 13.3.  Stool is heme positive.  No family history of CRC or colon polyps.  She has had no further bowel movements.     Impression:  Rectal bleeding  Antithrombotic use  Arthritis  Hypertension  Abnormal CT     2024: Nursing documented large bloody BM last night. Does not appear that stool was sent for the stool studies ordered yesterday. Hgb 12.4. Abdominal pain much better. She is very hungry and asking for food.      2024: Hgb 11.5. Enteric panel and C. Diff negative. She is a little upset this morning, believes there are bugs crawling on her ceiling but I do not see any bugs. No further bleeding. She is hungry and wanting to advance diet.     2024:   Hgb is stable at 11.6. Very sleepy this morning. Said her last bm was earlier today and did not see any blood in stool. Denies abdominal pain, n/v.  Recent Labs     24  0133 24  0430 24  1203 24  0219 24   WBC  --  8.3  --  9.3 11.6*   HGB 11.6 11.5 12.2 12.4 13.3 
                                                                                                 Vicky Montejo, KOKO-C                       [phone] cell                 Monday-Thursday 7:30-5:00                               GI PROGRESS NOTE        NAME:   Dannielle Hall       :    1935       MRN:    [id number]     Assessment/Plan     GI consultation for rectal bleeding while on Plavix.  88-year-old female with PMH arthritis, depression, hypertension.  She takes Plavix but is unsure why, last dose was yesterday (2024).  She reports a single episode of watery dark bloody bowel movement yesterday after which she presented to [facility] ER.  She had RLQ pain but no fever.  CT shows pancolonic diverticulosis with wall thickening and inflammatory stranding surrounding the cecum.  No abscess or fluid collection, no active hemorrhage.  Underlying mass is not excluded.  Last EGD and colonoscopy were 2020 by Dr. Henriquez with no bleeding source and no polyps.  Hgb is 13.3.  Stool is heme positive.  No family history of CRC or colon polyps.  She has had no further bowel movements.     Impression:  Rectal bleeding  Antithrombotic use  Arthritis  Hypertension  Abnormal CT    2024: Nursing documented large bloody BM last night. Does not appear that stool was sent for the stool studies ordered yesterday. Hgb 12.4. Abdominal pain much better. She is very hungry and asking for food.     Recent Labs     24  0219 24   WBC 9.3 11.6*   HGB 12.4 13.3   HCT 38.3 39.8   MCV 92.3 90.9    337     Plan:   Recommend colonoscopy after Plavix has been stopped at least 5 days (so 2024 at the soonest due to Memorial Day holiday). Please collect stool studies (spoke with nurse to collect please).   Continue abx  Follow H&H, transfuse prn  CLD, advance as tolerated to low residue diet     Patient Active Problem List   Diagnosis    Primary osteoarthritis involving multiple joints    Environmental 
                                                                                                 Vicky Montejo, KOKO-C                       [phone] cell                 Monday-Thursday 7:30-5:00                               GI PROGRESS NOTE        NAME:   Dannielle Hall       :    1935       MRN:    [id number]     Assessment/Plan     GI consultation for rectal bleeding while on Plavix.  88-year-old female with PMH arthritis, depression, hypertension.  She takes Plavix but is unsure why, last dose was yesterday (2024).  She reports a single episode of watery dark bloody bowel movement yesterday after which she presented to [facility] ER.  She had RLQ pain but no fever.  CT shows pancolonic diverticulosis with wall thickening and inflammatory stranding surrounding the cecum.  No abscess or fluid collection, no active hemorrhage.  Underlying mass is not excluded.  Last EGD and colonoscopy were 2020 by Dr. Henriquez with no bleeding source and no polyps.  Hgb is 13.3.  Stool is heme positive.  No family history of CRC or colon polyps.  She has had no further bowel movements.     Impression:  Rectal bleeding  Antithrombotic use  Arthritis  Hypertension  Abnormal CT    2024: Nursing documented large bloody BM last night. Does not appear that stool was sent for the stool studies ordered yesterday. Hgb 12.4. Abdominal pain much better. She is very hungry and asking for food.     2024: Hgb 11.5. Enteric panel and C. Diff negative. She is a little upset this morning, believes there are bugs crawling on her ceiling but I do not see any bugs. No further bleeding. She is hungry and wanting to advance diet.     2024: Colonoscopy yesterday by Dr. Artis:  Findings:    Scope advanced to the cecum and terminal ileum.   Preparation adequate.   Diffuse diverticulosis in the sigmoid, descending, transverse and cecum.   Single sessile polyp 5 mm in the sigmoid colon s/p cold snare removal in one piece 
      Hospitalist Progress Note    NAME:   Dannielle Hall   : 1935   MRN: [id number]     Date/Time: 2024 8:58 AM  Patient PCP: Tracy Banuelos APRN - [facility]    Estimated discharge date:   Barriers: Colonoscopy on , improvement in blood pressure        Assessment / Plan:  Lower GI bleeding  Abdominal pain likely due to cecal inflammation  CT abdomen  1.  No active GI bleeding.  2.  Inflammation around the cecum and appendix, where there is diverticulosis  and the appendix is minimally thickened. Differential considerations include  cecal diverticulitis or mild/early acute appendicitis. Underlying mass not  excluded.   GI following, needs Plavix washout prior to colonoscopy, plan for colonoscopy on   Continue PPI  Evaluated by general surgery, does not think this is appendicitis, likely cecal inflammation  Monitor hemoglobin  Continue empiric antibiotics x 7-day course  Enterobacter panel negative, C. difficile negative  Contacted daughter at the listed phone number, went to voicemail (full, couldn't leave a voicemail)  : Patient denies any abdominal pain.  Had bowel movement yesterday that was brown in color.  No blood in the stool.  H&H stable at 11.7/36.3.  Will give lab holiday for now.  : Patient denies any abdominal pain or nausea.     Altered mental status  Hallucinations  Was a code stroke  evening, was obtunded, Likely due to sudden drop in blood pressure after getting hydralazine. Never had any focal deficits  Was evaluated by teleneurology, thought this was likely metabolic.  Mental status is better today  CT head negative  (Discussed with Dr. Gold (unofficial consult), we both agreed this is not consistent with stroke and further storke work up not warranted  : Patient is alert oriented x 4.  She is back to her baseline.     Hypertensive urgency  Resume home med lisinopril  Blood pressure dropped to low 100s yesterday after getting hydralazine and was 
      Hospitalist Progress Note    NAME:   Dannielle Hall   : 1935   MRN: [id number]     Date/Time: 2024 1:55 PM  Patient PCP: Tracy Banuelos APRN - [facility]    Estimated discharge date:   Barriers: Plan for colonoscopy on       Assessment / Plan:  Lower GI bleeding  Abdominal pain likely due to cecal inflammation    CT abdomen  1.  No active GI bleeding.  2.  Inflammation around the cecum and appendix, where there is diverticulosis  and the appendix is minimally thickened. Differential considerations include  cecal diverticulitis or mild/early acute appendicitis. Underlying mass not  excluded.   GI following, needs Plavix washout prior to colonoscopy, plan for colonoscopy on   Continue PPI  Evaluated by general surgery, does not think this is appendicitis, likely cecal inflammation  Monitor hemoglobin  Continue empiric antibiotics x 7-day course  Enterobacter panel negative, C. difficile negative  Contacted daughter at the listed phone number, went to voicemail (full, couldn't leave a voicemail)    Altered mental status  Hallucinations  Was a code stroke  evening, was obtunded, Likely due to sudden drop in blood pressure after getting hydralazine. Never had any focal deficits  Was evaluated by teleneurology, thought this was likely metabolic.  Mental status is better today  CT head negative  (Discussed with Dr. Gold (unofficial consult), we both agreed this is not consistent with stroke and further storke work up not warranted    Hypokalemia  Repleted  Repeat in a.m.    DM   Start sliding scale  Monitor blood glucose  Lantus added     Hypertensive urgency  Resume home med lisinopril  Blood pressure dropped to low 100s yesterday after getting hydralazine and was obtunded  Will avoid hydralazine IV for now  Add Norvasc     Hyperlipidemia  Continue Lipitor     Depression   Resume home med abilify and venlafaxine     Medical Decision Making:   I personally reviewed labs: CBC ,BMP  I 
      Hospitalist Progress Note    NAME:   Dannielle Hall   : 1935   MRN: [id number]     Date/Time: 2024 1:39 PM  Patient PCP: Tracy Banuelos APRN - [facility]    Estimated discharge date:   Barriers: Stable hemoglobin, resolution of GI bleeding, colonoscopy plan inpatient versus outpatient      Assessment / Plan:  Lower GI bleeding  Abdominal pain likely due to cecal inflammation    CT abdomen  1.  No active GI bleeding.  2.  Inflammation around the cecum and appendix, where there is diverticulosis  and the appendix is minimally thickened. Differential considerations include  cecal diverticulitis or mild/early acute appendicitis. Underlying mass not  excluded.   GI following, needs Plavix washout prior to colonoscopy  Continue PPI  Evaluated by general surgery, does not think this is appendicitis, likely cecal inflammation  Monitor hemoglobin  Continue empiric antibiotics  Follow-up stool study, C. difficile     DM   Start sliding scale  Monitor blood glucose     HTN   Resume home med lisinopril     Hyperlipidemia  Continue Lipitor     Depression   Resume home med abilify and venlafaxine     Medical Decision Making:   I personally reviewed labs: CBC ,BMP  I personally reviewed imaging:CT abdomen   I personally reviewed EKG:Yes  Toxic drug monitoring:   Discussed case with: RNGREGG        Code Status: Full  DVT Prophylaxis: SCD  Baseline:     Subjective:     Chief Complaint / Reason for Physician Visit  Seen and evaluated for GI bleeding.  Had a single bm this morning, which she thinks had some blood      Objective:     VITALS:   Last 24hrs VS reviewed since prior progress note. Most recent are:  Patient Vitals for the past 24 hrs:   BP Temp Temp src Pulse Resp SpO2 Height   24 1120 -- -- -- -- -- -- 1.676 m (5' 6\")   24 0837 (!) 169/94 97.7 °F (36.5 °C) Oral 80 17 98 % --   24 0300 (!) 168/71 98.4 °F (36.9 °C) Oral 65 16 93 % --   24 2034 (!) 159/72 97.6 °F (36.4 °C) Oral 68 18 
      Hospitalist Progress Note    NAME:   Dannielle Hall   : 1935   MRN: [id number]     Date/Time: 2024 9:02 AM  Patient PCP: Tracy Banuelos APRN - [facility]    Estimated discharge date:   Barriers: Colonoscopy on         Assessment / Plan:  Lower GI bleeding  Abdominal pain likely due to cecal inflammation  CT abdomen  1.  No active GI bleeding.  2.  Inflammation around the cecum and appendix, where there is diverticulosis  and the appendix is minimally thickened. Differential considerations include  cecal diverticulitis or mild/early acute appendicitis. Underlying mass not  excluded.   GI following, needs Plavix washout prior to colonoscopy, plan for colonoscopy on   Continue PPI  Evaluated by general surgery, does not think this is appendicitis, likely cecal inflammation  Monitor hemoglobin  Continue empiric antibiotics x 7-day course  Enterobacter panel negative, C. difficile negative  Contacted daughter at the listed phone number, went to voicemail (full, couldn't leave a voicemail)  : Patient denies any abdominal pain.  Had bowel movement yesterday that was brown in color.  No blood in the stool.  H&H stable at 11.7/36.3.  Will give lab holiday for now.  : Patient denies any abdominal pain or nausea.     Altered mental status  Hallucinations  Was a code stroke  evening, was obtunded, Likely due to sudden drop in blood pressure after getting hydralazine. Never had any focal deficits  Was evaluated by teleneurology, thought this was likely metabolic.  Mental status is better today  CT head negative  (Discussed with Dr. Gold (unofficial consult), we both agreed this is not consistent with stroke and further storke work up not warranted  : Patient is alert oriented x 4.  She is back to her baseline.     Hypertensive urgency  Resume home med lisinopril  Blood pressure dropped to low 100s yesterday after getting hydralazine and was obtunded  Will avoid hydralazine IV for 
      Hospitalist Progress Note    NAME:   Dannielle Hall   : 1935   MRN: [id number]     Date/Time: 2024 3:37 PM  Patient PCP: Tracy Banuelos APRN - NP    Estimated discharge date:   Barriers: Plan for colonoscopy on       Assessment / Plan:  Lower GI bleeding  Abdominal pain likely due to cecal inflammation    CT abdomen  1.  No active GI bleeding.  2.  Inflammation around the cecum and appendix, where there is diverticulosis  and the appendix is minimally thickened. Differential considerations include  cecal diverticulitis or mild/early acute appendicitis. Underlying mass not  excluded.   GI following, needs Plavix washout prior to colonoscopy, plan for colonoscopy on   Continue PPI  Evaluated by general surgery, does not think this is appendicitis, likely cecal inflammation  Monitor hemoglobin  Continue empiric antibiotics  Follow-up stool study, C. difficile    Hallucinations  Likely delirium  Continue to monitor  Supportive measures, frequent orientation to time place and person.    DM   Start sliding scale  Monitor blood glucose     HTN   Resume home med lisinopril     Hyperlipidemia  Continue Lipitor     Depression   Resume home med abilify and venlafaxine     Medical Decision Making:   I personally reviewed labs: CBC ,BMP  I personally reviewed imaging:CT abdomen   I personally reviewed EKG:Yes  Toxic drug monitoring:   Discussed case with: RNGREGG        Code Status: Full  DVT Prophylaxis: SCD  Baseline:     Subjective:     Chief Complaint / Reason for Physician Visit  Seen and evaluated for GI bleeding.  No bloody BM today  As per nursing she had some hallucinations over the night, felt like there were worms crawling on her.      Objective:     VITALS:   Last 24hrs VS reviewed since prior progress note. Most recent are:  Patient Vitals for the past 24 hrs:   BP Temp Temp src Pulse Resp SpO2 Height Weight   24 0745 (!) 181/78 98.1 °F (36.7 °C) Oral 63 18 98 % -- -- 
  End of Shift Note     Bedside shift change report given to ELI Padron (oncoming nurse) by ELI Angulo .          Shift worked:  7p-7a   Shift summary and any significant changes:     NPO at midnight.   Concerns for physician to address:  None   Zone phone for oncoming shift:   [phone]      Patient Information  Dannielle Hall  88 y.o.  5/21/2024  7:37 AM by Cintia Lemon MD. Dannielle Hall was admitted from [facility]     Problem List          Patient Active Problem List     Diagnosis Date Noted    GI bleed 05/21/2024    Invasive ductal carcinoma of left breast (HCC) 10/19/2023    Pain following surgery or procedure 10/12/2023    Chronic obstructive pulmonary disease, unspecified (HCC) 12/29/2022    MCI (mild cognitive impairment) 09/28/2022    Hemorrhoids 09/28/2022    Chronic renal disease, stage III (HCC) 05/18/2022    Type 2 diabetes mellitus with chronic kidney disease (HCC) 05/18/2022    CKD (chronic kidney disease), symptom management only, unspecified stage 02/23/2022    Allergic rhinitis 02/11/2022    TIA (transient ischemic attack) 12/06/2021    History of GI bleed 10/24/2020    History of CVA (cerebrovascular accident) 10/24/2020    Age-related cataract of both eyes 05/05/2020    Primary osteoarthritis involving multiple joints 02/20/2020    Former smoker 02/20/2020    Major depressive disorder, recurrent episode, mild (HCC) 09/28/2018    Well controlled type 2 diabetes mellitus (HCC) 07/24/2017    Essential hypertension 03/17/2017    Elevated cholesterol 08/04/2016    Environmental allergies 01/02/2014      Past Medical History           Past Medical History:   Diagnosis Date    Allergic rhinitis due to pollen      Arthritis      BPV (benign positional vertigo)      Change in bowel habits      Depression      Diabetes (HCC)      Dysuria      Hemorrhoids      Hypertension      IBS (irritable bowel syndrome)      Menopause      Other diseases of nasal cavity and sinuses(478.19)      TIA (transient ischemic 
  End of Shift Note     Bedside shift change report given to MELISSA Moncada (oncoming nurse) by ELI Sheridan .          Shift worked:  7a-3p   Shift summary and any significant changes:      No significant events throughout the day. Patient was back and forth from bathroom. Golytely prep started. Colonoscopy planned for tomorrow 5/28. NPO at midnight.   Concerns for physician to address:  None   Zone phone for oncoming shift:   [phone]      Patient Information  Dannielle Hall  88 y.o.  5/21/2024  7:37 AM by Cintia Lemon MD. Dannielle Hall was admitted from [facility]     Problem List          Patient Active Problem List     Diagnosis Date Noted    GI bleed 05/21/2024    Invasive ductal carcinoma of left breast (HCC) 10/19/2023    Pain following surgery or procedure 10/12/2023    Chronic obstructive pulmonary disease, unspecified (HCC) 12/29/2022    MCI (mild cognitive impairment) 09/28/2022    Hemorrhoids 09/28/2022    Chronic renal disease, stage III (HCC) 05/18/2022    Type 2 diabetes mellitus with chronic kidney disease (HCC) 05/18/2022    CKD (chronic kidney disease), symptom management only, unspecified stage 02/23/2022    Allergic rhinitis 02/11/2022    TIA (transient ischemic attack) 12/06/2021    History of GI bleed 10/24/2020    History of CVA (cerebrovascular accident) 10/24/2020    Age-related cataract of both eyes 05/05/2020    Primary osteoarthritis involving multiple joints 02/20/2020    Former smoker 02/20/2020    Major depressive disorder, recurrent episode, mild (HCC) 09/28/2018    Well controlled type 2 diabetes mellitus (HCC) 07/24/2017    Essential hypertension 03/17/2017    Elevated cholesterol 08/04/2016    Environmental allergies 01/02/2014      Past Medical History           Past Medical History:   Diagnosis Date    Allergic rhinitis due to pollen      Arthritis      BPV (benign positional vertigo)      Change in bowel habits      Depression      Diabetes (HCC)      Dysuria      Hemorrhoids      
1227 - Attempted to schedule GI hospital follow up appointment with Dr. Henriquez [phone]. Unable to schedule GI appt as [facility] scheduling states this patient has never been seen at their practice. CM notified. [facility] placed [facility] information AVS for patient resource. Pending patient discharge. Kenyetta Méndez Care Management Assistant    [facility] hospital follow-up transitional care appointment has been scheduled with NP Tracy Banuelos on 6/4/24 0930. [facility] placed [facility] information AVS for patient resource.   Pending patient discharge.  Kenyetta Méndez, Care Management Assistant   
Comprehensive Nutrition Assessment    Type and Reason for Visit:  Initial, Positive Nutrition Screen    Nutrition Recommendations/Plan:   Consider advancing to full liquid diet this evening if tolerating clears with goal of 4 Carb choice/low fiber diet tomorrow  Add Strawberry Glucerna daily once diet advanced     Malnutrition Assessment:  Malnutrition Status:  At risk for malnutrition (Comment) (05/22/24 1120)      Nutrition Assessment:    Patient medically noted for GI bleed. PMH depression, DM, HTN, and diverticulosis. Diet advanced to clear liquids this morning; advance as tolerated to low fiber per GI. Plans for colonoscopy next week. Patient reports a pretty good appetite today and PTA; no significant weight changes reported but she states she has noted a progressive downward trend. She has not tried supplements before. She'd like to try one while admitted; will add Glucerna daily once diet advanced (prefers strawberry). Encouraged intake of meals as tolerated. She reports broth was salty this morning; hopefully can advance diet fairly quickly as patient states she is hungry.     Nutrition Related Findings:    Labs and medications reviewed   BM 5/22   Wound Type: None       Current Nutrition Intake & Therapies:          ADULT DIET; Clear Liquid; No red dye    Anthropometric Measures:  Height: 167.6 cm (5' 6\")  Ideal Body Weight (IBW): 130 lbs (59 kg)       Current Body Weight: 75.8 kg (167 lb 1.7 oz), 128.5 % IBW.    Current BMI (kg/m2): 27                          BMI Categories: Overweight (BMI 25.0-29.9)    Estimated Daily Nutrient Needs:  Energy Requirements Based On: Formula  Weight Used for Energy Requirements: Current  Energy (kcal/day): 1580 kcals (BMR x 1.3AF)  Weight Used for Protein Requirements: Current  Protein (g/day): 76-91g (1.0-1.2 g/kg bw)  Method Used for Fluid Requirements: 1 ml/kcal  Fluid (ml/day): 1600 mL    Nutrition Diagnosis:   No nutrition diagnosis at this time    Nutrition 
Comprehensive Nutrition Assessment    Type and Reason for Visit:  Reassess    Nutrition Recommendations/Plan:   4 carb choice/low fiber diet   Add Glucerna daily      Malnutrition Assessment:  Malnutrition Status:  At risk for malnutrition (Comment) (05/22/24 1120)      Nutrition Assessment:    Chart reviewed; diet advanced to low fiber this morning. Will add carb restrictions to diet as previously recommended. Glucerna added daily as discussed with patient yesterday. Orders in for clear liquid diet Monday in preparation for NPO and colonoscopy on Tuesday. Encourage intake of meals/supplements.     Nutrition Related Findings:    Labs and medications reviewed   BM 5/22   Wound Type: None       Current Nutrition Intake & Therapies:          ADULT DIET; Clear Liquid; No red dye  Diet NPO Exceptions are: Sips of Clear Liquids, Sips of Water with Meds, Ice Chips  ADULT DIET; Regular; Low Fiber; No red dye  ADULT ORAL NUTRITION SUPPLEMENT; Dinner; Diabetic Oral Supplement    Anthropometric Measures:  Height: 167.6 cm (5' 5.98\")  Ideal Body Weight (IBW): 130 lbs (59 kg)       Current Body Weight: 75.8 kg (167 lb 1.7 oz), 128.5 % IBW.    Current BMI (kg/m2): 27                          BMI Categories: Overweight (BMI 25.0-29.9)    Estimated Daily Nutrient Needs:  Energy Requirements Based On: Formula  Weight Used for Energy Requirements: Current  Energy (kcal/day): 1580 kcals (BMR x 1.3AF)  Weight Used for Protein Requirements: Current  Protein (g/day): 76-91g (1.0-1.2 g/kg bw)  Method Used for Fluid Requirements: 1 ml/kcal  Fluid (ml/day): 1600 mL    Nutrition Diagnosis:   No nutrition diagnosis at this time    Nutrition Interventions:   Food and/or Nutrient Delivery: Start Oral Nutrition Supplement, Modify Current Diet  Nutrition Education/Counseling: No recommendation at this time  Coordination of Nutrition Care: Continue to monitor while inpatient       Goals:  Previous Goal Met: Progressing toward Goal(s)  Goals: PO 
Discharge information reviewed with patient and daughter. Both verbalized understanding. Pharmacy and follow up appointments verified. Patient's daughter is driving her home.  
End of Shift Note     Bedside shift change report given to ELI Kwon (oncoming nurse) by Arvin RN .          Shift worked:  7a-7p   Shift summary and any significant changes:      No significant events throughout the day. Patient was back and forth from bathroom. No complaints of pain.   Concerns for physician to address:  None   Zone phone for oncoming shift:   [phone]      Patient Information  Dannielle Hall  88 y.o.  5/21/2024  7:37 AM by Cintia Lemon MD. Dannielle Hall was admitted from [facility]     Problem List          Patient Active Problem List     Diagnosis Date Noted    GI bleed 05/21/2024    Invasive ductal carcinoma of left breast (HCC) 10/19/2023    Pain following surgery or procedure 10/12/2023    Chronic obstructive pulmonary disease, unspecified (HCC) 12/29/2022    MCI (mild cognitive impairment) 09/28/2022    Hemorrhoids 09/28/2022    Chronic renal disease, stage III (HCC) 05/18/2022    Type 2 diabetes mellitus with chronic kidney disease (HCC) 05/18/2022    CKD (chronic kidney disease), symptom management only, unspecified stage 02/23/2022    Allergic rhinitis 02/11/2022    TIA (transient ischemic attack) 12/06/2021    History of GI bleed 10/24/2020    History of CVA (cerebrovascular accident) 10/24/2020    Age-related cataract of both eyes 05/05/2020    Primary osteoarthritis involving multiple joints 02/20/2020    Former smoker 02/20/2020    Major depressive disorder, recurrent episode, mild (HCC) 09/28/2018    Well controlled type 2 diabetes mellitus (HCC) 07/24/2017    Essential hypertension 03/17/2017    Elevated cholesterol 08/04/2016    Environmental allergies 01/02/2014      Past Medical History           Past Medical History:   Diagnosis Date    Allergic rhinitis due to pollen      Arthritis      BPV (benign positional vertigo)      Change in bowel habits      Depression      Diabetes (HCC)      Dysuria      Hemorrhoids      Hypertension      IBS (irritable bowel syndrome)      
End of Shift Note     Bedside shift change report given to ELI Luque (oncoming nurse) by ELI Kwon .          Shift worked:  nights   Shift summary and any significant changes:      No acute changes throughout the day. No complaints of pain or discomfort.One time dose of lisinopril given for elevated blood pressure Call or return to clinic prn if these symptoms worsen or fail to improve as anticipated. Mucinex  ordered BID for cough   Concerns for physician to address:  None   Zone phone for oncoming shift:   [phone]      Patient Information  Dannielle Hall  88 y.o.  5/21/2024  7:37 AM by Cintia Lemon MD. Dannielle Hall was admitted from [facility]     Problem List       Patient Active Problem List     Diagnosis Date Noted    GI bleed 05/21/2024    Invasive ductal carcinoma of left breast (HCC) 10/19/2023    Pain following surgery or procedure 10/12/2023    Chronic obstructive pulmonary disease, unspecified (HCC) 12/29/2022    MCI (mild cognitive impairment) 09/28/2022    Hemorrhoids 09/28/2022    Chronic renal disease, stage III (HCC) 05/18/2022    Type 2 diabetes mellitus with chronic kidney disease (HCC) 05/18/2022    CKD (chronic kidney disease), symptom management only, unspecified stage 02/23/2022    Allergic rhinitis 02/11/2022    TIA (transient ischemic attack) 12/06/2021    History of GI bleed 10/24/2020    History of CVA (cerebrovascular accident) 10/24/2020    Age-related cataract of both eyes 05/05/2020    Primary osteoarthritis involving multiple joints 02/20/2020    Former smoker 02/20/2020    Major depressive disorder, recurrent episode, mild (HCC) 09/28/2018    Well controlled type 2 diabetes mellitus (HCC) 07/24/2017    Essential hypertension 03/17/2017    Elevated cholesterol 08/04/2016    Environmental allergies 01/02/2014      Past Medical History        Past Medical History:   Diagnosis Date    Allergic rhinitis due to pollen      Arthritis      BPV (benign positional vertigo)      Change 
End of Shift Note     Bedside shift change report given to ELI Rivera (oncoming nurse) by MELISSA Espinoza .          Shift worked:  Nights   Shift summary and any significant changes:     No acute changes during the night,Labs done, PRN Tylenol,Mucinex,Melatonin, and Hydralazine was giving   Concerns for physician to address:  See above notes   Zone phone for oncoming shift:   [phone]      Patient Information  Dannielle Hall  88 y.o.  5/21/2024  7:37 AM by Cintia Lemon MD. Dannielle Hall was admitted from [facility]     Problem List          Patient Active Problem List     Diagnosis Date Noted    GI bleed 05/21/2024    Invasive ductal carcinoma of left breast (HCC) 10/19/2023    Pain following surgery or procedure 10/12/2023    Chronic obstructive pulmonary disease, unspecified (HCC) 12/29/2022    MCI (mild cognitive impairment) 09/28/2022    Hemorrhoids 09/28/2022    Chronic renal disease, stage III (HCC) 05/18/2022    Type 2 diabetes mellitus with chronic kidney disease (HCC) 05/18/2022    CKD (chronic kidney disease), symptom management only, unspecified stage 02/23/2022    Allergic rhinitis 02/11/2022    TIA (transient ischemic attack) 12/06/2021    History of GI bleed 10/24/2020    History of CVA (cerebrovascular accident) 10/24/2020    Age-related cataract of both eyes 05/05/2020    Primary osteoarthritis involving multiple joints 02/20/2020    Former smoker 02/20/2020    Major depressive disorder, recurrent episode, mild (HCC) 09/28/2018    Well controlled type 2 diabetes mellitus (HCC) 07/24/2017    Essential hypertension 03/17/2017    Elevated cholesterol 08/04/2016    Environmental allergies 01/02/2014      Past Medical History           Past Medical History:   Diagnosis Date    Allergic rhinitis due to pollen      Arthritis      BPV (benign positional vertigo)      Change in bowel habits      Depression      Diabetes (HCC)      Dysuria      Hemorrhoids      Hypertension      IBS (irritable bowel syndrome)      
End of Shift Note     Bedside shift change report given to Hannah BENITEZ (oncoming nurse) by ELI Kwon .          Shift worked: nights   Shift summary and any significant changes:      No acute changes overnight. No complaints of pain or discomfort   Concerns for physician to address:  None   Zone phone for oncoming shift:   [phone]      Patient Information  Dannielle Hall  88 y.o.  5/21/2024  7:37 AM by Cintia Lemon MD. Dannielle Hall was admitted from [facility]     Problem List          Patient Active Problem List     Diagnosis Date Noted    GI bleed 05/21/2024    Invasive ductal carcinoma of left breast (HCC) 10/19/2023    Pain following surgery or procedure 10/12/2023    Chronic obstructive pulmonary disease, unspecified (HCC) 12/29/2022    MCI (mild cognitive impairment) 09/28/2022    Hemorrhoids 09/28/2022    Chronic renal disease, stage III (HCC) 05/18/2022    Type 2 diabetes mellitus with chronic kidney disease (HCC) 05/18/2022    CKD (chronic kidney disease), symptom management only, unspecified stage 02/23/2022    Allergic rhinitis 02/11/2022    TIA (transient ischemic attack) 12/06/2021    History of GI bleed 10/24/2020    History of CVA (cerebrovascular accident) 10/24/2020    Age-related cataract of both eyes 05/05/2020    Primary osteoarthritis involving multiple joints 02/20/2020    Former smoker 02/20/2020    Major depressive disorder, recurrent episode, mild (HCC) 09/28/2018    Well controlled type 2 diabetes mellitus (HCC) 07/24/2017    Essential hypertension 03/17/2017    Elevated cholesterol 08/04/2016    Environmental allergies 01/02/2014      Past Medical History           Past Medical History:   Diagnosis Date    Allergic rhinitis due to pollen      Arthritis      BPV (benign positional vertigo)      Change in bowel habits      Depression      Diabetes (HCC)      Dysuria      Hemorrhoids      Hypertension      IBS (irritable bowel syndrome)      Menopause      Other diseases of nasal cavity and 
End of Shift Note    Bedside shift change report given to  ***  (oncoming nurse) by Ramone Boles RN .        Shift worked:  ***   Shift summary and any significant changes:     ***       Concerns for physician to address:  ***   Zone phone for oncoming shift:        Patient Information  Dannielle Hall  88 y.o.  5/21/2024  7:37 AM by Cintia Lemon MD. Dannielle Hall was admitted from [facility]    Problem List  Patient Active Problem List    Diagnosis Date Noted    GI bleed 05/21/2024    Invasive ductal carcinoma of left breast (HCC) 10/19/2023    Pain following surgery or procedure 10/12/2023    Chronic obstructive pulmonary disease, unspecified (HCC) 12/29/2022    MCI (mild cognitive impairment) 09/28/2022    Hemorrhoids 09/28/2022    Chronic renal disease, stage III ([facility]) 05/18/2022    Type 2 diabetes mellitus with chronic kidney disease ([facility]) 05/18/2022    CKD (chronic kidney disease), symptom management only, unspecified stage 02/23/2022    Allergic rhinitis 02/11/2022    TIA (transient ischemic attack) 12/06/2021    History of GI bleed 10/24/2020    History of CVA (cerebrovascular accident) 10/24/2020    Age-related cataract of both eyes 05/05/2020    Primary osteoarthritis involving multiple joints 02/20/2020    Former smoker 02/20/2020    Major depressive disorder, recurrent episode, mild (HCC) 09/28/2018    Well controlled type 2 diabetes mellitus ([facility]) 07/24/2017    Essential hypertension 03/17/2017    Elevated cholesterol 08/04/2016    Environmental allergies 01/02/2014     Past Medical History:   Diagnosis Date    Allergic rhinitis due to pollen     Arthritis     BPV (benign positional vertigo)     Change in bowel habits     Depression     Diabetes (HCC)     Dysuria     Hemorrhoids     Hypertension     IBS (irritable bowel syndrome)     Menopause     Other diseases of nasal cavity and sinuses(478.19)     TIA (transient ischemic attack)     Vertigo        Core Measures:  CVA: no  CHF: no  PNA: 
End of Shift Note    Bedside shift change report given to Arvin BENITEZ (oncoming nurse) by Ramone Boles RN .        Shift worked:  night   Shift summary and any significant changes:    Patient received her due meds.  She remained calm over the night except for some dry cough on and off.  Blood pressure was high over the night but within acceptable limits as per the provider.           Concerns for physician to address:     Zone phone for oncoming shift:   [phone]     Patient Information  Dannielle Hall  88 y.o.  5/21/2024  7:37 AM by Cintia Lemon MD. Dannielle Hall was admitted from [facility]    Problem List  Patient Active Problem List    Diagnosis Date Noted    GI bleed 05/21/2024    Invasive ductal carcinoma of left breast (HCC) 10/19/2023    Pain following surgery or procedure 10/12/2023    Chronic obstructive pulmonary disease, unspecified (HCC) 12/29/2022    MCI (mild cognitive impairment) 09/28/2022    Hemorrhoids 09/28/2022    Chronic renal disease, stage III (HCC) 05/18/2022    Type 2 diabetes mellitus with chronic kidney disease (HCC) 05/18/2022    CKD (chronic kidney disease), symptom management only, unspecified stage 02/23/2022    Allergic rhinitis 02/11/2022    TIA (transient ischemic attack) 12/06/2021    History of GI bleed 10/24/2020    History of CVA (cerebrovascular accident) 10/24/2020    Age-related cataract of both eyes 05/05/2020    Primary osteoarthritis involving multiple joints 02/20/2020    Former smoker 02/20/2020    Major depressive disorder, recurrent episode, mild (HCC) 09/28/2018    Well controlled type 2 diabetes mellitus (HCC) 07/24/2017    Essential hypertension 03/17/2017    Elevated cholesterol 08/04/2016    Environmental allergies 01/02/2014     Past Medical History:   Diagnosis Date    Allergic rhinitis due to pollen     Arthritis     BPV (benign positional vertigo)     Change in bowel habits     Depression     Diabetes (HCC)     Dysuria     Hemorrhoids     Hypertension 
End of Shift Note    Bedside shift change report given to ELI De Guzman (oncoming nurse) by Lexi Thurman RN .        Shift worked:  7a-7p   Shift summary and any significant changes:     No significant events throughout the day. Patient has been pleasant and oriented to self, place, and time.  Patient has been sleeping on and off throughout the day.     Concerns for physician to address:  None   Zone phone for oncoming shift:   [phone]     Patient Information  Dannielle Hall  88 y.o.  5/21/2024  7:37 AM by Cintia Lemon MD. Dannielle Hall was admitted from [facility]    Problem List  Patient Active Problem List    Diagnosis Date Noted    GI bleed 05/21/2024    Invasive ductal carcinoma of left breast (HCC) 10/19/2023    Pain following surgery or procedure 10/12/2023    Chronic obstructive pulmonary disease, unspecified (HCC) 12/29/2022    MCI (mild cognitive impairment) 09/28/2022    Hemorrhoids 09/28/2022    Chronic renal disease, stage III (HCC) 05/18/2022    Type 2 diabetes mellitus with chronic kidney disease (HCC) 05/18/2022    CKD (chronic kidney disease), symptom management only, unspecified stage 02/23/2022    Allergic rhinitis 02/11/2022    TIA (transient ischemic attack) 12/06/2021    History of GI bleed 10/24/2020    History of CVA (cerebrovascular accident) 10/24/2020    Age-related cataract of both eyes 05/05/2020    Primary osteoarthritis involving multiple joints 02/20/2020    Former smoker 02/20/2020    Major depressive disorder, recurrent episode, mild (HCC) 09/28/2018    Well controlled type 2 diabetes mellitus (HCC) 07/24/2017    Essential hypertension 03/17/2017    Elevated cholesterol 08/04/2016    Environmental allergies 01/02/2014     Past Medical History:   Diagnosis Date    Allergic rhinitis due to pollen     Arthritis     BPV (benign positional vertigo)     Change in bowel habits     Depression     Diabetes (HCC)     Dysuria     Hemorrhoids     Hypertension     IBS (irritable bowel syndrome)     
End of Shift Note    Bedside shift change report given to PATTY BENITEZ (oncoming nurse) by Ramone Boles RN .        Shift worked:  night   Shift summary and any significant changes:     New patient in the unit. Admit for stroke work ups.  Noted to have hallucinations and physically aggressive over the night.  Refused dual skin check,   Over the night, EKG done x1, ordered labs done, scheduled labs done,          Concerns for physician to address:     Zone phone for oncoming shift:   [phone]     Patient Information  Dannielle Hall  88 y.o.  5/21/2024  7:37 AM by Cintia Lemon MD. Dannielle Hall was admitted from [facility]    Problem List  Patient Active Problem List    Diagnosis Date Noted    GI bleed 05/21/2024    Invasive ductal carcinoma of left breast (HCC) 10/19/2023    Pain following surgery or procedure 10/12/2023    Chronic obstructive pulmonary disease, unspecified (HCC) 12/29/2022    MCI (mild cognitive impairment) 09/28/2022    Hemorrhoids 09/28/2022    Chronic renal disease, stage III (HCC) 05/18/2022    Type 2 diabetes mellitus with chronic kidney disease (HCC) 05/18/2022    CKD (chronic kidney disease), symptom management only, unspecified stage 02/23/2022    Allergic rhinitis 02/11/2022    TIA (transient ischemic attack) 12/06/2021    History of GI bleed 10/24/2020    History of CVA (cerebrovascular accident) 10/24/2020    Age-related cataract of both eyes 05/05/2020    Primary osteoarthritis involving multiple joints 02/20/2020    Former smoker 02/20/2020    Major depressive disorder, recurrent episode, mild (HCC) 09/28/2018    Well controlled type 2 diabetes mellitus (HCC) 07/24/2017    Essential hypertension 03/17/2017    Elevated cholesterol 08/04/2016    Environmental allergies 01/02/2014     Past Medical History:   Diagnosis Date    Allergic rhinitis due to pollen     Arthritis     BPV (benign positional vertigo)     Change in bowel habits     Depression     Diabetes (HCC)     Dysuria     
End of Shift Note    Bedside shift change report given to PATTY BENITEZ (oncoming nurse) by Ramone Boles RN .        Shift worked:  night   Shift summary and any significant changes:     New patient in the unit. Admit for stroke work ups.  Noted to have hallucinations and physically aggressive over the night. Notified the nocturists covering and a sitter placed.    Refused dual skin check,   Over the night, EKG done x1, ordered labs done, scheduled labs done,          Concerns for physician to address:     Zone phone for oncoming shift:   [phone]     Patient Information  Dannielle Hall  88 y.o.  5/21/2024  7:37 AM by Cintia Lemon MD. Dannielle Hall was admitted from [facility]    Problem List  Patient Active Problem List    Diagnosis Date Noted    GI bleed 05/21/2024    Invasive ductal carcinoma of left breast (HCC) 10/19/2023    Pain following surgery or procedure 10/12/2023    Chronic obstructive pulmonary disease, unspecified (HCC) 12/29/2022    MCI (mild cognitive impairment) 09/28/2022    Hemorrhoids 09/28/2022    Chronic renal disease, stage III (HCC) 05/18/2022    Type 2 diabetes mellitus with chronic kidney disease (HCC) 05/18/2022    CKD (chronic kidney disease), symptom management only, unspecified stage 02/23/2022    Allergic rhinitis 02/11/2022    TIA (transient ischemic attack) 12/06/2021    History of GI bleed 10/24/2020    History of CVA (cerebrovascular accident) 10/24/2020    Age-related cataract of both eyes 05/05/2020    Primary osteoarthritis involving multiple joints 02/20/2020    Former smoker 02/20/2020    Major depressive disorder, recurrent episode, mild (HCC) 09/28/2018    Well controlled type 2 diabetes mellitus (HCC) 07/24/2017    Essential hypertension 03/17/2017    Elevated cholesterol 08/04/2016    Environmental allergies 01/02/2014     Past Medical History:   Diagnosis Date    Allergic rhinitis due to pollen     Arthritis     BPV (benign positional vertigo)     Change in bowel habits 
End of Shift Note    Bedside shift change report given to PATTY BENITEZ (oncoming nurse) by Ramone Boles RN .        Shift worked:  night   Shift summary and any significant changes:     New patient in the unit. Admit for stroke work ups.  Noted to have hallucinations and physically aggressive over the night. Notified the nocturists covering and a sitter placed.    Refused dual skin check,   Over the night, EKG done x1, ordered labs done, scheduled labs done,          Concerns for physician to address:     Zone phone for oncoming shift:   [phone]     Patient Information  Dannielle Hall  88 y.o.  5/21/2024  7:37 AM by Cintia Lemon MD. Dannielle Hall was admitted from [facility]    Problem List  Patient Active Problem List    Diagnosis Date Noted    GI bleed 05/21/2024    Invasive ductal carcinoma of left breast (HCC) 10/19/2023    Pain following surgery or procedure 10/12/2023    Chronic obstructive pulmonary disease, unspecified (HCC) 12/29/2022    MCI (mild cognitive impairment) 09/28/2022    Hemorrhoids 09/28/2022    Chronic renal disease, stage III (HCC) 05/18/2022    Type 2 diabetes mellitus with chronic kidney disease (HCC) 05/18/2022    CKD (chronic kidney disease), symptom management only, unspecified stage 02/23/2022    Allergic rhinitis 02/11/2022    TIA (transient ischemic attack) 12/06/2021    History of GI bleed 10/24/2020    History of CVA (cerebrovascular accident) 10/24/2020    Age-related cataract of both eyes 05/05/2020    Primary osteoarthritis involving multiple joints 02/20/2020    Former smoker 02/20/2020    Major depressive disorder, recurrent episode, mild (HCC) 09/28/2018    Well controlled type 2 diabetes mellitus (HCC) 07/24/2017    Essential hypertension 03/17/2017    Elevated cholesterol 08/04/2016    Environmental allergies 01/02/2014     Past Medical History:   Diagnosis Date    Allergic rhinitis due to pollen     Arthritis     BPV (benign positional vertigo)     Change in bowel habits 
Endoscopy Case End Note:    1143:  Procedure scope was pre-cleaned, per protocol, at bedside by HARINDER Rascon RN.      1144:  Report received from anesthesia - ANTWON Jimenez CRNA.  See anesthesia flowsheet for intra-procedure vital signs and events.    
Endoscopy recovery  Patient returned to baseline, vital signs stable (see vital sign flowsheet). Patient offered liquids and tolerated well. Respiratory status within defined limits. Abdomen soft not tender. Skin with in defined limits.   
Nursing contacted Nocturnist/cross cover provider via non-urgent messaging system [employer] and notified patient pulling at lines, repeatedly trying to get out of the bed. No other concerns reported. No acute distress reported. No other information provided by nurse. VSS. Ordered bedside sitter, asked nurse via phone from that unit as primary nurse presently in another room, to please wrap the piv site and notified sitter to bedside, would not place a roll belt as pt is not able to remove on her own due to her mentation. Will defer further evaluation/management to the day shift primary attending care team. Patient denies any further complaints or concerns. Nursing to notify Hospitalist for further/continued concerns. Will remain available overnight for further concerns if nursing/patient needs. Please note, there are RRT systems in this hospital in place that if nursing has acute or critical patient condition change or concern, this is to help facilitate and notify that patient needs immediate bedside evaluation by a provider.     Non-billable note.   
Nursing contacted Nocturnist/cross cover provider via non-urgent messaging system [employer] and notified patient awake and somewhat hyperverbal per nurse reported, asking for something to help pt to sleep, appears already on seroquel. No other concerns reported. No acute distress reported. No other information provided by nurse. VSS. Ordered melatonin 3mg hs prn. Will defer further evaluation/management to the day shift primary attending care team. Patient denies any further complaints or concerns. Nursing to notify Hospitalist for further/continued concerns. Will remain available overnight for further concerns if nursing/patient needs. Please note, there are RRT systems in this hospital in place that if nursing has acute or critical patient condition change or concern, this is to help facilitate and notify that patient needs immediate bedside evaluation by a provider.     Non-billable note.       
Patient had x1 large bloody stool.  
Rapid reponse call pt in chair unresponsive to pain stim, placed her on 2 lpm nc o2 sat 98%, ABG drawn,  BP low, starting to arrouse after in bed, going for stat head CT  
Spiritual Care Assessment/Progress Note  [facility]    Name: Dannielle Hall MRN: [id number]    Age: 88 y.o.     Sex: female   Language: English     Date: 5/22/2024            Total Time Calculated: 11 min              Spiritual Assessment begun in [facility] 3 MED TELE  Service Provided For: Patient not available  Referral/Consult From: Nurse  Encounter Overview/Reason: Attempted Encounter    Spiritual beliefs:      [] Involved in a jac tradition/spiritual practice:      [] Supported by a jac community:      [] Claims no spiritual orientation:      [] Seeking spiritual identity:           [] Adheres to an individual form of spirituality:      [x] Not able to assess:                Identified resources for coping and support system:           [] Prayer                  [] Devotional reading               [] Music                  [] Guided Imagery     [] Pet visits                                        [] Other: (COMMENT)     Specific area/focus of visit   Encounter:    Crisis:    Spiritual/Emotional needs: Type: Spiritual Support  Ritual, Rites and Sacraments:    Grief, Loss, and Adjustments:    Ethics/Mediation:    Behavioral Health:    Palliative Care:    Advance Care Planning:      Plan/Referrals: Continue to visit, (comment), Continue Support (comment)    Narrative:  received a consult order from Sherri BENITEZ for a visit for Ms. Hall. She is receiving medical care at this time and is not available.  talked with her nurse in reference to her status.     Spiritual Health Services are available 24 hours a day as requested.     Rev. JYOTI Santacruz  [facility]   Paging Service [phone])  
Surgical Note    Date/Time:  5/22/2024        Assessment :    Plan:  Patient Active Problem List   Diagnosis    Primary osteoarthritis involving multiple joints    Environmental allergies    TIA (transient ischemic attack)    Well controlled type 2 diabetes mellitus (HCC)    History of GI bleed    History of CVA (cerebrovascular accident)    Major depressive disorder, recurrent episode, mild (HCC)    Elevated cholesterol    Age-related cataract of both eyes    Former smoker    Allergic rhinitis    Essential hypertension    CKD (chronic kidney disease), symptom management only, unspecified stage    Chronic renal disease, stage III (HCC)    Type 2 diabetes mellitus with chronic kidney disease (HCC)    MCI (mild cognitive impairment)    Hemorrhoids    Chronic obstructive pulmonary disease, unspecified (HCC)    Pain following surgery or procedure    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  Only minimal tenderness right lower quadrant now    Appendiceal inflammation seen on CT likely related to the cecal inflammation.  Not presenting like appendicitis.    Agree with GI plans for colonoscopy early next week.    Nothing more to add from surgery standpoint at this moment.    Will sign off.    Please reconsult if surgical issues arise on this admission.           Subjective:     Chief Complaint:      Review of Systems:  Y  N       Y  N  [] [x]  Fever/chills                                               [] [x]  Chest Pain  [] [x]  Cough                                                       [] [x]  Diarrhea   [] [x]  Sputum                                                     [] [x]  Constipation  [] [x]  SOB/LERMA                                                [] [x]  Nausea/Vomit  [] [x]  Abd Pain                                                    [x] []  Tolerating diet  [] [x]  Dysuria                                                           []Unable to obtain  ROS 
TRANSFER - IN REPORT:    Verbal report received from ELI Padron on Dannielle Hall  being received from [facility] for ordered procedure      Report consisted of patient's Situation, Background, Assessment and   Recommendations(SBAR).     Information from the following report(s) Nurse Handoff Report, Intake/Output, MAR, and Recent Results was reviewed with the receiving nurse.    Opportunity for questions and clarification was provided.      Assessment completed upon patient's arrival to unit and care assumed.    
TRANSFER - OUT REPORT:    Verbal report given to ELI Padron on Dannielle Hall  being transferred to Neuro for routine post-op       Report consisted of patient's Situation, Background, Assessment and   Recommendations(SBAR).     Information from the following report(s) Surgery Report, Intake/Output, and MAR was reviewed with the receiving nurse.    Opportunity for questions and clarification was provided.       
Telemetry D/C per Alana Sheppard  
diseases of nasal cavity and sinuses(478.19)      TIA (transient ischemic attack)      Vertigo              Core Measures:  CVA: no  CHF: no  PNA: no     Activity:  Level of Assistance: Minimal assist, patient does 75% or more  Number times ambulated in hallways past shift:   Number of times OOB to chair past shift:      Cardiac:   Cardiac Monitoring: yes,      Access:   Current line(s): PIV     Respiratory:   O2 Device: None (Room air)     GI:  Last BM (including prior to admit): 05/24/24 (Soft BM, no blood noted, light brown in color.)  Current diet:  ADULT DIET; Clear Liquid; No red dye  Diet NPO Exceptions are: Sips of Clear Liquids, Sips of Water with Meds, Ice Chips  ADULT ORAL NUTRITION SUPPLEMENT; Dinner; Diabetic Oral Supplement  ADULT DIET; Regular; 4 carb choices (60 gm/meal); Low Fiber; No red dye  Tolerating current diet: Yes     Pain Management:   Patient states pain is manageable on current regimen: yes     Skin:  Rodriguez Scale Score: 19  Interventions: turn q2  Pressure injury: no    Patient Safety:  Fall Score: Maloney Total Score: 70  Interventions: bed alarm  Self-release roll belt: No  Dexterity to release roll belt: no   (must document dexterity  here by stating Yes or No here, otherwise this is a restraint and must follow restraint documentation policy.)     DVT prophylaxis:  DVT prophylaxis: SCD     Active Consults:  IP CONSULT TO GI  IP CONSULT TO GENERAL SURGERY  IP CONSULT TO SPIRITUAL SERVICES     Length of Stay:  Expected LOS: 8  Actual LOS: 4       
is agreeable. Ok for regular diet, low fiber now. CLD on Monday, 5/27/2024 with prep that evening. NPO past midnight Monday night. Please continue to hold Plavix.   Defer to primary team regarding possible visual hallucinations, she is adamant she sees bugs crawling on her ceiling.  Continue abx  Follow H&H, transfuse prn       Patient Active Problem List   Diagnosis    Primary osteoarthritis involving multiple joints    Environmental allergies    TIA (transient ischemic attack)    Well controlled type 2 diabetes mellitus (HCC)    History of GI bleed    History of CVA (cerebrovascular accident)    Major depressive disorder, recurrent episode, mild (HCC)    Elevated cholesterol    Age-related cataract of both eyes    Former smoker    Allergic rhinitis    Essential hypertension    CKD (chronic kidney disease), symptom management only, unspecified stage    Chronic renal disease, stage III (HCC)    Type 2 diabetes mellitus with chronic kidney disease (HCC)    MCI (mild cognitive impairment)    Hemorrhoids    Chronic obstructive pulmonary disease, unspecified (HCC)    Pain following surgery or procedure    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 Most recent are:  Vitals:    05/23/24 0745   BP: (!) 181/78   Pulse: 63   Resp: 18   Temp: 98.1 °F (36.7 °C)   SpO2: 98%       Intake/Output Summary (Last 24 hours) at 5/23/2024 0851  Last data filed at 5/23/2024 0012  Gross per 24 hour   Intake --   Output 1000 ml   Net -1000 ml        PHYSICAL EXAM:  General   well developed, well nourished, appears stated age, a little agitated because she believes she sees bugs crawling on the ceiling.  EENT  Normocephalic, Atraumatic, PERRLA, EOMI, sclera clear  Respiratory   Clear To Auscultation bilaterally - no wheezes, rales, rhonchi, or crackles  Cardiology  Regular Rate and 
ischemic attack)      Vertigo              Core Measures:  CVA: no  CHF: no  PNA: no     Activity:  Level of Assistance: Minimal assist, patient does 75% or more  Number times ambulated in hallways past shift:   Number of times OOB to chair past shift:      Cardiac:   Cardiac Monitoring: yes,      Access:   Current line(s): PIV     Respiratory:   O2 Device: None (Room air)     GI:  Last BM (including prior to admit): 05/24/24 (Soft BM, no blood noted, light brown in color.)  Current diet:  ADULT DIET; Clear Liquid; No red dye  Diet NPO Exceptions are: Sips of Clear Liquids, Sips of Water with Meds, Ice Chips  ADULT ORAL NUTRITION SUPPLEMENT; Dinner; Diabetic Oral Supplement  ADULT DIET; Regular; 4 carb choices (60 gm/meal); Low Fiber; No red dye  Tolerating current diet: Yes     Pain Management:   Patient states pain is manageable on current regimen: yes     Skin:  Rodriguez Scale Score: 19  Interventions: turn q2  Pressure injury: no    Patient Safety:  Fall Score: Maloney Total Score: 70  Interventions: bed alarm  Self-release roll belt: No  Dexterity to release roll belt: no   (must document dexterity  here by stating Yes or No here, otherwise this is a restraint and must follow restraint documentation policy.)     DVT prophylaxis:  DVT prophylaxis: SCD     Active Consults:  IP CONSULT TO GI  IP CONSULT TO GENERAL SURGERY  IP CONSULT TO SPIRITUAL SERVICES     Length of Stay:  Expected LOS: 8  Actual LOS: 6  
this morning.    Hypokalemia  Repleted  Repeat in a.m.  5/25: Potassium is 3.6 and will give 40 KCl.  Will check for magnesium level.     DM   Start sliding scale  Monitor blood glucose  Lantus added     Hyperlipidemia  Continue Lipitor     Depression   Resume home med abilify and venlafaxine         Medical Decision Making:   I personally reviewed labs: CBC, BMP  I personally reviewed imaging: CT abdomen, CT head, chest x-ray  I personally reviewed EKG:  Toxic drug monitoring:   Discussed case with: Patient, RN        Code Status: Full code  DVT Prophylaxis: SCD  Baseline:     Subjective:     Chief Complaint / Reason for Physician Visit  \" Follow-up for lower GI bleed, stenosis with hallucinations, hypertensive urgency, hypokalemia, diabetes mellitus, hyperlipidemia, depression.\".  Discussed with RN events overnight.       Objective:     VITALS:   Last 24hrs VS reviewed since prior progress note. Most recent are:  Patient Vitals for the past 24 hrs:   BP Temp Temp src Pulse Resp SpO2   05/25/24 0351 (!) 151/88 97.9 °F (36.6 °C) -- 65 16 98 %   05/24/24 2337 (!) 160/82 98.2 °F (36.8 °C) -- 64 16 97 %   05/24/24 2207 (!) 182/86 98.1 °F (36.7 °C) Oral 74 16 100 %   05/24/24 1957 (!) 184/85 97.7 °F (36.5 °C) Oral 76 18 96 %   05/24/24 1511 (!) 177/76 98.4 °F (36.9 °C) -- 69 20 94 %   05/24/24 1132 (!) 198/97 98.4 °F (36.9 °C) -- 70 20 97 %   05/24/24 0810 (!) 183/99 98.8 °F (37.1 °C) Oral 71 16 98 %         Intake/Output Summary (Last 24 hours) at 5/25/2024 0701  Last data filed at 5/24/2024 1456  Gross per 24 hour   Intake 3688.33 ml   Output --   Net 3688.33 ml        I had a face to face encounter and independently examined this patient on 5/25/2024, as outlined below:  PHYSICAL EXAM:  General: Alert, cooperative  EENT:  EOMI. Anicteric sclerae.  Resp:  CTA bilaterally, no wheezing or rales.  No accessory muscle use  CV:  Regular  rhythm,  No edema  GI:  Soft, Non distended, Non tender.  +Bowel sounds  Neurologic: 
24 hours ending 05/28/24 0854       I had a face to face encounter and independently examined this patient on 5/28/2024, as outlined below:  PHYSICAL EXAM:  General: Alert, cooperative  EENT:  EOMI. Anicteric sclerae.  Resp:  CTA bilaterally, no wheezing or rales.  No accessory muscle use  CV:  Regular  rhythm,  No edema  GI:  Soft, Non distended, Non tender.  +Bowel sounds  Neurologic:  Alert and oriented X 3, normal speech,   Psych:   Good insight. Not anxious nor agitated  Skin:  No rashes.  No jaundice    Reviewed most current lab test results and cultures  YES  Reviewed most current radiology test results   YES  Review and summation of old records today    NO  Reviewed patient's current orders and MAR    YES  PMH/SH reviewed - no change compared to H&P    Procedures: see electronic medical records for all procedures/Xrays and details which were not copied into this note but were reviewed prior to creation of Plan.      LABS:  I reviewed today's most current labs and imaging studies.  Pertinent labs include:  Recent Labs     05/27/24  0028 05/28/24  0144   WBC 9.5 10.8   HGB 12.0 11.9   HCT 38.4 37.4    347     Recent Labs     05/27/24  0028 05/28/24  0144    141   K 3.3* 3.1*    108   CO2 28 27   GLUCOSE 127* 135*   BUN 9 6   CREATININE 1.03* 1.01   CALCIUM 8.3* 8.9   MG 1.9 1.9   PHOS 3.3 2.4*       Signed: Michelle Carrillo MD

## 2024-05-29 NOTE — PLAN OF CARE
Problem: Discharge Planning  Goal: Discharge to home or other facility with appropriate resources  5/22/2024 0947 by Fela Rivera RN  Outcome: Progressing     Problem: Chronic Conditions and Co-morbidities  Goal: Patient's chronic conditions and co-morbidity symptoms are monitored and maintained or improved  5/22/2024 2326 by Jatin Gibbs RN  Outcome: Progressing  5/22/2024 0947 by Fela Rivera RN  Outcome: Progressing     Problem: Safety - Adult  Goal: Free from fall injury  5/22/2024 2326 by Jatin Gibbs RN  Outcome: Progressing  5/22/2024 0947 by Fela Rivera RN  Outcome: Progressing     Problem: ABCDS Injury Assessment  Goal: Absence of physical injury  5/22/2024 2326 by Jatin Gibbs RN  Outcome: Progressing  5/22/2024 0947 by Fela Rivera RN  Outcome: Progressing     Problem: Pain  Goal: Verbalizes/displays adequate comfort level or baseline comfort level  5/22/2024 0947 by Fela Rivera RN  Outcome: Progressing     
  Problem: Discharge Planning  Goal: Discharge to home or other facility with appropriate resources  5/22/2024 0947 by Fela Rivera RN  Outcome: Progressing  5/21/2024 2021 by Cintia Francis RN  Outcome: Progressing     Problem: Chronic Conditions and Co-morbidities  Goal: Patient's chronic conditions and co-morbidity symptoms are monitored and maintained or improved  5/22/2024 0947 by Fela Rivera RN  Outcome: Progressing  5/21/2024 2021 by Cintia Francis RN  Outcome: Progressing     Problem: Safety - Adult  Goal: Free from fall injury  5/22/2024 0947 by Fela Rivera RN  Outcome: Progressing  5/21/2024 2021 by Cintia Francis RN  Outcome: Progressing     Problem: ABCDS Injury Assessment  Goal: Absence of physical injury  5/22/2024 0947 by Fela Rivera RN  Outcome: Progressing  5/21/2024 2021 by Cintia Francis RN  Outcome: Progressing     Problem: Pain  Goal: Verbalizes/displays adequate comfort level or baseline comfort level  Outcome: Progressing     
  Problem: Discharge Planning  Goal: Discharge to home or other facility with appropriate resources  5/24/2024 2157 by Bertha Boles RN  Outcome: Progressing  5/24/2024 1509 by Lexi Thurman RN  Outcome: Progressing     Problem: Chronic Conditions and Co-morbidities  Goal: Patient's chronic conditions and co-morbidity symptoms are monitored and maintained or improved  5/24/2024 2157 by Bertha Boles RN  Outcome: Progressing  5/24/2024 1509 by Lexi Thurman RN  Outcome: Progressing     Problem: Safety - Adult  Goal: Free from fall injury  5/24/2024 2157 by Bertha Boles RN  Outcome: Progressing  5/24/2024 1509 by Lexi Thurman RN  Outcome: Progressing     Problem: ABCDS Injury Assessment  Goal: Absence of physical injury  5/24/2024 2157 by Bertha Boles RN  Outcome: Progressing  5/24/2024 1509 by Lexi Thurman RN  Outcome: Progressing     Problem: Pain  Goal: Verbalizes/displays adequate comfort level or baseline comfort level  5/24/2024 2157 by Bertha Boles RN  Outcome: Progressing  5/24/2024 1509 by Lexi Thurman RN  Outcome: Progressing     Problem: Respiratory - Adult  Goal: Achieves optimal ventilation and oxygenation  5/24/2024 2157 by Bertha Boles RN  Outcome: Progressing  5/24/2024 1509 by Lexi Thurman RN  Outcome: Progressing     Problem: Infection - Adult  Goal: Absence of infection at discharge  5/24/2024 2157 by Bertha Boles RN  Outcome: Progressing  Flowsheets (Taken 5/24/2024 1943)  Absence of infection at discharge: Assess and monitor for signs and symptoms of infection  5/24/2024 1509 by Lexi Thurman RN  Outcome: Progressing  Flowsheets (Taken 5/24/2024 0810)  Absence of infection at discharge: Assess and monitor for signs and symptoms of infection  Goal: Absence of infection during hospitalization  Outcome: Progressing  Goal: Absence of fever/infection during anticipated neutropenic period  Outcome: Progressing     Problem: Hematologic - Adult  Goal: Maintains 
  Problem: Discharge Planning  Goal: Discharge to home or other facility with appropriate resources  5/25/2024 2113 by Cher Brown RN  Outcome: Progressing  5/25/2024 0811 by Arvin Toro RN  Outcome: Progressing     Problem: Chronic Conditions and Co-morbidities  Goal: Patient's chronic conditions and co-morbidity symptoms are monitored and maintained or improved  5/25/2024 2113 by Cher Brown RN  Outcome: Progressing  5/25/2024 0811 by Arvin Toro RN  Outcome: Progressing     Problem: Safety - Adult  Goal: Free from fall injury  5/25/2024 2113 by Cher Brown RN  Outcome: Progressing  5/25/2024 0811 by Arvin Toro RN  Outcome: Progressing     Problem: ABCDS Injury Assessment  Goal: Absence of physical injury  5/25/2024 2113 by Cher Brown RN  Outcome: Progressing  5/25/2024 0811 by Arvin Toro RN  Outcome: Progressing     Problem: Pain  Goal: Verbalizes/displays adequate comfort level or baseline comfort level  5/25/2024 2113 by Cher Brown RN  Outcome: Progressing  5/25/2024 0811 by Arvin Toro RN  Outcome: Progressing     Problem: Neurosensory - Adult  Goal: Achieves stable or improved neurological status  5/25/2024 2113 by Cher Brown RN  Outcome: Progressing  5/25/2024 0811 by Arvin Toro RN  Outcome: Progressing  Goal: Absence of seizures  5/25/2024 2113 by Cher Brown RN  Outcome: Progressing  5/25/2024 0811 by Arvin Toro RN  Outcome: Progressing  Goal: Remains free of injury related to seizures activity  5/25/2024 2113 by Cher Brown RN  Outcome: Progressing  5/25/2024 0811 by Arvin Toro RN  Outcome: Progressing  Goal: Achieves maximal functionality and self care  5/25/2024 2113 by Cher Brown RN  Outcome: Progressing  5/25/2024 0811 by Arvin Toro RN  Outcome: Progressing     Problem: Respiratory - Adult  Goal: Achieves optimal ventilation and oxygenation  5/25/2024 2113 by 
  Problem: Discharge Planning  Goal: Discharge to home or other facility with appropriate resources  5/26/2024 2118 by Cher Brown RN  Outcome: Progressing  5/26/2024 2116 by Cher Brown RN  Outcome: Progressing  5/26/2024 0918 by Arvin Toro RN  Outcome: Progressing     Problem: Chronic Conditions and Co-morbidities  Goal: Patient's chronic conditions and co-morbidity symptoms are monitored and maintained or improved  5/26/2024 2118 by Cher Brown RN  Outcome: Progressing  5/26/2024 2116 by Cher Brown RN  Outcome: Progressing  5/26/2024 0918 by Arvin Toro RN  Outcome: Progressing     Problem: Safety - Adult  Goal: Free from fall injury  5/26/2024 2118 by Cher Brown RN  Outcome: Progressing  5/26/2024 2116 by Cher Brown RN  Outcome: Progressing  5/26/2024 0918 by Arvin Toro RN  Outcome: Progressing     Problem: ABCDS Injury Assessment  Goal: Absence of physical injury  5/26/2024 2118 by Cher Borwn RN  Outcome: Progressing  5/26/2024 2116 by Cher Brown RN  Outcome: Progressing  5/26/2024 0918 by Arvin Toro RN  Outcome: Progressing     Problem: Pain  Goal: Verbalizes/displays adequate comfort level or baseline comfort level  5/26/2024 2118 by Cher Brown RN  Outcome: Progressing  5/26/2024 2116 by Cher Brown RN  Outcome: Progressing  5/26/2024 0918 by Arvin Toro RN  Outcome: Progressing     Problem: Neurosensory - Adult  Goal: Achieves stable or improved neurological status  5/26/2024 2118 by Cher Brown RN  Outcome: Progressing  5/26/2024 2116 by Cher Brown RN  Outcome: Progressing  5/26/2024 0918 by Arvin Toro RN  Outcome: Progressing  Goal: Absence of seizures  5/26/2024 2118 by Cher Brown RN  Outcome: Progressing  5/26/2024 2116 by Cher Brown RN  Outcome: Progressing  5/26/2024 0918 by Toro, Sheridan, RN  Outcome: Progressing  Goal: Remains free of 
  Problem: Discharge Planning  Goal: Discharge to home or other facility with appropriate resources  5/28/2024 2139 by Olga Griffin LPN  Outcome: Progressing  5/28/2024 1405 by Vito Burns RN  Outcome: Progressing     Problem: Chronic Conditions and Co-morbidities  Goal: Patient's chronic conditions and co-morbidity symptoms are monitored and maintained or improved  Outcome: Progressing     Problem: Safety - Adult  Goal: Free from fall injury  Outcome: Progressing     Problem: ABCDS Injury Assessment  Goal: Absence of physical injury  Outcome: Progressing     Problem: Pain  Goal: Verbalizes/displays adequate comfort level or baseline comfort level  Outcome: Progressing     Problem: Neurosensory - Adult  Goal: Achieves stable or improved neurological status  Outcome: Progressing  Goal: Absence of seizures  Outcome: Progressing  Goal: Remains free of injury related to seizures activity  Outcome: Progressing  Goal: Achieves maximal functionality and self care  Outcome: Progressing     Problem: Respiratory - Adult  Goal: Achieves optimal ventilation and oxygenation  Outcome: Progressing     Problem: Infection - Adult  Goal: Absence of infection at discharge  Outcome: Progressing  Goal: Absence of infection during hospitalization  Outcome: Progressing  Goal: Absence of fever/infection during anticipated neutropenic period  Outcome: Progressing     Problem: Hematologic - Adult  Goal: Maintains hematologic stability  Outcome: Progressing     Problem: Skin/Tissue Integrity  Goal: Absence of new skin breakdown  Description: 1.  Monitor for areas of redness and/or skin breakdown  2.  Assess vascular access sites hourly  3.  Every 4-6 hours minimum:  Change oxygen saturation probe site  4.  Every 4-6 hours:  If on nasal continuous positive airway pressure, respiratory therapy assess nares and determine need for appliance change or resting period.  Outcome: Progressing     
  Problem: Discharge Planning  Goal: Discharge to home or other facility with appropriate resources  Outcome: Progressing     Problem: Chronic Conditions and Co-morbidities  Goal: Patient's chronic conditions and co-morbidity symptoms are monitored and maintained or improved  5/23/2024 0818 by Fela Rivera RN  Outcome: Progressing  5/22/2024 2326 by Jatin Gibbs RN  Outcome: Progressing     Problem: Safety - Adult  Goal: Free from fall injury  5/23/2024 0818 by Fela Rivera RN  Outcome: Progressing  5/22/2024 2326 by Jatin Gibbs RN  Outcome: Progressing     Problem: ABCDS Injury Assessment  Goal: Absence of physical injury  5/23/2024 0818 by Fela Rivera RN  Outcome: Progressing  5/22/2024 2326 by Jatin Gibbs RN  Outcome: Progressing     Problem: Pain  Goal: Verbalizes/displays adequate comfort level or baseline comfort level  Outcome: Progressing     
  Problem: Discharge Planning  Goal: Discharge to home or other facility with appropriate resources  Outcome: Progressing     Problem: Chronic Conditions and Co-morbidities  Goal: Patient's chronic conditions and co-morbidity symptoms are monitored and maintained or improved  Outcome: Progressing     Problem: Safety - Adult  Goal: Free from fall injury  Outcome: Progressing     Problem: ABCDS Injury Assessment  Goal: Absence of physical injury  Outcome: Progressing     
  Problem: Discharge Planning  Goal: Discharge to home or other facility with appropriate resources  Outcome: Progressing     Problem: Chronic Conditions and Co-morbidities  Goal: Patient's chronic conditions and co-morbidity symptoms are monitored and maintained or improved  Outcome: Progressing     Problem: Safety - Adult  Goal: Free from fall injury  Outcome: Progressing     Problem: ABCDS Injury Assessment  Goal: Absence of physical injury  Outcome: Progressing     Problem: Pain  Goal: Verbalizes/displays adequate comfort level or baseline comfort level  Outcome: Progressing     Problem: Neurosensory - Adult  Goal: Achieves stable or improved neurological status  Outcome: Progressing     Problem: Respiratory - Adult  Goal: Achieves optimal ventilation and oxygenation  Outcome: Progressing     Problem: Infection - Adult  Goal: Absence of infection at discharge  Outcome: Progressing  Flowsheets (Taken 5/24/2024 1610)  Absence of infection at discharge: Assess and monitor for signs and symptoms of infection     Problem: Hematologic - Adult  Goal: Maintains hematologic stability  Outcome: Progressing     
  Problem: Discharge Planning  Goal: Discharge to home or other facility with appropriate resources  Outcome: Progressing     Problem: Chronic Conditions and Co-morbidities  Goal: Patient's chronic conditions and co-morbidity symptoms are monitored and maintained or improved  Outcome: Progressing     Problem: Safety - Adult  Goal: Free from fall injury  Outcome: Progressing     Problem: ABCDS Injury Assessment  Goal: Absence of physical injury  Outcome: Progressing     Problem: Pain  Goal: Verbalizes/displays adequate comfort level or baseline comfort level  Outcome: Progressing     Problem: Neurosensory - Adult  Goal: Achieves stable or improved neurological status  Outcome: Progressing  Goal: Absence of seizures  Outcome: Progressing  Goal: Remains free of injury related to seizures activity  Outcome: Progressing  Goal: Achieves maximal functionality and self care  Outcome: Progressing     Problem: Respiratory - Adult  Goal: Achieves optimal ventilation and oxygenation  Outcome: Progressing     Problem: Infection - Adult  Goal: Absence of infection at discharge  Outcome: Progressing  Goal: Absence of infection during hospitalization  Outcome: Progressing  Goal: Absence of fever/infection during anticipated neutropenic period  Outcome: Progressing     Problem: Hematologic - Adult  Goal: Maintains hematologic stability  Outcome: Progressing     
Cher Brown RN  Outcome: Progressing  5/26/2024 0918 by Arvin Toro RN  Outcome: Progressing     Problem: Infection - Adult  Goal: Absence of infection at discharge  5/26/2024 2116 by Cher Brown RN  Outcome: Progressing  5/26/2024 0918 by Arvin Toro RN  Outcome: Progressing  Goal: Absence of infection during hospitalization  5/26/2024 2116 by Cher Brown RN  Outcome: Progressing  5/26/2024 0918 by Arvin Toro RN  Outcome: Progressing  Goal: Absence of fever/infection during anticipated neutropenic period  5/26/2024 2116 by Cher Brown RN  Outcome: Progressing  5/26/2024 0918 by Arvin Toro RN  Outcome: Progressing     Problem: Hematologic - Adult  Goal: Maintains hematologic stability  5/26/2024 2116 by Cher Brown RN  Outcome: Progressing  5/26/2024 0918 by Arvin Toro RN  Outcome: Progressing     Problem: Skin/Tissue Integrity  Goal: Absence of new skin breakdown  Description: 1.  Monitor for areas of redness and/or skin breakdown  2.  Assess vascular access sites hourly  3.  Every 4-6 hours minimum:  Change oxygen saturation probe site  4.  Every 4-6 hours:  If on nasal continuous positive airway pressure, respiratory therapy assess nares and determine need for appliance change or resting period.  5/26/2024 2116 by Cher Brown RN  Outcome: Progressing  5/26/2024 0918 by Arvin Toro RN  Outcome: Progressing

## 2024-05-29 NOTE — CARE COORDINATION
Home, DTR to transport @ 1300. Clear from CM.     Transition of Care Plan:    RUR: 15% (moderate RUR)  Prior Level of Functioning: Independent with caregiver support (Medicaid caregivers 9a-2p daily)  Disposition: Home with continued caregivers and follow-ups  If SNF or IPR: Date FOC offered:   Date FOC received:   Accepting facility:   Date authorization started with reference number:   Date authorization received and expires:   Follow up appointments: PCP/Specialists as indicated  DME needed: None   Transportation at discharge: Daughter to transport @ 1300.   IM/IMM Medicare/ letter given: 2nd given 05/29  Is patient a [place] and connected with [facility]? N/a   If yes, was [place] transfer form completed and [facility] notified? N/a  Caregiver Contact:  Le Hawkins; DTR; [phone]   Discharge Caregiver contacted prior to discharge? CM to contact  Care Conference needed? Not at this time  Barriers to discharge: bp stability, GI clearance    0846 - Assumed transitions of care planning from  IVY Restrepo. Plan to d/c home with follow-ups and continued caregiver support. Family to transport at d/c.     1140 - Plan to d/c home today, DTR will be here at 1300. SMAARTER tool completed and on door frame. Pt is clear from CM for d/c.     Patient verbalized understanding and gave permission for possible discharge within 4 hours of receiving IMM.       05/29/24 1141   Services At/After Discharge   Transition of Care Consult (CM Consult) N/A   Services At/After Discharge None    Resource Information Provided? No  (N/a)   Mode of Transport at Discharge Other (see comment)  (Daughter)   Hospital Transport Time of Discharge 1300   Confirm Follow Up Transport Family   Condition of Participation: Discharge Planning   The Plan for Transition of Care is related to the following treatment goals: Home with follow-ups to continue care         GRAHAM Mann  Care Management  [facility]  [phone]

## 2024-05-29 NOTE — DISCHARGE SUMMARY
CONTINUE taking these medications      albuterol sulfate  (90 Base) MCG/ACT inhaler  Commonly known as: PROVENTIL;VENTOLIN;PROAIR  TAKE 2 PUFFS BY INHALATION EVERY FOUR TO SIX (4-6) HOURS AS NEEDED FOR WHEEZING.     ARIPiprazole 2 MG tablet  Commonly known as: ABILIFY  Take 1 tablet by mouth daily     atorvastatin 80 MG tablet  Commonly known as: LIPITOR  TAKE 1 TABLET BY MOUTH EVERY DAY     clopidogrel 75 MG tablet  Commonly known as: PLAVIX  Take 1 tablet by mouth daily     fluticasone 50 MCG/ACT nasal spray  Commonly known as: FLONASE     fluticasone furoate-vilanterol 100-25 MCG/ACT inhaler  Commonly known as: BREO ELLIPTA     Janumet  MG per tablet  Generic drug: sitaGLIPtan-metFORMIN  TAKE 1 TABLET BY MOUTH TWICE A DAY WITH MEALS     letrozole 2.5 MG tablet  Commonly known as: FEMARA  TAKE 1 TABLET BY MOUTH EVERY DAY     levocetirizine 5 MG tablet  Commonly known as: XYZAL  Take 1 tablet by mouth nightly     lisinopril 20 MG tablet  Commonly known as: PRINIVIL;ZESTRIL     metoprolol succinate 50 MG extended release tablet  Commonly known as: TOPROL XL  TAKE 1 TABLET BY MOUTH EVERY DAY     ondansetron 4 MG tablet  Commonly known as: ZOFRAN  Take 1 tablet by mouth 3 times daily as needed for Nausea or Vomiting     sucralfate 1 GM/10ML suspension  Commonly known as: Carafate  Take 10 mLs by mouth 4 times daily            STOP taking these medications      losartan 100 MG tablet  Commonly known as: COZAAR               Where to Get Your Medications        These medications were sent to [facility]/pharmacy #[id number] [place], [place] - [address] [facility] P [phone] - F [phone]  [address] [facility] [address]      Phone: [phone]   amoxicillin-clavulanate 500-125 MG per tablet  docusate sodium 100 MG capsule             DISPOSITION:    Home with Family:   x    Home with HH/PT/OT/RN:    SNF/LTC:    GRANT:    OTHER:            Code status: Full code  Recommended

## 2024-05-30 NOTE — TELEPHONE ENCOUNTER
Care Transitions Initial Follow Up Call    Outreach made within 2 business days of discharge: Yes    Patient: Dannielle Hall Patient : [id number]   MRN: [id number]  Reason for Admission: There are no discharge diagnoses documented for the most recent discharge.  Discharge Date: 24       Spoke with: madalyn nava    Discharge department/facility: [facility] Interactive Patient Contact:  Was patient able to fill all prescriptions: Yes  Was patient instructed to bring all medications to the follow-up visit: Yes  Is patient taking all medications as directed in the discharge summary? Yes  Does patient understand their discharge instructions: Yes  Does patient have questions or concerns that need addressed prior to 7-14 day follow up office visit: no    Scheduled appointment with PCP within 7-14 days    Follow Up  Future Appointments   Date Time Provider Department Center   2024  9:30 AM Tracy Banuelos APRN - NP [facility] MAIN BS [facility]   2024  2:00 PM Sundeep Langley MD [facility]       Mary Shaver LPN

## 2024-06-04 ENCOUNTER — OFFICE VISIT (OUTPATIENT)
Age: 89
End: 2024-06-04

## 2024-06-04 ENCOUNTER — CLINICAL DOCUMENTATION (OUTPATIENT)
Age: 89
End: 2024-06-04

## 2024-06-04 VITALS
RESPIRATION RATE: 18 BRPM | HEART RATE: 108 BPM | TEMPERATURE: 97.9 F | BODY MASS INDEX: 28.22 KG/M2 | OXYGEN SATURATION: 98 % | DIASTOLIC BLOOD PRESSURE: 70 MMHG | HEIGHT: 65 IN | WEIGHT: 169.4 LBS | SYSTOLIC BLOOD PRESSURE: 142 MMHG

## 2024-06-04 DIAGNOSIS — E78.00 ELEVATED CHOLESTEROL: ICD-10-CM

## 2024-06-04 DIAGNOSIS — Z85.3 HISTORY OF BREAST CANCER: ICD-10-CM

## 2024-06-04 DIAGNOSIS — G31.84 MCI (MILD COGNITIVE IMPAIRMENT): ICD-10-CM

## 2024-06-04 DIAGNOSIS — Z09 HOSPITAL DISCHARGE FOLLOW-UP: Primary | ICD-10-CM

## 2024-06-04 DIAGNOSIS — E11.22 TYPE 2 DIABETES MELLITUS WITH STAGE 3 CHRONIC KIDNEY DISEASE, WITHOUT LONG-TERM CURRENT USE OF INSULIN, UNSPECIFIED WHETHER STAGE 3A OR 3B CKD (HCC): ICD-10-CM

## 2024-06-04 DIAGNOSIS — I10 ESSENTIAL (PRIMARY) HYPERTENSION: ICD-10-CM

## 2024-06-04 DIAGNOSIS — N18.30 TYPE 2 DIABETES MELLITUS WITH STAGE 3 CHRONIC KIDNEY DISEASE, WITHOUT LONG-TERM CURRENT USE OF INSULIN, UNSPECIFIED WHETHER STAGE 3A OR 3B CKD (HCC): ICD-10-CM

## 2024-06-04 DIAGNOSIS — R05.3 CHRONIC COUGH: ICD-10-CM

## 2024-06-04 DIAGNOSIS — K62.5 RECTAL BLEED: ICD-10-CM

## 2024-06-04 DIAGNOSIS — Z91.09 ENVIRONMENTAL ALLERGIES: ICD-10-CM

## 2024-06-04 DIAGNOSIS — Z86.73 HISTORY OF CVA (CEREBROVASCULAR ACCIDENT): ICD-10-CM

## 2024-06-04 LAB — HBA1C MFR BLD: 8 %

## 2024-06-04 ASSESSMENT — PATIENT HEALTH QUESTIONNAIRE - PHQ9
2. FEELING DOWN, DEPRESSED OR HOPELESS: SEVERAL DAYS
SUM OF ALL RESPONSES TO PHQ QUESTIONS 1-9: 2
SUM OF ALL RESPONSES TO PHQ QUESTIONS 1-9: 2
1. LITTLE INTEREST OR PLEASURE IN DOING THINGS: SEVERAL DAYS
SUM OF ALL RESPONSES TO PHQ9 QUESTIONS 1 & 2: 2
SUM OF ALL RESPONSES TO PHQ QUESTIONS 1-9: 2
SUM OF ALL RESPONSES TO PHQ QUESTIONS 1-9: 2

## 2024-06-04 NOTE — PROGRESS NOTES
Patient was administered prevnar 20 shot in left deltoid via IM.  Patient tolerated prevnar 20 shot well.  Medication information reviewed with patient, patient states understanding. Patient to resume routine medications at home.  Patient given copy of AVS and VIIS with medication information and instructions for home. VIIS reviewed with patient and patient states understanding.

## 2024-06-04 NOTE — PATIENT INSTRUCTIONS
3/4/2022.  Care instructions adapted under license by [employer]. If you have questions about a medical condition or this instruction, always ask your healthcare professional. [employer] disclaims any warranty or liability for your use of this information.

## 2024-06-04 NOTE — PROGRESS NOTES
Subjective:     CC: Hospital follow up     Dannielle Hall is a 87 y.o. female who presents today for a hospital follow up. She is also overdue for follow up for diabetes, HTN, allergies, CKD, and other chronic medical issues. It has been over a year since I have seen her.    She is here with her daughter Le today.    New issues: Rectal bleeding  Seen in ED 5-20-24. Admitted from 5/21 through 5/29. Reported rectal bleeding. She had been taking Plavix.  She does have a history of recurrent GI bleeds.  Underwent colonoscopy 5-28-24. Found a bleeding polyp that was removed. Reports states follow up 5 years. She has not seen any more blood since.  She was prescribed an antibiotic x 4 days.  She is moving her bowels regularly now. Taking a stool softener.    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   She saw Dr. Linder and underwent left breast lumpectomy on 10/12/2023.   Since then she has been in remission. She is on Letrozole.    Type 2 diabetes  Lab Results   Component Value Date    LABA1C 7.1 (H) 08/16/2023     08/16/2023     She is on Januvia and Janumet.  Jardiance caused urinary frequency.    Home BS have not been checked. POC A1C today is 8 which is ok for her age an comorbidities.     CKD, stage 3  Lab Results   Component Value Date    CREATININE 1.16 (H) 05/29/2024     She avoids NSAIDS.    HTN  BP  stable for age (it came down to 142/70 when rechecked manually).  Taking meds daily.   She denies CP, SOB, dizziness, or swelling.     HLD  Lab Results   Component Value Date    CHOL 193 08/16/2023    TRIG 140 08/16/2023    HDL 56 08/16/2023     (H) 08/16/2023    VLDL 28 08/16/2023    CHOLHDLRATIO 3.4 08/16/2023      Well controlled with Lipitor 80mg daily.    Allergies  She takes Xyzol daily with good control.      HX of tobacco use   Quit 3 years ago  Used to c/o SOB and wheezing.   Suspected COPD (PFTS were ordered but never completed and she is not

## 2024-06-04 NOTE — PROGRESS NOTES
Chief Complaint   Patient presents with    Follow-Up from Hospital       Vitals:    06/04/24 0941   BP: (!) 147/88   Pulse: (!) 108   Resp: 18   Temp: 97.9 °F (36.6 °C)   SpO2: 98%   \"Have you been to the ER, urgent care clinic since your last visit?  Hospitalized since your last visit?\"    YES - When: approximately 7 days ago.  Where and Why: [facility] Ble[facility].    “Have you seen or consulted any other health care providers outside of [facility] since your last visit?”    NO            Click Here for Release of Records Request

## 2024-06-06 RX ORDER — METOPROLOL SUCCINATE 50 MG/1
50 TABLET, EXTENDED RELEASE ORAL DAILY
Qty: 90 TABLET | Refills: 1 | Status: SHIPPED | OUTPATIENT
Start: 2024-06-06

## 2024-06-24 ENCOUNTER — TELEPHONE (OUTPATIENT)
Age: 89
End: 2024-06-24

## 2024-06-25 RX ORDER — LISINOPRIL 20 MG/1
20 TABLET ORAL 2 TIMES DAILY
Qty: 180 TABLET | Refills: 3 | Status: SHIPPED | OUTPATIENT
Start: 2024-06-25

## 2024-06-30 ENCOUNTER — TELEPHONE (OUTPATIENT)
Age: 89
End: 2024-06-30

## 2024-06-30 NOTE — TELEPHONE ENCOUNTER
Received form from [facility] requesting medical necessity of shower.  What kind of showers she is trying to get and why does she need it?

## 2024-07-02 PROBLEM — M16.0 OSTEOARTHRITIS OF BOTH HIPS: Status: ACTIVE | Noted: 2024-07-02

## 2024-07-02 NOTE — TELEPHONE ENCOUNTER
I see she has an xray showing OA of both hips so I have addended the last OV note to include this as we need documentation of medical necessity. I signed the form to be faxed back.

## 2024-07-02 NOTE — TELEPHONE ENCOUNTER
She is having difficulty getting in and out of tub because it is hard to raise her leg over the side of tub.  Patient also has abnormal gait.  Want to upgrade to a regular shower she can step into.

## 2024-07-31 ENCOUNTER — HOSPITAL ENCOUNTER (OUTPATIENT)
Facility: HOSPITAL | Age: 89
Discharge: HOME | End: 2024-08-03
Payer: MEDICARE

## 2024-07-31 PROCEDURE — 99214 OFFICE O/P EST MOD 30 MIN: CPT | Performed by: PSYCHIATRY & NEUROLOGY

## 2024-07-31 RX ORDER — CLOPIDOGREL BISULFATE 75 MG/1
75 TABLET ORAL DAILY
Qty: 180 TABLET | Refills: 0 | Status: SHIPPED | OUTPATIENT
Start: 2024-07-31

## 2024-09-10 RX ORDER — LETROZOLE 2.5 MG/1
2.5 TABLET, FILM COATED ORAL DAILY
Qty: 90 TABLET | Refills: 1 | Status: SHIPPED | OUTPATIENT
Start: 2024-09-10

## 2024-09-11 RX ORDER — LEVOCETIRIZINE DIHYDROCHLORIDE 5 MG/1
5 TABLET, FILM COATED ORAL NIGHTLY
Qty: 90 TABLET | Refills: 1 | Status: SHIPPED | OUTPATIENT
Start: 2024-09-11

## 2024-10-15 ENCOUNTER — HOSPITAL ENCOUNTER (OUTPATIENT)
Facility: HOSPITAL | Age: 89
Discharge: HOME | End: 2024-10-18
Payer: MEDICARE

## 2024-10-15 PROCEDURE — 99214 OFFICE O/P EST MOD 30 MIN: CPT | Performed by: PSYCHIATRY & NEUROLOGY

## 2024-10-15 NOTE — PROGRESS NOTES
INTERVAL HISTORY (In Person): Ms. Hall is an 89-year-old female wih is following up with me 2.5 months since her last appointment re: a history of Major Depression. She has been hospitalized 4 times since 2018, the last time in 8/2023 for 2 weeks, and has also been in the SOP program (most recently, 11/2022--12/2023). She's responded well to the use of Effexor XR and particularly to the support and structure of the IOP program. I most recently increased the Effexor XR to 225 mg and later added Abilify, and she was stable and euthymic when she graduated from the [facility] in 12/23, as well as at the next 2 appts. Over the past several months or more, she's been very mildly dysphoric, although she's not felt any worse having been taken off the Abilify ~4 months ago.     She comes in today and states that she's been feeling \"OK, I guess\", and that overall her mood has still been relatively good. She hasn't had any significant periods of dysphoria or depression, but she does still have moments of a slightly lower mood. She still has an Aide that comes 5 days a week, for 4 hours each day, but she thinks her current one stole a new packet of underwear from her, so she doesn't trust her much now. Otherwise, it's worked out reasonably well. She's been very compliant with the Effexor XR.and she denies having any SE's with it. Her N/V functioning has been at least adequate, although she does tend to wake up at 5 AM most days. Still stays connected to friends and family by phone, and spends a lot of time talking with them. She has been having more arthritic pains and she's moving a little slower, but overall she's doing well now. Daughter still sets up her weekly meds, and she's very compliant with taking them. She doesn't feel that she needs to come back to the SOP program right now, which is also an indicator that she's feeling and functioning adequately.         CURRENT MEDICATION:  1. Effexor  mg daily (150 and 75)  2.

## 2024-11-04 ENCOUNTER — CLINICAL DOCUMENTATION (OUTPATIENT)
Age: 89
End: 2024-11-04

## 2024-11-04 ENCOUNTER — HOSPITAL ENCOUNTER (EMERGENCY)
Facility: HOSPITAL | Age: 89
Discharge: HOME | End: 2024-11-04
Attending: EMERGENCY MEDICINE
Payer: MEDICARE

## 2024-11-04 VITALS
HEIGHT: 66 IN | WEIGHT: 164 LBS | HEART RATE: 103 BPM | RESPIRATION RATE: 18 BRPM | TEMPERATURE: 98.3 F | SYSTOLIC BLOOD PRESSURE: 171 MMHG | DIASTOLIC BLOOD PRESSURE: 112 MMHG | OXYGEN SATURATION: 96 % | BODY MASS INDEX: 26.36 KG/M2

## 2024-11-04 DIAGNOSIS — N64.52 NIPPLE DISCHARGE: Primary | ICD-10-CM

## 2024-11-04 PROCEDURE — 99282 EMERGENCY DEPT VISIT SF MDM: CPT

## 2024-11-04 ASSESSMENT — PAIN - FUNCTIONAL ASSESSMENT
PAIN_FUNCTIONAL_ASSESSMENT: NONE - DENIES PAIN
PAIN_FUNCTIONAL_ASSESSMENT: NONE - DENIES PAIN

## 2024-11-04 NOTE — CARE COORDINATION
Received call from ED physician that patient is requesting hospital make f/u fernando with her breast surgeon Linda Lindre. Called office at [phone]. Spoke with Samaria. She said that everyone in the office is at a meeting right now. She said they are NOT able to make fernando right now but will have them call me back after 1pm. Waiting for a phone call back.

## 2024-11-04 NOTE — ED PROVIDER NOTES
BY MOUTH TWO TIMES A DAY.     metoprolol succinate 50 MG extended release tablet  Commonly known as: TOPROL XL  TAKE 1 TABLET BY MOUTH EVERY DAY     ondansetron 4 MG tablet  Commonly known as: ZOFRAN  Take 1 tablet by mouth 3 times daily as needed for Nausea or Vomiting     sucralfate 1 GM/10ML suspension  Commonly known as: Carafate  Take 10 mLs by mouth 4 times daily     * venlafaxine 75 MG extended release capsule  Commonly known as: EFFEXOR XR  Take 1 capsule by mouth daily Take with 150 mg capsule     * venlafaxine 150 MG extended release capsule  Commonly known as: EFFEXOR XR  Take 1 capsule by mouth daily Take with 75 mg capsule           * This list has 2 medication(s) that are the same as other medications prescribed for you. Read the directions carefully, and ask your doctor or other care provider to review them with you.                    DISCONTINUED MEDICATIONS:  Discharge Medication List as of 11/4/2024 12:33 PM          I am the Primary Clinician of Record.   Hernandez Elise DO (electronically signed)    (Please note that parts of this dictation were completed with voice recognition software. Quite often unanticipated grammatical, syntax, homophones, and other interpretive errors are inadvertently transcribed by the computer software. Please disregards these errors. Please excuse any errors that have escaped final proofreading.)         Hernandez Elise DO  11/04/24 6501

## 2024-11-04 NOTE — ED TRIAGE NOTES
Pt reports c/o left breast discharge. Pt was \"washing up to come to the ED to be checked for nervousness\" when she found \"liquid\" coming from her left breast.

## 2024-11-04 NOTE — DISCHARGE INSTRUCTIONS
Please call Dr. Linder's office as soon as possible. Your breast does not appear infected at this time. The discharge coming from your nipple did not look like something infectious. Dr. Linder will need to see you and evaluate if any further imaging or testing is needed. Please call her office as soon as possible. I attempted to make you an appointment, but was unable to do so. I did reach out to case management to see if they could schedule for you.

## 2024-11-04 NOTE — PROGRESS NOTES
Received message from Jess Partida  at [facility] requesting call to schedule follow up appt for patient who was in the ER at the time of the call. Attempted to call Jess rocha and LM for her to contact our office.

## 2024-11-05 ENCOUNTER — TELEPHONE (OUTPATIENT)
Age: 89
End: 2024-11-05

## 2024-11-05 NOTE — CARE COORDINATION
11/5/24 0901:     Received voicemail from Neptali at [facility]. Said couldn't find patient in system. She said to call them back at [phone]. Spoke with Ailyn. Said Neptali wasn't here today but transferred me to nurses station. No answer. Left a voicemail

## 2024-11-05 NOTE — TELEPHONE ENCOUNTER
I received a message from Jess Brantley  at [facility].  She wants to make an appointment for the patient.  I attempted to call her back but it went to voicemail.  On the voicemail I let her know that the patient was a current patient of Dr. Linder's and that she can call the  to schedule an appointment.

## 2024-11-06 ENCOUNTER — APPOINTMENT (OUTPATIENT)
Facility: HOSPITAL | Age: 89
End: 2024-11-06
Payer: MEDICARE

## 2024-11-06 ENCOUNTER — HOSPITAL ENCOUNTER (EMERGENCY)
Facility: HOSPITAL | Age: 89
Discharge: HOME | End: 2024-11-06
Attending: EMERGENCY MEDICINE
Payer: MEDICARE

## 2024-11-06 VITALS
OXYGEN SATURATION: 93 % | DIASTOLIC BLOOD PRESSURE: 77 MMHG | HEIGHT: 66 IN | WEIGHT: 164 LBS | RESPIRATION RATE: 20 BRPM | BODY MASS INDEX: 26.36 KG/M2 | SYSTOLIC BLOOD PRESSURE: 135 MMHG | TEMPERATURE: 98.1 F | HEART RATE: 68 BPM

## 2024-11-06 DIAGNOSIS — R42 LIGHTHEADEDNESS: Primary | ICD-10-CM

## 2024-11-06 LAB
ALBUMIN SERPL-MCNC: 3.7 G/DL (ref 3.5–5)
ALBUMIN/GLOB SERPL: 1 (ref 1.1–2.2)
ALP SERPL-CCNC: 119 U/L (ref 45–117)
ALT SERPL-CCNC: 24 U/L (ref 12–78)
ANION GAP SERPL CALC-SCNC: 7 MMOL/L (ref 2–12)
APPEARANCE UR: CLEAR
AST SERPL-CCNC: 29 U/L (ref 15–37)
BACTERIA URNS QL MICRO: NEGATIVE /HPF
BASOPHILS # BLD: 0.1 K/UL (ref 0–0.1)
BASOPHILS NFR BLD: 1 % (ref 0–1)
BILIRUB SERPL-MCNC: 1 MG/DL (ref 0.2–1)
BILIRUB UR QL: NEGATIVE
BUN SERPL-MCNC: 20 MG/DL (ref 6–20)
BUN/CREAT SERPL: 16 (ref 12–20)
CALCIUM SERPL-MCNC: 9.2 MG/DL (ref 8.5–10.1)
CHLORIDE SERPL-SCNC: 104 MMOL/L (ref 97–108)
CO2 SERPL-SCNC: 31 MMOL/L (ref 21–32)
COLOR UR: ABNORMAL
CREAT SERPL-MCNC: 1.25 MG/DL (ref 0.55–1.02)
DIFFERENTIAL METHOD BLD: ABNORMAL
EKG ATRIAL RATE: 70 BPM
EKG DIAGNOSIS: NORMAL
EKG P AXIS: -3 DEGREES
EKG P-R INTERVAL: 142 MS
EKG Q-T INTERVAL: 394 MS
EKG QRS DURATION: 74 MS
EKG QTC CALCULATION (BAZETT): 425 MS
EKG R AXIS: 7 DEGREES
EKG T AXIS: 99 DEGREES
EKG VENTRICULAR RATE: 70 BPM
EOSINOPHIL # BLD: 0.5 K/UL (ref 0–0.4)
EOSINOPHIL NFR BLD: 6 % (ref 0–7)
EPITH CASTS URNS QL MICRO: ABNORMAL /LPF
ERYTHROCYTE [DISTWIDTH] IN BLOOD BY AUTOMATED COUNT: 12.3 % (ref 11.5–14.5)
GLOBULIN SER CALC-MCNC: 3.8 G/DL (ref 2–4)
GLUCOSE SERPL-MCNC: 172 MG/DL (ref 65–100)
GLUCOSE UR STRIP.AUTO-MCNC: NEGATIVE MG/DL
HCT VFR BLD AUTO: 41.6 % (ref 35–47)
HGB BLD-MCNC: 13.5 G/DL (ref 11.5–16)
HGB UR QL STRIP: NEGATIVE
IMM GRANULOCYTES # BLD AUTO: 0 K/UL (ref 0–0.04)
IMM GRANULOCYTES NFR BLD AUTO: 1 % (ref 0–0.5)
KETONES UR QL STRIP.AUTO: NEGATIVE MG/DL
LEUKOCYTE ESTERASE UR QL STRIP.AUTO: ABNORMAL
LYMPHOCYTES # BLD: 3 K/UL (ref 0.8–3.5)
LYMPHOCYTES NFR BLD: 36 % (ref 12–49)
MCH RBC QN AUTO: 30 PG (ref 26–34)
MCHC RBC AUTO-ENTMCNC: 32.5 G/DL (ref 30–36.5)
MCV RBC AUTO: 92.4 FL (ref 80–99)
MONOCYTES # BLD: 0.6 K/UL (ref 0–1)
MONOCYTES NFR BLD: 8 % (ref 5–13)
NEUTS SEG # BLD: 4.2 K/UL (ref 1.8–8)
NEUTS SEG NFR BLD: 48 % (ref 32–75)
NITRITE UR QL STRIP.AUTO: NEGATIVE
NRBC # BLD: 0 K/UL (ref 0–0.01)
NRBC BLD-RTO: 0 PER 100 WBC
PH UR STRIP: 6 (ref 5–8)
PLATELET # BLD AUTO: 306 K/UL (ref 150–400)
PMV BLD AUTO: 9.8 FL (ref 8.9–12.9)
POTASSIUM SERPL-SCNC: 4.4 MMOL/L (ref 3.5–5.1)
PROT SERPL-MCNC: 7.5 G/DL (ref 6.4–8.2)
PROT UR STRIP-MCNC: NEGATIVE MG/DL
RBC # BLD AUTO: 4.5 M/UL (ref 3.8–5.2)
RBC #/AREA URNS HPF: ABNORMAL /HPF (ref 0–5)
SODIUM SERPL-SCNC: 142 MMOL/L (ref 136–145)
SP GR UR REFRACTOMETRY: 1.02 (ref 1–1.03)
TROPONIN I SERPL HS-MCNC: 13 NG/L (ref 0–51)
TROPONIN I SERPL HS-MCNC: 15 NG/L (ref 0–51)
UROBILINOGEN UR QL STRIP.AUTO: 0.2 EU/DL (ref 0.2–1)
WBC # BLD AUTO: 8.4 K/UL (ref 3.6–11)
WBC URNS QL MICRO: ABNORMAL /HPF (ref 0–4)

## 2024-11-06 PROCEDURE — 80053 COMPREHEN METABOLIC PANEL: CPT

## 2024-11-06 PROCEDURE — 70450 CT HEAD/BRAIN W/O DYE: CPT

## 2024-11-06 PROCEDURE — 81001 URINALYSIS AUTO W/SCOPE: CPT

## 2024-11-06 PROCEDURE — 84484 ASSAY OF TROPONIN QUANT: CPT

## 2024-11-06 PROCEDURE — 93005 ELECTROCARDIOGRAM TRACING: CPT | Performed by: EMERGENCY MEDICINE

## 2024-11-06 PROCEDURE — 36415 COLL VENOUS BLD VENIPUNCTURE: CPT

## 2024-11-06 PROCEDURE — 99284 EMERGENCY DEPT VISIT MOD MDM: CPT

## 2024-11-06 PROCEDURE — 85025 COMPLETE CBC W/AUTO DIFF WBC: CPT

## 2024-11-06 ASSESSMENT — PAIN - FUNCTIONAL ASSESSMENT
PAIN_FUNCTIONAL_ASSESSMENT: 0-10
PAIN_FUNCTIONAL_ASSESSMENT: NONE - DENIES PAIN

## 2024-11-06 ASSESSMENT — PAIN SCALES - GENERAL: PAINLEVEL_OUTOF10: 0

## 2024-11-06 NOTE — ED TRIAGE NOTES
Pt arrived by EMS for lightheadedness.  EMS reports pt woke up not feeling well and reported she was feeling lightheaded.  Pt arrived awake alert and oriented X 4, pt offers no complaints at this time, Provider meet EMS outside of the room  NIH negative.  Pt able to walk to the stretcher without difficulty, pt educated on ER flow

## 2024-11-06 NOTE — ED PROVIDER NOTES
[facility] EMERGENCY DEP  EMERGENCY DEPARTMENT ENCOUNTER      Patient Name: Dannielle Hall  MRN: [id number]  Birthdate 1935  Date of Evaluation: 11/6/2024  Physician: Satya Anderson MD    CHIEF COMPLAINT       Chief Complaint   Patient presents with    Lightheadedness       HISTORY OF PRESENT ILLNESS   (Location/Symptom, Timing/Onset, Context/Setting, Quality, Duration, Modifying Factors, Severity)   Dannielle Hall, 89 y.o., female     Patient presents with lightheadedness that started this morning.  She denies nausea, vomiting, chest pain, shortness of breath or other symptoms.  She has had episodes of lightheadedness in the past          Nursing Notes were reviewed.    REVIEW OF SYSTEMS    (Not required)   Review of Systems    Except as noted above the remainder of the review of systems was reviewed and negative.     PAST MEDICAL HISTORY     Past Medical History:   Diagnosis Date    Allergic rhinitis due to pollen     Arthritis     BPV (benign positional vertigo)     Change in bowel habits     Depression     Diabetes (HCC)     Dysuria     Hemorrhoids     Hypertension     IBS (irritable bowel syndrome)     Menopause     Other diseases of nasal cavity and sinuses(478.19)     TIA (transient ischemic attack)     Vertigo        SURGICAL HISTORY       Past Surgical History:   Procedure Laterality Date    BREAST LUMPECTOMY Left 10/12/2023    LEFT BREAST ULTRASOUND GUIDED LUMPECTOMY performed by Susana Linder MD at [facility] AMBULATORY OR    CHOLECYSTECTOMY  2016    COLONOSCOPY  2013?    COLONOSCOPY N/A 7/15/2020    COLONOSCOPY performed by Alonzo Henriquez MD at [facility] ENDOSCOPY    COLONOSCOPY N/A 5/28/2024    COLONOSCOPY BIOPSY performed by Benny Artis MD at [facility] ENDOSCOPY    HYSTERECTOMY (CERVIX STATUS UNKNOWN)      UPPER GI ENDOSCOPY,DIAGNOSIS  7/15/2020         US BREAST BIOPSY W LOC DEVICE 1ST LESION LEFT Left 07/18/2023    US BREAST BIOPSY W LOC DEVICE 1ST LESION LEFT 7/18/2023 [facility] RAD MAMMO       CURRENT

## 2024-11-12 ENCOUNTER — TELEPHONE (OUTPATIENT)
Facility: HOSPITAL | Age: 89
End: 2024-11-12

## 2024-11-12 NOTE — TELEPHONE ENCOUNTER
[facility]  Breast Navigator Encounter    Name:    Dannielle Hall  Age:    89 y.o.  Diagnosis:   LEFT breast cancer    Returned daughter's call.  She had LM on my voicemail asking about when her mom should follow-up.  Said that she had tried to call a few numbers to schedule, but was not able to make appts.    She was not available when I called back.  I LM asking her to make an appt for her mom with Dr. Courtney, medical oncologist, since she is still taking the letrozole for her cancer.  She only saw him once last December, and she has not followed up since.      She is also overdue with seeing the NP at Dr. Linder's office and overdue for a mammogram.  Recommended that she call and get the appt with KOKO Marte sometime in the near future to be seen and so that Rosanna can put in the order for the mammogram.  She has not been seen by Dr. Linder since November of 2023.      I left her the phone numbers for both offices to call and make the appts.  I said that the best plan was to make the appts when she is in the office because she will not get a call at a later date to make the appts.     I asked her to call me back if she had questions or had problems scheduling.                              Betsey Leo RN, BSN, [facility]  Oncology Breast Navigator     [address]  [address]  W: [phone]  F: [phone]  [email or web address]  Good Help to Those in Need®

## 2024-11-12 NOTE — TELEPHONE ENCOUNTER
[facility]  Breast Navigator Encounter    Name:    Dannielle Hall  Age:    89 y.o.  Diagnosis:   LEFT breast cancer    Daughter Le called again, and I returned her call.      Reviewed the plan:  Suggested that she reach out to Dr. Courtney's office for an appt with him in the near future.  He has been refilling her letrozole, but she was due to see him in March of 2024.  She has already left a message at his office.  I told her to call Dr. Linder's office and make an appt for her mom with KOKO Marte for after the first of the year.  She can put the order in for her mammogram, and Dannielle can get this done at [facility], which is much closer to her home.    She thought the offices would call her to schedule appts, but I told her that appts should be made at the time of the visits or shortly thereafter since the offices will not call her to schedule these.      She was very appreciative.  I told her if she has trouble getting through to either office for appts she should call me and I can help her.                            Betsey Leo, RN, BSN, CBCN  Oncology Breast Navigator     [address]  [address]  W: [phone]  F: [phone]  [email or web address]  Good Help to Those in Need®

## 2024-11-13 ENCOUNTER — TELEPHONE (OUTPATIENT)
Age: 89
End: 2024-11-13

## 2024-11-13 NOTE — TELEPHONE ENCOUNTER
Le, the patient's daughter called and thinks there was a misunderstanding on when the patient was supposed to follow up. Pt was last seen in Dec 2023 and per the last ov, pt was to follow up in three months.     Does patient need any labs or scans prior to an appt? Please advise when patient should be brought in for an appt.

## 2024-11-14 ENCOUNTER — TELEPHONE (OUTPATIENT)
Age: 89
End: 2024-11-14

## 2024-11-14 NOTE — TELEPHONE ENCOUNTER
Noted. Script already written and faxed. I had written \"walk in shower\" and added the diagnosis code. If they call back will re-do order with more detail.

## 2024-11-14 NOTE — TELEPHONE ENCOUNTER
Per your conversation with Elisa Gann for her shower, all that needs to be on the script is:    Member needs walk in shower due to functional limitations, fall risk. Plus your diagnosis.

## 2024-11-15 NOTE — PROGRESS NOTES
[facility] at [facility] Follow Up   Office Phone: [phone], Office Fax: [phone]    Date: 11/19/24    PATIENT PROFILE: Ms. Dannielle Hall is a 89 y.o. who presents with the following diagnoses: prior breast cancer    Patient Care Team:  Tracy Banuelos APRN - NP as PCP - General  Tracy Banuelos APRN - NP as PCP - [facility] Provider  Baltazar Erickson MD (General Surgery)     HEMATOLOGIC/ONCOLOGIC HISTORY  Oncology History Overview Note   #L Breast IDC, gr 2, ER 90%, NC 50%, Her2 negative (2+, negative by FISH), pT2 Nx Mx  -identified L breast mass on exam  -7/6/23- diagnostic mammogram- solid mass in L breast at 3:00, no obvious adenopathy, simle cyst in R breast  -7/20/23- u/s guided biopsy L breast- IDC, grade 2-3, focal DCIS, ER 90%, NC 50%, Her2 negative (2+, negative by FISH), ki67 40%  -10/12/23- L breast lumpectomy- removal of 25 mm IDC, grade 2, DCIS present, margins negative , pT2, no lymph node assessment  -not deemed to be a candidate for adjuvant radiation by prior team  -12/1/23- plans to start adjuvant letrozole, last visit with medical oncology  -lost to follow up  -11/4/24- presented to ED with concerns for L breast nipple discharge, referred back to breast surgery team  -11/19/24- zeb w/ Dr. Paolo Carmona at [facility] Oncology    Molecular Testing    Pertinent History  PMHx:  -Heme/Onc- breast cancer  -CV- htn  -/Nephro-  -Endo- NIDDM  -Resp-  -GI-  -HEENT-  -GYN-  -Rheum- OA  -MSK/Neuro- depression, prior TIA    PSurgHx: lumpectomy, cholecystectomy, hysterectomy  PSocHx: lives in [place], [place]  PFamHx: reviewed       Invasive ductal carcinoma of left breast (HCC)   10/19/2023 Initial Diagnosis    Invasive ductal carcinoma of left breast (HCC)         HISTORY OF PRESENT ILLNESS:   Mrs. Hall is an 89 yo w/ pmhx of htn, NIDDM, OA, depression, prior TIA presenting for follow up for breast cancer.    Patient last evaluated by Dr. Courtney 12/2023.  At

## 2024-11-19 ENCOUNTER — OFFICE VISIT (OUTPATIENT)
Age: 89
End: 2024-11-19
Payer: MEDICARE

## 2024-11-19 VITALS
OXYGEN SATURATION: 94 % | TEMPERATURE: 97.1 F | RESPIRATION RATE: 16 BRPM | SYSTOLIC BLOOD PRESSURE: 129 MMHG | BODY MASS INDEX: 27.55 KG/M2 | WEIGHT: 171.4 LBS | DIASTOLIC BLOOD PRESSURE: 73 MMHG | HEIGHT: 66 IN | HEART RATE: 78 BPM

## 2024-11-19 DIAGNOSIS — N64.52 DISCHARGE FROM LEFT NIPPLE: ICD-10-CM

## 2024-11-19 DIAGNOSIS — Z17.0 MALIGNANT NEOPLASM OF UPPER-OUTER QUADRANT OF LEFT BREAST IN FEMALE, ESTROGEN RECEPTOR POSITIVE (HCC): Primary | ICD-10-CM

## 2024-11-19 DIAGNOSIS — C50.412 MALIGNANT NEOPLASM OF UPPER-OUTER QUADRANT OF LEFT BREAST IN FEMALE, ESTROGEN RECEPTOR POSITIVE (HCC): Primary | ICD-10-CM

## 2024-11-19 DIAGNOSIS — Z79.811 AROMATASE INHIBITOR USE: ICD-10-CM

## 2024-11-19 PROCEDURE — 99214 OFFICE O/P EST MOD 30 MIN: CPT | Performed by: STUDENT IN AN ORGANIZED HEALTH CARE EDUCATION/TRAINING PROGRAM

## 2024-11-19 PROCEDURE — 1159F MED LIST DOCD IN RCRD: CPT | Performed by: STUDENT IN AN ORGANIZED HEALTH CARE EDUCATION/TRAINING PROGRAM

## 2024-11-19 PROCEDURE — 1126F AMNT PAIN NOTED NONE PRSNT: CPT | Performed by: STUDENT IN AN ORGANIZED HEALTH CARE EDUCATION/TRAINING PROGRAM

## 2024-11-19 PROCEDURE — 1123F ACP DISCUSS/DSCN MKR DOCD: CPT | Performed by: STUDENT IN AN ORGANIZED HEALTH CARE EDUCATION/TRAINING PROGRAM

## 2024-11-19 RX ORDER — LETROZOLE 2.5 MG/1
2.5 TABLET, FILM COATED ORAL DAILY
Qty: 90 TABLET | Refills: 3 | Status: SHIPPED | OUTPATIENT
Start: 2024-11-19

## 2024-11-19 ASSESSMENT — PATIENT HEALTH QUESTIONNAIRE - PHQ9
2. FEELING DOWN, DEPRESSED OR HOPELESS: NOT AT ALL
SUM OF ALL RESPONSES TO PHQ QUESTIONS 1-9: 0
1. LITTLE INTEREST OR PLEASURE IN DOING THINGS: NOT AT ALL
SUM OF ALL RESPONSES TO PHQ QUESTIONS 1-9: 0
SUM OF ALL RESPONSES TO PHQ9 QUESTIONS 1 & 2: 0
SUM OF ALL RESPONSES TO PHQ QUESTIONS 1-9: 0
SUM OF ALL RESPONSES TO PHQ QUESTIONS 1-9: 0

## 2024-11-19 NOTE — PROGRESS NOTES
Dannielleleni Hall is a 89 y.o. female  Chief Complaint   Patient presents with    Other     Breast cancer     1. Have you been to the ER, urgent care clinic since your last visit?  Hospitalized since your last visit?Yes When: 11/6 Where: [facility] [place] Reason for visit: Lightheadedness    2. Have you seen or consulted any other health care providers outside of the [facility] since your last visit?  Include any pap smears or colon screening. No

## 2024-11-19 NOTE — PATIENT INSTRUCTIONS
It was nice meeting you.  We reviewed your history of breat cancer and past surgery.  Please continue taking letrozole 2.5 mg daily.  I have ordered a mammogram and ultrasound given your reported nipple discharge.  Please see Dr. Linder on 12/11/24.  Please take calcium 1200 mg daily, vitamin D 2000 units daily.  I will order you a bone density scan to monitor your bone density on therapy.  I will see you back in 3 months.

## 2024-11-26 ENCOUNTER — HOSPITAL ENCOUNTER (OUTPATIENT)
Facility: HOSPITAL | Age: 89
Discharge: HOME | End: 2024-11-29
Attending: STUDENT IN AN ORGANIZED HEALTH CARE EDUCATION/TRAINING PROGRAM
Payer: MEDICARE

## 2024-11-26 DIAGNOSIS — Z79.811 AROMATASE INHIBITOR USE: ICD-10-CM

## 2024-11-26 PROCEDURE — 77080 DXA BONE DENSITY AXIAL: CPT

## 2024-11-26 NOTE — TELEPHONE ENCOUNTER
Medication Refill Request    Dannielle Hall is requesting a refill of the following medication(s):   Janumet   Please send refill to:     [facility]/pharmacy #[id number] - [place], [place] - [address] [facility] [phone] - F [phone]  [address] [facility] [address]  Phone: [phone] Fax: [phone]

## 2024-11-27 RX ORDER — CLOPIDOGREL BISULFATE 75 MG/1
75 TABLET ORAL DAILY
Qty: 90 TABLET | Refills: 1 | Status: SHIPPED | OUTPATIENT
Start: 2024-11-27

## 2024-11-27 RX ORDER — SITAGLIPTIN AND METFORMIN HYDROCHLORIDE 500; 50 MG/1; MG/1
1 TABLET, FILM COATED ORAL 2 TIMES DAILY WITH MEALS
Qty: 180 TABLET | Refills: 1 | Status: SHIPPED | OUTPATIENT
Start: 2024-11-27

## 2024-12-03 ENCOUNTER — HOSPITAL ENCOUNTER (INPATIENT)
Facility: HOSPITAL | Age: 89
LOS: 30 days | Discharge: HOME | DRG: 885 | End: 2025-01-02
Attending: EMERGENCY MEDICINE | Admitting: PSYCHIATRY & NEUROLOGY
Payer: MEDICARE

## 2024-12-03 DIAGNOSIS — R06.2 WHEEZING: ICD-10-CM

## 2024-12-03 DIAGNOSIS — F32.A DEPRESSION, UNSPECIFIED DEPRESSION TYPE: ICD-10-CM

## 2024-12-03 DIAGNOSIS — R45.851 SUICIDAL IDEATION: Primary | ICD-10-CM

## 2024-12-03 PROBLEM — F33.0 MDD (MAJOR DEPRESSIVE DISORDER), RECURRENT EPISODE, MILD (HCC): Status: ACTIVE | Noted: 2024-12-03

## 2024-12-03 LAB
ALBUMIN SERPL-MCNC: 3.5 G/DL (ref 3.5–5)
ALBUMIN/GLOB SERPL: 0.8 (ref 1.1–2.2)
ALP SERPL-CCNC: 115 U/L (ref 45–117)
ALT SERPL-CCNC: 16 U/L (ref 12–78)
AMPHET UR QL SCN: NEGATIVE
ANION GAP SERPL CALC-SCNC: 6 MMOL/L (ref 2–12)
APPEARANCE UR: CLEAR
AST SERPL-CCNC: 24 U/L (ref 15–37)
BACTERIA URNS QL MICRO: ABNORMAL /HPF
BARBITURATES UR QL SCN: NEGATIVE
BASOPHILS # BLD: 0 K/UL (ref 0–0.1)
BASOPHILS NFR BLD: 1 % (ref 0–1)
BENZODIAZ UR QL: NEGATIVE
BILIRUB SERPL-MCNC: 0.9 MG/DL (ref 0.2–1)
BILIRUB UR QL: NEGATIVE
BUN SERPL-MCNC: 17 MG/DL (ref 6–20)
BUN/CREAT SERPL: 15 (ref 12–20)
CALCIUM SERPL-MCNC: 9.3 MG/DL (ref 8.5–10.1)
CANNABINOIDS UR QL SCN: NEGATIVE
CHLORIDE SERPL-SCNC: 102 MMOL/L (ref 97–108)
CO2 SERPL-SCNC: 32 MMOL/L (ref 21–32)
COCAINE UR QL SCN: NEGATIVE
COLOR UR: ABNORMAL
CREAT SERPL-MCNC: 1.16 MG/DL (ref 0.55–1.02)
DIFFERENTIAL METHOD BLD: ABNORMAL
EOSINOPHIL # BLD: 0.3 K/UL (ref 0–0.4)
EOSINOPHIL NFR BLD: 3 % (ref 0–7)
EPITH CASTS URNS QL MICRO: ABNORMAL /LPF
ERYTHROCYTE [DISTWIDTH] IN BLOOD BY AUTOMATED COUNT: 12.3 % (ref 11.5–14.5)
ETHANOL SERPL-MCNC: <10 MG/DL (ref 0–0.08)
GLOBULIN SER CALC-MCNC: 4.2 G/DL (ref 2–4)
GLUCOSE SERPL-MCNC: 155 MG/DL (ref 65–100)
GLUCOSE UR STRIP.AUTO-MCNC: NEGATIVE MG/DL
HCT VFR BLD AUTO: 41.1 % (ref 35–47)
HGB BLD-MCNC: 13.4 G/DL (ref 11.5–16)
HGB UR QL STRIP: NEGATIVE
IMM GRANULOCYTES # BLD AUTO: 0 K/UL (ref 0–0.04)
IMM GRANULOCYTES NFR BLD AUTO: 1 % (ref 0–0.5)
KETONES UR QL STRIP.AUTO: NEGATIVE MG/DL
LEUKOCYTE ESTERASE UR QL STRIP.AUTO: NEGATIVE
LYMPHOCYTES # BLD: 2.3 K/UL (ref 0.8–3.5)
LYMPHOCYTES NFR BLD: 28 % (ref 12–49)
Lab: NORMAL
MCH RBC QN AUTO: 29.8 PG (ref 26–34)
MCHC RBC AUTO-ENTMCNC: 32.6 G/DL (ref 30–36.5)
MCV RBC AUTO: 91.5 FL (ref 80–99)
METHADONE UR QL: NEGATIVE
MONOCYTES # BLD: 0.6 K/UL (ref 0–1)
MONOCYTES NFR BLD: 7 % (ref 5–13)
NEUTS SEG # BLD: 5.1 K/UL (ref 1.8–8)
NEUTS SEG NFR BLD: 60 % (ref 32–75)
NITRITE UR QL STRIP.AUTO: NEGATIVE
NRBC # BLD: 0 K/UL (ref 0–0.01)
NRBC BLD-RTO: 0 PER 100 WBC
OPIATES UR QL: NEGATIVE
PCP UR QL: NEGATIVE
PH UR STRIP: 6 (ref 5–8)
PLATELET # BLD AUTO: 301 K/UL (ref 150–400)
PMV BLD AUTO: 9.5 FL (ref 8.9–12.9)
POTASSIUM SERPL-SCNC: 3.9 MMOL/L (ref 3.5–5.1)
PROT SERPL-MCNC: 7.7 G/DL (ref 6.4–8.2)
PROT UR STRIP-MCNC: NEGATIVE MG/DL
RBC # BLD AUTO: 4.49 M/UL (ref 3.8–5.2)
RBC #/AREA URNS HPF: ABNORMAL /HPF (ref 0–5)
SODIUM SERPL-SCNC: 140 MMOL/L (ref 136–145)
SP GR UR REFRACTOMETRY: 1.02 (ref 1–1.03)
URINE CULTURE IF INDICATED: ABNORMAL
UROBILINOGEN UR QL STRIP.AUTO: 0.2 EU/DL (ref 0.2–1)
WBC # BLD AUTO: 8.3 K/UL (ref 3.6–11)
WBC URNS QL MICRO: ABNORMAL /HPF (ref 0–4)

## 2024-12-03 PROCEDURE — 36415 COLL VENOUS BLD VENIPUNCTURE: CPT

## 2024-12-03 PROCEDURE — 80307 DRUG TEST PRSMV CHEM ANLYZR: CPT

## 2024-12-03 PROCEDURE — 1240000000 HC EMOTIONAL WELLNESS R&B

## 2024-12-03 PROCEDURE — 81001 URINALYSIS AUTO W/SCOPE: CPT

## 2024-12-03 PROCEDURE — 82077 ASSAY SPEC XCP UR&BREATH IA: CPT

## 2024-12-03 PROCEDURE — 80053 COMPREHEN METABOLIC PANEL: CPT

## 2024-12-03 PROCEDURE — 85025 COMPLETE CBC W/AUTO DIFF WBC: CPT

## 2024-12-03 PROCEDURE — 99285 EMERGENCY DEPT VISIT HI MDM: CPT

## 2024-12-03 RX ORDER — MAGNESIUM HYDROXIDE/ALUMINUM HYDROXICE/SIMETHICONE 120; 1200; 1200 MG/30ML; MG/30ML; MG/30ML
30 SUSPENSION ORAL EVERY 6 HOURS PRN
Status: DISCONTINUED | OUTPATIENT
Start: 2024-12-03 | End: 2025-01-02 | Stop reason: HOSPADM

## 2024-12-03 RX ORDER — TRAZODONE HYDROCHLORIDE 50 MG/1
25 TABLET, FILM COATED ORAL NIGHTLY PRN
Status: DISCONTINUED | OUTPATIENT
Start: 2024-12-03 | End: 2024-12-05

## 2024-12-03 RX ORDER — ACETAMINOPHEN 325 MG/1
650 TABLET ORAL EVERY 4 HOURS PRN
Status: DISCONTINUED | OUTPATIENT
Start: 2024-12-03 | End: 2025-01-02 | Stop reason: HOSPADM

## 2024-12-03 RX ORDER — POLYETHYLENE GLYCOL 3350 17 G/17G
17 POWDER, FOR SOLUTION ORAL DAILY PRN
Status: DISCONTINUED | OUTPATIENT
Start: 2024-12-03 | End: 2025-01-02 | Stop reason: HOSPADM

## 2024-12-03 ASSESSMENT — PAIN - FUNCTIONAL ASSESSMENT: PAIN_FUNCTIONAL_ASSESSMENT: 0-10

## 2024-12-03 ASSESSMENT — PAIN SCALES - GENERAL: PAINLEVEL_OUTOF10: 0

## 2024-12-03 NOTE — ED PROVIDER NOTES
[facility] EMERGENCY DEP  EMERGENCY DEPARTMENT ENCOUNTER       Pt Name: Dannielle Hall  MRN: [id number]  Birthdate 1935  Date of evaluation: 12/3/2024  Provider: Hernandez Elise DO   PCP: Tracy Banuelos APRN - NP  Note Started: 6:23 PM EST 12/3/24     CHIEF COMPLAINT       Chief Complaint   Patient presents with    Anxiety        HISTORY OF PRESENT ILLNESS: 1 or more elements      History From: Patient History limited by: none     Dannielle Hall is a 89 y.o. female cc of depression, suicidal thoughts. Patient states \" I am here to go to [place]\". She denies HI. Reports seeing things in peripheral vision, but when she looks they are gone. Reports thoughts of harming self. States she would lay in the road and let the cars run over her. Denies any acts of self harm.        Please See MDM for Additional Details of the HPI/PMH  Nursing Notes were all reviewed and agreed with or any disagreements were addressed in the HPI.     REVIEW OF SYSTEMS        Positives and Pertinent negatives as per HPI.    PAST HISTORY     Past Medical History:  Past Medical History:   Diagnosis Date    Allergic rhinitis due to pollen     Arthritis     BPV (benign positional vertigo)     Change in bowel habits     Depression     Diabetes (HCC)     Dysuria     Hemorrhoids     Hypertension     IBS (irritable bowel syndrome)     Menopause     Other diseases of nasal cavity and sinuses(478.19)     TIA (transient ischemic attack)     Vertigo        Past Surgical History:  Past Surgical History:   Procedure Laterality Date    BREAST LUMPECTOMY Left 10/12/2023    LEFT BREAST ULTRASOUND GUIDED LUMPECTOMY performed by Susana Linder MD at [facility] AMBULATORY OR    CHOLECYSTECTOMY  2016    COLONOSCOPY  2013?    COLONOSCOPY N/A 7/15/2020    COLONOSCOPY performed by Alonzo Henriquez MD at [facility] ENDOSCOPY    COLONOSCOPY N/A 5/28/2024    COLONOSCOPY BIOPSY performed by Benny Artis MD at [facility] ENDOSCOPY    HYSTERECTOMY (CERVIX STATUS UNKNOWN)      UPPER GI

## 2024-12-03 NOTE — ED TRIAGE NOTES
Pt arrived by EMS for anxiety.  Pt arrived awake and alert X 4.  Pt offers no complaints at this time.  Pt educated on ER flow

## 2024-12-03 NOTE — ED NOTES
[facility], voicemail left with instruction to call this writer back.

## 2024-12-04 PROBLEM — F33.1 MAJOR DEPRESSIVE DISORDER, RECURRENT EPISODE, MODERATE (HCC): Status: ACTIVE | Noted: 2024-12-03

## 2024-12-04 PROCEDURE — 6370000000 HC RX 637 (ALT 250 FOR IP): Performed by: PSYCHIATRY & NEUROLOGY

## 2024-12-04 PROCEDURE — 90792 PSYCH DIAG EVAL W/MED SRVCS: CPT | Performed by: PSYCHIATRY & NEUROLOGY

## 2024-12-04 PROCEDURE — 6360000002 HC RX W HCPCS: Performed by: PSYCHIATRY & NEUROLOGY

## 2024-12-04 PROCEDURE — 1240000000 HC EMOTIONAL WELLNESS R&B

## 2024-12-04 PROCEDURE — 94640 AIRWAY INHALATION TREATMENT: CPT

## 2024-12-04 RX ORDER — LISINOPRIL 10 MG/1
20 TABLET ORAL 2 TIMES DAILY
Status: DISCONTINUED | OUTPATIENT
Start: 2024-12-04 | End: 2025-01-02 | Stop reason: HOSPADM

## 2024-12-04 RX ORDER — ALBUTEROL SULFATE 90 UG/1
2 INHALANT RESPIRATORY (INHALATION) EVERY 6 HOURS PRN
Status: DISCONTINUED | OUTPATIENT
Start: 2024-12-04 | End: 2024-12-04

## 2024-12-04 RX ORDER — ALBUTEROL SULFATE 0.83 MG/ML
2.5 SOLUTION RESPIRATORY (INHALATION) EVERY 6 HOURS PRN
Status: DISCONTINUED | OUTPATIENT
Start: 2024-12-04 | End: 2025-01-02 | Stop reason: HOSPADM

## 2024-12-04 RX ORDER — CLOPIDOGREL BISULFATE 75 MG/1
75 TABLET ORAL DAILY
Status: DISCONTINUED | OUTPATIENT
Start: 2024-12-04 | End: 2025-01-02 | Stop reason: HOSPADM

## 2024-12-04 RX ORDER — LETROZOLE 2.5 MG/1
2.5 TABLET, FILM COATED ORAL DAILY
Status: DISCONTINUED | OUTPATIENT
Start: 2024-12-04 | End: 2025-01-02 | Stop reason: HOSPADM

## 2024-12-04 RX ORDER — VENLAFAXINE HYDROCHLORIDE 150 MG/1
150 CAPSULE, EXTENDED RELEASE ORAL
Status: DISCONTINUED | OUTPATIENT
Start: 2024-12-04 | End: 2025-01-02 | Stop reason: HOSPADM

## 2024-12-04 RX ORDER — ATORVASTATIN CALCIUM 40 MG/1
80 TABLET, FILM COATED ORAL DAILY
Status: DISCONTINUED | OUTPATIENT
Start: 2024-12-04 | End: 2025-01-02 | Stop reason: HOSPADM

## 2024-12-04 RX ORDER — ARIPIPRAZOLE 2 MG/1
2 TABLET ORAL DAILY
Status: DISCONTINUED | OUTPATIENT
Start: 2024-12-04 | End: 2024-12-09

## 2024-12-04 RX ORDER — ALOGLIPTIN 6.25 MG/1
12.5 TABLET, FILM COATED ORAL 2 TIMES DAILY WITH MEALS
Status: DISCONTINUED | OUTPATIENT
Start: 2024-12-04 | End: 2025-01-02 | Stop reason: HOSPADM

## 2024-12-04 RX ORDER — CETIRIZINE HYDROCHLORIDE 10 MG/1
10 TABLET ORAL DAILY
Status: DISCONTINUED | OUTPATIENT
Start: 2024-12-04 | End: 2025-01-02 | Stop reason: HOSPADM

## 2024-12-04 RX ORDER — METOPROLOL SUCCINATE 50 MG/1
50 TABLET, EXTENDED RELEASE ORAL DAILY
Status: DISCONTINUED | OUTPATIENT
Start: 2024-12-04 | End: 2025-01-02 | Stop reason: HOSPADM

## 2024-12-04 RX ORDER — HYDROXYZINE PAMOATE 25 MG/1
25 CAPSULE ORAL 3 TIMES DAILY PRN
Status: DISCONTINUED | OUTPATIENT
Start: 2024-12-04 | End: 2025-01-02 | Stop reason: HOSPADM

## 2024-12-04 RX ADMIN — METFORMIN HYDROCHLORIDE 500 MG: 500 TABLET ORAL at 17:14

## 2024-12-04 RX ADMIN — ATORVASTATIN CALCIUM 80 MG: 40 TABLET, FILM COATED ORAL at 08:50

## 2024-12-04 RX ADMIN — ARFORMOTEROL TARTRATE: 15 SOLUTION RESPIRATORY (INHALATION) at 08:55

## 2024-12-04 RX ADMIN — LISINOPRIL 20 MG: 10 TABLET ORAL at 08:51

## 2024-12-04 RX ADMIN — METFORMIN HYDROCHLORIDE 500 MG: 500 TABLET ORAL at 08:53

## 2024-12-04 RX ADMIN — TRAZODONE HYDROCHLORIDE 25 MG: 50 TABLET ORAL at 01:02

## 2024-12-04 RX ADMIN — ALOGLIPTIN 12.5 MG: 12.5 TABLET, FILM COATED ORAL at 17:14

## 2024-12-04 RX ADMIN — METOPROLOL SUCCINATE 50 MG: 50 TABLET, EXTENDED RELEASE ORAL at 08:51

## 2024-12-04 RX ADMIN — CETIRIZINE HYDROCHLORIDE 10 MG: 10 TABLET, FILM COATED ORAL at 08:51

## 2024-12-04 RX ADMIN — ALOGLIPTIN 12.5 MG: 12.5 TABLET, FILM COATED ORAL at 08:51

## 2024-12-04 RX ADMIN — ARFORMOTEROL TARTRATE: 15 SOLUTION RESPIRATORY (INHALATION) at 20:12

## 2024-12-04 RX ADMIN — LISINOPRIL 20 MG: 10 TABLET ORAL at 20:34

## 2024-12-04 RX ADMIN — LETROZOLE 2.5 MG: 2.5 TABLET, FILM COATED ORAL at 15:52

## 2024-12-04 RX ADMIN — VENLAFAXINE HYDROCHLORIDE 150 MG: 150 CAPSULE, EXTENDED RELEASE ORAL at 08:54

## 2024-12-04 RX ADMIN — ARIPIPRAZOLE 2 MG: 2 TABLET ORAL at 08:51

## 2024-12-04 RX ADMIN — CLOPIDOGREL BISULFATE 75 MG: 75 TABLET ORAL at 08:51

## 2024-12-04 ASSESSMENT — SLEEP AND FATIGUE QUESTIONNAIRES
AVERAGE NUMBER OF SLEEP HOURS: 6
DO YOU HAVE DIFFICULTY SLEEPING: YES
DO YOU USE A SLEEP AID: NO

## 2024-12-04 ASSESSMENT — PAIN SCALES - GENERAL: PAINLEVEL_OUTOF10: 0

## 2024-12-04 NOTE — BSMART NOTE
[employer] called and informed nurse that writer is aware of consult and will complete after prior consult was completed.

## 2024-12-04 NOTE — BSMART NOTE
Comprehensive Assessment Form Part 1      Section I - Disposition    Primary Diagnosis: Major Depressive Disorder per hx    Past Medical History:   Diagnosis Date    Allergic rhinitis due to pollen     Arthritis     BPV (benign positional vertigo)     Change in bowel habits     Depression     Diabetes (HCC)     Dysuria     Hemorrhoids     Hypertension     IBS (irritable bowel syndrome)     Menopause     Other diseases of nasal cavity and sinuses(478.19)     TIA (transient ischemic attack)     Vertigo         The Medical Doctor to Psychiatrist conference was notcompleted.  The Medical Doctor is in agreement with [facility] Clinician's disposition.  The plan is admit to inpatient BHU.  The Medical Provider consulted was Dr. Ramirez  The admitting Psychiatrist will be Dr. LAZCANO.  The admitting Diagnosis is MDD  The Payor source is  [employer] [place] [employer]     This writer reviewed the [place] Suicide Severity Rating Scale in nursing flowsheet and the risk level assigned is high risk.  Based on this assessment, the risk of suicide is moderate risk and the plan is to admit to inpatient BHU.     Section II - Integrated Summary  Summary:  Per Triage note:  Pt arrived by EMS for anxiety. Pt arrived awake and alert X 4. Pt offers no complaints at this time. Pt educated on ER flow     Patient is an 10y year old female that arrived to the ED for SI and anxiety. This writer met with patient via teledoc. Pt was alert and oriented x4 during interview endorsing SI with plan to \"lay out on the road in order to get killed or overdose on something\". Pt denied any intent to follow through with plan or hx of attempts but worsening SI for the last three months . Pt shared that she lives alone and has been struggling with managing items in her bathroom that have been causing her heightened anxiety . Pt shared that he sink has been giving her a lot of trouble and she has had no one to fix it. Pt. Pt dressed in personal clothing reported

## 2024-12-04 NOTE — H&P
dysphoric, but her mood has continued to deteriorate since then as noted.    There is no evidence or history to suggest any sydni or hypomania nor is there any evidence or history of any psychosis.  A particular issue for her seems to be her strong need for socialization and the fact that she lives alone has been an impediment in that regard.    She denies any substance abuse issues at all, and she states she has been taking her medication fairly consistently, noted below.  She states her daughter sets the medicines up for her and she thinks she rarely misses any dosages.    PAST PSYCHIATRIC HISTORY:  She has been hospitalized 4 times previously at this facility in 08/2018, 04/2019, 08/2020, and 08/2023 due to episodes of depression.  She has also been in our IOP program off and on since her 2nd hospitalization in 2019.  She has tried Zoloft, Wellbutrin, Ritalin, BuSpar, Xanax, but the Effexor has clearly been most effective medicine for her coupled with the use of Abilify during bouts of depression.    PAST MEDICAL HISTORY:    1. Breast cancer, stage I, which is stable.  2. Diabetes mellitus.  3. Hypertension.  4. Hyperlipidemia.  5. COPD.  6. History of small TIA.  7. Chronic kidney disease, stage 3.  8. GERD.  9. Osteoarthritis.    MEDICATIONS ON ADMISSION:    1. Effexor  mg daily.  2. Janumet  b.i.d.  3. Plavix 75 mg daily.  4. Femara 2.5 mg daily.  5. Lisinopril 20 mg b.i.d.  6. Metoprolol-XL 50 mg daily.  7. Lipitor 80 mg daily.  8. Breo Ellipta 100-25 one puff daily.  9. Albuterol inhaler 2 puffs q.6 hours p.r.n. for wheezing.  10. Xyzal 5 mg q.h.s.    ALLERGIES:  NO KNOWN ALLERGIES.    FAMILY HISTORY:  She denies any family psychiatric history.    SOCIAL HISTORY:  She has been  for many years and has 3 children with 2 of her daughters being fairly active in assisting her.  She continues to live alone, but has a number of friends that she talks to on the phone several hours every day.  She

## 2024-12-04 NOTE — BSMART NOTE
BSMART assessment completed, and suicide risk level noted to be Moderate Risk and sitter to be continued. Primary Nurse Miguel Ángel and Physician Ashley notified. Concerns not observed. Pt requesting voluntary admission to [facility] [place] and provided verbal consent to speak with her daughter (Le Bartlettens /[phone])

## 2024-12-05 PROCEDURE — 99232 SBSQ HOSP IP/OBS MODERATE 35: CPT | Performed by: PSYCHIATRY & NEUROLOGY

## 2024-12-05 PROCEDURE — 94761 N-INVAS EAR/PLS OXIMETRY MLT: CPT

## 2024-12-05 PROCEDURE — 94640 AIRWAY INHALATION TREATMENT: CPT

## 2024-12-05 PROCEDURE — 1240000000 HC EMOTIONAL WELLNESS R&B

## 2024-12-05 PROCEDURE — 6360000002 HC RX W HCPCS: Performed by: PSYCHIATRY & NEUROLOGY

## 2024-12-05 PROCEDURE — 6370000000 HC RX 637 (ALT 250 FOR IP): Performed by: PSYCHIATRY & NEUROLOGY

## 2024-12-05 RX ORDER — TRAZODONE HYDROCHLORIDE 50 MG/1
50 TABLET, FILM COATED ORAL NIGHTLY PRN
Status: DISCONTINUED | OUTPATIENT
Start: 2024-12-05 | End: 2024-12-25

## 2024-12-05 RX ADMIN — VENLAFAXINE HYDROCHLORIDE 150 MG: 150 CAPSULE, EXTENDED RELEASE ORAL at 08:26

## 2024-12-05 RX ADMIN — ARFORMOTEROL TARTRATE: 15 SOLUTION RESPIRATORY (INHALATION) at 19:47

## 2024-12-05 RX ADMIN — METFORMIN HYDROCHLORIDE 500 MG: 500 TABLET ORAL at 08:26

## 2024-12-05 RX ADMIN — TRAZODONE HYDROCHLORIDE 25 MG: 50 TABLET ORAL at 02:37

## 2024-12-05 RX ADMIN — BENZOCAINE 6 MG-MENTHOL 10 MG LOZENGES 1 LOZENGE: at 17:43

## 2024-12-05 RX ADMIN — ATORVASTATIN CALCIUM 80 MG: 40 TABLET, FILM COATED ORAL at 08:26

## 2024-12-05 RX ADMIN — TRAZODONE HYDROCHLORIDE 50 MG: 50 TABLET ORAL at 22:37

## 2024-12-05 RX ADMIN — CLOPIDOGREL BISULFATE 75 MG: 75 TABLET ORAL at 08:26

## 2024-12-05 RX ADMIN — ARIPIPRAZOLE 2 MG: 2 TABLET ORAL at 08:26

## 2024-12-05 RX ADMIN — ALOGLIPTIN 12.5 MG: 12.5 TABLET, FILM COATED ORAL at 17:21

## 2024-12-05 RX ADMIN — ALOGLIPTIN 12.5 MG: 12.5 TABLET, FILM COATED ORAL at 08:26

## 2024-12-05 RX ADMIN — LISINOPRIL 20 MG: 10 TABLET ORAL at 08:26

## 2024-12-05 RX ADMIN — ARFORMOTEROL TARTRATE: 15 SOLUTION RESPIRATORY (INHALATION) at 08:10

## 2024-12-05 RX ADMIN — LISINOPRIL 20 MG: 10 TABLET ORAL at 20:41

## 2024-12-05 RX ADMIN — CETIRIZINE HYDROCHLORIDE 10 MG: 10 TABLET, FILM COATED ORAL at 08:26

## 2024-12-05 RX ADMIN — LETROZOLE 2.5 MG: 2.5 TABLET, FILM COATED ORAL at 08:27

## 2024-12-05 RX ADMIN — METOPROLOL SUCCINATE 50 MG: 50 TABLET, EXTENDED RELEASE ORAL at 08:30

## 2024-12-05 RX ADMIN — METFORMIN HYDROCHLORIDE 500 MG: 500 TABLET ORAL at 17:21

## 2024-12-05 ASSESSMENT — PAIN DESCRIPTION - DESCRIPTORS: DESCRIPTORS: OTHER (COMMENT)

## 2024-12-05 ASSESSMENT — PAIN DESCRIPTION - LOCATION: LOCATION: THROAT

## 2024-12-05 ASSESSMENT — PAIN SCALES - GENERAL: PAINLEVEL_OUTOF10: 3

## 2024-12-05 NOTE — CONSULTS
[facility]   Hospitalist [facility] Admission History & Physical        12/4/2024 8:45 PM  Patient: Dannielle Hall 1935  PCP: Tracy Banuelos APRN - NP    HISTORY  Chief Complaint:   Chief Complaint   Patient presents with    Anxiety       HPI: 89 y.o. female presenting for admission to [facility] for further evaluation and treatment for Major depressive disorder, recurrent episode, moderate (HCC).  She  has a past medical history of Allergic rhinitis due to pollen, Arthritis, BPV (benign positional vertigo), Change in bowel habits, Depression, Diabetes (HCC), Dysuria, Hemorrhoids, Hypertension, IBS (irritable bowel syndrome), Menopause, Other diseases of nasal cavity and sinuses(478.19), TIA (transient ischemic attack), and Vertigo.. She.    Pt presents for admission to [facility] Inpatient Unit with exacerbated depression and SI.  She was admitted for treatment adjustment.  She states she is feeling better this evening after eating.      She has not acute medical c/o.  No recent illness.  No CP or SOB,  no GI or  c/o.  She does c/o of some chronic arthritic pains.       Past Medical History:  Past Medical History:   Diagnosis Date    Allergic rhinitis due to pollen     Arthritis     BPV (benign positional vertigo)     Change in bowel habits     Depression     Diabetes (HCC)     Dysuria     Hemorrhoids     Hypertension     IBS (irritable bowel syndrome)     Menopause     Other diseases of nasal cavity and sinuses(478.19)     TIA (transient ischemic attack)     Vertigo        Past Surgical History:  Past Surgical History:   Procedure Laterality Date    BREAST LUMPECTOMY Left 10/12/2023    LEFT BREAST ULTRASOUND GUIDED LUMPECTOMY performed by Susana Linder MD at [facility] AMBULATORY OR    CHOLECYSTECTOMY  2016    COLONOSCOPY  2013?    COLONOSCOPY N/A 7/15/2020    COLONOSCOPY performed by Alonzo Henriquez MD at [facility] ENDOSCOPY    COLONOSCOPY N/A 5/28/2024    COLONOSCOPY BIOPSY performed by Chandra

## 2024-12-06 LAB
GLUCOSE BLD STRIP.AUTO-MCNC: 149 MG/DL (ref 65–117)
SERVICE CMNT-IMP: ABNORMAL

## 2024-12-06 PROCEDURE — 6360000002 HC RX W HCPCS: Performed by: PSYCHIATRY & NEUROLOGY

## 2024-12-06 PROCEDURE — 6370000000 HC RX 637 (ALT 250 FOR IP): Performed by: PSYCHIATRY & NEUROLOGY

## 2024-12-06 PROCEDURE — G0008 ADMIN INFLUENZA VIRUS VAC: HCPCS | Performed by: PSYCHIATRY & NEUROLOGY

## 2024-12-06 PROCEDURE — 94640 AIRWAY INHALATION TREATMENT: CPT

## 2024-12-06 PROCEDURE — 94761 N-INVAS EAR/PLS OXIMETRY MLT: CPT

## 2024-12-06 PROCEDURE — 1240000000 HC EMOTIONAL WELLNESS R&B

## 2024-12-06 PROCEDURE — 82962 GLUCOSE BLOOD TEST: CPT

## 2024-12-06 PROCEDURE — 90656 IIV3 VACC NO PRSV 0.5 ML IM: CPT | Performed by: PSYCHIATRY & NEUROLOGY

## 2024-12-06 PROCEDURE — 99232 SBSQ HOSP IP/OBS MODERATE 35: CPT | Performed by: PSYCHIATRY & NEUROLOGY

## 2024-12-06 RX ORDER — LOPERAMIDE HYDROCHLORIDE 2 MG/1
2 CAPSULE ORAL EVERY 4 HOURS PRN
Status: DISCONTINUED | OUTPATIENT
Start: 2024-12-06 | End: 2024-12-13

## 2024-12-06 RX ADMIN — ALOGLIPTIN 12.5 MG: 12.5 TABLET, FILM COATED ORAL at 08:33

## 2024-12-06 RX ADMIN — ARFORMOTEROL TARTRATE: 15 SOLUTION RESPIRATORY (INHALATION) at 07:31

## 2024-12-06 RX ADMIN — ATORVASTATIN CALCIUM 80 MG: 40 TABLET, FILM COATED ORAL at 08:33

## 2024-12-06 RX ADMIN — CLOPIDOGREL BISULFATE 75 MG: 75 TABLET ORAL at 08:33

## 2024-12-06 RX ADMIN — INFLUENZA A VIRUS A/VICTORIA/4897/2022 IVR-238 (H1N1) ANTIGEN (PROPIOLACTONE INACTIVATED), INFLUENZA A VIRUS A/THAILAND/8/2022 IVR-237 (H3N2) ANTIGEN (PROPIOLACTONE INACTIVATED), INFLUENZA B VIRUS B/AUSTRIA/1359417/2021 BVR-26 ANTIGEN (PROPIOLACTONE INACTIVATED) 0.5 ML: 15; 15; 15 INJECTION, SUSPENSION INTRAMUSCULAR at 17:34

## 2024-12-06 RX ADMIN — ARIPIPRAZOLE 2 MG: 2 TABLET ORAL at 08:33

## 2024-12-06 RX ADMIN — METOPROLOL SUCCINATE 50 MG: 50 TABLET, EXTENDED RELEASE ORAL at 08:33

## 2024-12-06 RX ADMIN — METFORMIN HYDROCHLORIDE 500 MG: 500 TABLET ORAL at 08:33

## 2024-12-06 RX ADMIN — CETIRIZINE HYDROCHLORIDE 10 MG: 10 TABLET, FILM COATED ORAL at 08:34

## 2024-12-06 RX ADMIN — ALOGLIPTIN 12.5 MG: 12.5 TABLET, FILM COATED ORAL at 17:37

## 2024-12-06 RX ADMIN — LETROZOLE 2.5 MG: 2.5 TABLET, FILM COATED ORAL at 08:34

## 2024-12-06 RX ADMIN — METFORMIN HYDROCHLORIDE 500 MG: 500 TABLET ORAL at 17:37

## 2024-12-06 RX ADMIN — LISINOPRIL 20 MG: 10 TABLET ORAL at 20:16

## 2024-12-06 RX ADMIN — VENLAFAXINE HYDROCHLORIDE 150 MG: 150 CAPSULE, EXTENDED RELEASE ORAL at 08:33

## 2024-12-06 RX ADMIN — LISINOPRIL 20 MG: 10 TABLET ORAL at 08:35

## 2024-12-06 RX ADMIN — ARFORMOTEROL TARTRATE: 15 SOLUTION RESPIRATORY (INHALATION) at 19:37

## 2024-12-06 ASSESSMENT — PAIN SCALES - GENERAL
PAINLEVEL_OUTOF10: 0
PAINLEVEL_OUTOF10: 0

## 2024-12-07 PROCEDURE — 6360000002 HC RX W HCPCS: Performed by: PSYCHIATRY & NEUROLOGY

## 2024-12-07 PROCEDURE — 94760 N-INVAS EAR/PLS OXIMETRY 1: CPT

## 2024-12-07 PROCEDURE — 6370000000 HC RX 637 (ALT 250 FOR IP): Performed by: PSYCHIATRY & NEUROLOGY

## 2024-12-07 PROCEDURE — 1240000000 HC EMOTIONAL WELLNESS R&B

## 2024-12-07 PROCEDURE — 99232 SBSQ HOSP IP/OBS MODERATE 35: CPT | Performed by: PSYCHIATRY & NEUROLOGY

## 2024-12-07 PROCEDURE — 94640 AIRWAY INHALATION TREATMENT: CPT

## 2024-12-07 RX ADMIN — ARFORMOTEROL TARTRATE: 15 SOLUTION RESPIRATORY (INHALATION) at 07:22

## 2024-12-07 RX ADMIN — VENLAFAXINE HYDROCHLORIDE 150 MG: 150 CAPSULE, EXTENDED RELEASE ORAL at 08:32

## 2024-12-07 RX ADMIN — ARIPIPRAZOLE 2 MG: 2 TABLET ORAL at 08:29

## 2024-12-07 RX ADMIN — CLOPIDOGREL BISULFATE 75 MG: 75 TABLET ORAL at 08:28

## 2024-12-07 RX ADMIN — METFORMIN HYDROCHLORIDE 500 MG: 500 TABLET ORAL at 17:47

## 2024-12-07 RX ADMIN — LETROZOLE 2.5 MG: 2.5 TABLET, FILM COATED ORAL at 08:30

## 2024-12-07 RX ADMIN — METOPROLOL SUCCINATE 50 MG: 50 TABLET, EXTENDED RELEASE ORAL at 08:29

## 2024-12-07 RX ADMIN — ALUMINUM HYDROXIDE, MAGNESIUM HYDROXIDE, AND SIMETHICONE 30 ML: 200; 200; 20 SUSPENSION ORAL at 09:35

## 2024-12-07 RX ADMIN — BENZOCAINE 6 MG-MENTHOL 10 MG LOZENGES 1 LOZENGE: at 15:47

## 2024-12-07 RX ADMIN — CETIRIZINE HYDROCHLORIDE 10 MG: 10 TABLET, FILM COATED ORAL at 08:30

## 2024-12-07 RX ADMIN — LOPERAMIDE HYDROCHLORIDE 2 MG: 2 CAPSULE ORAL at 05:35

## 2024-12-07 RX ADMIN — ALOGLIPTIN 12.5 MG: 12.5 TABLET, FILM COATED ORAL at 08:28

## 2024-12-07 RX ADMIN — ALOGLIPTIN 12.5 MG: 12.5 TABLET, FILM COATED ORAL at 17:47

## 2024-12-07 RX ADMIN — LISINOPRIL 20 MG: 10 TABLET ORAL at 20:25

## 2024-12-07 RX ADMIN — METFORMIN HYDROCHLORIDE 500 MG: 500 TABLET ORAL at 08:28

## 2024-12-07 RX ADMIN — LISINOPRIL 20 MG: 10 TABLET ORAL at 08:29

## 2024-12-07 RX ADMIN — ACETAMINOPHEN 650 MG: 325 TABLET ORAL at 15:50

## 2024-12-07 RX ADMIN — ATORVASTATIN CALCIUM 80 MG: 40 TABLET, FILM COATED ORAL at 08:29

## 2024-12-07 RX ADMIN — ARFORMOTEROL TARTRATE: 15 SOLUTION RESPIRATORY (INHALATION) at 19:31

## 2024-12-07 ASSESSMENT — PAIN SCALES - GENERAL
PAINLEVEL_OUTOF10: 0

## 2024-12-07 ASSESSMENT — PAIN - FUNCTIONAL ASSESSMENT: PAIN_FUNCTIONAL_ASSESSMENT: ACTIVITIES ARE NOT PREVENTED

## 2024-12-07 ASSESSMENT — PAIN DESCRIPTION - LOCATION
LOCATION: GENERALIZED
LOCATION: GENERALIZED

## 2024-12-07 ASSESSMENT — PAIN DESCRIPTION - DESCRIPTORS: DESCRIPTORS: OTHER (COMMENT)

## 2024-12-08 PROCEDURE — 6370000000 HC RX 637 (ALT 250 FOR IP): Performed by: PSYCHIATRY & NEUROLOGY

## 2024-12-08 PROCEDURE — 6360000002 HC RX W HCPCS: Performed by: PSYCHIATRY & NEUROLOGY

## 2024-12-08 PROCEDURE — 1240000000 HC EMOTIONAL WELLNESS R&B

## 2024-12-08 PROCEDURE — 94640 AIRWAY INHALATION TREATMENT: CPT

## 2024-12-08 PROCEDURE — 99232 SBSQ HOSP IP/OBS MODERATE 35: CPT | Performed by: PSYCHIATRY & NEUROLOGY

## 2024-12-08 PROCEDURE — 94760 N-INVAS EAR/PLS OXIMETRY 1: CPT

## 2024-12-08 RX ADMIN — ATORVASTATIN CALCIUM 80 MG: 40 TABLET, FILM COATED ORAL at 08:23

## 2024-12-08 RX ADMIN — VENLAFAXINE HYDROCHLORIDE 150 MG: 150 CAPSULE, EXTENDED RELEASE ORAL at 08:23

## 2024-12-08 RX ADMIN — CETIRIZINE HYDROCHLORIDE 10 MG: 10 TABLET, FILM COATED ORAL at 08:23

## 2024-12-08 RX ADMIN — CLOPIDOGREL BISULFATE 75 MG: 75 TABLET ORAL at 08:23

## 2024-12-08 RX ADMIN — METFORMIN HYDROCHLORIDE 500 MG: 500 TABLET ORAL at 08:24

## 2024-12-08 RX ADMIN — ARFORMOTEROL TARTRATE: 15 SOLUTION RESPIRATORY (INHALATION) at 19:15

## 2024-12-08 RX ADMIN — LISINOPRIL 20 MG: 10 TABLET ORAL at 20:19

## 2024-12-08 RX ADMIN — LETROZOLE 2.5 MG: 2.5 TABLET, FILM COATED ORAL at 08:24

## 2024-12-08 RX ADMIN — ALOGLIPTIN 12.5 MG: 12.5 TABLET, FILM COATED ORAL at 17:08

## 2024-12-08 RX ADMIN — ALOGLIPTIN 12.5 MG: 12.5 TABLET, FILM COATED ORAL at 08:23

## 2024-12-08 RX ADMIN — METOPROLOL SUCCINATE 50 MG: 50 TABLET, EXTENDED RELEASE ORAL at 08:24

## 2024-12-08 RX ADMIN — LISINOPRIL 20 MG: 10 TABLET ORAL at 08:22

## 2024-12-08 RX ADMIN — ARFORMOTEROL TARTRATE: 15 SOLUTION RESPIRATORY (INHALATION) at 07:19

## 2024-12-08 RX ADMIN — LOPERAMIDE HYDROCHLORIDE 2 MG: 2 CAPSULE ORAL at 12:24

## 2024-12-08 RX ADMIN — ARIPIPRAZOLE 2 MG: 2 TABLET ORAL at 08:24

## 2024-12-08 RX ADMIN — METFORMIN HYDROCHLORIDE 500 MG: 500 TABLET ORAL at 17:08

## 2024-12-08 ASSESSMENT — PAIN SCALES - GENERAL
PAINLEVEL_OUTOF10: 0
PAINLEVEL_OUTOF10: 0

## 2024-12-09 PROCEDURE — 6370000000 HC RX 637 (ALT 250 FOR IP): Performed by: PSYCHIATRY & NEUROLOGY

## 2024-12-09 PROCEDURE — 99232 SBSQ HOSP IP/OBS MODERATE 35: CPT | Performed by: PSYCHIATRY & NEUROLOGY

## 2024-12-09 PROCEDURE — 6360000002 HC RX W HCPCS: Performed by: PSYCHIATRY & NEUROLOGY

## 2024-12-09 PROCEDURE — 1240000000 HC EMOTIONAL WELLNESS R&B

## 2024-12-09 PROCEDURE — 94761 N-INVAS EAR/PLS OXIMETRY MLT: CPT

## 2024-12-09 PROCEDURE — 94640 AIRWAY INHALATION TREATMENT: CPT

## 2024-12-09 RX ADMIN — ARFORMOTEROL TARTRATE: 15 SOLUTION RESPIRATORY (INHALATION) at 20:07

## 2024-12-09 RX ADMIN — METFORMIN HYDROCHLORIDE 500 MG: 500 TABLET ORAL at 17:00

## 2024-12-09 RX ADMIN — METFORMIN HYDROCHLORIDE 500 MG: 500 TABLET ORAL at 07:55

## 2024-12-09 RX ADMIN — CETIRIZINE HYDROCHLORIDE 10 MG: 10 TABLET, FILM COATED ORAL at 08:23

## 2024-12-09 RX ADMIN — ALOGLIPTIN 12.5 MG: 12.5 TABLET, FILM COATED ORAL at 07:55

## 2024-12-09 RX ADMIN — LISINOPRIL 20 MG: 10 TABLET ORAL at 08:23

## 2024-12-09 RX ADMIN — LOPERAMIDE HYDROCHLORIDE 2 MG: 2 CAPSULE ORAL at 01:01

## 2024-12-09 RX ADMIN — METOPROLOL SUCCINATE 50 MG: 50 TABLET, EXTENDED RELEASE ORAL at 08:23

## 2024-12-09 RX ADMIN — LISINOPRIL 20 MG: 10 TABLET ORAL at 20:32

## 2024-12-09 RX ADMIN — LOPERAMIDE HYDROCHLORIDE 2 MG: 2 CAPSULE ORAL at 22:38

## 2024-12-09 RX ADMIN — LETROZOLE 2.5 MG: 2.5 TABLET, FILM COATED ORAL at 08:22

## 2024-12-09 RX ADMIN — VENLAFAXINE HYDROCHLORIDE 150 MG: 150 CAPSULE, EXTENDED RELEASE ORAL at 07:55

## 2024-12-09 RX ADMIN — ATORVASTATIN CALCIUM 80 MG: 40 TABLET, FILM COATED ORAL at 08:23

## 2024-12-09 RX ADMIN — CLOPIDOGREL BISULFATE 75 MG: 75 TABLET ORAL at 08:23

## 2024-12-09 RX ADMIN — ARIPIPRAZOLE 2 MG: 2 TABLET ORAL at 08:22

## 2024-12-09 RX ADMIN — ALOGLIPTIN 12.5 MG: 12.5 TABLET, FILM COATED ORAL at 17:00

## 2024-12-09 RX ADMIN — ARFORMOTEROL TARTRATE: 15 SOLUTION RESPIRATORY (INHALATION) at 07:44

## 2024-12-09 ASSESSMENT — PAIN SCALES - GENERAL
PAINLEVEL_OUTOF10: 0
PAINLEVEL_OUTOF10: 0

## 2024-12-10 PROCEDURE — 99232 SBSQ HOSP IP/OBS MODERATE 35: CPT | Performed by: PSYCHIATRY & NEUROLOGY

## 2024-12-10 PROCEDURE — 6370000000 HC RX 637 (ALT 250 FOR IP): Performed by: PSYCHIATRY & NEUROLOGY

## 2024-12-10 PROCEDURE — 94761 N-INVAS EAR/PLS OXIMETRY MLT: CPT

## 2024-12-10 PROCEDURE — 6360000002 HC RX W HCPCS: Performed by: PSYCHIATRY & NEUROLOGY

## 2024-12-10 PROCEDURE — 94640 AIRWAY INHALATION TREATMENT: CPT

## 2024-12-10 PROCEDURE — 1240000000 HC EMOTIONAL WELLNESS R&B

## 2024-12-10 RX ADMIN — LETROZOLE 2.5 MG: 2.5 TABLET, FILM COATED ORAL at 12:07

## 2024-12-10 RX ADMIN — ARFORMOTEROL TARTRATE: 15 SOLUTION RESPIRATORY (INHALATION) at 07:54

## 2024-12-10 RX ADMIN — LISINOPRIL 20 MG: 10 TABLET ORAL at 12:09

## 2024-12-10 RX ADMIN — LISINOPRIL 20 MG: 10 TABLET ORAL at 20:32

## 2024-12-10 RX ADMIN — ALOGLIPTIN 12.5 MG: 12.5 TABLET, FILM COATED ORAL at 16:58

## 2024-12-10 RX ADMIN — VENLAFAXINE HYDROCHLORIDE 150 MG: 150 CAPSULE, EXTENDED RELEASE ORAL at 12:09

## 2024-12-10 RX ADMIN — METOPROLOL SUCCINATE 50 MG: 50 TABLET, EXTENDED RELEASE ORAL at 12:09

## 2024-12-10 RX ADMIN — ARFORMOTEROL TARTRATE: 15 SOLUTION RESPIRATORY (INHALATION) at 19:47

## 2024-12-10 RX ADMIN — METFORMIN HYDROCHLORIDE 500 MG: 500 TABLET ORAL at 16:58

## 2024-12-10 RX ADMIN — CLOPIDOGREL BISULFATE 75 MG: 75 TABLET ORAL at 12:09

## 2024-12-10 RX ADMIN — CETIRIZINE HYDROCHLORIDE 10 MG: 10 TABLET, FILM COATED ORAL at 12:09

## 2024-12-10 RX ADMIN — BREXPIPRAZOLE 0.5 MG: 0.5 TABLET ORAL at 12:09

## 2024-12-10 RX ADMIN — ATORVASTATIN CALCIUM 80 MG: 40 TABLET, FILM COATED ORAL at 12:09

## 2024-12-10 ASSESSMENT — PAIN SCALES - GENERAL
PAINLEVEL_OUTOF10: 0
PAINLEVEL_OUTOF10: 0

## 2024-12-11 PROCEDURE — 94640 AIRWAY INHALATION TREATMENT: CPT

## 2024-12-11 PROCEDURE — 1240000000 HC EMOTIONAL WELLNESS R&B

## 2024-12-11 PROCEDURE — 6370000000 HC RX 637 (ALT 250 FOR IP): Performed by: PSYCHIATRY & NEUROLOGY

## 2024-12-11 PROCEDURE — 6360000002 HC RX W HCPCS: Performed by: PSYCHIATRY & NEUROLOGY

## 2024-12-11 PROCEDURE — 99232 SBSQ HOSP IP/OBS MODERATE 35: CPT | Performed by: PSYCHIATRY & NEUROLOGY

## 2024-12-11 RX ADMIN — LETROZOLE 2.5 MG: 2.5 TABLET, FILM COATED ORAL at 08:35

## 2024-12-11 RX ADMIN — LISINOPRIL 20 MG: 10 TABLET ORAL at 08:35

## 2024-12-11 RX ADMIN — METFORMIN HYDROCHLORIDE 500 MG: 500 TABLET ORAL at 17:05

## 2024-12-11 RX ADMIN — CETIRIZINE HYDROCHLORIDE 10 MG: 10 TABLET, FILM COATED ORAL at 08:35

## 2024-12-11 RX ADMIN — METOPROLOL SUCCINATE 50 MG: 50 TABLET, EXTENDED RELEASE ORAL at 08:35

## 2024-12-11 RX ADMIN — BREXPIPRAZOLE 0.5 MG: 0.5 TABLET ORAL at 08:35

## 2024-12-11 RX ADMIN — LISINOPRIL 20 MG: 10 TABLET ORAL at 20:21

## 2024-12-11 RX ADMIN — ALOGLIPTIN 12.5 MG: 12.5 TABLET, FILM COATED ORAL at 17:05

## 2024-12-11 RX ADMIN — CLOPIDOGREL BISULFATE 75 MG: 75 TABLET ORAL at 08:35

## 2024-12-11 RX ADMIN — ARFORMOTEROL TARTRATE: 15 SOLUTION RESPIRATORY (INHALATION) at 19:25

## 2024-12-11 RX ADMIN — METFORMIN HYDROCHLORIDE 500 MG: 500 TABLET ORAL at 08:35

## 2024-12-11 RX ADMIN — ALOGLIPTIN 12.5 MG: 12.5 TABLET, FILM COATED ORAL at 08:35

## 2024-12-11 RX ADMIN — ARFORMOTEROL TARTRATE: 15 SOLUTION RESPIRATORY (INHALATION) at 07:57

## 2024-12-11 RX ADMIN — ALUMINUM HYDROXIDE, MAGNESIUM HYDROXIDE, AND SIMETHICONE 30 ML: 200; 200; 20 SUSPENSION ORAL at 21:22

## 2024-12-11 RX ADMIN — VENLAFAXINE HYDROCHLORIDE 150 MG: 150 CAPSULE, EXTENDED RELEASE ORAL at 08:35

## 2024-12-11 RX ADMIN — TRAZODONE HYDROCHLORIDE 50 MG: 50 TABLET ORAL at 21:23

## 2024-12-11 RX ADMIN — ATORVASTATIN CALCIUM 80 MG: 40 TABLET, FILM COATED ORAL at 08:35

## 2024-12-11 ASSESSMENT — PAIN SCALES - GENERAL
PAINLEVEL_OUTOF10: 0
PAINLEVEL_OUTOF10: 0
PAINLEVEL_OUTOF10: 4

## 2024-12-11 ASSESSMENT — PAIN DESCRIPTION - DESCRIPTORS: DESCRIPTORS: OTHER (COMMENT)

## 2024-12-12 ENCOUNTER — TELEPHONE (OUTPATIENT)
Age: 89
End: 2024-12-12

## 2024-12-12 PROBLEM — N30.00 ACUTE CYSTITIS WITHOUT HEMATURIA: Status: ACTIVE | Noted: 2024-12-12

## 2024-12-12 LAB
APPEARANCE UR: CLEAR
BACTERIA URNS QL MICRO: NEGATIVE /HPF
BILIRUB UR QL: NEGATIVE
CHOLEST SERPL-MCNC: 95 MG/DL
COLOR UR: ABNORMAL
EPITH CASTS URNS QL MICRO: ABNORMAL /LPF
GLUCOSE BLD STRIP.AUTO-MCNC: 143 MG/DL (ref 65–117)
GLUCOSE UR STRIP.AUTO-MCNC: NEGATIVE MG/DL
HDLC SERPL-MCNC: 54 MG/DL
HDLC SERPL: 1.8 (ref 0–5)
HGB UR QL STRIP: NEGATIVE
KETONES UR QL STRIP.AUTO: NEGATIVE MG/DL
LDLC SERPL CALC-MCNC: 23.2 MG/DL (ref 0–100)
LEUKOCYTE ESTERASE UR QL STRIP.AUTO: ABNORMAL
NITRITE UR QL STRIP.AUTO: NEGATIVE
PH UR STRIP: 6 (ref 5–8)
PROT UR STRIP-MCNC: NEGATIVE MG/DL
RBC #/AREA URNS HPF: ABNORMAL /HPF (ref 0–5)
SERVICE CMNT-IMP: ABNORMAL
SP GR UR REFRACTOMETRY: 1.01 (ref 1–1.03)
TRIGL SERPL-MCNC: 89 MG/DL
URINE CULTURE IF INDICATED: ABNORMAL
UROBILINOGEN UR QL STRIP.AUTO: 0.2 EU/DL (ref 0.2–1)
VLDLC SERPL CALC-MCNC: 17.8 MG/DL
WBC URNS QL MICRO: ABNORMAL /HPF (ref 0–4)

## 2024-12-12 PROCEDURE — 36415 COLL VENOUS BLD VENIPUNCTURE: CPT

## 2024-12-12 PROCEDURE — 94760 N-INVAS EAR/PLS OXIMETRY 1: CPT

## 2024-12-12 PROCEDURE — 6370000000 HC RX 637 (ALT 250 FOR IP): Performed by: PSYCHIATRY & NEUROLOGY

## 2024-12-12 PROCEDURE — 80061 LIPID PANEL: CPT

## 2024-12-12 PROCEDURE — 87086 URINE CULTURE/COLONY COUNT: CPT

## 2024-12-12 PROCEDURE — 1240000000 HC EMOTIONAL WELLNESS R&B

## 2024-12-12 PROCEDURE — 81001 URINALYSIS AUTO W/SCOPE: CPT

## 2024-12-12 PROCEDURE — 94640 AIRWAY INHALATION TREATMENT: CPT

## 2024-12-12 PROCEDURE — 82962 GLUCOSE BLOOD TEST: CPT

## 2024-12-12 PROCEDURE — 6360000002 HC RX W HCPCS: Performed by: PSYCHIATRY & NEUROLOGY

## 2024-12-12 PROCEDURE — 99232 SBSQ HOSP IP/OBS MODERATE 35: CPT | Performed by: PSYCHIATRY & NEUROLOGY

## 2024-12-12 RX ADMIN — CLOPIDOGREL BISULFATE 75 MG: 75 TABLET ORAL at 08:33

## 2024-12-12 RX ADMIN — METFORMIN HYDROCHLORIDE 500 MG: 500 TABLET ORAL at 08:03

## 2024-12-12 RX ADMIN — CETIRIZINE HYDROCHLORIDE 10 MG: 10 TABLET, FILM COATED ORAL at 08:34

## 2024-12-12 RX ADMIN — METFORMIN HYDROCHLORIDE 500 MG: 500 TABLET ORAL at 17:15

## 2024-12-12 RX ADMIN — CEPHALEXIN 500 MG: 250 CAPSULE ORAL at 20:35

## 2024-12-12 RX ADMIN — LISINOPRIL 20 MG: 10 TABLET ORAL at 08:33

## 2024-12-12 RX ADMIN — ARFORMOTEROL TARTRATE: 15 SOLUTION RESPIRATORY (INHALATION) at 19:47

## 2024-12-12 RX ADMIN — VENLAFAXINE HYDROCHLORIDE 150 MG: 150 CAPSULE, EXTENDED RELEASE ORAL at 08:03

## 2024-12-12 RX ADMIN — ATORVASTATIN CALCIUM 80 MG: 40 TABLET, FILM COATED ORAL at 08:34

## 2024-12-12 RX ADMIN — BREXPIPRAZOLE 0.5 MG: 0.5 TABLET ORAL at 08:32

## 2024-12-12 RX ADMIN — METOPROLOL SUCCINATE 50 MG: 50 TABLET, EXTENDED RELEASE ORAL at 08:34

## 2024-12-12 RX ADMIN — LOPERAMIDE HYDROCHLORIDE 2 MG: 2 CAPSULE ORAL at 05:04

## 2024-12-12 RX ADMIN — ALOGLIPTIN 12.5 MG: 12.5 TABLET, FILM COATED ORAL at 08:03

## 2024-12-12 RX ADMIN — CEPHALEXIN 500 MG: 250 CAPSULE ORAL at 09:06

## 2024-12-12 RX ADMIN — LISINOPRIL 20 MG: 10 TABLET ORAL at 20:35

## 2024-12-12 RX ADMIN — CEPHALEXIN 500 MG: 250 CAPSULE ORAL at 13:43

## 2024-12-12 RX ADMIN — LETROZOLE 2.5 MG: 2.5 TABLET, FILM COATED ORAL at 08:32

## 2024-12-12 RX ADMIN — ARFORMOTEROL TARTRATE: 15 SOLUTION RESPIRATORY (INHALATION) at 07:09

## 2024-12-12 RX ADMIN — ALOGLIPTIN 12.5 MG: 12.5 TABLET, FILM COATED ORAL at 17:15

## 2024-12-12 ASSESSMENT — PAIN SCALES - GENERAL
PAINLEVEL_OUTOF10: 0
PAINLEVEL_OUTOF10: 0

## 2024-12-12 NOTE — TELEPHONE ENCOUNTER
Attempted to contact patient to review results from her recent DEXA scan noting osteopenia.     Patient's phone went straight to voicemail and voicemail was full.    We will plan to discuss results and potential antiresorptive options at f/u 2/2025.    Paolo Carmona MD  [facility] Oncology

## 2024-12-13 LAB
BACTERIA SPEC CULT: NORMAL
CC UR VC: NORMAL
GLUCOSE BLD STRIP.AUTO-MCNC: 121 MG/DL (ref 65–117)
SERVICE CMNT-IMP: ABNORMAL
SERVICE CMNT-IMP: NORMAL

## 2024-12-13 PROCEDURE — 6370000000 HC RX 637 (ALT 250 FOR IP): Performed by: PSYCHIATRY & NEUROLOGY

## 2024-12-13 PROCEDURE — 99232 SBSQ HOSP IP/OBS MODERATE 35: CPT | Performed by: PSYCHIATRY & NEUROLOGY

## 2024-12-13 PROCEDURE — 82962 GLUCOSE BLOOD TEST: CPT

## 2024-12-13 PROCEDURE — 6360000002 HC RX W HCPCS: Performed by: PSYCHIATRY & NEUROLOGY

## 2024-12-13 PROCEDURE — 1240000000 HC EMOTIONAL WELLNESS R&B

## 2024-12-13 PROCEDURE — 94640 AIRWAY INHALATION TREATMENT: CPT

## 2024-12-13 RX ADMIN — VENLAFAXINE HYDROCHLORIDE 150 MG: 150 CAPSULE, EXTENDED RELEASE ORAL at 09:04

## 2024-12-13 RX ADMIN — LISINOPRIL 20 MG: 10 TABLET ORAL at 20:20

## 2024-12-13 RX ADMIN — METFORMIN HYDROCHLORIDE 500 MG: 500 TABLET ORAL at 09:04

## 2024-12-13 RX ADMIN — ALOGLIPTIN 12.5 MG: 12.5 TABLET, FILM COATED ORAL at 17:48

## 2024-12-13 RX ADMIN — CEPHALEXIN 500 MG: 250 CAPSULE ORAL at 09:04

## 2024-12-13 RX ADMIN — ARFORMOTEROL TARTRATE: 15 SOLUTION RESPIRATORY (INHALATION) at 19:52

## 2024-12-13 RX ADMIN — METFORMIN HYDROCHLORIDE 500 MG: 500 TABLET ORAL at 17:48

## 2024-12-13 RX ADMIN — CEPHALEXIN 500 MG: 250 CAPSULE ORAL at 14:53

## 2024-12-13 RX ADMIN — CEPHALEXIN 500 MG: 250 CAPSULE ORAL at 20:20

## 2024-12-13 RX ADMIN — CLOPIDOGREL BISULFATE 75 MG: 75 TABLET ORAL at 09:04

## 2024-12-13 RX ADMIN — CETIRIZINE HYDROCHLORIDE 10 MG: 10 TABLET, FILM COATED ORAL at 09:04

## 2024-12-13 RX ADMIN — LISINOPRIL 20 MG: 10 TABLET ORAL at 09:04

## 2024-12-13 RX ADMIN — ALOGLIPTIN 12.5 MG: 12.5 TABLET, FILM COATED ORAL at 09:04

## 2024-12-13 RX ADMIN — ATORVASTATIN CALCIUM 80 MG: 40 TABLET, FILM COATED ORAL at 09:04

## 2024-12-13 RX ADMIN — ARFORMOTEROL TARTRATE: 15 SOLUTION RESPIRATORY (INHALATION) at 08:07

## 2024-12-13 RX ADMIN — METOPROLOL SUCCINATE 50 MG: 50 TABLET, EXTENDED RELEASE ORAL at 09:04

## 2024-12-13 RX ADMIN — LETROZOLE 2.5 MG: 2.5 TABLET, FILM COATED ORAL at 09:06

## 2024-12-13 ASSESSMENT — PAIN SCALES - GENERAL
PAINLEVEL_OUTOF10: 0
PAINLEVEL_OUTOF10: 0

## 2024-12-14 LAB
GLUCOSE BLD STRIP.AUTO-MCNC: 102 MG/DL (ref 65–117)
SERVICE CMNT-IMP: NORMAL

## 2024-12-14 PROCEDURE — 6360000002 HC RX W HCPCS: Performed by: PSYCHIATRY & NEUROLOGY

## 2024-12-14 PROCEDURE — 1240000000 HC EMOTIONAL WELLNESS R&B

## 2024-12-14 PROCEDURE — 94761 N-INVAS EAR/PLS OXIMETRY MLT: CPT

## 2024-12-14 PROCEDURE — 94640 AIRWAY INHALATION TREATMENT: CPT

## 2024-12-14 PROCEDURE — 6370000000 HC RX 637 (ALT 250 FOR IP): Performed by: PSYCHIATRY & NEUROLOGY

## 2024-12-14 PROCEDURE — 82962 GLUCOSE BLOOD TEST: CPT

## 2024-12-14 RX ADMIN — CEPHALEXIN 500 MG: 250 CAPSULE ORAL at 13:38

## 2024-12-14 RX ADMIN — ALOGLIPTIN 12.5 MG: 12.5 TABLET, FILM COATED ORAL at 16:55

## 2024-12-14 RX ADMIN — ARFORMOTEROL TARTRATE: 15 SOLUTION RESPIRATORY (INHALATION) at 19:45

## 2024-12-14 RX ADMIN — CEPHALEXIN 500 MG: 250 CAPSULE ORAL at 20:37

## 2024-12-14 RX ADMIN — LETROZOLE 2.5 MG: 2.5 TABLET, FILM COATED ORAL at 08:42

## 2024-12-14 RX ADMIN — LISINOPRIL 20 MG: 10 TABLET ORAL at 08:42

## 2024-12-14 RX ADMIN — METFORMIN HYDROCHLORIDE 500 MG: 500 TABLET ORAL at 16:55

## 2024-12-14 RX ADMIN — ALOGLIPTIN 12.5 MG: 12.5 TABLET, FILM COATED ORAL at 08:42

## 2024-12-14 RX ADMIN — METOPROLOL SUCCINATE 50 MG: 50 TABLET, EXTENDED RELEASE ORAL at 08:42

## 2024-12-14 RX ADMIN — CEPHALEXIN 500 MG: 250 CAPSULE ORAL at 08:42

## 2024-12-14 RX ADMIN — CETIRIZINE HYDROCHLORIDE 10 MG: 10 TABLET, FILM COATED ORAL at 08:42

## 2024-12-14 RX ADMIN — LISINOPRIL 20 MG: 10 TABLET ORAL at 20:36

## 2024-12-14 RX ADMIN — METFORMIN HYDROCHLORIDE 500 MG: 500 TABLET ORAL at 08:42

## 2024-12-14 RX ADMIN — CLOPIDOGREL BISULFATE 75 MG: 75 TABLET ORAL at 08:42

## 2024-12-14 RX ADMIN — ARFORMOTEROL TARTRATE: 15 SOLUTION RESPIRATORY (INHALATION) at 09:16

## 2024-12-14 RX ADMIN — VENLAFAXINE HYDROCHLORIDE 150 MG: 150 CAPSULE, EXTENDED RELEASE ORAL at 08:42

## 2024-12-14 RX ADMIN — ATORVASTATIN CALCIUM 80 MG: 40 TABLET, FILM COATED ORAL at 08:43

## 2024-12-14 ASSESSMENT — PAIN SCALES - GENERAL
PAINLEVEL_OUTOF10: 0
PAINLEVEL_OUTOF10: 0

## 2024-12-15 LAB
GLUCOSE BLD STRIP.AUTO-MCNC: 123 MG/DL (ref 65–117)
SERVICE CMNT-IMP: ABNORMAL

## 2024-12-15 PROCEDURE — 94640 AIRWAY INHALATION TREATMENT: CPT

## 2024-12-15 PROCEDURE — 1240000000 HC EMOTIONAL WELLNESS R&B

## 2024-12-15 PROCEDURE — 82962 GLUCOSE BLOOD TEST: CPT

## 2024-12-15 PROCEDURE — 6360000002 HC RX W HCPCS: Performed by: PSYCHIATRY & NEUROLOGY

## 2024-12-15 PROCEDURE — 94761 N-INVAS EAR/PLS OXIMETRY MLT: CPT

## 2024-12-15 PROCEDURE — 6370000000 HC RX 637 (ALT 250 FOR IP): Performed by: PSYCHIATRY & NEUROLOGY

## 2024-12-15 RX ADMIN — CETIRIZINE HYDROCHLORIDE 10 MG: 10 TABLET, FILM COATED ORAL at 08:38

## 2024-12-15 RX ADMIN — VENLAFAXINE HYDROCHLORIDE 150 MG: 150 CAPSULE, EXTENDED RELEASE ORAL at 08:38

## 2024-12-15 RX ADMIN — ARFORMOTEROL TARTRATE: 15 SOLUTION RESPIRATORY (INHALATION) at 08:04

## 2024-12-15 RX ADMIN — ATORVASTATIN CALCIUM 80 MG: 40 TABLET, FILM COATED ORAL at 08:39

## 2024-12-15 RX ADMIN — ARFORMOTEROL TARTRATE: 15 SOLUTION RESPIRATORY (INHALATION) at 19:33

## 2024-12-15 RX ADMIN — CLOPIDOGREL BISULFATE 75 MG: 75 TABLET ORAL at 08:38

## 2024-12-15 RX ADMIN — ALOGLIPTIN 12.5 MG: 12.5 TABLET, FILM COATED ORAL at 16:48

## 2024-12-15 RX ADMIN — ALOGLIPTIN 12.5 MG: 12.5 TABLET, FILM COATED ORAL at 08:38

## 2024-12-15 RX ADMIN — METOPROLOL SUCCINATE 50 MG: 50 TABLET, EXTENDED RELEASE ORAL at 08:40

## 2024-12-15 RX ADMIN — METFORMIN HYDROCHLORIDE 500 MG: 500 TABLET ORAL at 08:38

## 2024-12-15 RX ADMIN — CEPHALEXIN 500 MG: 250 CAPSULE ORAL at 08:38

## 2024-12-15 RX ADMIN — CEPHALEXIN 500 MG: 250 CAPSULE ORAL at 14:18

## 2024-12-15 RX ADMIN — METFORMIN HYDROCHLORIDE 500 MG: 500 TABLET ORAL at 16:48

## 2024-12-15 RX ADMIN — LISINOPRIL 20 MG: 10 TABLET ORAL at 08:39

## 2024-12-15 RX ADMIN — LISINOPRIL 20 MG: 10 TABLET ORAL at 20:35

## 2024-12-15 RX ADMIN — CEPHALEXIN 500 MG: 250 CAPSULE ORAL at 20:35

## 2024-12-15 RX ADMIN — LETROZOLE 2.5 MG: 2.5 TABLET, FILM COATED ORAL at 08:39

## 2024-12-15 ASSESSMENT — PAIN SCALES - GENERAL
PAINLEVEL_OUTOF10: 0
PAINLEVEL_OUTOF10: 0

## 2024-12-16 PROBLEM — F05 DELIRIUM OF MIXED ORIGIN: Status: ACTIVE | Noted: 2024-12-16

## 2024-12-16 PROBLEM — F33.2 SEVERE RECURRENT MAJOR DEPRESSION WITHOUT PSYCHOTIC FEATURES (HCC): Status: ACTIVE | Noted: 2024-12-03

## 2024-12-16 LAB
GLUCOSE BLD STRIP.AUTO-MCNC: 104 MG/DL (ref 65–117)
SERVICE CMNT-IMP: NORMAL

## 2024-12-16 PROCEDURE — 6370000000 HC RX 637 (ALT 250 FOR IP): Performed by: PSYCHIATRY & NEUROLOGY

## 2024-12-16 PROCEDURE — 82962 GLUCOSE BLOOD TEST: CPT

## 2024-12-16 PROCEDURE — 94640 AIRWAY INHALATION TREATMENT: CPT

## 2024-12-16 PROCEDURE — 1240000000 HC EMOTIONAL WELLNESS R&B

## 2024-12-16 PROCEDURE — 6360000002 HC RX W HCPCS: Performed by: PSYCHIATRY & NEUROLOGY

## 2024-12-16 PROCEDURE — 99232 SBSQ HOSP IP/OBS MODERATE 35: CPT | Performed by: PSYCHIATRY & NEUROLOGY

## 2024-12-16 PROCEDURE — 94760 N-INVAS EAR/PLS OXIMETRY 1: CPT

## 2024-12-16 RX ORDER — BUPROPION HYDROCHLORIDE 150 MG/1
150 TABLET ORAL DAILY
Status: DISCONTINUED | OUTPATIENT
Start: 2024-12-16 | End: 2025-01-02 | Stop reason: HOSPADM

## 2024-12-16 RX ADMIN — CLOPIDOGREL BISULFATE 75 MG: 75 TABLET ORAL at 08:40

## 2024-12-16 RX ADMIN — ALOGLIPTIN 12.5 MG: 12.5 TABLET, FILM COATED ORAL at 16:51

## 2024-12-16 RX ADMIN — LISINOPRIL 20 MG: 10 TABLET ORAL at 08:40

## 2024-12-16 RX ADMIN — METFORMIN HYDROCHLORIDE 500 MG: 500 TABLET ORAL at 09:11

## 2024-12-16 RX ADMIN — ALOGLIPTIN 12.5 MG: 12.5 TABLET, FILM COATED ORAL at 08:41

## 2024-12-16 RX ADMIN — ARFORMOTEROL TARTRATE: 15 SOLUTION RESPIRATORY (INHALATION) at 07:26

## 2024-12-16 RX ADMIN — CEPHALEXIN 500 MG: 250 CAPSULE ORAL at 08:40

## 2024-12-16 RX ADMIN — BUPROPION HYDROCHLORIDE 150 MG: 150 TABLET, EXTENDED RELEASE ORAL at 08:49

## 2024-12-16 RX ADMIN — ARFORMOTEROL TARTRATE: 15 SOLUTION RESPIRATORY (INHALATION) at 19:51

## 2024-12-16 RX ADMIN — TRAZODONE HYDROCHLORIDE 50 MG: 50 TABLET ORAL at 23:01

## 2024-12-16 RX ADMIN — CETIRIZINE HYDROCHLORIDE 10 MG: 10 TABLET, FILM COATED ORAL at 08:41

## 2024-12-16 RX ADMIN — METFORMIN HYDROCHLORIDE 500 MG: 500 TABLET ORAL at 16:51

## 2024-12-16 RX ADMIN — VENLAFAXINE HYDROCHLORIDE 150 MG: 150 CAPSULE, EXTENDED RELEASE ORAL at 08:41

## 2024-12-16 RX ADMIN — ATORVASTATIN CALCIUM 80 MG: 40 TABLET, FILM COATED ORAL at 08:40

## 2024-12-16 RX ADMIN — LISINOPRIL 20 MG: 10 TABLET ORAL at 20:14

## 2024-12-16 RX ADMIN — CEPHALEXIN 500 MG: 250 CAPSULE ORAL at 15:05

## 2024-12-16 RX ADMIN — LETROZOLE 2.5 MG: 2.5 TABLET, FILM COATED ORAL at 08:46

## 2024-12-16 RX ADMIN — CEPHALEXIN 500 MG: 250 CAPSULE ORAL at 20:13

## 2024-12-16 RX ADMIN — METOPROLOL SUCCINATE 50 MG: 50 TABLET, EXTENDED RELEASE ORAL at 08:49

## 2024-12-16 ASSESSMENT — PAIN SCALES - GENERAL: PAINLEVEL_OUTOF10: 0

## 2024-12-17 ENCOUNTER — TELEPHONE (OUTPATIENT)
Facility: HOSPITAL | Age: 89
End: 2024-12-17

## 2024-12-17 LAB
GLUCOSE BLD STRIP.AUTO-MCNC: 130 MG/DL (ref 65–117)
SERVICE CMNT-IMP: ABNORMAL

## 2024-12-17 PROCEDURE — 82962 GLUCOSE BLOOD TEST: CPT

## 2024-12-17 PROCEDURE — 6370000000 HC RX 637 (ALT 250 FOR IP): Performed by: PSYCHIATRY & NEUROLOGY

## 2024-12-17 PROCEDURE — 6360000002 HC RX W HCPCS: Performed by: PSYCHIATRY & NEUROLOGY

## 2024-12-17 PROCEDURE — 1240000000 HC EMOTIONAL WELLNESS R&B

## 2024-12-17 PROCEDURE — 99232 SBSQ HOSP IP/OBS MODERATE 35: CPT | Performed by: PSYCHIATRY & NEUROLOGY

## 2024-12-17 PROCEDURE — 94640 AIRWAY INHALATION TREATMENT: CPT

## 2024-12-17 RX ADMIN — TRAZODONE HYDROCHLORIDE 50 MG: 50 TABLET ORAL at 20:38

## 2024-12-17 RX ADMIN — LETROZOLE 2.5 MG: 2.5 TABLET, FILM COATED ORAL at 08:23

## 2024-12-17 RX ADMIN — LISINOPRIL 20 MG: 10 TABLET ORAL at 20:01

## 2024-12-17 RX ADMIN — BUPROPION HYDROCHLORIDE 150 MG: 150 TABLET, EXTENDED RELEASE ORAL at 08:24

## 2024-12-17 RX ADMIN — ARFORMOTEROL TARTRATE: 15 SOLUTION RESPIRATORY (INHALATION) at 07:59

## 2024-12-17 RX ADMIN — ATORVASTATIN CALCIUM 80 MG: 40 TABLET, FILM COATED ORAL at 08:24

## 2024-12-17 RX ADMIN — METFORMIN HYDROCHLORIDE 500 MG: 500 TABLET ORAL at 17:18

## 2024-12-17 RX ADMIN — BENZOCAINE 6 MG-MENTHOL 10 MG LOZENGES 1 LOZENGE: at 13:39

## 2024-12-17 RX ADMIN — CETIRIZINE HYDROCHLORIDE 10 MG: 10 TABLET, FILM COATED ORAL at 08:24

## 2024-12-17 RX ADMIN — BENZOCAINE 6 MG-MENTHOL 10 MG LOZENGES 1 LOZENGE: at 10:45

## 2024-12-17 RX ADMIN — LISINOPRIL 20 MG: 10 TABLET ORAL at 08:23

## 2024-12-17 RX ADMIN — METOPROLOL SUCCINATE 50 MG: 50 TABLET, EXTENDED RELEASE ORAL at 08:24

## 2024-12-17 RX ADMIN — ALOGLIPTIN 12.5 MG: 12.5 TABLET, FILM COATED ORAL at 07:52

## 2024-12-17 RX ADMIN — METFORMIN HYDROCHLORIDE 500 MG: 500 TABLET ORAL at 07:52

## 2024-12-17 RX ADMIN — CLOPIDOGREL BISULFATE 75 MG: 75 TABLET ORAL at 08:24

## 2024-12-17 RX ADMIN — ALOGLIPTIN 12.5 MG: 12.5 TABLET, FILM COATED ORAL at 17:18

## 2024-12-17 RX ADMIN — ARFORMOTEROL TARTRATE: 15 SOLUTION RESPIRATORY (INHALATION) at 19:16

## 2024-12-17 RX ADMIN — VENLAFAXINE HYDROCHLORIDE 150 MG: 150 CAPSULE, EXTENDED RELEASE ORAL at 07:52

## 2024-12-17 ASSESSMENT — PAIN SCALES - GENERAL
PAINLEVEL_OUTOF10: 0
PAINLEVEL_OUTOF10: 0

## 2024-12-17 NOTE — TELEPHONE ENCOUNTER
[facility]  Breast Navigator Encounter    Name:    Dannielle Hall  Age:    89 y.o.  Diagnosis:  LEFT breast cancer    Daughter Le left a message on my voicemail that she was cancelling her mom's appt for tomorrow since she is in the hospital.      I checked the schedule, and it has already been cancelled.  Le said that she will call back to rescheduled.                                   Betsey Leo RN, BSN, [facility]  Oncology Breast Navigator     [address]  [address]  W: [phone]  F: [phone]  [email or web address]  Good Help to Those in Need®

## 2024-12-18 LAB
GLUCOSE BLD STRIP.AUTO-MCNC: 97 MG/DL (ref 65–117)
SERVICE CMNT-IMP: NORMAL

## 2024-12-18 PROCEDURE — 6360000002 HC RX W HCPCS: Performed by: PSYCHIATRY & NEUROLOGY

## 2024-12-18 PROCEDURE — 6370000000 HC RX 637 (ALT 250 FOR IP): Performed by: PSYCHIATRY & NEUROLOGY

## 2024-12-18 PROCEDURE — 82962 GLUCOSE BLOOD TEST: CPT

## 2024-12-18 PROCEDURE — 94640 AIRWAY INHALATION TREATMENT: CPT

## 2024-12-18 PROCEDURE — 1240000000 HC EMOTIONAL WELLNESS R&B

## 2024-12-18 PROCEDURE — 99232 SBSQ HOSP IP/OBS MODERATE 35: CPT | Performed by: PSYCHIATRY & NEUROLOGY

## 2024-12-18 RX ADMIN — TRAZODONE HYDROCHLORIDE 50 MG: 50 TABLET ORAL at 21:17

## 2024-12-18 RX ADMIN — ARFORMOTEROL TARTRATE: 15 SOLUTION RESPIRATORY (INHALATION) at 19:11

## 2024-12-18 RX ADMIN — VENLAFAXINE HYDROCHLORIDE 150 MG: 150 CAPSULE, EXTENDED RELEASE ORAL at 11:51

## 2024-12-18 RX ADMIN — CETIRIZINE HYDROCHLORIDE 10 MG: 10 TABLET, FILM COATED ORAL at 11:51

## 2024-12-18 RX ADMIN — BENZOCAINE 6 MG-MENTHOL 10 MG LOZENGES 1 LOZENGE: at 22:16

## 2024-12-18 RX ADMIN — ARFORMOTEROL TARTRATE: 15 SOLUTION RESPIRATORY (INHALATION) at 07:56

## 2024-12-18 RX ADMIN — BUPROPION HYDROCHLORIDE 150 MG: 150 TABLET, EXTENDED RELEASE ORAL at 11:51

## 2024-12-18 RX ADMIN — ALOGLIPTIN 12.5 MG: 12.5 TABLET, FILM COATED ORAL at 16:44

## 2024-12-18 RX ADMIN — ATORVASTATIN CALCIUM 80 MG: 40 TABLET, FILM COATED ORAL at 11:51

## 2024-12-18 RX ADMIN — METFORMIN HYDROCHLORIDE 500 MG: 500 TABLET ORAL at 16:44

## 2024-12-18 RX ADMIN — LISINOPRIL 20 MG: 10 TABLET ORAL at 20:28

## 2024-12-18 RX ADMIN — LETROZOLE 2.5 MG: 2.5 TABLET, FILM COATED ORAL at 11:50

## 2024-12-18 RX ADMIN — BENZOCAINE 6 MG-MENTHOL 10 MG LOZENGES 1 LOZENGE: at 16:37

## 2024-12-18 RX ADMIN — METOPROLOL SUCCINATE 50 MG: 50 TABLET, EXTENDED RELEASE ORAL at 11:51

## 2024-12-18 RX ADMIN — CLOPIDOGREL BISULFATE 75 MG: 75 TABLET ORAL at 11:51

## 2024-12-18 RX ADMIN — LISINOPRIL 20 MG: 10 TABLET ORAL at 11:51

## 2024-12-18 ASSESSMENT — PAIN SCALES - GENERAL
PAINLEVEL_OUTOF10: 0

## 2024-12-18 ASSESSMENT — PAIN DESCRIPTION - DESCRIPTORS: DESCRIPTORS: SORE

## 2024-12-19 LAB
GLUCOSE BLD STRIP.AUTO-MCNC: 113 MG/DL (ref 65–117)
SERVICE CMNT-IMP: NORMAL

## 2024-12-19 PROCEDURE — 82962 GLUCOSE BLOOD TEST: CPT

## 2024-12-19 PROCEDURE — 94760 N-INVAS EAR/PLS OXIMETRY 1: CPT

## 2024-12-19 PROCEDURE — 99232 SBSQ HOSP IP/OBS MODERATE 35: CPT | Performed by: PSYCHIATRY & NEUROLOGY

## 2024-12-19 PROCEDURE — 6370000000 HC RX 637 (ALT 250 FOR IP): Performed by: PSYCHIATRY & NEUROLOGY

## 2024-12-19 PROCEDURE — 6360000002 HC RX W HCPCS: Performed by: PSYCHIATRY & NEUROLOGY

## 2024-12-19 PROCEDURE — 1240000000 HC EMOTIONAL WELLNESS R&B

## 2024-12-19 PROCEDURE — 94640 AIRWAY INHALATION TREATMENT: CPT

## 2024-12-19 RX ADMIN — BUPROPION HYDROCHLORIDE 150 MG: 150 TABLET, EXTENDED RELEASE ORAL at 08:21

## 2024-12-19 RX ADMIN — ALOGLIPTIN 12.5 MG: 12.5 TABLET, FILM COATED ORAL at 08:03

## 2024-12-19 RX ADMIN — ALOGLIPTIN 12.5 MG: 12.5 TABLET, FILM COATED ORAL at 16:59

## 2024-12-19 RX ADMIN — ARFORMOTEROL TARTRATE: 15 SOLUTION RESPIRATORY (INHALATION) at 19:42

## 2024-12-19 RX ADMIN — CLOPIDOGREL BISULFATE 75 MG: 75 TABLET ORAL at 08:21

## 2024-12-19 RX ADMIN — METFORMIN HYDROCHLORIDE 500 MG: 500 TABLET ORAL at 08:03

## 2024-12-19 RX ADMIN — ARFORMOTEROL TARTRATE: 15 SOLUTION RESPIRATORY (INHALATION) at 07:03

## 2024-12-19 RX ADMIN — CETIRIZINE HYDROCHLORIDE 10 MG: 10 TABLET, FILM COATED ORAL at 08:22

## 2024-12-19 RX ADMIN — VENLAFAXINE HYDROCHLORIDE 150 MG: 150 CAPSULE, EXTENDED RELEASE ORAL at 08:03

## 2024-12-19 RX ADMIN — LETROZOLE 2.5 MG: 2.5 TABLET, FILM COATED ORAL at 08:22

## 2024-12-19 RX ADMIN — ATORVASTATIN CALCIUM 80 MG: 40 TABLET, FILM COATED ORAL at 08:22

## 2024-12-19 RX ADMIN — LISINOPRIL 20 MG: 10 TABLET ORAL at 08:22

## 2024-12-19 RX ADMIN — METFORMIN HYDROCHLORIDE 500 MG: 500 TABLET ORAL at 16:59

## 2024-12-19 RX ADMIN — METOPROLOL SUCCINATE 50 MG: 50 TABLET, EXTENDED RELEASE ORAL at 08:22

## 2024-12-19 RX ADMIN — LISINOPRIL 20 MG: 10 TABLET ORAL at 20:29

## 2024-12-19 ASSESSMENT — PAIN SCALES - GENERAL
PAINLEVEL_OUTOF10: 0
PAINLEVEL_OUTOF10: 0

## 2024-12-20 LAB
GLUCOSE BLD STRIP.AUTO-MCNC: 100 MG/DL (ref 65–117)
SERVICE CMNT-IMP: NORMAL

## 2024-12-20 PROCEDURE — 1240000000 HC EMOTIONAL WELLNESS R&B

## 2024-12-20 PROCEDURE — 94760 N-INVAS EAR/PLS OXIMETRY 1: CPT

## 2024-12-20 PROCEDURE — 82962 GLUCOSE BLOOD TEST: CPT

## 2024-12-20 PROCEDURE — 6360000002 HC RX W HCPCS: Performed by: PSYCHIATRY & NEUROLOGY

## 2024-12-20 PROCEDURE — 6370000000 HC RX 637 (ALT 250 FOR IP): Performed by: PSYCHIATRY & NEUROLOGY

## 2024-12-20 PROCEDURE — 99232 SBSQ HOSP IP/OBS MODERATE 35: CPT | Performed by: PSYCHIATRY & NEUROLOGY

## 2024-12-20 PROCEDURE — 94640 AIRWAY INHALATION TREATMENT: CPT

## 2024-12-20 RX ADMIN — CETIRIZINE HYDROCHLORIDE 10 MG: 10 TABLET, FILM COATED ORAL at 08:23

## 2024-12-20 RX ADMIN — VENLAFAXINE HYDROCHLORIDE 150 MG: 150 CAPSULE, EXTENDED RELEASE ORAL at 08:23

## 2024-12-20 RX ADMIN — ARFORMOTEROL TARTRATE: 15 SOLUTION RESPIRATORY (INHALATION) at 20:20

## 2024-12-20 RX ADMIN — ATORVASTATIN CALCIUM 80 MG: 40 TABLET, FILM COATED ORAL at 08:23

## 2024-12-20 RX ADMIN — LISINOPRIL 20 MG: 10 TABLET ORAL at 20:40

## 2024-12-20 RX ADMIN — BENZOCAINE 6 MG-MENTHOL 10 MG LOZENGES 1 LOZENGE: at 00:22

## 2024-12-20 RX ADMIN — LISINOPRIL 20 MG: 10 TABLET ORAL at 08:23

## 2024-12-20 RX ADMIN — ALOGLIPTIN 12.5 MG: 12.5 TABLET, FILM COATED ORAL at 17:17

## 2024-12-20 RX ADMIN — ALOGLIPTIN 12.5 MG: 12.5 TABLET, FILM COATED ORAL at 08:23

## 2024-12-20 RX ADMIN — ACETAMINOPHEN 650 MG: 325 TABLET ORAL at 14:50

## 2024-12-20 RX ADMIN — METFORMIN HYDROCHLORIDE 500 MG: 500 TABLET ORAL at 17:17

## 2024-12-20 RX ADMIN — METFORMIN HYDROCHLORIDE 500 MG: 500 TABLET ORAL at 08:24

## 2024-12-20 RX ADMIN — CLOPIDOGREL BISULFATE 75 MG: 75 TABLET ORAL at 08:23

## 2024-12-20 RX ADMIN — METOPROLOL SUCCINATE 50 MG: 50 TABLET, EXTENDED RELEASE ORAL at 08:23

## 2024-12-20 RX ADMIN — BUPROPION HYDROCHLORIDE 150 MG: 150 TABLET, EXTENDED RELEASE ORAL at 08:24

## 2024-12-20 RX ADMIN — LETROZOLE 2.5 MG: 2.5 TABLET, FILM COATED ORAL at 08:24

## 2024-12-20 RX ADMIN — ARFORMOTEROL TARTRATE: 15 SOLUTION RESPIRATORY (INHALATION) at 07:52

## 2024-12-20 ASSESSMENT — PAIN SCALES - GENERAL
PAINLEVEL_OUTOF10: 6
PAINLEVEL_OUTOF10: 0
PAINLEVEL_OUTOF10: 1
PAINLEVEL_OUTOF10: 0
PAINLEVEL_OUTOF10: 0

## 2024-12-20 ASSESSMENT — PAIN DESCRIPTION - DESCRIPTORS
DESCRIPTORS: SHOOTING
DESCRIPTORS: SHOOTING

## 2024-12-20 ASSESSMENT — PAIN DESCRIPTION - ORIENTATION
ORIENTATION: RIGHT;LEFT;MID
ORIENTATION: RIGHT;LEFT;MID

## 2024-12-20 ASSESSMENT — PAIN DESCRIPTION - LOCATION
LOCATION: FINGER (COMMENT WHICH ONE)
LOCATION: THROAT
LOCATION: FINGER (COMMENT WHICH ONE)

## 2024-12-21 LAB
GLUCOSE BLD STRIP.AUTO-MCNC: 111 MG/DL (ref 65–117)
SERVICE CMNT-IMP: NORMAL

## 2024-12-21 PROCEDURE — 6360000002 HC RX W HCPCS: Performed by: PSYCHIATRY & NEUROLOGY

## 2024-12-21 PROCEDURE — 94761 N-INVAS EAR/PLS OXIMETRY MLT: CPT

## 2024-12-21 PROCEDURE — 6370000000 HC RX 637 (ALT 250 FOR IP): Performed by: PSYCHIATRY & NEUROLOGY

## 2024-12-21 PROCEDURE — 1240000000 HC EMOTIONAL WELLNESS R&B

## 2024-12-21 PROCEDURE — 82962 GLUCOSE BLOOD TEST: CPT

## 2024-12-21 PROCEDURE — 94640 AIRWAY INHALATION TREATMENT: CPT

## 2024-12-21 RX ADMIN — ALOGLIPTIN 12.5 MG: 12.5 TABLET, FILM COATED ORAL at 17:15

## 2024-12-21 RX ADMIN — METFORMIN HYDROCHLORIDE 500 MG: 500 TABLET ORAL at 17:15

## 2024-12-21 RX ADMIN — ARFORMOTEROL TARTRATE: 15 SOLUTION RESPIRATORY (INHALATION) at 09:10

## 2024-12-21 RX ADMIN — CETIRIZINE HYDROCHLORIDE 10 MG: 10 TABLET, FILM COATED ORAL at 08:52

## 2024-12-21 RX ADMIN — ALOGLIPTIN 12.5 MG: 12.5 TABLET, FILM COATED ORAL at 08:51

## 2024-12-21 RX ADMIN — CLOPIDOGREL BISULFATE 75 MG: 75 TABLET ORAL at 08:51

## 2024-12-21 RX ADMIN — ATORVASTATIN CALCIUM 80 MG: 40 TABLET, FILM COATED ORAL at 08:51

## 2024-12-21 RX ADMIN — VENLAFAXINE HYDROCHLORIDE 150 MG: 150 CAPSULE, EXTENDED RELEASE ORAL at 08:51

## 2024-12-21 RX ADMIN — METFORMIN HYDROCHLORIDE 500 MG: 500 TABLET ORAL at 08:51

## 2024-12-21 RX ADMIN — LISINOPRIL 20 MG: 10 TABLET ORAL at 08:51

## 2024-12-21 RX ADMIN — LETROZOLE 2.5 MG: 2.5 TABLET, FILM COATED ORAL at 08:51

## 2024-12-21 RX ADMIN — METOPROLOL SUCCINATE 50 MG: 50 TABLET, EXTENDED RELEASE ORAL at 08:51

## 2024-12-21 RX ADMIN — BUPROPION HYDROCHLORIDE 150 MG: 150 TABLET, EXTENDED RELEASE ORAL at 08:51

## 2024-12-21 RX ADMIN — LISINOPRIL 20 MG: 10 TABLET ORAL at 20:27

## 2024-12-21 RX ADMIN — ARFORMOTEROL TARTRATE: 15 SOLUTION RESPIRATORY (INHALATION) at 19:33

## 2024-12-21 ASSESSMENT — PAIN SCALES - GENERAL
PAINLEVEL_OUTOF10: 0
PAINLEVEL_OUTOF10: 0

## 2024-12-22 LAB
GLUCOSE BLD STRIP.AUTO-MCNC: 85 MG/DL (ref 65–117)
SERVICE CMNT-IMP: NORMAL

## 2024-12-22 PROCEDURE — 6360000002 HC RX W HCPCS: Performed by: PSYCHIATRY & NEUROLOGY

## 2024-12-22 PROCEDURE — 94640 AIRWAY INHALATION TREATMENT: CPT

## 2024-12-22 PROCEDURE — 6370000000 HC RX 637 (ALT 250 FOR IP): Performed by: PSYCHIATRY & NEUROLOGY

## 2024-12-22 PROCEDURE — 82962 GLUCOSE BLOOD TEST: CPT

## 2024-12-22 PROCEDURE — 1240000000 HC EMOTIONAL WELLNESS R&B

## 2024-12-22 RX ADMIN — BUPROPION HYDROCHLORIDE 150 MG: 150 TABLET, EXTENDED RELEASE ORAL at 08:29

## 2024-12-22 RX ADMIN — VENLAFAXINE HYDROCHLORIDE 150 MG: 150 CAPSULE, EXTENDED RELEASE ORAL at 08:27

## 2024-12-22 RX ADMIN — METOPROLOL SUCCINATE 50 MG: 50 TABLET, EXTENDED RELEASE ORAL at 08:28

## 2024-12-22 RX ADMIN — ATORVASTATIN CALCIUM 80 MG: 40 TABLET, FILM COATED ORAL at 08:27

## 2024-12-22 RX ADMIN — ALOGLIPTIN 12.5 MG: 12.5 TABLET, FILM COATED ORAL at 17:17

## 2024-12-22 RX ADMIN — CLOPIDOGREL BISULFATE 75 MG: 75 TABLET ORAL at 08:29

## 2024-12-22 RX ADMIN — METFORMIN HYDROCHLORIDE 500 MG: 500 TABLET ORAL at 17:18

## 2024-12-22 RX ADMIN — LISINOPRIL 20 MG: 10 TABLET ORAL at 08:27

## 2024-12-22 RX ADMIN — ALOGLIPTIN 12.5 MG: 12.5 TABLET, FILM COATED ORAL at 08:27

## 2024-12-22 RX ADMIN — ARFORMOTEROL TARTRATE: 15 SOLUTION RESPIRATORY (INHALATION) at 19:18

## 2024-12-22 RX ADMIN — LISINOPRIL 20 MG: 10 TABLET ORAL at 20:45

## 2024-12-22 RX ADMIN — LETROZOLE 2.5 MG: 2.5 TABLET, FILM COATED ORAL at 08:30

## 2024-12-22 RX ADMIN — METFORMIN HYDROCHLORIDE 500 MG: 500 TABLET ORAL at 08:28

## 2024-12-22 RX ADMIN — CETIRIZINE HYDROCHLORIDE 10 MG: 10 TABLET, FILM COATED ORAL at 08:27

## 2024-12-22 ASSESSMENT — PAIN SCALES - GENERAL
PAINLEVEL_OUTOF10: 0
PAINLEVEL_OUTOF10: 0

## 2024-12-23 LAB
GLUCOSE BLD STRIP.AUTO-MCNC: 118 MG/DL (ref 65–117)
SERVICE CMNT-IMP: ABNORMAL

## 2024-12-23 PROCEDURE — 94640 AIRWAY INHALATION TREATMENT: CPT

## 2024-12-23 PROCEDURE — 6360000002 HC RX W HCPCS: Performed by: PSYCHIATRY & NEUROLOGY

## 2024-12-23 PROCEDURE — 82962 GLUCOSE BLOOD TEST: CPT

## 2024-12-23 PROCEDURE — 1240000000 HC EMOTIONAL WELLNESS R&B

## 2024-12-23 PROCEDURE — 6370000000 HC RX 637 (ALT 250 FOR IP): Performed by: PSYCHIATRY & NEUROLOGY

## 2024-12-23 PROCEDURE — 99232 SBSQ HOSP IP/OBS MODERATE 35: CPT | Performed by: PSYCHIATRY & NEUROLOGY

## 2024-12-23 RX ADMIN — BUPROPION HYDROCHLORIDE 150 MG: 150 TABLET, EXTENDED RELEASE ORAL at 08:20

## 2024-12-23 RX ADMIN — ATORVASTATIN CALCIUM 80 MG: 40 TABLET, FILM COATED ORAL at 08:20

## 2024-12-23 RX ADMIN — ALOGLIPTIN 12.5 MG: 12.5 TABLET, FILM COATED ORAL at 17:21

## 2024-12-23 RX ADMIN — ALOGLIPTIN 12.5 MG: 12.5 TABLET, FILM COATED ORAL at 08:06

## 2024-12-23 RX ADMIN — VENLAFAXINE HYDROCHLORIDE 150 MG: 150 CAPSULE, EXTENDED RELEASE ORAL at 08:06

## 2024-12-23 RX ADMIN — METFORMIN HYDROCHLORIDE 500 MG: 500 TABLET ORAL at 17:21

## 2024-12-23 RX ADMIN — LISINOPRIL 20 MG: 10 TABLET ORAL at 08:21

## 2024-12-23 RX ADMIN — BENZOCAINE 6 MG-MENTHOL 10 MG LOZENGES 1 LOZENGE: at 14:13

## 2024-12-23 RX ADMIN — LETROZOLE 2.5 MG: 2.5 TABLET, FILM COATED ORAL at 08:19

## 2024-12-23 RX ADMIN — LISINOPRIL 20 MG: 10 TABLET ORAL at 20:55

## 2024-12-23 RX ADMIN — METFORMIN HYDROCHLORIDE 500 MG: 500 TABLET ORAL at 08:06

## 2024-12-23 RX ADMIN — CLOPIDOGREL BISULFATE 75 MG: 75 TABLET ORAL at 08:21

## 2024-12-23 RX ADMIN — METOPROLOL SUCCINATE 50 MG: 50 TABLET, EXTENDED RELEASE ORAL at 08:21

## 2024-12-23 RX ADMIN — CETIRIZINE HYDROCHLORIDE 10 MG: 10 TABLET, FILM COATED ORAL at 08:21

## 2024-12-23 RX ADMIN — ARFORMOTEROL TARTRATE: 15 SOLUTION RESPIRATORY (INHALATION) at 08:49

## 2024-12-23 RX ADMIN — ARFORMOTEROL TARTRATE: 15 SOLUTION RESPIRATORY (INHALATION) at 19:18

## 2024-12-23 ASSESSMENT — PAIN SCALES - GENERAL
PAINLEVEL_OUTOF10: 0
PAINLEVEL_OUTOF10: 0

## 2024-12-24 LAB
GLUCOSE BLD STRIP.AUTO-MCNC: 105 MG/DL (ref 65–117)
SERVICE CMNT-IMP: NORMAL

## 2024-12-24 PROCEDURE — 1240000000 HC EMOTIONAL WELLNESS R&B

## 2024-12-24 PROCEDURE — 94761 N-INVAS EAR/PLS OXIMETRY MLT: CPT

## 2024-12-24 PROCEDURE — 6370000000 HC RX 637 (ALT 250 FOR IP): Performed by: PSYCHIATRY & NEUROLOGY

## 2024-12-24 PROCEDURE — 6360000002 HC RX W HCPCS: Performed by: PSYCHIATRY & NEUROLOGY

## 2024-12-24 PROCEDURE — 6370000000 HC RX 637 (ALT 250 FOR IP): Performed by: INTERNAL MEDICINE

## 2024-12-24 PROCEDURE — 94640 AIRWAY INHALATION TREATMENT: CPT

## 2024-12-24 PROCEDURE — 82962 GLUCOSE BLOOD TEST: CPT

## 2024-12-24 RX ADMIN — METOPROLOL SUCCINATE 50 MG: 50 TABLET, EXTENDED RELEASE ORAL at 07:59

## 2024-12-24 RX ADMIN — PSYLLIUM HUSK 1 PACKET: 3.4 POWDER ORAL at 19:59

## 2024-12-24 RX ADMIN — CETIRIZINE HYDROCHLORIDE 10 MG: 10 TABLET, FILM COATED ORAL at 07:59

## 2024-12-24 RX ADMIN — LETROZOLE 2.5 MG: 2.5 TABLET, FILM COATED ORAL at 07:57

## 2024-12-24 RX ADMIN — LISINOPRIL 20 MG: 10 TABLET ORAL at 07:59

## 2024-12-24 RX ADMIN — ARFORMOTEROL TARTRATE: 15 SOLUTION RESPIRATORY (INHALATION) at 08:33

## 2024-12-24 RX ADMIN — CLOPIDOGREL BISULFATE 75 MG: 75 TABLET ORAL at 07:59

## 2024-12-24 RX ADMIN — VENLAFAXINE HYDROCHLORIDE 150 MG: 150 CAPSULE, EXTENDED RELEASE ORAL at 07:59

## 2024-12-24 RX ADMIN — METFORMIN HYDROCHLORIDE 500 MG: 500 TABLET ORAL at 17:07

## 2024-12-24 RX ADMIN — PSYLLIUM HUSK 1 PACKET: 3.4 POWDER ORAL at 12:38

## 2024-12-24 RX ADMIN — ATORVASTATIN CALCIUM 80 MG: 40 TABLET, FILM COATED ORAL at 07:59

## 2024-12-24 RX ADMIN — LISINOPRIL 20 MG: 10 TABLET ORAL at 19:59

## 2024-12-24 RX ADMIN — BUPROPION HYDROCHLORIDE 150 MG: 150 TABLET, EXTENDED RELEASE ORAL at 07:59

## 2024-12-24 RX ADMIN — ALOGLIPTIN 12.5 MG: 12.5 TABLET, FILM COATED ORAL at 17:07

## 2024-12-24 RX ADMIN — ARFORMOTEROL TARTRATE: 15 SOLUTION RESPIRATORY (INHALATION) at 20:23

## 2024-12-24 ASSESSMENT — PAIN SCALES - GENERAL
PAINLEVEL_OUTOF10: 0
PAINLEVEL_OUTOF10: 0

## 2024-12-25 LAB
GLUCOSE BLD STRIP.AUTO-MCNC: 107 MG/DL (ref 65–117)
SERVICE CMNT-IMP: NORMAL

## 2024-12-25 PROCEDURE — 6370000000 HC RX 637 (ALT 250 FOR IP): Performed by: PSYCHIATRY & NEUROLOGY

## 2024-12-25 PROCEDURE — 1240000000 HC EMOTIONAL WELLNESS R&B

## 2024-12-25 PROCEDURE — 82962 GLUCOSE BLOOD TEST: CPT

## 2024-12-25 PROCEDURE — 6370000000 HC RX 637 (ALT 250 FOR IP): Performed by: INTERNAL MEDICINE

## 2024-12-25 PROCEDURE — 94640 AIRWAY INHALATION TREATMENT: CPT

## 2024-12-25 PROCEDURE — 6360000002 HC RX W HCPCS: Performed by: PSYCHIATRY & NEUROLOGY

## 2024-12-25 RX ORDER — TRAZODONE HYDROCHLORIDE 50 MG/1
50 TABLET, FILM COATED ORAL NIGHTLY
Status: DISCONTINUED | OUTPATIENT
Start: 2024-12-25 | End: 2025-01-02 | Stop reason: HOSPADM

## 2024-12-25 RX ADMIN — ALOGLIPTIN 12.5 MG: 12.5 TABLET, FILM COATED ORAL at 08:25

## 2024-12-25 RX ADMIN — LETROZOLE 2.5 MG: 2.5 TABLET, FILM COATED ORAL at 08:25

## 2024-12-25 RX ADMIN — ALOGLIPTIN 12.5 MG: 12.5 TABLET, FILM COATED ORAL at 17:10

## 2024-12-25 RX ADMIN — METFORMIN HYDROCHLORIDE 500 MG: 500 TABLET ORAL at 08:26

## 2024-12-25 RX ADMIN — METOPROLOL SUCCINATE 50 MG: 50 TABLET, EXTENDED RELEASE ORAL at 08:26

## 2024-12-25 RX ADMIN — ATORVASTATIN CALCIUM 80 MG: 40 TABLET, FILM COATED ORAL at 08:26

## 2024-12-25 RX ADMIN — VENLAFAXINE HYDROCHLORIDE 150 MG: 150 CAPSULE, EXTENDED RELEASE ORAL at 08:27

## 2024-12-25 RX ADMIN — BUPROPION HYDROCHLORIDE 150 MG: 150 TABLET, EXTENDED RELEASE ORAL at 08:26

## 2024-12-25 RX ADMIN — LISINOPRIL 20 MG: 10 TABLET ORAL at 08:26

## 2024-12-25 RX ADMIN — BENZOCAINE 6 MG-MENTHOL 10 MG LOZENGES 1 LOZENGE: at 17:12

## 2024-12-25 RX ADMIN — ARFORMOTEROL TARTRATE: 15 SOLUTION RESPIRATORY (INHALATION) at 08:49

## 2024-12-25 RX ADMIN — CETIRIZINE HYDROCHLORIDE 10 MG: 10 TABLET, FILM COATED ORAL at 08:26

## 2024-12-25 RX ADMIN — TRAZODONE HYDROCHLORIDE 50 MG: 50 TABLET ORAL at 20:24

## 2024-12-25 RX ADMIN — ARFORMOTEROL TARTRATE: 15 SOLUTION RESPIRATORY (INHALATION) at 20:44

## 2024-12-25 RX ADMIN — METFORMIN HYDROCHLORIDE 500 MG: 500 TABLET ORAL at 17:10

## 2024-12-25 RX ADMIN — LISINOPRIL 20 MG: 10 TABLET ORAL at 20:24

## 2024-12-25 RX ADMIN — CLOPIDOGREL BISULFATE 75 MG: 75 TABLET ORAL at 08:26

## 2024-12-25 RX ADMIN — PSYLLIUM HUSK 1 PACKET: 3.4 POWDER ORAL at 20:24

## 2024-12-25 ASSESSMENT — PAIN SCALES - GENERAL
PAINLEVEL_OUTOF10: 0
PAINLEVEL_OUTOF10: 0

## 2024-12-26 LAB
GLUCOSE BLD STRIP.AUTO-MCNC: 120 MG/DL (ref 65–117)
SERVICE CMNT-IMP: ABNORMAL

## 2024-12-26 PROCEDURE — 94640 AIRWAY INHALATION TREATMENT: CPT

## 2024-12-26 PROCEDURE — 94760 N-INVAS EAR/PLS OXIMETRY 1: CPT

## 2024-12-26 PROCEDURE — 6360000002 HC RX W HCPCS: Performed by: PSYCHIATRY & NEUROLOGY

## 2024-12-26 PROCEDURE — 1240000000 HC EMOTIONAL WELLNESS R&B

## 2024-12-26 PROCEDURE — 6370000000 HC RX 637 (ALT 250 FOR IP): Performed by: PSYCHIATRY & NEUROLOGY

## 2024-12-26 PROCEDURE — 82962 GLUCOSE BLOOD TEST: CPT

## 2024-12-26 PROCEDURE — 6370000000 HC RX 637 (ALT 250 FOR IP): Performed by: INTERNAL MEDICINE

## 2024-12-26 RX ADMIN — ARFORMOTEROL TARTRATE: 15 SOLUTION RESPIRATORY (INHALATION) at 19:29

## 2024-12-26 RX ADMIN — ALOGLIPTIN 12.5 MG: 12.5 TABLET, FILM COATED ORAL at 08:44

## 2024-12-26 RX ADMIN — ALOGLIPTIN 12.5 MG: 12.5 TABLET, FILM COATED ORAL at 16:58

## 2024-12-26 RX ADMIN — TRAZODONE HYDROCHLORIDE 50 MG: 50 TABLET ORAL at 20:35

## 2024-12-26 RX ADMIN — BUPROPION HYDROCHLORIDE 150 MG: 150 TABLET, EXTENDED RELEASE ORAL at 08:44

## 2024-12-26 RX ADMIN — LISINOPRIL 20 MG: 10 TABLET ORAL at 20:35

## 2024-12-26 RX ADMIN — METFORMIN HYDROCHLORIDE 500 MG: 500 TABLET ORAL at 08:44

## 2024-12-26 RX ADMIN — PSYLLIUM HUSK 1 PACKET: 3.4 POWDER ORAL at 20:35

## 2024-12-26 RX ADMIN — CETIRIZINE HYDROCHLORIDE 10 MG: 10 TABLET, FILM COATED ORAL at 08:44

## 2024-12-26 RX ADMIN — LISINOPRIL 20 MG: 10 TABLET ORAL at 08:44

## 2024-12-26 RX ADMIN — PSYLLIUM HUSK 1 PACKET: 3.4 POWDER ORAL at 08:44

## 2024-12-26 RX ADMIN — ARFORMOTEROL TARTRATE: 15 SOLUTION RESPIRATORY (INHALATION) at 08:03

## 2024-12-26 RX ADMIN — METOPROLOL SUCCINATE 50 MG: 50 TABLET, EXTENDED RELEASE ORAL at 08:44

## 2024-12-26 RX ADMIN — LETROZOLE 2.5 MG: 2.5 TABLET, FILM COATED ORAL at 08:45

## 2024-12-26 RX ADMIN — CLOPIDOGREL BISULFATE 75 MG: 75 TABLET ORAL at 08:44

## 2024-12-26 RX ADMIN — METFORMIN HYDROCHLORIDE 500 MG: 500 TABLET ORAL at 16:58

## 2024-12-26 RX ADMIN — ATORVASTATIN CALCIUM 80 MG: 40 TABLET, FILM COATED ORAL at 08:44

## 2024-12-26 RX ADMIN — VENLAFAXINE HYDROCHLORIDE 150 MG: 150 CAPSULE, EXTENDED RELEASE ORAL at 08:44

## 2024-12-26 ASSESSMENT — PAIN SCALES - GENERAL
PAINLEVEL_OUTOF10: 0
PAINLEVEL_OUTOF10: 0

## 2024-12-27 LAB
GLUCOSE BLD STRIP.AUTO-MCNC: 92 MG/DL (ref 65–117)
SERVICE CMNT-IMP: NORMAL

## 2024-12-27 PROCEDURE — 94760 N-INVAS EAR/PLS OXIMETRY 1: CPT

## 2024-12-27 PROCEDURE — 6360000002 HC RX W HCPCS: Performed by: PSYCHIATRY & NEUROLOGY

## 2024-12-27 PROCEDURE — 82962 GLUCOSE BLOOD TEST: CPT

## 2024-12-27 PROCEDURE — 6370000000 HC RX 637 (ALT 250 FOR IP): Performed by: PSYCHIATRY & NEUROLOGY

## 2024-12-27 PROCEDURE — 94640 AIRWAY INHALATION TREATMENT: CPT

## 2024-12-27 PROCEDURE — 1240000000 HC EMOTIONAL WELLNESS R&B

## 2024-12-27 PROCEDURE — 6370000000 HC RX 637 (ALT 250 FOR IP): Performed by: INTERNAL MEDICINE

## 2024-12-27 PROCEDURE — 99232 SBSQ HOSP IP/OBS MODERATE 35: CPT | Performed by: PSYCHIATRY & NEUROLOGY

## 2024-12-27 RX ADMIN — ALOGLIPTIN 12.5 MG: 12.5 TABLET, FILM COATED ORAL at 08:15

## 2024-12-27 RX ADMIN — BUPROPION HYDROCHLORIDE 150 MG: 150 TABLET, EXTENDED RELEASE ORAL at 08:41

## 2024-12-27 RX ADMIN — ARFORMOTEROL TARTRATE: 15 SOLUTION RESPIRATORY (INHALATION) at 08:04

## 2024-12-27 RX ADMIN — PSYLLIUM HUSK 1 PACKET: 3.4 POWDER ORAL at 10:06

## 2024-12-27 RX ADMIN — ALOGLIPTIN 12.5 MG: 12.5 TABLET, FILM COATED ORAL at 17:02

## 2024-12-27 RX ADMIN — CLOPIDOGREL BISULFATE 75 MG: 75 TABLET ORAL at 08:41

## 2024-12-27 RX ADMIN — LISINOPRIL 20 MG: 10 TABLET ORAL at 20:38

## 2024-12-27 RX ADMIN — LETROZOLE 2.5 MG: 2.5 TABLET, FILM COATED ORAL at 08:43

## 2024-12-27 RX ADMIN — LISINOPRIL 20 MG: 10 TABLET ORAL at 08:40

## 2024-12-27 RX ADMIN — TRAZODONE HYDROCHLORIDE 50 MG: 50 TABLET ORAL at 20:38

## 2024-12-27 RX ADMIN — CETIRIZINE HYDROCHLORIDE 10 MG: 10 TABLET, FILM COATED ORAL at 08:40

## 2024-12-27 RX ADMIN — METFORMIN HYDROCHLORIDE 500 MG: 500 TABLET ORAL at 17:02

## 2024-12-27 RX ADMIN — METOPROLOL SUCCINATE 50 MG: 50 TABLET, EXTENDED RELEASE ORAL at 08:41

## 2024-12-27 RX ADMIN — PSYLLIUM HUSK 1 PACKET: 3.4 POWDER ORAL at 20:39

## 2024-12-27 RX ADMIN — ARFORMOTEROL TARTRATE: 15 SOLUTION RESPIRATORY (INHALATION) at 19:48

## 2024-12-27 RX ADMIN — ATORVASTATIN CALCIUM 80 MG: 40 TABLET, FILM COATED ORAL at 08:40

## 2024-12-27 RX ADMIN — VENLAFAXINE HYDROCHLORIDE 150 MG: 150 CAPSULE, EXTENDED RELEASE ORAL at 08:15

## 2024-12-27 RX ADMIN — METFORMIN HYDROCHLORIDE 500 MG: 500 TABLET ORAL at 08:15

## 2024-12-27 RX ADMIN — BENZOCAINE 6 MG-MENTHOL 10 MG LOZENGES 1 LOZENGE: at 04:10

## 2024-12-27 ASSESSMENT — PAIN SCALES - GENERAL
PAINLEVEL_OUTOF10: 1
PAINLEVEL_OUTOF10: 0

## 2024-12-27 ASSESSMENT — PAIN DESCRIPTION - LOCATION: LOCATION: THROAT

## 2024-12-27 ASSESSMENT — PAIN DESCRIPTION - DESCRIPTORS: DESCRIPTORS: DISCOMFORT

## 2024-12-28 LAB
GLUCOSE BLD STRIP.AUTO-MCNC: 103 MG/DL (ref 65–117)
SERVICE CMNT-IMP: NORMAL

## 2024-12-28 PROCEDURE — 82962 GLUCOSE BLOOD TEST: CPT

## 2024-12-28 PROCEDURE — 6360000002 HC RX W HCPCS: Performed by: PSYCHIATRY & NEUROLOGY

## 2024-12-28 PROCEDURE — 6370000000 HC RX 637 (ALT 250 FOR IP): Performed by: INTERNAL MEDICINE

## 2024-12-28 PROCEDURE — 94640 AIRWAY INHALATION TREATMENT: CPT

## 2024-12-28 PROCEDURE — 6370000000 HC RX 637 (ALT 250 FOR IP): Performed by: PSYCHIATRY & NEUROLOGY

## 2024-12-28 PROCEDURE — 1240000000 HC EMOTIONAL WELLNESS R&B

## 2024-12-28 RX ADMIN — LISINOPRIL 20 MG: 10 TABLET ORAL at 20:37

## 2024-12-28 RX ADMIN — ALOGLIPTIN 12.5 MG: 12.5 TABLET, FILM COATED ORAL at 08:59

## 2024-12-28 RX ADMIN — LETROZOLE 2.5 MG: 2.5 TABLET, FILM COATED ORAL at 09:00

## 2024-12-28 RX ADMIN — VENLAFAXINE HYDROCHLORIDE 150 MG: 150 CAPSULE, EXTENDED RELEASE ORAL at 08:59

## 2024-12-28 RX ADMIN — ATORVASTATIN CALCIUM 80 MG: 40 TABLET, FILM COATED ORAL at 08:59

## 2024-12-28 RX ADMIN — PSYLLIUM HUSK 1 PACKET: 3.4 POWDER ORAL at 20:37

## 2024-12-28 RX ADMIN — BUPROPION HYDROCHLORIDE 150 MG: 150 TABLET, EXTENDED RELEASE ORAL at 08:59

## 2024-12-28 RX ADMIN — METFORMIN HYDROCHLORIDE 500 MG: 500 TABLET ORAL at 08:59

## 2024-12-28 RX ADMIN — METFORMIN HYDROCHLORIDE 500 MG: 500 TABLET ORAL at 17:23

## 2024-12-28 RX ADMIN — ARFORMOTEROL TARTRATE: 15 SOLUTION RESPIRATORY (INHALATION) at 08:44

## 2024-12-28 RX ADMIN — ALOGLIPTIN 12.5 MG: 12.5 TABLET, FILM COATED ORAL at 17:22

## 2024-12-28 RX ADMIN — ARFORMOTEROL TARTRATE: 15 SOLUTION RESPIRATORY (INHALATION) at 19:56

## 2024-12-28 RX ADMIN — CETIRIZINE HYDROCHLORIDE 10 MG: 10 TABLET, FILM COATED ORAL at 08:59

## 2024-12-28 RX ADMIN — METOPROLOL SUCCINATE 50 MG: 50 TABLET, EXTENDED RELEASE ORAL at 08:58

## 2024-12-28 RX ADMIN — LISINOPRIL 20 MG: 10 TABLET ORAL at 08:59

## 2024-12-28 RX ADMIN — CLOPIDOGREL BISULFATE 75 MG: 75 TABLET ORAL at 08:59

## 2024-12-28 RX ADMIN — TRAZODONE HYDROCHLORIDE 50 MG: 50 TABLET ORAL at 20:37

## 2024-12-28 ASSESSMENT — PAIN SCALES - GENERAL: PAINLEVEL_OUTOF10: 0

## 2024-12-29 LAB
GLUCOSE BLD STRIP.AUTO-MCNC: 107 MG/DL (ref 65–117)
SERVICE CMNT-IMP: NORMAL

## 2024-12-29 PROCEDURE — 6370000000 HC RX 637 (ALT 250 FOR IP): Performed by: PSYCHIATRY & NEUROLOGY

## 2024-12-29 PROCEDURE — 1240000000 HC EMOTIONAL WELLNESS R&B

## 2024-12-29 PROCEDURE — 6370000000 HC RX 637 (ALT 250 FOR IP): Performed by: INTERNAL MEDICINE

## 2024-12-29 PROCEDURE — 6370000000 HC RX 637 (ALT 250 FOR IP)

## 2024-12-29 PROCEDURE — 6360000002 HC RX W HCPCS: Performed by: PSYCHIATRY & NEUROLOGY

## 2024-12-29 PROCEDURE — 94640 AIRWAY INHALATION TREATMENT: CPT

## 2024-12-29 PROCEDURE — 82962 GLUCOSE BLOOD TEST: CPT

## 2024-12-29 RX ORDER — LOPERAMIDE HYDROCHLORIDE 2 MG/1
2 CAPSULE ORAL 4 TIMES DAILY PRN
Status: DISCONTINUED | OUTPATIENT
Start: 2024-12-29 | End: 2025-01-02 | Stop reason: HOSPADM

## 2024-12-29 RX ADMIN — ARFORMOTEROL TARTRATE: 15 SOLUTION RESPIRATORY (INHALATION) at 09:02

## 2024-12-29 RX ADMIN — ACETAMINOPHEN 650 MG: 325 TABLET ORAL at 08:38

## 2024-12-29 RX ADMIN — LOPERAMIDE HYDROCHLORIDE 2 MG: 2 CAPSULE ORAL at 12:02

## 2024-12-29 RX ADMIN — LISINOPRIL 20 MG: 10 TABLET ORAL at 20:40

## 2024-12-29 RX ADMIN — CLOPIDOGREL BISULFATE 75 MG: 75 TABLET ORAL at 08:37

## 2024-12-29 RX ADMIN — LETROZOLE 2.5 MG: 2.5 TABLET, FILM COATED ORAL at 08:39

## 2024-12-29 RX ADMIN — CETIRIZINE HYDROCHLORIDE 10 MG: 10 TABLET, FILM COATED ORAL at 08:37

## 2024-12-29 RX ADMIN — ALOGLIPTIN 12.5 MG: 12.5 TABLET, FILM COATED ORAL at 17:27

## 2024-12-29 RX ADMIN — ARFORMOTEROL TARTRATE: 15 SOLUTION RESPIRATORY (INHALATION) at 19:38

## 2024-12-29 RX ADMIN — METOPROLOL SUCCINATE 50 MG: 50 TABLET, EXTENDED RELEASE ORAL at 08:38

## 2024-12-29 RX ADMIN — METFORMIN HYDROCHLORIDE 500 MG: 500 TABLET ORAL at 17:27

## 2024-12-29 RX ADMIN — TRAZODONE HYDROCHLORIDE 50 MG: 50 TABLET ORAL at 20:40

## 2024-12-29 RX ADMIN — VENLAFAXINE HYDROCHLORIDE 150 MG: 150 CAPSULE, EXTENDED RELEASE ORAL at 08:38

## 2024-12-29 RX ADMIN — LISINOPRIL 20 MG: 10 TABLET ORAL at 08:37

## 2024-12-29 RX ADMIN — ATORVASTATIN CALCIUM 80 MG: 40 TABLET, FILM COATED ORAL at 08:38

## 2024-12-29 RX ADMIN — METFORMIN HYDROCHLORIDE 500 MG: 500 TABLET ORAL at 08:38

## 2024-12-29 RX ADMIN — PSYLLIUM HUSK 1 PACKET: 3.4 POWDER ORAL at 20:40

## 2024-12-29 RX ADMIN — BUPROPION HYDROCHLORIDE 150 MG: 150 TABLET, EXTENDED RELEASE ORAL at 08:39

## 2024-12-29 RX ADMIN — PSYLLIUM HUSK 1 PACKET: 3.4 POWDER ORAL at 08:39

## 2024-12-29 RX ADMIN — BENZOCAINE 6 MG-MENTHOL 10 MG LOZENGES 1 LOZENGE: at 06:35

## 2024-12-29 RX ADMIN — ALOGLIPTIN 12.5 MG: 12.5 TABLET, FILM COATED ORAL at 08:38

## 2024-12-29 ASSESSMENT — PAIN SCALES - GENERAL
PAINLEVEL_OUTOF10: 0
PAINLEVEL_OUTOF10: 8
PAINLEVEL_OUTOF10: 2

## 2024-12-29 ASSESSMENT — PAIN DESCRIPTION - DESCRIPTORS
DESCRIPTORS: ACHING
DESCRIPTORS: ACHING

## 2024-12-29 ASSESSMENT — PAIN DESCRIPTION - LOCATION
LOCATION: BACK
LOCATION: BACK

## 2024-12-29 ASSESSMENT — PAIN - FUNCTIONAL ASSESSMENT: PAIN_FUNCTIONAL_ASSESSMENT: ACTIVITIES ARE NOT PREVENTED

## 2024-12-30 LAB
GLUCOSE BLD STRIP.AUTO-MCNC: 91 MG/DL (ref 65–117)
SERVICE CMNT-IMP: NORMAL

## 2024-12-30 PROCEDURE — 82962 GLUCOSE BLOOD TEST: CPT

## 2024-12-30 PROCEDURE — 6370000000 HC RX 637 (ALT 250 FOR IP): Performed by: PSYCHIATRY & NEUROLOGY

## 2024-12-30 PROCEDURE — 94640 AIRWAY INHALATION TREATMENT: CPT

## 2024-12-30 PROCEDURE — 1240000000 HC EMOTIONAL WELLNESS R&B

## 2024-12-30 PROCEDURE — 6370000000 HC RX 637 (ALT 250 FOR IP): Performed by: INTERNAL MEDICINE

## 2024-12-30 PROCEDURE — 6360000002 HC RX W HCPCS: Performed by: PSYCHIATRY & NEUROLOGY

## 2024-12-30 PROCEDURE — 99232 SBSQ HOSP IP/OBS MODERATE 35: CPT | Performed by: PSYCHIATRY & NEUROLOGY

## 2024-12-30 RX ADMIN — BENZOCAINE 6 MG-MENTHOL 10 MG LOZENGES 1 LOZENGE: at 14:05

## 2024-12-30 RX ADMIN — CLOPIDOGREL BISULFATE 75 MG: 75 TABLET ORAL at 08:22

## 2024-12-30 RX ADMIN — BENZOCAINE 6 MG-MENTHOL 10 MG LOZENGES 1 LOZENGE: at 09:24

## 2024-12-30 RX ADMIN — PSYLLIUM HUSK 1 PACKET: 3.4 POWDER ORAL at 09:24

## 2024-12-30 RX ADMIN — METFORMIN HYDROCHLORIDE 500 MG: 500 TABLET ORAL at 08:22

## 2024-12-30 RX ADMIN — PSYLLIUM HUSK 1 PACKET: 3.4 POWDER ORAL at 20:24

## 2024-12-30 RX ADMIN — BUPROPION HYDROCHLORIDE 150 MG: 150 TABLET, EXTENDED RELEASE ORAL at 08:22

## 2024-12-30 RX ADMIN — ARFORMOTEROL TARTRATE: 15 SOLUTION RESPIRATORY (INHALATION) at 09:10

## 2024-12-30 RX ADMIN — METFORMIN HYDROCHLORIDE 500 MG: 500 TABLET ORAL at 17:12

## 2024-12-30 RX ADMIN — ALOGLIPTIN 12.5 MG: 12.5 TABLET, FILM COATED ORAL at 08:22

## 2024-12-30 RX ADMIN — TRAZODONE HYDROCHLORIDE 50 MG: 50 TABLET ORAL at 20:24

## 2024-12-30 RX ADMIN — ARFORMOTEROL TARTRATE: 15 SOLUTION RESPIRATORY (INHALATION) at 20:44

## 2024-12-30 RX ADMIN — CETIRIZINE HYDROCHLORIDE 10 MG: 10 TABLET, FILM COATED ORAL at 08:22

## 2024-12-30 RX ADMIN — LISINOPRIL 20 MG: 10 TABLET ORAL at 20:24

## 2024-12-30 RX ADMIN — METOPROLOL SUCCINATE 50 MG: 50 TABLET, EXTENDED RELEASE ORAL at 08:22

## 2024-12-30 RX ADMIN — LETROZOLE 2.5 MG: 2.5 TABLET, FILM COATED ORAL at 08:28

## 2024-12-30 RX ADMIN — ALOGLIPTIN 12.5 MG: 12.5 TABLET, FILM COATED ORAL at 17:12

## 2024-12-30 RX ADMIN — ATORVASTATIN CALCIUM 80 MG: 40 TABLET, FILM COATED ORAL at 08:22

## 2024-12-30 RX ADMIN — VENLAFAXINE HYDROCHLORIDE 150 MG: 150 CAPSULE, EXTENDED RELEASE ORAL at 08:22

## 2024-12-30 RX ADMIN — LISINOPRIL 20 MG: 10 TABLET ORAL at 08:22

## 2024-12-30 ASSESSMENT — PAIN SCALES - GENERAL
PAINLEVEL_OUTOF10: 0
PAINLEVEL_OUTOF10: 0

## 2024-12-30 NOTE — GROUP NOTE
Group Therapy Note    Date: 12/11/2024    Group Start Time: 0900  Group End Time: 0950  Group Topic: Process Group - Inpatient    [facility] BEHAVIORAL HLTH OP    Fela Quiroga, LCSW        Group Therapy Note    Attendees: 3 scheduled    Focus of session was based on article “6 Steps for Personal Change.”  We spoke about how the goal is to get rid on unhealthy behaviors.  We spoke about the steps which include focusing on one change at a time, identifying why you want to change, understand how the behavior has served you, and to be able to sit with the discomfort of the process of change as well as take baby steps to change.  We spoke about the importance of accepting and understanding why we have done the behavior or had this particular way for so long to help us move past having it serve us in some unhealthy way.         Patient's Goal:  Discharge to home or other facility with appropriate resources     Notes:  Dannielle participated in group with prompting.  She spoke about how she is doing better today and that she is feeling more hopeful and more future focused.  She spoke about what she is thinking about her plans for discharge.  She spoke about how she has been struggling with the idea of not having her aides but she can recognize that she does not need them for 40 hours a week.  We spoke about how we can try and find out some options for reducing her hours for her aides to come in order to be able to come to [facility].     Status After Intervention:  Improved    Participation Level: Active Listener and Interactive    Participation Quality: Appropriate, Attentive, Sharing, and Supportive      Speech:  normal      Thought Process/Content: Logical  Linear      Affective Functioning: Congruent and Flat      Mood: anxious and depressed      Level of consciousness:  Alert, Oriented x4, and Attentive      Response to Learning: Able to verbalize current 
                                                                      Group Therapy Note    Date: 12/13/2024      Pt did not attend group today as she was sleeping due to being awake a lot last night   Signature:  Socorro Ríos LCSW    
                                                                      Group Therapy Note    Date: 12/14/2024    Group Start Time: 1300  Group End Time: 1330  Group Topic: Nursing    [facility] BEHAVIORAL HEALTH    Cuca Ngueyn, RN        Group Therapy Note    Attendees: 2       Patient's Goal:  to discuss anxieties when alone    Notes:  discussed anxieties and thoughts that occur when alone, ariadne at night and reactions to them    Status After Intervention:  Unchanged    Participation Level: Interactive    Participation Quality: Sharing      Speech:  normal      Thought Process/Content: Logical  Linear      Affective Functioning: Congruent      Mood: anxious and depressed      Level of consciousness:  Alert and Preoccupied      Response to Learning: Able to verbalize current knowledge/experience and Progressing to goal      Endings: None Reported    Modes of Intervention: Education, Socialization, and Exploration      Discipline Responsible: Registered Nurse      Signature:  Cuca Locke RN    
                                                                      Group Therapy Note    Date: 12/15/2024    Group Start Time: 1344  Group End Time: 1415  Group Topic: Activity    [facility] BEHAVIORAL HEALTH    Cuca Nguyen, RN        Group Therapy Note    Attendees: 3       Patient's Goal:  to get my head together and go home    Notes:  movie and socialization/discussion    Status After Intervention:  Improved    Participation Level: Interactive    Participation Quality: Sharing      Speech:  normal      Thought Process/Content: Logical  Linear      Affective Functioning: Congruent      Mood: euthymic      Level of consciousness:  Alert and Oriented x4      Response to Learning: Progressing to goal      Endings: None Reported    Modes of Intervention: Support, Clarifying, and Activity      Discipline Responsible: Registered Nurse      Signature:  Cuca Locke RN    
                                                                      Group Therapy Note    Date: 12/16/2024    Dannielle stated that she was not feeling well in that she was feeling tired and not able to get up to come to group. I encouraged her to come and sit anyway but she would not come. I told her I was going to continue to encourage her to push herself to come and sit with us in group.     Signature:  Fela Quiroga [facility]    
                                                                      Group Therapy Note    Date: 12/17/2024    Group Start Time: 0830  Group End Time: 0900  Group Topic: Community Meeting    [facility] BEHAVIORAL HEALTH    Madi Pozo        Group Therapy Note    Attendees: 1/3         Patient's Goal:  goal is to get well and treat people better, go through her clothes and get a few outfits out    Notes:  patient is coloring [employer] stocking and talking with staff.     Status After Intervention:  Unchanged    Participation Level: Active Listener    Participation Quality: Sharing      Speech:  loud      Thought Process/Content: Hallucinating  Flight of ideas      Affective Functioning: Blunted and Exaggerated      Mood: anxious and elevated      Level of consciousness:  Alert      Response to Learning: Able to verbalize current knowledge/experience      Endings: None Reported    Modes of Intervention: Socialization      Discipline Responsible: Behavorial Health Tech      Signature:  Madi Pozo    
                                                                      Group Therapy Note    Date: 12/18/2024    Dannielle was asleep during group time.  We will continue to encourage her to attend group as she can.        Signature:  Fela Quiroga LCSW    
                                                                      Group Therapy Note    Date: 12/20/2024    Group Start Time: 1345  Group End Time: 1445  Group Topic: Nursing    [facility] BEHAVIORAL HEALTH    Cuca Nguyen, RN        Group Therapy Note    Attendees: 1       Patient's Goal:  to get straight and go back home    Notes: 1:1 session    Status After Intervention:  Improved    Participation Level: Interactive    Participation Quality: Attentive      Speech:  normal      Thought Process/Content: Logical  Linear      Affective Functioning: Congruent      Mood: depressed      Level of consciousness:  Alert      Response to Learning: Able to verbalize current knowledge/experience and Progressing to goal      Endings: None Reported    Modes of Intervention: Support and Socialization      Discipline Responsible: Registered Nurse      Signature:  Cuca Locke RN    
                                                                      Group Therapy Note    Date: 12/22/2024    Group Start Time: 0910  Group End Time: 0950  Group Topic: Nursing    [facility] BEHAVIORAL HEALTH    Cuca Nguyen, RN        Group Therapy Note    Attendees: 1       Patient's Goal:  to sleep better    Notes:  1:1 session    Status After Intervention:  Improved    Participation Level: Interactive    Participation Quality: Attentive      Speech:  normal      Thought Process/Content: Flight of ideas      Affective Functioning: Congruent      Mood: elevated      Level of consciousness:  Alert and Oriented x4      Response to Learning: Progressing to goal      Endings: None Reported    Modes of Intervention: Support, Socialization, and Clarifying      Discipline Responsible: Registered Nurse      Signature:  Cuca Locke RN    
                                                                      Group Therapy Note    Date: 12/26/2024    Group Start Time: 0900  Group End Time: 0950  Group Topic: Process Group - Inpatient    [facility] BEHAVIORAL HLTH OP    Socorro Ríos LCSW        Group Therapy Note    Attendees: 3 scheduled     Focus of session was on establishing healthy daily routines that benefit our emotional and physical well being.  We spoke about how routine helps us stay focused on our recovery and making good choices for ourselves.  We spoke about the importance of setting at least one daily goal and every day use priority to decide what needs to be done.  We spoke about meditation is helpful to allow us some time everyday to relax and release negative thoughts and feelings.  We also spoke about the importance of avoiding procrastination and to handle things as they come up.  We also spoke about the importance of taking prescribed medications as well as being honest with others.  We spoke about focusing our thoughts on what we can do versus what we can't.  We spoke about what we could put into place to make our daily lives go smoother and increase our chances of success.              Patient's Goal:  Will be euthymic at discharge     Notes:  Pt participated in the activity and engaged in discussion.  Pt continues to feel that she wants to go home but is alone and lonely by herself.  We discussed possible  discharge plans and she states that she would like to try and find a \"good\" care worker before having to move to assisted living.  Pt acknowledges that she is less depressed when she is able to interact with people.  We again discussed attending [facility] when she is discharged .  Pt's was alert and her affect was bright     Status After Intervention:  Improved    Participation Level: Active Listener and Interactive    Participation Quality: Appropriate and Attentive      Speech:  normal      Thought Process/Content: 
                                                                      Group Therapy Note    Date: 12/28/2024    Group Start Time: 1330  Group End Time:   1400    Group Topic: Nursing    [facility] BEHAVIORAL HEALTH    Cuca Nguyen, RN        Group Therapy Note    Attendees: 1:1 session for part of this       Patient's Goal:  to get better    Status After Intervention:  Unchanged    Participation Level: Interactive    Participation Quality: Appropriate, Attentive, and Supportive      Speech:  pressured      Thought Process/Content: Logical  Linear      Affective Functioning: Congruent      Mood: anxious and depressed      Level of consciousness:  Alert, Oriented x4, and Preoccupied      Response to Learning: Able to verbalize/acknowledge new learning      Endings: None Reported    Modes of Intervention: Support      Discipline Responsible: Registered Nurse      Signature:  Cuca Locke RN    
                                                                      Group Therapy Note    Date: 12/30/2024    Group Start Time: 0900  Group End Time: 0950  Group Topic: Process Group - Inpatient    [facility] BEHAVIORAL HLTH OP    Fela Quiroga, LCSW        Group Therapy Note    Attendees: 3 scheduled    Focus of session was based on idea of setting intentions for the New Year and other times that these intentions can be addressed.  We discussed why it is helpful to think about these ideas more than one time a year at the New Year.  We spoke about questions that patient can think about and even journal about.  We spoke about a mental health goal for the upcoming week or year and why this is important to them.   We spoke about what can be done to better prepare for stressors in the new year and what boundaries need to be set or reinforced and why.  We discussed having journaling be a ongoing process for patient and how it can be beneficial.         Patient's Goal:  Patient's chronic conditions and co-morbidity symptoms are monitored and maintained or improved     Notes: Dannielle participated in session well. She spoke about how she hopes to go home on Thursday and we spoke about what she has to look forward to.  We spoke about her entering into another year and what she is hoping to focus on. She spoke about how she has spent a lot of time lately reflecting on her relationship with God and how she can continue to pursue that relationship to deepen it further.  She shared story of her father and she became tearful thinking about him.      Status After Intervention:  Improved    Participation Level: Active Listener and Interactive    Participation Quality: Appropriate, Attentive, Sharing, and Supportive      Speech:  normal      Thought Process/Content: Logical      Affective Functioning: Congruent and Flat      Mood: anxious and depressed      Level of consciousness:  Alert, Oriented x4, and Attentive      Response to 
                                                                      Group Therapy Note    Date: 12/4/2024    Group Start Time: 0900  Group End Time: 0950  Group Topic: Process Group - Inpatient    [facility] BEHAVIORAL HLTH OP    Fela Quiroga, LCSW        Group Therapy Note    Attendees: 5 scheduled    Focus of session was on how to stop the cycle of depression.  We spoke about the three parts of depression are negative or self critical thoughts, physical symptoms of fatigue, poor concentration, irritability, nervousness, and behavioral isolation or passivity or inactivity.  We discussed 4 components of stopping depression which include practice alternative or balanced thinking, get involved with activities, have contact with other people, and take care of our health (rest, eat right, exercise).  We spoke about a simple plan to follow to develop a better mood which includes talking back to negative thoughts, initiating pleasant activities, and having purposeful people contact.          Patient's Goal:  Patient's chronic conditions and co-morbidity symptoms are monitored and maintained or improved     Notes:  Dannielle participated in group with prompting.  She spoke about how \"I have not been doing well\" but she could not pinpoint what has not been going well.  She spoke about how her aides have not been working out but they are sending one more to see if that person is a better fit.  I encouraged her to focus on making good choices for herself and her mood.      Status After Intervention:  Unchanged    Participation Level: Active Listener and Interactive    Participation Quality: Appropriate, Attentive, and Sharing      Speech:  normal      Thought Process/Content: Logical  Linear      Affective Functioning: Congruent and Flat      Mood: anxious and depressed      Level of consciousness:  Alert, Oriented x4, Attentive, and Preoccupied      Response to Learning: Able to verbalize current knowledge/experience, Able to 
                                                                      Group Therapy Note    Date: 12/5/2024    Group Start Time: 0900  Group End Time: 0950  Group Topic: Process Group - Inpatient    [facility] BEHAVIORAL HLTH OP    Socorro Ríos LCSW        Group Therapy Note    Attendees: 5 scheduled    Focus of session was on effective ways to control anxiety.  We spoke about how these specific skills can have a very immediate effect on stress and anxiety and we spoke about how we can motivate to use them.  We spoke about the following skills; dealing with problems on the spot, strong, confident relationships, slowing down, taking one thing at a time, coping with ruts, divide problems up, using past experiences, and building relaxation into our daily lives.  We spoke about which skill we will start to incorporate into our daily use.       Patient's Goal:  Will be euthymic at discharge     Notes:  Pt attended group and participated in the activity.  She engaged with the other members and her affect was bright.  Pt stated that she had not been feeling well recently and was more depressed.  She decided herself that she felt she needed to come to inpatient for medication management. She shared that she is still lonely at home.  She doesn't believe that she needs an in home worker five hours day but acknowledges that she does need some assistance      Status After Intervention:  Improved    Participation Level: Active Listener and Interactive    Participation Quality: Appropriate, Attentive, and Sharing      Speech:  normal      Thought Process/Content: Logical      Affective Functioning: Congruent      Mood: depressed      Level of consciousness:  Alert and Attentive      Response to Learning: Able to verbalize current knowledge/experience, Able to verbalize/acknowledge new learning, Able to retain information, and Capable of insight      Endings: None Reported    Modes of Intervention: Education and 
                                                                      Group Therapy Note    Date: 12/9/2024    Group Start Time: 0900  Group End Time: 0950  Group Topic: Process Group - Inpatient    [facility] BEHAVIORAL HLTH OP    Fela Quiroga LCSW        Group Therapy Note    Attendees: 2 scheduled    Focus of session was from Alba Macedo LCSW handout on “Journal Prompts to help you set better boundaries.”  We spoke about the impact of journaling and how it can be effective in reinforcing what we want to do and change.  We discussed the prompts of “what problems have you experienced related to your struggle with boundaries and how do you think your life will improve by setting and maintaining boundaries.  We spoke specifically about the prompt of “in what ways do we overextend ourselves to please and take care of others?” We discussed what boundaries are necessary at this time to develop.         Patient's Goal:  Patient's chronic conditions and co-morbidity symptoms are monitored and maintained or improved     Notes:  Dannielle participated in group with prompting.  She spoke about how she has been very lonely at home and she has been struggling with aides in the home and what they do and do not do to help her.  She spoke about how she is aware of her need to stay connected to people and that she enjoys talking and helping people.  She was open to coming to the [facility] program after discharge to help her figure out a long term plan for her future.      Status After Intervention:  Unchanged    Participation Level: Active Listener and Interactive    Participation Quality: Appropriate, Attentive, Sharing, and Supportive      Speech:  normal      Thought Process/Content: Logical  Linear      Affective Functioning: Congruent      Mood: depressed      Level of consciousness:  Alert, Oriented x4, and Attentive      Response to Learning: Able to verbalize current knowledge/experience, Able to retain information, Capable of 
LCSW    
x4, and Attentive      Response to Learning: Able to verbalize current knowledge/experience, Able to retain information, and Able to change behavior      Endings: None Reported    Modes of Intervention: Education, Support, Socialization, Exploration, and Clarifying      Discipline Responsible: /Counselor      Signature:  Fela Quiroga LCSW

## 2024-12-31 LAB
GLUCOSE BLD STRIP.AUTO-MCNC: 123 MG/DL (ref 65–117)
SERVICE CMNT-IMP: ABNORMAL

## 2024-12-31 PROCEDURE — 6370000000 HC RX 637 (ALT 250 FOR IP): Performed by: PSYCHIATRY & NEUROLOGY

## 2024-12-31 PROCEDURE — 1240000000 HC EMOTIONAL WELLNESS R&B

## 2024-12-31 PROCEDURE — 99232 SBSQ HOSP IP/OBS MODERATE 35: CPT | Performed by: PSYCHIATRY & NEUROLOGY

## 2024-12-31 PROCEDURE — 82962 GLUCOSE BLOOD TEST: CPT

## 2024-12-31 PROCEDURE — 6360000002 HC RX W HCPCS: Performed by: PSYCHIATRY & NEUROLOGY

## 2024-12-31 PROCEDURE — 94640 AIRWAY INHALATION TREATMENT: CPT

## 2024-12-31 PROCEDURE — 94760 N-INVAS EAR/PLS OXIMETRY 1: CPT

## 2024-12-31 PROCEDURE — 6370000000 HC RX 637 (ALT 250 FOR IP): Performed by: INTERNAL MEDICINE

## 2024-12-31 RX ADMIN — BUPROPION HYDROCHLORIDE 150 MG: 150 TABLET, EXTENDED RELEASE ORAL at 10:04

## 2024-12-31 RX ADMIN — CETIRIZINE HYDROCHLORIDE 10 MG: 10 TABLET, FILM COATED ORAL at 10:04

## 2024-12-31 RX ADMIN — LETROZOLE 2.5 MG: 2.5 TABLET, FILM COATED ORAL at 10:00

## 2024-12-31 RX ADMIN — METFORMIN HYDROCHLORIDE 500 MG: 500 TABLET ORAL at 17:07

## 2024-12-31 RX ADMIN — METFORMIN HYDROCHLORIDE 500 MG: 500 TABLET ORAL at 10:04

## 2024-12-31 RX ADMIN — PSYLLIUM HUSK 1 PACKET: 3.4 POWDER ORAL at 20:28

## 2024-12-31 RX ADMIN — LISINOPRIL 20 MG: 10 TABLET ORAL at 20:28

## 2024-12-31 RX ADMIN — ARFORMOTEROL TARTRATE: 15 SOLUTION RESPIRATORY (INHALATION) at 07:49

## 2024-12-31 RX ADMIN — METOPROLOL SUCCINATE 50 MG: 50 TABLET, EXTENDED RELEASE ORAL at 10:04

## 2024-12-31 RX ADMIN — CLOPIDOGREL BISULFATE 75 MG: 75 TABLET ORAL at 10:04

## 2024-12-31 RX ADMIN — PSYLLIUM HUSK 1 PACKET: 3.4 POWDER ORAL at 11:04

## 2024-12-31 RX ADMIN — VENLAFAXINE HYDROCHLORIDE 150 MG: 150 CAPSULE, EXTENDED RELEASE ORAL at 10:04

## 2024-12-31 RX ADMIN — LISINOPRIL 20 MG: 10 TABLET ORAL at 10:04

## 2024-12-31 RX ADMIN — ALOGLIPTIN 12.5 MG: 12.5 TABLET, FILM COATED ORAL at 10:01

## 2024-12-31 RX ADMIN — TRAZODONE HYDROCHLORIDE 50 MG: 50 TABLET ORAL at 20:28

## 2024-12-31 RX ADMIN — ALOGLIPTIN 12.5 MG: 12.5 TABLET, FILM COATED ORAL at 17:07

## 2024-12-31 RX ADMIN — ATORVASTATIN CALCIUM 80 MG: 40 TABLET, FILM COATED ORAL at 10:04

## 2024-12-31 RX ADMIN — ARFORMOTEROL TARTRATE: 15 SOLUTION RESPIRATORY (INHALATION) at 19:50

## 2024-12-31 ASSESSMENT — PAIN SCALES - GENERAL
PAINLEVEL_OUTOF10: 0
PAINLEVEL_OUTOF10: 0

## 2024-12-31 NOTE — PROGRESS NOTES
CC: \"I'm feeling fair\"    Dannielle Hall reports experiencing diarrhea and gastrointestinal issues, for which she has previously been given Imodium in the hospital, and it seemed to help. She mentions sleeping well last night, having slept through the night for six hours, but still feels as sleepy as if she had never slept due to her GI upset. Despite this, she denies having any bad thoughts about hurting herself or others this morning and reports no experiences of seeing or hearing things that others do not. Regarding her appetite, she states that she ate breakfast but did not finish it, leaving a small piece of [place], indicating that her appetite remains good..    MEDS:  Current Facility-Administered Medications   Medication Dose Route Frequency    loperamide (IMODIUM) capsule 2 mg  2 mg Oral 4x Daily PRN    traZODone (DESYREL) tablet 50 mg  50 mg Oral Nightly    psyllium husk-aspartame (METAMUCIL FIBER) packet 1 packet  1 packet Oral BID    buPROPion (WELLBUTRIN XL) extended release tablet 150 mg  150 mg Oral Daily    Benzocaine-Menthol (CEPACOL) 1 lozenge  1 lozenge Oral Q2H PRN    atorvastatin (LIPITOR) tablet 80 mg  80 mg Oral Daily    clopidogrel (PLAVIX) tablet 75 mg  75 mg Oral Daily    letrozole (FEMARA) tablet 2.5 mg (patient supplied) (Patient Supplied)  2.5 mg Oral Daily    cetirizine (ZYRTEC) tablet 10 mg  10 mg Oral Daily    lisinopril (PRINIVIL;ZESTRIL) tablet 20 mg  20 mg Oral BID    metoprolol succinate (TOPROL XL) extended release tablet 50 mg  50 mg Oral Daily    hydrOXYzine pamoate (VISTARIL) capsule 25 mg  25 mg Oral TID PRN    albuterol (PROVENTIL) (2.5 MG/3ML) 0.083% nebulizer solution 2.5 mg  2.5 mg Nebulization Q6H PRN    arformoterol 15 mcg-budesonide 0.5 mg neb solution   Nebulization BID RT    alogliptin (NESINA) tablet 12.5 mg  12.5 mg Oral BID WC    And    metFORMIN (GLUCOPHAGE) tablet 500 mg  500 mg Oral BID WC    venlafaxine (EFFEXOR XR) extended release capsule 150 mg  150 mg Oral 
Comprehensive Nutrition Assessment    Type and Reason for Visit:  Initial, Positive nutrition screen    Nutrition Recommendations/Plan:   Regular 3 carb diet low sodium   Encourage po intake      Malnutrition Assessment:  Malnutrition Status:  No malnutrition (12/10/24 1427)    Context:  Social/Environmental Circumstances     Findings of the 6 clinical characteristics of malnutrition:  Energy Intake:  No decrease in energy intake  Weight Loss:  No weight loss     Body Fat Loss:  No body fat loss     Muscle Mass Loss:  No muscle mass loss    Fluid Accumulation:  No fluid accumulation     Strength:  Not Performed    Nutrition Assessment:  Present on Admission:   Major depressive disorder, recurrent episode, moderate (HCC)   Primary osteoarthritis involving multiple joints   Well controlled type 2 diabetes mellitus (HCC)   Essential hypertension   History of CVA (cerebrovascular accident)   Chronic obstructive pulmonary disease, unspecified (HCC)   Elevated cholesterol    Pt admitted with above. She has fair to good po intake. Weights are stable. Last A1c% in aug 2023 was 7.1 would recommend a new A1c%.      Nutrition Related Findings:      Wound Type: None   ,meds-reviewed, labs-Cr-1.16, blood glucose-131-179    Current Nutrition Intake & Therapies:    Average Meal Intake: 51-75%, %  Average Supplements Intake: None Ordered  ADULT DIET; Regular; 3 carb choices (45 gm/meal); Low Sodium (2 gm); Safety Tray; Safety Tray (Disposables)  Patient Vitals for the past 96 hrs:   PO Meals Eaten (%)   12/10/24 1213 76 - 100%   12/09/24 1715 26 - 50%   12/09/24 1215 51 - 75%   12/09/24 0845 76 - 100%   12/08/24 1908 51 - 75%   12/08/24 1300 51 - 75%   12/08/24 0859 51 - 75%   12/07/24 1800 76 - 100%   12/07/24 0900 51 - 75%   12/06/24 1715 1 - 25%       Anthropometric Measures:  Height: 167.6 cm (5' 6\")  Ideal Body Weight (IBW): 130 lbs (59 kg)    Current Body Weight: 77.3 kg (170 lb 6.7 oz), 131.1 % IBW.    Current BMI 
Comprehensive Nutrition Assessment    Type and Reason for Visit:  Reassess    Nutrition Recommendations/Plan:   Regular 3 carb diet, low sodium      Malnutrition Assessment:  Malnutrition Status:  No malnutrition (12/10/24 1427)    Context:  Social/Environmental Circumstances     Findings of the 6 clinical characteristics of malnutrition:  Energy Intake:  No decrease in energy intake  Weight Loss:  No weight loss     Body Fat Loss:  No body fat loss     Muscle Mass Loss:  No muscle mass loss    Fluid Accumulation:  No fluid accumulation     Strength:  Not Performed    Nutrition Assessment:  Chart reviewed. Pt continues to have great po intake. She has no new wt since last note. She was in a good mood.      Nutrition Related Findings:      Wound Type: None   , meds-reviewed, labs-no new in wks     Current Nutrition Intake & Therapies:    Average Meal Intake: %  Average Supplements Intake: None Ordered  ADULT DIET; Regular; 3 carb choices (45 gm/meal); Low Sodium (2 gm); Safety Tray; Safety Tray (Disposables)  Patient Vitals for the past 96 hrs:   PO Meals Eaten (%)   12/19/24 1215 76 - 100%   12/19/24 0845 76 - 100%   12/18/24 1800 76 - 100%   12/18/24 1300 76 - 100%   12/18/24 0808 0%   12/16/24 1231 76 - 100%   12/16/24 0910 1 - 25%   12/15/24 1728 76 - 100%       Anthropometric Measures:  Height: 167.6 cm (5' 6\")  Ideal Body Weight (IBW): 130 lbs (59 kg)    Current Body Weight: 77.3 kg (170 lb 6.7 oz), 131.1 % IBW.    Current BMI (kg/m2): 27.5  BMI Categories: Overweight (BMI 25.0-29.9)      12/4/2024    12:12 AM 12/3/2024     5:10 PM 11/19/2024     9:48 AM 11/6/2024     7:15 AM 11/4/2024    12:04 PM 6/4/2024     9:41 AM 5/22/2024     4:07 PM   Weight History   Weight - Scale 170 lbs 6 oz 164 lbs 171 lbs 6 oz 164 lbs 164 lbs 169 lbs 6 oz 167 lbs   Weight - Scale 77.3 kg 74.4 kg 77.7 kg 74.4 kg 74.4 kg 76.8 kg 75.8 kg   Weight Method Standing scale Stated  Bed scale          Estimated Daily Nutrient 
Comprehensive Nutrition Assessment    Type and Reason for Visit:  Reassess    Nutrition Recommendations/Plan:   Regular 3 carb low sodium diet   New weight      Malnutrition Assessment:  Malnutrition Status:  No malnutrition (12/10/24 1427)    Context:  Social/Environmental Circumstances     Findings of the 6 clinical characteristics of malnutrition:  Energy Intake:  No decrease in energy intake  Weight Loss:  No weight loss     Body Fat Loss:  No body fat loss     Muscle Mass Loss:  No muscle mass loss    Fluid Accumulation:  No fluid accumulation     Strength:  Not Performed    Nutrition Assessment:  Chart reviewed. Still no new wt-request a new wt. Continues to have good po intake. Blood snfjmr-.      Nutrition Related Findings:      Wound Type: None       Current Nutrition Intake & Therapies:    Average Meal Intake: %  Average Supplements Intake: None Ordered  ADULT DIET; Regular; 3 carb choices (45 gm/meal); Low Sodium (2 gm); Safety Tray; Safety Tray (Disposables)  Patient Vitals for the past 96 hrs:   PO Meals Eaten (%)   12/27/24 1240 76 - 100%   12/27/24 0808 76 - 100%   12/26/24 1215 76 - 100%   12/26/24 0831 51 - 75%   12/25/24 1723 51 - 75%   12/25/24 1334 51 - 75%   12/25/24 0900 76 - 100%   12/23/24 1817 26 - 50%       Anthropometric Measures:  Height: 167.6 cm (5' 6\")  Ideal Body Weight (IBW): 130 lbs (59 kg)    Current Body Weight: 77.3 kg (170 lb 6.7 oz), 131.1 % IBW.    Current BMI (kg/m2): 27.5  BMI Categories: Overweight (BMI 25.0-29.9)      12/4/2024    12:12 AM 12/3/2024     5:10 PM 11/19/2024     9:48 AM 11/6/2024     7:15 AM 11/4/2024    12:04 PM 6/4/2024     9:41 AM 5/22/2024     4:07 PM   Weight History   Weight - Scale 170 lbs 6 oz 164 lbs 171 lbs 6 oz 164 lbs 164 lbs 169 lbs 6 oz 167 lbs   Weight - Scale 77.3 kg 74.4 kg 77.7 kg 74.4 kg 74.4 kg 76.8 kg 75.8 kg   Weight Method Standing scale Stated  Bed scale          Estimated Daily Nutrient Needs:  Energy Requirements 
HOSPITALIST FOLLOW UP    Asked to check patient for more confusion and bladder incontinence today  Has been Rx Keflex 500mg tid x 5 days    Urine c/w UTI.  C&S pending.  Continue Keflex and plan adjustment if symptoms persist and C&S suggests alternative treatment option.  If  symptoms persist suggest Nursing obtain a bladder check for PVR to r/o retention.  May need GYN exam post d/c as well to check for prolapse or vaginal type infection.     12/12/24 08:00   Color, UA YELLOW/STRAW   Glucose, Ur Negative   Bilirubin, Urine Negative   Ketones, Urine Negative   Specific Gravity, UA 1.010   Blood, Urine Negative   Protein, UA Negative   Urobilinogen 0.2   Nitrite, Urine Negative   Leukocyte Esterase, Urine MODERATE !   Appearance CLEAR   pH, Urine 6.0   WBC, UA 10-20   RBC, UA 0-5   Epithelial Cells, UA FEW   Bacteria, UA Negative       
INTERVAL Hx: (Records and clinical information from the past 3 days were reviewed)    CC: \"pretty good\"    States she's still been feeling better overall, and that she's pleased with her progress. However, she's still anxious that her improvement may not last, and whether getting back home will be more stressful than she thinks. We discussed this at length, including my recommendation to restart in the SOP program ASAP. She's no longer experiencing any delirium, and that's helped improve her mood and reduce her level of anxiety as well. She also hasn't had any of the manic-like episodes in over a week. No SE's or issues with the addition of  the Wellbutrin XL, or with the Effexor XR. Slept 6.5 hours last night. POC Gluc: 105--120 over the past 3 days, and 92 this AM.    MEDS:  Current Facility-Administered Medications   Medication Dose Route Frequency    traZODone (DESYREL) tablet 50 mg  50 mg Oral Nightly    psyllium husk-aspartame (METAMUCIL FIBER) packet 1 packet  1 packet Oral BID    buPROPion (WELLBUTRIN XL) extended release tablet 150 mg  150 mg Oral Daily    Benzocaine-Menthol (CEPACOL) 1 lozenge  1 lozenge Oral Q2H PRN    atorvastatin (LIPITOR) tablet 80 mg  80 mg Oral Daily    clopidogrel (PLAVIX) tablet 75 mg  75 mg Oral Daily    letrozole (FEMARA) tablet 2.5 mg (patient supplied) (Patient Supplied)  2.5 mg Oral Daily    cetirizine (ZYRTEC) tablet 10 mg  10 mg Oral Daily    lisinopril (PRINIVIL;ZESTRIL) tablet 20 mg  20 mg Oral BID    metoprolol succinate (TOPROL XL) extended release tablet 50 mg  50 mg Oral Daily    hydrOXYzine pamoate (VISTARIL) capsule 25 mg  25 mg Oral TID PRN    albuterol (PROVENTIL) (2.5 MG/3ML) 0.083% nebulizer solution 2.5 mg  2.5 mg Nebulization Q6H PRN    arformoterol 15 mcg-budesonide 0.5 mg neb solution   Nebulization BID RT    alogliptin (NESINA) tablet 12.5 mg  12.5 mg Oral BID WC    And    metFORMIN (GLUCOPHAGE) tablet 500 mg  500 mg Oral BID WC    venlafaxine (EFFEXOR XR) 
INTERVAL Hx: (Records and clinical information from the weekend were reviewed)    CC: \"I am okay today\"    Ms. Hall reports feeling \"sleepy\" today. Says she did not sleep as well and only slept 5 hours. Nursing staff note that she has long conversations while she is sleeping and they can hear her talking loudly. She refused to consider a hypnotic initially but after discussion she finally agreed to take Trazodone and I will change it to a standing dose for her sleep. Says her mood is \"Fair\" and she is less depressed today. However she still has passive SI which \"comes and goes\". Pleasant and cheerful during the interview.        MEDS:  Current Facility-Administered Medications   Medication Dose Route Frequency    psyllium husk-aspartame (METAMUCIL FIBER) packet 1 packet  1 packet Oral BID    buPROPion (WELLBUTRIN XL) extended release tablet 150 mg  150 mg Oral Daily    traZODone (DESYREL) tablet 50 mg  50 mg Oral Nightly PRN    Benzocaine-Menthol (CEPACOL) 1 lozenge  1 lozenge Oral Q2H PRN    atorvastatin (LIPITOR) tablet 80 mg  80 mg Oral Daily    clopidogrel (PLAVIX) tablet 75 mg  75 mg Oral Daily    letrozole (FEMARA) tablet 2.5 mg (patient supplied) (Patient Supplied)  2.5 mg Oral Daily    cetirizine (ZYRTEC) tablet 10 mg  10 mg Oral Daily    lisinopril (PRINIVIL;ZESTRIL) tablet 20 mg  20 mg Oral BID    metoprolol succinate (TOPROL XL) extended release tablet 50 mg  50 mg Oral Daily    hydrOXYzine pamoate (VISTARIL) capsule 25 mg  25 mg Oral TID PRN    albuterol (PROVENTIL) (2.5 MG/3ML) 0.083% nebulizer solution 2.5 mg  2.5 mg Nebulization Q6H PRN    arformoterol 15 mcg-budesonide 0.5 mg neb solution   Nebulization BID RT    alogliptin (NESINA) tablet 12.5 mg  12.5 mg Oral BID WC    And    metFORMIN (GLUCOPHAGE) tablet 500 mg  500 mg Oral BID WC    venlafaxine (EFFEXOR XR) extended release capsule 150 mg  150 mg Oral Daily with breakfast    acetaminophen (TYLENOL) tablet 650 mg  650 mg Oral Q4H PRN    
INTERVAL Hx: (Records and clinical information from the weekend were reviewed)    CC: \"I'm OK\"    States she's been feeling a little better overall, and that she's slowly realizing that. Still anxious that her improvement may not last, but she's pleased that she' not having the severe symptoms she was having previously. She still reports feeling some dysphoria and some anxiety, but overall she's pleased with the current med regimen and course of Tx. She's no longer experiencing any delirium, and that's helped her mood as well. She hasn't had any of the manic-like episodes in almost a week. She still believes she would decompensate if she were to return home at this point, given her sensitivity to being alone, and her perception that she's not stable or fully functional. No SE's or issues with the addition of  the Wellbutrin XL, or with the Effexor XR. Slept 6.5 hours last night. POC Gluc: 85--118 over the past 3 days.    MEDS:  Current Facility-Administered Medications   Medication Dose Route Frequency    buPROPion (WELLBUTRIN XL) extended release tablet 150 mg  150 mg Oral Daily    traZODone (DESYREL) tablet 50 mg  50 mg Oral Nightly PRN    Benzocaine-Menthol (CEPACOL) 1 lozenge  1 lozenge Oral Q2H PRN    atorvastatin (LIPITOR) tablet 80 mg  80 mg Oral Daily    clopidogrel (PLAVIX) tablet 75 mg  75 mg Oral Daily    letrozole (FEMARA) tablet 2.5 mg (patient supplied) (Patient Supplied)  2.5 mg Oral Daily    cetirizine (ZYRTEC) tablet 10 mg  10 mg Oral Daily    lisinopril (PRINIVIL;ZESTRIL) tablet 20 mg  20 mg Oral BID    metoprolol succinate (TOPROL XL) extended release tablet 50 mg  50 mg Oral Daily    hydrOXYzine pamoate (VISTARIL) capsule 25 mg  25 mg Oral TID PRN    albuterol (PROVENTIL) (2.5 MG/3ML) 0.083% nebulizer solution 2.5 mg  2.5 mg Nebulization Q6H PRN    arformoterol 15 mcg-budesonide 0.5 mg neb solution   Nebulization BID RT    alogliptin (NESINA) tablet 12.5 mg  12.5 mg Oral BID WC    And    
INTERVAL Hx: (Records and clinical information from the weekend were reviewed)    CC: \"Things are fine but I had some diarrhea\"    Ms. Hall is somewhat improved today. She has again complained of diarrhea and in the past Metamucil has helped her with this. Pleasant and calm during the interview. Denies any SI or plan today. Her diarrhea recurred twice today and is concerned about this. Her sleep is \"better\" and she woke up several times due to having to go to the diarrhea. She declines to consider a change to her sleep medications. Feels she is somewhat depressed and is \"25%\" better. Denies any SI or plan though. Remains motivated to continue in the treatment plan and transition to SOP.        MEDS:  Current Facility-Administered Medications   Medication Dose Route Frequency    psyllium husk-aspartame (METAMUCIL FIBER) packet 1 packet  1 packet Oral BID    buPROPion (WELLBUTRIN XL) extended release tablet 150 mg  150 mg Oral Daily    traZODone (DESYREL) tablet 50 mg  50 mg Oral Nightly PRN    Benzocaine-Menthol (CEPACOL) 1 lozenge  1 lozenge Oral Q2H PRN    atorvastatin (LIPITOR) tablet 80 mg  80 mg Oral Daily    clopidogrel (PLAVIX) tablet 75 mg  75 mg Oral Daily    letrozole (FEMARA) tablet 2.5 mg (patient supplied) (Patient Supplied)  2.5 mg Oral Daily    cetirizine (ZYRTEC) tablet 10 mg  10 mg Oral Daily    lisinopril (PRINIVIL;ZESTRIL) tablet 20 mg  20 mg Oral BID    metoprolol succinate (TOPROL XL) extended release tablet 50 mg  50 mg Oral Daily    hydrOXYzine pamoate (VISTARIL) capsule 25 mg  25 mg Oral TID PRN    albuterol (PROVENTIL) (2.5 MG/3ML) 0.083% nebulizer solution 2.5 mg  2.5 mg Nebulization Q6H PRN    arformoterol 15 mcg-budesonide 0.5 mg neb solution   Nebulization BID RT    alogliptin (NESINA) tablet 12.5 mg  12.5 mg Oral BID WC    And    metFORMIN (GLUCOPHAGE) tablet 500 mg  500 mg Oral BID WC    venlafaxine (EFFEXOR XR) extended release capsule 150 mg  150 mg Oral Daily with breakfast    
INTERVAL Hx: (Records and clinical information from the weekend were reviewed)    CC: \"not good (softly)\"    States she's not feeling well, despite having no pain or particular physical discomfort. She's more flat this AM, but she initially says she's not depressed. However, her N/V functioning has been poor and she's more anhedonic as well, so I feel most of her discomfort is from the MDD. Tolerating the Abilify without any akathisia, sedation, or other EPSE's noted, and she denies having any subjective SE's. I continued to discuss her pattern of becoming more and more depressed if/when she doesn't have enough socialization, but she doesn't want to consider an MIS at this point. I also discussed the need to restart in our SOP program after D/C, which she wants to do, but she's worried she'll lose her Aides. Slept only 6 hours last night--sleep is still a little erratic.     MEDS:  Current Facility-Administered Medications   Medication Dose Route Frequency    loperamide (IMODIUM) capsule 2 mg  2 mg Oral Q4H PRN    traZODone (DESYREL) tablet 50 mg  50 mg Oral Nightly PRN    Benzocaine-Menthol (CEPACOL) 1 lozenge  1 lozenge Oral Q2H PRN    atorvastatin (LIPITOR) tablet 80 mg  80 mg Oral Daily    clopidogrel (PLAVIX) tablet 75 mg  75 mg Oral Daily    letrozole (FEMARA) tablet 2.5 mg (patient supplied) (Patient Supplied)  2.5 mg Oral Daily    cetirizine (ZYRTEC) tablet 10 mg  10 mg Oral Daily    lisinopril (PRINIVIL;ZESTRIL) tablet 20 mg  20 mg Oral BID    metoprolol succinate (TOPROL XL) extended release tablet 50 mg  50 mg Oral Daily    hydrOXYzine pamoate (VISTARIL) capsule 25 mg  25 mg Oral TID PRN    albuterol (PROVENTIL) (2.5 MG/3ML) 0.083% nebulizer solution 2.5 mg  2.5 mg Nebulization Q6H PRN    arformoterol 15 mcg-budesonide 0.5 mg neb solution   Nebulization BID RT    alogliptin (NESINA) tablet 12.5 mg  12.5 mg Oral BID WC    And    metFORMIN (GLUCOPHAGE) tablet 500 mg  500 mg Oral BID WC    venlafaxine 
INTERVAL Hx: (Records and clinical information from the weekend were reviewed)    CC: \"not good\"    She appears to no longer be delirious, with the hypomanic symptoms and VH's she'd been having. Symptoms seemed to clear roughly 1-2 days ago. However, she's back to being very depressed, anergic, withdrawn, and emotionally flat. Barely speaks to me and kept her eyes closed most of the time. States she's feeling very dysphoric and almost hopeless, and she seems to have very little memory of her recent condition, and the cognitive and behavioral changes she had. Tried to discussed her clinical course and the Tx options, but she was barely responsive. I will introduce Wellbutrin XL at 150 mg to augment the Effexor XR, as she's clearly very depressed still. C+S was unremarkable with only some mild, mixed cheng. She's in no acute distress and her VS are stable. Slept 2 hours last night.     MEDS:  Current Facility-Administered Medications   Medication Dose Route Frequency    buPROPion (WELLBUTRIN XL) extended release tablet 150 mg  150 mg Oral Daily    cephALEXin (KEFLEX) capsule 500 mg  500 mg Oral TID    traZODone (DESYREL) tablet 50 mg  50 mg Oral Nightly PRN    Benzocaine-Menthol (CEPACOL) 1 lozenge  1 lozenge Oral Q2H PRN    atorvastatin (LIPITOR) tablet 80 mg  80 mg Oral Daily    clopidogrel (PLAVIX) tablet 75 mg  75 mg Oral Daily    letrozole (FEMARA) tablet 2.5 mg (patient supplied) (Patient Supplied)  2.5 mg Oral Daily    cetirizine (ZYRTEC) tablet 10 mg  10 mg Oral Daily    lisinopril (PRINIVIL;ZESTRIL) tablet 20 mg  20 mg Oral BID    metoprolol succinate (TOPROL XL) extended release tablet 50 mg  50 mg Oral Daily    hydrOXYzine pamoate (VISTARIL) capsule 25 mg  25 mg Oral TID PRN    albuterol (PROVENTIL) (2.5 MG/3ML) 0.083% nebulizer solution 2.5 mg  2.5 mg Nebulization Q6H PRN    arformoterol 15 mcg-budesonide 0.5 mg neb solution   Nebulization BID RT    alogliptin (NESINA) tablet 12.5 mg  12.5 mg Oral BID WC 
INTERVAL Hx: (Records and clinical information from the weekend were reviewed)    CC: \"not too good\"    States she's not feeling well today, some of it due to GI issues (cramping and diarrhea). However, she's also very anxious about D/C and whether she'll handle being home alone well or not. I continued to discuss D/C issues with her, and the need to transition out of the hospital, but her coping skills are minimal and she would be at high risk to quickly decompensate and end up back in the hospital at this point. She recognizes that her mood has improved somewhat, and that she needs to get back home, but her anxiety about it all is quite strong, and historically she's not remained stable when she feels like this and tries to manage being alone, etc. We discussed a number of issues at length, including my recommendation to restart in the SOP program ASAP, which she's reluctant to do until she sees how she does at home, first. No further delirium, or any of the manic-like episodes she was having previously. No SE's or issues with the addition of  the Wellbutrin XL, or with the Effexor XR. Slept 8 hours last night. POC Gluc: 91--107 over the past 3 days.    MEDS:  Current Facility-Administered Medications   Medication Dose Route Frequency    loperamide (IMODIUM) capsule 2 mg  2 mg Oral 4x Daily PRN    traZODone (DESYREL) tablet 50 mg  50 mg Oral Nightly    psyllium husk-aspartame (METAMUCIL FIBER) packet 1 packet  1 packet Oral BID    buPROPion (WELLBUTRIN XL) extended release tablet 150 mg  150 mg Oral Daily    Benzocaine-Menthol (CEPACOL) 1 lozenge  1 lozenge Oral Q2H PRN    atorvastatin (LIPITOR) tablet 80 mg  80 mg Oral Daily    clopidogrel (PLAVIX) tablet 75 mg  75 mg Oral Daily    letrozole (FEMARA) tablet 2.5 mg (patient supplied) (Patient Supplied)  2.5 mg Oral Daily    cetirizine (ZYRTEC) tablet 10 mg  10 mg Oral Daily    lisinopril (PRINIVIL;ZESTRIL) tablet 20 mg  20 mg Oral BID    metoprolol succinate 
INTERVAL Hx: (Records and clinical information were reviewed)    CC: \"I don't feel too good\"    States she's not feeling well, but can't identify whether it's more emotional or more physical. She presents as being more dysphoric and emotionally flat, c/w her usual state of significant depression. On the positive side, she's no longer experiencing any delirium, and she hasn't had any of the manic-like episodes in almost 2 days. We need to continue to closely monitor her over the next several days to hopefully see that she's stabilized affectively, but she also needs to be less depressed to be D/C'ed. She would undoubtedly decompensate if she were to return home at this point, given her sensitivity to being alone, and her perception that she's not stable or fully functional. No SE's or issues with the addition of  the Wellbutrin XL, or with the Effexor XR. Slept 7.75 hours last night, but she feels she didn't sleep very well at all. POC Gluc: 113 this AM.    MEDS:  Current Facility-Administered Medications   Medication Dose Route Frequency    buPROPion (WELLBUTRIN XL) extended release tablet 150 mg  150 mg Oral Daily    traZODone (DESYREL) tablet 50 mg  50 mg Oral Nightly PRN    Benzocaine-Menthol (CEPACOL) 1 lozenge  1 lozenge Oral Q2H PRN    atorvastatin (LIPITOR) tablet 80 mg  80 mg Oral Daily    clopidogrel (PLAVIX) tablet 75 mg  75 mg Oral Daily    letrozole (FEMARA) tablet 2.5 mg (patient supplied) (Patient Supplied)  2.5 mg Oral Daily    cetirizine (ZYRTEC) tablet 10 mg  10 mg Oral Daily    lisinopril (PRINIVIL;ZESTRIL) tablet 20 mg  20 mg Oral BID    metoprolol succinate (TOPROL XL) extended release tablet 50 mg  50 mg Oral Daily    hydrOXYzine pamoate (VISTARIL) capsule 25 mg  25 mg Oral TID PRN    albuterol (PROVENTIL) (2.5 MG/3ML) 0.083% nebulizer solution 2.5 mg  2.5 mg Nebulization Q6H PRN    arformoterol 15 mcg-budesonide 0.5 mg neb solution   Nebulization BID RT    alogliptin (NESINA) tablet 12.5 mg  
INTERVAL Hx: (Records and clinical information were reviewed)    CC: \"I'm OK (softly)\"    States she's still not feeling very well, but she can't identify any specific symptoms other than feeling flat/dysphoric emotionally. She remains very tired and slowed motorically, and her daughter spoke with me yesterday to say the meds must be causing this tiredness and lack of functioning because she wasn't that bad PTA. I changed the Abilify to Rexulti this AM, and we'll need to monitor her closely for SE's, etc. Her N/V functioning has been poor and she's more anhedonic as well, so I still feel that most of her symptoms are from the MDD. Tolerating the meds otherwise, without any akathisia, or other EPSE's noted. She doesn't want to consider an intermediate at this point, and she's also having trouble committing to going back to the SOP program for fear that she'll somehow mess up the arrangement with her Aides.  Slept only 4.75 hours last night, but she sleeps a lot during the day, including now.     MEDS:  Current Facility-Administered Medications   Medication Dose Route Frequency    brexpiprazole (REXULTI) tablet 0.5 mg  0.5 mg Oral Daily    loperamide (IMODIUM) capsule 2 mg  2 mg Oral Q4H PRN    traZODone (DESYREL) tablet 50 mg  50 mg Oral Nightly PRN    Benzocaine-Menthol (CEPACOL) 1 lozenge  1 lozenge Oral Q2H PRN    atorvastatin (LIPITOR) tablet 80 mg  80 mg Oral Daily    clopidogrel (PLAVIX) tablet 75 mg  75 mg Oral Daily    letrozole (FEMARA) tablet 2.5 mg (patient supplied) (Patient Supplied)  2.5 mg Oral Daily    cetirizine (ZYRTEC) tablet 10 mg  10 mg Oral Daily    lisinopril (PRINIVIL;ZESTRIL) tablet 20 mg  20 mg Oral BID    metoprolol succinate (TOPROL XL) extended release tablet 50 mg  50 mg Oral Daily    hydrOXYzine pamoate (VISTARIL) capsule 25 mg  25 mg Oral TID PRN    albuterol (PROVENTIL) (2.5 MG/3ML) 0.083% nebulizer solution 2.5 mg  2.5 mg Nebulization Q6H PRN    arformoterol 15 mcg-budesonide 0.5 mg neb 
INTERVAL Hx: (Records and clinical information were reviewed)    CC: \"I'm OK\"    States she's feeling a little better this AM, and objectively she appears to be brighter and slightly more animated. However, she's still depressed and still exhibiting a lot of negative, pessimistic thoughts. Still appears to be slowed motorically compared to her baseline, and she still reports a limited appetite, very little motivation, and she still appears to be anhedonic. Tolerating the Rexulti so far--no sedation or EPSE's noted. Discussed the Tx plan and also our F/U recommendation to return to the SOP program. She seems to be in agreement with that, but still worries it'll mess up her in-home Aide coverage.  Slept better--8.5  hours last night.     MEDS:  Current Facility-Administered Medications   Medication Dose Route Frequency    brexpiprazole (REXULTI) tablet 0.5 mg  0.5 mg Oral Daily    loperamide (IMODIUM) capsule 2 mg  2 mg Oral Q4H PRN    traZODone (DESYREL) tablet 50 mg  50 mg Oral Nightly PRN    Benzocaine-Menthol (CEPACOL) 1 lozenge  1 lozenge Oral Q2H PRN    atorvastatin (LIPITOR) tablet 80 mg  80 mg Oral Daily    clopidogrel (PLAVIX) tablet 75 mg  75 mg Oral Daily    letrozole (FEMARA) tablet 2.5 mg (patient supplied) (Patient Supplied)  2.5 mg Oral Daily    cetirizine (ZYRTEC) tablet 10 mg  10 mg Oral Daily    lisinopril (PRINIVIL;ZESTRIL) tablet 20 mg  20 mg Oral BID    metoprolol succinate (TOPROL XL) extended release tablet 50 mg  50 mg Oral Daily    hydrOXYzine pamoate (VISTARIL) capsule 25 mg  25 mg Oral TID PRN    albuterol (PROVENTIL) (2.5 MG/3ML) 0.083% nebulizer solution 2.5 mg  2.5 mg Nebulization Q6H PRN    arformoterol 15 mcg-budesonide 0.5 mg neb solution   Nebulization BID RT    alogliptin (NESINA) tablet 12.5 mg  12.5 mg Oral BID WC    And    metFORMIN (GLUCOPHAGE) tablet 500 mg  500 mg Oral BID WC    venlafaxine (EFFEXOR XR) extended release capsule 150 mg  150 mg Oral Daily with breakfast    
INTERVAL Hx: (Records and clinical information were reviewed)    CC: \"I'm alright\"    States she didn't sleep well last night so she's more tired and emotionally flat this AM, but she also states that her mood has been \"pretty good\" much of the time. She also recognizes that her sleep has been erratic for a long time now, so she's not distressed about it at this point. Further discussed D/C plans for Thursday, and she states she feels OK with that. Still wants to get home first and see how she's doing before committing to going back to the IOP program. However, her Aide agency had cancelled her service after she fired the last Aide for stealing, so they'd need to reopen her case, etc. I continued to discuss D/C issues with her, but her coping skills are minimal and she would be at high risk to quickly decompensate and end up back in the hospital if \"forced\" out before she feels ready. She recognizes that her mood has improved somewhat, and that she needs to get back home, but her anxiety about it all is quite strong, and historically she's not remained stable when she feels like this and tries to manage being alone, etc. No further delirium, or any of the manic-like episodes she was having previously. No SE's or issues with the addition of  the Wellbutrin XL, or with the Effexor XR. Slept 6.5 hours last night. POC Gluc: 123 this AM.    MEDS:  Current Facility-Administered Medications   Medication Dose Route Frequency    loperamide (IMODIUM) capsule 2 mg  2 mg Oral 4x Daily PRN    traZODone (DESYREL) tablet 50 mg  50 mg Oral Nightly    psyllium husk-aspartame (METAMUCIL FIBER) packet 1 packet  1 packet Oral BID    buPROPion (WELLBUTRIN XL) extended release tablet 150 mg  150 mg Oral Daily    Benzocaine-Menthol (CEPACOL) 1 lozenge  1 lozenge Oral Q2H PRN    atorvastatin (LIPITOR) tablet 80 mg  80 mg Oral Daily    clopidogrel (PLAVIX) tablet 75 mg  75 mg Oral Daily    letrozole (FEMARA) tablet 2.5 mg (patient supplied) 
INTERVAL Hx: (Records and clinical information were reviewed)    CC: \"I'm doing pretty good\"    States she's feeling a little better today, although still fairly depressed. She continues to indicate that she's not doing as well as she should be, but she can't clarify any specific symptoms or reasons for it. She recognizes that she's actually improving slowly, and that she's better than a week or two ago. However, she's also still moderately dysphoric and emotionally flat, c/w her prior bouts of significant depression in the past. On the positive side, she's no longer experiencing any delirium, and that's helped her mood as well. She hasn't had any of the manic-like episodes in almost 3 days. She still believes she would undoubtedly decompensate if she were to return home at this point, given her sensitivity to being alone, and her perception that she's not stable or fully functional. No SE's or issues with the addition of  the Wellbutrin XL, or with the Effexor XR. Slept 3.5 hours last night, but she feels she slept well. POC Gluc: 100 this AM.    MEDS:  Current Facility-Administered Medications   Medication Dose Route Frequency    buPROPion (WELLBUTRIN XL) extended release tablet 150 mg  150 mg Oral Daily    traZODone (DESYREL) tablet 50 mg  50 mg Oral Nightly PRN    Benzocaine-Menthol (CEPACOL) 1 lozenge  1 lozenge Oral Q2H PRN    atorvastatin (LIPITOR) tablet 80 mg  80 mg Oral Daily    clopidogrel (PLAVIX) tablet 75 mg  75 mg Oral Daily    letrozole (FEMARA) tablet 2.5 mg (patient supplied) (Patient Supplied)  2.5 mg Oral Daily    cetirizine (ZYRTEC) tablet 10 mg  10 mg Oral Daily    lisinopril (PRINIVIL;ZESTRIL) tablet 20 mg  20 mg Oral BID    metoprolol succinate (TOPROL XL) extended release tablet 50 mg  50 mg Oral Daily    hydrOXYzine pamoate (VISTARIL) capsule 25 mg  25 mg Oral TID PRN    albuterol (PROVENTIL) (2.5 MG/3ML) 0.083% nebulizer solution 2.5 mg  2.5 mg Nebulization Q6H PRN    arformoterol 15 
INTERVAL Hx: (Records and clinical information were reviewed)    CC: \"I'm feeling better\"    States she's feeling much less depressed now, and that her mood quickly improved yesterday once she was able to start socializing. We discussed this pattern further and she recognizes that she \"loves being around people\", but she doesn't seem to apply that knowledge when she's on her own. She reports still not feeling fully euthymic yet, and she didn't sleep well at all last night. Tolerating the addition of Abilify again as well as the Effexor XR. I will increase the Trazodone to 50 mg PRN and continue the current med regimen. Discussed transitioning back to the SOP program, but she's a little ambivalent due to the Aides she has coming to the house 5 days a week--not sure how to work around that. Slept only 1 hour last night.     MEDS:  Current Facility-Administered Medications   Medication Dose Route Frequency    traZODone (DESYREL) tablet 50 mg  50 mg Oral Nightly PRN    atorvastatin (LIPITOR) tablet 80 mg  80 mg Oral Daily    clopidogrel (PLAVIX) tablet 75 mg  75 mg Oral Daily    letrozole (FEMARA) tablet 2.5 mg (patient supplied) (Patient Supplied)  2.5 mg Oral Daily    cetirizine (ZYRTEC) tablet 10 mg  10 mg Oral Daily    lisinopril (PRINIVIL;ZESTRIL) tablet 20 mg  20 mg Oral BID    metoprolol succinate (TOPROL XL) extended release tablet 50 mg  50 mg Oral Daily    hydrOXYzine pamoate (VISTARIL) capsule 25 mg  25 mg Oral TID PRN    albuterol (PROVENTIL) (2.5 MG/3ML) 0.083% nebulizer solution 2.5 mg  2.5 mg Nebulization Q6H PRN    arformoterol 15 mcg-budesonide 0.5 mg neb solution   Nebulization BID RT    alogliptin (NESINA) tablet 12.5 mg  12.5 mg Oral BID WC    And    metFORMIN (GLUCOPHAGE) tablet 500 mg  500 mg Oral BID WC    venlafaxine (EFFEXOR XR) extended release capsule 150 mg  150 mg Oral Daily with breakfast    ARIPiprazole (ABILIFY) tablet 2 mg  2 mg Oral Daily    acetaminophen (TYLENOL) tablet 650 mg  650 mg 
INTERVAL Hx: (Records and clinical information were reviewed)    CC: \"OK\"    She continues to have an altered mental status, mostly c/w delirium but the possibility of there being some hypomania also exists. She's currently very lethargic and difficult to arouse, but she didn't sleep much at all again last night. She's possibly still having some VH's, but they aren't as strong as the night before. However, her behavior has been out of character for her--occasionally swearing or rude, not orderly or neat, etc. Will need to see if the AMS I'm,proves being on the Keflex, but I'll also stop the Rexulti as well as the Imodium in case she's highly sensitive to that. Her level of confusion/clarity still seems to fluctuate which is more c/w delirium. She's in no acute distress and her VS are stable. Otherwise, her mood seems to be better at times--not overly depressed, but can be irritable. Slept 2 hours last night.     12/14/24-patient seen for follow-up with the treatment team.  It appears her confusion is clearing especially this afternoon.  Patient's nurse stated she was somewhat confused this morning but she is much better this afternoon.  Reports that she did not sleep well last night.  Stated she feels less anxious today.  Depression is slowly improving.  She has been engaging in the milieu, has been playing cards and participating in groups.  No hallucination or paranoia at this time.  Does not appear to be responding to internal stimuli at this time.  Denies SI or HI.  Accepting medications with no issues.  No behavioral issues.    12/15/24-seen for follow-up with the treatment team.  Isolative to her room today.  Presented with restricted affect.  States that she feels more depressed today.  Lacking motivation and energy.  Low appetite.  States that she slept good last night, nursing staff reported 6 hours sleep.  She is alert and oriented x 4.  Confusion seems to be cleared at this time.  Denies hallucinations or 
INTERVAL Hx: (Records and clinical information were reviewed)    CC: \"OK\"    She continues to have an altered mental status, mostly c/w delirium but the possibility of there being some hypomania also exists. She's currently very lethargic and difficult to arouse, but she didn't sleep much at all again last night. She's possibly still having some VH's, but they aren't as strong as the night before. However, her behavior has been out of character for her--occasionally swearing or rude, not orderly or neat, etc. Will need to see if the AMS I'm,proves being on the Keflex, but I'll also stop the Rexulti as well as the Imodium in case she's highly sensitive to that. Her level of confusion/clarity still seems to fluctuate which is more c/w delirium. She's in no acute distress and her VS are stable. Otherwise, her mood seems to be better at times--not overly depressed, but can be irritable. Slept 2 hours last night.     12/14/24-patient seen for follow-up with the treatment team.  It appears her confusion is clearing especially this afternoon.  Patient's nurse stated she was somewhat confused this morning but she is much better this afternoon.  Reports that she did not sleep well last night.  Stated she feels less anxious today.  Depression is slowly improving.  She has been engaging in the milieu, has been playing cards and participating in groups.  No hallucination or paranoia at this time.  Does not appear to be responding to internal stimuli at this time.  Denies SI or HI.  Accepting medications with no issues.  No behavioral issues.    MEDS:  Current Facility-Administered Medications   Medication Dose Route Frequency    cephALEXin (KEFLEX) capsule 500 mg  500 mg Oral TID    traZODone (DESYREL) tablet 50 mg  50 mg Oral Nightly PRN    Benzocaine-Menthol (CEPACOL) 1 lozenge  1 lozenge Oral Q2H PRN    atorvastatin (LIPITOR) tablet 80 mg  80 mg Oral Daily    clopidogrel (PLAVIX) tablet 75 mg  75 mg Oral Daily    letrozole 
INTERVAL Hx: (Records and clinical information were reviewed)    CC: \"\"I'm good\"    States she's still feeling good, but she's not as hypomanic this AM, likely from only sleeping 4 hours last night and still being tired. She continued to have some mild mood lability yesterday, and today she still reports that her mood is better and not depressed. Will need to closely monitor over the next several days to hopefully see that she's stabilized, affectively. No evidence of any delirium now, but her mood instability has created a confusing clinical picture. Discussed her clinical status and the Tx plan, and she agrees with waiting to see how the next few days go, to make sure her she's consistently stable. She's not had sudden mood swings like this in the past, and we've not seen evidence of it over the many years of intensive outpatient Tx. No SE's or issues with the addition of  the Wellbutrin XL, or with the Effexor XR. Slept 4 hours last night, but she's still sleeping this AM. Gluc: 97 this AM.    MEDS:  Current Facility-Administered Medications   Medication Dose Route Frequency    buPROPion (WELLBUTRIN XL) extended release tablet 150 mg  150 mg Oral Daily    traZODone (DESYREL) tablet 50 mg  50 mg Oral Nightly PRN    Benzocaine-Menthol (CEPACOL) 1 lozenge  1 lozenge Oral Q2H PRN    atorvastatin (LIPITOR) tablet 80 mg  80 mg Oral Daily    clopidogrel (PLAVIX) tablet 75 mg  75 mg Oral Daily    letrozole (FEMARA) tablet 2.5 mg (patient supplied) (Patient Supplied)  2.5 mg Oral Daily    cetirizine (ZYRTEC) tablet 10 mg  10 mg Oral Daily    lisinopril (PRINIVIL;ZESTRIL) tablet 20 mg  20 mg Oral BID    metoprolol succinate (TOPROL XL) extended release tablet 50 mg  50 mg Oral Daily    hydrOXYzine pamoate (VISTARIL) capsule 25 mg  25 mg Oral TID PRN    albuterol (PROVENTIL) (2.5 MG/3ML) 0.083% nebulizer solution 2.5 mg  2.5 mg Nebulization Q6H PRN    arformoterol 15 mcg-budesonide 0.5 mg neb solution   Nebulization BID RT 
INTERVAL Hx: (Records and clinical information were reviewed)    CC: \"much better\"    She presents as much brighter today, and she didn't sleep hardly at all last night. She's not presenting as hypomanic, but her mood has definitely switched suddenly, but not due to any delirium like before. She's cognitively clearer and isn't having any AMS-type issues. Discussed her clinical course and the Tx plans, and she agrees with waiting to see how the next few days go, to make sure her current condition persists. She also denies that she's had sudden mood swings like this in the past, and we've not seen evidence of it over the many years of intensive outpatient Tx. No SE's or issues with the addition of  the Wellbutrin XL, or with the Effexor XR. Slept 0 hours last night.     MEDS:  Current Facility-Administered Medications   Medication Dose Route Frequency    buPROPion (WELLBUTRIN XL) extended release tablet 150 mg  150 mg Oral Daily    traZODone (DESYREL) tablet 50 mg  50 mg Oral Nightly PRN    Benzocaine-Menthol (CEPACOL) 1 lozenge  1 lozenge Oral Q2H PRN    atorvastatin (LIPITOR) tablet 80 mg  80 mg Oral Daily    clopidogrel (PLAVIX) tablet 75 mg  75 mg Oral Daily    letrozole (FEMARA) tablet 2.5 mg (patient supplied) (Patient Supplied)  2.5 mg Oral Daily    cetirizine (ZYRTEC) tablet 10 mg  10 mg Oral Daily    lisinopril (PRINIVIL;ZESTRIL) tablet 20 mg  20 mg Oral BID    metoprolol succinate (TOPROL XL) extended release tablet 50 mg  50 mg Oral Daily    hydrOXYzine pamoate (VISTARIL) capsule 25 mg  25 mg Oral TID PRN    albuterol (PROVENTIL) (2.5 MG/3ML) 0.083% nebulizer solution 2.5 mg  2.5 mg Nebulization Q6H PRN    arformoterol 15 mcg-budesonide 0.5 mg neb solution   Nebulization BID RT    alogliptin (NESINA) tablet 12.5 mg  12.5 mg Oral BID WC    And    metFORMIN (GLUCOPHAGE) tablet 500 mg  500 mg Oral BID WC    venlafaxine (EFFEXOR XR) extended release capsule 150 mg  150 mg Oral Daily with breakfast    
INTERVAL Hx: (Records and clinical information were reviewed)    CC: \"pretty good\"    States she's feeling about the same today, and still much less dysphoric compared to PTA. She's still having erratic sleep, but seems to be getting enough in total. Tolerating the Abilify without any akathisia, sedation, or other EPSE's. I continued to discuss her pattern of becoming more and more depressed if/when she doesn't have enough socialization. However, she still doesn't want to transition into an MIS, and she hopes to get better Aide(s) in to help her. I also discussed the need to restart in our SOP program after D/C, which she wants to do (but is worried she'll lose her Aides). Slept only 4 hours last night, but she napped for several hours during the day.     MEDS:  Current Facility-Administered Medications   Medication Dose Route Frequency    influenza A&B vaccine (FLUAD) injection 0.5 mL  1 Dose IntraMUSCular Prior to discharge    traZODone (DESYREL) tablet 50 mg  50 mg Oral Nightly PRN    Benzocaine-Menthol (CEPACOL) 1 lozenge  1 lozenge Oral Q2H PRN    atorvastatin (LIPITOR) tablet 80 mg  80 mg Oral Daily    clopidogrel (PLAVIX) tablet 75 mg  75 mg Oral Daily    letrozole (FEMARA) tablet 2.5 mg (patient supplied) (Patient Supplied)  2.5 mg Oral Daily    cetirizine (ZYRTEC) tablet 10 mg  10 mg Oral Daily    lisinopril (PRINIVIL;ZESTRIL) tablet 20 mg  20 mg Oral BID    metoprolol succinate (TOPROL XL) extended release tablet 50 mg  50 mg Oral Daily    hydrOXYzine pamoate (VISTARIL) capsule 25 mg  25 mg Oral TID PRN    albuterol (PROVENTIL) (2.5 MG/3ML) 0.083% nebulizer solution 2.5 mg  2.5 mg Nebulization Q6H PRN    arformoterol 15 mcg-budesonide 0.5 mg neb solution   Nebulization BID RT    alogliptin (NESINA) tablet 12.5 mg  12.5 mg Oral BID WC    And    metFORMIN (GLUCOPHAGE) tablet 500 mg  500 mg Oral BID WC    venlafaxine (EFFEXOR XR) extended release capsule 150 mg  150 mg Oral Daily with breakfast    
INTERVAL Hx: (Records and clinical information were reviewed)    CC: \"there was a man here\"    Appears to have developed some delirium starting last night. She's been more confused and slightly disoriented at times, and she had some VH's of a man in her \"closet\" that she still believes was real. She didn't sleep at all, and she's been slightly more restless as well, and her level of confusion/clarity seems to fluctuate, all of which is c/w delirium. She's in no acute distress, and the likely cause is a UTI--+ dysuria and frequency, and her UA is mild to moderately suggestive of a UTI (increased WBC's and mod Leuk Esterase). Will start Keflex and get the Hospitalist's input as well. Otherwise, her mood seemed to be slightly better yesterday, and she appears to be tolerating the Rexulti easily so far--no sedation or EPSE's noted. Discussed the Tx plan and also our F/U recommendation to return to the SOP program. Slept 0 hours last night.     MEDS:  Current Facility-Administered Medications   Medication Dose Route Frequency    cephALEXin (KEFLEX) capsule 500 mg  500 mg Oral TID    brexpiprazole (REXULTI) tablet 0.5 mg  0.5 mg Oral Daily    loperamide (IMODIUM) capsule 2 mg  2 mg Oral Q4H PRN    traZODone (DESYREL) tablet 50 mg  50 mg Oral Nightly PRN    Benzocaine-Menthol (CEPACOL) 1 lozenge  1 lozenge Oral Q2H PRN    atorvastatin (LIPITOR) tablet 80 mg  80 mg Oral Daily    clopidogrel (PLAVIX) tablet 75 mg  75 mg Oral Daily    letrozole (FEMARA) tablet 2.5 mg (patient supplied) (Patient Supplied)  2.5 mg Oral Daily    cetirizine (ZYRTEC) tablet 10 mg  10 mg Oral Daily    lisinopril (PRINIVIL;ZESTRIL) tablet 20 mg  20 mg Oral BID    metoprolol succinate (TOPROL XL) extended release tablet 50 mg  50 mg Oral Daily    hydrOXYzine pamoate (VISTARIL) capsule 25 mg  25 mg Oral TID PRN    albuterol (PROVENTIL) (2.5 MG/3ML) 0.083% nebulizer solution 2.5 mg  2.5 mg Nebulization Q6H PRN    arformoterol 15 mcg-budesonide 0.5 mg 
Spiritual Health History and Assessment/Progress Note  [facility]    Follow-up,  ,  , Follow up     Name: Dannielle Hall MRN: [id number]    Age: 89 y.o.     Sex: female   Language: English   Faith: Evangelical   Severe recurrent major depression without psychotic features (HCC)     Date: 12/20/2024            Total Time Calculated: 58 min              Spiritual Assessment continued in [facility] BE[facility]AVIORAL HEALTH        Referral/Consult From: Rounding   Encounter Overview/Reason: Follow-up  Service Provided For: Patient    Zuri, Belief, Meaning:   Patient identifies as spiritual, is connected with a zuri tradition or spiritual practice, and has beliefs or practices that help with coping during difficult times  Family/Friends No family/friends present      Importance and Influence:  Patient has spiritual/personal beliefs that influence decisions regarding their health  Family/Friends No family/friends present    Community:  Patient is connected with a spiritual community and feels well-supported. Support system includes: Children, Zuri Community, and Friends  Family/Friends No family/friends present    Assessment and Plan of Care:     Patient Interventions include: Facilitated expression of thoughts and feelings, Affirmed coping skills/support systems, and Provided sacramental/Baptist ritual  Family/Friends Interventions include: No family/friends present    Patient Plan of Care: Spiritual Care available upon further referral  Family/Friends Plan of Care: No family/friends present    Electronically signed by Chaplain Mary on 12/20/2024 at 2:08 PM  
Spiritual Health History and Assessment/Progress Note  [facility]    Follow-up, Behavioral Health, Loneliness/Social Isolation,  ,  , Follow up     Name: Dannielle Hall MRN: [id number]    Age: 89 y.o.     Sex: female   Language: English   Sikh: Hinduism   Major depressive disorder, recurrent episode, moderate (HCC)     Date: 12/7/2024            Total Time Calculated: 55 min              Spiritual Assessment continued in [facility] BE[facility]AVIORAL HE[facility]        Referral/Consult From: Rounding   Encounter Overview/Reason: Follow-up, Behavioral Health, Loneliness/Social Isolation  Service Provided For: Patient    Zuri, Belief, Meaning:   Patient identifies as spiritual, is connected with a zuri tradition or spiritual practice, and has beliefs or practices that help with coping during difficult times  Family/Friends No family/friends present      Importance and Influence:  Patient has spiritual/personal beliefs that influence decisions regarding their health  Family/Friends No family/friends present    Community:  Patient is connected with a spiritual community and feels well-supported. Support system includes: Children, Zuri Community, and Friends  Family/Friends No family/friends present    Assessment and Plan of Care:     Patient Interventions include: Facilitated expression of thoughts and feelings and Provided sacramental/Christian ritual  Family/Friends Interventions include: No family/friends present    Patient Plan of Care: Spiritual Care available upon further referral  Family/Friends Plan of Care: No family/friends present    Electronically signed by Chaplain Mary on 12/7/2024 at 11:50 AM  
Spiritual Health History and Assessment/Progress Note  [facility]    Initial Encounter, Loneliness/Social Isolation,  ,  , Initial Encounter    Name: Dannielle Hall MRN: [id number]    Age: 89 y.o.     Sex: female   Language: English   Latter day: Cheondoism   Major depressive disorder, recurrent episode, moderate (HCC)     Date: 12/5/2024            Total Time Calculated: 65 min              Spiritual Assessment began in [facility]        Referral/Consult From: Rounding   Encounter Overview/Reason: Initial Encounter, Loneliness/Social Isolation       Zuri, Belief, Meaning:   Patient identifies as spiritual, is connected with a zuri tradition or spiritual practice, and has beliefs or practices that help with coping during difficult times  Family/Friends No family/friends present      Importance and Influence:  Patient has spiritual/personal beliefs that influence decisions regarding their health  Family/Friends No family/friends present    Community:  Patient is connected with a spiritual community and expresses feelings of isolation: Other: The Patient's immediate family lives out of the area.  Family/Friends No family/friends present    Assessment and Plan of Care:     Patient Interventions include: Facilitated expression of thoughts and feelings and Provided sacramental/Orthodox ritual  Family/Friends Interventions include: No family/friends present    Patient Plan of Care: Spiritual Care available upon further referral  Family/Friends Plan of Care: No family/friends present    Electronically signed by Chaplain Mary on 12/5/2024 at 3:53 PM  
Oral BID WC    venlafaxine (EFFEXOR XR) extended release capsule 150 mg  150 mg Oral Daily with breakfast    acetaminophen (TYLENOL) tablet 650 mg  650 mg Oral Q4H PRN    polyethylene glycol (GLYCOLAX) packet 17 g  17 g Oral Daily PRN    aluminum & magnesium hydroxide-simethicone (MAALOX) 200-200-20 MG/5ML suspension 30 mL  30 mL Oral Q6H PRN       VITALS: Patient Vitals for the past 12 hrs:   Temp Pulse Resp BP SpO2   12/28/24 0747 98.8 °F (37.1 °C) 52 16 139/76 96 %       LABS: .  Recent Results (from the past 24 hour(s))   POCT Glucose    Collection Time: 12/28/24  6:09 AM   Result Value Ref Range    POC Glucose 103 65 - 117 mg/dL    Performed by: Arlene Lugo RN        MSE:   Appearance: casually dressed; fair grooming  Behavior: more social  Motor: less withdrawn  Mood/Affect: Affect is Euthymic, Mood is congruent with affect  Thought Process: more organized now  Thought Content:  no delusions; no SI/HI  Perceptions: no VH's now  Insight/Judgment: Fair    ASSESSMENT:   1.  Major Depression, recurrent, severe (F33.2)  2.  Delirium--cleared  3.  Consider Bipolar disorder, possibly hypomanic    PLAN:  Continue the current medication plan    I certify that the inpatient psychiatric hospital services furnished since the previous certification/re-certification were, and continue to be, medically necessary for treatment which could reasonably be expected to improve the patient's condition and/or for diagnostic study. This patient continues to need, on a daily basis, active treatment furnished directly by or under the supervision of inpatient psychiatric personnel.  
Oral Daily PRN    aluminum & magnesium hydroxide-simethicone (MAALOX) 200-200-20 MG/5ML suspension 30 mL  30 mL Oral Q6H PRN       VITALS: Patient Vitals for the past 12 hrs:   Temp Pulse Resp BP SpO2   12/26/24 0831 98.2 °F (36.8 °C) 57 19 (!) 158/75 98 %   12/26/24 0806 -- 52 16 -- 97 %       LABS: .  Recent Results (from the past 24 hour(s))   POCT Glucose    Collection Time: 12/26/24  6:09 AM   Result Value Ref Range    POC Glucose 120 (H) 65 - 117 mg/dL    Performed by: Sophie Ramirez  [facility]        MSE:   Appearance: casually dressed; fair grooming  Behavior: slightly more social  Motor: less withdrawn  Mood/Affect: dysphoric and flat; no irritability  Thought Process: more organized now  Thought Content:  no delusions; no SI/HI  Perceptions: no VH's now  Insight/Judgment: limited    ASSESSMENT:   1.  Major Depression, recurrent, severe (F33.2)  2.  Delirium--cleared  3.  Consider Bipolar disorder, possibly hypomanic    PLAN:  Continue the Effexor XR at 150 mg daily.  Continue the Wellbutrin XL at 150 mg daily.  Continue the PRN Trazodone (increase to 50 mg) and Vistaril.   ELOS: 5-7 days, given her history and slow response to Tx--plan to transition to the SOP program.  Trazodone 50mg QHS for sleep.     I certify that the inpatient psychiatric hospital services furnished since the previous certification/re-certification were, and continue to be, medically necessary for treatment which could reasonably be expected to improve the patient's condition and/or for diagnostic study. This patient continues to need, on a daily basis, active treatment furnished directly by or under the supervision of inpatient psychiatric personnel.  
mg Oral BID     venlafaxine (EFFEXOR XR) extended release capsule 150 mg  150 mg Oral Daily with breakfast    acetaminophen (TYLENOL) tablet 650 mg  650 mg Oral Q4H PRN    polyethylene glycol (GLYCOLAX) packet 17 g  17 g Oral Daily PRN    aluminum & magnesium hydroxide-simethicone (MAALOX) 200-200-20 MG/5ML suspension 30 mL  30 mL Oral Q6H PRN       VITALS: Patient Vitals for the past 12 hrs:   Temp Pulse Resp BP SpO2   12/13/24 0807 -- 58 16 -- 97 %   12/13/24 0750 98.2 °F (36.8 °C) 74 18 (!) 169/90 97 %   12/12/24 2329 97.7 °F (36.5 °C) 64 17 (!) 146/70 98 %       LABS: .  Recent Results (from the past 24 hour(s))   POCT Glucose    Collection Time: 12/13/24  5:52 AM   Result Value Ref Range    POC Glucose 121 (H) 65 - 117 mg/dL    Performed by: Yamileth Neil RN        MSE:   Appearance: casually dressed; fair grooming  Behavior: more irritable; occasionally odd  Motor: less slowed; also fidgety  Mood/Affect: less dysphoric; some irritability  Thought Process: loose and tangential currently  Thought Content: some confusion; no true delusions; no SI/HI  Perceptions: recent VH's but no AH's  Insight/Judgment: poor currently    ASSESSMENT:   1.  Major Depression, recurrent, moderate severity (F33.1)  2.  Likely UTI with some delirium  3.  Consider Bipolar disorder, currently hypomanic    PLAN:  Continue the Keflex at 500 mg TID for 4 more days and check C+S.  Continue the Effexor XR at 150 mg daily.  D/C the Rexulti and Imodium.  Continue the PRN Trazodone (increase to 50 mg) and Vistaril.   ELOS: 6-9 days now, given her condition and her history--plan to transition to the SOP program.

## 2025-01-01 LAB
GLUCOSE BLD STRIP.AUTO-MCNC: 95 MG/DL (ref 65–117)
SERVICE CMNT-IMP: NORMAL

## 2025-01-01 PROCEDURE — 6370000000 HC RX 637 (ALT 250 FOR IP): Performed by: PSYCHIATRY & NEUROLOGY

## 2025-01-01 PROCEDURE — 94760 N-INVAS EAR/PLS OXIMETRY 1: CPT

## 2025-01-01 PROCEDURE — 6370000000 HC RX 637 (ALT 250 FOR IP): Performed by: INTERNAL MEDICINE

## 2025-01-01 PROCEDURE — 1240000000 HC EMOTIONAL WELLNESS R&B

## 2025-01-01 PROCEDURE — 82962 GLUCOSE BLOOD TEST: CPT

## 2025-01-01 PROCEDURE — 94640 AIRWAY INHALATION TREATMENT: CPT

## 2025-01-01 PROCEDURE — 6360000002 HC RX W HCPCS: Performed by: PSYCHIATRY & NEUROLOGY

## 2025-01-01 RX ADMIN — ALOGLIPTIN 12.5 MG: 12.5 TABLET, FILM COATED ORAL at 10:02

## 2025-01-01 RX ADMIN — LISINOPRIL 20 MG: 10 TABLET ORAL at 20:25

## 2025-01-01 RX ADMIN — PSYLLIUM HUSK 1 PACKET: 3.4 POWDER ORAL at 20:24

## 2025-01-01 RX ADMIN — METOPROLOL SUCCINATE 50 MG: 50 TABLET, EXTENDED RELEASE ORAL at 10:02

## 2025-01-01 RX ADMIN — VENLAFAXINE HYDROCHLORIDE 150 MG: 150 CAPSULE, EXTENDED RELEASE ORAL at 10:02

## 2025-01-01 RX ADMIN — ATORVASTATIN CALCIUM 80 MG: 40 TABLET, FILM COATED ORAL at 10:03

## 2025-01-01 RX ADMIN — METFORMIN HYDROCHLORIDE 500 MG: 500 TABLET ORAL at 10:02

## 2025-01-01 RX ADMIN — ARFORMOTEROL TARTRATE: 15 SOLUTION RESPIRATORY (INHALATION) at 07:44

## 2025-01-01 RX ADMIN — BENZOCAINE 6 MG-MENTHOL 10 MG LOZENGES 1 LOZENGE: at 00:31

## 2025-01-01 RX ADMIN — METFORMIN HYDROCHLORIDE 500 MG: 500 TABLET ORAL at 17:19

## 2025-01-01 RX ADMIN — LETROZOLE 2.5 MG: 2.5 TABLET, FILM COATED ORAL at 10:01

## 2025-01-01 RX ADMIN — ARFORMOTEROL TARTRATE: 15 SOLUTION RESPIRATORY (INHALATION) at 19:13

## 2025-01-01 RX ADMIN — LISINOPRIL 20 MG: 10 TABLET ORAL at 10:03

## 2025-01-01 RX ADMIN — CETIRIZINE HYDROCHLORIDE 10 MG: 10 TABLET, FILM COATED ORAL at 10:03

## 2025-01-01 RX ADMIN — TRAZODONE HYDROCHLORIDE 50 MG: 50 TABLET ORAL at 20:24

## 2025-01-01 RX ADMIN — CLOPIDOGREL BISULFATE 75 MG: 75 TABLET ORAL at 10:03

## 2025-01-01 RX ADMIN — PSYLLIUM HUSK 1 PACKET: 3.4 POWDER ORAL at 10:03

## 2025-01-01 RX ADMIN — BUPROPION HYDROCHLORIDE 150 MG: 150 TABLET, EXTENDED RELEASE ORAL at 10:03

## 2025-01-01 RX ADMIN — ALOGLIPTIN 12.5 MG: 12.5 TABLET, FILM COATED ORAL at 17:19

## 2025-01-01 ASSESSMENT — PAIN SCALES - GENERAL: PAINLEVEL_OUTOF10: 0

## 2025-01-02 VITALS
HEART RATE: 56 BPM | TEMPERATURE: 98.4 F | OXYGEN SATURATION: 97 % | WEIGHT: 170.4 LBS | BODY MASS INDEX: 27.38 KG/M2 | RESPIRATION RATE: 19 BRPM | SYSTOLIC BLOOD PRESSURE: 162 MMHG | DIASTOLIC BLOOD PRESSURE: 74 MMHG | HEIGHT: 66 IN

## 2025-01-02 LAB
GLUCOSE BLD STRIP.AUTO-MCNC: 105 MG/DL (ref 65–117)
SERVICE CMNT-IMP: NORMAL

## 2025-01-02 PROCEDURE — 82962 GLUCOSE BLOOD TEST: CPT

## 2025-01-02 PROCEDURE — 94761 N-INVAS EAR/PLS OXIMETRY MLT: CPT

## 2025-01-02 PROCEDURE — 94640 AIRWAY INHALATION TREATMENT: CPT

## 2025-01-02 PROCEDURE — 6370000000 HC RX 637 (ALT 250 FOR IP): Performed by: PSYCHIATRY & NEUROLOGY

## 2025-01-02 PROCEDURE — 6370000000 HC RX 637 (ALT 250 FOR IP): Performed by: INTERNAL MEDICINE

## 2025-01-02 PROCEDURE — 6360000002 HC RX W HCPCS: Performed by: PSYCHIATRY & NEUROLOGY

## 2025-01-02 RX ORDER — HYDROXYZINE PAMOATE 25 MG/1
25 CAPSULE ORAL 2 TIMES DAILY PRN
Qty: 60 CAPSULE | Refills: 1 | Status: SHIPPED | OUTPATIENT
Start: 2025-01-02 | End: 2025-03-03

## 2025-01-02 RX ORDER — CLOPIDOGREL BISULFATE 75 MG/1
75 TABLET ORAL DAILY
Qty: 30 TABLET | Refills: 1 | Status: SHIPPED | OUTPATIENT
Start: 2025-01-02

## 2025-01-02 RX ORDER — ATORVASTATIN CALCIUM 80 MG/1
80 TABLET, FILM COATED ORAL DAILY
Qty: 30 TABLET | Refills: 1 | Status: SHIPPED | OUTPATIENT
Start: 2025-01-02

## 2025-01-02 RX ORDER — METOPROLOL SUCCINATE 50 MG/1
50 TABLET, EXTENDED RELEASE ORAL DAILY
Qty: 90 TABLET | Refills: 1 | OUTPATIENT
Start: 2025-01-02

## 2025-01-02 RX ORDER — ALBUTEROL SULFATE 90 UG/1
2 INHALANT RESPIRATORY (INHALATION) EVERY 6 HOURS PRN
Qty: 8.5 EACH | Refills: 3 | Status: SHIPPED | OUTPATIENT
Start: 2025-01-02

## 2025-01-02 RX ORDER — LEVOCETIRIZINE DIHYDROCHLORIDE 5 MG/1
5 TABLET, FILM COATED ORAL NIGHTLY
Qty: 90 TABLET | Refills: 1 | Status: SHIPPED | OUTPATIENT
Start: 2025-01-02

## 2025-01-02 RX ORDER — BUPROPION HYDROCHLORIDE 150 MG/1
150 TABLET ORAL DAILY
Qty: 30 TABLET | Refills: 1 | Status: SHIPPED | OUTPATIENT
Start: 2025-01-03

## 2025-01-02 RX ORDER — TRAZODONE HYDROCHLORIDE 50 MG/1
50 TABLET, FILM COATED ORAL NIGHTLY
Qty: 30 TABLET | Refills: 1 | Status: SHIPPED | OUTPATIENT
Start: 2025-01-02

## 2025-01-02 RX ORDER — LISINOPRIL 20 MG/1
20 TABLET ORAL 2 TIMES DAILY
Qty: 60 TABLET | Refills: 1 | Status: SHIPPED | OUTPATIENT
Start: 2025-01-02

## 2025-01-02 RX ORDER — LETROZOLE 2.5 MG/1
2.5 TABLET, FILM COATED ORAL DAILY
Qty: 30 TABLET | Refills: 1 | Status: SHIPPED | OUTPATIENT
Start: 2025-01-02

## 2025-01-02 RX ORDER — ALOGLIPTIN 12.5 MG/1
12.5 TABLET, FILM COATED ORAL 2 TIMES DAILY WITH MEALS
Qty: 30 TABLET | Refills: 1 | Status: SHIPPED | OUTPATIENT
Start: 2025-01-02

## 2025-01-02 RX ORDER — VENLAFAXINE HYDROCHLORIDE 150 MG/1
150 CAPSULE, EXTENDED RELEASE ORAL
Qty: 30 CAPSULE | Refills: 1 | Status: SHIPPED | OUTPATIENT
Start: 2025-01-03

## 2025-01-02 RX ORDER — METOPROLOL SUCCINATE 50 MG/1
50 TABLET, EXTENDED RELEASE ORAL DAILY
Qty: 30 TABLET | Refills: 1 | Status: SHIPPED | OUTPATIENT
Start: 2025-01-03

## 2025-01-02 RX ADMIN — ATORVASTATIN CALCIUM 80 MG: 40 TABLET, FILM COATED ORAL at 08:55

## 2025-01-02 RX ADMIN — BUPROPION HYDROCHLORIDE 150 MG: 150 TABLET, EXTENDED RELEASE ORAL at 08:54

## 2025-01-02 RX ADMIN — LETROZOLE 2.5 MG: 2.5 TABLET, FILM COATED ORAL at 08:53

## 2025-01-02 RX ADMIN — CLOPIDOGREL BISULFATE 75 MG: 75 TABLET ORAL at 08:54

## 2025-01-02 RX ADMIN — CETIRIZINE HYDROCHLORIDE 10 MG: 10 TABLET, FILM COATED ORAL at 08:54

## 2025-01-02 RX ADMIN — LISINOPRIL 20 MG: 10 TABLET ORAL at 08:55

## 2025-01-02 RX ADMIN — ALOGLIPTIN 12.5 MG: 12.5 TABLET, FILM COATED ORAL at 08:55

## 2025-01-02 RX ADMIN — PSYLLIUM HUSK 1 PACKET: 3.4 POWDER ORAL at 10:57

## 2025-01-02 RX ADMIN — ARFORMOTEROL TARTRATE: 15 SOLUTION RESPIRATORY (INHALATION) at 08:35

## 2025-01-02 RX ADMIN — METFORMIN HYDROCHLORIDE 500 MG: 500 TABLET ORAL at 08:55

## 2025-01-02 RX ADMIN — METOPROLOL SUCCINATE 50 MG: 50 TABLET, EXTENDED RELEASE ORAL at 08:54

## 2025-01-02 RX ADMIN — VENLAFAXINE HYDROCHLORIDE 150 MG: 150 CAPSULE, EXTENDED RELEASE ORAL at 08:55

## 2025-01-02 ASSESSMENT — PAIN SCALES - GENERAL: PAINLEVEL_OUTOF10: 0

## 2025-01-02 NOTE — DISCHARGE INSTRUCTIONS
BEHAVIORAL HEALTH NURSING DISCHARGE NOTE      The following personal items collected during your admission are returned to you:   Dental Appliance: Collected  Vision: Collected  Hearing Aid: NA  Jewelry: NA  Clothing: Collected  Other Valuables: Collected  Valuables sent to safe:      PATIENT INSTRUCTIONS:    What to do at Home    You may operate a vehicle for 24-hours.    You may engage in an occupation involving machinery or appliances.    You may not drink any alcoholic beverages during the next 48-hours.    You may resume normal activities.    Avoid making any critical decisions for at least 24-hours.    Recommended diet: diabetic diet.    Recommended activity: activity as tolerated    You are referred to [facility].    Follow-up with [facility].  If you have problems relating to your recovery, call your physician.    The discharge information has been reviewed with the patient.  The patient verbalized understanding.

## 2025-01-02 NOTE — PLAN OF CARE
Problem: ABCDS Injury Assessment  Goal: Absence of physical injury  Outcome: Progressing     Problem: Depression  Goal: Will be euthymic at discharge  Description: INTERVENTIONS:  1. Administer medication as ordered  2. Provide emotional support via 1:1 interaction with staff  3. Encourage involvement in milieu/groups/activities  4. Monitor for social isolation  12/21/2024 1010 by Cuca Nguyen, RN  Outcome: Progressing  Note: Feels her antidepressants are helping her and she is engaging more with staff and peers and smiling.  12/20/2024 2048 by Jolynn Carson, RN  Outcome: Progressing  Note: Patient stated no feelings of depression        
  Problem: Chronic Conditions and Co-morbidities  Goal: Patient's chronic conditions and co-morbidity symptoms are monitored and maintained or improved  1/1/2025 2333 by Nicki Escamilla RN  Outcome: Progressing  1/1/2025 1205 by Yeny Sifuentes RN  Outcome: Progressing     Problem: Discharge Planning  Goal: Discharge to home or other facility with appropriate resources  1/1/2025 2333 by Nicki Escamilla RN  Outcome: Progressing  Flowsheets (Taken 1/1/2025 2304)  Discharge to home or other facility with appropriate resources: Identify barriers to discharge with patient and caregiver  1/1/2025 1205 by Yeny Sifuentes RN  Outcome: Progressing  Flowsheets (Taken 1/1/2025 1002)  Discharge to home or other facility with appropriate resources: Identify barriers to discharge with patient and caregiver     Problem: Pain  Goal: Verbalizes/displays adequate comfort level or baseline comfort level  1/1/2025 1205 by Yeny Sifuentes RN  Outcome: Progressing     Problem: ABCDS Injury Assessment  Goal: Absence of physical injury  1/1/2025 1205 by Yeny Sifuentes RN  Outcome: Progressing     Problem: Safety - Adult  Goal: Free from fall injury  1/1/2025 2333 by Nicki Escamilla RN  Outcome: Progressing  1/1/2025 1205 by Yeny Sifuentes RN  Outcome: Progressing     Problem: Self Harm/Suicidality  Goal: Will have no self-injury during hospital stay  Description: INTERVENTIONS:  1.  Ensure constant observer at bedside with Q15M safety checks  2.  Maintain a safe environment  3.  Secure patient belongings  4.  Ensure family/visitors adhere to safety recommendations  5.  Ensure safety tray has been added to patient's diet order  6.  Every shift and PRN: Re-assess suicidal risk via Frequent Screener    Recent Flowsheet Documentation  Taken 1/1/2025 2304 by Nicki Escamilla RN  Will have no self-injury during hospital stay: Maintain a safe environment  1/1/2025 1205 by Yeny Sifuentes RN  Outcome: Progressing  Flowsheets (Taken 1/1/2025 1002)  Will have no 
  Problem: Chronic Conditions and Co-morbidities  Goal: Patient's chronic conditions and co-morbidity symptoms are monitored and maintained or improved  1/2/2025 0732 by Hayden Sosa RN  Outcome: Progressing     Problem: Discharge Planning  Goal: Discharge to home or other facility with appropriate resources  1/2/2025 0732 by Hayden Sosa RN  Outcome: Progressing     Problem: Pain  Goal: Verbalizes/displays adequate comfort level or baseline comfort level  Outcome: Progressing     Problem: ABCDS Injury Assessment  Goal: Absence of physical injury  Outcome: Progressing     Problem: Safety - Adult  Goal: Free from fall injury  1/2/2025 0732 by Hayden Sosa RN  Outcome: Progressing     Problem: Self Harm/Suicidality  Goal: Will have no self-injury during hospital stay  Description: INTERVENTIONS:  1.  Ensure constant observer at bedside with Q15M safety checks  2.  Maintain a safe environment  3.  Secure patient belongings  4.  Ensure family/visitors adhere to safety recommendations  5.  Ensure safety tray has been added to patient's diet order  6.  Every shift and PRN: Re-assess suicidal risk via Frequent Screener    Outcome: Progressing  Flowsheets (Taken 1/1/2025 2304 by Nicki Escamilla, RN)  Will have no self-injury during hospital stay: Maintain a safe environment     Problem: Depression  Goal: Will be euthymic at discharge  Description: INTERVENTIONS:  1. Administer medication as ordered  2. Provide emotional support via 1:1 interaction with staff  3. Encourage involvement in milieu/groups/activities  4. Monitor for social isolation  1/2/2025 0732 by Hayden Sosa RN  Outcome: Progressing     Problem: Anxiety  Goal: Will report anxiety at manageable levels  Description: INTERVENTIONS:  1. Administer medication as ordered  2. Teach and rehearse alternative coping skills  3. Provide emotional support with 1:1 interaction with staff  Outcome: Progressing  Flowsheets (Taken 1/1/2025 2304 by Francine 
  Problem: Chronic Conditions and Co-morbidities  Goal: Patient's chronic conditions and co-morbidity symptoms are monitored and maintained or improved  1/2/2025 1244 by Hayden Sosa RN  Outcome: Adequate for Discharge     Problem: Discharge Planning  Goal: Discharge to home or other facility with appropriate resources  1/2/2025 1244 by Hayden Sosa RN  Outcome: Adequate for Discharge  Flowsheets (Taken 1/2/2025 0740)  Discharge to home or other facility with appropriate resources: Identify barriers to discharge with patient and caregiver     Problem: Pain  Goal: Verbalizes/displays adequate comfort level or baseline comfort level  1/2/2025 1244 by Hayden Sosa RN  Outcome: Adequate for Discharge     Problem: ABCDS Injury Assessment  Goal: Absence of physical injury  1/2/2025 1244 by Hayden Sosa RN  Outcome: Adequate for Discharge     Problem: Safety - Adult  Goal: Free from fall injury  1/2/2025 1244 by Hayden Sosa RN  Outcome: Adequate for Discharge     Problem: Self Harm/Suicidality  Goal: Will have no self-injury during hospital stay  Description: INTERVENTIONS:  1.  Ensure constant observer at bedside with Q15M safety checks  2.  Maintain a safe environment  3.  Secure patient belongings  4.  Ensure family/visitors adhere to safety recommendations  5.  Ensure safety tray has been added to patient's diet order  6.  Every shift and PRN: Re-assess suicidal risk via Frequent Screener    1/2/2025 1244 by Hayden Sosa RN  Outcome: Adequate for Discharge  Flowsheets (Taken 1/2/2025 0740)  Will have no self-injury during hospital stay: Maintain a safe environment     Problem: Depression  Goal: Will be euthymic at discharge  Description: INTERVENTIONS:  1. Administer medication as ordered  2. Provide emotional support via 1:1 interaction with staff  3. Encourage involvement in milieu/groups/activities  4. Monitor for social isolation  1/2/2025 1244 by Hayden Sosa RN  Outcome: 
  Problem: Chronic Conditions and Co-morbidities  Goal: Patient's chronic conditions and co-morbidity symptoms are monitored and maintained or improved  12/10/2024 1950 by Nicki Escamilla RN  Outcome: Progressing  Flowsheets (Taken 12/10/2024 1917)  Care Plan - Patient's Chronic Conditions and Co-Morbidity Symptoms are Monitored and Maintained or Improved: Monitor and assess patient's chronic conditions and comorbid symptoms for stability, deterioration, or improvement     Problem: Discharge Planning  Goal: Discharge to home or other facility with appropriate resources  Outcome: Progressing     Problem: Safety - Adult  Goal: Free from fall injury  12/11/2024 0939 by Sulma Ta RN  Outcome: Progressing  12/10/2024 1950 by Nicki Escamilla RN  Outcome: Progressing     Problem: Self Harm/Suicidality  Goal: Will have no self-injury during hospital stay  Description: INTERVENTIONS:  1.  Ensure constant observer at bedside with Q15M safety checks  2.  Maintain a safe environment  3.  Secure patient belongings  4.  Ensure family/visitors adhere to safety recommendations  5.  Ensure safety tray has been added to patient's diet order  6.  Every shift and PRN: Re-assess suicidal risk via Frequent Screener    12/11/2024 0939 by Sulma Ta RN  Outcome: Progressing  12/10/2024 1950 by Nicki Escamilla RN  Outcome: Progressing  Flowsheets (Taken 12/10/2024 1917)  Will have no self-injury during hospital stay: Maintain a safe environment     Problem: Depression  Goal: Will be euthymic at discharge  Description: INTERVENTIONS:  1. Administer medication as ordered  2. Provide emotional support via 1:1 interaction with staff  3. Encourage involvement in milieu/groups/activities  4. Monitor for social isolation  Outcome: Progressing     
  Problem: Chronic Conditions and Co-morbidities  Goal: Patient's chronic conditions and co-morbidity symptoms are monitored and maintained or improved  12/10/2024 1950 by Nicki Escamilla RN  Outcome: Progressing  Flowsheets (Taken 12/10/2024 1917)  Care Plan - Patient's Chronic Conditions and Co-Morbidity Symptoms are Monitored and Maintained or Improved: Monitor and assess patient's chronic conditions and comorbid symptoms for stability, deterioration, or improvement  12/10/2024 0829 by Hayden Sosa RN  Outcome: Progressing  Flowsheets  Taken 12/10/2024 0826 by Hayden Sosa RN  Care Plan - Patient's Chronic Conditions and Co-Morbidity Symptoms are Monitored and Maintained or Improved: Monitor and assess patient's chronic conditions and comorbid symptoms for stability, deterioration, or improvement  Taken 12/9/2024 1918 by Nicki Escamilla RN  Care Plan - Patient's Chronic Conditions and Co-Morbidity Symptoms are Monitored and Maintained or Improved: Monitor and assess patient's chronic conditions and comorbid symptoms for stability, deterioration, or improvement     Problem: Discharge Planning  Goal: Discharge to home or other facility with appropriate resources  Recent Flowsheet Documentation  Taken 12/10/2024 1917 by Nicki Escamilla RN  Discharge to home or other facility with appropriate resources: Identify barriers to discharge with patient and caregiver  12/10/2024 0829 by Hayden Sosa RN  Outcome: Progressing  Flowsheets  Taken 12/10/2024 0826 by Hayden Sosa RN  Discharge to home or other facility with appropriate resources: Identify barriers to discharge with patient and caregiver  Taken 12/9/2024 1918 by Nicki Escamilla RN  Discharge to home or other facility with appropriate resources: Identify barriers to discharge with patient and caregiver     Problem: Pain  Goal: Verbalizes/displays adequate comfort level or baseline comfort level  12/10/2024 0829 by Hayden Sosa, RN  Outcome: 
  Problem: Chronic Conditions and Co-morbidities  Goal: Patient's chronic conditions and co-morbidity symptoms are monitored and maintained or improved  12/15/2024 0937 by Cuca Nguyen, RN  Outcome: Progressing  Flowsheets (Taken 12/15/2024 0805)  Care Plan - Patient's Chronic Conditions and Co-Morbidity Symptoms are Monitored and Maintained or Improved: Monitor and assess patient's chronic conditions and comorbid symptoms for stability, deterioration, or improvement  12/14/2024 2013 by Nicki Escamilla RN  Outcome: Progressing  Flowsheets  Taken 12/14/2024 1941 by Nicki Escamilla, RN  Care Plan - Patient's Chronic Conditions and Co-Morbidity Symptoms are Monitored and Maintained or Improved: Monitor and assess patient's chronic conditions and comorbid symptoms for stability, deterioration, or improvement  Taken 12/14/2024 0845 by Cuca Nguyen, RN  Care Plan - Patient's Chronic Conditions and Co-Morbidity Symptoms are Monitored and Maintained or Improved: Monitor and assess patient's chronic conditions and comorbid symptoms for stability, deterioration, or improvement     
  Problem: Chronic Conditions and Co-morbidities  Goal: Patient's chronic conditions and co-morbidity symptoms are monitored and maintained or improved  12/15/2024 2049 by Nicki Escamilla RN  Outcome: Progressing  Flowsheets (Taken 12/15/2024 1936)  Care Plan - Patient's Chronic Conditions and Co-Morbidity Symptoms are Monitored and Maintained or Improved: Monitor and assess patient's chronic conditions and comorbid symptoms for stability, deterioration, or improvement  12/15/2024 0937 by Cuca Nguyen RN  Outcome: Progressing  Flowsheets (Taken 12/15/2024 0805)  Care Plan - Patient's Chronic Conditions and Co-Morbidity Symptoms are Monitored and Maintained or Improved: Monitor and assess patient's chronic conditions and comorbid symptoms for stability, deterioration, or improvement     Problem: Discharge Planning  Goal: Discharge to home or other facility with appropriate resources  Recent Flowsheet Documentation  Taken 12/15/2024 1936 by Nicki Escamilla RN  Discharge to home or other facility with appropriate resources: Identify barriers to discharge with patient and caregiver  12/15/2024 0937 by Cuca Nguyen RN  Outcome: Progressing  Flowsheets  Taken 12/15/2024 0805 by Cuca Nguyen RN  Discharge to home or other facility with appropriate resources: Identify barriers to discharge with patient and caregiver  Taken 12/14/2024 1941 by Nicki Escamilla RN  Discharge to home or other facility with appropriate resources: Identify barriers to discharge with patient and caregiver     Problem: ABCDS Injury Assessment  Goal: Absence of physical injury  Outcome: Progressing     Problem: Safety - Adult  Goal: Free from fall injury  Outcome: Progressing     Problem: Self Harm/Suicidality  Goal: Will have no self-injury during hospital stay  Description: INTERVENTIONS:  1.  Ensure constant observer at bedside with Q15M safety checks  2.  Maintain a safe environment  3.  Secure patient belongings  4.  Ensure 
  Problem: Chronic Conditions and Co-morbidities  Goal: Patient's chronic conditions and co-morbidity symptoms are monitored and maintained or improved  12/17/2024 2116 by Brittney Jacobs RN  Outcome: Progressing     Problem: Discharge Planning  Goal: Discharge to home or other facility with appropriate resources  12/17/2024 2116 by Brittney Jacobs RN  Outcome: Progressing  Flowsheets  Taken 12/17/2024 2045 by Brittney Jacobs RN  Discharge to home or other facility with appropriate resources: Identify barriers to discharge with patient and caregiver  Taken 12/17/2024 0813 by Hayden Sosa RN  Discharge to home or other facility with appropriate resources: Identify barriers to discharge with patient and caregiver     Problem: Pain  Goal: Verbalizes/displays adequate comfort level or baseline comfort level  12/17/2024 2116 by Brittney Jacobs RN  Outcome: Progressing     Problem: ABCDS Injury Assessment  Goal: Absence of physical injury  12/17/2024 2116 by Brittney Jacobs RN  Outcome: Progressing     Problem: Safety - Adult  Goal: Free from fall injury  12/17/2024 2116 by Brittney Jacobs RN  Outcome: Progressing     Problem: Self Harm/Suicidality  Goal: Will have no self-injury during hospital stay  Description: INTERVENTIONS:  1.  Ensure constant observer at bedside with Q15M safety checks  2.  Maintain a safe environment  3.  Secure patient belongings  4.  Ensure family/visitors adhere to safety recommendations  5.  Ensure safety tray has been added to patient's diet order  6.  Every shift and PRN: Re-assess suicidal risk via Frequent Screener    12/17/2024 2116 by Brittney Jacobs RN  Outcome: Progressing  Flowsheets  Taken 12/17/2024 2045 by Brittney Jacobs RN  Will have no self-injury during hospital stay: Maintain a safe environment  Taken 12/17/2024 0813 by Hayden Sosa RN  Will have no self-injury during hospital stay: Maintain a safe environment     Problem: Depression  Goal: Will be euthymic at 
  Problem: Chronic Conditions and Co-morbidities  Goal: Patient's chronic conditions and co-morbidity symptoms are monitored and maintained or improved  12/18/2024 2001 by Nicki Escamilla RN  Outcome: Progressing  12/18/2024 0733 by Hayden Sosa RN  Outcome: Progressing     Problem: Discharge Planning  Goal: Discharge to home or other facility with appropriate resources  Recent Flowsheet Documentation  Taken 12/18/2024 1938 by Nicki Escamilla RN  Discharge to home or other facility with appropriate resources: Identify barriers to discharge with patient and caregiver  Taken 12/18/2024 0743 by Hayden Sosa RN  Discharge to home or other facility with appropriate resources: Identify barriers to discharge with patient and caregiver  12/18/2024 0733 by Hayden Sosa RN  Outcome: Progressing     Problem: Pain  Goal: Verbalizes/displays adequate comfort level or baseline comfort level  12/18/2024 0733 by Hayden Sosa RN  Outcome: Progressing     Problem: ABCDS Injury Assessment  Goal: Absence of physical injury  12/18/2024 0733 by Hayden Sosa RN  Outcome: Progressing     Problem: Safety - Adult  Goal: Free from fall injury  12/18/2024 2001 by Nicki Escamilla RN  Outcome: Progressing  12/18/2024 0733 by Hayden Sosa RN  Outcome: Progressing     Problem: Self Harm/Suicidality  Goal: Will have no self-injury during hospital stay  Description: INTERVENTIONS:  1.  Ensure constant observer at bedside with Q15M safety checks  2.  Maintain a safe environment  3.  Secure patient belongings  4.  Ensure family/visitors adhere to safety recommendations  5.  Ensure safety tray has been added to patient's diet order  6.  Every shift and PRN: Re-assess suicidal risk via Frequent Screener    Recent Flowsheet Documentation  Taken 12/18/2024 1938 by Nicki Escamilla RN  Will have no self-injury during hospital stay: Maintain a safe environment  Taken 12/18/2024 0743 by Hayden Sosa RN  Will have no self-injury 
  Problem: Chronic Conditions and Co-morbidities  Goal: Patient's chronic conditions and co-morbidity symptoms are monitored and maintained or improved  12/19/2024 0726 by Hayden Sosa, RN  Outcome: Progressing     Problem: Discharge Planning  Goal: Discharge to home or other facility with appropriate resources  Outcome: Progressing  Flowsheets (Taken 12/18/2024 1938 by Nicki Escamilla, RN)  Discharge to home or other facility with appropriate resources: Identify barriers to discharge with patient and caregiver     Problem: Pain  Goal: Verbalizes/displays adequate comfort level or baseline comfort level  Outcome: Progressing     Problem: ABCDS Injury Assessment  Goal: Absence of physical injury  Outcome: Progressing     Problem: Safety - Adult  Goal: Free from fall injury  12/19/2024 0726 by Hayden Sosa, RN  Outcome: Progressing     Problem: Self Harm/Suicidality  Goal: Will have no self-injury during hospital stay  Description: INTERVENTIONS:  1.  Ensure constant observer at bedside with Q15M safety checks  2.  Maintain a safe environment  3.  Secure patient belongings  4.  Ensure family/visitors adhere to safety recommendations  5.  Ensure safety tray has been added to patient's diet order  6.  Every shift and PRN: Re-assess suicidal risk via Frequent Screener    Outcome: Progressing  Flowsheets (Taken 12/18/2024 1938 by Nicki Escamilla, RN)  Will have no self-injury during hospital stay: Maintain a safe environment     Problem: Depression  Goal: Will be euthymic at discharge  Description: INTERVENTIONS:  1. Administer medication as ordered  2. Provide emotional support via 1:1 interaction with staff  3. Encourage involvement in milieu/groups/activities  4. Monitor for social isolation  12/19/2024 0726 by Hayden Sosa, RN  Outcome: Progressing     Problem: Anxiety  Goal: Will report anxiety at manageable levels  Description: INTERVENTIONS:  1. Administer medication as ordered  2. Teach and rehearse 
  Problem: Chronic Conditions and Co-morbidities  Goal: Patient's chronic conditions and co-morbidity symptoms are monitored and maintained or improved  12/19/2024 1921 by Nicki Escamilla RN  Outcome: Progressing  12/19/2024 0726 by Hayden Sosa RN  Outcome: Progressing     Problem: Discharge Planning  Goal: Discharge to home or other facility with appropriate resources  Recent Flowsheet Documentation  Taken 12/19/2024 1916 by Nicki Escamilla RN  Discharge to home or other facility with appropriate resources: Identify barriers to discharge with patient and caregiver  Taken 12/19/2024 0727 by Hayden Sosa RN  Discharge to home or other facility with appropriate resources: Identify barriers to discharge with patient and caregiver  12/19/2024 0726 by Hayden Sosa RN  Outcome: Progressing  Flowsheets (Taken 12/18/2024 1938 by Nicki Escamilla RN)  Discharge to home or other facility with appropriate resources: Identify barriers to discharge with patient and caregiver     Problem: Pain  Goal: Verbalizes/displays adequate comfort level or baseline comfort level  12/19/2024 0726 by Hayden Sosa RN  Outcome: Progressing     Problem: ABCDS Injury Assessment  Goal: Absence of physical injury  12/19/2024 1921 by Nicki Escamilla RN  Outcome: Progressing  12/19/2024 0726 by Hayden Sosa RN  Outcome: Progressing     Problem: Safety - Adult  Goal: Free from fall injury  12/19/2024 1921 by Nicki Escamilla RN  Outcome: Progressing  12/19/2024 0726 by Hayden Sosa RN  Outcome: Progressing     Problem: Self Harm/Suicidality  Goal: Will have no self-injury during hospital stay  Description: INTERVENTIONS:  1.  Ensure constant observer at bedside with Q15M safety checks  2.  Maintain a safe environment  3.  Secure patient belongings  4.  Ensure family/visitors adhere to safety recommendations  5.  Ensure safety tray has been added to patient's diet order  6.  Every shift and PRN: Re-assess suicidal risk via 
  Problem: Chronic Conditions and Co-morbidities  Goal: Patient's chronic conditions and co-morbidity symptoms are monitored and maintained or improved  12/23/2024 2113 by Nicki Escamilla RN  Outcome: Progressing  12/23/2024 0751 by Hayden Sosa RN  Outcome: Progressing     Problem: Discharge Planning  Goal: Discharge to home or other facility with appropriate resources  Recent Flowsheet Documentation  Taken 12/23/2024 1945 by Nicki Escamilla RN  Discharge to home or other facility with appropriate resources: Identify barriers to discharge with patient and caregiver  Taken 12/23/2024 0754 by Hayden Sosa RN  Discharge to home or other facility with appropriate resources: Identify barriers to discharge with patient and caregiver  12/23/2024 0751 by Hayden Sosa RN  Outcome: Progressing  Flowsheets (Taken 12/22/2024 2105 by Jolynn Carson RN)  Discharge to home or other facility with appropriate resources: Identify barriers to discharge with patient and caregiver     Problem: Pain  Goal: Verbalizes/displays adequate comfort level or baseline comfort level  12/23/2024 2113 by Nicki Escamilla RN  Outcome: Progressing  12/23/2024 0751 by Hayden Sosa RN  Outcome: Progressing     Problem: ABCDS Injury Assessment  Goal: Absence of physical injury  12/23/2024 0751 by Hayden Sosa RN  Outcome: Progressing     Problem: Safety - Adult  Goal: Free from fall injury  12/23/2024 2113 by Nicki Escamilla RN  Outcome: Progressing  12/23/2024 0751 by Hayden Sosa RN  Outcome: Progressing     Problem: Self Harm/Suicidality  Goal: Will have no self-injury during hospital stay  Description: INTERVENTIONS:  1.  Ensure constant observer at bedside with Q15M safety checks  2.  Maintain a safe environment  3.  Secure patient belongings  4.  Ensure family/visitors adhere to safety recommendations  5.  Ensure safety tray has been added to patient's diet order  6.  Every shift and PRN: Re-assess suicidal risk via 
  Problem: Chronic Conditions and Co-morbidities  Goal: Patient's chronic conditions and co-morbidity symptoms are monitored and maintained or improved  12/30/2024 0748 by Hayden Sosa RN  Outcome: Progressing     Problem: Discharge Planning  Goal: Discharge to home or other facility with appropriate resources  Outcome: Progressing  Flowsheets (Taken 12/29/2024 1950 by Nicki Escamilla, RN)  Discharge to home or other facility with appropriate resources: Identify barriers to discharge with patient and caregiver     Problem: Pain  Goal: Verbalizes/displays adequate comfort level or baseline comfort level  12/30/2024 0748 by Hayden Sosa RN  Outcome: Progressing     Problem: ABCDS Injury Assessment  Goal: Absence of physical injury  Outcome: Progressing     Problem: Safety - Adult  Goal: Free from fall injury  12/30/2024 0748 by Hayden Sosa RN  Outcome: Progressing     Problem: Self Harm/Suicidality  Goal: Will have no self-injury during hospital stay  Description: INTERVENTIONS:  1.  Ensure constant observer at bedside with Q15M safety checks  2.  Maintain a safe environment  3.  Secure patient belongings  4.  Ensure family/visitors adhere to safety recommendations  5.  Ensure safety tray has been added to patient's diet order  6.  Every shift and PRN: Re-assess suicidal risk via Frequent Screener    12/30/2024 0748 by Hayden Sosa RN  Outcome: Progressing     Problem: Depression  Goal: Will be euthymic at discharge  Description: INTERVENTIONS:  1. Administer medication as ordered  2. Provide emotional support via 1:1 interaction with staff  3. Encourage involvement in milieu/groups/activities  4. Monitor for social isolation  Outcome: Progressing     Problem: Anxiety  Goal: Will report anxiety at manageable levels  Description: INTERVENTIONS:  1. Administer medication as ordered  2. Teach and rehearse alternative coping skills  3. Provide emotional support with 1:1 interaction with staff  Outcome: 
  Problem: Chronic Conditions and Co-morbidities  Goal: Patient's chronic conditions and co-morbidity symptoms are monitored and maintained or improved  12/5/2024 1040 by Sulma Ta RN  Outcome: Progressing     Problem: Discharge Planning  Goal: Discharge to home or other facility with appropriate resources  Outcome: Progressing     Problem: Pain  Goal: Verbalizes/displays adequate comfort level or baseline comfort level  12/5/2024 1040 by Sulma Ta RN  Outcome: Progressing     Problem: ABCDS Injury Assessment  Goal: Absence of physical injury  Outcome: Progressing     Problem: Safety - Adult  Goal: Free from fall injury  12/5/2024 1939 by Nicki Escamilla RN  Outcome: Progressing  12/5/2024 1040 by Sulma Ta RN  Outcome: Progressing     Problem: Self Harm/Suicidality  Goal: Will have no self-injury during hospital stay  Description: INTERVENTIONS:  1.  Ensure constant observer at bedside with Q15M safety checks  2.  Maintain a safe environment  3.  Secure patient belongings  4.  Ensure family/visitors adhere to safety recommendations  5.  Ensure safety tray has been added to patient's diet order  6.  Every shift and PRN: Re-assess suicidal risk via Frequent Screener    Recent Flowsheet Documentation  Taken 12/5/2024 1934 by Nicki Escamilla RN  Will have no self-injury during hospital stay: Maintain a safe environment  12/5/2024 1040 by Sulma Ta RN  Outcome: Progressing     Problem: Depression  Goal: Will be euthymic at discharge  Description: INTERVENTIONS:  1. Administer medication as ordered  2. Provide emotional support via 1:1 interaction with staff  3. Encourage involvement in milieu/groups/activities  4. Monitor for social isolation  12/5/2024 1939 by Nicki Escamilla RN  Outcome: Progressing  12/5/2024 1040 by Sulma Ta RN  Outcome: Progressing     
  Problem: Chronic Conditions and Co-morbidities  Goal: Patient's chronic conditions and co-morbidity symptoms are monitored and maintained or improved  Outcome: Progressing     Problem: Discharge Planning  Goal: Discharge to home or other facility with appropriate resources  Outcome: Progressing  Flowsheets  Taken 12/26/2024 0831 by Yeny Sifuentes RN  Discharge to home or other facility with appropriate resources: Identify barriers to discharge with patient and caregiver  Taken 12/25/2024 2036 by Jolynn Carson RN  Discharge to home or other facility with appropriate resources: Identify barriers to discharge with patient and caregiver     Problem: Pain  Goal: Verbalizes/displays adequate comfort level or baseline comfort level  12/26/2024 0926 by Yeny Sifuentes RN  Outcome: Progressing  12/25/2024 2034 by Jolynn Carson RN  Outcome: Progressing     Problem: ABCDS Injury Assessment  Goal: Absence of physical injury  12/26/2024 0926 by Yeny Sifuentes RN  Outcome: Progressing  12/25/2024 2034 by Jolynn Carson RN  Outcome: Progressing     Problem: Safety - Adult  Goal: Free from fall injury  12/26/2024 0926 by Yeny Sifuentes RN  Outcome: Progressing  12/25/2024 2034 by Jolynn Carson RN  Outcome: Progressing     Problem: Self Harm/Suicidality  Goal: Will have no self-injury during hospital stay  Description: INTERVENTIONS:  1.  Ensure constant observer at bedside with Q15M safety checks  2.  Maintain a safe environment  3.  Secure patient belongings  4.  Ensure family/visitors adhere to safety recommendations  5.  Ensure safety tray has been added to patient's diet order  6.  Every shift and PRN: Re-assess suicidal risk via Frequent Screener    12/26/2024 0926 by Yeny Sifuentes RN  Outcome: Progressing  Flowsheets  Taken 12/26/2024 0831 by Yeny Sifuentes RN  Will have no self-injury during hospital stay: Maintain a safe environment  Taken 12/25/2024 2036 by Jolynn Carson RN  Will have no self-injury during 
  Problem: Chronic Conditions and Co-morbidities  Goal: Patient's chronic conditions and co-morbidity symptoms are monitored and maintained or improved  Outcome: Progressing     Problem: Discharge Planning  Goal: Discharge to home or other facility with appropriate resources  Outcome: Progressing  Flowsheets (Taken 1/1/2025 1002)  Discharge to home or other facility with appropriate resources: Identify barriers to discharge with patient and caregiver     Problem: Pain  Goal: Verbalizes/displays adequate comfort level or baseline comfort level  Outcome: Progressing     Problem: ABCDS Injury Assessment  Goal: Absence of physical injury  Outcome: Progressing     Problem: Safety - Adult  Goal: Free from fall injury  Outcome: Progressing     Problem: Self Harm/Suicidality  Goal: Will have no self-injury during hospital stay  Description: INTERVENTIONS:  1.  Ensure constant observer at bedside with Q15M safety checks  2.  Maintain a safe environment  3.  Secure patient belongings  4.  Ensure family/visitors adhere to safety recommendations  5.  Ensure safety tray has been added to patient's diet order  6.  Every shift and PRN: Re-assess suicidal risk via Frequent Screener    Outcome: Progressing  Flowsheets (Taken 1/1/2025 1002)  Will have no self-injury during hospital stay: Maintain a safe environment     Problem: Depression  Goal: Will be euthymic at discharge  Description: INTERVENTIONS:  1. Administer medication as ordered  2. Provide emotional support via 1:1 interaction with staff  3. Encourage involvement in milieu/groups/activities  4. Monitor for social isolation  Outcome: Progressing     Problem: Anxiety  Goal: Will report anxiety at manageable levels  Description: INTERVENTIONS:  1. Administer medication as ordered  2. Teach and rehearse alternative coping skills  3. Provide emotional support with 1:1 interaction with staff  Outcome: Progressing  Flowsheets (Taken 1/1/2025 1002)  Will report anxiety at 
  Problem: Chronic Conditions and Co-morbidities  Goal: Patient's chronic conditions and co-morbidity symptoms are monitored and maintained or improved  Outcome: Progressing     Problem: Discharge Planning  Goal: Discharge to home or other facility with appropriate resources  Outcome: Progressing  Flowsheets (Taken 12/22/2024 2105 by Jolynn Carson, RN)  Discharge to home or other facility with appropriate resources: Identify barriers to discharge with patient and caregiver     Problem: Pain  Goal: Verbalizes/displays adequate comfort level or baseline comfort level  12/23/2024 0751 by Hayden Sosa RN  Outcome: Progressing     Problem: ABCDS Injury Assessment  Goal: Absence of physical injury  Outcome: Progressing     Problem: Safety - Adult  Goal: Free from fall injury  12/23/2024 0751 by Hayden Sosa, RN  Outcome: Progressing     Problem: Self Harm/Suicidality  Goal: Will have no self-injury during hospital stay  Description: INTERVENTIONS:  1.  Ensure constant observer at bedside with Q15M safety checks  2.  Maintain a safe environment  3.  Secure patient belongings  4.  Ensure family/visitors adhere to safety recommendations  5.  Ensure safety tray has been added to patient's diet order  6.  Every shift and PRN: Re-assess suicidal risk via Frequent Screener    12/23/2024 0751 by Hayden Sosa, RN  Outcome: Progressing  Flowsheets (Taken 12/22/2024 2105 by Jolynn Carson, RN)  Will have no self-injury during hospital stay: Maintain a safe environment     Problem: Depression  Goal: Will be euthymic at discharge  Description: INTERVENTIONS:  1. Administer medication as ordered  2. Provide emotional support via 1:1 interaction with staff  3. Encourage involvement in milieu/groups/activities  4. Monitor for social isolation  Outcome: Progressing     Problem: Anxiety  Goal: Will report anxiety at manageable levels  Description: INTERVENTIONS:  1. Administer medication as ordered  2. Teach and rehearse 
  Problem: Chronic Conditions and Co-morbidities  Goal: Patient's chronic conditions and co-morbidity symptoms are monitored and maintained or improved  Outcome: Progressing     Problem: Discharge Planning  Goal: Discharge to home or other facility with appropriate resources  Outcome: Progressing  Flowsheets (Taken 12/27/2024 1952)  Discharge to home or other facility with appropriate resources: Identify barriers to discharge with patient and caregiver     Problem: Pain  Goal: Verbalizes/displays adequate comfort level or baseline comfort level  Outcome: Progressing     Problem: ABCDS Injury Assessment  Goal: Absence of physical injury  Outcome: Progressing     Problem: Safety - Adult  Goal: Free from fall injury  Outcome: Progressing     Problem: Safety - Adult  Goal: Free from fall injury  Outcome: Progressing     Problem: Self Harm/Suicidality  Goal: Will have no self-injury during hospital stay  Description: INTERVENTIONS:  1.  Ensure constant observer at bedside with Q15M safety checks  2.  Maintain a safe environment  3.  Secure patient belongings  4.  Ensure family/visitors adhere to safety recommendations  5.  Ensure safety tray has been added to patient's diet order  6.  Every shift and PRN: Re-assess suicidal risk via Frequent Screener    Outcome: Progressing  Flowsheets (Taken 12/27/2024 1952)  Will have no self-injury during hospital stay: Maintain a safe environment     Problem: Depression  Goal: Will be euthymic at discharge  Description: INTERVENTIONS:  1. Administer medication as ordered  2. Provide emotional support via 1:1 interaction with staff  3. Encourage involvement in milieu/groups/activities  4. Monitor for social isolation  Outcome: Progressing     Problem: Anxiety  Goal: Will report anxiety at manageable levels  Description: INTERVENTIONS:  1. Administer medication as ordered  2. Teach and rehearse alternative coping skills  3. Provide emotional support with 1:1 interaction with 
  Problem: Chronic Conditions and Co-morbidities  Goal: Patient's chronic conditions and co-morbidity symptoms are monitored and maintained or improved  Outcome: Progressing     Problem: Discharge Planning  Goal: Discharge to home or other facility with appropriate resources  Outcome: Progressing  Flowsheets (Taken 12/30/2024 2119 by Jolynn Carson, RN)  Discharge to home or other facility with appropriate resources: Identify barriers to discharge with patient and caregiver     Problem: Pain  Goal: Verbalizes/displays adequate comfort level or baseline comfort level  12/31/2024 0721 by Hayden Sosa, RN  Outcome: Progressing     Problem: ABCDS Injury Assessment  Goal: Absence of physical injury  Outcome: Progressing     Problem: Safety - Adult  Goal: Free from fall injury  12/31/2024 0721 by Hayden Sosa, RN  Outcome: Progressing     Problem: Self Harm/Suicidality  Goal: Will have no self-injury during hospital stay  Description: INTERVENTIONS:  1.  Ensure constant observer at bedside with Q15M safety checks  2.  Maintain a safe environment  3.  Secure patient belongings  4.  Ensure family/visitors adhere to safety recommendations  5.  Ensure safety tray has been added to patient's diet order  6.  Every shift and PRN: Re-assess suicidal risk via Frequent Screener    Outcome: Progressing  Flowsheets (Taken 12/30/2024 2119 by Jolynn Carson, RN)  Will have no self-injury during hospital stay: Maintain a safe environment     Problem: Depression  Goal: Will be euthymic at discharge  Description: INTERVENTIONS:  1. Administer medication as ordered  2. Provide emotional support via 1:1 interaction with staff  3. Encourage involvement in milieu/groups/activities  4. Monitor for social isolation  Outcome: Progressing     Problem: Anxiety  Goal: Will report anxiety at manageable levels  Description: INTERVENTIONS:  1. Administer medication as ordered  2. Teach and rehearse alternative coping skills  3. Provide 
  Problem: Chronic Conditions and Co-morbidities  Goal: Patient's chronic conditions and co-morbidity symptoms are monitored and maintained or improved  Outcome: Progressing     Problem: Pain  Goal: Verbalizes/displays adequate comfort level or baseline comfort level  12/4/2024 1034 by Sulma Ta RN  Outcome: Progressing  12/4/2024 0109 by Jolynn Carson RN  Outcome: Progressing     Problem: Safety - Adult  Goal: Free from fall injury  12/4/2024 1034 by Sulma Ta RN  Outcome: Progressing     Problem: Respiratory - Adult  Goal: Achieves optimal ventilation and oxygenation  12/4/2024 0900 by Gracy Mukherjee, RT  Outcome: Progressing     
  Problem: Chronic Conditions and Co-morbidities  Goal: Patient's chronic conditions and co-morbidity symptoms are monitored and maintained or improved  Outcome: Progressing     Problem: Safety - Adult  Goal: Free from fall injury  Outcome: Progressing     Problem: Depression  Goal: Will be euthymic at discharge  Description: INTERVENTIONS:  1. Administer medication as ordered  2. Provide emotional support via 1:1 interaction with staff  3. Encourage involvement in milieu/groups/activities  4. Monitor for social isolation  Outcome: Progressing     
  Problem: Chronic Conditions and Co-morbidities  Goal: Patient's chronic conditions and co-morbidity symptoms are monitored and maintained or improved  Outcome: Progressing     Problem: Safety - Adult  Goal: Free from fall injury  Outcome: Progressing     Problem: Self Harm/Suicidality  Goal: Will have no self-injury during hospital stay  Description: INTERVENTIONS:  1.  Ensure constant observer at bedside with Q15M safety checks  2.  Maintain a safe environment  3.  Secure patient belongings  4.  Ensure family/visitors adhere to safety recommendations  5.  Ensure safety tray has been added to patient's diet order  6.  Every shift and PRN: Re-assess suicidal risk via Frequent Screener    Outcome: Progressing  Flowsheets (Taken 12/31/2024 1935)  Will have no self-injury during hospital stay: Maintain a safe environment     Problem: Depression  Goal: Will be euthymic at discharge  Description: INTERVENTIONS:  1. Administer medication as ordered  2. Provide emotional support via 1:1 interaction with staff  3. Encourage involvement in milieu/groups/activities  4. Monitor for social isolation  Outcome: Progressing     Problem: Respiratory - Adult  Goal: Achieves optimal ventilation and oxygenation  12/31/2024 1954 by Maryana Tavares, RT  Outcome: Progressing  12/31/2024 0753 by Chema Dove Sr., RCP  Outcome: Progressing     
  Problem: Chronic Conditions and Co-morbidities  Goal: Patient's chronic conditions and co-morbidity symptoms are monitored and maintained or improved  Outcome: Progressing  Flowsheets  Taken 12/10/2024 0826 by Hayden Sosa RN  Care Plan - Patient's Chronic Conditions and Co-Morbidity Symptoms are Monitored and Maintained or Improved: Monitor and assess patient's chronic conditions and comorbid symptoms for stability, deterioration, or improvement  Taken 12/9/2024 1918 by Nicki Escamilla RN  Care Plan - Patient's Chronic Conditions and Co-Morbidity Symptoms are Monitored and Maintained or Improved: Monitor and assess patient's chronic conditions and comorbid symptoms for stability, deterioration, or improvement     Problem: Discharge Planning  Goal: Discharge to home or other facility with appropriate resources  Outcome: Progressing  Flowsheets  Taken 12/10/2024 0826 by Hayden Sosa RN  Discharge to home or other facility with appropriate resources: Identify barriers to discharge with patient and caregiver  Taken 12/9/2024 1918 by Nicki Escamilla RN  Discharge to home or other facility with appropriate resources: Identify barriers to discharge with patient and caregiver     Problem: Pain  Goal: Verbalizes/displays adequate comfort level or baseline comfort level  Outcome: Progressing     Problem: ABCDS Injury Assessment  Goal: Absence of physical injury  Outcome: Progressing     Problem: Safety - Adult  Goal: Free from fall injury  Outcome: Progressing     Problem: Self Harm/Suicidality  Goal: Will have no self-injury during hospital stay  Description: INTERVENTIONS:  1.  Ensure constant observer at bedside with Q15M safety checks  2.  Maintain a safe environment  3.  Secure patient belongings  4.  Ensure family/visitors adhere to safety recommendations  5.  Ensure safety tray has been added to patient's diet order  6.  Every shift and PRN: Re-assess suicidal risk via Frequent Screener    Outcome: 
  Problem: Chronic Conditions and Co-morbidities  Goal: Patient's chronic conditions and co-morbidity symptoms are monitored and maintained or improved  Outcome: Progressing  Flowsheets  Taken 12/12/2024 0741 by Hayden Sosa RN  Care Plan - Patient's Chronic Conditions and Co-Morbidity Symptoms are Monitored and Maintained or Improved: Monitor and assess patient's chronic conditions and comorbid symptoms for stability, deterioration, or improvement  Taken 12/11/2024 2047 by Jolynn Carson RN  Care Plan - Patient's Chronic Conditions and Co-Morbidity Symptoms are Monitored and Maintained or Improved: Monitor and assess patient's chronic conditions and comorbid symptoms for stability, deterioration, or improvement     Problem: Discharge Planning  Goal: Discharge to home or other facility with appropriate resources  Outcome: Progressing  Flowsheets  Taken 12/12/2024 0741 by Hayden Sosa RN  Discharge to home or other facility with appropriate resources: Identify barriers to discharge with patient and caregiver  Taken 12/11/2024 2047 by Jolynn Carson RN  Discharge to home or other facility with appropriate resources: Identify barriers to discharge with patient and caregiver     Problem: Pain  Goal: Verbalizes/displays adequate comfort level or baseline comfort level  12/12/2024 0743 by Hayden Sosa RN  Outcome: Progressing     Problem: ABCDS Injury Assessment  Goal: Absence of physical injury  Outcome: Progressing     Problem: Safety - Adult  Goal: Free from fall injury  12/12/2024 0743 by Hayden Sosa RN  Outcome: Progressing     Problem: Self Harm/Suicidality  Goal: Will have no self-injury during hospital stay  Description: INTERVENTIONS:  1.  Ensure constant observer at bedside with Q15M safety checks  2.  Maintain a safe environment  3.  Secure patient belongings  4.  Ensure family/visitors adhere to safety recommendations  5.  Ensure safety tray has been added to patient's diet order  6.  
  Problem: Chronic Conditions and Co-morbidities  Goal: Patient's chronic conditions and co-morbidity symptoms are monitored and maintained or improved  Outcome: Progressing  Flowsheets  Taken 12/14/2024 1941 by Nicki Escamilla RN  Care Plan - Patient's Chronic Conditions and Co-Morbidity Symptoms are Monitored and Maintained or Improved: Monitor and assess patient's chronic conditions and comorbid symptoms for stability, deterioration, or improvement  Taken 12/14/2024 0845 by Cuca Nguyen RN  Care Plan - Patient's Chronic Conditions and Co-Morbidity Symptoms are Monitored and Maintained or Improved: Monitor and assess patient's chronic conditions and comorbid symptoms for stability, deterioration, or improvement     Problem: Pain  Goal: Verbalizes/displays adequate comfort level or baseline comfort level  12/14/2024 0947 by Cuca Nguyen RN  Outcome: Progressing     Problem: Safety - Adult  Goal: Free from fall injury  12/14/2024 2013 by Nicki Escamilla RN  Outcome: Progressing  12/14/2024 0947 by Cuca Nguyen RN  Outcome: Progressing     Problem: Self Harm/Suicidality  Goal: Will have no self-injury during hospital stay  Description: INTERVENTIONS:  1.  Ensure constant observer at bedside with Q15M safety checks  2.  Maintain a safe environment  3.  Secure patient belongings  4.  Ensure family/visitors adhere to safety recommendations  5.  Ensure safety tray has been added to patient's diet order  6.  Every shift and PRN: Re-assess suicidal risk via Frequent Screener    Outcome: Progressing  Flowsheets  Taken 12/14/2024 1941 by Nicki Escamilla RN  Will have no self-injury during hospital stay: Maintain a safe environment  Taken 12/14/2024 0845 by Cuca Nguyen RN  Will have no self-injury during hospital stay: Maintain a safe environment     Problem: Depression  Goal: Will be euthymic at discharge  Description: INTERVENTIONS:  1. Administer medication as ordered  2. Provide emotional 
  Problem: Chronic Conditions and Co-morbidities  Goal: Patient's chronic conditions and co-morbidity symptoms are monitored and maintained or improved  Outcome: Progressing  Flowsheets  Taken 12/28/2024 2012 by Nicki Escamilla RN  Care Plan - Patient's Chronic Conditions and Co-Morbidity Symptoms are Monitored and Maintained or Improved: Monitor and assess patient's chronic conditions and comorbid symptoms for stability, deterioration, or improvement  Taken 12/28/2024 0830 by Cuca Nguyen RN  Care Plan - Patient's Chronic Conditions and Co-Morbidity Symptoms are Monitored and Maintained or Improved: Monitor and assess patient's chronic conditions and comorbid symptoms for stability, deterioration, or improvement     Problem: Pain  Goal: Verbalizes/displays adequate comfort level or baseline comfort level  12/28/2024 1041 by Cuca Nguyen RN  Outcome: Progressing  Flowsheets (Taken 12/28/2024 0830)  Verbalizes/displays adequate comfort level or baseline comfort level: Encourage patient to monitor pain and request assistance     Problem: ABCDS Injury Assessment  Goal: Absence of physical injury  12/28/2024 1041 by Cuca Nguyen RN  Outcome: Progressing     Problem: Safety - Adult  Goal: Free from fall injury  12/28/2024 1041 by Cuca Nguyen RN  Outcome: Progressing     Problem: Self Harm/Suicidality  Goal: Will have no self-injury during hospital stay  Description: INTERVENTIONS:  1.  Ensure constant observer at bedside with Q15M safety checks  2.  Maintain a safe environment  3.  Secure patient belongings  4.  Ensure family/visitors adhere to safety recommendations  5.  Ensure safety tray has been added to patient's diet order  6.  Every shift and PRN: Re-assess suicidal risk via Frequent Screener    Outcome: Progressing  Flowsheets  Taken 12/28/2024 2012 by Nicki Escamilla RN  Will have no self-injury during hospital stay: Maintain a safe environment  Taken 12/28/2024 0830 by 
  Problem: Chronic Conditions and Co-morbidities  Goal: Patient's chronic conditions and co-morbidity symptoms are monitored and maintained or improved  Outcome: Progressing  Flowsheets  Taken 12/29/2024 1950 by Nicki Escamilla RN  Care Plan - Patient's Chronic Conditions and Co-Morbidity Symptoms are Monitored and Maintained or Improved: Monitor and assess patient's chronic conditions and comorbid symptoms for stability, deterioration, or improvement  Taken 12/29/2024 0800 by Cuca Nguyen RN  Care Plan - Patient's Chronic Conditions and Co-Morbidity Symptoms are Monitored and Maintained or Improved: Monitor and assess patient's chronic conditions and comorbid symptoms for stability, deterioration, or improvement     Problem: Pain  Goal: Verbalizes/displays adequate comfort level or baseline comfort level  12/29/2024 2016 by Nicki Escamilla RN  Outcome: Progressing  12/29/2024 1039 by Cuca Nguyen RN  Outcome: Progressing     Problem: ABCDS Injury Assessment  Goal: Absence of physical injury  12/29/2024 1039 by Cuca Nguyen RN  Outcome: Progressing     Problem: Safety - Adult  Goal: Free from fall injury  Outcome: Progressing     Problem: Self Harm/Suicidality  Goal: Will have no self-injury during hospital stay  Description: INTERVENTIONS:  1.  Ensure constant observer at bedside with Q15M safety checks  2.  Maintain a safe environment  3.  Secure patient belongings  4.  Ensure family/visitors adhere to safety recommendations  5.  Ensure safety tray has been added to patient's diet order  6.  Every shift and PRN: Re-assess suicidal risk via Frequent Screener    12/29/2024 2016 by Nicki Escamilla RN  Outcome: Progressing  Flowsheets (Taken 12/29/2024 1950)  Will have no self-injury during hospital stay: Maintain a safe environment  12/29/2024 1039 by Cuca Nguyen RN  Outcome: Progressing  Flowsheets (Taken 12/29/2024 0800)  Will have no self-injury during hospital stay: Maintain a safe 
  Problem: Chronic Conditions and Co-morbidities  Goal: Patient's chronic conditions and co-morbidity symptoms are monitored and maintained or improved  Recent Flowsheet Documentation  Taken 12/13/2024 1925 by Evelyn Mayberry RN  Care Plan - Patient's Chronic Conditions and Co-Morbidity Symptoms are Monitored and Maintained or Improved: Monitor and assess patient's chronic conditions and comorbid symptoms for stability, deterioration, or improvement  12/13/2024 1035 by Cuca Nguyen RN  Outcome: Progressing  Flowsheets (Taken 12/13/2024 0902)  Care Plan - Patient's Chronic Conditions and Co-Morbidity Symptoms are Monitored and Maintained or Improved: Monitor and assess patient's chronic conditions and comorbid symptoms for stability, deterioration, or improvement     Problem: Pain  Goal: Verbalizes/displays adequate comfort level or baseline comfort level  12/13/2024 1035 by Cuca Nguyen RN  Outcome: Progressing  Flowsheets (Taken 12/13/2024 0902)  Verbalizes/displays adequate comfort level or baseline comfort level: Encourage patient to monitor pain and request assistance     Problem: ABCDS Injury Assessment  Goal: Absence of physical injury  12/13/2024 1035 by Cuca Nguyen RN  Outcome: Progressing     Problem: Safety - Adult  Goal: Free from fall injury  Outcome: Progressing     Problem: Self Harm/Suicidality  Goal: Will have no self-injury during hospital stay  Description: INTERVENTIONS:  1.  Ensure constant observer at bedside with Q15M safety checks  2.  Maintain a safe environment  3.  Secure patient belongings  4.  Ensure family/visitors adhere to safety recommendations  5.  Ensure safety tray has been added to patient's diet order  6.  Every shift and PRN: Re-assess suicidal risk via Frequent Screener    Outcome: Progressing  Flowsheets (Taken 12/13/2024 1925)  Will have no self-injury during hospital stay: Maintain a safe environment     Problem: Respiratory - Adult  Goal: Achieves 
  Problem: Chronic Conditions and Co-morbidities  Goal: Patient's chronic conditions and co-morbidity symptoms are monitored and maintained or improved  Recent Flowsheet Documentation  Taken 12/9/2024 1918 by Nicki Escamilla RN  Care Plan - Patient's Chronic Conditions and Co-Morbidity Symptoms are Monitored and Maintained or Improved: Monitor and assess patient's chronic conditions and comorbid symptoms for stability, deterioration, or improvement  12/9/2024 0807 by Hayden Sosa RN  Outcome: Progressing  Flowsheets (Taken 12/9/2024 0805)  Care Plan - Patient's Chronic Conditions and Co-Morbidity Symptoms are Monitored and Maintained or Improved: Monitor and assess patient's chronic conditions and comorbid symptoms for stability, deterioration, or improvement     Problem: Discharge Planning  Goal: Discharge to home or other facility with appropriate resources  Recent Flowsheet Documentation  Taken 12/9/2024 1918 by Nicki Escamilla RN  Discharge to home or other facility with appropriate resources: Identify barriers to discharge with patient and caregiver  12/9/2024 0807 by Hayden Sosa RN  Outcome: Progressing  Flowsheets  Taken 12/9/2024 0805 by Hayden Sosa RN  Discharge to home or other facility with appropriate resources: Identify barriers to discharge with patient and caregiver  Taken 12/8/2024 2053 by Jolynn Carson RN  Discharge to home or other facility with appropriate resources: Identify barriers to discharge with patient and caregiver     Problem: Pain  Goal: Verbalizes/displays adequate comfort level or baseline comfort level  12/9/2024 0807 by Hayden Sosa RN  Outcome: Progressing     Problem: ABCDS Injury Assessment  Goal: Absence of physical injury  12/9/2024 0807 by Hayden Sosa RN  Outcome: Progressing     Problem: Safety - Adult  Goal: Free from fall injury  12/9/2024 0807 by Hayden Sosa RN  Outcome: Progressing     Problem: Self Harm/Suicidality  Goal: Will have no 
  Problem: Depression  Goal: Will be euthymic at discharge  Description: INTERVENTIONS:  1. Administer medication as ordered  2. Provide emotional support via 1:1 interaction with staff  3. Encourage involvement in milieu/groups/activities  4. Monitor for social isolation  Outcome: Progressing     Problem: Safety - Adult  Goal: Free from fall injury  12/14/2024 0947 by Cuca Nguyen, RN  Outcome: Progressing  12/13/2024 1953 by Evelyn Mayberry, RN  Outcome: Progressing     Problem: Anxiety  Goal: Will report anxiety at manageable levels  Description: INTERVENTIONS:  1. Administer medication as ordered  2. Teach and rehearse alternative coping skills  3. Provide emotional support with 1:1 interaction with staff  Recent Flowsheet Documentation  Taken 12/14/2024 0845 by Cuca Nguyen, RN  Will report anxiety at manageable levels:   Administer medication as ordered   Provide emotional support with 1:1 interaction with staff     
  Problem: Depression  Goal: Will be euthymic at discharge  Description: INTERVENTIONS:  1. Administer medication as ordered  2. Provide emotional support via 1:1 interaction with staff  3. Encourage involvement in milieu/groups/activities  4. Monitor for social isolation  Outcome: Progressing  Note: Mood and affect brightened. No teary episodes and very little confusion any more. Carries on full conversations at night with herself but denies AVH/SI/HI/pain. Able to do own ADL's. Had a soft brown BM today. Attended group activity as active participant. Worked on arts and crafts. Talked to friends and family on phone, who are supportive.     
  Problem: Discharge Planning  Goal: Discharge to home or other facility with appropriate resources  Outcome: Progressing  Flowsheets  Taken 12/25/2024 0815 by Cuca Nguyen, RN  Discharge to home or other facility with appropriate resources: Identify barriers to discharge with patient and caregiver  Taken 12/24/2024 2025 by Nicki Escamilla RN  Discharge to home or other facility with appropriate resources: Identify barriers to discharge with patient and caregiver     Problem: Pain  Goal: Verbalizes/displays adequate comfort level or baseline comfort level  12/25/2024 1016 by Cuca Nguyen, RN  Outcome: Progressing  12/25/2024 1006 by Cuca Nguyen, RN  Outcome: Progressing     
  Problem: Discharge Planning  Goal: Discharge to home or other facility with appropriate resources  Outcome: Progressing  Flowsheets (Taken 12/16/2024 0912 by Sulma Ta, RN)  Discharge to home or other facility with appropriate resources: Identify barriers to discharge with patient and caregiver     Problem: Pain  Goal: Verbalizes/displays adequate comfort level or baseline comfort level  12/16/2024 2018 by Jolynn Carson RN  Outcome: Progressing     Problem: Safety - Adult  Goal: Free from fall injury  12/16/2024 2018 by Jolynn Carson RN  Outcome: Progressing     Problem: Self Harm/Suicidality  Goal: Will have no self-injury during hospital stay  Description: INTERVENTIONS:  1.  Ensure constant observer at bedside with Q15M safety checks  2.  Maintain a safe environment  3.  Secure patient belongings  4.  Ensure family/visitors adhere to safety recommendations  5.  Ensure safety tray has been added to patient's diet order  6.  Every shift and PRN: Re-assess suicidal risk via Frequent Screener    12/16/2024 2018 by Jolynn Carson RN  Outcome: Progressing     Problem: Anxiety  Goal: Will report anxiety at manageable levels  Description: INTERVENTIONS:  1. Administer medication as ordered  2. Teach and rehearse alternative coping skills  3. Provide emotional support with 1:1 interaction with staff  Outcome: Progressing  Flowsheets (Taken 12/16/2024 0912 by Sulma Ta, RN)  Will report anxiety at manageable levels: Administer medication as ordered     
  Problem: Discharge Planning  Goal: Discharge to home or other facility with appropriate resources  Outcome: Progressing  Flowsheets (Taken 12/21/2024 0840 by Cuca Nguyen, RN)  Discharge to home or other facility with appropriate resources: Identify barriers to discharge with patient and caregiver     Problem: Pain  Goal: Verbalizes/displays adequate comfort level or baseline comfort level  Outcome: Progressing     Problem: Safety - Adult  Goal: Free from fall injury  12/21/2024 2033 by Jolynn Carson, RN  Outcome: Progressing     Problem: Anxiety  Goal: Will report anxiety at manageable levels  Description: INTERVENTIONS:  1. Administer medication as ordered  2. Teach and rehearse alternative coping skills  3. Provide emotional support with 1:1 interaction with staff  Outcome: Progressing  Flowsheets (Taken 12/21/2024 0840 by Cuca Nguyen, RN)  Will report anxiety at manageable levels: Administer medication as ordered     Problem: Self Care Deficit  Goal: Return ADL status to a safe level of function  Description: INTERVENTIONS:  1. Administer medication as ordered  2. Assess ADL deficits and provide assistive devices as needed  3. Obtain PT/OT consults as needed  4. Assist and instruct patient to increase activity and self care as tolerated  Outcome: Progressing     
  Problem: Pain  Goal: Verbalizes/displays adequate comfort level or baseline comfort level  12/12/2024 2134 by Jolynn Carson RN  Outcome: Progressing     Problem: ABCDS Injury Assessment  Goal: Absence of physical injury  12/12/2024 2134 by Jolynn Carson RN  Outcome: Progressing     Problem: Safety - Adult  Goal: Free from fall injury  12/12/2024 2134 by Jolynn Carson RN  Outcome: Progressing     Problem: Self Harm/Suicidality  Goal: Will have no self-injury during hospital stay  Description: INTERVENTIONS:  1.  Ensure constant observer at bedside with Q15M safety checks  2.  Maintain a safe environment  3.  Secure patient belongings  4.  Ensure family/visitors adhere to safety recommendations  5.  Ensure safety tray has been added to patient's diet order  6.  Every shift and PRN: Re-assess suicidal risk via Frequent Screener    12/12/2024 2134 by Jolynn Carson RN  Outcome: Progressing     Problem: Anxiety  Goal: Will report anxiety at manageable levels  Description: INTERVENTIONS:  1. Administer medication as ordered  2. Teach and rehearse alternative coping skills  3. Provide emotional support with 1:1 interaction with staff  12/12/2024 2134 by Jolynn Carson RN  Outcome: Progressing     Problem: Self Care Deficit  Goal: Return ADL status to a safe level of function  Description: INTERVENTIONS:  1. Administer medication as ordered  2. Assess ADL deficits and provide assistive devices as needed  3. Obtain PT/OT consults as needed  4. Assist and instruct patient to increase activity and self care as tolerated  12/12/2024 2134 by Jolynn Carson RN  Outcome: Progressing     
  Problem: Pain  Goal: Verbalizes/displays adequate comfort level or baseline comfort level  12/13/2024 1035 by Cuca Nguyen, RN  Outcome: Progressing  Flowsheets (Taken 12/13/2024 0902)  Verbalizes/displays adequate comfort level or baseline comfort level: Encourage patient to monitor pain and request assistance  12/12/2024 2134 by Jolynn Carson, RN  Outcome: Progressing     Problem: ABCDS Injury Assessment  Goal: Absence of physical injury  12/13/2024 1035 by Cuca Nguyen, RN  Outcome: Progressing  12/12/2024 2134 by Jolynn Carson, RN  Outcome: Progressing     
  Problem: Pain  Goal: Verbalizes/displays adequate comfort level or baseline comfort level  12/20/2024 2048 by Jolynn Carson RN  Outcome: Progressing  Note: Denies pain      Problem: Safety - Adult  Goal: Free from fall injury  12/20/2024 2048 by Jolynn Carson RN  Outcome: Progressing     Problem: Depression  Goal: Will be euthymic at discharge  Description: INTERVENTIONS:  1. Administer medication as ordered  2. Provide emotional support via 1:1 interaction with staff  3. Encourage involvement in milieu/groups/activities  4. Monitor for social isolation  12/20/2024 2048 by Jolynn Carson RN  Outcome: Progressing  Note: Patient stated no feelings of depression        Problem: Self Care Deficit  Goal: Return ADL status to a safe level of function  Description: INTERVENTIONS:  1. Administer medication as ordered  2. Assess ADL deficits and provide assistive devices as needed  3. Obtain PT/OT consults as needed  4. Assist and instruct patient to increase activity and self care as tolerated  Outcome: Progressing     
  Problem: Pain  Goal: Verbalizes/displays adequate comfort level or baseline comfort level  12/25/2024 2034 by Jolynn Carson RN  Outcome: Progressing     Problem: ABCDS Injury Assessment  Goal: Absence of physical injury  12/25/2024 2034 by Jolynn Carson RN  Outcome: Progressing     Problem: Safety - Adult  Goal: Free from fall injury  12/25/2024 2034 by Jolynn Carson RN  Outcome: Progressing     Problem: Self Harm/Suicidality  Goal: Will have no self-injury during hospital stay  Description: INTERVENTIONS:  1.  Ensure constant observer at bedside with Q15M safety checks  2.  Maintain a safe environment  3.  Secure patient belongings  4.  Ensure family/visitors adhere to safety recommendations  5.  Ensure safety tray has been added to patient's diet order  6.  Every shift and PRN: Re-assess suicidal risk via Frequent Screener    Outcome: Progressing  Flowsheets (Taken 12/25/2024 0815 by Cuca Nguyen, RN)  Will have no self-injury during hospital stay: Maintain a safe environment     Problem: Anxiety  Goal: Will report anxiety at manageable levels  Description: INTERVENTIONS:  1. Administer medication as ordered  2. Teach and rehearse alternative coping skills  3. Provide emotional support with 1:1 interaction with staff  Outcome: Progressing  Flowsheets (Taken 12/25/2024 0815 by Cuca Nguyen, RN)  Will report anxiety at manageable levels:   Administer medication as ordered   Provide emotional support with 1:1 interaction with staff     Problem: Self Care Deficit  Goal: Return ADL status to a safe level of function  Description: INTERVENTIONS:  1. Administer medication as ordered  2. Assess ADL deficits and provide assistive devices as needed  3. Obtain PT/OT consults as needed  4. Assist and instruct patient to increase activity and self care as tolerated  Outcome: Progressing     
  Problem: Pain  Goal: Verbalizes/displays adequate comfort level or baseline comfort level  12/26/2024 2007 by Jolynn Carson RN  Outcome: Progressing     Problem: ABCDS Injury Assessment  Goal: Absence of physical injury  12/26/2024 2007 by Jolynn Carson RN  Outcome: Progressing     Problem: Safety - Adult  Goal: Free from fall injury  12/26/2024 2007 by Jolynn Carson RN  Outcome: Progressing     Problem: Self Harm/Suicidality  Goal: Will have no self-injury during hospital stay  Description: INTERVENTIONS:  1.  Ensure constant observer at bedside with Q15M safety checks  2.  Maintain a safe environment  3.  Secure patient belongings  4.  Ensure family/visitors adhere to safety recommendations  5.  Ensure safety tray has been added to patient's diet order  6.  Every shift and PRN: Re-assess suicidal risk via Frequent Screener    12/26/2024 2007 by Jolynn Carson RN  Outcome: Progressing     Problem: Self Care Deficit  Goal: Return ADL status to a safe level of function  Description: INTERVENTIONS:  1. Administer medication as ordered  2. Assess ADL deficits and provide assistive devices as needed  3. Obtain PT/OT consults as needed  4. Assist and instruct patient to increase activity and self care as tolerated  12/26/2024 2007 by Jolynn Carson RN  Outcome: Progressing     Problem: Cardiovascular - Adult  Goal: Maintains optimal cardiac output and hemodynamic stability  12/26/2024 2007 by Jolynn Carson RN  Outcome: Progressing     
  Problem: Pain  Goal: Verbalizes/displays adequate comfort level or baseline comfort level  12/28/2024 1041 by Cuca Nguyen RN  Outcome: Progressing  Flowsheets (Taken 12/28/2024 0830)  Verbalizes/displays adequate comfort level or baseline comfort level: Encourage patient to monitor pain and request assistance  12/27/2024 2327 by Brittney Jacobs RN  Outcome: Progressing     Problem: Discharge Planning  Goal: Discharge to home or other facility with appropriate resources  Recent Flowsheet Documentation  Taken 12/28/2024 0830 by Cuca Nguyen RN  Discharge to home or other facility with appropriate resources: Identify barriers to discharge with patient and caregiver  12/27/2024 2327 by Brittney Jacobs RN  Outcome: Progressing  Flowsheets (Taken 12/27/2024 1952)  Discharge to home or other facility with appropriate resources: Identify barriers to discharge with patient and caregiver     Problem: ABCDS Injury Assessment  Goal: Absence of physical injury  12/28/2024 1041 by Cuca Nguyen RN  Outcome: Progressing  12/27/2024 2327 by Brittney Jacobs RN  Outcome: Progressing     
  Problem: Pain  Goal: Verbalizes/displays adequate comfort level or baseline comfort level  12/30/2024 2116 by Jolynn Carson RN  Outcome: Progressing     Problem: Safety - Adult  Goal: Free from fall injury  12/30/2024 2116 by Jolynn Carson RN  Outcome: Progressing     Problem: Anxiety  Goal: Will report anxiety at manageable levels  Description: INTERVENTIONS:  1. Administer medication as ordered  2. Teach and rehearse alternative coping skills  3. Provide emotional support with 1:1 interaction with staff  12/30/2024 2116 by Jolynn Carson RN  Outcome: Progressing  Flowsheets (Taken 12/30/2024 0751 by Hayden Sosa, RN)  Will report anxiety at manageable levels: Provide emotional support with 1:1 interaction with staff     Problem: Self Care Deficit  Goal: Return ADL status to a safe level of function  Description: INTERVENTIONS:  1. Administer medication as ordered  2. Assess ADL deficits and provide assistive devices as needed  3. Obtain PT/OT consults as needed  4. Assist and instruct patient to increase activity and self care as tolerated  12/30/2024 2116 by Jolynn Carson, RN  Outcome: Progressing     
  Problem: Pain  Goal: Verbalizes/displays adequate comfort level or baseline comfort level  12/7/2024 1004 by Cuca Nguyen, RN  Outcome: Progressing  Flowsheets (Taken 12/7/2024 0830)  Verbalizes/displays adequate comfort level or baseline comfort level: Encourage patient to monitor pain and request assistance  12/6/2024 2106 by Jolynn Carson, RN  Outcome: Progressing     Problem: ABCDS Injury Assessment  Goal: Absence of physical injury  12/7/2024 1004 by Cuca Nguyen, RN  Outcome: Progressing  12/6/2024 2106 by Jolynn Carson, RN  Outcome: Progressing     
  Problem: Pain  Goal: Verbalizes/displays adequate comfort level or baseline comfort level  12/7/2024 2030 by Jolynn Carson, RN  Outcome: Progressing     Problem: Safety - Adult  Goal: Free from fall injury  Outcome: Progressing     Problem: Self Harm/Suicidality  Goal: Will have no self-injury during hospital stay  Description: INTERVENTIONS:  1.  Ensure constant observer at bedside with Q15M safety checks  2.  Maintain a safe environment  3.  Secure patient belongings  4.  Ensure family/visitors adhere to safety recommendations  5.  Ensure safety tray has been added to patient's diet order  6.  Every shift and PRN: Re-assess suicidal risk via Frequent Screener    Outcome: Progressing  Flowsheets (Taken 12/7/2024 0830 by Cuca Nguyen, RN)  Will have no self-injury during hospital stay: Maintain a safe environment     Problem: Anxiety  Goal: Will report anxiety at manageable levels  Description: INTERVENTIONS:  1. Administer medication as ordered  2. Teach and rehearse alternative coping skills  3. Provide emotional support with 1:1 interaction with staff  Outcome: Progressing     Problem: Sleep Disturbance  Goal: Will exhibit normal sleeping pattern  Description: INTERVENTIONS:  1. Administer medication as ordered  2. Decrease environmental stimuli, including noise, as appropriate  3. Discourage social isolation and naps during the day  Outcome: Progressing     
  Problem: Pain  Goal: Verbalizes/displays adequate comfort level or baseline comfort level  12/8/2024 2050 by Jolynn Carson RN  Outcome: Progressing     Problem: Safety - Adult  Goal: Free from fall injury  Outcome: Progressing     Problem: Self Harm/Suicidality  Goal: Will have no self-injury during hospital stay  Description: INTERVENTIONS:  1.  Ensure constant observer at bedside with Q15M safety checks  2.  Maintain a safe environment  3.  Secure patient belongings  4.  Ensure family/visitors adhere to safety recommendations  5.  Ensure safety tray has been added to patient's diet order  6.  Every shift and PRN: Re-assess suicidal risk via Frequent Screener    Outcome: Progressing  Flowsheets (Taken 12/8/2024 0740 by Cuca Nguyen, RN)  Will have no self-injury during hospital stay: Maintain a safe environment     Problem: Anxiety  Goal: Will report anxiety at manageable levels  Description: INTERVENTIONS:  1. Administer medication as ordered  2. Teach and rehearse alternative coping skills  3. Provide emotional support with 1:1 interaction with staff  12/8/2024 2050 by Jolynn Carson RN  Outcome: Progressing     Problem: Sleep Disturbance  Goal: Will exhibit normal sleeping pattern  Description: INTERVENTIONS:  1. Administer medication as ordered  2. Decrease environmental stimuli, including noise, as appropriate  3. Discourage social isolation and naps during the day  Outcome: Progressing     Problem: Self Care Deficit  Goal: Return ADL status to a safe level of function  Description: INTERVENTIONS:  1. Administer medication as ordered  2. Assess ADL deficits and provide assistive devices as needed  3. Obtain PT/OT consults as needed  4. Assist and instruct patient to increase activity and self care as tolerated  Outcome: Progressing     Problem: Respiratory - Adult  Goal: Achieves optimal ventilation and oxygenation  12/8/2024 2050 by Jolynn Carson, RN  Outcome: Progressing  Flowsheets (Taken 
  Problem: Pain  Goal: Verbalizes/displays adequate comfort level or baseline comfort level  Outcome: Progressing     Problem: ABCDS Injury Assessment  Goal: Absence of physical injury  Outcome: Progressing     Problem: Self Harm/Suicidality  Goal: Will have no self-injury during hospital stay  Description: INTERVENTIONS:  1.  Ensure constant observer at bedside with Q15M safety checks  2.  Maintain a safe environment  3.  Secure patient belongings  4.  Ensure family/visitors adhere to safety recommendations  5.  Ensure safety tray has been added to patient's diet order  6.  Every shift and PRN: Re-assess suicidal risk via Frequent Screener    Outcome: Progressing  Flowsheets (Taken 12/29/2024 0800)  Will have no self-injury during hospital stay: Maintain a safe environment     
  Problem: Pain  Goal: Verbalizes/displays adequate comfort level or baseline comfort level  Outcome: Progressing     Problem: ABCDS Injury Assessment  Goal: Absence of physical injury  Outcome: Progressing  Flowsheets (Taken 12/24/2024 2025 by Nicki Escamilla RN)  Absence of Physical Injury: Implement safety measures based on patient assessment     Problem: Safety - Adult  Goal: Free from fall injury  Outcome: Progressing  Flowsheets (Taken 12/24/2024 2025 by Nicki Escamilla RN)  Free From Fall Injury: Instruct family/caregiver on patient safety     
  Problem: Pain  Goal: Verbalizes/displays adequate comfort level or baseline comfort level  Outcome: Progressing     Problem: Safety - Adult  Goal: Free from fall injury  12/22/2024 2058 by Jolynn Carson, RN  Outcome: Progressing     Problem: Self Harm/Suicidality  Goal: Will have no self-injury during hospital stay  Description: INTERVENTIONS:  1.  Ensure constant observer at bedside with Q15M safety checks  2.  Maintain a safe environment  3.  Secure patient belongings  4.  Ensure family/visitors adhere to safety recommendations  5.  Ensure safety tray has been added to patient's diet order  6.  Every shift and PRN: Re-assess suicidal risk via Frequent Screener    Outcome: Progressing  Flowsheets (Taken 12/22/2024 0800 by Cuca Nguyen, RN)  Will have no self-injury during hospital stay: Maintain a safe environment     Problem: Anxiety  Goal: Will report anxiety at manageable levels  Description: INTERVENTIONS:  1. Administer medication as ordered  2. Teach and rehearse alternative coping skills  3. Provide emotional support with 1:1 interaction with staff  Outcome: Progressing  Flowsheets (Taken 12/22/2024 0800 by Cuca Nguyen, RN)  Will report anxiety at manageable levels: Provide emotional support with 1:1 interaction with staff     Problem: Self Care Deficit  Goal: Return ADL status to a safe level of function  Description: INTERVENTIONS:  1. Administer medication as ordered  2. Assess ADL deficits and provide assistive devices as needed  3. Obtain PT/OT consults as needed  4. Assist and instruct patient to increase activity and self care as tolerated  Outcome: Progressing     
  Problem: Pain  Goal: Verbalizes/displays adequate comfort level or baseline comfort level  Outcome: Progressing  Flowsheets (Taken 12/6/2024 0830)  Verbalizes/displays adequate comfort level or baseline comfort level: Encourage patient to monitor pain and request assistance     Problem: Discharge Planning  Goal: Discharge to home or other facility with appropriate resources  Recent Flowsheet Documentation  Taken 12/6/2024 0830 by Cuca Nguyen RN  Discharge to home or other facility with appropriate resources: Identify barriers to discharge with patient and caregiver     Problem: ABCDS Injury Assessment  Goal: Absence of physical injury  Outcome: Progressing     
  Problem: Pain  Goal: Verbalizes/displays adequate comfort level or baseline comfort level  Outcome: Progressing  Note: Patient denies pain      Problem: Safety - Adult  Goal: Free from fall injury  12/11/2024 2043 by Jolynn Carson RN  Outcome: Progressing     Problem: Self Harm/Suicidality  Goal: Will have no self-injury during hospital stay  Description: INTERVENTIONS:  1.  Ensure constant observer at bedside with Q15M safety checks  2.  Maintain a safe environment  3.  Secure patient belongings  4.  Ensure family/visitors adhere to safety recommendations  5.  Ensure safety tray has been added to patient's diet order  6.  Every shift and PRN: Re-assess suicidal risk via Frequent Screener    12/11/2024 2043 by Jolynn Carson RN  Outcome: Progressing     Problem: Anxiety  Goal: Will report anxiety at manageable levels  Description: INTERVENTIONS:  1. Administer medication as ordered  2. Teach and rehearse alternative coping skills  3. Provide emotional support with 1:1 interaction with staff  12/11/2024 2043 by Jolynn Carson RN  Outcome: Progressing  Note: Rates anxiety 0/10     Problem: Sleep Disturbance  Goal: Will exhibit normal sleeping pattern  Description: INTERVENTIONS:  1. Administer medication as ordered  2. Decrease environmental stimuli, including noise, as appropriate  3. Discourage social isolation and naps during the day  Outcome: Progressing     Problem: Self Care Deficit  Goal: Return ADL status to a safe level of function  Description: INTERVENTIONS:  1. Administer medication as ordered  2. Assess ADL deficits and provide assistive devices as needed  3. Obtain PT/OT consults as needed  4. Assist and instruct patient to increase activity and self care as tolerated  Outcome: Progressing     
  Problem: Respiratory - Adult  Goal: Achieves optimal ventilation and oxygenation  1/1/2025 0746 by Chema Dove Sr., RCP  Outcome: Progressing  12/31/2024 1954 by Maryana Tavares, RT  Outcome: Progressing     
  Problem: Respiratory - Adult  Goal: Achieves optimal ventilation and oxygenation  1/2/2025 0836 by Vikki Franks, RT  Outcome: Progressing  1/2/2025 0732 by Hayden Sosa RN  Outcome: Progressing     
  Problem: Respiratory - Adult  Goal: Achieves optimal ventilation and oxygenation  12/10/2024 0755 by Vikki Franks, RT  Outcome: Progressing  12/9/2024 1946 by Nicki Escamilla, RN  Outcome: Progressing  Flowsheets (Taken 12/9/2024 1918)  Achieves optimal ventilation and oxygenation: Assess for changes in respiratory status     
  Problem: Respiratory - Adult  Goal: Achieves optimal ventilation and oxygenation  12/10/2024 1953 by Maryana Tavares, RT  Outcome: Progressing  Flowsheets (Taken 12/10/2024 1917 by Nicki Escamilla, RN)  Achieves optimal ventilation and oxygenation: Assess for changes in respiratory status  12/10/2024 0829 by Hayden Sosa, RN  Outcome: Progressing  Flowsheets (Taken 12/10/2024 0826)  Achieves optimal ventilation and oxygenation: Assess for changes in respiratory status  12/10/2024 0755 by Vikki Franks, RT  Outcome: Progressing     
  Problem: Respiratory - Adult  Goal: Achieves optimal ventilation and oxygenation  12/11/2024 0800 by Gracy Mukherjee, RT  Outcome: Progressing  12/10/2024 1953 by Maryana Tavares, RT  Outcome: Progressing  Flowsheets (Taken 12/10/2024 1917 by Nicki Escamilla RN)  Achieves optimal ventilation and oxygenation: Assess for changes in respiratory status     
  Problem: Respiratory - Adult  Goal: Achieves optimal ventilation and oxygenation  12/11/2024 1931 by Kassy Xiong, RT  Outcome: Progressing  12/11/2024 0800 by Gracy Mukherjee, RT  Outcome: Progressing     
  Problem: Respiratory - Adult  Goal: Achieves optimal ventilation and oxygenation  12/12/2024 0715 by Chema Dove Sr., RCP  Outcome: Progressing  Flowsheets (Taken 12/11/2024 2047 by Jolynn Carson, RN)  Achieves optimal ventilation and oxygenation: Assess for changes in respiratory status  12/11/2024 1931 by Kassy Xiong, RT  Outcome: Progressing     
  Problem: Respiratory - Adult  Goal: Achieves optimal ventilation and oxygenation  12/12/2024 1956 by Maryana Tavares, RT  Outcome: Progressing  12/12/2024 0743 by Hayden Sosa, RN  Outcome: Progressing  Flowsheets (Taken 12/12/2024 0741)  Achieves optimal ventilation and oxygenation: Assess for changes in respiratory status  12/12/2024 0715 by Chema Dove Sr., P  Outcome: Progressing  Flowsheets (Taken 12/11/2024 2047 by Jolynn Carson, RN)  Achieves optimal ventilation and oxygenation: Assess for changes in respiratory status     
  Problem: Respiratory - Adult  Goal: Achieves optimal ventilation and oxygenation  12/13/2024 0810 by Gracy Mukherjee, RT  Outcome: Adequate for Discharge  Flowsheets (Taken 12/12/2024 2138 by Jolynn Carson, RN)  Achieves optimal ventilation and oxygenation: Assess for changes in respiratory status  12/12/2024 1956 by Maryana Tavares, RT  Outcome: Progressing     
  Problem: Respiratory - Adult  Goal: Achieves optimal ventilation and oxygenation  12/13/2024 1956 by Maryana Tavares, RT  Outcome: Progressing  12/13/2024 1953 by Evelyn Mayberry, RN  Outcome: Progressing  Flowsheets (Taken 12/13/2024 0902 by Cuca Nguyen, RN)  Achieves optimal ventilation and oxygenation: Assess for changes in respiratory status  12/13/2024 0810 by Gracy Mukherjee, RT  Outcome: Adequate for Discharge  Flowsheets (Taken 12/12/2024 2138 by Jolynn Carson, RN)  Achieves optimal ventilation and oxygenation: Assess for changes in respiratory status     
  Problem: Respiratory - Adult  Goal: Achieves optimal ventilation and oxygenation  12/14/2024 0919 by Vikki Franks, RT  Outcome: Progressing  12/13/2024 1956 by Maryana Tavares, RT  Outcome: Progressing  12/13/2024 1953 by Evelyn Mayberry, RN  Outcome: Progressing  Flowsheets (Taken 12/13/2024 0902 by Cuca Nguyen, RN)  Achieves optimal ventilation and oxygenation: Assess for changes in respiratory status     
  Problem: Respiratory - Adult  Goal: Achieves optimal ventilation and oxygenation  12/16/2024 1954 by Maryana Tavares, RT  Outcome: Progressing  12/16/2024 0940 by Sulma Ta, RN  Outcome: Progressing  Flowsheets (Taken 12/16/2024 0912)  Achieves optimal ventilation and oxygenation: Assess for changes in mentation and behavior  12/16/2024 0731 by Chema Dove Sr., RCP  Outcome: Progressing  Flowsheets (Taken 12/15/2024 1936 by Nicki Escamilla, RN)  Achieves optimal ventilation and oxygenation: Assess for changes in respiratory status     
  Problem: Respiratory - Adult  Goal: Achieves optimal ventilation and oxygenation  12/17/2024 0800 by Vikki Franks, RT  Outcome: Progressing  12/17/2024 0743 by Hayden Sosa, RN  Outcome: Progressing  Flowsheets (Taken 12/16/2024 2021 by Jolynn Carson, RN)  Achieves optimal ventilation and oxygenation: Assess for changes in respiratory status  12/16/2024 1954 by Maryana Tavares, RT  Outcome: Progressing     
  Problem: Respiratory - Adult  Goal: Achieves optimal ventilation and oxygenation  12/17/2024 1920 by Maryana Tavares, RT  Outcome: Progressing  Flowsheets (Taken 12/17/2024 0813 by Hayden Sosa, RN)  Achieves optimal ventilation and oxygenation: Assess for changes in respiratory status  12/17/2024 0800 by Vikki Franks, RT  Outcome: Progressing  12/17/2024 0743 by Hayden Sosa, RN  Outcome: Progressing  Flowsheets (Taken 12/16/2024 2021 by Jolynn Carson, RN)  Achieves optimal ventilation and oxygenation: Assess for changes in respiratory status     
  Problem: Respiratory - Adult  Goal: Achieves optimal ventilation and oxygenation  12/18/2024 0759 by Gracy Mukherjee, RT  Outcome: Progressing  12/18/2024 0733 by Hayden Sosa, RN  Outcome: Progressing  12/17/2024 2116 by Brittney Jacobs RN  Outcome: Progressing  Flowsheets (Taken 12/17/2024 2045)  Achieves optimal ventilation and oxygenation: Assess for changes in respiratory status  12/17/2024 1920 by Maryana Tavares, RT  Outcome: Progressing  Flowsheets (Taken 12/17/2024 0813 by Hayden Sosa, RN)  Achieves optimal ventilation and oxygenation: Assess for changes in respiratory status     
  Problem: Respiratory - Adult  Goal: Achieves optimal ventilation and oxygenation  12/20/2024 2024 by Maryana Tavares, RT  Outcome: Progressing  Flowsheets (Taken 12/20/2024 0814 by Cuca Nguyen, RN)  Achieves optimal ventilation and oxygenation: Assess for changes in respiratory status  12/20/2024 0756 by Chema Dove Sr., RCP  Outcome: Progressing     
  Problem: Respiratory - Adult  Goal: Achieves optimal ventilation and oxygenation  12/21/2024 0912 by Gracy Mukherjee, RT  Outcome: Adequate for Discharge  Flowsheets (Taken 12/20/2024 2052 by Jolynn Carson, RN)  Achieves optimal ventilation and oxygenation: Assess for changes in respiratory status  12/20/2024 2024 by Maryana Tavares, RT  Outcome: Progressing  Flowsheets (Taken 12/20/2024 0814 by Cuca Nguyen, RN)  Achieves optimal ventilation and oxygenation: Assess for changes in respiratory status     
  Problem: Respiratory - Adult  Goal: Achieves optimal ventilation and oxygenation  12/21/2024 1943 by Kassy Xiong, RT  Outcome: Progressing  12/21/2024 0912 by Gracy Mukherjee, RT  Outcome: Adequate for Discharge  Flowsheets  Taken 12/21/2024 0840 by Cuca gNuyen, RN  Achieves optimal ventilation and oxygenation: Assess for changes in respiratory status  Taken 12/20/2024 2052 by Jolynn Carson, RN  Achieves optimal ventilation and oxygenation: Assess for changes in respiratory status     
  Problem: Respiratory - Adult  Goal: Achieves optimal ventilation and oxygenation  12/23/2024 0852 by Gracy Mukherjee, RT  Outcome: Progressing  12/23/2024 0751 by Hayden Sosa, RN  Outcome: Progressing  Flowsheets (Taken 12/22/2024 2105 by Jolynn Carson, RN)  Achieves optimal ventilation and oxygenation: Assess for changes in respiratory status     
  Problem: Respiratory - Adult  Goal: Achieves optimal ventilation and oxygenation  12/24/2024 2028 by Maryana Tavares, RT  Outcome: Progressing  12/24/2024 0840 by Hayden Sosa, RN  Outcome: Progressing  12/24/2024 0836 by Vikki Franks, RT  Outcome: Progressing     
  Problem: Respiratory - Adult  Goal: Achieves optimal ventilation and oxygenation  12/26/2024 0809 by Chema Dove Sr., RCP  Outcome: Progressing  12/25/2024 2051 by Maryana Tavares, RT  Outcome: Progressing  Flowsheets  Taken 12/25/2024 2036 by Jolynn Carson, RN  Achieves optimal ventilation and oxygenation: Assess for changes in respiratory status  Taken 12/25/2024 0815 by Cuca Nguyen RN  Achieves optimal ventilation and oxygenation: Assess for changes in respiratory status     
  Problem: Respiratory - Adult  Goal: Achieves optimal ventilation and oxygenation  12/27/2024 0809 by Chema Dove Sr., P  Outcome: Progressing  Flowsheets (Taken 12/27/2024 0730 by Hayden Sosa, RN)  Achieves optimal ventilation and oxygenation: Assess for changes in respiratory status  12/27/2024 0725 by Hayden Sosa, RN  Outcome: Progressing  Flowsheets (Taken 12/26/2024 2010 by Jolynn Carson, RN)  Achieves optimal ventilation and oxygenation: Assess for changes in respiratory status     
  Problem: Respiratory - Adult  Goal: Achieves optimal ventilation and oxygenation  12/27/2024 1954 by Maryana Tavares, RT  Outcome: Progressing  12/27/2024 0809 by Chema Dove Sr., RCP  Outcome: Progressing  Flowsheets (Taken 12/27/2024 0730 by Hayden Sosa, RN)  Achieves optimal ventilation and oxygenation: Assess for changes in respiratory status  12/27/2024 0725 by Hayden Sosa, RN  Outcome: Progressing  Flowsheets (Taken 12/26/2024 2010 by Jolynn Carson, RN)  Achieves optimal ventilation and oxygenation: Assess for changes in respiratory status     
  Problem: Respiratory - Adult  Goal: Achieves optimal ventilation and oxygenation  12/28/2024 0846 by Vikki Franks, RT  Outcome: Progressing  12/27/2024 1954 by Maryana Tavares, RT  Outcome: Progressing  Flowsheets (Taken 12/27/2024 1952 by Brittney Jacobs, RN)  Achieves optimal ventilation and oxygenation: Assess for changes in respiratory status     
  Problem: Respiratory - Adult  Goal: Achieves optimal ventilation and oxygenation  12/29/2024 0904 by Vikki Franks, RT  Outcome: Progressing  12/28/2024 2020 by Nicki Escamilla RN  Outcome: Progressing  Flowsheets (Taken 12/28/2024 2012)  Achieves optimal ventilation and oxygenation: Assess for changes in respiratory status     
  Problem: Respiratory - Adult  Goal: Achieves optimal ventilation and oxygenation  12/30/2024 0915 by Kassy Xiong, RT  Outcome: Progressing  12/30/2024 0748 by Hayden Sosa, RN  Outcome: Progressing  Flowsheets (Taken 12/29/2024 1950 by Nicki Escamilla, RN)  Achieves optimal ventilation and oxygenation: Assess for changes in respiratory status     
  Problem: Respiratory - Adult  Goal: Achieves optimal ventilation and oxygenation  12/30/2024 0915 by Kassy Xiong, RT  Outcome: Progressing  12/30/2024 0748 by Hayden Sosa, RN  Outcome: Progressing  Flowsheets (Taken 12/29/2024 1950 by Nicki Escamilla, RN)  Achieves optimal ventilation and oxygenation: Assess for changes in respiratory status     
  Problem: Respiratory - Adult  Goal: Achieves optimal ventilation and oxygenation  12/31/2024 0753 by Chema Dove Sr., [facility]  Outcome: Progressing     
  Problem: Respiratory - Adult  Goal: Achieves optimal ventilation and oxygenation  12/31/2024 1954 by Maryana Tavares, RT  Outcome: Progressing  12/31/2024 0753 by Chema Dove Sr., RCP  Outcome: Progressing  12/31/2024 0721 by Hayden Sosa, RN  Outcome: Progressing     
  Problem: Respiratory - Adult  Goal: Achieves optimal ventilation and oxygenation  12/4/2024 2023 by Kassy Xiong, RT  Outcome: Progressing  12/4/2024 0900 by Gracy Mukherjee, RT  Outcome: Progressing     
  Problem: Respiratory - Adult  Goal: Achieves optimal ventilation and oxygenation  12/6/2024 1947 by Maryana Tavares, RT  Outcome: Progressing  Flowsheets (Taken 12/6/2024 0830 by Cuca Nguyen, RN)  Achieves optimal ventilation and oxygenation: Assess for changes in respiratory status  12/6/2024 0734 by Vikki Franks, RT  Outcome: Progressing     
  Problem: Respiratory - Adult  Goal: Achieves optimal ventilation and oxygenation  12/8/2024 0738 by Chema Dove Sr., RCP  Outcome: Progressing  Flowsheets (Taken 12/7/2024 2033 by Jolynn Carson, RN)  Achieves optimal ventilation and oxygenation: Assess for changes in respiratory status  12/7/2024 1935 by Maryana Tavares, RT  Outcome: Progressing  Flowsheets (Taken 12/7/2024 0830 by Cuca Nguyen, RN)  Achieves optimal ventilation and oxygenation: Assess for changes in respiratory status     
  Problem: Respiratory - Adult  Goal: Achieves optimal ventilation and oxygenation  12/8/2024 0738 by Chema Dove Sr., RCP  Outcome: Progressing  Flowsheets (Taken 12/7/2024 2033 by Jolynn Carson, RN)  Achieves optimal ventilation and oxygenation: Assess for changes in respiratory status  12/7/2024 1935 by Maryana Tavares, RT  Outcome: Progressing  Flowsheets (Taken 12/7/2024 0830 by Cuca Nguyen, RN)  Achieves optimal ventilation and oxygenation: Assess for changes in respiratory status     
  Problem: Respiratory - Adult  Goal: Achieves optimal ventilation and oxygenation  12/9/2024 0746 by Vikki Franks, RT  Outcome: Progressing  Flowsheets (Taken 12/8/2024 2053 by Jolynn Carson, RN)  Achieves optimal ventilation and oxygenation: Assess for changes in respiratory status  12/8/2024 2050 by Jolynn Carson, RN  Outcome: Progressing  Flowsheets (Taken 12/8/2024 0740 by Cuca Nguyen, RN)  Achieves optimal ventilation and oxygenation: Assess for changes in respiratory status     
  Problem: Respiratory - Adult  Goal: Achieves optimal ventilation and oxygenation  Outcome: Progressing     
  Problem: Respiratory - Adult  Goal: Achieves optimal ventilation and oxygenation  Outcome: Progressing  Flowsheets  Taken 12/25/2024 2036 by Jolynn Carson, RN  Achieves optimal ventilation and oxygenation: Assess for changes in respiratory status  Taken 12/25/2024 0815 by Cuca Nguyen RN  Achieves optimal ventilation and oxygenation: Assess for changes in respiratory status     
  Problem: Respiratory - Adult  Goal: Achieves optimal ventilation and oxygenation  Outcome: Progressing  Flowsheets (Taken 12/14/2024 1941 by Nicki Escamilla RN)  Achieves optimal ventilation and oxygenation: Assess for changes in respiratory status     
  Problem: Respiratory - Adult  Goal: Achieves optimal ventilation and oxygenation  Outcome: Progressing  Flowsheets (Taken 12/15/2024 1936 by Nicki Escamilla RN)  Achieves optimal ventilation and oxygenation: Assess for changes in respiratory status     
  Problem: Respiratory - Adult  Goal: Achieves optimal ventilation and oxygenation  Outcome: Progressing  Flowsheets (Taken 12/7/2024 0830 by Cuca Nguyen RN)  Achieves optimal ventilation and oxygenation: Assess for changes in respiratory status     
  Problem: Self Harm/Suicidality  Goal: Will have no self-injury during hospital stay  Description: INTERVENTIONS:  1.  Ensure constant observer at bedside with Q15M safety checks  2.  Maintain a safe environment  3.  Secure patient belongings  4.  Ensure family/visitors adhere to safety recommendations  5.  Ensure safety tray has been added to patient's diet order  6.  Every shift and PRN: Re-assess suicidal risk via Frequent Screener    Recent Flowsheet Documentation  Taken 12/20/2024 0814 by Cuca Nguyen RN  Will have no self-injury during hospital stay: Maintain a safe environment     Problem: Depression  Goal: Will be euthymic at discharge  Description: INTERVENTIONS:  1. Administer medication as ordered  2. Provide emotional support via 1:1 interaction with staff  3. Encourage involvement in milieu/groups/activities  4. Monitor for social isolation  Outcome: Progressing     Problem: Anxiety  Goal: Will report anxiety at manageable levels  Description: INTERVENTIONS:  1. Administer medication as ordered  2. Teach and rehearse alternative coping skills  3. Provide emotional support with 1:1 interaction with staff  Recent Flowsheet Documentation  Taken 12/20/2024 0814 by Cuca Nguyen, RN  Will report anxiety at manageable levels:   Administer medication as ordered   Provide emotional support with 1:1 interaction with staff     
  Problem: Sleep Disturbance  Goal: Will exhibit normal sleeping pattern  Description: INTERVENTIONS:  1. Administer medication as ordered  2. Decrease environmental stimuli, including noise, as appropriate  3. Discourage social isolation and naps during the day  12/7/2024 2030 by Jolynn Carson RN  Outcome: Jesus  Slept fairly well last night but talks in her sleep with her eyes open.     
  Problem: Sleep Disturbance  Goal: Will exhibit normal sleeping pattern  Description: INTERVENTIONS:  1. Administer medication as ordered  2. Decrease environmental stimuli, including noise, as appropriate  3. Discourage social isolation and naps during the day  12/18/2024 0733 by Hayden Sosa, RN  Outcome: Progressing     Problem: Self Care Deficit  Goal: Return ADL status to a safe level of function  Description: INTERVENTIONS:  1. Administer medication as ordered  2. Assess ADL deficits and provide assistive devices as needed  3. Obtain PT/OT consults as needed  4. Assist and instruct patient to increase activity and self care as tolerated  12/18/2024 0733 by Hayden Sosa, RN  Outcome: Progressing     Problem: Respiratory - Adult  Goal: Achieves optimal ventilation and oxygenation  12/18/2024 0733 by Hayden Sosa, RN  Outcome: Progressing     Problem: Cardiovascular - Adult  Goal: Maintains optimal cardiac output and hemodynamic stability  Outcome: Progressing  Flowsheets (Taken 12/17/2024 2045 by Brittney Jacobs, RN)  Maintains optimal cardiac output and hemodynamic stability: Monitor blood pressure and heart rate     
Nicki Escamilla RN  Will have no self-injury during hospital stay: Maintain a safe environment  Taken 12/15/2024 0805 by Cuca Nguyen RN  Will have no self-injury during hospital stay: Maintain a safe environment     Problem: Depression  Goal: Will be euthymic at discharge  Description: INTERVENTIONS:  1. Administer medication as ordered  2. Provide emotional support via 1:1 interaction with staff  3. Encourage involvement in milieu/groups/activities  4. Monitor for social isolation  12/16/2024 0940 by Sulma Ta RN  Outcome: Progressing  12/15/2024 2049 by Nicki Escamilla RN  Outcome: Progressing     Problem: Anxiety  Goal: Will report anxiety at manageable levels  Description: INTERVENTIONS:  1. Administer medication as ordered  2. Teach and rehearse alternative coping skills  3. Provide emotional support with 1:1 interaction with staff  Recent Flowsheet Documentation  Taken 12/16/2024 0912 by Sulma Ta RN  Will report anxiety at manageable levels: Administer medication as ordered     Problem: Sleep Disturbance  Goal: Will exhibit normal sleeping pattern  Description: INTERVENTIONS:  1. Administer medication as ordered  2. Decrease environmental stimuli, including noise, as appropriate  3. Discourage social isolation and naps during the day  Outcome: Progressing     
as ordered  2. Teach and rehearse alternative coping skills  3. Provide emotional support with 1:1 interaction with staff  Outcome: Progressing  Flowsheets (Taken 12/23/2024 1945 by Nicki Escamilla, RN)  Will report anxiety at manageable levels: Provide emotional support with 1:1 interaction with staff     Problem: Sleep Disturbance  Goal: Will exhibit normal sleeping pattern  Description: INTERVENTIONS:  1. Administer medication as ordered  2. Decrease environmental stimuli, including noise, as appropriate  3. Discourage social isolation and naps during the day  12/24/2024 0840 by Hayden Sosa RN  Outcome: Progressing     Problem: Self Care Deficit  Goal: Return ADL status to a safe level of function  Description: INTERVENTIONS:  1. Administer medication as ordered  2. Assess ADL deficits and provide assistive devices as needed  3. Obtain PT/OT consults as needed  4. Assist and instruct patient to increase activity and self care as tolerated  Outcome: Progressing     Problem: Respiratory - Adult  Goal: Achieves optimal ventilation and oxygenation  12/24/2024 0840 by Hayden Sosa RN  Outcome: Progressing     Problem: Cardiovascular - Adult  Goal: Maintains optimal cardiac output and hemodynamic stability  Outcome: Progressing     
caregiver     Problem: Cardiovascular - Adult  Goal: Maintains optimal cardiac output and hemodynamic stability  Recent Flowsheet Documentation  Taken 12/6/2024 2103 by Jolynn Carson, ELI  Maintains optimal cardiac output and hemodynamic stability: Monitor blood pressure and heart rate     
interaction with staff  3. Encourage involvement in milieu/groups/activities  4. Monitor for social isolation  Outcome: Progressing     Problem: Anxiety  Goal: Will report anxiety at manageable levels  Description: INTERVENTIONS:  1. Administer medication as ordered  2. Teach and rehearse alternative coping skills  3. Provide emotional support with 1:1 interaction with staff  12/9/2024 0807 by Hayden Sosa RN  Outcome: Progressing     Problem: Sleep Disturbance  Goal: Will exhibit normal sleeping pattern  Description: INTERVENTIONS:  1. Administer medication as ordered  2. Decrease environmental stimuli, including noise, as appropriate  3. Discourage social isolation and naps during the day  12/9/2024 0807 by Hayden Sosa, RN  Outcome: Progressing     Problem: Self Care Deficit  Goal: Return ADL status to a safe level of function  Description: INTERVENTIONS:  1. Administer medication as ordered  2. Assess ADL deficits and provide assistive devices as needed  3. Obtain PT/OT consults as needed  4. Assist and instruct patient to increase activity and self care as tolerated  12/9/2024 0807 by Hayden Sosa, RN  Outcome: Progressing     Problem: Respiratory - Adult  Goal: Achieves optimal ventilation and oxygenation  12/9/2024 0807 by Hayden Sosa, RN  Outcome: Progressing  Flowsheets (Taken 12/9/2024 0805)  Achieves optimal ventilation and oxygenation: Assess for changes in respiratory status     Problem: Cardiovascular - Adult  Goal: Maintains optimal cardiac output and hemodynamic stability  Outcome: Progressing     
medication as ordered  2. Teach and rehearse alternative coping skills  3. Provide emotional support with 1:1 interaction with staff  12/17/2024 0743 by Hayden Sosa RN  Outcome: Progressing  Flowsheets (Taken 12/16/2024 2021 by Jolynn Carson RN)  Will report anxiety at manageable levels: Administer medication as ordered     Problem: Sleep Disturbance  Goal: Will exhibit normal sleeping pattern  Description: INTERVENTIONS:  1. Administer medication as ordered  2. Decrease environmental stimuli, including noise, as appropriate  3. Discourage social isolation and naps during the day  Outcome: Progressing     Problem: Self Care Deficit  Goal: Return ADL status to a safe level of function  Description: INTERVENTIONS:  1. Administer medication as ordered  2. Assess ADL deficits and provide assistive devices as needed  3. Obtain PT/OT consults as needed  4. Assist and instruct patient to increase activity and self care as tolerated  Outcome: Progressing     Problem: Respiratory - Adult  Goal: Achieves optimal ventilation and oxygenation  12/17/2024 0743 by Hayden Sosa RN  Outcome: Progressing  Flowsheets (Taken 12/16/2024 2021 by Jolynn Carson RN)  Achieves optimal ventilation and oxygenation: Assess for changes in respiratory status     Problem: Cardiovascular - Adult  Goal: Maintains optimal cardiac output and hemodynamic stability  Outcome: Progressing  Flowsheets (Taken 12/16/2024 2021 by Jolynn Carson RN)  Maintains optimal cardiac output and hemodynamic stability: Monitor blood pressure and heart rate     
medication as ordered  2. Teach and rehearse alternative coping skills  3. Provide emotional support with 1:1 interaction with staff  Outcome: Progressing     Problem: Sleep Disturbance  Goal: Will exhibit normal sleeping pattern  Description: INTERVENTIONS:  1. Administer medication as ordered  2. Decrease environmental stimuli, including noise, as appropriate  3. Discourage social isolation and naps during the day  Outcome: Progressing     Problem: Self Care Deficit  Goal: Return ADL status to a safe level of function  Description: INTERVENTIONS:  1. Administer medication as ordered  2. Assess ADL deficits and provide assistive devices as needed  3. Obtain PT/OT consults as needed  4. Assist and instruct patient to increase activity and self care as tolerated  12/27/2024 0725 by Hayden Sosa, RN  Outcome: Progressing     Problem: Respiratory - Adult  Goal: Achieves optimal ventilation and oxygenation  Outcome: Progressing  Flowsheets (Taken 12/26/2024 2010 by Jolynn Carson, RN)  Achieves optimal ventilation and oxygenation: Assess for changes in respiratory status     Problem: Cardiovascular - Adult  Goal: Maintains optimal cardiac output and hemodynamic stability  12/27/2024 0725 by Hayden Sosa, RN  Outcome: Progressing

## 2025-01-03 NOTE — BH NOTE
Group Therapy Note    Date: 1/2/2025    Group Start Time: 0900  Group End Time: 0950  Group Topic: Psychotherapy    [facility] BEHAVIORAL HLTH OP    Socorro Ríos LCSW        Group Therapy Note    Attendees: 6 scheduled    Focus of session was based on article “13 Things To Do To Be Healthier Daily.”  We reviewed the 13 things which include: drink more water, practice and express gratitude, wake up with the sun, forgive, declutter, know when to speak up and know when to stay quiet, journal, eliminate negative energies, pay attention, unplug from social media, embrace change, learn to laugh at situations that normally would make you angry, and go for it.  Group members were asked to determine which of these ideas could contribute to their life right now in a positive way.         Patient's Goal:  Will be euthymic at discharge     Notes:  Pt attended group and participated in the discussion.  She disclosed that her stomach has kassie upset yesterday and this morning but that she had taken something for it.  She is excited about going home but also feeling somewhat overwhelmed.  We discussed her discharge and after care plan.      Status After Intervention:  Improved    Participation Level: Active Listener    Participation Quality: Appropriate, Attentive, and Sharing      Speech:  normal      Thought Process/Content: Logical      Affective Functioning: Congruent      Mood: euthymic      Level of consciousness:  Alert, Oriented x4, and Attentive      Response to Learning: Able to verbalize current knowledge/experience, Able to verbalize/acknowledge new learning, Able to retain information, Capable of insight, Able to change behavior, and Progressing to goal      Endings: None Reported    Modes of Intervention: Education, Support, Socialization, Exploration, Clarifying, Problem-solving, and Activity      Discipline Responsible: Social 
                                                                      Group Therapy Note    Date: 12/10/2024      I spoke with pt in her room as she was still in bed.  She was very sleepy stating that she is tired and does not feel well.  I encourage her to attend group but she did not,  I will continue to encourage her to participate in the future      Signature:  Socorro Ríos, HENRYW    
                                                                      Group Therapy Note    Date: 12/12/2024    Group Start Time: 0900  Group End Time: 0950  Group Topic: Psychotherapy    [facility] BEHAVIORAL TH OP    Socorro Ríos LCSW        Group Therapy Note    Attendees: 3 scheduled    Focus of session was a discussion on what are essentially three stages of treatment/therapy.  I discussed with group that the first stage is typically developing insight and awareness about symptoms, reactions and behaviors that we engage in, what triggers us, how we may be vulnerable to triggers and emotions, and looking inside to see how we impact our day to day life in negative and positive ways.  We spoke about the 2nd phase is typically gaining knowledge essentially about how to do things in positive ways, how we can change our reactions, behaviors, and set goals for change and how to go about it.  Third phase is typically implementation of coping skills and creating positive changes.  I spoke with group about how many people get stuck in entering the third phase of doing the work to change and why and we discussed where group members believe that they are and assisted in finding ways forward in their progress of treatment.        Patient's Goal:  Will be euthymic at discharge   Notes:  I spoke with pt in her room to encourage her to come to group.  She shared that she was not feeling well and was having some problems.  She discussed coming to [facility] when she leaves inpatient.  She then appeared confused and talked about the beautiful decorations in her room.    Pt walked down to group on her own and but then got up again only to return with a sweater. She continued to talk about things that were not there but then could orient briefly     Status After Intervention:  Unchanged    Participation Level: Minimal    Participation Quality: Lethargic      Speech:  normal      Thought Process/Content: Hallucinating      Affective 
                                                                      Group Therapy Note    Date: 12/23/2024    Group Start Time: 0900  Group End Time: 0950  Group Topic: Process Group - Inpatient    [facility] BEHAVIORAL HLTH OP    Fela Quiroga, LCSW        Group Therapy Note    Attendees: 4 scheduled    Focus of session was a discussion from the book “Anxious to Please” by Ildefonso Davalos and Paul Wood.  We discussed their awareness practice and the definition being sustained attention to thoughts, feelings, body, and behavior, also known as mindfulness.  We spoke about how awareness can be about anxiety and how it can help.  We spoke about the less awareness we have about anxiety, the more our bodies acclimate and prepare for it which keeps us in a consistent state of feeling on edge.  We spoke about the ways that we can avoid and suppress anxiety including “the cloud” which is space out when things are difficult, “the drug” which is using ____to avoid and stay unaware, “the sleight of hand” which is staying preoccupied with solving the problems of others so you can't see your own, and “the Daniel pan” which is to blame external circumstances.  We spoke about what patient can see that can help them be more aware and make positive changes for how to face anxiety.         Patient's Goal:  Will have no self-injury during hospital stay     Notes:  Dannielle participated in group well and she spoke about how she is still learning how to accept and cope with the physical pains as well as the emotions of her day to day life.  She spoke bout how she is willing to work on accepting painful feelings as is rather than finding ways to avoid them.      Status After Intervention:  Improved    Participation Level: Active Listener and Interactive    Participation Quality: Appropriate and Attentive      Speech:  normal      Thought Process/Content: Logical  Linear      Affective Functioning: Congruent and Flat      Mood: 
                                                                      Group Therapy Note    Date: 12/24/2024    Dannielle was in bed when I came to see her.  She said she still has diarrhea and did not want to attend group today.  We will continue to encourage group attendance and participation as part of patient's recovery plan.    Signature:  Fela Quiroga [facility]    
                                                                      Group Therapy Note    Date: 12/31/2024    Group Start Time: 0900  Group End Time: 0950  Group Topic: Process Group - Inpatient    [facility] BEHAVIORAL HLTH OP    Socorro Ríos, WILIAN        Group Therapy Note    Attendees: 4 scheduled     Focus of session was based on the article by Eric Shaw “4 Questions that Will Change Your Attitude (When You Can't Change Anything else)”.  We spoke about the quote “a peaceful person is not a person who is always in a good situation, but rather a person who always has a good attitude in every situation.”  We spoke about how the one controllable thing in every situation is our attitude and making deliberate choices about it.  We went through the questions in the article: who would you be and what else would you see, if you erased the thought that's worrying you, what could you be positive about right now if you really wanted to, how can you respond from a place of clarity and strength, rather than thoughtlessly reacting to this experience, and what can you let go of right now without losing anything.  We spoke about how having moment to moment attitude checks can help us cope better in the day to day.         Patient's Goal:  Will be euthymic at discharge     Notes:  Pt participated in the group activity and discussion. Pt shared that she tries to have a positive attitude but it has been difficult this year.  She would like to start the year off right and feels better now.  She would like to be able to attend [facility] and would like to have a assistant in the afternoons.  Pt has also had thought recently about assisted living but is not ready for that at this time     Status After Intervention:  Improved    Participation Level: Active Listener and Interactive    Participation Quality: Appropriate, Attentive, and Sharing      Speech:  normal      Thought Process/Content: Logical      Affective Functioning: 
                                                                      Group Therapy Note    Date: 12/6/2024    Pt did not attend group today as she was sleeping.  I will continue to encourage pt to attend in the future            Signature:  Socorro Ríos LCSW    
        Mood labile and affect depressed. Alert and oriented x 4. Feels fatigued and \"down\" today but denies HI/SI/AVH.  Has some short term memory deficits. Speech logical and linear. Able to do own ADL's. Had diarrhea x 1 today. Refused Metamucil. Attended group activity as active participant, talking with staff frequently Worked on arts and crafts. Talked to friends and family on phone, who are supportive. Dtr visited.                 
  Affect blunted. Mood depressed. Alert and oriented x 3. Cooperative and med compliant. Ate 75% meals. Able to do own ADL's. Has some short term memory deficits. Did not attend group activity. Isolative to room to sleep in between meals.Spoke with  today, preferring 1:1 interactions to group ones.    PRN Medication Documentation    Specific patient behavior that led to need for PRN medication: c/o flatulence  Staff interventions attempted prior to PRN being given: assessed  PRN medication given: Maalox 30ml po given at 0935  Patient response/effectiveness of PRN medication: \"I feel better.\"    PRN Medication Documentation    Specific patient behavior that led to need for PRN medication: generalized stiffness, denies pain  Staff interventions attempted prior to PRN being given: assess  PRN medication given: Tylenol 650mg po given at 1550  Patient response/effectiveness of PRN medication: 0/10 pain at 1628      PRN Medication Documentation    Specific patient behavior that led to need for PRN medication: dry cough  Staff interventions attempted prior to PRN being given: assess, no soreness of throat  PRN medication given: Cepacol lozenge given at 1547  Patient response/effectiveness of PRN medication: \"It helps me feel better.          
  Behavioral Health Interdisciplinary Rounds     Patient Name: Dannielle Hall  Age: 89 y.o.  Room/Bed:  228/01  Primary Diagnosis: Major depressive disorder, recurrent episode, moderate (HCC)   Admission Status: Voluntary     Readmission within 30 days: No  Power of  in place: No  Patient requires a blocked bed: No14}          Reason for blocked bed:     Sleep hours:        Participation in Care/Groups:  Yes  Medication Compliant?: Yes  PRNS (last 24 hours): Sleep Aid    Restraints (last 24 hours):  No  Substance Abuse:  No    24 hour chart check complete: Yes    Patient goal(s) for today: to get some help  Treatment team focus/goals: Patient's chronic conditions and co-morbidity symptoms are monitored and maintained or improved   Progress note: patient admitted voluntarily for depressed mood will have medications adjusted    LOS:  1  Expected LOS: 5-7    Financial concerns/prescription coverage:  No  Family contact: no      Family requesting physician contact today:  No  Discharge plan: return home referral to [facility] she has an aid 5 days per week  Access to weapons: No       Outpatient provider(s): Dr. Langley  Patient's preferred phone number for follow up call: [phone]     Participating treatment team members: Dannielle Hall, Marci Hilton, Sally Quiroga, Dr. Langley(assigned SW), Sally Quiroga    
  Behavioral Health Interdisciplinary Rounds     Patient Name: Dannielle Hall  Age: 89 y.o.  Room/Bed:  228/01  Primary Diagnosis: Major depressive disorder, recurrent episode, moderate (HCC)   Admission Status: Voluntary     Readmission within 30 days: No  Power of  in place: No  Patient requires a blocked bed: No14}          Reason for blocked bed:     Sleep hours: 2       Participation in Care/Groups:  Yes  Medication Compliant?: Yes  PRNS (last 24 hours):  other     Restraints (last 24 hours):  No  Substance Abuse:  No    24 hour chart check complete: Yes    Patient goal(s) for today: patient has not set a goal for the day  Treatment team focus/goals:  Will report anxiety at manageable levels   Progress note: She continues to have an altered mental status, a couple medications will be stopped to see if this has an impact. She is sleeping poorly     LOS:  10  Expected LOS: 5-7    Financial concerns/prescription coverage:  No  Family contact: yes      Family requesting physician contact today:  No  Discharge plan: return back to her home with personal care  Access to weapons: No       Outpatient provider(s): Dr. Langley  Patient's preferred phone number for follow up call: [phone]     Participating treatment team members: Dannielle JEFFERSON Rafael,Kita Knox, Michaela Ríos, Dr. Langley, Marci Knox, (assigned SW), Sally Quiroga    
  Behavioral Health Interdisciplinary Rounds     Patient Name: Dannielle Hall  Age: 89 y.o.  Room/Bed:  228/01  Primary Diagnosis: Major depressive disorder, recurrent episode, moderate (HCC)   Admission Status: Voluntary     Readmission within 30 days: No  Power of  in place: No  Patient requires a blocked bed: No14}          Reason for blocked bed:     Sleep hours: 4       Participation in Care/Groups:  Yes  Medication Compliant?: Yes  PRNS (last 24 hours): Sleep Aid    Restraints (last 24 hours):  No  Substance Abuse:  No    24 hour chart check complete: Yes    Patient goal(s) for today: to feel better and get some sleep tonight  Treatment team focus/goals: Discharge to home or other facility with appropriate resources   Progress note: tolerating medication, sleep continues to be poor. Less dysphoric, denies SI/HI    LOS:  3  Expected LOS: 4-6    Financial concerns/prescription coverage:  No  Family contact: no      Family requesting physician contact today:  No  Discharge plan: return home possible follow up with IOP if agreeable  Access to weapons: No       Outpatient provider(s): Dr. Langley medication management  Patient's preferred phone number for follow up call: [phone]     Participating treatment team members: Dannielle JEFFERSON Hall, Kita Knox, Marci Knox, Michaela Ríos, Dr. Langley (assigned SW), Sally Quiroga    
  Behavioral Health Interdisciplinary Rounds     Patient Name: Dannielle Hall  Age: 89 y.o.  Room/Bed:  228/01  Primary Diagnosis: Major depressive disorder, recurrent episode, moderate (HCC)   Admission Status: Voluntary     Readmission within 30 days: No  Power of  in place: No  Patient requires a blocked bed: No14}          Reason for blocked bed:     Sleep hours: 6       Participation in Care/Groups:  Yes  Medication Compliant?: Yes  PRNS (last 24 hours): None    Restraints (last 24 hours):  No  Substance Abuse:  No    24 hour chart check complete: Yes    Patient goal(s) for today: to be positive  Treatment team focus/goals: Discharge to home or other facility with appropriate resources   Progress note: reports not feeling well but cannot identify why, feels her mood is better, she is anxious    LOS:  6  Expected LOS: 5-6    Financial concerns/prescription coverage:  No  Family contact: no      Family requesting physician contact today:  No  Discharge plan: return home follow up with Dr. Langley for medication management or start IOP  Access to weapons: No       Outpatient provider(s): Dr. Langley  Patient's preferred phone number for follow up call: [phone]     Participating treatment team members: Kita Godfrey, Dr. Langley, Sally Quiroga, (assigned SW), Sally Quiroga    
  Behavioral Health Interdisciplinary Rounds     Patient Name: Dannielle Hall  Age: 89 y.o.  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)   Admission Status: Voluntary     Readmission within 30 days: No  Power of  in place: No  Patient requires a blocked bed: No14}          Reason for blocked bed:     Sleep hours: 2       Participation in Care/Groups:  Yes  Medication Compliant?: Yes  PRNS (last 24 hours): None    Restraints (last 24 hours):  No  Substance Abuse:  No    24 hour chart check complete: Yes    Patient goal(s) for today: to feel improvement in her mood  Treatment team focus/goals:  Will report anxiety at manageable levels   Progress note: Start Wellbutrin XL at 150 mg daily. more dysphoric, no VH's now, very slowed; no longer fidgety,  very withdrawn and anergic        LOS:  13  Expected LOS: 6-10    Financial concerns/prescription coverage:  No  Family contact: yes      Family requesting physician contact today:  No  Discharge plan: return home with personal care follow up with Dr. Langley for medication management  Access to weapons: No       Outpatient provider(s): Dr. Langley  Patient's preferred phone number for follow up call: [phone]     Participating treatment team members: Dannielle JEFFERSON Rafael,Kita Knox, Sally Quiroga, Dr. Langley, Marci Knox (assigned SW), Sally Quiroga    
  Behavioral Health Interdisciplinary Rounds     Patient Name: Dannielle Hall  Age: 89 y.o.  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)   Admission Status: Voluntary     Readmission within 30 days: No  Power of  in place: No  Patient requires a blocked bed: No14}          Reason for blocked bed:     Sleep hours: 4       Participation in Care/Groups:  Yes  Medication Compliant?: Yes  PRNS (last 24 hours): Sleep Aid    Restraints (last 24 hours):  No  Substance Abuse:  No    24 hour chart check complete: Yes    Patient goal(s) for today: to start feeling like herself again  Treatment team focus/goals: : Discharge to home or other facility with appropriate resources   Progress note: monitoring patient to ensure her mood is stable and she does not have any other changes in her mood    LOS:  15  Expected LOS: 3-6    Financial concerns/prescription coverage:  No  Family contact: yes      Family requesting physician contact today:  No  Discharge plan: return home and will follow up with Dr. Langley for medication management  Access to weapons: No       Outpatient provider(s): Dr. Langley  Patient's preferred phone number for follow up call: [phone]     Participating treatment team members: Kita Godfrey, Dr. Langley, Sally Quiroga, Marci Knox (assigned SW), Sally Quiroga    
  Behavioral Health Interdisciplinary Rounds     Patient Name: Dannielle Hall  Age: 89 y.o.  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)   Admission Status: Voluntary     Readmission within 30 days: No  Power of  in place: No  Patient requires a blocked bed: No14}          Reason for blocked bed:     Sleep hours: 6.5       Participation in Care/Groups:  Yes  Medication Compliant?: Yes  PRNS (last 24 hours): None    Restraints (last 24 hours):  No  Substance Abuse:  No    24 hour chart check complete: Yes    Patient goal(s) for today: to continue progressing forward  Treatment team focus/goals:  Discharge to home or other facility with appropriate resources   Progress note: patient reports feeling improvement, anxious that it may not last. Continues to believe she would decompensate if she were to return home at this time.    LOS:  20  Expected LOS: 4-5    Financial concerns/prescription coverage:  No  Family contact: no      Family requesting physician contact today:  No  Discharge plan: return home with personal care  Access to weapons: No       Outpatient provider(s): Dr. Langley  Patient's preferred phone number for follow up call: [phone]     Participating treatment team members: Kita Godfrey, Dr. Langley, Sally Quiroga (assigned SW), Sally Quiroga    
  Behavioral Health Interdisciplinary Rounds     Patient Name: Dannielle Hall  Age: 89 y.o.  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)   Admission Status: Voluntary     Readmission within 30 days: No  Power of  in place: No  Patient requires a blocked bed: No14}          Reason for blocked bed:     Sleep hours: 6.5       Participation in Care/Groups:  Yes  Medication Compliant?: Yes  PRNS (last 24 hours): None    Restraints (last 24 hours):  No  Substance Abuse:  No    24 hour chart check complete: Yes    Patient goal(s) for today: to enjoy her good days  Treatment team focus/goals: Discharge to home or other facility with appropriate resources   Progress note: patient is pleased with her progress, she is anxious that her improvement will not last once she is home. Patient was encouraged to restart the IOP immediately after discharge    LOS:  24  Expected LOS: 3-4    Financial concerns/prescription coverage:  No  Family contact: yes      Family requesting physician contact today:  No  Discharge plan: return home and will follow up with Dr. Langley  Access to weapons: No       Outpatient provider(s): Dr. Langley  Patient's preferred phone number for follow up call: [phone]     Participating treatment team members: Dannielle Hall,Kita Knox, Michaela Ríos, Dr. Langley (assigned SW), Sally Quiroga    
  Behavioral Health Interdisciplinary Rounds     Patient Name: Dannielle Hall  Age: 89 y.o.  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)   Admission Status: Voluntary     Readmission within 30 days: No  Power of  in place: No  Patient requires a blocked bed: No14}          Reason for blocked bed:     Sleep hours: 8       Participation in Care/Groups:  Yes  Medication Compliant?: Yes  PRNS (last 24 hours): Pain    Restraints (last 24 hours):  No  Substance Abuse:  No    24 hour chart check complete: Yes    Patient goal(s) for today: prepare herself for discharge  Treatment team focus/goals:  Discharge to home or other facility with appropriate resources   Progress note: patient reports her mood has improved but her anxiety is worse thinking about discharge    LOS:  27  Expected LOS: 3    Financial concerns/prescription coverage:  No  Family contact: yes      Family requesting physician contact today:  No  Discharge plan: home with personal care  Access to weapons: No       Outpatient provider(s): Dr. Langley  Patient's preferred phone number for follow up call: [phone]     Participating treatment team members: Dannielle Hall, Kita Knox, Sally cortés, Dr. Langley(assigned SW), Sally Cortés    
  Mood labile and affect tearful. Alert and oriented x 4. Has some short term memory deficits.  Speech logical and linear. Carries on full conversations at night with herself but denies AVH/SI/HI/pain. Able to do own ADL's. Had a soft brown BM today. Refused Metamucil. Attended group activity as active participant, talking with staff frequently Worked on arts and crafts. Talked to friends and family on phone, who are supportive.    PRN Medication Documentation    Specific patient behavior that led to need for PRN medication: dry intermittent cough  Staff interventions attempted prior to PRN being given: assess, on Lisinopril, breath sounds clear  PRN medication given: Cepacol lozenge one given at 1712  Patient response/effectiveness of PRN medication: coughing decreased        
Affect appropriate, mood euthymic. Patient alert and oriented x3. She denies SI/HI/AVH/Pain Patient interacted well with peers and staff, she is cooperative, meds compliant and makes needs known. No signs of distress noted. No PRN given.   
Affect blunt , mood depressed. Patient a & o x 4.  Denies pain. No SI, HI, AVH. Patient tolerated medications well, cooperative. Patient has been having VH. U/A has been obtained. Patient has new order for Keflex 500mg three times a day.  Consumed all 3 meals. Per report patient didn't sleep last night. Patient has not slept during this shift.  Currently in the day room during the time of this note.  
Affect blunt Mood depressed, Patient alert and oriented to person, time and place. Denies SI/HI/AVH/Pain. Patient tolerated medications well, she was cooperative. She slept for more than 8 hours , Vitals WNL. No PRNs given.  
Affect blunt and restricted, mood depressed. Patient is alert and oriented to person, place and time. She is compliant to her meds, is cooperative and friendly. Patient denies SI/HI/AVH/Pain. No SE's noted. No PRNs given in this shift.   BS this morning @0610 hrs was - 104 mg/dl        
Affect blunt, Mood brightened, patient alert and oriented x4. This writer received the patient in the dayroom, she was eating a snack while watching TV, Patient denies SI/HI/AVH/Pain, Patient compliant with her medications, she is cooperative and friendly. No sign of distress noted. Patient did not receive any PRN medication. Her BS @0624 is 107mg/dl  
Affect blunt, Mood depressed, Patient alert and oriented x3. She denies SI/HI/AVH/Pain, She interacts with staff, patient is cooperative and complies to her medication. Patient had difficulties falling asleep but she refused a sleep aid when this writer suggested it. Patient however requested for a lozenge because of night time coughing  PRN Medication Documentation    Specific patient behavior that led to need for PRN medication: complains of irritating cough  Staff interventions attempted prior to PRN being given: patient requested  PRN medication given: Cepacol lozenge @0022  Patient response/effectiveness of PRN medication: effective  
Affect blunt, Mood depressed. Patient alert and oriented x4. Patient denies pain, denies SI/HI/AVH. She is compliant with her medications, cooperative and friendly. No signs of distress noted on her. BS @0614   113mg/dl    PRN Medication Documentation    Specific patient behavior that led to need for PRN medication: difficulty falling asleep  Staff interventions attempted prior to PRN being given: Patient requested  PRN medication given: Trazodone 50 mg @2117hrs  Patient response/effectiveness of PRN medication: Patient was able to sleep  PRN Medication Documentation    Specific patient behavior that led to need for PRN medication: cough  Staff interventions attempted prior to PRN being given: patient requested  PRN medication given: Cepacol Lozenge @2216hrs  Patient response/effectiveness of PRN medication: effective  
Affect blunt, mood depressed, Patient alert and oriented x3. Patient denies pain, Denies SI/HI/AVH. She tolerated her meds. Patient is Cooperative, friendly with peers and staff. No SE reported.   PRN Medication Documentation    Specific patient behavior that led to need for PRN medication:  cough  Staff interventions attempted prior to PRN being given: patient requested  PRN medication given: 1 cepacol lozenge @0031hrs  Patient response/effectiveness of PRN medication: effective  
Affect blunt, mood depressed. Patient a & o x 4.  Denies pain. No SI, HI, AVH. Patient tolerated medications well, cooperative. Consumed all 3 meals this shift. Encouraged to get of her room. Friendly with staff.  Currently in the day room during the time of this note.  
Affect blunt, mood depressed. Patient a & o x 4.  Denies pain. No SI, HI, AVH. Patient tolerated medications well, cooperative. Consumed most of meals throughout the shift. Enjoyed talking to staff.   Currently in the day room during the time of this note.  
Affect blunt, mood depressed. Patient a & o x 4.  Denies pain. No SI, HI, AVH. Patient tolerated medications well, cooperative. Friendly with staff. Not sleeping during the day as much as she has in the past.  Currently in the day room during the time of this note.  
Affect blunt, mood depressed. Patient a & o x 4.  Denies pain. No SI, HI, AVH. Patient tolerated medications well, cooperative. Patient consumed all 3 meals this shift. Has been friendly with staff and peers. Patient may possibly be discharged 1/2/2025. Bloods have remained wnl.  Currently in the day room  during the time of this note.  
Affect blunt, mood depressed. Patient a & o x 4.  Denies pain. No SI, HI, AVH. Patient tolerated medications well, cooperative. Patient consumed all 3 meals this shift. Patient is friendly with staff and peers. Patient's sleep pattern has remained normal.   Currently in the day room during the time of this note.  
Affect blunt, mood depressed. Patient a & o x 4.  Denies pain. No SI, HI, AVH. Patient tolerated medications well, cooperative. Patient did not eat breakfast but did consume lunch and dinner. Patient did not wake up until lunch time hours. Since then she's been in the day room coloring.  Currently in the day room during the time of this note.    PRN Medication Documentation    Specific patient behavior that led to the need for PRN medication: Sore Throat  Staff interventions attempted prior to PRN being given: Patient Requested  PRN medication given: Cepacol Lozenge at 1637  Patient responsiveness/effectiveness of PRN medication: Tolerated Well, Effective  
Affect blunt, mood depressed. Patient a & o x 4.  Denies pain. No SI, HI, AVH. Patient tolerated medications well, cooperative. Patient has been in the day room most of this shift. Friendly towards staff. Consumed all 3 meals.  Currently in the day room during the time of this note.    PRN Medication Documentation    Specific patient behavior that led to the need for PRN medication: Sore Throat  Staff interventions attempted prior to PRN being given: Patient Requested  PRN medication given: Cepacol Lozenge at 4953, 7681  Patient responsiveness/effectiveness of PRN medication: Tolerated Well, Effective  
Affect blunt, mood depressed. Patient a & o x 4.  Denies pain. No SI, HI, AVH. Patient tolerated medications well, cooperative. Patient's diabetic medication was held due to not eating breakfast. Patient has slept mostly throughout the shift. Patient did consume lunch and dinner. Patient stated she was tired and wanted to sleep.  Currently in the day room during the time of this note.  
Affect blunt, mood labile. Patient a & o x 4.  Denies pain. No SI, HI, AVH. Patient tolerated medications well, cooperative. Patient has been in the day room conversing with staff and patients. Currently in the day room during the time of this note.  
Affect blunt, mood labile. Patient a & o x 4.  Denies pain. No SI, HI, AVH. Patient tolerated medications well, cooperative. Patient has been in the day room most of the day. Friendly with staff and peers. Patient has enjoyed listening to music. Consumed all 3 meals throughout the shift. Patient has c/o loose stools and is now taking fiber packets.  Currently in the day room during the time of this note.  
Affect blunted Mood depressed. Alert and oriented x4. Patient denies SI/HI/AVH/Pain. She is meds compliant, cooperative and friendly. Patient makes needs known. No SE reported. No PRN given in this shift. Expected to be discharged today  
Affect blunted, Mood depressed and anxious. Patient interacts well with peers and staff. She makes her needs known, cooperative and meds compliant. She is tolerating her meds well , She denies SI/HI/ AVH/ pain .No signs of distress noted. She was not able to fall asleep even after taking Trazodone.  PRN Medication Documentation    Specific patient behavior that led to need for PRN medication: difficulty falling asleep  Staff interventions attempted prior to PRN being given: assessment  PRN medication given: Trazodone 25mg @0237  Patient response/effectiveness of PRN medication: Patient stayed awake and active, med not effective  
Affect blunted. Mood brightened.  Alert and oriented x.4  Has occs'l short term memory loss. Ate 90% of meals. Denies SI/HI/AVH/pain.  Med compliant. Denies SE's from meds. Washed up and is able to do own ADL's. Supportive family and friends. Talked on phone with them today. BP elevated at 178/73 this am and improved after lunch and am meds to  to 145/55. KOKO Cullen aware of both VS. Talked in sleep quite a lot. Active participant in group activity.  
Affect blunted. Mood depressed. Alert and oriented x 3. Cooperative and med compliant. Ate 75% meals. Able to do own ADL's. Has some short term memory deficits. Did not attend group activity. Isolative to room to sleep in between meals.  
Affect blunted. Mood depressed. Had diarrhea this am and felt weak. VSS and POC BS WNL. Ate most of meals. Speech logical and linear. Denies SI/HI/AVH. Cooperative and med compliant.    PRN Medication Documentation    Specific patient behavior that led to need for PRN medication: loose stool  Staff interventions attempted prior to PRN being given: assess  PRN medication given: Imodium 2mg po given at 1202  Patient response/effectiveness of PRN medication: no further loose stool  PRN Medication Documentation    Specific patient behavior that led to need for PRN medication: c/o 8/10 back pain  Staff interventions attempted prior to PRN being given: assess  PRN medication given: Tylenol 650mg po given at 0838  Patient response/effectiveness of PRN medication: 2/10 back pain at 0930      
Affect blunted. Mood depressed/anxious. Speech circumstantial; verbose. Talked in sleep last night and answered herself with her eyes open. Alert and oriented x 4. Ate 75% of meals. Isolative to bed in between meals. Denies SE's from meds. Med compliant. /86 this am. Repeated prior to lunch - 127/81.  aware of values. Bathed at sink today.  Denies SI/HI/AVH/pain. Med compliant and cooperative.    PRN Medication Documentation    Specific patient behavior that led to need for PRN medication: c/o diarrhea  Staff interventions attempted prior to PRN being given: assess  PRN medication given: Imodium 2mg po given at 1224  Patient response/effectiveness of PRN medication: reports no furthur diarrhea  
Affect flat, Mood depressed, anxious. Patient is  alert and oriented to person and place. Patient denied SI/HI/AVH/Pain. Meds compliant, cooperative. Makes needs known to staff  PRN Medication Documentation    Specific patient behavior that led to need for PRN medication: Difficulty falling asleep  Staff interventions attempted prior to PRN being given: assessment  PRN medication given: Trazodone 50mg @2237  Patient response/effectiveness of PRN medication: Patient was able to sleep  
Affect flat, Mood depressed. Patient is alert and oriented to person and place. She denies SI/HI/AVH/Pain. Patient is compliant to her meds, cooperative. She interacts well with peers and staff. She is stable, no sign of distress noted.  PRN Medication Documentation    Specific patient behavior that led to need for PRN medication: Diarrhoea x1  Staff interventions attempted prior to PRN being given: Patient requested  PRN medication given: Loperamide 2mg @ 2238  Patient response/effectiveness of PRN medication: Patient satisfied  
Affect flat, mood depressed. Alert and oriented X 4. Cooperative and pleasant.  Interacted with staff and peers. Makes needs known.  Pt spent most of the day in her room sleeping. Denied SI/HI/AVH. Medication compliant. Stable with no s/s of distress at this time.   
Affect flat, mood depressed. Alert and oriented X 4. Cooperative and pleasant. Attended and participated in groups. Interacted with staff and peers. Makes needs known.  Pt appears less depressed as she has smiled numerous times today and was able to joke with staff. Denied SI/HI/AH. Medication compliant. Stable with no s/s of distress at this time.  
Affect flat, mood labile at times. Patient a & o x 4.  Denies pain. No SI, HI, AVH. Patient tolerated medications well, cooperative.  Currently sleeping during the time of this note.      PRN Medication Documentation    Specific patient behavior that led to need for PRN medication:  patient reports problems sleeping.  Staff interventions attempted prior to PRN being given: Patient encouraged to try resting in bed.  PRN medication given: Trazodone 50 mg po @ 2038.  Patient response/effectiveness of PRN medication: medication effective.      Patient slept 4 hours.      0600 blood sugar 97  
Affect flat, mood sad. Patient a & o x 4.  Denies pain. No SI, HI, AVH. Patient tolerated medications well, she is cooperative with staff. Patient is currently sleeping during the time of this note.  Staff continues q 15 minute safety checks.        Patient slept 6 hours.        BS: 103 @ 8862  
Affect flat. Mood depressed. Alert and oriented x 3. Confused at times but reorients easily. Cheerful in am but isolative to room later to sleep. Gait steady. Able to do own ADL's with minimal assist. Denies SI/HI/AVH/pain. Denies dysuria. Have not observed talking to self near as much as last week. Med compliant and cooperative. Attended group as active participant.  PRN Medication Documentation    Specific patient behavior that led to need for PRN medication: 6/10 bilateral middle finger pain  Staff interventions attempted prior to PRN being given: assess  PRN medication given: Tylenol 650mg po given at 1450  Patient response/effectiveness of PRN medication: 0/10 bilateral middle finger pain at 1550  
Affect full, mood good. Alert and oriented X 4. Cooperative and pleasant. Attended and participated in group. Interacted with staff and peers. Makes needs known. Ate all meals and snacks. Denied SI/HI/AVH and pain. Medication compliant. Stable with no s/s of distress.      
Affect johnson, mood anxious. Alert and oriented X 3-4. Cooperative and pleasant. Attended and participated in group. Interacted with staff and peers. Makes needs known. Ate all meals and snacks. Denied SI/HI/AVH and pain. Medication compliant. Stable with no s/s of distress.  Pt medication, Femara will be used from her own home supply.  Daughter brought it in today.  She interacting well with everyone. No issues at this time.    
Affect johnson, mood good, but anxious at times. Alert and oriented X 4. Cooperative and pleasant. Attended and participated in group. Interacted with staff and peers. Makes needs known. Ate all meals and snacks. Denied SI/HI/AVH and pain. Medication compliant. Stable with no s/s of distress.  Appears tired at times. Encouraged to rest this afternoon.  She laid down @ 1400 and rested until dinner and slept 3 hours. Pt awaken and she ate dinner 90 percent of meal.    PRN Medication Documentation    Specific patient behavior that led to need for PRN medication: c/o of tickle in back of throat and dryness. Asked for Cepacol.  Staff interventions attempted prior to PRN being given: assessed  PRN medication given: Cepacol 1 po given @ 17:43.    Patient response/effectiveness of PRN medication: Helped; pt pleased with outcome.    
Affect labile. Mood labile. Alert and oriented x 3. Sleepy much of day since only had 2 hours of sleep last night. Able to rouse but falls back to sleep quickly. Assisted to BR and voiding. No BM's today or diarrhea noted. Did not attend group activity. Refused breakfast but had lunch and dinner, eating %. Fell asleep at table today. As day progressed, became more alert, but still not to level of previous days. Med compliant. Drank h20 when encouraged (several times today) and followed commands. Cooperative. Dr. Langley made aware of increased somnolence and actions on night shift. Med compliant. States she has dysuria and urine has a noticeable odor.  
Affect subdued and blunted, mood depressed. Alert and oriented X 4. Cooperative and calm. She did not attend group. Interacted with staff and peers. Makes needs known. Ate meals and snacks. Denied SI/HI/AVH and pain. Medication compliant. Stable with s/s depressed mood. Pt started first dose Wellbutrin 150 mg po for increased depressed mood per Dr. Langley.  
Discussed with the patient and all questioned fully answered. She will call me if any problems arise.  Discharge plan of care/case management plan validated with provider discharge order.  Discharge Summary    Dannielle Hall  :  1935  MRN:  [id number]    ADMIT DATE:  12/3/2024  DISCHARGE DATE:  2025      Discharge instruction reviewed with Patient.    Home med's returned Yes    Personal belongings returned Yes    Patient Walked to front entrance with Behavioral Health Tech        SIGNED:    MARINA VASQUEZ RN     
INTERVAL Hx: (Records and clinical information were reviewed)    CC: \"I'm alright\"    States she didn't sleep well last night so she's more tired and emotionally flat this AM, but she also states that her mood has been \"pretty good\" much of the time. She also recognizes that her sleep has been erratic for a long time now, so she's not distressed about it at this point. Further discussed D/C plans for Thursday, and she states she feels OK with that. Still wants to get home first and see how she's doing before committing to going back to the IOP program. However, her Aide agency had cancelled her service after she fired the last Aide for stealing, so they'd need to reopen her case, etc. I continued to discuss D/C issues with her, but her coping skills are minimal and she would be at high risk to quickly decompensate and end up back in the hospital if \"forced\" out before she feels ready. She recognizes that her mood has improved somewhat, and that she needs to get back home, but her anxiety about it all is quite strong, and historically she's not remained stable when she feels like this and tries to manage being alone, etc. No further delirium, or any of the manic-like episodes she was having previously. No SE's or issues with the addition of  the Wellbutrin XL, or with the Effexor XR. Slept 6.5 hours last night. POC Gluc: 123 this AM.    01/01 - the patient has been visible, cooperative and with  no instances of agitation or aggression. She is medication compliant and denies active thoughts of self harm. Patient with no SI/HI/AVH/PI and she is able to make her needs known. She is discharge focused, and per SW she will discharge tomorrow, to MIS. Patient has been in fair behavioral control.     MEDS:  No current facility-administered medications for this encounter.     Current Outpatient Medications   Medication Sig    buPROPion (WELLBUTRIN XL) 150 MG extended release tablet Take 1 tablet by mouth daily    traZODone 
INTERVAL Hx: (Records and clinical information were reviewed)    CC: \"pretty good\"    States she's feeling about the same today, and still much less dysphoric compared to PTA. She's still having erratic sleep, but seems to be getting enough in total. Tolerating the Abilify without any akathisia, sedation, or other EPSE's. I continued to discuss her pattern of becoming more and more depressed if/when she doesn't have enough socialization. However, she still doesn't want to transition into an MIS, and she hopes to get better Aide(s) in to help her. I also discussed the need to restart in our SOP program after D/C, which she wants to do (but is worried she'll lose her Aides). Slept only 4 hours last night, but she napped for several hours during the day.     12/07 - no acute overnight events. The patient has been visible, per RN she had diarrhea overnight and got Imodium followed by flatulence and was given Maalox. She is talkative, cooperative and with no episodes of agitation or agitation. She slept 6 hours total and had unremarkable vital signs this AM. She endorses depressed mood but is otherwise in fair behavioral control with no self harm ideation expressed. She states that she has been dealing with bitterness and regret though she has good insight into her circumstances and is coping fairly.     MEDS:  Current Facility-Administered Medications   Medication Dose Route Frequency    loperamide (IMODIUM) capsule 2 mg  2 mg Oral Q4H PRN    traZODone (DESYREL) tablet 50 mg  50 mg Oral Nightly PRN    Benzocaine-Menthol (CEPACOL) 1 lozenge  1 lozenge Oral Q2H PRN    atorvastatin (LIPITOR) tablet 80 mg  80 mg Oral Daily    clopidogrel (PLAVIX) tablet 75 mg  75 mg Oral Daily    letrozole (FEMARA) tablet 2.5 mg (patient supplied) (Patient Supplied)  2.5 mg Oral Daily    cetirizine (ZYRTEC) tablet 10 mg  10 mg Oral Daily    lisinopril (PRINIVIL;ZESTRIL) tablet 20 mg  20 mg Oral BID    metoprolol succinate (TOPROL XL) 
[facility]  Master Treatment Plan for Dannielle Hall    Date Treatment Plan Initiated: 12/3/2024    Treatment Plan Modalities:  Type of Modality Amount  (x minutes) Frequency (x/week) Duration (x days) Name of Responsible Staff   Community & wrap-up meetings to encourage peer interactions 30 14 1 Estela HELMS [facility]   Group psychotherapy to assist in building coping skills and internal controls 60 5 1 Sally QUINTERO, WILIAN QUINTERO, HENRYW   Therapeutic activity groups to build coping skills 60 7 1 Cuca REYES, RN  Hayden MOODY RN     Psychoeducation in group setting to address:   Medication education  Coping Skills  Symptom Management   60 7 1 WILIAN Babcock, WILIAN CORTEZ RN   Discharge planning   60 2 1 Kita QUINTERO [facility] II            Physician medication management   15 7 1 MANJIT Langley MD                                         Treatment Team Signatures    I have participated in the development of this plan of treatment and agree to its implementation.    Patient Signature       Patient Printed Name Date/Time   Social Work/Therapist Signature       Social Work/Therapist Printed Name Date/Time   RN Signature       RN Printed Name    Jolynn Carson  Date/Time   Other Signature     Other Signature Date/Time   MD Signature       MD Printed Name Date/Time     
Met with patient to see if this writer could encourage her or assist her with her planning after discharge. We spoke about home vs a facility and she does not want that and feels she does not need a facility at this time due to her help. She stated that her grandson lives next door now and that has helped, knowing she has someone close to call. We spoke about going to [facility] outpatient. She stated that it is very difficult to get up moving that early. I told her that I could call DSS to see if she was to attend could an aide come earlier and then come back. Patient is not sure if she wants this writer to call at this time until she can decide what she wants. She stated for this writer to come back another time and talk about it.  
Mood and affect brightened. No teary episodes and very little confusion any more. Carries on full conversations at night with herself but denies AVH/SI/HI/pain. Able to do own ADL's. Had a soft brown BM today. Attended group activity as active participant. Worked on arts and crafts. Talked to friends and family on phone, who are supportive.  
Mood euthymic, Affect congruent with the mood, Patient is alert and oriented x4. Patient denies SI/HI/AVH/Pain. Patient was compliant with her meds, cooperative and interactive with peers and staff. No SE noted, her VS are WNL. BS @0629 - 107mg/dl  PRN Medication Documentation    Specific patient behavior that led to need for PRN medication: cough  Staff interventions attempted prior to PRN being given: patient requested  PRN medication given: Cepacol lozenge 1 @6am  Patient response/effectiveness of PRN medication: effective  
PSYCHOSOCIAL ASSESSMENT  :Patient identifying info:   Dannielle Hall is a 89 y.o., female admitted 12/3/2024  5:26 PM     Presenting problem and precipitating factors: Per H&P  The patient is an 89-year-old  female, whom we are very familiar with from prior inpatient and outpatient treatment due to a lengthy history of recurrent major depressive disorder. She is admitted voluntarily through the [facility] after being brought there by EMS after she called them in the midst of having an apparent anxiety attack. She indicates that she has been feeling progressively more depressed and she has noted that her mood has been declining for several months or so, to the point where she is having some vague suicidal thoughts. She has never made any attempts to try to harm herself in the past, but she states she has had thoughts about lying down in the road or possibly taking an overdose, although she admits that she has not come close to acting on those impulses. However, her neurovegetative functioning has been worsening to where she is sleeping poorly at night, although she then will tend to sleep as much as 5 or 6 hours at times during the day. Her appetite has been poor and she has lost close to 5 pounds recently and she has been increasingly anergic and anhedonic. She is fairly flat emotionally compared to her baseline and she states that she has been quite dysphoric and hopeless at times. She denies that there is any particular precipitant or trigger to her declining mood, although it should be noted that she had stopped taking Abilify roughly 5-6 months ago on her own.   Mental status assessment: The patient is noted to be a casually dressed and adequately groomed 89-year-old, who appears slightly younger than her stated age. She was lying in bed and was motorically slowed and tired at this point, and affectively flat, particularly given her baseline. She endorsed some neurovegetative dysfunction as 
Patient continues to have mood lability on and off. Her sleep continues to be poor. She has been out of her room the entire afternoon. She has been talking with staff and coloring. She looks brighter, denies SI/HI/AVH. She states she wants to feel better but cannot elaborate how because she will say she is not depressed. Reinforced how well she is doing for her age and continue to encourage her to think positive about what she does have. She does plan to return home and has personal care. She has not made a decision about returning back to [facility] but will continue to see Dr. Langley for medication management.   
Patient has been sleeping all day until later afternoon. She had been refusing to get up and eat. She was assisted to the day room to eat this afternoon. She remained very sleepy and did not socialize with her peers or staff. She slept very little last night. Her new medication Rexulti was discontinued today to see if this was fueling her symptoms she was having prior to today.  
Patient presents brightened today and is A&O X 4. Patient denies SI/HI and AVH. Patient denies any feelings of anxiety and stated, \"I just feel sad with today being [place].\" Patient spent time in the dayroom this evening playing cards, watching TV, and interacting with staff. Patient has been cooperative, med compliant and makes needs known to staff. Patient is showing no s/s of distress at this time.   
Patient presents brightened today and is A&O X 4. Patient denies SI/HI and AVH. Patient was in the dayroom most of the evening watching TV and interacting  with staff. Patient has been cooperative, med compliant and makes needs known to staff. Patient is showing no s/s of distress at time of this note.   
Patient presents flat and irritable this evening. Patient is oriented to person and place. Patient denies SI/HI and AVH but is having VH stating she is seeing her mother and cats. Patient has been uncooperative when asked to leave the dayroom as it was time to shut down patient refused. Patient finally left and dayroom was locked. Patient has been med compliant and has been interacting with peers and staff. Patient was up most of the night falling asleep in the early morning hours. Patient is showing no s/s of distress at time of this note.   
Patient presents flat and is A&O X 4. Patient denies SI/HI and AVH. Patient denies any pain. Patient was in the dayroom most of the early evening watching TV. Patient has been cooperative, med compliant and makes needs known to staff. Patient is able to perform ADLs without assist. Patient is showing no s/s of distress at time of this note.   
Patient presents flat and is A&O X 4. Patient denies SI/HI and AVH. Patient expresses concerns about discharge and not wanting to be alone. Patient was in the dayroom most of the evening interacting with staff and peers. Patient has been cooperative, med compliant and makes needs known to staff. Patient is showing no s/s of distress at this time.   
Patient presents flat and is A&O X 4. Patient denies SI/HI and AVH. Patient rates depression 4/10 and anxiety 0/10. Patient has been isolative to room but does make needs known to staff. Patient has been cooperative and med compliant. Patient had complaints of being unable to sleep and requested medication, see below. Patient is showing no s/s of distress at time of this note.       PRN Medication Documentation     Specific patient behavior that led to the need for PRN medication: sleep  Staff interventions attempted prior to PRN being given: patient requested  PRN medication given: trazodone 50 mg @ 2301  Patient responsiveness/effectiveness of PRN medication: patient remains restless  
Patient presents flat and is A&O X 4. Patient denies SI/HI and AVH. Patient rates depression 7/10 and anxiety 4/10. Patient was in the dayroom most of the evening watching TV and interacting with peers and staff. Patient has been cooperative, med compliant, and makes needs known to staff. Patient is showing no s/s of distress at this time.       PRN Medication Documentation     Specific patient behavior that led to the need for PRN medication: mild pain in throat   Staff interventions attempted prior to PRN being given: patient requested  PRN medication given: Cepacol @ 0410  Patient responsiveness/effectiveness of PRN medication: patient satisfied   
Patient presents flat and is A&O X 4. Patient denies SI/HI and AVH. Patient remained isolative this evening. Patient expressed feeling depressed and that she did not like being at home alone. This writer actively listened and provided emotional support. This writer educated patient on doing activities that would keep her entertained throughout the day. Patient has been cooperative, friendly and med compliant. Patient does make needs known to staff and is showing no s/s of distress at time of this note.     PRN Medication Documentation     Specific patient behavior that led to the need for PRN medication: diarrhea   Staff interventions attempted prior to PRN being given: patient requested  PRN medication given: Imodium 2 mg @ 0101  Patient responsiveness/effectiveness of PRN medication: patient satisfied   
Patient presents flat and is A&O X 4. Patient denies SI/HI and AVH. Patient was in the dayroom most of the evening playing cards with staff and watching a movie. Patient has been cooperative, med complaint and makes needs known to staff. Patient is showing no s/s of distress at time of this note.   
Patient presents flat and is A&O X 4. Patient denies SI/HI and AVH. Patient was observed laying in bed awake responding to internal stimuli. Patient has been isolative today only coming out late in the evening to eat what was left of her sandwich. Patient has been cooperative, med compliant and makes needs known to staff. Patient is showing no s/s of distress at time of this note.   
Patient presents flat is A&O X 4 and is adequately groomed. Patient denies SI/HI and AVH. Patient spent most of the evening in the dayroom interacting with staff. Patient does get tearful when talking about family and her own emotional state. Patient was encouraged to talk about feelings and was educated on some coping skills to calm her in moments of distress. Patient has been cooperative and friendly. Patient has been medication compliant and makes needs known to staff. Patient complained of diarrhea in which PRN was provided, see below. Patient is showing no s/s of distress at time of this note.       PRN Medication Documentation     Specific patient behavior that led to the need for PRN medication: Diarrhea   Staff interventions attempted prior to PRN being given: patient requested  PRN medication given: Imodium 2 mg   Patient responsiveness/effectiveness of PRN medication: patient satisfied   
Patient presents sad and is A&O X 4 becoming increasingly confused as the night progressed. Patient denies SI/HI and AVH, but patient has been observed talking to self. Patient has been coming out of her room in the early hours of the morning asking where she was and if her mother came here with her. Patient started to have VH running out of her room stating, \"There is a man in the closet, I'm not sleeping in there.\" Patient was reassured that there was no man in her room this writer and another nurse went in, cut all lights on to show patient that there was no one there. Patient still continued to state that the man was there. Patient was informed that the nurse was sitting right outside of her room. Patient rates depression 4/10 and anxiety 0/10. Patient has been frequently going to the restroom with the urge to void, patient states it burns when voiding, will pass on to the next shift to inform doctor. Patient has been cooperative, med compliant and makes needs known to staff. Patient is showing no s/s of distress at time of this note.       PRN Medication Documentation     Specific patient behavior that led to the need for PRN medication: indigestion   Staff interventions attempted prior to PRN being given: patient requested  PRN medication given: Maalox 30 mL @ 2122  Patient responsiveness/effectiveness of PRN medication: patient satisfied       PRN Medication Documentation     Specific patient behavior that led to the need for PRN medication: sleep   Staff interventions attempted prior to PRN being given: patient requested  PRN medication given: Trazodone 50 mg @ 2123  Patient responsiveness/effectiveness of PRN medication: patient remained restless throughout the night     PRN Medication Documentation     Specific patient behavior that led to the need for PRN medication: diarrhea   Staff interventions attempted prior to PRN being given: patient requested  PRN medication given: imodium 2 mg @ 0504  Patient 
Patient rec'd sleeping, she slept most of the hours of the day per day shift nurse. She ate her dinner at 7.20p, Affect blunted, Mood depressed. Patient alert and oriented x4. Denies SI/HI/AVH/Pain She is pleasant and cooperative, tolerated meds. No SE reported. No PRN given in this shift BS - 91 @5859  
Patient received in the dayroom,then she moved to her room, remained quiet on her bed. she is pleasant, cooperative and compliant with her meds. Patient is alert and oriented to person, place and time. She denies pain, SI/HI/AVH. No PRNs given to her in this shift. Her BS @0610am - 123 mg/dl.  
Patient remains on the unit, she has been calm and cooperative. Denies SI/HI/AVH. She has been extremely drowsy in the mornings. She does report feeling flat more than usual. Per her daughter she feels she has declined since starting the new medication. Medication was stopped today a new agent was started. She continues to have trouble deciding if she will attend the IOP. Will see how she responds to the change in medication. She will return to her home with help. She see's Dr. Langley for medication management.  
Patient remains on the unit. She is no longer displaying signs of delirium. She is no longer fidgety and now she is very withdrawn and has a low mood. She has been in bed all day. She did not want to attend group. She denies VH and is not confused as she was previously. She was started on a new antidepressant today. She will be returning back to her home and she does received personal care. She does follow up with Dr. Langley for medication management. She can return back to the IOP program but is still unsure if she can manage to get going that early. Will see how she feels about the program when she starts feeling better.  
Patient remains on the unit. She is pleasant and cooperative. Reports her mood has improved and is anxious that this will change once she gets home. She has reported time to time having passive SI. She will be discharging to home next week, likely at the beginning of the week. All staff encourage IOP she is not able to commit if she will go directly from unit or after she gets home. This writer will notify her personal care agency once a discharge date is set for her care to resume at home. She does follow up with Dr. Langley privately for medication management.  
Patient reports not feeling well but unable to identify if its emotional or physical, she is flat and does not appear to have any manic like symptoms. She slept better last night. She is focused on her aides in the home and how that makes her more anxious. She feels she does not need them 5 days a week. She is unable to commit to going to [facility]. She is more appropriate with staff with her comments. She denies SI/HI/AVH. She is dysphoric. She feels she is not stable enough for discharge. She will be returning home with personal care and will follow up with Dr. Langley for medication management.  
Patient sleeping this shift. Noted with head resting on dayroom table at the beginning of shift.  Easily aroused. Oriented x4. Assessed and patient stated she is sleeping at night but also tired during the day. Denies pain. Sad expression, congruent affect.Depressed mood. Vitals stable. Medication compliant. Denies SI/HI/AH. +VH. Talked about seeing people dressed in \"fine\" clothing. Encouraged her to call for staff when this happens and we would help her refocus and clarify what she may/may not being seeing and to promote comfort.  Medication compliant. Rested in bed with eyes closed and equal rise/fall of chest for 6 hours.   
Patient was admitted to the unit voluntarily. She reported feeling that her mood has been declining and she was having vague suicidal thoughts. She reports poor sleep and appetite and feeling very flat. She lives alone and does have an aid 5x per week. She follows up with Dr. Langley for medication management. She has attended [facility] IOP in the past will be encouraged to start again after discharge. She does plan to return back to her home. She will be started on new medication that has been helpful to her in the past.   
Patient was much brighter today and up more. She reports feeling less dysphoric and recognizes how being social is important. She is uncertain how to transition to IOP with having aides. She is fearful to loose what she has. She will continue to think about this. She is aware she is not at her baseline. She slept poorly last night. She denies SI/HI/AVH. Her PRN Trazodone was increased. She is able to do her ADL care and she said her appetite was good today. Patient will be returning back to her home.  
Patient will be discharged to home this date and her daughter will transport her home. She will follow up with Dr. Langley on Jan 14 at 1pm for a medication appointment. She is still considering returning back to the IOP program but is unable to commit at this time. Patient was involved and agreeable in her discharge plan. The behavioral health transition record was reviewed with patient and she verbalized understanding. A copy was routed to her PCP, Dr. Langley and she was given a copy to take with her.  
Per accepting Dr Choi[facility]  
Precautions:  High fall, q 15 min safety checks    SI/HI -    AVH endorses VH's at times of her mother and other dead people    Pain  - denies    Medication Compliance full    PRN Meds:    Behaviors: now back to baseline, slept well, able to do own ADL's with min assist, steadier and much more alert, confused as to where she was this am, reoriented easily    Sleep Hours    Group Attendance no    Intake 100%  
Precautions: high fall, q 15 min safety checks      SI/HI -    AVH -    Pain no    Medication Compliance full    PRN Meds:none    Behaviors: friendly, cooperative, able to do own ADL's with min assist, tires easily, depressed, no anxiety    Sleep Hours    Group Attendance - interacts well with others and participates fullly    Intake -50%-100%  
Report received from ELI Feng re: SBAR. Patient arrived via wheelchair on unit at 2321 escorted by security.Information from the following report(s)  SBAR, Kardex, ED Summary, MAR, Recent Results and Med Rec Status was reviewed with the receiving nurse. Opportunity for questions and clarification was provided. Dr. Pozo notified of arrival. Patient is A&O X 4 and presents flat but is pleasant and cooperative. Patient denied SI/HI and AVH. Patient is showing no s/s of distress at time of this note.       PRN Medication Documentation     Specific patient behavior that led to the need for PRN medication: sleep   Staff interventions attempted prior to PRN being given: patient requested  PRN medication given: Trazodone 25 mg @ 0102  Patient responsiveness/effectiveness of PRN medication: patient has been resting  
Spoke with patients personal care agency to resume care. Staff were unaware that she had been in the hospital and she no longer had an aide in the home due to accusing this aide of stealing. Agency was shocked she wanted and care. This writer explained that she had not informed us of this  and I was just resuming her care. He needed to see if she could get services and then if she wanted the same aide back. It would take him a few days to get care if he was able to. Dr. Langley will discharge patient on Thursday to allow appropriate time for in home care. Patient declined going directly to IOP on the day of discharge and stated she wanted to go home first and then would make arrangements to attend [facility]. This writer called her daughter to inform her that she will be discharged to home on Thursday. Patients daughter will transport home. This writer will wait to hear back from agency.  
This writer contacted [facility] to see if they knew what agency patient had her care through. She stated she did not. This writer left a message for her daughter requesting the name of her personal care agency. Explained that if patient was able and interested in attending the IOP and the care could be moved around slightly we would like her to pursue the program. This writer wants to inquire with the agency for future discharge planning purposes.    Patient's daughter called back to say the personal care agency is [facility]. This writer will reach out to the agency to check what options patient may have for care and attending the IOP program.  
Weekend Coverage:    CC: \" I feel good today\"    General appearance: appropriate, relaxed, pleasant  Speech: clear and spontaneous   Mood: 8/10  Affect: congruent to mood  SI/HI/AVH: Denies since being admitted. She reports that she had these thoughts prior to coming in because she was seeing things such as seeing people in the bed with her and seeing things in the hallway. She states \"all of that is gone now\".   Compliance: Wellbutrin added to regimen, no side effects noted   Sleep/Appetite: sleep is up and down, she did not sleep well last night. Appetite: fair    Plan:continue current treatment  
Weekend Coverage:    CC: \"I have been better but feel better today than I have previously.     General appearance: appropriate, relaxed, pleasant  Speech: clear and spontaneous   Mood: 8/10  Affect: congruent to mood  SI/HI/AVH: Denies currently, reports that she had flighting SI upon admission but she has not thought. Reports that she was experiencing VH(someone standing beside her or someone coming into her room) initially but this has not occurred recently.   Compliance: Wellbutrin added to regimen, no side effects noted   Sleep/Appetite: sleep varies, reports that some nights are better than other. She reports that her sleep has been this way x at least one year. No contributing factors to change in sleep    Plan:continue current treatment  
Support, Socialization, Exploration, and Clarifying      Discipline Responsible: /Counselor      Signature:  Fela Quiroga LCSW    
behavior      Endings: None Reported    Modes of Intervention: Education, Support, Socialization, Exploration, Clarifying, Problem-solving, and Activity      Discipline Responsible: /Counselor      Signature:  Socorro Ríos LCSW    
daily.  Continue the Abilify at 2 mg daily.  Continue the PRN Trazodone (increase to 50 mg) and Vistaril.   ELOS: 4-6 days given her history--plan to transition to the SOP program.      
0.5mL 12/06/2024    Influenza, FLUAD, (age 65 y+), IM, Quadv, 0.5mL 11/03/2021    Influenza, FLUAD, (age 65 y+), IM, Trivalent PF, 0.5mL 11/11/2019    Influenza, FLUARIX, FLULAVAL, FLUZONE (age 6 mo+) and AFLURIA, (age 3 y+), Quadv PF, 0.5mL 09/29/2018    Influenza, FLUZONE High Dose, (age 65 y+), IM, Trivalent PF, 0.5mL 10/10/2016, 10/11/2017    Pneumococcal Vaccine 12/19/2006    Pneumococcal, PCV-13, PREVNAR 13, (age 6w+), IM, 0.5mL 10/11/2017    Pneumococcal, PCV20, PREVNAR 20, (age 6w+), IM, 0.5mL 06/04/2024     Influenza Vaccination Status: Influenza vaccine was given during this hospitalization.    Screening for Metabolic Disorders for Patients on Antipsychotic Medications  (Data obtained from the EMR)    Estimated Body Mass Index  Body mass index is 27.5 kg/m².      Vital Signs/Blood Pressure  BP (!) 162/74   Pulse 56   Temp 98.4 °F (36.9 °C) (Oral)   Resp 19   Ht 1.676 m (5' 6\")   Wt 77.3 kg (170 lb 6.4 oz)   SpO2 97%   BMI 27.50 kg/m²      Fasting Blood Glucose or Hemoglobin A1c  Lab Results   Component Value Date/Time    GLUCPO 105 11/15/2021 04:49 PM       Hemoglobin A1C   Date Value Ref Range Status   08/16/2023 7.1 (H) 4.0 - 5.6 % Final     Comment:     NEW METHOD  PLEASE NOTE NEW REFERENCE RANGE  (NOTE)  HbA1C Interpretive Ranges  <5.7              Normal  5.7 - 6.4         Consider Prediabetes  >6.5              Consider Diabetes       Hemoglobin A1C, POC   Date Value Ref Range Status   06/04/2024 8.0 % Final       Discharge Diagnosis: Major Depression, recurrent, severe (F33.2)     Discharge Plan/Destination: patient will return home and will follow up with Dr. Langley    Discharge Medication List and Instructions:      Medication List        START taking these medications      alogliptin 12.5 MG Tabs tablet  Commonly known as: NESINA  Take 1 tablet by mouth 2 times daily (with meals)     buPROPion 150 MG extended release tablet  Commonly known as: WELLBUTRIN XL  Take 1 tablet by mouth

## 2025-01-03 NOTE — DISCHARGE SUMMARY
PSYCHIATRIC DISCHARGE SUMMARY         IDENTIFICATION:    Patient Name  Dannielle Hall   Date of Birth 1935   [facility] [id number]   Medical Record Number  [id number]      Age  89 y.o.   PCP Tracy Banuelos APRN - NP   Admit date:  12/3/2024    Discharge date: 1/3/2025   Room Number  228/01  @ [facility]   Date of Service  1/3/2025            TYPE OF DISCHARGE: REGULAR               CONDITION AT DISCHARGE: Improved and Fair       PROVISIONAL & DISCHARGE DIAGNOSES:        Active Hospital Problems    Delirium of mixed origin      Acute cystitis without hematuria      *Severe recurrent major depression without psychotic features (HCC)      Chronic obstructive pulmonary disease, unspecified (HCC)      History of CVA (cerebrovascular accident)      Primary osteoarthritis involving multiple joints      Well controlled type 2 diabetes mellitus (HCC)      Essential hypertension      Elevated cholesterol        DISCHARGE DIAGNOSIS:   Axis I:  SEE ABOVE  Axis II: SEE ABOVE  Axis III: SEE ABOVE  Axis IV:  lack of structure  Axis V:  20 on admission, 60 on discharge     HISTORY OF PRESENT ILLNESS:  \"Suicidal ideation\"     The patient is an 89-year-old  female, whom we are very familiar with from prior inpatient and outpatient treatment due to a lengthy history of recurrent major depressive disorder.  She is admitted voluntarily through the [facility] Emergency Room after being brought there by EMS after she called them in the midst of having an apparent anxiety attack.  She indicates that she has been feeling progressively more depressed and she has noted that her mood has been declining for several months or so, to the point where she is having some vague suicidal thoughts.  She has never made any attempts to try to harm herself in the past, but she states she has had thoughts about lying down in the road or possibly taking an overdose, although she admits that she has not come close to acting on those

## 2025-02-13 ENCOUNTER — TELEPHONE (OUTPATIENT)
Facility: HOSPITAL | Age: 89
End: 2025-02-13

## 2025-02-14 ENCOUNTER — TELEPHONE (OUTPATIENT)
Facility: HOSPITAL | Age: 89
End: 2025-02-14

## 2025-02-14 NOTE — TELEPHONE ENCOUNTER
Was going to call to assist with getting patient's appt rescheduled, but I see that it has already been done.

## 2025-03-12 NOTE — BH NOTES
[facility]  Group Therapy    Date of Service: 3/2/2022  Start time: 1000  Stop time: 1050  Type of session: Therapy Group    Problem number: 1. Dep F 33.0    Short term goal (STG): A2. Pt will identify what she wants to be able to do or what her functioning level will be in order for her to know she is ready for discharge     Intervention/techniques: Informed, Validated/Supported, Prompted/Cued, Listened/Empathized and Provided Feedback    Patient mental status/affect: Anxious, Congruent and Preoccupied    Patient behavior/appearance: Neatly Groomed, Attentive, Cooperative and Needed Prompting    Special patient treatment accommodations provided (describe): None    Patient response and progress towards goals: Focus of session was on ways to manage dysfunctional people in our lives. The information came from [place], [place]. We spoke about the ways to [facility] with love and I shared the strategies which includes focusing on what we can control, respond dont react, respond in a new way, allow others to make their own good or bad decisions, dont give advice or tell people what to do, and dont obsess about other peoples problems. I shared as well the need to give our own expectations a reality check, walk away from an unproductive argument, and choose to not spend time with those who really trigger us. We spoke about strategies that group members can use today to help improve functioning. Jose Juan Wilhelm participated in group with prompting. She spoke about how she knows that she needs to continue to work on her ability to have healthy relationships. She spoke about how  has been able to see her relationship with her children in a much healthier way. [de-identified] : SR [de-identified] : 5d MCOT 12/2025:  SR with PACs, no AF  [de-identified] : 2/7/23: 0.5mm horizontal ST depression in aVF, V5 and V6, no ischemia/infarct   [de-identified] : 2/16/23: LVEF 60-65%, mild to mod AI, shunt at atrial level

## 2025-03-13 ENCOUNTER — HOSPITAL ENCOUNTER (OUTPATIENT)
Facility: HOSPITAL | Age: 89
Discharge: HOME | End: 2025-03-16
Attending: STUDENT IN AN ORGANIZED HEALTH CARE EDUCATION/TRAINING PROGRAM
Payer: MEDICARE

## 2025-03-13 DIAGNOSIS — C50.412 MALIGNANT NEOPLASM OF UPPER-OUTER QUADRANT OF LEFT BREAST IN FEMALE, ESTROGEN RECEPTOR POSITIVE: ICD-10-CM

## 2025-03-13 DIAGNOSIS — Z17.0 MALIGNANT NEOPLASM OF UPPER-OUTER QUADRANT OF LEFT BREAST IN FEMALE, ESTROGEN RECEPTOR POSITIVE: ICD-10-CM

## 2025-03-13 PROCEDURE — G0279 TOMOSYNTHESIS, MAMMO: HCPCS

## 2025-04-07 NOTE — TELEPHONE ENCOUNTER
Patient requesting refill on     Requested Prescriptions     Pending Prescriptions Disp Refills    metoprolol succinate (TOPROL XL) 50 MG extended release tablet [Pharmacy Med Name: METOPROLOL SUCC ER 50 MG TAB] 90 tablet 1     Sig: TAKE 1 TABLET BY MOUTH EVERY DAY        Last OV 6/4/2024      SQ heparin

## 2025-04-08 RX ORDER — METOPROLOL SUCCINATE 50 MG/1
50 TABLET, EXTENDED RELEASE ORAL DAILY
Qty: 90 TABLET | Refills: 0 | Status: SHIPPED | OUTPATIENT
Start: 2025-04-08

## 2025-04-25 NOTE — PROGRESS NOTES
[facility] at [facility] General Follow Up Visit   Office Phone: [phone], Office Fax: [phone]    Date: 4/28/25    PATIENT PROFILE: Ms. Dannielle Hall is a 89 y.o. who presents with the following diagnoses: history of breast cancer    PMH:   has a past medical history of Allergic rhinitis due to pollen, Arthritis, BPV (benign positional vertigo), Breast cancer ([facility]), Change in bowel habits, Depression, Diabetes (HCC), Dysuria, Hemorrhoids, Hypertension, IBS (irritable bowel syndrome), Menopause, Other diseases of nasal cavity and sinuses(058.87), TIA (transient ischemic attack), and Vertigo.    ALLERGIES:  No Known Allergies     CURRENT MEDS:  Current Outpatient Medications   Medication Sig Dispense Refill    metoprolol succinate (TOPROL XL) 50 MG extended release tablet TAKE 1 TABLET BY MOUTH EVERY DAY 90 tablet 0    buPROPion (WELLBUTRIN XL) 150 MG extended release tablet Take 1 tablet by mouth daily 30 tablet 1    traZODone (DESYREL) 50 MG tablet Take 1 tablet by mouth nightly 30 tablet 1    venlafaxine (EFFEXOR XR) 150 MG extended release capsule Take 1 capsule by mouth daily (with breakfast) 30 capsule 1    metFORMIN (GLUCOPHAGE) 500 MG tablet Take 1 tablet by mouth 2 times daily (with meals) 60 tablet 3    levocetirizine (XYZAL) 5 MG tablet Take 1 tablet by mouth nightly 90 tablet 1    atorvastatin (LIPITOR) 80 MG tablet Take 1 tablet by mouth daily 30 tablet 1    lisinopril (PRINIVIL;ZESTRIL) 20 MG tablet Take 1 tablet by mouth 2 times daily 60 tablet 1    letrozole (FEMARA) 2.5 MG tablet Take 1 tablet by mouth daily 30 tablet 1    clopidogrel (PLAVIX) 75 MG tablet Take 1 tablet by mouth daily 30 tablet 1    albuterol sulfate HFA (PROVENTIL;VENTOLIN;PROAIR) 108 (90 Base) MCG/ACT inhaler Inhale 2 puffs into the lungs every 6 hours as needed for Wheezing 8.5 each 3    alogliptin (NESINA) 12.5 MG TABS tablet Take 1 tablet by mouth 2 times daily (with meals) 30 tablet 1     No

## 2025-04-28 ENCOUNTER — OFFICE VISIT (OUTPATIENT)
Age: 89
End: 2025-04-28
Payer: MEDICARE

## 2025-04-28 VITALS
DIASTOLIC BLOOD PRESSURE: 86 MMHG | TEMPERATURE: 97.8 F | OXYGEN SATURATION: 96 % | RESPIRATION RATE: 16 BRPM | HEIGHT: 66 IN | SYSTOLIC BLOOD PRESSURE: 156 MMHG | BODY MASS INDEX: 26.07 KG/M2 | HEART RATE: 83 BPM | WEIGHT: 162.2 LBS

## 2025-04-28 DIAGNOSIS — M85.80 OSTEOPENIA, UNSPECIFIED LOCATION: ICD-10-CM

## 2025-04-28 DIAGNOSIS — Z17.0 MALIGNANT NEOPLASM OF UPPER-OUTER QUADRANT OF LEFT BREAST IN FEMALE, ESTROGEN RECEPTOR POSITIVE (HCC): Primary | ICD-10-CM

## 2025-04-28 DIAGNOSIS — Z79.811 AROMATASE INHIBITOR USE: ICD-10-CM

## 2025-04-28 DIAGNOSIS — C50.412 MALIGNANT NEOPLASM OF UPPER-OUTER QUADRANT OF LEFT BREAST IN FEMALE, ESTROGEN RECEPTOR POSITIVE (HCC): Primary | ICD-10-CM

## 2025-04-28 PROCEDURE — 99214 OFFICE O/P EST MOD 30 MIN: CPT | Performed by: STUDENT IN AN ORGANIZED HEALTH CARE EDUCATION/TRAINING PROGRAM

## 2025-04-28 PROCEDURE — 1123F ACP DISCUSS/DSCN MKR DOCD: CPT | Performed by: STUDENT IN AN ORGANIZED HEALTH CARE EDUCATION/TRAINING PROGRAM

## 2025-04-28 PROCEDURE — 1159F MED LIST DOCD IN RCRD: CPT | Performed by: STUDENT IN AN ORGANIZED HEALTH CARE EDUCATION/TRAINING PROGRAM

## 2025-04-28 RX ORDER — LETROZOLE 2.5 MG/1
2.5 TABLET, FILM COATED ORAL DAILY
Qty: 90 TABLET | Refills: 2 | Status: SHIPPED | OUTPATIENT
Start: 2025-04-28

## 2025-04-28 ASSESSMENT — PATIENT HEALTH QUESTIONNAIRE - PHQ9
SUM OF ALL RESPONSES TO PHQ QUESTIONS 1-9: 0
SUM OF ALL RESPONSES TO PHQ QUESTIONS 1-9: 0
1. LITTLE INTEREST OR PLEASURE IN DOING THINGS: NOT AT ALL
SUM OF ALL RESPONSES TO PHQ QUESTIONS 1-9: 0
2. FEELING DOWN, DEPRESSED OR HOPELESS: NOT AT ALL
SUM OF ALL RESPONSES TO PHQ QUESTIONS 1-9: 0

## 2025-04-28 NOTE — PROGRESS NOTES
Dannielle Hall is a 89 y.o. female    Chief Complaint   Patient presents with    Other     Malignant neoplasm of left breast       1. Have you been to the ER, urgent care clinic since your last visit?  Hospitalized since your last visit?Yes When: 12/3/24 Where: [facility] Reason for visit: Suicidal ideation    2. Have you seen or consulted any other health care providers outside of the [facility] since your last visit?  Include any pap smears or colon screening. No

## 2025-04-28 NOTE — PATIENT INSTRUCTIONS
It was nice seeing you.  We have reviewed how you have been feeling over the preceding months.  Please continue taking letrozole daily.  We discussed the results of your bone density scan noting low density or osteopenia.  This is not quite osteoporosis, but we do consider starting bone strengthening therapies in this range when taking letrozole.  Please take calcium 1200 mg daily and vitamin D 2000 units daily (available over the counter).  We would consider starting a shot every 6 months called

## 2025-05-05 RX ORDER — ATORVASTATIN CALCIUM 80 MG/1
80 TABLET, FILM COATED ORAL DAILY
Qty: 90 TABLET | Refills: 3 | Status: SHIPPED | OUTPATIENT
Start: 2025-05-05

## 2025-05-15 RX ORDER — LISINOPRIL 20 MG/1
20 TABLET ORAL 2 TIMES DAILY
Qty: 60 TABLET | Refills: 0 | Status: SHIPPED | OUTPATIENT
Start: 2025-05-15

## 2025-05-20 RX ORDER — METOPROLOL SUCCINATE 50 MG/1
50 TABLET, EXTENDED RELEASE ORAL DAILY
Qty: 90 TABLET | Refills: 0 | Status: SHIPPED | OUTPATIENT
Start: 2025-05-20

## 2025-05-20 NOTE — TELEPHONE ENCOUNTER
Patient requesting refill on     Requested Prescriptions     Pending Prescriptions Disp Refills    metoprolol succinate (TOPROL XL) 50 MG extended release tablet [Pharmacy Med Name: METOPROLOL SUCC ER 50 MG TAB] 90 tablet 0     Sig: TAKE 1 TABLET BY MOUTH EVERY DAY        Last OV 6/4/2024     Next visit 6/12/25

## 2025-05-28 ENCOUNTER — HOSPITAL ENCOUNTER (INPATIENT)
Facility: HOSPITAL | Age: 89
LOS: 22 days | Discharge: HOME | DRG: 885 | End: 2025-06-19
Attending: EMERGENCY MEDICINE | Admitting: PSYCHIATRY & NEUROLOGY
Payer: MEDICARE

## 2025-05-28 DIAGNOSIS — R45.851 SUICIDAL IDEATION: Primary | ICD-10-CM

## 2025-05-28 DIAGNOSIS — Z86.59 HISTORY OF COMMAND HALLUCINATIONS: ICD-10-CM

## 2025-05-28 PROBLEM — F32.A DEPRESSION: Status: ACTIVE | Noted: 2025-05-28

## 2025-05-28 LAB
ALBUMIN SERPL-MCNC: 3.6 G/DL (ref 3.5–5)
ALBUMIN/GLOB SERPL: 0.9 (ref 1.1–2.2)
ALP SERPL-CCNC: 109 U/L (ref 45–117)
ALT SERPL-CCNC: 24 U/L (ref 12–78)
AMPHET UR QL SCN: NEGATIVE
ANION GAP SERPL CALC-SCNC: 8 MMOL/L (ref 2–12)
APAP SERPL-MCNC: <2 UG/ML (ref 10–30)
APPEARANCE UR: CLEAR
AST SERPL-CCNC: 23 U/L (ref 15–37)
BACTERIA URNS QL MICRO: ABNORMAL /HPF
BARBITURATES UR QL SCN: NEGATIVE
BASOPHILS # BLD: 0.04 K/UL (ref 0–0.1)
BASOPHILS NFR BLD: 0.5 % (ref 0–1)
BENZODIAZ UR QL: NEGATIVE
BILIRUB SERPL-MCNC: 0.8 MG/DL (ref 0.2–1)
BILIRUB UR QL: NEGATIVE
BUN SERPL-MCNC: 13 MG/DL (ref 6–20)
BUN/CREAT SERPL: 12 (ref 12–20)
CALCIUM SERPL-MCNC: 9.3 MG/DL (ref 8.5–10.1)
CANNABINOIDS UR QL SCN: NEGATIVE
CHLORIDE SERPL-SCNC: 104 MMOL/L (ref 97–108)
CO2 SERPL-SCNC: 33 MMOL/L (ref 21–32)
COCAINE UR QL SCN: NEGATIVE
COLOR UR: ABNORMAL
CREAT SERPL-MCNC: 1.13 MG/DL (ref 0.55–1.02)
DIFFERENTIAL METHOD BLD: ABNORMAL
EKG ATRIAL RATE: 84 BPM
EKG DIAGNOSIS: NORMAL
EKG P AXIS: 75 DEGREES
EKG P-R INTERVAL: 146 MS
EKG Q-T INTERVAL: 394 MS
EKG QRS DURATION: 74 MS
EKG QTC CALCULATION (BAZETT): 465 MS
EKG R AXIS: -1 DEGREES
EKG T AXIS: 96 DEGREES
EKG VENTRICULAR RATE: 84 BPM
EOSINOPHIL # BLD: 0.22 K/UL (ref 0–0.4)
EOSINOPHIL NFR BLD: 3 % (ref 0–0.7)
EPITH CASTS URNS QL MICRO: ABNORMAL /LPF
ERYTHROCYTE [DISTWIDTH] IN BLOOD BY AUTOMATED COUNT: 12.4 % (ref 11.5–14.5)
ETHANOL SERPL-MCNC: <10 MG/DL (ref 0–0.08)
GLOBULIN SER CALC-MCNC: 4.2 G/DL (ref 2–4)
GLUCOSE BLD STRIP.AUTO-MCNC: 181 MG/DL (ref 65–117)
GLUCOSE BLD STRIP.AUTO-MCNC: 181 MG/DL (ref 65–117)
GLUCOSE SERPL-MCNC: 189 MG/DL (ref 65–100)
GLUCOSE UR STRIP.AUTO-MCNC: NEGATIVE MG/DL
HCT VFR BLD AUTO: 41.7 % (ref 35–47)
HGB BLD-MCNC: 13.9 G/DL (ref 11.5–16)
HGB UR QL STRIP: NEGATIVE
IMM GRANULOCYTES # BLD AUTO: 0.03 K/UL (ref 0–0.04)
IMM GRANULOCYTES NFR BLD AUTO: 0.4 % (ref 0–0.5)
KETONES UR QL STRIP.AUTO: NEGATIVE MG/DL
LEUKOCYTE ESTERASE UR QL STRIP.AUTO: ABNORMAL
LYMPHOCYTES # BLD: 2.43 K/UL (ref 0.8–3.5)
LYMPHOCYTES NFR BLD: 33.2 % (ref 12–49)
Lab: NORMAL
MAGNESIUM SERPL-MCNC: 1.6 MG/DL (ref 1.6–2.4)
MCH RBC QN AUTO: 30.7 PG (ref 26–34)
MCHC RBC AUTO-ENTMCNC: 33.3 G/DL (ref 30–36.5)
MCV RBC AUTO: 92.1 FL (ref 80–99)
METHADONE UR QL: NEGATIVE
MONOCYTES # BLD: 0.42 K/UL (ref 0–1)
MONOCYTES NFR BLD: 5.7 % (ref 5–13)
NEUTS SEG # BLD: 4.19 K/UL (ref 1.8–8)
NEUTS SEG NFR BLD: 57.2 % (ref 32–75)
NITRITE UR QL STRIP.AUTO: NEGATIVE
NRBC # BLD: 0 K/UL (ref 0–0.01)
NRBC BLD-RTO: 0 PER 100 WBC
OPIATES UR QL: NEGATIVE
PCP UR QL: NEGATIVE
PH UR STRIP: 7.5 (ref 5–8)
PLATELET # BLD AUTO: 314 K/UL (ref 150–400)
PMV BLD AUTO: 9.7 FL (ref 8.9–12.9)
POTASSIUM SERPL-SCNC: 4.5 MMOL/L (ref 3.5–5.1)
PROT SERPL-MCNC: 7.8 G/DL (ref 6.4–8.2)
PROT UR STRIP-MCNC: NEGATIVE MG/DL
RBC # BLD AUTO: 4.53 M/UL (ref 3.8–5.2)
RBC #/AREA URNS HPF: ABNORMAL /HPF (ref 0–5)
SALICYLATES SERPL-MCNC: <1.7 MG/DL (ref 2.8–20)
SERVICE CMNT-IMP: ABNORMAL
SERVICE CMNT-IMP: ABNORMAL
SODIUM SERPL-SCNC: 145 MMOL/L (ref 136–145)
SP GR UR REFRACTOMETRY: 1.01 (ref 1–1.03)
URINE CULTURE IF INDICATED: ABNORMAL
UROBILINOGEN UR QL STRIP.AUTO: 0.2 EU/DL (ref 0.2–1)
WBC # BLD AUTO: 7.3 K/UL (ref 3.6–11)
WBC URNS QL MICRO: ABNORMAL /HPF (ref 0–4)

## 2025-05-28 PROCEDURE — 93005 ELECTROCARDIOGRAM TRACING: CPT | Performed by: EMERGENCY MEDICINE

## 2025-05-28 PROCEDURE — 6370000000 HC RX 637 (ALT 250 FOR IP): Performed by: EMERGENCY MEDICINE

## 2025-05-28 PROCEDURE — 80053 COMPREHEN METABOLIC PANEL: CPT

## 2025-05-28 PROCEDURE — 6370000000 HC RX 637 (ALT 250 FOR IP): Performed by: STUDENT IN AN ORGANIZED HEALTH CARE EDUCATION/TRAINING PROGRAM

## 2025-05-28 PROCEDURE — 82962 GLUCOSE BLOOD TEST: CPT

## 2025-05-28 PROCEDURE — 80179 DRUG ASSAY SALICYLATE: CPT

## 2025-05-28 PROCEDURE — 36415 COLL VENOUS BLD VENIPUNCTURE: CPT

## 2025-05-28 PROCEDURE — 80307 DRUG TEST PRSMV CHEM ANLYZR: CPT

## 2025-05-28 PROCEDURE — 99285 EMERGENCY DEPT VISIT HI MDM: CPT

## 2025-05-28 PROCEDURE — 87086 URINE CULTURE/COLONY COUNT: CPT

## 2025-05-28 PROCEDURE — 82077 ASSAY SPEC XCP UR&BREATH IA: CPT

## 2025-05-28 PROCEDURE — 81001 URINALYSIS AUTO W/SCOPE: CPT

## 2025-05-28 PROCEDURE — 80143 DRUG ASSAY ACETAMINOPHEN: CPT

## 2025-05-28 PROCEDURE — 85025 COMPLETE CBC W/AUTO DIFF WBC: CPT

## 2025-05-28 PROCEDURE — 83735 ASSAY OF MAGNESIUM: CPT

## 2025-05-28 PROCEDURE — 90791 PSYCH DIAGNOSTIC EVALUATION: CPT

## 2025-05-28 PROCEDURE — 1240000000 HC EMOTIONAL WELLNESS R&B

## 2025-05-28 RX ORDER — LISINOPRIL 20 MG/1
20 TABLET ORAL
Status: COMPLETED | OUTPATIENT
Start: 2025-05-28 | End: 2025-05-28

## 2025-05-28 RX ORDER — HYDROXYZINE HYDROCHLORIDE 25 MG/1
50 TABLET, FILM COATED ORAL 3 TIMES DAILY PRN
Status: DISCONTINUED | OUTPATIENT
Start: 2025-05-28 | End: 2025-05-29

## 2025-05-28 RX ORDER — TRAZODONE HYDROCHLORIDE 50 MG/1
50 TABLET ORAL NIGHTLY PRN
Status: DISCONTINUED | OUTPATIENT
Start: 2025-05-28 | End: 2025-06-19 | Stop reason: HOSPADM

## 2025-05-28 RX ORDER — SIMETHICONE 80 MG
80 TABLET,CHEWABLE ORAL EVERY 6 HOURS PRN
Status: DISCONTINUED | OUTPATIENT
Start: 2025-05-28 | End: 2025-06-19 | Stop reason: HOSPADM

## 2025-05-28 RX ORDER — ACETAMINOPHEN 500 MG
1000 TABLET ORAL
Status: ACTIVE | OUTPATIENT
Start: 2025-05-28 | End: 2025-05-29

## 2025-05-28 RX ORDER — HYDRALAZINE HYDROCHLORIDE 25 MG/1
25 TABLET, FILM COATED ORAL
Status: COMPLETED | OUTPATIENT
Start: 2025-05-28 | End: 2025-05-28

## 2025-05-28 RX ADMIN — LISINOPRIL 20 MG: 20 TABLET ORAL at 11:37

## 2025-05-28 RX ADMIN — SIMETHICONE 80 MG: 80 TABLET, CHEWABLE ORAL at 23:23

## 2025-05-28 RX ADMIN — HYDRALAZINE HYDROCHLORIDE 25 MG: 25 TABLET ORAL at 11:37

## 2025-05-28 ASSESSMENT — PAIN SCALES - GENERAL
PAINLEVEL_OUTOF10: 0
PAINLEVEL_OUTOF10: 0

## 2025-05-28 ASSESSMENT — PAIN - FUNCTIONAL ASSESSMENT
PAIN_FUNCTIONAL_ASSESSMENT: NONE - DENIES PAIN
PAIN_FUNCTIONAL_ASSESSMENT: 0-10

## 2025-05-28 NOTE — ED NOTES
Admission SBAR Note  Situation/Background: Pt brought in by EMS for weakness and fatigue. During triage pt expresses desire to die.  Pt stated that she \"wants to destroy\" herself. Pt also endorses episodes like this in the past and states that she usually has to stay in the hospital for it. Pt calm and cooperative in ED.    Patient is being transferred to [facility] ([facility] Room# 236    Patient's Chief Complaint was Weakness and is admitted for SI.    CODE STATUS: Full  CSSRS: 2- Suicidal Thoughts    ISOLATION/PRECAUTIONS: No  ISOLATION TYPE:     Is this a behavioral health patient? Yes  Has wanding been completed Yes  Are belongings secure? Yes    Called outstanding consults: Yes    STAT labs collected: Yes      All STAT orders are complete: Yes    The following personal items will be sent with the patient during transfer to the floor:     All valuables: listed in ER with patient,       ASSESSMENT:    NEURO:   NIH SCORE: 0,1-4,5-15,15-20,21-42: 0   NEENA SWALLOW SCREEN COMPLETE: No  ORIENTATION LEVEL: ORIENTATION LEVEL: Person, Place, Time, and Situation  Cognition:  appropriate decision making, appropriate for age attention/concentration, and appropriate safety awareness  Speech: shows no evidence of impairment    Is patient impulsive? No  Is patient oriented? Yes  Do they follow commands? Yes  Is the patient ambulatory? Yes    FALL RISK? Yes  Interventions: Implemented/recommended use of non-skid footwear, Implemented/recommended use of fall risk identification flag to all team members, and Implemented/recommended assistive devices and encouraged their use    RESPIRATORY:   Is patient on oxygen? No  Oxygen therapy: room air  O2 rate: 0    CARDIAC:   Is cardiac monitoring ordered? No    Last Rhythm: Rhythm including paccardio: Normal Sinus Rhythm 70  Patient to transfer with tele box on? No  Infusions: Meds; iv fluids: none        /GI:    Continent Bowel/Bladder? Yes  Urinary Output: 0  Chronic or Acute:   If Chronic, is it 3 days old, was it changed prior to specimen collection? Yes  Was UA with reflex sent to lab? Yes  If no, collect and send prior to transport to inpatient area.    INTEGUMENTARY:  IS THE PATIENT UNDRESSED? Yes  ARE THERE WOUNDS PRESENT? No  ARE THE WOUNDS DOCUMENTED? No    RESTRAINTS IN USE: No    IS THE DOCUMENTATION COMPLETE? Yes  Is there a current order?  Yes  When does it ? na       Vital Signs:   / Level of Consciousness: Alert (0)    Vitals:    25 1053 25 1059 25 1211 25 1356   BP: (!) 192/94  (!) 184/86 (!) 177/90   Pulse:       Resp:       Temp:       SpO2: 96% 97% 94% 95%   Weight:  73.5 kg (162 lb)     Height:  1.676 m (5' 6\")            Pain 1: numeric  Pain Scale 1: 0    REVIEW:    IP UNIT CALLED NOTE IS READY: Yes  IF THERE ARE QUESTIONS, CALL Yadira   AT PHONE # [phone]

## 2025-05-28 NOTE — VIRTUAL HEALTH
Dannielle Hall  [id number]  1935     Social Work Behavioral Health Crisis Assessment    05/28/25    Chief Complaint: Suicidal Ideation     HPI: Patient is a 89 y.o. Black /  female who presents on 5/28/25 for suicidal ideation. Records show pt endorses fatigue, depression, SI, and command hallucinations telling her to take pills. Records show hx of Depression, SI.    Upon assessment pt is calm and agreeable to meet with writer via [employer]. She is A&Ox4.When asked about reason for presentation pt states \"every once in a while I have these spells and I have to go to the hospital\". She describes these \"spells\" as feeling depressed and \"like I don't want to be around anymore\". Pt endorses SI with plan to \"lay in the road and let a car hit me or take some pills and go away\". She denies any previous attempts but says she's struggled with SI on and off for years and will always go to the hospital before she acts on her thoughts. Pt endorses AH of voices telling her to harm herself and VH of \"people around the corner\". Pt denies any substance use. She says she is unsure if she is taking any psychiatric medications but does know she has taken them in the past. She says she lives alone and feels very isolated, however does say she has a good support system, they are just busy. She gave permission to contact her children.     Collateral: Son- Raul [phone] - voicemail left requesting return call   DaughterShannan Lujan [phone]- voicemail left requesting return call     Past Psychiatric History:  Previous Diagnoses/symptoms: Depression, SI  Previous suicide attempts/self-harm: Denies  Inpatient psychiatric hospitalizations: yes  Current outpatient psychiatric provider: Denies  Current therapist: Denies  Previous psychiatric medication trials: per chart, Abilify  Current psychiatric medications: per chart, Wellbutrin, Trazodone   Family Psychiatric History: Denies    Sleep Hours: decreased   Sleep  TABS tablet, Take 1 tablet by mouth 2 times daily (with meals), Disp: 30 tablet, Rfl: 1  Not in a hospital admission.  Prior to Admission medications    Medication Sig Start Date End Date Taking? Authorizing Provider   metoprolol succinate (TOPROL XL) 50 MG extended release tablet TAKE 1 TABLET BY MOUTH EVERY DAY 5/20/25   Tracy Banuelos APRN - NP   lisinopril (PRINIVIL;ZESTRIL) 20 MG tablet TAKE 1 TABLET BY MOUTH TWICE A DAY 5/15/25   Tracy Banuelos APRN - NP   atorvastatin (LIPITOR) 80 MG tablet TAKE 1 TABLET BY MOUTH EVERY DAY 5/5/25   Tracy Banuelos APRN - NP   letrozole (FEMARA) 2.5 MG tablet Take 1 tablet by mouth daily 4/28/25   Paolo Carmona MD   buPROPion (WELLBUTRIN XL) 150 MG extended release tablet Take 1 tablet by mouth daily 1/3/25   Sukhdeep Choi MD   traZODone (DESYREL) 50 MG tablet Take 1 tablet by mouth nightly 1/2/25   Sukhdeep Choi MD   venlafaxine (EFFEXOR XR) 150 MG extended release capsule Take 1 capsule by mouth daily (with breakfast) 1/3/25   Sukhdeep Choi MD   metFORMIN (GLUCOPHAGE) 500 MG tablet Take 1 tablet by mouth 2 times daily (with meals) 1/2/25   Sukhdeep Choi MD   levocetirizine (XYZAL) 5 MG tablet Take 1 tablet by mouth nightly 1/2/25   Sukhdeep Choi MD   clopidogrel (PLAVIX) 75 MG tablet Take 1 tablet by mouth daily 1/2/25   Sukhdeep Choi MD   albuterol sulfate HFA (PROVENTIL;VENTOLIN;PROAIR) 108 (90 Base) MCG/ACT inhaler Inhale 2 puffs into the lungs every 6 hours as needed for Wheezing 1/2/25   Sukhdeep Choi MD   alogliptin (NESINA) 12.5 MG TABS tablet Take 1 tablet by mouth 2 times daily (with meals) 1/2/25   Sukhdeep Choi MD        Labs:  UDS: not available  ETOH: not available  HCG: na      Mental Status Exam:  Level of consciousness:  within normal limits   Appearance:  street clothes.  Does appear stated age. No acute distress.  Behavior/Motor:  no abnormalities noted  Attitude

## 2025-05-28 NOTE — BH NOTE
[facility]  Master Treatment Plan for Dannielle Hall    Date Treatment Plan Initiated: 05/28/2025    Treatment Plan Modalities:  Type of Modality Amount  (x minutes) Frequency (x/week) Duration (x days) Name of Responsible Staff   Community & wrap-up meetings to encourage peer interactions 30 14 1 NAIMA Linder KO Tineo [facility]   Group psychotherapy to assist in building coping skills and internal controls 60 5 1 [facility]   Therapeutic activity groups to build coping skills 60 7 1 Indu WEINSTEIN [facility]     Psychoeducation in group setting to address:   Medication education  Coping Skills  Symptom Management   60 7 1 WILIAN [facility] QU[facility], RN  Cuca CORTEZ RN   Discharge planning   60 2 1 Kita QUINTERO [facility] II   Spirituality    60 2 1 Nate SARAVIA   Physician medication management   15 7 1 MANJIT Langley MD                                         Treatment Team Signatures    I have participated in the development of this plan of treatment and agree to its implementation.    Patient Signature       Patient Printed Name Date/Time   Social Work/Therapist Signature       Social Work/Therapist Printed Name Date/Time   RN Signature       RN Printed Name  Sulma Ta RN Date/Time  05/28/2025  @1849   Other Signature     Other Signature Date/Time   MD Signature       MD Printed Name Date/Time

## 2025-05-28 NOTE — BH NOTE
Admission Note:     Patient arrived on the unit @ 1657 pm from [facility] ER on Voluntary admission.  Patient was escorted on the unit by Hospital Security. Dr. Langley and Nursing Supervisor were notified of patient's arrival. Consult placed for Medical H&P done by Hospitalist. Patient alert and oriented x 4. Calm and cooperative. Denies SI/HI/AVH. No delusional statements made. Pt endorses she felt low and depressed and was ready to destroy herself. She denied doing anything, taking overdose of medications or any other means of hurting herself.  She is calm and cooperative. Appetite good and ate full meal

## 2025-05-28 NOTE — ED NOTES
[facility] called back and will work on placement once urine drug screen has resulted. Patient attempting to give urine sample at this time.

## 2025-05-28 NOTE — BSMART NOTE
[facility]. Nursing report [phone]. Hidla/nursing made aware.

## 2025-05-28 NOTE — ED NOTES
Lab called to see if there was enough urine for urine drug screen. Patient stated that she was unable to provide a urine sample at this time.

## 2025-05-28 NOTE — BH NOTE
TRANSFER - IN REPORT:     Verbal report received from ELI Stevens on Dannielle Hall being transferred from [facility] ER for routine progression of care        Report consisted of patient’s Situation, Background, Assessment and   Recommendations(SBAR).      Information from the following report(s) SBAR, Kardex, ED Summary, MAR, Recent Results and Med Rec Status was reviewed with the receiving nurse.     Opportunity for questions and clarification was provided.

## 2025-05-28 NOTE — ED PROVIDER NOTES
[facility] EMERGENCY DEPARTMENT  EMERGENCY DEPARTMENT ENCOUNTER       Pt Name: Dannielle Hall  MRN: [id number]  Birthdate 1935  Date of evaluation: 5/28/2025  Provider: Hernandez Elise DO   PCP: Tracy Banuelos APRN - NP  Note Started: 10:40 AM EDT 5/28/25     CHIEF COMPLAINT       Chief Complaint   Patient presents with    Fatigue        HISTORY OF PRESENT ILLNESS: 1 or more elements      History From: Patient, History limited by: none     Dannielle Hall is a 89 y.o. female past medical history significant for depression, CVA, diabetes, hypertension presenting the emergency department complaining of generalized weakness, depression, suicidal ideation.  Patient states that she has been feeling generally weak and describes feeling that she \"wants to destroy herself \".  She states she heard voices in her head telling her to take pills.  She did not take any medicines to try to hurt herself.  No acts of furtherance.  Denies any drugs or alcohol.  Has no other complaints such as fever, chills, cough, chest pain, shortness of breath, abdominal pain, nausea vomiting or diarrhea.       Please See MDM for Additional Details of the HPI/PMH  Nursing Notes were all reviewed and agreed with or any disagreements were addressed in the HPI.     REVIEW OF SYSTEMS        Positives and Pertinent negatives as per HPI.    PAST HISTORY     Past Medical History:  Past Medical History:   Diagnosis Date    Allergic rhinitis due to pollen     Arthritis     BPV (benign positional vertigo)     Breast cancer (HCC)     left br cancer    Change in bowel habits     Depression     Diabetes (HCC)     Dysuria     Hemorrhoids     Hypertension     IBS (irritable bowel syndrome)     Menopause     Other diseases of nasal cavity and sinuses(478.19)     TIA (transient ischemic attack)     Vertigo        Past Surgical History:  Past Surgical History:   Procedure Laterality Date    BREAST LUMPECTOMY Left 10/12/2023    LEFT BREAST  Complexity of Data Reviewed  Labs: ordered.  ECG/medicine tests: ordered and independent interpretation performed.     Details: EKG shows sinus rhythm, rate 84.  Nonspecific lateral ST changes, no evidence of ST elevation myocardial infarction.  Interpreted by me    Risk  OTC drugs.  Prescription drug management.  Decision regarding hospitalization.          CLINICAL MANAGEMENT TOOLS:  Not Applicable      ED Course as of 05/28/25 1602   Wed May 28, 2025   1601 Patient medically cleared for inpatient psychiatric admission [AR]      ED Course User Index  [AR] Hernandez Elise DO       Discussed with telepsych provider, recommend admission, I agree with the recommendation.  Patient medically cleared for inpatient psychiatric admission.  No acute medical concerns at this time, still pending urine, but even if patient has UTI can be treated with oral antibiotics.  She is noted to be hypertensive, will order her home blood pressure medications        FINAL IMPRESSION     1. Suicidal ideation    2. History of command hallucinations          DISPOSITION/PLAN   Dannielle Hall's  results have been reviewed with her.  She has been counseled regarding her diagnosis, treatment, and plan.  She verbally conveys understanding and agreement of the signs, symptoms, diagnosis, treatment and prognosis and additionally agrees to follow up as discussed.  She also agrees with the care-plan and conveys that all of her questions have been answered.  I have also provided discharge instructions for her that include: educational information regarding their diagnosis and treatment, and list of reasons why they would want to return to the ED prior to their follow-up appointment, should her condition change.     CLINICAL IMPRESSION    Patient will be admitted to an appropriate and available inpatient psychiatric unit as determined by the behavioral health bedboard     PATIENT REFERRED TO:  No follow-up provider specified.     DISCHARGE

## 2025-05-28 NOTE — BSMART NOTE
[facility] aware of consult and recommendation by tele psych for admission and will support dispo and plan of care. Access made aware pt still has pending UDS, Cris reported pt is ambulatory with no assistive devices, she can perform ADLs and she does not use a cpap.

## 2025-05-29 PROBLEM — E11.9 WELL CONTROLLED TYPE 2 DIABETES MELLITUS (HCC): Status: RESOLVED | Noted: 2017-07-24 | Resolved: 2025-05-29

## 2025-05-29 PROBLEM — F32.A DEPRESSION: Status: RESOLVED | Noted: 2025-05-28 | Resolved: 2025-05-29

## 2025-05-29 LAB
BACTERIA SPEC CULT: NORMAL
GLUCOSE BLD STRIP.AUTO-MCNC: 107 MG/DL (ref 65–117)
GLUCOSE BLD STRIP.AUTO-MCNC: 153 MG/DL (ref 65–117)
GLUCOSE BLD STRIP.AUTO-MCNC: 157 MG/DL (ref 65–117)
GLUCOSE BLD STRIP.AUTO-MCNC: 334 MG/DL (ref 65–117)
SERVICE CMNT-IMP: ABNORMAL
SERVICE CMNT-IMP: NORMAL
SERVICE CMNT-IMP: NORMAL

## 2025-05-29 PROCEDURE — 6370000000 HC RX 637 (ALT 250 FOR IP): Performed by: INTERNAL MEDICINE

## 2025-05-29 PROCEDURE — 90792 PSYCH DIAG EVAL W/MED SRVCS: CPT | Performed by: PSYCHIATRY & NEUROLOGY

## 2025-05-29 PROCEDURE — 6370000000 HC RX 637 (ALT 250 FOR IP): Performed by: PSYCHIATRY & NEUROLOGY

## 2025-05-29 PROCEDURE — 82962 GLUCOSE BLOOD TEST: CPT

## 2025-05-29 PROCEDURE — 1240000000 HC EMOTIONAL WELLNESS R&B

## 2025-05-29 RX ORDER — LETROZOLE 2.5 MG/1
2.5 TABLET, FILM COATED ORAL DAILY
Status: DISCONTINUED | OUTPATIENT
Start: 2025-05-29 | End: 2025-06-19 | Stop reason: HOSPADM

## 2025-05-29 RX ORDER — ATORVASTATIN CALCIUM 40 MG/1
80 TABLET, FILM COATED ORAL DAILY
Status: DISCONTINUED | OUTPATIENT
Start: 2025-05-29 | End: 2025-06-19 | Stop reason: HOSPADM

## 2025-05-29 RX ORDER — HYDROXYZINE PAMOATE 25 MG/1
25 CAPSULE ORAL 3 TIMES DAILY PRN
Status: DISCONTINUED | OUTPATIENT
Start: 2025-05-29 | End: 2025-06-19 | Stop reason: HOSPADM

## 2025-05-29 RX ORDER — LISINOPRIL 10 MG/1
20 TABLET ORAL 2 TIMES DAILY
Status: DISCONTINUED | OUTPATIENT
Start: 2025-05-29 | End: 2025-06-06

## 2025-05-29 RX ORDER — VENLAFAXINE HYDROCHLORIDE 150 MG/1
150 CAPSULE, EXTENDED RELEASE ORAL
Status: DISCONTINUED | OUTPATIENT
Start: 2025-05-29 | End: 2025-06-19 | Stop reason: HOSPADM

## 2025-05-29 RX ORDER — MAGNESIUM HYDROXIDE/ALUMINUM HYDROXICE/SIMETHICONE 120; 1200; 1200 MG/30ML; MG/30ML; MG/30ML
30 SUSPENSION ORAL EVERY 6 HOURS PRN
Status: DISCONTINUED | OUTPATIENT
Start: 2025-05-29 | End: 2025-06-19 | Stop reason: HOSPADM

## 2025-05-29 RX ORDER — ALOGLIPTIN 12.5 MG/1
12.5 TABLET, FILM COATED ORAL 2 TIMES DAILY WITH MEALS
Status: DISCONTINUED | OUTPATIENT
Start: 2025-05-29 | End: 2025-06-19 | Stop reason: HOSPADM

## 2025-05-29 RX ORDER — ASPIRIN 81 MG/1
81 TABLET ORAL DAILY
Status: ON HOLD | COMMUNITY
End: 2025-06-19 | Stop reason: HOSPADM

## 2025-05-29 RX ORDER — METOPROLOL SUCCINATE 50 MG/1
50 TABLET, EXTENDED RELEASE ORAL DAILY
Status: DISCONTINUED | OUTPATIENT
Start: 2025-05-29 | End: 2025-06-19 | Stop reason: HOSPADM

## 2025-05-29 RX ORDER — ACETAMINOPHEN 325 MG/1
650 TABLET ORAL EVERY 4 HOURS PRN
Status: DISCONTINUED | OUTPATIENT
Start: 2025-05-29 | End: 2025-06-19 | Stop reason: HOSPADM

## 2025-05-29 RX ORDER — GLUCAGON 1 MG/ML
1 KIT INJECTION PRN
Status: DISCONTINUED | OUTPATIENT
Start: 2025-05-29 | End: 2025-06-06

## 2025-05-29 RX ORDER — CLOPIDOGREL BISULFATE 75 MG/1
75 TABLET ORAL DAILY
Status: DISCONTINUED | OUTPATIENT
Start: 2025-05-29 | End: 2025-06-19 | Stop reason: HOSPADM

## 2025-05-29 RX ORDER — INSULIN LISPRO 100 [IU]/ML
0-8 INJECTION, SOLUTION INTRAVENOUS; SUBCUTANEOUS
Status: DISCONTINUED | OUTPATIENT
Start: 2025-05-29 | End: 2025-06-19 | Stop reason: HOSPADM

## 2025-05-29 RX ORDER — DEXTROSE MONOHYDRATE 100 MG/ML
INJECTION, SOLUTION INTRAVENOUS CONTINUOUS PRN
Status: DISCONTINUED | OUTPATIENT
Start: 2025-05-29 | End: 2025-06-19 | Stop reason: HOSPADM

## 2025-05-29 RX ADMIN — LISINOPRIL 20 MG: 10 TABLET ORAL at 09:56

## 2025-05-29 RX ADMIN — LISINOPRIL 20 MG: 10 TABLET ORAL at 20:32

## 2025-05-29 RX ADMIN — INSULIN LISPRO 6 UNITS: 100 INJECTION, SOLUTION INTRAVENOUS; SUBCUTANEOUS at 12:40

## 2025-05-29 RX ADMIN — ATORVASTATIN CALCIUM 80 MG: 40 TABLET, FILM COATED ORAL at 09:55

## 2025-05-29 RX ADMIN — CLOPIDOGREL BISULFATE 75 MG: 75 TABLET, FILM COATED ORAL at 09:56

## 2025-05-29 RX ADMIN — ALOGLIPTIN 12.5 MG: 12.5 TABLET, FILM COATED ORAL at 09:55

## 2025-05-29 RX ADMIN — METFORMIN HYDROCHLORIDE 500 MG: 500 TABLET ORAL at 17:03

## 2025-05-29 RX ADMIN — METOPROLOL SUCCINATE 50 MG: 50 TABLET, EXTENDED RELEASE ORAL at 09:56

## 2025-05-29 RX ADMIN — ALOGLIPTIN 12.5 MG: 12.5 TABLET, FILM COATED ORAL at 17:03

## 2025-05-29 RX ADMIN — VENLAFAXINE HYDROCHLORIDE 150 MG: 150 CAPSULE, EXTENDED RELEASE ORAL at 09:55

## 2025-05-29 RX ADMIN — LETROZOLE 2.5 MG: 2.5 TABLET ORAL at 09:55

## 2025-05-29 RX ADMIN — METFORMIN HYDROCHLORIDE 500 MG: 500 TABLET ORAL at 09:56

## 2025-05-29 NOTE — BH NOTE
Group Therapy Note    Date: 5/29/2025    Group Start Time: 0900  Group End Time: 0950  Group Topic: Psychotherapy    [facility] BEHAVIORAL TH OP    Socorro Ríos LCSW        Group Therapy Note    Attendees: 8 scheduled    Focus of session was on effective ways to control anxiety.  We spoke about how these specific skills can have a very immediate effect on stress and anxiety and we spoke about how we can motivate to use them.  We spoke about the following skills; dealing with problems on the spot, strong, confident relationships, slowing down, taking one thing at a time, coping with ruts, divide problems up, using past experiences, and building relaxation into our daily lives.  We spoke about which skill we will start to incorporate into our daily use.        Patient's Goal:  Will report anxiety at manageable levels     Notes:  Pt participated in the group activity and discussion.  She shared that yesterday she had thoughts of taking pills to overdose or going down the road and left a car run over her.  She has had suicide thoughts over the last few months but realized yesterday that she needed help. Pt still lives alone and no longer has anyone coming in to help her.  Pt is considering coming back to [facility] but states she has a hard time in the mornings    Status After Intervention:  Unchanged    Participation Level: Active Listener and Interactive    Participation Quality: Appropriate, Attentive, and Sharing      Speech:  normal      Thought Process/Content: Logical      Affective Functioning: Congruent      Mood: anxious and depressed      Level of consciousness:  Alert and Attentive      Response to Learning: Able to verbalize current knowledge/experience      Endings: None Reported    Modes of Intervention: Education, Support, Socialization, Exploration, Clarifying, Problem-solving, and Activity      Discipline Responsible: Social

## 2025-05-29 NOTE — BH NOTE
Patient was admitted to the unit voluntarily for SI. Patient reports having these spells and needs inpatient when experiencing this. She reports feeling isolated, endorses a good support system. She endorses AH telling her to harm herself and seeing people around the corner. She is not involved in the IOP program at [facility] and only see's Dr. Langley for medication management. She lives alone and is able to do her ADL care. Patient will be returning back to her home once stable.

## 2025-05-29 NOTE — PROGRESS NOTES
Pharmacy Medication Reconciliation     The patient was interviewed regarding current PTA medication list, use and drug allergies.        Clarified PTA med list with Medication list provided by family member/dispense records.      The patient was questioned regarding use of any:  inhalers, topical products, over the counter medications, herbal medications, vitamin products or ophthalmic/nasal/otic medication use.   Allergies were verified.        Allergy Update: NKDA    Recommendations/Findings:   The following amendments were made to the patient's active medication list on file at [facility]:   1) Additions: ASA 81mg daily.     2) Deletions: none    3) Changes: none        Drug Interactions and duplicate therapies include: none    PTA medication list was corrected to the following:     Prior to Admission Medications   Prescriptions Last Dose Informant Patient Reported? Taking?   albuterol sulfate HFA (PROVENTIL;VENTOLIN;PROAIR) 108 (90 Base) MCG/ACT inhaler Past Month  No Yes   Sig: Inhale 2 puffs into the lungs every 6 hours as needed for Wheezing   alogliptin (NESINA) 12.5 MG TABS tablet Past Month  No Yes   Sig: Take 1 tablet by mouth 2 times daily (with meals)   atorvastatin (LIPITOR) 80 MG tablet 5/28/2025  No Yes   Sig: TAKE 1 TABLET BY MOUTH EVERY DAY   buPROPion (WELLBUTRIN XL) 150 MG extended release tablet Past Month  No Yes   Sig: Take 1 tablet by mouth daily   clopidogrel (PLAVIX) 75 MG tablet 5/28/2025  No Yes   Sig: Take 1 tablet by mouth daily   letrozole (FEMARA) 2.5 MG tablet 5/28/2025  No Yes   Sig: Take 1 tablet by mouth daily   levocetirizine (XYZAL) 5 MG tablet 5/28/2025  No Yes   Sig: Take 1 tablet by mouth nightly   lisinopril (PRINIVIL;ZESTRIL) 20 MG tablet 5/28/2025  No Yes   Sig: TAKE 1 TABLET BY MOUTH TWICE A DAY   metFORMIN (GLUCOPHAGE) 500 MG tablet 5/28/2025  No Yes   Sig: Take 1 tablet by mouth 2 times daily (with meals)   metoprolol succinate (TOPROL XL) 50 MG extended release tablet

## 2025-05-29 NOTE — PLAN OF CARE
Problem: Chronic Conditions and Co-morbidities  Goal: Patient's chronic conditions and co-morbidity symptoms are monitored and maintained or improved  5/29/2025 0759 by Hayden Sosa RN  Outcome: Progressing  5/28/2025 2145 by Nicki Escamilla RN  Outcome: Progressing  5/28/2025 1829 by Sulma Ta RN  Outcome: Progressing     Problem: Discharge Planning  Goal: Discharge to home or other facility with appropriate resources  Outcome: Progressing     Problem: Pain  Goal: Verbalizes/displays adequate comfort level or baseline comfort level  Outcome: Progressing     Problem: Safety - Adult  Goal: Free from fall injury  5/29/2025 0759 by Hayden Sosa RN  Outcome: Progressing  5/28/2025 2145 by Nicki Escamilla RN  Outcome: Progressing  5/28/2025 1829 by Sulma Ta RN  Outcome: Progressing     Problem: ABCDS Injury Assessment  Goal: Absence of physical injury  5/29/2025 0759 by Hayden Sosa RN  Outcome: Progressing  5/28/2025 2145 by Nicki Escamilla RN  Outcome: Progressing  5/28/2025 1829 by Sulma Ta RN  Outcome: Progressing     Problem: Anxiety  Goal: Will report anxiety at manageable levels  Description: INTERVENTIONS:1. Administer medication as ordered2. Teach and rehearse alternative coping skills3. Provide emotional support with 1:1 interaction with staff  Outcome: Progressing     Problem: Coping  Goal: Pt/Family able to verbalize concerns and demonstrate effective coping strategies  Description: INTERVENTIONS:1. Assist patient/family to identify coping skills, available support systems and cultural and spiritual values2. Provide emotional support, including active listening and acknowledgement of concerns of patient and caregivers3. Reduce environmental stimuli, as able4. Instruct patient/family in relaxation techniques, as appropriate5. Assess for spiritual pain/suffering and initiate Spiritual Care, Psychosocial Clinical Specialist consults as needed  Outcome: Progressing

## 2025-05-29 NOTE — H&P
[address]  [address]                                PSYCH H&P      PATIENT NAME: YOSELYN NICHOLS           : 1935  MED REC NO: [id number]                       ROOM: 232  ACCOUNT NO: [id number]                       ADMIT DATE: 2025  PROVIDER: Sundeep Langley MD      This is a [facility] Psychiatric Admission Note.    HISTORY OF PRESENT ILLNESS:  The patient is an 89-year-old  female, who I am very familiar with secondary to prior psychiatric treatment over many years due to recurrent bouts of major depression.  She was admitted voluntarily through the [facility] Emergency room after presenting herself there with worsening depression for a number of weeks, which had built up to the point of having suicidal thoughts.  She states she was having impulse to lay down on the road and get hit by a car, or possibly to take an overdose.  Her neurovegetative functioning had been deteriorating to where she was struggling with sleep, yet often dozing during the day, and she was not eating well too.  Her energy level is quite low, she is increasingly dysphoric and significantly anhedonic.  She was feeling hopeless as well, and beginning to have the suicidal thoughts as noted above.  She also had started to have some possible psychotic symptoms of hearing vague voices telling her to overdose.  Her description of this was a bit unclear, but it appears as though these were voices that she hears outside of her head and that she does not think that they were her own thoughts, and they may in fact be consistent with some psychosis.  There was no other evidence of psychotic symptoms, however.    A significant issue may be her medication compliance.  She has historically responded well to Effexor XR, but her prescription that I had last written would have run out a few months ago, but she is not aware of whether she had stopped that medicine

## 2025-05-29 NOTE — BH NOTE
PSYCHOSOCIAL ASSESSMENT  :Patient identifying info:   Dannielle Hall is a 89 y.o., female admitted 5/28/2025 10:33 AM     Presenting problem and precipitating factors: Per Dr. Shivam ROMERO  The patient is an 89-year-old  female, who I am very familiar with secondary to prior psychiatric treatment over many years due to recurrent bouts of major depression. She was admitted voluntarily through the [facility] Emergency room after presenting herself there with worsening depression for a number of weeks, which had built up to the point of having suicidal thoughts. She states she was having impulse to lay down on the road and get hit by a car, or possibly to take an overdose. Her neurovegetative functioning had been deteriorating to where she was struggling with sleep, yet often dozing during the day, and she was not eating well too. Her energy level is quite low, she is increasingly dysphoric and significantly anhedonic. She was feeling hopeless as well, and beginning to have the suicidal thoughts as noted above. She also had started to have some possible psychotic symptoms of hearing vague voices telling her to overdose. Her description of this was a bit unclear, but it appears as though these were voices that she hears outside of her head and that she does not think that they were her own thoughts, and they may in fact be consistent with some psychosis. There was no other evidence of psychotic symptoms, however.     Mental status assessment: The patient is noted to be a casually dressed, adequately groomed, 89-year-old, who appeared younger than her stated age. She was lying in bed and again was much motorically slowed. She reported being tired, emotionally flat, and dysphoric. She endorsed having some neurovegetative dysfunction as noted above, and she reported having suicidal thoughts previously, but not currently since being in the hospital. There is no evidence of any sydni or hypomania nor any symptoms of

## 2025-05-29 NOTE — CONSULTS
Hospitalist Consult Note    NAME:   Dannielle Hall   : 1935   MRN: [id number]     Date/Time: 2025 10:31 AM    Patient PCP: Tracy Banuelos APRN - NP    ______________________________________________________________________  Given the patient's current clinical presentation, I have a high level of concern for decompensation if discharged from the emergency department.  Complex decision making was performed, which includes reviewing the patient's available past medical records, laboratory results, and x-ray films.       My assessment of this patient's clinical condition and my plan of care is as follows.    Assessment / Plan:    HTN  -Continue Home dose metoprolol and Lisinopril    Diabetes Mellitus 2  -Last HbA1c was 8 on 24  -Continue Alogliptin and Metformin    H/o TIA  Hyperlipidemia  -Continue Plavix and Atorvastatin    Hx Breast Cancer s/p Lt Lumpectomy  -In remission, followed by oncology  -Continue Letrozole    Mood Disorder  -Under care of psychiatry    Thanks for allowing hospital to be part of patient care. I will sign off, please call hospitalist further assistance needed.    Medical Decision Making:   I personally reviewed labs: CBC, BMP    Code Status: Full  DVT Prophylaxis:  Ambulatory      Subjective:   Reason for visit: Medical consult on admitted inpatient psych patient    HISTORY OF PRESENT ILLNESS:     Dannielle Hall is a 89 y.o.  female with PMHx significant for HTN, DM, TIA, BRCA, mood DO, admitted under inpatient psychiatry for management of her mood disorder. Beside mental health issues she denies any medical complaints like cough, fever, chills, nausea, vomiting, diarrhea, chest pain, abdominal pain, problems urination.     We saw the patient on consult.    Past Medical History:   Diagnosis Date    Allergic rhinitis due to pollen     Arthritis     BPV (benign positional vertigo)     Breast cancer (HCC)     left br cancer    Change in bowel habits     Depression

## 2025-05-29 NOTE — BH NOTE
Affect incongruent, mood depressed. Patient a & o x 4. No SI, HI, AVH. Patient tolerated medications well, cooperative.  Received prn simethicone for gas pains.

## 2025-05-29 NOTE — PLAN OF CARE
Problem: Chronic Conditions and Co-morbidities  Goal: Patient's chronic conditions and co-morbidity symptoms are monitored and maintained or improved  5/28/2025 2145 by Nicki Escamilla RN  Outcome: Progressing  5/28/2025 1829 by Sulma Ta RN  Outcome: Progressing     Problem: Safety - Adult  Goal: Free from fall injury  5/28/2025 2145 by Nicki Escamilla RN  Outcome: Progressing  5/28/2025 1829 by Sulma Ta RN  Outcome: Progressing     Problem: ABCDS Injury Assessment  Goal: Absence of physical injury  5/28/2025 2145 by Nicki Escamilla RN  Outcome: Progressing  5/28/2025 1829 by Sulma Ta RN  Outcome: Progressing

## 2025-05-29 NOTE — BH NOTE
Affect appropriate, mood helpless. Patient a & o x 4.  Denies pain. No SI, HI, AVH. Patient tolerated medications well, cooperative. Patient has asked members of staff to do things the patient has the ability to do. Patient has been notified that if she perform certain adl's on her own she will.   Currently rooming in during the time of this note.

## 2025-05-30 LAB
GLUCOSE BLD STRIP.AUTO-MCNC: 107 MG/DL (ref 65–117)
GLUCOSE BLD STRIP.AUTO-MCNC: 110 MG/DL (ref 65–117)
GLUCOSE BLD STRIP.AUTO-MCNC: 166 MG/DL (ref 65–117)
GLUCOSE BLD STRIP.AUTO-MCNC: 167 MG/DL (ref 65–117)
SERVICE CMNT-IMP: ABNORMAL
SERVICE CMNT-IMP: ABNORMAL
SERVICE CMNT-IMP: NORMAL
SERVICE CMNT-IMP: NORMAL

## 2025-05-30 PROCEDURE — 99232 SBSQ HOSP IP/OBS MODERATE 35: CPT | Performed by: PSYCHIATRY & NEUROLOGY

## 2025-05-30 PROCEDURE — 82962 GLUCOSE BLOOD TEST: CPT

## 2025-05-30 PROCEDURE — 6370000000 HC RX 637 (ALT 250 FOR IP): Performed by: PSYCHIATRY & NEUROLOGY

## 2025-05-30 PROCEDURE — 2500000003 HC RX 250 WO HCPCS: Performed by: PSYCHIATRY & NEUROLOGY

## 2025-05-30 PROCEDURE — 1240000000 HC EMOTIONAL WELLNESS R&B

## 2025-05-30 RX ORDER — GUAIFENESIN 200 MG/10ML
200 LIQUID ORAL EVERY 4 HOURS PRN
Status: DISCONTINUED | OUTPATIENT
Start: 2025-05-30 | End: 2025-06-05

## 2025-05-30 RX ADMIN — GUAIFENESIN 200 MG: 100 LIQUID ORAL at 20:32

## 2025-05-30 RX ADMIN — LISINOPRIL 20 MG: 10 TABLET ORAL at 08:26

## 2025-05-30 RX ADMIN — LISINOPRIL 20 MG: 10 TABLET ORAL at 20:12

## 2025-05-30 RX ADMIN — LETROZOLE 2.5 MG: 2.5 TABLET ORAL at 10:00

## 2025-05-30 RX ADMIN — METFORMIN HYDROCHLORIDE 500 MG: 500 TABLET ORAL at 08:25

## 2025-05-30 RX ADMIN — VENLAFAXINE HYDROCHLORIDE 150 MG: 150 CAPSULE, EXTENDED RELEASE ORAL at 08:26

## 2025-05-30 RX ADMIN — ALOGLIPTIN 12.5 MG: 12.5 TABLET, FILM COATED ORAL at 08:26

## 2025-05-30 RX ADMIN — ACETAMINOPHEN 650 MG: 325 TABLET ORAL at 02:33

## 2025-05-30 RX ADMIN — METOPROLOL SUCCINATE 50 MG: 50 TABLET, EXTENDED RELEASE ORAL at 08:26

## 2025-05-30 RX ADMIN — ATORVASTATIN CALCIUM 80 MG: 40 TABLET, FILM COATED ORAL at 08:25

## 2025-05-30 RX ADMIN — METFORMIN HYDROCHLORIDE 500 MG: 500 TABLET ORAL at 17:31

## 2025-05-30 RX ADMIN — ALOGLIPTIN 12.5 MG: 12.5 TABLET, FILM COATED ORAL at 17:31

## 2025-05-30 RX ADMIN — CLOPIDOGREL BISULFATE 75 MG: 75 TABLET, FILM COATED ORAL at 08:26

## 2025-05-30 ASSESSMENT — PAIN SCALES - GENERAL
PAINLEVEL_OUTOF10: 0
PAINLEVEL_OUTOF10: 0

## 2025-05-30 NOTE — BH NOTE
Affect sad. Mood depressed/anxious. Apathetic. Alert and oriented x 4. Med compliant. Cooperative. Ate 80% of breakfast and 15% of lunch. Isolative to room to sleep in between meals. Denies SI/HI/AVH/pain. Interacts well with others. Did not attend group activity.

## 2025-05-30 NOTE — PLAN OF CARE
Problem: Pain  Goal: Verbalizes/displays adequate comfort level or baseline comfort level  Recent Flowsheet Documentation  Taken 5/30/2025 0840 by Cuca Nguyen RN  Verbalizes/displays adequate comfort level or baseline comfort level: Encourage patient to monitor pain and request assistance  5/30/2025 0326 by Nicki Escamilla RN  Outcome: Progressing     Problem: Safety - Adult  Goal: Free from fall injury  Outcome: Progressing     Problem: Coping  Goal: Pt/Family able to verbalize concerns and demonstrate effective coping strategies  Description: INTERVENTIONS:1. Assist patient/family to identify coping skills, available support systems and cultural and spiritual values2. Provide emotional support, including active listening and acknowledgement of concerns of patient and caregivers3. Reduce environmental stimuli, as able4. Instruct patient/family in relaxation techniques, as appropriate5. Assess for spiritual pain/suffering and initiate Spiritual Care, Psychosocial Clinical Specialist consults as needed  5/30/2025 1121 by Cuca Nguyen RN  Outcome: Progressing  Flowsheets (Taken 5/30/2025 0840)  Patient/family able to verbalize anxieties, fears, and concerns, and demonstrate effective coping: Provide emotional support, including active listening and acknowledgement of concerns of patient and caregivers  5/30/2025 0326 by Nicki Escamilla RN  Outcome: Progressing  Flowsheets (Taken 5/29/2025 2040)  Patient/family able to verbalize anxieties, fears, and concerns, and demonstrate effective coping: Provide emotional support, including active listening and acknowledgement of concerns of patient and caregivers     Problem: Anxiety  Goal: Will report anxiety at manageable levels  Description: INTERVENTIONS:1. Administer medication as ordered2. Teach and rehearse alternative coping skills3. Provide emotional support with 1:1 interaction with staff  Outcome: Progressing  Flowsheets (Taken 5/30/2025 0840)  Will

## 2025-05-30 NOTE — BH NOTE
Affect: Labile.   Mood:  Labile.  Alert and oriented x's 4.  Patient spent some time in the day room talking, laughing and joking with staff and peers.  Mood would swing quickly from tearful / sad to laughing and joking again.  She admits to hearing voices but can't really explain exactly what they are telling her or what their intent is.  She yelled out in her sleep, stated she had a sharp pain that ran from her LLQ abdomen across her left left \"but it's going away now\".  Inquired about possible urinary symptoms, patient denied increased frequency, denied painful or burning urination, denies seeing blood in urine and confirms a strong steady flow.  Noted in chart that patient does have a urine culture pending.  She has been med compliant, makes her needs known to staff.  Denies SI/HI/AVH/pain.  Pleasant and cooperative.  Speech is clear and linear.   2022 blood sugar was good at 153.  No insulin given per sliding scale parameters.  Patient is currently resting with out signs or symptoms of distress.    Mellisa Moyer RN

## 2025-05-30 NOTE — PROGRESS NOTES
Spiritual Health History and Assessment/Progress Note  [facility]    Loneliness/Social Isolation,  ,  ,      Name: Dannielle Hall MRN: [id number]    Age: 89 y.o.     Sex: female   Language: English   Orthodoxy: Shinto   Severe recurrent major depression without psychotic features (HCC)     Date: 5/30/2025                           Spiritual Assessment began in [facility] BE[facility]AV[facility]        Referral/Consult From: Nurse   Encounter Overview/Reason: Loneliness/Social Isolation  Service Provided For: Patient not available (Patient commented that she was not feeling well. Would I return at another time.)    Zuri, Belief, Meaning:   Patient unable to assess at this time  Family/Friends No family/friends present      Importance and Influence:  Patient unable to assess at this time  Family/Friends No family/friends present    Community:  Patient Other: Unable to access at his time  Family/Friends No family/friends present    Assessment and Plan of Care:     Patient Interventions include: Other: Unable to provide interventions. Patient decline visit.  Family/Friends Interventions include: No family/friends present    Patient Plan of Care: Spiritual Care available upon further referral  Family/Friends Plan of Care: No family/friends present    Electronically signed by Chaplain Mary on 5/30/2025 at 1:33 PM

## 2025-05-30 NOTE — BH NOTE
Group Therapy Note    Date: 5/30/2025    I spoke with pt this morning in her room as she was still in bed. Pt stated that she did not want to come to group today as she did not feel well.  I will continue to encourage pt to attend in the future     Signature:  Socorro Ríos, WILIAN

## 2025-05-30 NOTE — BH NOTE
Behavioral Health Interdisciplinary Rounds     Patient Name: Dannielle Hall  Age: 89 y.o.  Room/Bed:  232/01  Primary Diagnosis: Severe recurrent major depression without psychotic features (HCC)   Admission Status: Voluntary     Readmission within 30 days: No  Power of  in place: No  Patient requires a blocked bed: No14}          Reason for blocked bed:     Sleep hours:        Participation in Care/Groups:  Yes  Medication Compliant?: Yes  PRNS (last 24 hours): Pain    Restraints (last 24 hours):  No  Substance Abuse:  No    24 hour chart check complete: Yes    Patient goal(s) for today: to feel better  Treatment team focus/goals: Pt/Family able to verbalize concerns and demonstrate effective coping strategies   Progress note: to continue restarted her antidepressants    LOS:  2  Expected LOS: 7-10    Financial concerns/prescription coverage:  No  Family contact: no      Family requesting physician contact today:  No  Discharge plan: home  Access to weapons: No       Outpatient provider(s): Dr. Langley  Patient's preferred phone number for follow up call: [phone]     Participating treatment team members: Dannielle Hall,Kita Knox, Marci Knox, Dr. Langley, Michaela Ríos (assigned SW), Sally Quiroga

## 2025-05-30 NOTE — PROGRESS NOTES
INTERVAL Hx: (Records and clinical information were reviewed)    CC: \"not good\"     Still very depressed and feeling \"the same\" as she was PTA. Remains withdrawn and in bed most of the time. Her presentation is also incongruent at times in that she'll smile or appear more animated, but that's c/w her history when she becomes depressed. Her N/V functioning remains very poor, although she did feel that she slept a little better last night. No apparent SE's restarting the Effexor XR. She can't recall what meds she may or may not have been taking PTA as her daughter manages her meds for her. C+S shows no growth and she denies having any dysuria. Still tends to be somatic when depressed--feels weak, various aches and pains, etc. Slept 6.5 hours last night. Glucs: 107--334 the past 24 hours.    MEDS:  Current Facility-Administered Medications   Medication Dose Route Frequency    guaiFENesin (ROBITUSSIN) 100 MG/5ML liquid 200 mg  200 mg Oral Q4H PRN    lisinopril (PRINIVIL;ZESTRIL) tablet 20 mg  20 mg Oral BID    alogliptin (NESINA) tablet 12.5 mg  12.5 mg Oral BID WC    atorvastatin (LIPITOR) tablet 80 mg  80 mg Oral Daily    clopidogrel (PLAVIX) tablet 75 mg  75 mg Oral Daily    letrozole (FEMARA) tablet 2.5 mg  2.5 mg Oral Daily    metFORMIN (GLUCOPHAGE) tablet 500 mg  500 mg Oral BID WC    metoprolol succinate (TOPROL XL) extended release tablet 50 mg  50 mg Oral Daily    venlafaxine (EFFEXOR XR) extended release capsule 150 mg  150 mg Oral Daily with breakfast    hydrOXYzine pamoate (VISTARIL) capsule 25 mg  25 mg Oral TID PRN    acetaminophen (TYLENOL) tablet 650 mg  650 mg Oral Q4H PRN    aluminum & magnesium hydroxide-simethicone (MAALOX PLUS) 200-200-20 MG/5ML suspension 30 mL  30 mL Oral Q6H PRN    insulin lispro (HUMALOG,ADMELOG) injection vial 0-8 Units  0-8 Units SubCUTAneous 4x Daily AC & HS    glucose chewable tablet 16 g  4 tablet Oral PRN    dextrose bolus 10% 125 mL  125 mL IntraVENous PRN    Or

## 2025-05-30 NOTE — PLAN OF CARE
Problem: Chronic Conditions and Co-morbidities  Goal: Patient's chronic conditions and co-morbidity symptoms are monitored and maintained or improved  Outcome: Progressing  Flowsheets (Taken 5/29/2025 2040)  Care Plan - Patient's Chronic Conditions and Co-Morbidity Symptoms are Monitored and Maintained or Improved: Monitor and assess patient's chronic conditions and comorbid symptoms for stability, deterioration, or improvement     Problem: Pain  Goal: Verbalizes/displays adequate comfort level or baseline comfort level  Outcome: Progressing     Problem: Coping  Goal: Pt/Family able to verbalize concerns and demonstrate effective coping strategies  Description: INTERVENTIONS:1. Assist patient/family to identify coping skills, available support systems and cultural and spiritual values2. Provide emotional support, including active listening and acknowledgement of concerns of patient and caregivers3. Reduce environmental stimuli, as able4. Instruct patient/family in relaxation techniques, as appropriate5. Assess for spiritual pain/suffering and initiate Spiritual Care, Psychosocial Clinical Specialist consults as needed  Outcome: Progressing  Flowsheets (Taken 5/29/2025 2040)  Patient/family able to verbalize anxieties, fears, and concerns, and demonstrate effective coping: Provide emotional support, including active listening and acknowledgement of concerns of patient and caregivers

## 2025-05-31 LAB
GLUCOSE BLD STRIP.AUTO-MCNC: 142 MG/DL (ref 65–117)
GLUCOSE BLD STRIP.AUTO-MCNC: 156 MG/DL (ref 65–117)
GLUCOSE BLD STRIP.AUTO-MCNC: 184 MG/DL (ref 65–117)
GLUCOSE BLD STRIP.AUTO-MCNC: 95 MG/DL (ref 65–117)
SERVICE CMNT-IMP: ABNORMAL
SERVICE CMNT-IMP: NORMAL

## 2025-05-31 PROCEDURE — 82962 GLUCOSE BLOOD TEST: CPT

## 2025-05-31 PROCEDURE — 6370000000 HC RX 637 (ALT 250 FOR IP): Performed by: PSYCHIATRY & NEUROLOGY

## 2025-05-31 PROCEDURE — 1240000000 HC EMOTIONAL WELLNESS R&B

## 2025-05-31 PROCEDURE — 6370000000 HC RX 637 (ALT 250 FOR IP): Performed by: INTERNAL MEDICINE

## 2025-05-31 RX ADMIN — METOPROLOL SUCCINATE 50 MG: 50 TABLET, EXTENDED RELEASE ORAL at 10:13

## 2025-05-31 RX ADMIN — LISINOPRIL 20 MG: 10 TABLET ORAL at 20:36

## 2025-05-31 RX ADMIN — ATORVASTATIN CALCIUM 80 MG: 40 TABLET, FILM COATED ORAL at 10:14

## 2025-05-31 RX ADMIN — METFORMIN HYDROCHLORIDE 500 MG: 500 TABLET ORAL at 17:16

## 2025-05-31 RX ADMIN — ALOGLIPTIN 12.5 MG: 12.5 TABLET, FILM COATED ORAL at 10:13

## 2025-05-31 RX ADMIN — ALOGLIPTIN 12.5 MG: 12.5 TABLET, FILM COATED ORAL at 17:16

## 2025-05-31 RX ADMIN — CLOPIDOGREL BISULFATE 75 MG: 75 TABLET, FILM COATED ORAL at 10:14

## 2025-05-31 RX ADMIN — LETROZOLE 2.5 MG: 2.5 TABLET ORAL at 10:13

## 2025-05-31 RX ADMIN — METFORMIN HYDROCHLORIDE 500 MG: 500 TABLET ORAL at 10:14

## 2025-05-31 RX ADMIN — LISINOPRIL 20 MG: 10 TABLET ORAL at 10:13

## 2025-05-31 RX ADMIN — VENLAFAXINE HYDROCHLORIDE 150 MG: 150 CAPSULE, EXTENDED RELEASE ORAL at 10:24

## 2025-05-31 RX ADMIN — INSULIN LISPRO 2 UNITS: 100 INJECTION, SOLUTION INTRAVENOUS; SUBCUTANEOUS at 20:37

## 2025-05-31 ASSESSMENT — PAIN SCALES - GENERAL: PAINLEVEL_OUTOF10: 0

## 2025-05-31 NOTE — BH NOTE
Affect: Incongruent.   Mood: Incongruent.   Patient was smiling for most of the shift however during every conversation she would randomly say, \"I don't know why I feel like crying, I just feel so sad\".  Patient also appears somatic.  She complains that she just didn't feel great for most of the day.  She has random instantly  fleeting pains in multiple locations.  She a good portion of the shift in her room in bed.  She clearly had trouble sleeping however she declined any medication to help her sleep.  Denies SI/HI/AVH/pain.  Med compliant. Denies SE's from meds.  She has been cooperative and pleasant.  Makes her needs known to staff.  Currently resting in her bed.    Mellisa Moyer RN

## 2025-05-31 NOTE — PROGRESS NOTES
Psychiatric Progress Note    Patient: Dannielle Hall MRN: [id number]  SSN: [id number]    YOB: 1935  Age: 89 y.o.  Sex: female      Admit Date: 5/28/2025    LOS: 3 days     INTERVAL Hx: (Records and clinical information were reviewed)     CC: \"not good\"     Still very depressed and feeling \"the same\" as she was PTA. Remains withdrawn and in bed most of the time. Her presentation is also incongruent at times in that she'll smile or appear more animated, but that's c/w her history when she becomes depressed. Her N/V functioning remains very poor, although she did feel that she slept a little better last night. No apparent SE's restarting the Effexor XR. She can't recall what meds she may or may not have been taking PTA as her daughter manages her meds for her. C+S shows no growth and she denies having any dysuria. Still tends to be somatic when depressed--feels weak, various aches and pains, etc. Slept 6.5 hours last night. Glucs: 107--334 the past 24 hours.    5/31/2025 - Dannielle Hall  remains depressed and reports \"not feeling too good.\" She denies suicidal ideation (SI). She reports experiencing visual hallucinations (VH) of shadows and occasional auditory hallucinations (AH), though not currently. There is no evidence of aggression or violence. She is appropriately interactive and aware. She is tolerating    Objective:     Vitals:    05/30/25 0717 05/30/25 1924 05/30/25 2321 05/31/25 0807   BP: (!) 150/77 (!) 168/81  (!) 181/82   Pulse: 65 70 70 60   Resp: 16 18  17   Temp: 97.9 °F (36.6 °C) 97.9 °F (36.6 °C)  98.2 °F (36.8 °C)   TempSrc: Oral Oral  Oral   SpO2: 97% 96%  96%   Weight:       Height:            Mental Status Exam:   Appearance: casually dressed; adequately groomed  Behavior: calm; no agitation  Motor: slowed  Mood/Affect: very dysphoric and restricted  Thought Process: slightly scattered  Thought Content: no overt delusions; no SI/HI  Perceptions: recent

## 2025-05-31 NOTE — BH NOTE
Precautions: q 15 min safety checks    SI/HI denies    AVH endorses both    Pain denies    Medication Compliance full    PRN Meds:    Behaviors - brightened today.  Napped this am, alert and oriented x 4    Sleep Hours    Group Attendance - no    Intake 60%

## 2025-05-31 NOTE — PLAN OF CARE
Problem: Pain  Goal: Verbalizes/displays adequate comfort level or baseline comfort level  5/30/2025 2324 by Mellisa Moyer RN  Outcome: Progressing     Problem: Safety - Adult  Goal: Free from fall injury  5/31/2025 1252 by Cuca Nguyen RN  Outcome: Progressing  5/30/2025 2324 by Mellisa Moyer RN  Outcome: Progressing     Problem: Anxiety  Goal: Will report anxiety at manageable levels  Description: INTERVENTIONS:1. Administer medication as ordered2. Teach and rehearse alternative coping skills3. Provide emotional support with 1:1 interaction with staff  5/31/2025 1252 by Cuca Nguyen RN  Outcome: Progressing  Flowsheets (Taken 5/31/2025 0820)  Will report anxiety at manageable levels: Administer medication as ordered  5/30/2025 2324 by Mellisa Moyer, RN  Outcome: Progressing  Flowsheets (Taken 5/30/2025 2321)  Will report anxiety at manageable levels: Administer medication as ordered

## 2025-05-31 NOTE — PLAN OF CARE
needed  5/30/2025 2324 by Mellisa Moyer, RN  Outcome: Progressing  Flowsheets (Taken 5/30/2025 2321)  Patient/family able to verbalize anxieties, fears, and concerns, and demonstrate effective coping: Provide emotional support, including active listening and acknowledgement of concerns of patient and caregivers  5/30/2025 1121 by Cuca Nguyen, RN  Outcome: Progressing  Flowsheets (Taken 5/30/2025 0840)  Patient/family able to verbalize anxieties, fears, and concerns, and demonstrate effective coping: Provide emotional support, including active listening and acknowledgement of concerns of patient and caregivers     Problem: Chronic Conditions and Co-morbidities  Goal: Patient's chronic conditions and co-morbidity symptoms are monitored and maintained or improved  Recent Flowsheet Documentation  Taken 5/30/2025 2321 by Mellisa Moyer, RN  Care Plan - Patient's Chronic Conditions and Co-Morbidity Symptoms are Monitored and Maintained or Improved: Monitor and assess patient's chronic conditions and comorbid symptoms for stability, deterioration, or improvement     Problem: Behavior  Goal: Pt/Family maintain appropriate behavior and adhere to behavioral management agreement, if implemented  Description: INTERVENTIONS:1. Assess patient/family's coping skills and  non-compliant behavior (including use of illegal substances)2. Notify security of behavior or suspected illegal substances which indicate the need for search of the family and/or belongings3. Encourage verbalization of thoughts and concerns in a socially appropriate manner4. Utilize positive, consistent limit setting strategies supporting safety of patient, staff and others5. Encourage participation in the decision making process about the behavioral management agreement6. If a visitor's behavior poses a threat to safety call refer to organization policy.7. Initiate consult with , Psychosocial CNS, Spiritual Care as appropriate  Recent  Flowsheet Documentation  Taken 5/30/2025 2321 by Mellisa Moyer, RN  Patient/family maintains appropriate behavior and adheres to behavioral management agreement, if implemented: Encourage verbalization of thoughts and concerns in a socially appropriate manner

## 2025-06-01 LAB
GLUCOSE BLD STRIP.AUTO-MCNC: 109 MG/DL (ref 65–117)
GLUCOSE BLD STRIP.AUTO-MCNC: 158 MG/DL (ref 65–117)
GLUCOSE BLD STRIP.AUTO-MCNC: 334 MG/DL (ref 65–117)
GLUCOSE BLD STRIP.AUTO-MCNC: 352 MG/DL (ref 65–117)
GLUCOSE BLD STRIP.AUTO-MCNC: 95 MG/DL (ref 65–117)
SERVICE CMNT-IMP: ABNORMAL
SERVICE CMNT-IMP: NORMAL
SERVICE CMNT-IMP: NORMAL

## 2025-06-01 PROCEDURE — 6370000000 HC RX 637 (ALT 250 FOR IP): Performed by: INTERNAL MEDICINE

## 2025-06-01 PROCEDURE — 2500000003 HC RX 250 WO HCPCS: Performed by: PSYCHIATRY & NEUROLOGY

## 2025-06-01 PROCEDURE — 82962 GLUCOSE BLOOD TEST: CPT

## 2025-06-01 PROCEDURE — 6370000000 HC RX 637 (ALT 250 FOR IP): Performed by: PSYCHIATRY & NEUROLOGY

## 2025-06-01 PROCEDURE — 1240000000 HC EMOTIONAL WELLNESS R&B

## 2025-06-01 RX ADMIN — ACETAMINOPHEN 650 MG: 325 TABLET ORAL at 04:21

## 2025-06-01 RX ADMIN — INSULIN LISPRO 6 UNITS: 100 INJECTION, SOLUTION INTRAVENOUS; SUBCUTANEOUS at 11:25

## 2025-06-01 RX ADMIN — VENLAFAXINE HYDROCHLORIDE 150 MG: 150 CAPSULE, EXTENDED RELEASE ORAL at 09:44

## 2025-06-01 RX ADMIN — ALOGLIPTIN 12.5 MG: 12.5 TABLET, FILM COATED ORAL at 09:41

## 2025-06-01 RX ADMIN — LETROZOLE 2.5 MG: 2.5 TABLET ORAL at 09:43

## 2025-06-01 RX ADMIN — METFORMIN HYDROCHLORIDE 500 MG: 500 TABLET ORAL at 17:24

## 2025-06-01 RX ADMIN — CLOPIDOGREL BISULFATE 75 MG: 75 TABLET, FILM COATED ORAL at 09:42

## 2025-06-01 RX ADMIN — METOPROLOL SUCCINATE 50 MG: 50 TABLET, EXTENDED RELEASE ORAL at 09:42

## 2025-06-01 RX ADMIN — ATORVASTATIN CALCIUM 80 MG: 40 TABLET, FILM COATED ORAL at 09:41

## 2025-06-01 RX ADMIN — METFORMIN HYDROCHLORIDE 500 MG: 500 TABLET ORAL at 09:44

## 2025-06-01 RX ADMIN — ALOGLIPTIN 12.5 MG: 12.5 TABLET, FILM COATED ORAL at 17:24

## 2025-06-01 RX ADMIN — LISINOPRIL 20 MG: 10 TABLET ORAL at 09:43

## 2025-06-01 RX ADMIN — GUAIFENESIN 200 MG: 100 LIQUID ORAL at 20:25

## 2025-06-01 RX ADMIN — LISINOPRIL 20 MG: 10 TABLET ORAL at 20:24

## 2025-06-01 ASSESSMENT — PAIN SCALES - WONG BAKER: WONGBAKER_NUMERICALRESPONSE: NO HURT

## 2025-06-01 ASSESSMENT — PAIN SCALES - GENERAL: PAINLEVEL_OUTOF10: 0

## 2025-06-01 NOTE — BH NOTE
Precautions:Q 15 min safety checks, fall    SI/HI denies    AVH endorses both     Pain none    Medication Compliance full    PRN Meds:none    Behaviors brightened affect this am, laughing and dancing, intrusive, took midday nap, skipping lunch BS fluctuated quite a bit. 1121 334 repeated (was 349) then at 1620 was 95., A and O x 4. Good hygiene. Able to do own ADL's and ask for what she needs. No anxiety noted.    Group Attendance - participated in talking with staff and peers     Intake % of 2 meals plus a snack

## 2025-06-01 NOTE — BH NOTE
Affect blunt, Mood labile. Alert and oriented x4. Denies SI/HI/AVH/Pain. Patient is meds compliant, cooperative and interactive. She makes needs known., Her VS are WNL.  PRN Medication Documentation    Specific patient behavior that led to need for PRN medication: Abdominal pain  Staff interventions attempted prior to PRN being given: Patient requested  PRN medication given: Tylenol 650mg @0421  Patient response/effectiveness of PRN medication: effective

## 2025-06-01 NOTE — PLAN OF CARE
Problem: Chronic Conditions and Co-morbidities  Goal: Patient's chronic conditions and co-morbidity symptoms are monitored and maintained or improved  Outcome: Progressing  Flowsheets (Taken 5/31/2025 2305)  Care Plan - Patient's Chronic Conditions and Co-Morbidity Symptoms are Monitored and Maintained or Improved: Monitor and assess patient's chronic conditions and comorbid symptoms for stability, deterioration, or improvement     Problem: Safety - Adult  Goal: Free from fall injury  5/31/2025 1252 by Cuca Nguyen RN  Outcome: Progressing     Problem: Anxiety  Goal: Will report anxiety at manageable levels  Description: INTERVENTIONS:1. Administer medication as ordered2. Teach and rehearse alternative coping skills3. Provide emotional support with 1:1 interaction with staff  5/31/2025 2311 by Nicki Escamilla RN  Outcome: Progressing  Flowsheets (Taken 5/31/2025 2305)  Will report anxiety at manageable levels: Administer medication as ordered  5/31/2025 1252 by Cuca Nguyen RN  Outcome: Progressing  Flowsheets (Taken 5/31/2025 0820)  Will report anxiety at manageable levels: Administer medication as ordered     Problem: Coping  Goal: Pt/Family able to verbalize concerns and demonstrate effective coping strategies  Description: INTERVENTIONS:1. Assist patient/family to identify coping skills, available support systems and cultural and spiritual values2. Provide emotional support, including active listening and acknowledgement of concerns of patient and caregivers3. Reduce environmental stimuli, as able4. Instruct patient/family in relaxation techniques, as appropriate5. Assess for spiritual pain/suffering and initiate Spiritual Care, Psychosocial Clinical Specialist consults as needed  Outcome: Progressing  Flowsheets (Taken 5/31/2025 2305)  Patient/family able to verbalize anxieties, fears, and concerns, and demonstrate effective coping: Provide emotional support, including active listening and

## 2025-06-01 NOTE — PLAN OF CARE
Problem: Pain  Goal: Verbalizes/displays adequate comfort level or baseline comfort level  Outcome: Progressing     Problem: Safety - Adult  Goal: Free from fall injury  Outcome: Progressing     Problem: Coping  Goal: Pt/Family able to verbalize concerns and demonstrate effective coping strategies  Description: INTERVENTIONS:1. Assist patient/family to identify coping skills, available support systems and cultural and spiritual values2. Provide emotional support, including active listening and acknowledgement of concerns of patient and caregivers3. Reduce environmental stimuli, as able4. Instruct patient/family in relaxation techniques, as appropriate5. Assess for spiritual pain/suffering and initiate Spiritual Care, Psychosocial Clinical Specialist consults as needed  Recent Flowsheet Documentation  Taken 6/1/2025 0800 by Cuca Nguyen, RN  Patient/family able to verbalize anxieties, fears, and concerns, and demonstrate effective coping: Provide emotional support, including active listening and acknowledgement of concerns of patient and caregivers  5/31/2025 2311 by Nicki Escamilla, RN  Outcome: Progressing  Flowsheets (Taken 5/31/2025 2305)  Patient/family able to verbalize anxieties, fears, and concerns, and demonstrate effective coping: Provide emotional support, including active listening and acknowledgement of concerns of patient and caregivers

## 2025-06-01 NOTE — PROGRESS NOTES
Psychiatric Progress Note    Patient: Dannielle Hall MRN: [id number]  SSN: [id number]    YOB: 1935  Age: 89 y.o.  Sex: female      Admit Date: 5/28/2025    LOS: 4 days     INTERVAL Hx: (Records and clinical information were reviewed)     CC: \"not good\"     Still very depressed and feeling \"the same\" as she was PTA. Remains withdrawn and in bed most of the time. Her presentation is also incongruent at times in that she'll smile or appear more animated, but that's c/w her history when she becomes depressed. Her N/V functioning remains very poor, although she did feel that she slept a little better last night. No apparent SE's restarting the Effexor XR. She can't recall what meds she may or may not have been taking PTA as her daughter manages her meds for her. C+S shows no growth and she denies having any dysuria. Still tends to be somatic when depressed--feels weak, various aches and pains, etc. Slept 6.5 hours last night. Glucs: 107--334 the past 24 hours.     5/31/2025 - Dannielle Hall  remains depressed and reports \"not feeling too good.\" She denies suicidal ideation (SI). She reports experiencing visual hallucinations (VH) of shadows and occasional auditory hallucinations (AH), though not currently. There is no evidence of aggression or violence. She is appropriately interactive and aware. She is tolerating     6/01/2025 - Dannielle Hall reports feeling good today and expresses a desire for everyone around her to be aware of this. She notes poor sleep last night due to disturbances from her roommate but was able to catch up on rest this morning. Suicidal ideation (SI) has decreased. Auditory and visual hallucinations (AVH) have also decreased since admission. No aggression or violence has been observed. She remains appropriately interactive and aware. She is tolerating medications well and is eating adequately.    Objective:     Vitals:    05/30/25 2321 05/31/25 0807 05/31/25 1911  hours)   POCT Glucose    Collection Time: 05/31/25 11:28 AM   Result Value Ref Range    POC Glucose 156 (H) 65 - 117 mg/dL    Performed by: Maye NIÑO)    POCT Glucose    Collection Time: 05/31/25  4:08 PM   Result Value Ref Range    POC Glucose 142 (H) 65 - 117 mg/dL    Performed by: Cuca Locke    POCT Glucose    Collection Time: 05/31/25  8:27 PM   Result Value Ref Range    POC Glucose 184 (H) 65 - 117 mg/dL    Performed by: Francine Lewis    POCT Glucose    Collection Time: 06/01/25  6:08 AM   Result Value Ref Range    POC Glucose 109 65 - 117 mg/dL    Performed by: Francine Lewis      ASSESSMENT:   1.  Major Depression, recurrent, severe (F33.2)     PLAN: - Continue current treatment     Continue back on the Effexor XR at 150 mg daily.  Continue the PRN Trazodone and Vistaril.  ELOS: 10-12+ days given her history and severity of depression.           Signed By: AMIE Terrazas     June 1, 2025

## 2025-06-02 LAB
GLUCOSE BLD STRIP.AUTO-MCNC: 102 MG/DL (ref 65–117)
GLUCOSE BLD STRIP.AUTO-MCNC: 106 MG/DL (ref 65–117)
GLUCOSE BLD STRIP.AUTO-MCNC: 128 MG/DL (ref 65–117)
GLUCOSE BLD STRIP.AUTO-MCNC: 180 MG/DL (ref 65–117)
SERVICE CMNT-IMP: ABNORMAL
SERVICE CMNT-IMP: ABNORMAL
SERVICE CMNT-IMP: NORMAL
SERVICE CMNT-IMP: NORMAL

## 2025-06-02 PROCEDURE — 6370000000 HC RX 637 (ALT 250 FOR IP): Performed by: INTERNAL MEDICINE

## 2025-06-02 PROCEDURE — 6370000000 HC RX 637 (ALT 250 FOR IP): Performed by: PSYCHIATRY & NEUROLOGY

## 2025-06-02 PROCEDURE — 99232 SBSQ HOSP IP/OBS MODERATE 35: CPT | Performed by: PSYCHIATRY & NEUROLOGY

## 2025-06-02 PROCEDURE — 82962 GLUCOSE BLOOD TEST: CPT

## 2025-06-02 PROCEDURE — 1240000000 HC EMOTIONAL WELLNESS R&B

## 2025-06-02 RX ADMIN — LETROZOLE 2.5 MG: 2.5 TABLET ORAL at 09:25

## 2025-06-02 RX ADMIN — LISINOPRIL 20 MG: 10 TABLET ORAL at 19:54

## 2025-06-02 RX ADMIN — VENLAFAXINE HYDROCHLORIDE 150 MG: 150 CAPSULE, EXTENDED RELEASE ORAL at 09:19

## 2025-06-02 RX ADMIN — METFORMIN HYDROCHLORIDE 500 MG: 500 TABLET ORAL at 09:19

## 2025-06-02 RX ADMIN — METOPROLOL SUCCINATE 50 MG: 50 TABLET, EXTENDED RELEASE ORAL at 09:19

## 2025-06-02 RX ADMIN — ATORVASTATIN CALCIUM 80 MG: 40 TABLET, FILM COATED ORAL at 09:19

## 2025-06-02 RX ADMIN — LISINOPRIL 20 MG: 10 TABLET ORAL at 09:19

## 2025-06-02 RX ADMIN — ALOGLIPTIN 12.5 MG: 12.5 TABLET, FILM COATED ORAL at 17:02

## 2025-06-02 RX ADMIN — CLOPIDOGREL BISULFATE 75 MG: 75 TABLET, FILM COATED ORAL at 09:19

## 2025-06-02 RX ADMIN — ALOGLIPTIN 12.5 MG: 12.5 TABLET, FILM COATED ORAL at 09:19

## 2025-06-02 RX ADMIN — METFORMIN HYDROCHLORIDE 500 MG: 500 TABLET ORAL at 17:02

## 2025-06-02 RX ADMIN — INSULIN LISPRO 2 UNITS: 100 INJECTION, SOLUTION INTRAVENOUS; SUBCUTANEOUS at 11:03

## 2025-06-02 ASSESSMENT — PAIN SCALES - GENERAL
PAINLEVEL_OUTOF10: 0
PAINLEVEL_OUTOF10: 0

## 2025-06-02 NOTE — BH NOTE
Affect appropriate, mood labile. Patient a & o x 4.  Denies pain. No SI, HI, AVH. Patient tolerated medications well, cooperative. Blood glucose has been as normal as possible during the shift. Patient has been friendly with peers and staff.  Currently rooming in during the time of this note.

## 2025-06-02 NOTE — BH NOTE
Precautions: Q15 safety checks    SI/HI - denies    AV -denies    Pain - denies    Medication Compliance - good    PRN Meds: Robitussin @2025    Behaviors : patients mood is brightened, meds compliant, cooperative, interactive and friendly.    Sleep Hours: 7.75 hrs    BS @6am - 102 mg/dl

## 2025-06-02 NOTE — PROGRESS NOTES
Spiritual Health History and Assessment/Progress Note  [facility]    Loneliness/Social Isolation,  ,  , Initial Encounter    Name: Dannielle Hall MRN: [id number]    Age: 89 y.o.     Sex: female   Language: English   Mu-ism: Hindu   Severe recurrent major depression without psychotic features (HCC)     Date: 6/2/2025            Total Time Calculated: 20 min              Spiritual Assessment began in [facility]        Referral/Consult From: Other (comment) (Admission assessment)   Encounter Overview/Reason: Loneliness/Social Isolation  Service Provided For: Patient    Zuri, Belief, Meaning:   Patient identifies as spiritual, is connected with a zuri tradition or spiritual practice, and has beliefs or practices that help with coping during difficult times  Family/Friends No family/friends present.    Importance and Influence:  Patient has spiritual/personal beliefs that influence decisions regarding their health  Family/Friends No family/friends present    Community:  Patient is connected with a spiritual community and feels well-supported. Support system includes: Children  Family/Friends No family/friends present    Assessment and Plan of Care:     Patient Interventions include: Facilitated expression of thoughts and feelings, Explored spiritual coping/struggle/distress, and Affirmed coping skills/support systems  Family/Friends Interventions include: No family/friends present    Patient Plan of Care: Spiritual Care available upon further referral  Family/Friends Plan of Care: No family/friends present    Electronically signed by Chaplain Mary on 6/2/2025 at 3:43 PM

## 2025-06-02 NOTE — BH NOTE
Patient reports no change with her mood today but did endorse it was better yesterday. She did attend group and then walked for exercise briefly. She smiled as she was doing so. She has been calm and pleasant, medication compliant. She reports depressed mood and poor neurovegetative function. She will be returning back to her home. She see's Dr. Langley privately for medication management. She states she is not able to attend the [facility] due to difficulty getting up and going early in the mornings.

## 2025-06-02 NOTE — PLAN OF CARE
Problem: Chronic Conditions and Co-morbidities  Goal: Patient's chronic conditions and co-morbidity symptoms are monitored and maintained or improved  Outcome: Progressing  Flowsheets (Taken 6/1/2025 2307)  Care Plan - Patient's Chronic Conditions and Co-Morbidity Symptoms are Monitored and Maintained or Improved: Monitor and assess patient's chronic conditions and comorbid symptoms for stability, deterioration, or improvement     Problem: Pain  Goal: Verbalizes/displays adequate comfort level or baseline comfort level  6/1/2025 2333 by Nicki Escamilla RN  Outcome: Progressing  6/1/2025 1211 by Cuca Nguyen RN  Outcome: Progressing     Problem: Safety - Adult  Goal: Free from fall injury  6/1/2025 2333 by Nicki Escamilla RN  Outcome: Progressing  6/1/2025 1211 by Cuca Nguyen RN  Outcome: Progressing     Problem: Coping  Goal: Pt/Family able to verbalize concerns and demonstrate effective coping strategies  Description: INTERVENTIONS:1. Assist patient/family to identify coping skills, available support systems and cultural and spiritual values2. Provide emotional support, including active listening and acknowledgement of concerns of patient and caregivers3. Reduce environmental stimuli, as able4. Instruct patient/family in relaxation techniques, as appropriate5. Assess for spiritual pain/suffering and initiate Spiritual Care, Psychosocial Clinical Specialist consults as needed  Outcome: Progressing     Problem: Decision Making  Goal: Pt/Family able to effectively weigh alternatives and participate in decision making related to treatment and care  Description: INTERVENTIONS:1. Determine when there are differences between patient's view, family's view, and healthcare provider's view of condition2. Facilitate patient and family articulation of goals for care3. Help patient and family identify pros/cons of alternative solutions4. Provide information as requested by patient/family5. Respect patient/family

## 2025-06-02 NOTE — BH NOTE
Group Therapy Note    Date: 6/2/2025    Group Start Time: 0900  Group End Time: 0950  Group Topic: Psychotherapy    [facility]    Socorro Ríos LCSW        Group Therapy Note    Attendees: 7 scheduled    Focus of session was on negative self evaluation and how it impacts self esteem and then impacts what we do and how we behave.  We spoke about the common ways we are negative towards ourselves is when we tell our selves we should or should not have done things, chastising ourselves for not meeting the standards that we have set for ourselves.  Negative self talk also affects us in we start to call ourselves hurtful names and start to believe them, and we also make sweeping generalizations about who and what we are.  We spoke about how we can challenge these thoughts by using the skill of looking for facts in what we are saying and looking for alternative views of the situation.  We discussed the value in starting a thought diary because it is easier to process our thoughts out on paper than in our heads. (       Patient's Goal:  Will report anxiety at manageable levels     Notes:  Pt attended group with encouragement and engaged when prompted.  Pt's affect was flat at first but then became bright after group as she and another pt were walking laps  in the hallway upon my encouragement. Pt states she does not like to feel depressed and knows that being alone only makes it worse.  We discussed possible ways for her to become active and socialize     Status After Intervention:  Improved    Participation Level: Active Listener and Interactive    Participation Quality: Appropriate, Attentive, Sharing, and Supportive      Speech:  normal      Thought Process/Content: Logical      Affective Functioning: Congruent      Mood: euthymic      Level of consciousness:  Alert, Oriented x4, and Attentive      Response to Learning: Able to

## 2025-06-02 NOTE — PLAN OF CARE
Problem: Chronic Conditions and Co-morbidities  Goal: Patient's chronic conditions and co-morbidity symptoms are monitored and maintained or improved  6/2/2025 0802 by Hayden Sosa RN  Outcome: Progressing  6/1/2025 2333 by Nicki Escamilla RN  Outcome: Progressing  Flowsheets (Taken 6/1/2025 2307)  Care Plan - Patient's Chronic Conditions and Co-Morbidity Symptoms are Monitored and Maintained or Improved: Monitor and assess patient's chronic conditions and comorbid symptoms for stability, deterioration, or improvement     Problem: Discharge Planning  Goal: Discharge to home or other facility with appropriate resources  Outcome: Progressing     Problem: Pain  Goal: Verbalizes/displays adequate comfort level or baseline comfort level  6/2/2025 0802 by Hayden Sosa RN  Outcome: Progressing  6/1/2025 2333 by Nicki Escamilla RN  Outcome: Progressing     Problem: Safety - Adult  Goal: Free from fall injury  6/2/2025 0802 by Hayden Sosa RN  Outcome: Progressing  6/1/2025 2333 by Nicki Escamilla RN  Outcome: Progressing     Problem: ABCDS Injury Assessment  Goal: Absence of physical injury  Outcome: Progressing     Problem: Anxiety  Goal: Will report anxiety at manageable levels  Description: INTERVENTIONS:1. Administer medication as ordered2. Teach and rehearse alternative coping skills3. Provide emotional support with 1:1 interaction with staff  Outcome: Progressing  Flowsheets (Taken 6/1/2025 2307 by Nicki Escamilla RN)  Will report anxiety at manageable levels: Administer medication as ordered     Problem: Coping  Goal: Pt/Family able to verbalize concerns and demonstrate effective coping strategies  Description: INTERVENTIONS:1. Assist patient/family to identify coping skills, available support systems and cultural and spiritual values2. Provide emotional support, including active listening and acknowledgement of concerns of patient and caregivers3. Reduce environmental stimuli, as able4. Instruct

## 2025-06-02 NOTE — BH NOTE
Behavioral Health Interdisciplinary Rounds     Patient Name: Dannielle Hall  Age: 89 y.o.  Room/Bed:  232/01  Primary Diagnosis: Severe recurrent major depression without psychotic features (HCC)   Admission Status: Voluntary     Readmission within 30 days: No  Power of  in place: No  Patient requires a blocked bed: No14}          Reason for blocked bed:     Sleep hours: 7.75       Participation in Care/Groups:  Yes  Medication Compliant?: Yes  PRNS (last 24 hours): Pain    Restraints (last 24 hours):  No  Substance Abuse:  No    24 hour chart check complete: Yes    Patient goal(s) for today: to take 1 day at a time and feel better  Treatment team focus/goals:  Discharge to home or other facility with appropriate resources   Progress note: patient reports feeling down, she was up smiling and walking. Her affect does not match what she is reporting    LOS:  5  Expected LOS: 7-10    Financial concerns/prescription coverage:  No  Family contact: no      Family requesting physician contact today:  No  Discharge plan: return home  Access to weapons: No       Outpatient provider(s): Dr. Langley  Patient's preferred phone number for follow up call: [phone]     Participating treatment team members: Dannielleraman Hall,Kita Martha Smith, Dr. Langley, Michaela Ríos  (assigned SW), Sally Quiroga

## 2025-06-02 NOTE — PROGRESS NOTES
INTERVAL Hx: (Records and clinical information from the weekend were reviewed)    CC: \"so-so\"     States she's still very depressed and feeling mostly \"the same\", but she agrees that she felt a little better yesterday. Seems more flat and dysphoric this AM, but we'll see how the day progresses. Still remains withdrawn and in bed most of the time, but she was more social yesterday. Her presentation is still often incongruent in that she'll smile or appear more animated when she says she feels terrible, but that's c/w her history when she becomes depressed. Her N/V functioning remains very poor, although she has been sleeping a little better. No apparent SE's restarting the Effexor XR. Still tends to be somatic when depressed--feels weak, various aches and pains, etc. Slept 7.75 hours last night. Glucs: 95--352 the past 72 hours.    MEDS:  Current Facility-Administered Medications   Medication Dose Route Frequency    guaiFENesin (ROBITUSSIN) 100 MG/5ML liquid 200 mg  200 mg Oral Q4H PRN    lisinopril (PRINIVIL;ZESTRIL) tablet 20 mg  20 mg Oral BID    alogliptin (NESINA) tablet 12.5 mg  12.5 mg Oral BID WC    atorvastatin (LIPITOR) tablet 80 mg  80 mg Oral Daily    clopidogrel (PLAVIX) tablet 75 mg  75 mg Oral Daily    letrozole (FEMARA) tablet 2.5 mg  2.5 mg Oral Daily    metFORMIN (GLUCOPHAGE) tablet 500 mg  500 mg Oral BID WC    metoprolol succinate (TOPROL XL) extended release tablet 50 mg  50 mg Oral Daily    venlafaxine (EFFEXOR XR) extended release capsule 150 mg  150 mg Oral Daily with breakfast    hydrOXYzine pamoate (VISTARIL) capsule 25 mg  25 mg Oral TID PRN    acetaminophen (TYLENOL) tablet 650 mg  650 mg Oral Q4H PRN    aluminum & magnesium hydroxide-simethicone (MAALOX PLUS) 200-200-20 MG/5ML suspension 30 mL  30 mL Oral Q6H PRN    insulin lispro (HUMALOG,ADMELOG) injection vial 0-8 Units  0-8 Units SubCUTAneous 4x Daily AC & HS    glucose chewable tablet 16 g  4 tablet Oral PRN    dextrose bolus 10% 125

## 2025-06-03 LAB
GLUCOSE BLD STRIP.AUTO-MCNC: 107 MG/DL (ref 65–117)
GLUCOSE BLD STRIP.AUTO-MCNC: 126 MG/DL (ref 65–117)
GLUCOSE BLD STRIP.AUTO-MCNC: 129 MG/DL (ref 65–117)
GLUCOSE BLD STRIP.AUTO-MCNC: 145 MG/DL (ref 65–117)
SERVICE CMNT-IMP: ABNORMAL
SERVICE CMNT-IMP: NORMAL

## 2025-06-03 PROCEDURE — 82962 GLUCOSE BLOOD TEST: CPT

## 2025-06-03 PROCEDURE — 99232 SBSQ HOSP IP/OBS MODERATE 35: CPT | Performed by: PSYCHIATRY & NEUROLOGY

## 2025-06-03 PROCEDURE — 6370000000 HC RX 637 (ALT 250 FOR IP): Performed by: PSYCHIATRY & NEUROLOGY

## 2025-06-03 PROCEDURE — 1240000000 HC EMOTIONAL WELLNESS R&B

## 2025-06-03 RX ADMIN — ALOGLIPTIN 12.5 MG: 12.5 TABLET, FILM COATED ORAL at 17:09

## 2025-06-03 RX ADMIN — METFORMIN HYDROCHLORIDE 500 MG: 500 TABLET ORAL at 17:09

## 2025-06-03 RX ADMIN — ATORVASTATIN CALCIUM 80 MG: 40 TABLET, FILM COATED ORAL at 12:16

## 2025-06-03 RX ADMIN — LISINOPRIL 20 MG: 10 TABLET ORAL at 12:16

## 2025-06-03 RX ADMIN — ALOGLIPTIN 12.5 MG: 12.5 TABLET, FILM COATED ORAL at 12:16

## 2025-06-03 RX ADMIN — LETROZOLE 2.5 MG: 2.5 TABLET ORAL at 12:16

## 2025-06-03 RX ADMIN — VENLAFAXINE HYDROCHLORIDE 150 MG: 150 CAPSULE, EXTENDED RELEASE ORAL at 12:16

## 2025-06-03 RX ADMIN — CLOPIDOGREL BISULFATE 75 MG: 75 TABLET, FILM COATED ORAL at 12:16

## 2025-06-03 RX ADMIN — ALUMINUM HYDROXIDE, MAGNESIUM HYDROXIDE, AND SIMETHICONE 30 ML: 200; 200; 20 SUSPENSION ORAL at 00:17

## 2025-06-03 RX ADMIN — METOPROLOL SUCCINATE 50 MG: 50 TABLET, EXTENDED RELEASE ORAL at 12:16

## 2025-06-03 RX ADMIN — LISINOPRIL 20 MG: 10 TABLET ORAL at 19:43

## 2025-06-03 RX ADMIN — METFORMIN HYDROCHLORIDE 500 MG: 500 TABLET ORAL at 12:16

## 2025-06-03 ASSESSMENT — PAIN SCALES - GENERAL
PAINLEVEL_OUTOF10: 0
PAINLEVEL_OUTOF10: 0

## 2025-06-03 NOTE — BH NOTE
Group Therapy Note    Date: 6/3/2025    I spoke with pt in her room as she was in bed.  She stated she did not feel good all over and [facility]  I encouraged her to get up like yesterday , reminded her she feels better when she does, but pt did not attend.  I will continue to encourage her participation in the future           Signature:  Socorro Ríos, HENRY

## 2025-06-03 NOTE — BH NOTE
Affect appropriate, mood depressed. Patient a & o x 4.  Denies pain. No SI, HI, AVH. Patient tolerated medications well, cooperative. Patient has spent most of the shift in bed. Patient did eat lunch and went to bed afterwards. Has been encouraged to interact with peers but stated she wanted to rest.  Currently resting in bed during the time of this note.

## 2025-06-03 NOTE — PROGRESS NOTES
INTERVAL Hx: (Records and clinical information were reviewed)    CC: \"not good\"     States she's feeling worse this AM, much of it due to some diarrhea she started having last night. Still very depressed and still reports feeling mostly \"the same\", but she still agrees that she felt a little better 2 days ago. Still remains withdrawn, flat, and dysphoric, and she's staying in bed most of the time. Her presentation has been more consistently depressed and flat, with  none of the incongruent smiles or periods where she seems more animated. Her N/V functioning remains very poor, although she has been sleeping a little better. No apparent SE's restarting the Effexor XR. Still tends to be somatic when depressed--feels weak, various aches and pains, etc. Slept 7.5 hours last night. Glucs: 106--180 the past 24 hours.    MEDS:  Current Facility-Administered Medications   Medication Dose Route Frequency    guaiFENesin (ROBITUSSIN) 100 MG/5ML liquid 200 mg  200 mg Oral Q4H PRN    lisinopril (PRINIVIL;ZESTRIL) tablet 20 mg  20 mg Oral BID    alogliptin (NESINA) tablet 12.5 mg  12.5 mg Oral BID WC    atorvastatin (LIPITOR) tablet 80 mg  80 mg Oral Daily    clopidogrel (PLAVIX) tablet 75 mg  75 mg Oral Daily    letrozole (FEMARA) tablet 2.5 mg  2.5 mg Oral Daily    metFORMIN (GLUCOPHAGE) tablet 500 mg  500 mg Oral BID WC    metoprolol succinate (TOPROL XL) extended release tablet 50 mg  50 mg Oral Daily    venlafaxine (EFFEXOR XR) extended release capsule 150 mg  150 mg Oral Daily with breakfast    hydrOXYzine pamoate (VISTARIL) capsule 25 mg  25 mg Oral TID PRN    acetaminophen (TYLENOL) tablet 650 mg  650 mg Oral Q4H PRN    aluminum & magnesium hydroxide-simethicone (MAALOX PLUS) 200-200-20 MG/5ML suspension 30 mL  30 mL Oral Q6H PRN    insulin lispro (HUMALOG,ADMELOG) injection vial 0-8 Units  0-8 Units SubCUTAneous 4x Daily AC & HS    glucose chewable tablet 16 g  4 tablet Oral PRN    dextrose bolus 10% 125 mL  125 mL

## 2025-06-03 NOTE — PLAN OF CARE
Problem: Chronic Conditions and Co-morbidities  Goal: Patient's chronic conditions and co-morbidity symptoms are monitored and maintained or improved  Recent Flowsheet Documentation  Taken 6/3/2025 1946 by Mellisa Moyer RN  Care Plan - Patient's Chronic Conditions and Co-Morbidity Symptoms are Monitored and Maintained or Improved: Monitor and assess patient's chronic conditions and comorbid symptoms for stability, deterioration, or improvement  Taken 6/3/2025 0752 by Hayden Sosa RN  Care Plan - Patient's Chronic Conditions and Co-Morbidity Symptoms are Monitored and Maintained or Improved: Monitor and assess patient's chronic conditions and comorbid symptoms for stability, deterioration, or improvement  6/3/2025 0747 by Hayden Sosa RN  Outcome: Progressing  Flowsheets (Taken 6/2/2025 2255 by Mellisa Moyer RN)  Care Plan - Patient's Chronic Conditions and Co-Morbidity Symptoms are Monitored and Maintained or Improved: Monitor and assess patient's chronic conditions and comorbid symptoms for stability, deterioration, or improvement     Problem: Discharge Planning  Goal: Discharge to home or other facility with appropriate resources  Recent Flowsheet Documentation  Taken 6/3/2025 1946 by Mellisa Moyer RN  Discharge to home or other facility with appropriate resources: Identify discharge learning needs (meds, wound care, etc)  Taken 6/3/2025 0752 by Hayden Sosa RN  Discharge to home or other facility with appropriate resources: Identify barriers to discharge with patient and caregiver  6/3/2025 0747 by Hayden Sosa RN  Outcome: Progressing     Problem: Pain  Goal: Verbalizes/displays adequate comfort level or baseline comfort level  6/3/2025 0747 by Hayden Sosa RN  Outcome: Progressing     Problem: Safety - Adult  Goal: Free from fall injury  6/3/2025 0747 by Hayden Sosa RN  Outcome: Progressing     Problem: ABCDS Injury Assessment  Goal: Absence of physical injury  6/3/2025

## 2025-06-03 NOTE — PLAN OF CARE
Problem: Pain  Goal: Verbalizes/displays adequate comfort level or baseline comfort level  Outcome: Progressing     Problem: Anxiety  Goal: Will report anxiety at manageable levels  Description: INTERVENTIONS:1. Administer medication as ordered2. Teach and rehearse alternative coping skills3. Provide emotional support with 1:1 interaction with staff  Outcome: Progressing  Flowsheets (Taken 6/2/2025 2255)  Will report anxiety at manageable levels: Administer medication as ordered     Problem: Coping  Goal: Pt/Family able to verbalize concerns and demonstrate effective coping strategies  Description: INTERVENTIONS:1. Assist patient/family to identify coping skills, available support systems and cultural and spiritual values2. Provide emotional support, including active listening and acknowledgement of concerns of patient and caregivers3. Reduce environmental stimuli, as able4. Instruct patient/family in relaxation techniques, as appropriate5. Assess for spiritual pain/suffering and initiate Spiritual Care, Psychosocial Clinical Specialist consults as needed  Outcome: Progressing  Flowsheets (Taken 6/2/2025 2255)  Patient/family able to verbalize anxieties, fears, and concerns, and demonstrate effective coping: Reduce environmental stimuli, as able     Problem: Chronic Conditions and Co-morbidities  Goal: Patient's chronic conditions and co-morbidity symptoms are monitored and maintained or improved  Recent Flowsheet Documentation  Taken 6/2/2025 2255 by Mellisa Moyer RN  Care Plan - Patient's Chronic Conditions and Co-Morbidity Symptoms are Monitored and Maintained or Improved: Monitor and assess patient's chronic conditions and comorbid symptoms for stability, deterioration, or improvement     Problem: Decision Making  Goal: Pt/Family able to effectively weigh alternatives and participate in decision making related to treatment and care  Description: INTERVENTIONS:1. Determine when there are differences between

## 2025-06-03 NOTE — PLAN OF CARE
Problem: Chronic Conditions and Co-morbidities  Goal: Patient's chronic conditions and co-morbidity symptoms are monitored and maintained or improved  Outcome: Progressing  Flowsheets (Taken 6/2/2025 2255 by Mellisa Moyer RN)  Care Plan - Patient's Chronic Conditions and Co-Morbidity Symptoms are Monitored and Maintained or Improved: Monitor and assess patient's chronic conditions and comorbid symptoms for stability, deterioration, or improvement     Problem: Discharge Planning  Goal: Discharge to home or other facility with appropriate resources  Outcome: Progressing     Problem: Pain  Goal: Verbalizes/displays adequate comfort level or baseline comfort level  6/3/2025 0747 by Hayden Sosa RN  Outcome: Progressing  6/2/2025 2300 by Mellisa Moyer RN  Outcome: Progressing     Problem: Safety - Adult  Goal: Free from fall injury  Outcome: Progressing     Problem: ABCDS Injury Assessment  Goal: Absence of physical injury  Outcome: Progressing     Problem: Anxiety  Goal: Will report anxiety at manageable levels  Description: INTERVENTIONS:1. Administer medication as ordered2. Teach and rehearse alternative coping skills3. Provide emotional support with 1:1 interaction with staff  6/3/2025 0747 by Hayden Sosa RN  Outcome: Progressing  6/2/2025 2300 by Mellisa Moyer RN  Outcome: Progressing  Flowsheets (Taken 6/2/2025 2255)  Will report anxiety at manageable levels: Administer medication as ordered     Problem: Coping  Goal: Pt/Family able to verbalize concerns and demonstrate effective coping strategies  Description: INTERVENTIONS:1. Assist patient/family to identify coping skills, available support systems and cultural and spiritual values2. Provide emotional support, including active listening and acknowledgement of concerns of patient and caregivers3. Reduce environmental stimuli, as able4. Instruct patient/family in relaxation techniques, as appropriate5. Assess for spiritual pain/suffering and

## 2025-06-03 NOTE — BH NOTE
Patient reports not feeling well mentally and physically. She has been in the bed sleeping the majority of the day. She is withdrawn and feels her mood is still depressed. She denies SI/HI. She will continue her current medications. She will follow up with Dr. Langley once discharged for medication management.

## 2025-06-03 NOTE — BH NOTE
Affect:  Dysphoric.   Mood: Anhedonia.  Patient is alert and oriented x 's 4.  \"I know today is the 2nd because I get my check tomorrow\".  Patient spent the entire shift in the bed.  Sates she \"just don't feel good\".  Patient stated that her age is catching up with her and she is worried that her body is just starting to shut down because it is too old.  Very fearful.  Asks if I can keep coming in to check on her.  Patient did complain of indigestion but after exploring it more she stated that she had beef for dinner and that it just didn't feel like it went down all the way.  She did find relief after burping several times.  I did give her Maalox Plus and she was able to sleep fairly well afterward.   Denies SI/HI/AVH/pain.  She has been med compliant. Denies SE's from meds.  Pleasant and cooperative and makes her needs known to staff.  Patient is currently resting comfortably.        PRN Medication Documentation    Specific patient behavior that led to the need for PRN medication: indigestion  Staff interventions attempted prior to PRN being given: assess  PRN medication given: Maalox Plus was administered PO at 0017 on 6/3/2025.  Patient responsiveness/effectiveness of PRN medication: Patientis resting comfortably with no signs of distress.          Mellisa Moyer RN

## 2025-06-04 LAB
GLUCOSE BLD STRIP.AUTO-MCNC: 107 MG/DL (ref 65–117)
GLUCOSE BLD STRIP.AUTO-MCNC: 150 MG/DL (ref 65–117)
GLUCOSE BLD STRIP.AUTO-MCNC: 164 MG/DL (ref 65–117)
GLUCOSE BLD STRIP.AUTO-MCNC: 86 MG/DL (ref 65–117)
SERVICE CMNT-IMP: ABNORMAL
SERVICE CMNT-IMP: ABNORMAL
SERVICE CMNT-IMP: NORMAL
SERVICE CMNT-IMP: NORMAL

## 2025-06-04 PROCEDURE — 6370000000 HC RX 637 (ALT 250 FOR IP): Performed by: PSYCHIATRY & NEUROLOGY

## 2025-06-04 PROCEDURE — 82962 GLUCOSE BLOOD TEST: CPT

## 2025-06-04 PROCEDURE — 1240000000 HC EMOTIONAL WELLNESS R&B

## 2025-06-04 PROCEDURE — 2500000003 HC RX 250 WO HCPCS: Performed by: PSYCHIATRY & NEUROLOGY

## 2025-06-04 PROCEDURE — 99232 SBSQ HOSP IP/OBS MODERATE 35: CPT | Performed by: PSYCHIATRY & NEUROLOGY

## 2025-06-04 RX ADMIN — ALOGLIPTIN 12.5 MG: 12.5 TABLET, FILM COATED ORAL at 17:52

## 2025-06-04 RX ADMIN — GUAIFENESIN 200 MG: 100 LIQUID ORAL at 04:51

## 2025-06-04 RX ADMIN — METFORMIN HYDROCHLORIDE 500 MG: 500 TABLET ORAL at 09:28

## 2025-06-04 RX ADMIN — LISINOPRIL 20 MG: 10 TABLET ORAL at 09:28

## 2025-06-04 RX ADMIN — ACETAMINOPHEN 650 MG: 325 TABLET ORAL at 20:42

## 2025-06-04 RX ADMIN — LETROZOLE 2.5 MG: 2.5 TABLET ORAL at 09:28

## 2025-06-04 RX ADMIN — ATORVASTATIN CALCIUM 80 MG: 40 TABLET, FILM COATED ORAL at 09:28

## 2025-06-04 RX ADMIN — LISINOPRIL 20 MG: 10 TABLET ORAL at 20:42

## 2025-06-04 RX ADMIN — METFORMIN HYDROCHLORIDE 500 MG: 500 TABLET ORAL at 17:52

## 2025-06-04 RX ADMIN — METOPROLOL SUCCINATE 50 MG: 50 TABLET, EXTENDED RELEASE ORAL at 09:28

## 2025-06-04 RX ADMIN — VENLAFAXINE HYDROCHLORIDE 150 MG: 150 CAPSULE, EXTENDED RELEASE ORAL at 09:28

## 2025-06-04 RX ADMIN — ALOGLIPTIN 12.5 MG: 12.5 TABLET, FILM COATED ORAL at 09:28

## 2025-06-04 RX ADMIN — CLOPIDOGREL BISULFATE 75 MG: 75 TABLET, FILM COATED ORAL at 09:28

## 2025-06-04 ASSESSMENT — PAIN SCALES - WONG BAKER: WONGBAKER_NUMERICALRESPONSE: NO HURT

## 2025-06-04 ASSESSMENT — PAIN SCALES - GENERAL: PAINLEVEL_OUTOF10: 0

## 2025-06-04 NOTE — PLAN OF CARE
Problem: Chronic Conditions and Co-morbidities  Goal: Patient's chronic conditions and co-morbidity symptoms are monitored and maintained or improved  Outcome: Progressing     Problem: Discharge Planning  Goal: Discharge to home or other facility with appropriate resources  Outcome: Progressing     Problem: Pain  Goal: Verbalizes/displays adequate comfort level or baseline comfort level  Outcome: Progressing     Problem: Safety - Adult  Goal: Free from fall injury  Outcome: Progressing     Problem: ABCDS Injury Assessment  Goal: Absence of physical injury  Outcome: Progressing     Problem: Anxiety  Goal: Will report anxiety at manageable levels  Description: INTERVENTIONS:1. Administer medication as ordered2. Teach and rehearse alternative coping skills3. Provide emotional support with 1:1 interaction with staff  Outcome: Progressing

## 2025-06-04 NOTE — BH NOTE
Behavioral Health Interdisciplinary Rounds     Patient Name: Dannielle Hall  Age: 89 y.o.  Room/Bed:  232/01  Primary Diagnosis: Severe recurrent major depression without psychotic features (HCC)   Admission Status: Voluntary     Readmission within 30 days: No  Power of  in place: No  Patient requires a blocked bed: No14}          Reason for blocked bed:     Sleep hours: 4       Participation in Care/Groups:  Yes  Medication Compliant?: Yes  PRNS (last 24 hours): None    Restraints (last 24 hours):  No  Substance Abuse:  No    24 hour chart check complete: Yes    Patient goal(s) for today: improve mood and stop crying  Treatment team focus/goals:  Discharge to home or other facility with appropriate resources   Progress note: has been withdrawn, poor mood. Reports today mood is better    LOS:  7  Expected LOS: 7-10    Financial concerns/prescription coverage:  No  Family contact: no      Family requesting physician contact today:  No  Discharge plan: home with outpatient f/u  Access to weapons: No       Outpatient provider(s): Dr. Langley  Patient's preferred phone number for follow up call: [phone]     Participating treatment team members: Dannielle Hall, Dr. Shivam Hilton (assigned SW), Sally Quiroga

## 2025-06-04 NOTE — BH NOTE
Affect: Anhedonia .   Mood:  Dysphoric.  Patient has been isolative and has been staying in her room most days.  Encouraged patient to come out to day room for a while.  Patient did come out and sit with her peers however she was not very interactive.  She was stating that if she could just take her hair down and fix it that maybe it would help her feel better about herself.  Staff assisted patient with braiding her hair.  Patient expressed gratitude and then went back to bed.  Patient is alert and oriented x's 4.  She is pleasant and cooperative to staff.  She has been medication compliant.   Denies SE's from meds.  Denies SI/HI/AVH/pain.   Patient is currently resting comfortably with no signs or symptoms of distress.    Mellisa Moyer RN

## 2025-06-04 NOTE — BH NOTE
Patient remains on the unit and continues to report her mood being very sad and tearful. She has been withdrawn and staying in bed. Her baseline is a social person enjoying the company of others which she has not been demonstrating. She has been fearful due to her age and not feeling well. She is calm, compliant and cooperative. She denies SI/HI while being on the unit. The therapist discussed with her looking into an MIS where she could have her meals prepared medications administered and be involved in activities and have socialization. She is going to find the name of the facility that had great reviews from another person. This writer will give the information to her daughter as they may want to take a tour and consider this. She does have a home to return to and will be able to follow up with Dr. Langley for medication management.

## 2025-06-04 NOTE — GROUP NOTE
Group Therapy Note    Date: 6/4/2025    Group Start Time: 0900  Group End Time: 0950  Group Topic: Process Group - Inpatient    [facility]    Fela Quiroga, LCSW        Group Therapy Note    Attendees: 8 scheduled    Focus of session was based on the handout “Everything Is Awful and I'm not Okay.”  We spoke about how this handout leads us to ask questions to help us see what we can possibly do to take care of ourselves.  We spoke about the questions range from physical needs to emotional and mental needs.  We spoke about how this can be useful when we are in crisis and need some simple directions that we may be unable to even think about due to the crisis.  We spoke about how group members can use this tool and how they may be willing to use it.          Patient's Goal:  Patient's chronic conditions and co-morbidity symptoms are monitored and maintained or improved     Notes:  Dannielle participated in group with prompting. She spoke about how she is continuing to struggle with aging and being able to do less at home as well as her three children not being able to help her much due to their lives and responsibilities.  She spoke about how she continues to be struggling with loneliness.  We spoke about being able to look into options for assisted living and even if it is out of the area, maybe she will be able to spend more time with her children and be able to maintain support with friends on the phone.      Status After Intervention:  Unchanged    Participation Level: Active Listener and Interactive    Participation Quality: Appropriate, Attentive, Sharing, and Supportive      Speech:  normal      Thought Process/Content: Perseverating      Affective Functioning: Congruent and Flat      Mood: anxious and depressed      Level of consciousness:  Alert, Oriented x4, and Attentive      Response to Learning: Able to verbalize current

## 2025-06-04 NOTE — BH NOTE
Affect unstable with sadness, blunt and johnson intermittently, mood depressed/anxious. Alert and oriented x4 . Cooperative and pleasant. Attended and participated in group. Interacted with staff and peers. Makes needs known. Ate all meals and snacks. Denied SI/HI/AVH and pain. Medication compliant. Stable with no s/s of distress.

## 2025-06-04 NOTE — PROGRESS NOTES
INTERVAL Hx: (Records and clinical information were reviewed)    CC: \"better today\"     States she's feeling much better this AM, and that's evident objectively. She's brighter and slightly more animated, and she's much more interactive and talkative as well. She recognizes the improvement, but she also felt this way on 6/1, but her mood then dropped again. We discussed this pattern and how she's often gone through the same ups and downs in the past as her overall mood improves. She's tolerating the meds well including the Effexor XR. Says the diarrhea comes and goes, but overall she thinks it's better.  Has been staying in bed a lot, but she hopes to be more active and social today. Still tends to be somatic when more depressed, and physically better when her mood improves. Slept 4 hours last night. Glucs: 107--145 the past 24 hours.    MEDS:  Current Facility-Administered Medications   Medication Dose Route Frequency    guaiFENesin (ROBITUSSIN) 100 MG/5ML liquid 200 mg  200 mg Oral Q4H PRN    lisinopril (PRINIVIL;ZESTRIL) tablet 20 mg  20 mg Oral BID    alogliptin (NESINA) tablet 12.5 mg  12.5 mg Oral BID WC    atorvastatin (LIPITOR) tablet 80 mg  80 mg Oral Daily    clopidogrel (PLAVIX) tablet 75 mg  75 mg Oral Daily    letrozole (FEMARA) tablet 2.5 mg  2.5 mg Oral Daily    metFORMIN (GLUCOPHAGE) tablet 500 mg  500 mg Oral BID WC    metoprolol succinate (TOPROL XL) extended release tablet 50 mg  50 mg Oral Daily    venlafaxine (EFFEXOR XR) extended release capsule 150 mg  150 mg Oral Daily with breakfast    hydrOXYzine pamoate (VISTARIL) capsule 25 mg  25 mg Oral TID PRN    acetaminophen (TYLENOL) tablet 650 mg  650 mg Oral Q4H PRN    aluminum & magnesium hydroxide-simethicone (MAALOX PLUS) 200-200-20 MG/5ML suspension 30 mL  30 mL Oral Q6H PRN    insulin lispro (HUMALOG,ADMELOG) injection vial 0-8 Units  0-8 Units SubCUTAneous 4x Daily AC & HS    glucose chewable tablet 16 g  4 tablet Oral PRN    dextrose bolus

## 2025-06-05 LAB
GLUCOSE BLD STRIP.AUTO-MCNC: 114 MG/DL (ref 65–117)
GLUCOSE BLD STRIP.AUTO-MCNC: 129 MG/DL (ref 65–117)
GLUCOSE BLD STRIP.AUTO-MCNC: 147 MG/DL (ref 65–117)
GLUCOSE BLD STRIP.AUTO-MCNC: 158 MG/DL (ref 65–117)
SERVICE CMNT-IMP: ABNORMAL
SERVICE CMNT-IMP: NORMAL

## 2025-06-05 PROCEDURE — 2500000003 HC RX 250 WO HCPCS: Performed by: PSYCHIATRY & NEUROLOGY

## 2025-06-05 PROCEDURE — 99232 SBSQ HOSP IP/OBS MODERATE 35: CPT | Performed by: PSYCHIATRY & NEUROLOGY

## 2025-06-05 PROCEDURE — 6370000000 HC RX 637 (ALT 250 FOR IP): Performed by: PSYCHIATRY & NEUROLOGY

## 2025-06-05 PROCEDURE — 82962 GLUCOSE BLOOD TEST: CPT

## 2025-06-05 PROCEDURE — 1240000000 HC EMOTIONAL WELLNESS R&B

## 2025-06-05 RX ORDER — CLONIDINE HYDROCHLORIDE 0.1 MG/1
0.2 TABLET ORAL EVERY 4 HOURS PRN
Status: DISCONTINUED | OUTPATIENT
Start: 2025-06-05 | End: 2025-06-06

## 2025-06-05 RX ORDER — GUAIFENESIN/DEXTROMETHORPHAN 100-10MG/5
10 SYRUP ORAL EVERY 4 HOURS PRN
Status: DISCONTINUED | OUTPATIENT
Start: 2025-06-05 | End: 2025-06-05

## 2025-06-05 RX ORDER — CETIRIZINE HYDROCHLORIDE 10 MG/1
5 TABLET ORAL DAILY
Status: DISCONTINUED | OUTPATIENT
Start: 2025-06-05 | End: 2025-06-13

## 2025-06-05 RX ADMIN — GUAIFENESIN 200 MG: 100 LIQUID ORAL at 09:32

## 2025-06-05 RX ADMIN — CLOPIDOGREL BISULFATE 75 MG: 75 TABLET, FILM COATED ORAL at 08:29

## 2025-06-05 RX ADMIN — METFORMIN HYDROCHLORIDE 500 MG: 500 TABLET ORAL at 08:29

## 2025-06-05 RX ADMIN — METOPROLOL SUCCINATE 50 MG: 50 TABLET, EXTENDED RELEASE ORAL at 08:29

## 2025-06-05 RX ADMIN — CETIRIZINE HYDROCHLORIDE 5 MG: 10 TABLET, FILM COATED ORAL at 11:36

## 2025-06-05 RX ADMIN — GUAIFENESIN SYRUP AND DEXTROMETHORPHAN 10 ML: 100; 10 SYRUP ORAL at 16:45

## 2025-06-05 RX ADMIN — ALOGLIPTIN 12.5 MG: 12.5 TABLET, FILM COATED ORAL at 17:47

## 2025-06-05 RX ADMIN — ATORVASTATIN CALCIUM 80 MG: 40 TABLET, FILM COATED ORAL at 08:29

## 2025-06-05 RX ADMIN — LISINOPRIL 20 MG: 10 TABLET ORAL at 08:28

## 2025-06-05 RX ADMIN — LISINOPRIL 20 MG: 10 TABLET ORAL at 20:16

## 2025-06-05 RX ADMIN — ALOGLIPTIN 12.5 MG: 12.5 TABLET, FILM COATED ORAL at 08:29

## 2025-06-05 RX ADMIN — VENLAFAXINE HYDROCHLORIDE 150 MG: 150 CAPSULE, EXTENDED RELEASE ORAL at 08:28

## 2025-06-05 RX ADMIN — LETROZOLE 2.5 MG: 2.5 TABLET ORAL at 08:38

## 2025-06-05 RX ADMIN — GUAIFENESIN 200 MG: 100 LIQUID ORAL at 00:10

## 2025-06-05 RX ADMIN — METFORMIN HYDROCHLORIDE 500 MG: 500 TABLET ORAL at 17:47

## 2025-06-05 ASSESSMENT — PAIN SCALES - GENERAL
PAINLEVEL_OUTOF10: 0
PAINLEVEL_OUTOF10: 0

## 2025-06-05 NOTE — PLAN OF CARE
Problem: Chronic Conditions and Co-morbidities  Goal: Patient's chronic conditions and co-morbidity symptoms are monitored and maintained or improved  6/4/2025 2345 by Nicki Escamilla RN  Outcome: Progressing  6/4/2025 1418 by Sulma Ta RN  Outcome: Progressing     Problem: Discharge Planning  Goal: Discharge to home or other facility with appropriate resources  6/4/2025 1418 by Sulma Ta RN  Outcome: Progressing     Problem: Pain  Goal: Verbalizes/displays adequate comfort level or baseline comfort level  6/4/2025 1418 by Sulma Ta RN  Outcome: Progressing     Problem: Safety - Adult  Goal: Free from fall injury  6/4/2025 2345 by Nicki Escamilla RN  Outcome: Progressing  6/4/2025 1418 by Sulma Ta RN  Outcome: Progressing     Problem: ABCDS Injury Assessment  Goal: Absence of physical injury  6/4/2025 1418 by Sulma Ta RN  Outcome: Progressing     Problem: Anxiety  Goal: Will report anxiety at manageable levels  Description: INTERVENTIONS:1. Administer medication as ordered2. Teach and rehearse alternative coping skills3. Provide emotional support with 1:1 interaction with staff  6/4/2025 1418 by Sulma Ta RN  Outcome: Progressing     Problem: Coping  Goal: Pt/Family able to verbalize concerns and demonstrate effective coping strategies  Description: INTERVENTIONS:1. Assist patient/family to identify coping skills, available support systems and cultural and spiritual values2. Provide emotional support, including active listening and acknowledgement of concerns of patient and caregivers3. Reduce environmental stimuli, as able4. Instruct patient/family in relaxation techniques, as appropriate5. Assess for spiritual pain/suffering and initiate Spiritual Care, Psychosocial Clinical Specialist consults as needed  Outcome: Progressing

## 2025-06-05 NOTE — BH NOTE
Affect congruent, mood depressed. Patient a & o x 4.  Denies pain. No SI, HI, AVH. Patient tolerated medications well, cooperative. Friendly with staff and peers. Encouraged to not stay in bed for as long as she does. Patient has also stated she gets lonely at home and has thought of other living options.  Currently eating dinner during the time of this note.    PRN Medication Documentation    Specific patient behavior that led to the need for PRN medication: Cough  Staff interventions attempted prior to PRN being given: Patient Requested  PRN medication given: Robitussin  Patient responsiveness/effectiveness of PRN medication: Tolerated Well, Not Effective    PRN Medication Documentation    Specific patient behavior that led to the need for PRN medication: Cough  Staff interventions attempted prior to PRN being given: Patient Requested  PRN medication given: Robitussin DM   Patient responsiveness/effectiveness of PRN medication: Tolerated Well, Effective

## 2025-06-05 NOTE — BH NOTE
Group Therapy Note    I spoke with pt in her room as she had gone back to bed after breakfast.  She stated she did not feel like coming to group. I encouraged  her participation and we discussed the importance of her being active to assist with her depressive symptoms.  Pt stated she understood but did not want to come today     Signature:  Socorro Ríos LCSW

## 2025-06-05 NOTE — PLAN OF CARE
Problem: Chronic Conditions and Co-morbidities  Goal: Patient's chronic conditions and co-morbidity symptoms are monitored and maintained or improved  6/5/2025 0809 by Hayden Sosa RN  Outcome: Progressing  6/4/2025 2345 by Nicki Escamilla RN  Outcome: Progressing     Problem: Discharge Planning  Goal: Discharge to home or other facility with appropriate resources  Outcome: Progressing     Problem: Pain  Goal: Verbalizes/displays adequate comfort level or baseline comfort level  Outcome: Progressing     Problem: Safety - Adult  Goal: Free from fall injury  6/5/2025 0809 by Hayden Sosa RN  Outcome: Progressing  6/4/2025 2345 by Nicki Escamilla RN  Outcome: Progressing     Problem: ABCDS Injury Assessment  Goal: Absence of physical injury  Outcome: Progressing     Problem: Anxiety  Goal: Will report anxiety at manageable levels  Description: INTERVENTIONS:1. Administer medication as ordered2. Teach and rehearse alternative coping skills3. Provide emotional support with 1:1 interaction with staff  Outcome: Progressing     Problem: Coping  Goal: Pt/Family able to verbalize concerns and demonstrate effective coping strategies  Description: INTERVENTIONS:1. Assist patient/family to identify coping skills, available support systems and cultural and spiritual values2. Provide emotional support, including active listening and acknowledgement of concerns of patient and caregivers3. Reduce environmental stimuli, as able4. Instruct patient/family in relaxation techniques, as appropriate5. Assess for spiritual pain/suffering and initiate Spiritual Care, Psychosocial Clinical Specialist consults as needed  6/5/2025 0809 by Hayden Sosa RN  Outcome: Progressing  6/4/2025 2345 by Nicki Escamilla RN  Outcome: Progressing     Problem: Decision Making  Goal: Pt/Family able to effectively weigh alternatives and participate in decision making related to treatment and care  Description: INTERVENTIONS:1. Determine when

## 2025-06-05 NOTE — PROGRESS NOTES
INTERVAL Hx: (Records and clinical information were reviewed)    CC: \"not too good\"     States she's feeling much worse again today, and that her mood started to dip yesterday afternoon. She's also experiencing a lot of coughing which is most likely nasal drip from allergies, so I will add Zyrtec at 5 mg daily. Will also change the Robitussin to the DM formulation. She feels tired and weaker, some of which may be the allergy issues, but most of it is likely the depression. He's more withdrawn again, and less social. I discussed her course of Tx and how this up and down course is very typical, and a good indicator that she's progressing.  She's tolerating the meds well including the Effexor XR. Says the diarrhea is mostly gone now. Back to staying in bed a lot, but she hopes to be more active and social later today. Still tends to be somatic when more depressed, and physically better when her mood improves. Slept 4.5 hours last night. Glucs: 86--164 the past 24 hours.    MEDS:  Current Facility-Administered Medications   Medication Dose Route Frequency    cetirizine (ZYRTEC) tablet 5 mg  5 mg Oral Daily    guaiFENesin (ROBITUSSIN) 100 MG/5ML liquid 200 mg  200 mg Oral Q4H PRN    lisinopril (PRINIVIL;ZESTRIL) tablet 20 mg  20 mg Oral BID    alogliptin (NESINA) tablet 12.5 mg  12.5 mg Oral BID WC    atorvastatin (LIPITOR) tablet 80 mg  80 mg Oral Daily    clopidogrel (PLAVIX) tablet 75 mg  75 mg Oral Daily    letrozole (FEMARA) tablet 2.5 mg  2.5 mg Oral Daily    metFORMIN (GLUCOPHAGE) tablet 500 mg  500 mg Oral BID WC    metoprolol succinate (TOPROL XL) extended release tablet 50 mg  50 mg Oral Daily    venlafaxine (EFFEXOR XR) extended release capsule 150 mg  150 mg Oral Daily with breakfast    hydrOXYzine pamoate (VISTARIL) capsule 25 mg  25 mg Oral TID PRN    acetaminophen (TYLENOL) tablet 650 mg  650 mg Oral Q4H PRN    aluminum & magnesium hydroxide-simethicone (MAALOX PLUS) 200-200-20 MG/5ML suspension 30 mL  30

## 2025-06-05 NOTE — BH NOTE
Affect blunt, Mood labile, sometimes sad and tearful. Alert and oriented x3. Denies SI/HI/AVH. She is meds compliant, cooperative and makes needs known.   PRN Medication Documentation    Specific patient behavior that led to need for PRN medication: Headache  Staff interventions attempted prior to PRN being given: patient requested  PRN medication given: Tylenol 650mg @2042  Patient response/effectiveness of PRN medication: effective  PRN Medication Documentation    Specific patient behavior that led to need for PRN medication: Cough  Staff interventions attempted prior to PRN being given: assessment  PRN medication given: Robittusin @0010  Patient response/effectiveness of PRN medication: effective

## 2025-06-06 ENCOUNTER — APPOINTMENT (OUTPATIENT)
Facility: HOSPITAL | Age: 89
DRG: 885 | End: 2025-06-06
Payer: MEDICARE

## 2025-06-06 PROBLEM — N39.0 UTI (URINARY TRACT INFECTION): Status: ACTIVE | Noted: 2025-06-06

## 2025-06-06 LAB
ALBUMIN SERPL-MCNC: 3.5 G/DL (ref 3.5–5)
ALBUMIN/GLOB SERPL: 0.8 (ref 1.1–2.2)
ALP SERPL-CCNC: 124 U/L (ref 45–117)
ALT SERPL-CCNC: 21 U/L (ref 12–78)
ANION GAP SERPL CALC-SCNC: 6 MMOL/L (ref 2–12)
APPEARANCE UR: CLEAR
AST SERPL-CCNC: 21 U/L (ref 15–37)
BACTERIA URNS QL MICRO: ABNORMAL /HPF
BASOPHILS # BLD: 0.03 K/UL (ref 0–0.1)
BASOPHILS NFR BLD: 0.2 % (ref 0–1)
BILIRUB SERPL-MCNC: 0.7 MG/DL (ref 0.2–1)
BILIRUB UR QL: NEGATIVE
BUN SERPL-MCNC: 15 MG/DL (ref 6–20)
BUN/CREAT SERPL: 13 (ref 12–20)
CALCIUM SERPL-MCNC: 9.3 MG/DL (ref 8.5–10.1)
CHLORIDE SERPL-SCNC: 101 MMOL/L (ref 97–108)
CO2 SERPL-SCNC: 33 MMOL/L (ref 21–32)
COLOR UR: ABNORMAL
CREAT SERPL-MCNC: 1.18 MG/DL (ref 0.55–1.02)
DIFFERENTIAL METHOD BLD: ABNORMAL
EOSINOPHIL # BLD: 0.22 K/UL (ref 0–0.4)
EOSINOPHIL NFR BLD: 1.7 % (ref 0–0.7)
EPITH CASTS URNS QL MICRO: ABNORMAL /LPF
ERYTHROCYTE [DISTWIDTH] IN BLOOD BY AUTOMATED COUNT: 12.3 % (ref 11.5–14.5)
GLOBULIN SER CALC-MCNC: 4.3 G/DL (ref 2–4)
GLUCOSE BLD STRIP.AUTO-MCNC: 109 MG/DL (ref 65–117)
GLUCOSE BLD STRIP.AUTO-MCNC: 115 MG/DL (ref 65–117)
GLUCOSE BLD STRIP.AUTO-MCNC: 126 MG/DL (ref 65–117)
GLUCOSE BLD STRIP.AUTO-MCNC: 201 MG/DL (ref 65–117)
GLUCOSE SERPL-MCNC: 239 MG/DL (ref 65–100)
GLUCOSE UR STRIP.AUTO-MCNC: NEGATIVE MG/DL
HCT VFR BLD AUTO: 40.9 % (ref 35–47)
HGB BLD-MCNC: 13.2 G/DL (ref 11.5–16)
HGB UR QL STRIP: NEGATIVE
IMM GRANULOCYTES # BLD AUTO: 0.05 K/UL (ref 0–0.04)
IMM GRANULOCYTES NFR BLD AUTO: 0.4 % (ref 0–0.5)
KETONES UR QL STRIP.AUTO: NEGATIVE MG/DL
LEUKOCYTE ESTERASE UR QL STRIP.AUTO: ABNORMAL
LYMPHOCYTES # BLD: 2.15 K/UL (ref 0.8–3.5)
LYMPHOCYTES NFR BLD: 16.3 % (ref 12–49)
MCH RBC QN AUTO: 30.3 PG (ref 26–34)
MCHC RBC AUTO-ENTMCNC: 32.3 G/DL (ref 30–36.5)
MCV RBC AUTO: 93.8 FL (ref 80–99)
MONOCYTES # BLD: 0.82 K/UL (ref 0–1)
MONOCYTES NFR BLD: 6.2 % (ref 5–13)
NEUTS SEG # BLD: 9.95 K/UL (ref 1.8–8)
NEUTS SEG NFR BLD: 75.2 % (ref 32–75)
NITRITE UR QL STRIP.AUTO: NEGATIVE
NRBC # BLD: 0 K/UL (ref 0–0.01)
NRBC BLD-RTO: 0 PER 100 WBC
PH UR STRIP: 6 (ref 5–8)
PLATELET # BLD AUTO: 309 K/UL (ref 150–400)
PMV BLD AUTO: 9.6 FL (ref 8.9–12.9)
POTASSIUM SERPL-SCNC: 3.6 MMOL/L (ref 3.5–5.1)
PROT SERPL-MCNC: 7.8 G/DL (ref 6.4–8.2)
PROT UR STRIP-MCNC: NEGATIVE MG/DL
RBC # BLD AUTO: 4.36 M/UL (ref 3.8–5.2)
RBC #/AREA URNS HPF: ABNORMAL /HPF (ref 0–5)
SERVICE CMNT-IMP: ABNORMAL
SERVICE CMNT-IMP: ABNORMAL
SERVICE CMNT-IMP: NORMAL
SERVICE CMNT-IMP: NORMAL
SODIUM SERPL-SCNC: 140 MMOL/L (ref 136–145)
SP GR UR REFRACTOMETRY: 1.02 (ref 1–1.03)
URINE CULTURE IF INDICATED: ABNORMAL
UROBILINOGEN UR QL STRIP.AUTO: 1 EU/DL (ref 0.2–1)
WBC # BLD AUTO: 13.2 K/UL (ref 3.6–11)
WBC URNS QL MICRO: ABNORMAL /HPF (ref 0–4)

## 2025-06-06 PROCEDURE — 85025 COMPLETE CBC W/AUTO DIFF WBC: CPT

## 2025-06-06 PROCEDURE — 71045 X-RAY EXAM CHEST 1 VIEW: CPT

## 2025-06-06 PROCEDURE — 6370000000 HC RX 637 (ALT 250 FOR IP): Performed by: PSYCHIATRY & NEUROLOGY

## 2025-06-06 PROCEDURE — 99232 SBSQ HOSP IP/OBS MODERATE 35: CPT | Performed by: PSYCHIATRY & NEUROLOGY

## 2025-06-06 PROCEDURE — 6370000000 HC RX 637 (ALT 250 FOR IP): Performed by: HOSPITALIST

## 2025-06-06 PROCEDURE — 81001 URINALYSIS AUTO W/SCOPE: CPT

## 2025-06-06 PROCEDURE — 6370000000 HC RX 637 (ALT 250 FOR IP): Performed by: INTERNAL MEDICINE

## 2025-06-06 PROCEDURE — 1240000000 HC EMOTIONAL WELLNESS R&B

## 2025-06-06 PROCEDURE — 82962 GLUCOSE BLOOD TEST: CPT

## 2025-06-06 PROCEDURE — 80053 COMPREHEN METABOLIC PANEL: CPT

## 2025-06-06 PROCEDURE — 36415 COLL VENOUS BLD VENIPUNCTURE: CPT

## 2025-06-06 PROCEDURE — 87086 URINE CULTURE/COLONY COUNT: CPT

## 2025-06-06 RX ORDER — DEXTROSE MONOHYDRATE 100 MG/ML
INJECTION, SOLUTION INTRAVENOUS CONTINUOUS PRN
Status: DISCONTINUED | OUTPATIENT
Start: 2025-06-06 | End: 2025-06-19 | Stop reason: HOSPADM

## 2025-06-06 RX ORDER — GLUCAGON 1 MG/ML
1 KIT INJECTION PRN
Status: DISCONTINUED | OUTPATIENT
Start: 2025-06-06 | End: 2025-06-19 | Stop reason: HOSPADM

## 2025-06-06 RX ORDER — LOSARTAN POTASSIUM 25 MG/1
50 TABLET ORAL
Status: DISCONTINUED | OUTPATIENT
Start: 2025-06-06 | End: 2025-06-11

## 2025-06-06 RX ORDER — CEFUROXIME AXETIL 250 MG/1
250 TABLET ORAL
Status: COMPLETED | OUTPATIENT
Start: 2025-06-06 | End: 2025-06-09

## 2025-06-06 RX ORDER — BENZONATATE 100 MG/1
200 CAPSULE ORAL 3 TIMES DAILY PRN
Status: DISCONTINUED | OUTPATIENT
Start: 2025-06-06 | End: 2025-06-19 | Stop reason: HOSPADM

## 2025-06-06 RX ADMIN — BENZONATATE 200 MG: 100 CAPSULE ORAL at 15:41

## 2025-06-06 RX ADMIN — VENLAFAXINE HYDROCHLORIDE 150 MG: 150 CAPSULE, EXTENDED RELEASE ORAL at 08:51

## 2025-06-06 RX ADMIN — LETROZOLE 2.5 MG: 2.5 TABLET ORAL at 08:52

## 2025-06-06 RX ADMIN — METOPROLOL SUCCINATE 50 MG: 50 TABLET, EXTENDED RELEASE ORAL at 08:51

## 2025-06-06 RX ADMIN — ATORVASTATIN CALCIUM 80 MG: 40 TABLET, FILM COATED ORAL at 08:50

## 2025-06-06 RX ADMIN — LOSARTAN POTASSIUM 50 MG: 25 TABLET, FILM COATED ORAL at 20:03

## 2025-06-06 RX ADMIN — INSULIN LISPRO 2 UNITS: 100 INJECTION, SOLUTION INTRAVENOUS; SUBCUTANEOUS at 11:35

## 2025-06-06 RX ADMIN — CETIRIZINE HYDROCHLORIDE 5 MG: 10 TABLET, FILM COATED ORAL at 08:50

## 2025-06-06 RX ADMIN — BENZONATATE 200 MG: 100 CAPSULE ORAL at 01:40

## 2025-06-06 RX ADMIN — METFORMIN HYDROCHLORIDE 500 MG: 500 TABLET ORAL at 08:52

## 2025-06-06 RX ADMIN — CEFUROXIME AXETIL 250 MG: 250 TABLET ORAL at 18:36

## 2025-06-06 RX ADMIN — ALOGLIPTIN 12.5 MG: 12.5 TABLET, FILM COATED ORAL at 17:37

## 2025-06-06 RX ADMIN — ALOGLIPTIN 12.5 MG: 12.5 TABLET, FILM COATED ORAL at 08:52

## 2025-06-06 RX ADMIN — CLOPIDOGREL BISULFATE 75 MG: 75 TABLET, FILM COATED ORAL at 08:51

## 2025-06-06 RX ADMIN — ACETAMINOPHEN 650 MG: 325 TABLET ORAL at 03:02

## 2025-06-06 RX ADMIN — LISINOPRIL 20 MG: 10 TABLET ORAL at 08:51

## 2025-06-06 RX ADMIN — BENZONATATE 200 MG: 100 CAPSULE ORAL at 23:04

## 2025-06-06 RX ADMIN — METFORMIN HYDROCHLORIDE 500 MG: 500 TABLET ORAL at 17:37

## 2025-06-06 ASSESSMENT — PAIN SCALES - GENERAL
PAINLEVEL_OUTOF10: 0

## 2025-06-06 NOTE — PROGRESS NOTES
INTERVAL Hx: (Records and clinical information were reviewed)    CC: \"a little better\"     States she's feeling slightly better today, and that her mood improved a bit yesterday, as the day progressed. She denies feeling as hopeless as she was PTA, and objectively she's a bit more animated and slightly brighter. She spiked a low-grade fever last evening (100.4), and her BP was elevated. Appreciate Dr. Farooq's help--Robitussin stopped and Clonidine PRN ordered. MELO better this AM, but still slightly elevated. Chronic cough persists--possibly due to Lisinopril vs allergy drainage. Will check CXR and labs this AM. I again discussed her course of Tx and how this up and down course is very typical, and a good indicator that she's progressing. She's tolerating the meds well including the Effexor XR. Says the diarrhea is mostly gone now. Slept 5.5 hours last night. Glucs: 115--158 the past 24 hours.    MEDS:  Current Facility-Administered Medications   Medication Dose Route Frequency    benzonatate (TESSALON) capsule 200 mg  200 mg Oral TID PRN    cetirizine (ZYRTEC) tablet 5 mg  5 mg Oral Daily    cloNIDine (CATAPRES) tablet 0.2 mg  0.2 mg Oral Q4H PRN    lisinopril (PRINIVIL;ZESTRIL) tablet 20 mg  20 mg Oral BID    alogliptin (NESINA) tablet 12.5 mg  12.5 mg Oral BID WC    atorvastatin (LIPITOR) tablet 80 mg  80 mg Oral Daily    clopidogrel (PLAVIX) tablet 75 mg  75 mg Oral Daily    letrozole (FEMARA) tablet 2.5 mg  2.5 mg Oral Daily    metFORMIN (GLUCOPHAGE) tablet 500 mg  500 mg Oral BID WC    metoprolol succinate (TOPROL XL) extended release tablet 50 mg  50 mg Oral Daily    venlafaxine (EFFEXOR XR) extended release capsule 150 mg  150 mg Oral Daily with breakfast    hydrOXYzine pamoate (VISTARIL) capsule 25 mg  25 mg Oral TID PRN    acetaminophen (TYLENOL) tablet 650 mg  650 mg Oral Q4H PRN    aluminum & magnesium hydroxide-simethicone (MAALOX PLUS) 200-200-20 MG/5ML suspension 30 mL  30 mL Oral Q6H PRN    insulin  lispro (HUMALOG,ADMELOG) injection vial 0-8 Units  0-8 Units SubCUTAneous 4x Daily AC & HS    glucose chewable tablet 16 g  4 tablet Oral PRN    dextrose bolus 10% 125 mL  125 mL IntraVENous PRN    Or    dextrose bolus 10% 250 mL  250 mL IntraVENous PRN    glucagon injection 1 mg  1 mg SubCUTAneous PRN    dextrose 10 % infusion   IntraVENous Continuous PRN    traZODone (DESYREL) tablet 50 mg  50 mg Oral Nightly PRN    simethicone (MYLICON) chewable tablet 80 mg  80 mg Oral Q6H PRN       VITALS: Patient Vitals for the past 12 hrs:   Temp Pulse Resp BP SpO2   06/06/25 0737 -- 90 17 (!) 176/93 96 %   06/06/25 0734 98.4 °F (36.9 °C) 95 -- -- 94 %   06/06/25 0600 98.8 °F (37.1 °C) 58 16 (!) 186/87 --   06/06/25 0245 100.4 °F (38 °C) 98 18 (!) 186/82 --   06/05/25 2233 99.9 °F (37.7 °C) 76 -- (!) 155/86 96 %   06/05/25 2106 -- 79 -- (!) 202/99 --         LABS: .  Recent Results (from the past 24 hours)   POCT Glucose    Collection Time: 06/05/25 11:06 AM   Result Value Ref Range    POC Glucose 158 (H) 65 - 117 mg/dL    Performed by: Ian Blake RN    POCT Glucose    Collection Time: 06/05/25  4:16 PM   Result Value Ref Range    POC Glucose 147 (H) 65 - 117 mg/dL    Performed by: Ian Blake RN    POCT Glucose    Collection Time: 06/05/25  8:09 PM   Result Value Ref Range    POC Glucose 129 (H) 65 - 117 mg/dL    Performed by: Yamileth Neil RN    POCT Glucose    Collection Time: 06/06/25  6:17 AM   Result Value Ref Range    POC Glucose 115 65 - 117 mg/dL    Performed by: Yamileth Neil RN        MSE:   Appearance: casually dressed; adequately groomed  Behavior: calm; no agitation  Motor: slowed  Mood/Affect: less dysphoric and restricted  Thought Process: clearer  Thought Content: no overt delusions; no SI/HI  Perceptions: denies AH's  Insight/Judgment: limited    ASSESSMENT:   1.  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 May need augmentation at some

## 2025-06-06 NOTE — PLAN OF CARE
Problem: Safety - Adult  Goal: Free from fall injury  6/6/2025 1217 by Cuca Nguyen RN  Outcome: Progressing  6/6/2025 0148 by Mellisa Moyer RN  Outcome: Progressing     Problem: Decision Making  Goal: Pt/Family able to effectively weigh alternatives and participate in decision making related to treatment and care  Description: INTERVENTIONS:1. Determine when there are differences between patient's view, family's view, and healthcare provider's view of condition2. Facilitate patient and family articulation of goals for care3. Help patient and family identify pros/cons of alternative solutions4. Provide information as requested by patient/family5. Respect patient/family right to receive or not to receive information6. Serve as a liaison between patient and family and health care team7. Initiate Consults from Ethics, Palliative Care or initiate Family Care Conference as is appropriate  Recent Flowsheet Documentation  Taken 6/6/2025 0812 by Cuca Nguyen RN  Patient/family able to effectively weigh alternatives and participate in decision making related to treatment and care: Facilitate patient and family articulation of goals for care  6/6/2025 0148 by Mellisa Moyer RN  Outcome: Progressing     Problem: Anxiety  Goal: Will report anxiety at manageable levels  Description: INTERVENTIONS:1. Administer medication as ordered2. Teach and rehearse alternative coping skills3. Provide emotional support with 1:1 interaction with staff  INTERVENTIONS:1. Administer medication as ordered2. Teach and rehearse alternative coping skills3. Provide emotional support with 1:1 interaction with staff  6/6/2025 1217 by Cuca Nguyen RN  Outcome: Progressing  Flowsheets (Taken 6/6/2025 0812)  Will report anxiety at manageable levels: Administer medication as ordered  6/6/2025 0148 by Mellisa Moyer RN  Outcome: Progressing  Flowsheets (Taken 6/5/2025 2016)  Will report anxiety at manageable levels: Administer

## 2025-06-06 NOTE — PROGRESS NOTES
Spiritual Health History and Assessment/Progress Note  [facility]    Follow-up,  ,  , Follow up     Name: Dannielle Hall MRN: [id number]    Age: 89 y.o.     Sex: female   Language: English   [employer]: [employer]   Severe recurrent major depression without psychotic features (HCC)     Date: 6/6/2025            Total Time Calculated: 20 min              Spiritual Assessment continued in [facility]        Referral/Consult From: Rounding   Encounter Overview/Reason: Follow-up  Service Provided For: Patient    Zuri, Belief, Meaning:   Patient identifies as spiritual and has beliefs or practices that help with coping during difficult times  Family/Friends No family/friends present      Importance and Influence:  Patient has spiritual/personal beliefs that influence decisions regarding their health  Family/Friends No family/friends present    Community:  Patient is connected with a spiritual community  Family/Friends No family/friends present    Assessment and Plan of Care:     Patient Interventions include: Facilitated expression of thoughts and feelings and Affirmed coping skills/support systems  Family/Friends Interventions include: No family/friends present    Patient Plan of Care: Spiritual Care available upon further referral  Family/Friends Plan of Care: No family/friends present    Electronically signed by Chaplain Mary on 6/6/2025 at 1:02 PM

## 2025-06-06 NOTE — BH NOTE
Group Therapy Note    Date: 6/6/2025    Group Start Time: 0900  Group End Time: 0950  Group Topic: Psychotherapy    [facility]    Socorro Ríos LCSW        Group Therapy Note    Attendees: 7 scheduled      Focus of session was on discussing three different types of communication and identifying which ones we may engage in and the success it gives us.  We spoke about what passive and aggressive communication is and how it proves to be ineffective for us to get what we need or want or to make our relationships better and healthier.   We spoke about what we need to be assertive about can be broken down into different categories such as how to say no at the right time in the right way, telling people how you feel, or to get information or clarification of what you need or want.  We spoke about what improvements can be made in our communication patterns to enrich our well being and our relationships.         Patient's Goal: Will report anxiety at manageable levels     Notes: Pt participated in the group activity and discussion.  She expressed that she has been thinking about what she wants to do when she is discharged.  We discussed the facility in [place] and also [facility]. She feels her children are getting tired of taking care of her.  We explored how pt wants to live the rest of her life and is she wanted to be at home or around others.  Pt asked if I would get her more information regarding these facilities.  I expressed that Kita could also help her further      Status After Intervention:  Improved    Participation Level: Active Listener and Interactive    Participation Quality: Appropriate, Attentive, and Sharing      Speech:  normal      Thought Process/Content: Logical      Affective Functioning: Congruent      Mood: euthymic      Level of consciousness:  Alert, Oriented x4, and Attentive      Response to Learning:

## 2025-06-06 NOTE — BH NOTE
Affect: Restricted.   Mood: Worried / Depressed.  Patient c/o that she \"just don't feel good\".  Reports having 3 liquid / loose bowel movement with only one having some formed stool mixed.  I advised patient that if she had another bowel movement to not flush and to let me know so I can assess it.      Message to Hospitalist regarding HTN:  Vitals taken on 6/5/25: start of shift 1928 = 175/112 repeat: 2015 = 205/99 evening med administered: 2016 Lisinopril 20 mg repeat post med: 2106 202/99 Asymptomatic No new meds. Only PRN med administered today: Robitussin @ 0932 this am and Robitussin DM at 1645 Please advise if there are any additional med changes requested.   6/5/25 9:55 PM  It’s probably the dextromethorphan. I will discontinue that and add PRN hydralazine    6/5/25 9:59 PM  I’m seeing now that this is a psych patient. Instead of hydralazine, I have ordered PO clonidine.    Acknowledged. Thank you!    6/6/25 12:35 AM   I have not had to give the PRN Clonidine. Last blood pressure was 155/86 at 2233 on 6/5/25.        Message to Hospitalist regarding persistent cough:    6/6/25 12:40 AM   My current concern is that she has a tight, non productive cough, continuous cough that is prohibiting her from getting any rest. Her O2 Sat is 96% RA, she is able to talk in full fluent sentences but is completely wiped out from the cough. Please advise further. Thank you.    6/6/25 12:59 AM  Has anyone done a chest x-ray? And has anyone tried Tessalon pearls yet?    6/6/25 1:05 AM   No recent chest xray found. Cannot find evidence of recent Tessalon order. Just a thought also, she has had a chronic cough since admitted, not sure if Lisinopril is a contributing factor however the cough jahaira seem to be getting worse.    6/6/25 1:20 AM  A lisinopril cough can happen after you’ve been on the medication for a while but normally occurs once you start the medication. Ultimately, I will leave up to the daytime provider. In the  meantime, I will order a chest x-ray and Tessalon pearls.      0300 Patient continued to cough.  Lungs sounds assesses, rough rub could be auscultated in the RUQ with slightly diminished breath sounds in the lower lobes.  O2 Sat was at 94% RA, still speaking in full fluent sentences without difficulty.  BP = 186/82, Hear rate was 98 and Temp = 100.4 oral.  Tylenol 650 mg was administered @ 0302.          PRN Medication Documentation    Specific patient behavior that led to the need for PRN medication: persistent cough  Staff interventions attempted prior to PRN being given: warm tea with honey  PRN medication given: Tessalon 200 mg administered PO on 6/6/2025 at 0140.  Patient responsiveness/effectiveness of PRN medication: Patient continued to cough.      PRN Medication Documentation    Specific patient behavior that led to the need for PRN medication: Low grade fever  Staff interventions attempted prior to PRN being given: assess  PRN medication given: Tyleonol 650 mg administered PO on 6/6/2025 @ 0302.  Patient responsiveness/effectiveness of PRN medication: Fever had ceased.        Mellisa Moyer RN

## 2025-06-06 NOTE — PLAN OF CARE
Problem: Pain  Goal: Verbalizes/displays adequate comfort level or baseline comfort level  Outcome: Progressing     Problem: Safety - Adult  Goal: Free from fall injury  Outcome: Progressing     Problem: Anxiety  Goal: Will report anxiety at manageable levels  Description: INTERVENTIONS:1. Administer medication as ordered2. Teach and rehearse alternative coping skills3. Provide emotional support with 1:1 interaction with staff  INTERVENTIONS:1. Administer medication as ordered2. Teach and rehearse alternative coping skills3. Provide emotional support with 1:1 interaction with staff  Outcome: Progressing     Problem: Coping  Goal: Pt/Family able to verbalize concerns and demonstrate effective coping strategies  Description: INTERVENTIONS:1. Assist patient/family to identify coping skills, available support systems and cultural and spiritual values2. Provide emotional support, including active listening and acknowledgement of concerns of patient and caregivers3. Reduce environmental stimuli, as able4. Instruct patient/family in relaxation techniques, as appropriate5. Assess for spiritual pain/suffering and initiate Spiritual Care, Psychosocial Clinical Specialist consults as needed  Outcome: Progressing     Problem: Decision Making  Goal: Pt/Family able to effectively weigh alternatives and participate in decision making related to treatment and care  Description: INTERVENTIONS:1. Determine when there are differences between patient's view, family's view, and healthcare provider's view of condition2. Facilitate patient and family articulation of goals for care3. Help patient and family identify pros/cons of alternative solutions4. Provide information as requested by patient/family5. Respect patient/family right to receive or not to receive information6. Serve as a liaison between patient and family and health care team7. Initiate Consults from Ethics, Palliative Care or initiate Family Care Conference as is

## 2025-06-06 NOTE — BH NOTE
Behavioral Health Interdisciplinary Rounds     Patient Name: Dannielle Hall  Age: 89 y.o.  Room/Bed:  232/01  Primary Diagnosis: Severe recurrent major depression without psychotic features (HCC)   Admission Status: Voluntary     Readmission within 30 days: No  Power of  in place: No  Patient requires a blocked bed: No14}          Reason for blocked bed:     Sleep hours: 5.5       Participation in Care/Groups:  Yes  Medication Compliant?: Yes  PRNS (last 24 hours): Pain    Restraints (last 24 hours):  No  Substance Abuse:  No    24 hour chart check complete: Yes    Patient goal(s) for today: to feel like herself, take 1 day at a time  Treatment team focus/goals: Will report anxiety at manageable levels   Progress note: Patient continues to have dips in her mood. She reports physically not feeling well. She does not feel hopeless and denies SI. Blood work and a x-ray were done earlier today. She is compliant with her treatment.    LOS:  9  Expected LOS: 7-10    Financial concerns/prescription coverage:  No  Family contact: no      Family requesting physician contact today:  No  Discharge plan: return home  Access to weapons: No       Outpatient provider(s): Dr. Langley  Patient's preferred phone number for follow up call: [phone]     Participating treatment team members: Dannielleraman Hall,Kita Knox, Marci Knox, Michaela Ríos, Dr. Langley (assigned SW), Sally Quiroga

## 2025-06-06 NOTE — PROGRESS NOTES
[facility]  Hospitalist Progress Note    NAME: Dannielle Hall   :  1935   MRN:  [id number]     Total duration of encounter: 9 days      Interim Hospital Summary: 89 y.o. female who presented on 2025 with Severe recurrent major depression without psychotic features (HCC). She has a past medical history of Allergic rhinitis due to pollen, Arthritis, BPV (benign positional vertigo), Breast cancer (HCC), Change in bowel habits, Depression, Diabetes (HCC), Dysuria, Hemorrhoids, Hypertension, IBS (irritable bowel syndrome), Menopause, Other diseases of nasal cavity and sinuses(478.19), TIA (transient ischemic attack), and Vertigo..       [facility] pt admitted for tx depression      Subjective:     Chief Complaint / Reason for Physician Visit  \"resting\".  Discussed with RN   Asked to check for c/o cough and elevation in BP  Has chronic dry productive could  Concern for adverse effect to ACE    Review of Systems:  Symptom Y/N Comments  Symptom Y/N Comments   Fever/Chills n   Chest Pain n    Poor Appetite n   Edema n    Cough y   Abdominal Pain n    Sputum n   Joint Pain n    SOB/LERMA n   Pruritis/Rash n    Nausea/vomit n   Tolerating PT/OT     Diarrhea n   Tolerating Diet y    Constipation n   Other         Current Facility-Administered Medications:     benzonatate (TESSALON) capsule 200 mg, 200 mg, Oral, TID PRN, Hector Farooq MD, 200 mg at 25 1541    glucose chewable tablet 16 g, 4 tablet, Oral, PRN, Chema Berrios MD    dextrose bolus 10% 125 mL, 125 mL, IntraVENous, PRN **OR** dextrose bolus 10% 250 mL, 250 mL, IntraVENous, PRN, Chema Berrios MD    glucagon injection 1 mg, 1 mg, SubCUTAneous, PRN, Chema Berrios MD    dextrose 10 % infusion, , IntraVENous, Continuous PRN, Chema Berrios MD    cefUROXime (CEFTIN) tablet 250 mg, 250 mg, Oral, BID PC, Chema Berrios MD    cetirizine (ZYRTEC) tablet 5 mg, 5 mg, Oral, Daily, Sundeep Langley MD, 5 mg at 25 0850

## 2025-06-06 NOTE — BH NOTE
Patient remains on the unit. Patient continues to have dips in her mood. One day she is better than the next she reports poor mood. She reports physically not feeling well also. She does not feel hopeless and denies SI. Blood work and a x-ray were done earlier today. She is compliant with her treatment.

## 2025-06-06 NOTE — BH NOTE
Affect blunted. Mood depressed. Tearful after group. Supportive expression employed. \"I have to think about going to a nursing home because I worry my children too much.\" Attended group as active participant. Steady gait. Dry cough noted while lying down. Dr. Langley notified of last night's elevated temp and increased cough. Lab work ordered and done, along with portable CXR. UA obtained - urine cloudy/straw colored. Med compliant and cooperative. Ate 75% of meals. Alert and oriented x 4. Knows how to ask for what she needs. Dr. Berrios in to check on pt and saw labs and made aware of high bp.    PRN Medication Documentation    Specific patient behavior that led to need for PRN medication: dry cough  Staff interventions attempted prior to PRN being given: assess  PRN medication given: Tessalon Perles 200mg po given at 1541  Patient response/effectiveness of PRN medication: coughing decreased a little

## 2025-06-07 LAB
BACTERIA SPEC CULT: NORMAL
CC UR VC: NORMAL
EST. AVERAGE GLUCOSE BLD GHB EST-MCNC: 171 MG/DL
GLUCOSE BLD STRIP.AUTO-MCNC: 114 MG/DL (ref 65–117)
GLUCOSE BLD STRIP.AUTO-MCNC: 138 MG/DL (ref 65–117)
GLUCOSE BLD STRIP.AUTO-MCNC: 161 MG/DL (ref 65–117)
GLUCOSE BLD STRIP.AUTO-MCNC: 169 MG/DL (ref 65–117)
HBA1C MFR BLD: 7.6 % (ref 4–5.6)
SERVICE CMNT-IMP: ABNORMAL
SERVICE CMNT-IMP: NORMAL
SERVICE CMNT-IMP: NORMAL

## 2025-06-07 PROCEDURE — 6370000000 HC RX 637 (ALT 250 FOR IP): Performed by: INTERNAL MEDICINE

## 2025-06-07 PROCEDURE — 36415 COLL VENOUS BLD VENIPUNCTURE: CPT

## 2025-06-07 PROCEDURE — 1240000000 HC EMOTIONAL WELLNESS R&B

## 2025-06-07 PROCEDURE — 83036 HEMOGLOBIN GLYCOSYLATED A1C: CPT

## 2025-06-07 PROCEDURE — 82962 GLUCOSE BLOOD TEST: CPT

## 2025-06-07 PROCEDURE — 6370000000 HC RX 637 (ALT 250 FOR IP): Performed by: PSYCHIATRY & NEUROLOGY

## 2025-06-07 RX ADMIN — LOSARTAN POTASSIUM 50 MG: 25 TABLET, FILM COATED ORAL at 20:32

## 2025-06-07 RX ADMIN — METFORMIN HYDROCHLORIDE 500 MG: 500 TABLET ORAL at 08:37

## 2025-06-07 RX ADMIN — METOPROLOL SUCCINATE 50 MG: 50 TABLET, EXTENDED RELEASE ORAL at 08:38

## 2025-06-07 RX ADMIN — CLOPIDOGREL BISULFATE 75 MG: 75 TABLET, FILM COATED ORAL at 08:37

## 2025-06-07 RX ADMIN — ALOGLIPTIN 12.5 MG: 12.5 TABLET, FILM COATED ORAL at 17:41

## 2025-06-07 RX ADMIN — LETROZOLE 2.5 MG: 2.5 TABLET ORAL at 08:38

## 2025-06-07 RX ADMIN — METFORMIN HYDROCHLORIDE 500 MG: 500 TABLET ORAL at 17:41

## 2025-06-07 RX ADMIN — CETIRIZINE HYDROCHLORIDE 5 MG: 10 TABLET, FILM COATED ORAL at 08:37

## 2025-06-07 RX ADMIN — TRAZODONE HYDROCHLORIDE 50 MG: 50 TABLET ORAL at 20:32

## 2025-06-07 RX ADMIN — CEFUROXIME AXETIL 250 MG: 250 TABLET ORAL at 18:52

## 2025-06-07 RX ADMIN — ATORVASTATIN CALCIUM 80 MG: 40 TABLET, FILM COATED ORAL at 08:37

## 2025-06-07 RX ADMIN — ALOGLIPTIN 12.5 MG: 12.5 TABLET, FILM COATED ORAL at 08:38

## 2025-06-07 RX ADMIN — CEFUROXIME AXETIL 250 MG: 250 TABLET ORAL at 08:37

## 2025-06-07 RX ADMIN — VENLAFAXINE HYDROCHLORIDE 150 MG: 150 CAPSULE, EXTENDED RELEASE ORAL at 08:37

## 2025-06-07 ASSESSMENT — PAIN SCALES - GENERAL
PAINLEVEL_OUTOF10: 0
PAINLEVEL_OUTOF10: 0

## 2025-06-07 NOTE — BH NOTE
Affect blunted. Mood depressed. Alert and oriented x 4. Cooperative and med compliant. Knows how to ask for what she needs. Able to do own ADL's except needs partial help with a shower. Speech logical and linear. Enjoys talking to others. Anhedonic. Denies SI/HI/AVH/pain. States she \"hears and sees people in the shadows and outside of her house at home.\" HOB wedge in bed due to post nasal drip and cough when lying down (nonproductive). Rarely coughs when OOB. Ate 50%. Is wrestling with the idea of going to a nursing home. Talks in her sleep. /96 this am at 0754 and after bp med - 158/71 at 1128. Dr. Berrios notified and will watch VS over next day or two to see if Cozaar and Norvasc are enough.

## 2025-06-07 NOTE — PLAN OF CARE
Problem: Discharge Planning  Goal: Discharge to home or other facility with appropriate resources  Recent Flowsheet Documentation  Taken 6/7/2025 0820 by Cuca Nguyen, RN  Discharge to home or other facility with appropriate resources: Identify barriers to discharge with patient and caregiver     Problem: Pain  Goal: Verbalizes/displays adequate comfort level or baseline comfort level  Outcome: Progressing     Problem: ABCDS Injury Assessment  Goal: Absence of physical injury  Outcome: Progressing

## 2025-06-07 NOTE — PLAN OF CARE
Problem: Chronic Conditions and Co-morbidities  Goal: Patient's chronic conditions and co-morbidity symptoms are monitored and maintained or improved  Outcome: Progressing  Flowsheets (Taken 6/6/2025 2256)  Care Plan - Patient's Chronic Conditions and Co-Morbidity Symptoms are Monitored and Maintained or Improved: Monitor and assess patient's chronic conditions and comorbid symptoms for stability, deterioration, or improvement     Problem: Safety - Adult  Goal: Free from fall injury  6/6/2025 2303 by Nicki Escamilla RN  Outcome: Progressing  6/6/2025 1217 by Cuca Nguyen RN  Outcome: Progressing     Problem: Anxiety  Goal: Will report anxiety at manageable levels  Description: INTERVENTIONS:1. Administer medication as ordered2. Teach and rehearse alternative coping skills3. Provide emotional support with 1:1 interaction with staff  INTERVENTIONS:1. Administer medication as ordered2. Teach and rehearse alternative coping skills3. Provide emotional support with 1:1 interaction with staff  Recent Flowsheet Documentation  Taken 6/6/2025 2256 by Nicki Escamilla RN  Will report anxiety at manageable levels: Administer medication as ordered  6/6/2025 1217 by Cuca Nguyen RN  Outcome: Progressing  Flowsheets (Taken 6/6/2025 0812)  Will report anxiety at manageable levels: Administer medication as ordered     Problem: Coping  Goal: Pt/Family able to verbalize concerns and demonstrate effective coping strategies  Description: INTERVENTIONS:1. Assist patient/family to identify coping skills, available support systems and cultural and spiritual values2. Provide emotional support, including active listening and acknowledgement of concerns of patient and caregivers3. Reduce environmental stimuli, as able4. Instruct patient/family in relaxation techniques, as appropriate5. Assess for spiritual pain/suffering and initiate Spiritual Care, Psychosocial Clinical Specialist consults as needed  Outcome:

## 2025-06-07 NOTE — PLAN OF CARE
Problem: Anxiety  Goal: Will report anxiety at manageable levels  Description: INTERVENTIONS:1. Administer medication as ordered2. Teach and rehearse alternative coping skills3. Provide emotional support with 1:1 interaction with staff  INTERVENTIONS:1. Administer medication as ordered2. Teach and rehearse alternative coping skills3. Provide emotional support with 1:1 interaction with staff  Recent Flowsheet Documentation  Taken 6/7/2025 0820 by Cuca Nguyen RN  Will report anxiety at manageable levels: Administer medication as ordered     Problem: Coping  Goal: Pt/Family able to verbalize concerns and demonstrate effective coping strategies  Description: INTERVENTIONS:1. Assist patient/family to identify coping skills, available support systems and cultural and spiritual values2. Provide emotional support, including active listening and acknowledgement of concerns of patient and caregivers3. Reduce environmental stimuli, as able4. Instruct patient/family in relaxation techniques, as appropriate5. Assess for spiritual pain/suffering and initiate Spiritual Care, Psychosocial Clinical Specialist consults as needed  Recent Flowsheet Documentation  Taken 6/7/2025 0820 by Cuca Nguyen RN  Patient/family able to verbalize anxieties, fears, and concerns, and demonstrate effective coping: Provide emotional support, including active listening and acknowledgement of concerns of patient and caregivers     Problem: Self Harm/Suicidality  Goal: Will have no self-injury during hospital stay  Description: INTERVENTIONS:1.  Ensure constant observer at bedside with Q15M safety checks2.  Maintain a safe environment3.  Secure patient belongings4.  Ensure family/visitors adhere to safety recommendations5.  Ensure safety tray has been added to patient's diet order6.  Every shift and PRN: Re-assess suicidal risk via Frequent Screener  Recent Flowsheet Documentation  Taken 6/7/2025 0820 by Cuca Nguyen, RN  Will

## 2025-06-07 NOTE — BH NOTE
Affect flat, mood depressed, sad, she is tearful sometimes. Patient alert and oriented x4. She denies SI/HI/AVH/Pain. She complained of a dry cough at the early hours of this shift and a PRN med was given with good effect. She is meds compliant, cooperative and makes needs known. no SE noted.  PRN Medication Documentation    Specific patient behavior that led to need for PRN medication: Cough  Staff interventions attempted prior to PRN being given: Assessment  PRN medication given: Tessalon capsule 200mg @2304  Patient response/effectiveness of PRN medication: effective

## 2025-06-08 PROBLEM — R45.851 SUICIDAL IDEATION: Status: ACTIVE | Noted: 2025-06-08

## 2025-06-08 LAB
GLUCOSE BLD STRIP.AUTO-MCNC: 104 MG/DL (ref 65–117)
GLUCOSE BLD STRIP.AUTO-MCNC: 130 MG/DL (ref 65–117)
GLUCOSE BLD STRIP.AUTO-MCNC: 131 MG/DL (ref 65–117)
GLUCOSE BLD STRIP.AUTO-MCNC: 146 MG/DL (ref 65–117)
SERVICE CMNT-IMP: ABNORMAL
SERVICE CMNT-IMP: NORMAL

## 2025-06-08 PROCEDURE — 6370000000 HC RX 637 (ALT 250 FOR IP): Performed by: PSYCHIATRY & NEUROLOGY

## 2025-06-08 PROCEDURE — 82962 GLUCOSE BLOOD TEST: CPT

## 2025-06-08 PROCEDURE — 1240000000 HC EMOTIONAL WELLNESS R&B

## 2025-06-08 PROCEDURE — 6370000000 HC RX 637 (ALT 250 FOR IP): Performed by: HOSPITALIST

## 2025-06-08 PROCEDURE — 6370000000 HC RX 637 (ALT 250 FOR IP): Performed by: INTERNAL MEDICINE

## 2025-06-08 RX ADMIN — CLOPIDOGREL BISULFATE 75 MG: 75 TABLET, FILM COATED ORAL at 08:49

## 2025-06-08 RX ADMIN — ACETAMINOPHEN 650 MG: 325 TABLET ORAL at 23:40

## 2025-06-08 RX ADMIN — METFORMIN HYDROCHLORIDE 500 MG: 500 TABLET ORAL at 08:46

## 2025-06-08 RX ADMIN — ALOGLIPTIN 12.5 MG: 12.5 TABLET, FILM COATED ORAL at 08:45

## 2025-06-08 RX ADMIN — LOSARTAN POTASSIUM 50 MG: 25 TABLET, FILM COATED ORAL at 20:22

## 2025-06-08 RX ADMIN — TRAZODONE HYDROCHLORIDE 50 MG: 50 TABLET ORAL at 20:22

## 2025-06-08 RX ADMIN — METOPROLOL SUCCINATE 50 MG: 50 TABLET, EXTENDED RELEASE ORAL at 08:46

## 2025-06-08 RX ADMIN — VENLAFAXINE HYDROCHLORIDE 150 MG: 150 CAPSULE, EXTENDED RELEASE ORAL at 08:46

## 2025-06-08 RX ADMIN — LETROZOLE 2.5 MG: 2.5 TABLET ORAL at 08:46

## 2025-06-08 RX ADMIN — BENZONATATE 200 MG: 100 CAPSULE ORAL at 21:05

## 2025-06-08 RX ADMIN — CEFUROXIME AXETIL 250 MG: 250 TABLET ORAL at 18:39

## 2025-06-08 RX ADMIN — ALOGLIPTIN 12.5 MG: 12.5 TABLET, FILM COATED ORAL at 17:14

## 2025-06-08 RX ADMIN — CETIRIZINE HYDROCHLORIDE 5 MG: 10 TABLET, FILM COATED ORAL at 08:45

## 2025-06-08 RX ADMIN — METFORMIN HYDROCHLORIDE 500 MG: 500 TABLET ORAL at 17:14

## 2025-06-08 RX ADMIN — ATORVASTATIN CALCIUM 80 MG: 40 TABLET, FILM COATED ORAL at 08:45

## 2025-06-08 RX ADMIN — CEFUROXIME AXETIL 250 MG: 250 TABLET ORAL at 08:46

## 2025-06-08 RX ADMIN — BENZONATATE 200 MG: 100 CAPSULE ORAL at 11:08

## 2025-06-08 ASSESSMENT — PAIN DESCRIPTION - DESCRIPTORS: DESCRIPTORS: ACHING

## 2025-06-08 ASSESSMENT — PAIN SCALES - GENERAL
PAINLEVEL_OUTOF10: 0
PAINLEVEL_OUTOF10: 0
PAINLEVEL_OUTOF10: 6

## 2025-06-08 ASSESSMENT — SLEEP AND FATIGUE QUESTIONNAIRES
AVERAGE NUMBER OF SLEEP HOURS: 7
DO YOU USE A SLEEP AID: NO
DO YOU HAVE DIFFICULTY SLEEPING: NO

## 2025-06-08 ASSESSMENT — PAIN DESCRIPTION - ORIENTATION: ORIENTATION: RIGHT

## 2025-06-08 ASSESSMENT — PAIN DESCRIPTION - LOCATION: LOCATION: KNEE

## 2025-06-08 ASSESSMENT — PAIN - FUNCTIONAL ASSESSMENT: PAIN_FUNCTIONAL_ASSESSMENT: ACTIVITIES ARE NOT PREVENTED

## 2025-06-08 NOTE — BH NOTE
Precautions: q 15 min safety checks, high fall    SI/HI -     AVH -, states she has these at home at night    Pain no    Medication Compliance full    PRN Meds:    Behaviors sad, interacts well with others, tired easily, helped with bath, linens changed, pondering nursing home placement, bp not quite as high today since bp meds adj on Friday      Group Attendance no    Intake 60%

## 2025-06-08 NOTE — BH NOTE
Patient presents flat/sad. Patient is A&O X 4 and denies SI/HI and AVH. Patient spent the entirety of the shift isolated but does make needs known to staff. Patient stated that she \"just isn't feeling well and is tired.\" She requested medication to help her sleep, see below. Patient has been cooperative and med compliant. She is showing no s/s of distress at this time.       PRN Medication Documentation     Specific patient behavior that led to the need for PRN medication: sleep   Staff interventions attempted prior to PRN being given: patient requested  PRN medication given: Trazodone 50 mg @ 2032  Patient responsiveness/effectiveness of PRN medication: patient has been sleeping well    none

## 2025-06-08 NOTE — PLAN OF CARE
Problem: ABCDS Injury Assessment  Goal: Absence of physical injury  Outcome: Progressing     Problem: Anxiety  Goal: Will report anxiety at manageable levels  Description: INTERVENTIONS:1. Administer medication as ordered2. Teach and rehearse alternative coping skills3. Provide emotional support with 1:1 interaction with staff  INTERVENTIONS:1. Administer medication as ordered2. Teach and rehearse alternative coping skills3. Provide emotional support with 1:1 interaction with staff  Recent Flowsheet Documentation  Taken 6/8/2025 0825 by Cuca Nguyen RN  Will report anxiety at manageable levels:   Administer medication as ordered   Provide emotional support with 1:1 interaction with staff     Problem: Anxiety  Goal: Will report anxiety at manageable levels  Description: INTERVENTIONS:1. Administer medication as ordered2. Teach and rehearse alternative coping skills3. Provide emotional support with 1:1 interaction with staff  INTERVENTIONS:1. Administer medication as ordered2. Teach and rehearse alternative coping skills3. Provide emotional support with 1:1 interaction with staff  Recent Flowsheet Documentation  Taken 6/8/2025 0825 by Cuca Nguyen RN  Will report anxiety at manageable levels:   Administer medication as ordered   Provide emotional support with 1:1 interaction with staff     Problem: Decision Making  Goal: Pt/Family able to effectively weigh alternatives and participate in decision making related to treatment and care  Description: INTERVENTIONS:1. Determine when there are differences between patient's view, family's view, and healthcare provider's view of condition2. Facilitate patient and family articulation of goals for care3. Help patient and family identify pros/cons of alternative solutions4. Provide information as requested by patient/family5. Respect patient/family right to receive or not to receive information6. Serve as a liaison between patient and family and health care

## 2025-06-08 NOTE — BH NOTE
INTERVAL Hx: (Records and clinical information were reviewed)    CC: \"depression\"     States she's feeling slightly better today, and that her mood improved a bit yesterday, as the day progressed. She denies feeling as hopeless as she was PTA, and objectively she's a bit more animated and slightly brighter. She spiked a low-grade fever last evening (100.4), and her BP was elevated. Appreciate Dr. Farooq's help--Robitussin stopped and Clonidine PRN ordered. MELO better this AM, but still slightly elevated. Chronic cough persists--possibly due to Lisinopril vs allergy drainage. Will check CXR and labs this AM. I again discussed her course of Tx and how this up and down course is very typical, and a good indicator that she's progressing. She's tolerating the meds well including the Effexor XR. Says the diarrhea is mostly gone now. Slept 5.5 hours last night. Glucs: 115--158 the past 24 hours.    06/07 - the patient has been visible, she is cooperative and has fair insight into her situation. She slept 7 hours overnight and reports a poor appetite as well as apathy. She is medication compliant and denies active thoughts of self harm. She is medication compliant and did not received any PRN agents for agitation or aggression. Patient denies SI/HI/AVH/PI. She states she is feeling better since admission and has no concerns about her care.    MEDS:  Current Facility-Administered Medications   Medication Dose Route Frequency    benzonatate (TESSALON) capsule 200 mg  200 mg Oral TID PRN    glucose chewable tablet 16 g  4 tablet Oral PRN    dextrose bolus 10% 125 mL  125 mL IntraVENous PRN    Or    dextrose bolus 10% 250 mL  250 mL IntraVENous PRN    glucagon injection 1 mg  1 mg SubCUTAneous PRN    dextrose 10 % infusion   IntraVENous Continuous PRN    cefUROXime (CEFTIN) tablet 250 mg  250 mg Oral BID PC    losartan (COZAAR) tablet 50 mg  50 mg Oral QHS    cetirizine (ZYRTEC) tablet 5 mg  5 mg Oral Daily    alogliptin

## 2025-06-08 NOTE — PLAN OF CARE
Problem: Discharge Planning  Goal: Discharge to home or other facility with appropriate resources  Outcome: Progressing  Flowsheets  Taken 6/7/2025 2151 by Jolynn Carson RN  Discharge to home or other facility with appropriate resources: Identify barriers to discharge with patient and caregiver  Taken 6/7/2025 0820 by Cuca Nguyen RN  Discharge to home or other facility with appropriate resources: Identify barriers to discharge with patient and caregiver     Problem: Pain  Goal: Verbalizes/displays adequate comfort level or baseline comfort level  6/7/2025 2153 by Jolynn Carson RN  Outcome: Progressing     Problem: Safety - Adult  Goal: Free from fall injury  6/7/2025 2153 by Jolynn Carson RN  Outcome: Progressing     Problem: ABCDS Injury Assessment  Goal: Absence of physical injury  6/7/2025 2153 by Jolynn Carson RN  Outcome: Progressing     Problem: Self Harm/Suicidality  Goal: Will have no self-injury during hospital stay  Description: INTERVENTIONS:1.  Ensure constant observer at bedside with Q15M safety checks2.  Maintain a safe environment3.  Secure patient belongings4.  Ensure family/visitors adhere to safety recommendations5.  Ensure safety tray has been added to patient's diet order6.  Every shift and PRN: Re-assess suicidal risk via Frequent Screener  Outcome: Progressing  Flowsheets  Taken 6/7/2025 2151 by Jolynn Carson RN  Will have no self-injury during hospital stay: Maintain a safe environment  Taken 6/7/2025 0820 by Cuca Nguyen RN  Will have no self-injury during hospital stay: Maintain a safe environment     Problem: Cherrie  Goal: Will exhibit normal sleep and speech and no impulsivity  Description: INTERVENTIONS:1. Administer medication as ordered2. Set limits on impulsive behavior3. Make attempts to decrease external stimuli as possible  Outcome: Progressing     Problem: Sleep Disturbance  Goal: Will exhibit normal sleeping pattern  Description: INTERVENTIONS:1.

## 2025-06-09 LAB
GLUCOSE BLD STRIP.AUTO-MCNC: 106 MG/DL (ref 65–117)
GLUCOSE BLD STRIP.AUTO-MCNC: 121 MG/DL (ref 65–117)
GLUCOSE BLD STRIP.AUTO-MCNC: 134 MG/DL (ref 65–117)
GLUCOSE BLD STRIP.AUTO-MCNC: 155 MG/DL (ref 65–117)
SERVICE CMNT-IMP: ABNORMAL
SERVICE CMNT-IMP: NORMAL

## 2025-06-09 PROCEDURE — 6370000000 HC RX 637 (ALT 250 FOR IP): Performed by: PSYCHIATRY & NEUROLOGY

## 2025-06-09 PROCEDURE — 6370000000 HC RX 637 (ALT 250 FOR IP): Performed by: INTERNAL MEDICINE

## 2025-06-09 PROCEDURE — 82962 GLUCOSE BLOOD TEST: CPT

## 2025-06-09 PROCEDURE — 6370000000 HC RX 637 (ALT 250 FOR IP): Performed by: HOSPITALIST

## 2025-06-09 PROCEDURE — 1240000000 HC EMOTIONAL WELLNESS R&B

## 2025-06-09 RX ADMIN — METOPROLOL SUCCINATE 50 MG: 50 TABLET, EXTENDED RELEASE ORAL at 08:22

## 2025-06-09 RX ADMIN — CEFUROXIME AXETIL 250 MG: 250 TABLET ORAL at 08:23

## 2025-06-09 RX ADMIN — METFORMIN HYDROCHLORIDE 500 MG: 500 TABLET ORAL at 08:23

## 2025-06-09 RX ADMIN — ALOGLIPTIN 12.5 MG: 12.5 TABLET, FILM COATED ORAL at 08:22

## 2025-06-09 RX ADMIN — VENLAFAXINE HYDROCHLORIDE 150 MG: 150 CAPSULE, EXTENDED RELEASE ORAL at 08:23

## 2025-06-09 RX ADMIN — CETIRIZINE HYDROCHLORIDE 5 MG: 10 TABLET, FILM COATED ORAL at 08:22

## 2025-06-09 RX ADMIN — BENZONATATE 200 MG: 100 CAPSULE ORAL at 20:52

## 2025-06-09 RX ADMIN — LETROZOLE 2.5 MG: 2.5 TABLET ORAL at 08:23

## 2025-06-09 RX ADMIN — LOSARTAN POTASSIUM 50 MG: 25 TABLET, FILM COATED ORAL at 20:51

## 2025-06-09 RX ADMIN — CLOPIDOGREL BISULFATE 75 MG: 75 TABLET, FILM COATED ORAL at 08:22

## 2025-06-09 RX ADMIN — ALOGLIPTIN 12.5 MG: 12.5 TABLET, FILM COATED ORAL at 17:14

## 2025-06-09 RX ADMIN — ATORVASTATIN CALCIUM 80 MG: 40 TABLET, FILM COATED ORAL at 08:22

## 2025-06-09 RX ADMIN — METFORMIN HYDROCHLORIDE 500 MG: 500 TABLET ORAL at 17:14

## 2025-06-09 ASSESSMENT — PAIN SCALES - GENERAL
PAINLEVEL_OUTOF10: 0
PAINLEVEL_OUTOF10: 0

## 2025-06-09 NOTE — BH NOTE
Behavioral Health Interdisciplinary Rounds     Patient Name: Dannielle Hall  Age: 89 y.o.  Room/Bed:  232/01  Primary Diagnosis: Severe recurrent major depression without psychotic features (HCC)   Admission Status: Voluntary     Readmission within 30 days: No  Power of  in place: No  Patient requires a blocked bed: No14}          Reason for blocked bed:     Sleep hours:        Participation in Care/Groups:  Yes  Medication Compliant?: Yes  PRNS (last 24 hours): Sleep Aid and Pain    Restraints (last 24 hours):  No  Substance Abuse:  No    24 hour chart check complete: Yes    Patient goal(s) for today: to take a day at a time  Treatment team focus/goals: Discharge to home or other facility with appropriate resources   Progress note: cooperative compliant with treatment, reports feeling better    LOS:  12  Expected LOS: 5-7    Financial concerns/prescription coverage:  No  Family contact: no      Family requesting physician contact today:  No  Discharge plan: home  Access to weapons: No       Outpatient provider(s): Dr. Langley  Patient's preferred phone number for follow up call:  [phone]     Participating treatment team members: Dannielle Hall,Kita Pam Siemering, Dr. Scott (  (assigned SW), Sally Quiroga

## 2025-06-09 NOTE — BH NOTE
Patient presents flat and is A&O X 4. Patient denies SI/HI and AVH. Patient has been cooperative and med compliant. She has been coughing on and off throughout the shift requesting medication, see below. She also requested medication to help her sleep as well as something for pain in her right knee, see below. Patient was aftab the dayroom at start of shift interacting with staff and peers. Patient is currently resting and showing no s/s of distress at this time.       PRN Medication Documentation     Specific patient behavior that led to the need for PRN medication: sleep  Staff interventions attempted prior to PRN being given: patient requested  PRN medication given: Trazodone 50 mg @ 2022  Patient responsiveness/effectiveness of PRN medication: patient has been resting but has had moments of restlessness due to coughing    PRN Medication Documentation     Specific patient behavior that led to the need for PRN medication: mild pain  Staff interventions attempted prior to PRN being given: patient requested  PRN medication given: Tylenol 650 mg @ 2340  Patient responsiveness/effectiveness of PRN medication: pt stated his pain has decreased    PRN Medication Documentation     Specific patient behavior that led to the need for PRN medication: cough   Staff interventions attempted prior to PRN being given: patient requested  PRN medication given: Tessalon 200 mg @ 2105  Patient responsiveness/effectiveness of PRN medication: patient has still been coughing on and off throughout shift.

## 2025-06-09 NOTE — DISCHARGE INSTRUCTIONS
BEHAVIORAL HEALTH NURSING DISCHARGE NOTE      BEHAVIORAL HEALTH NURSING DISCHARGE NOTE      The following personal items collected during your admission are returned to you:   Dental Appliance:    Vision:    Hearing Aid:    Jewelry:    Clothing:    Other Valuables:    Valuables sent to safe:        PATIENT INSTRUCTIONS:    What to do at Home    You may not operate a vehicle .     You may not engage in an occupation involving machinery or appliances.    You may not drink any alcoholic beverages.      You may resume normal activities as tolerated.    Avoid making any critical decisions for at least 24-hours.    Recommended diet: regular diet diabetic diet    Recommended activity: activity as tolerated.     You are referred to [facility].    Follow-up with [facility] Outpatient Program ([facility] Tuesday 06/24, Wednesday 06/25 and Friday 06/27 @ [facility]. If you have problems relating to your recovery, call your physician.    The discharge information has been reviewed with the patient.  The patient verbalized understanding.

## 2025-06-09 NOTE — PLAN OF CARE
Problem: Chronic Conditions and Co-morbidities  Goal: Patient's chronic conditions and co-morbidity symptoms are monitored and maintained or improved  Outcome: Progressing     Problem: Discharge Planning  Goal: Discharge to home or other facility with appropriate resources  6/9/2025 1002 by Hayden Sosa RN  Outcome: Progressing  6/8/2025 2200 by Jolynn Carson RN  Outcome: Progressing  Flowsheets  Taken 6/8/2025 2158 by Jolynn Carson RN  Discharge to home or other facility with appropriate resources: Identify barriers to discharge with patient and caregiver  Taken 6/8/2025 0825 by Cuca Nguyen RN  Discharge to home or other facility with appropriate resources: Identify barriers to discharge with patient and caregiver     Problem: Pain  Goal: Verbalizes/displays adequate comfort level or baseline comfort level  6/9/2025 1002 by Hayden Sosa RN  Outcome: Progressing  6/8/2025 2200 by Jolynn Carson RN  Outcome: Progressing     Problem: Safety - Adult  Goal: Free from fall injury  6/9/2025 1002 by Hayden Sosa RN  Outcome: Progressing  6/8/2025 2200 by Jolynn Carson RN  Outcome: Progressing     Problem: ABCDS Injury Assessment  Goal: Absence of physical injury  6/9/2025 1002 by Hayden Sosa RN  Outcome: Progressing  6/8/2025 2200 by Jolynn Carson RN  Outcome: Progressing     Problem: Anxiety  Goal: Will report anxiety at manageable levels  Description: INTERVENTIONS:1. Administer medication as ordered2. Teach and rehearse alternative coping skills3. Provide emotional support with 1:1 interaction with staff  INTERVENTIONS:1. Administer medication as ordered2. Teach and rehearse alternative coping skills3. Provide emotional support with 1:1 interaction with staff  Outcome: Progressing  Flowsheets (Taken 6/8/2025 2158 by Jolynn Carson RN)  Will report anxiety at manageable levels:   Administer medication as ordered   Provide emotional support with 1:1 interaction with staff

## 2025-06-09 NOTE — BH NOTE
Group Therapy Note    Date: 6/9/2025    Group Start Time: 0900  Group End Time: 0950  Group Topic: Psychotherapy    [facility]    Socorro Ríos LCSW        Group Therapy Note    Attendees: 8 scheduled      Focus of session was based on handout of the 10 skills of emotional regulation.  We spoke about what emotional regulation is and how vital it is to practice in our day to day lives.  I shared the 10 skills to work on which include identifying emotions, what someone else is feeling, ability to start and pursue goals, ability to tolerate awkwardness, ability to not crumble under pressure, and the ability to sooth your emotions as well as ability to sooth others when possible.  Group members were asked to share what they can work on and build into their day to day habits.         Patient's Goal:  Will report anxiety at manageable levels     Notes:  Pt attended group and participated in the activity. Pt asked again about possible facilities that she could go to upon discharge. She stated her daughter dropped things off but did not visit.  She is feeling her children are getting tired,  She wants to do the right thing while she is able to make the decision.  We discussed her concerns and I told her I would consult with Kita gillette     Status After Intervention:  Improved    Participation Level: Active Listener and Interactive    Participation Quality: Appropriate, Attentive, and Sharing      Speech:  normal      Thought Process/Content: Logical      Affective Functioning: Congruent      Mood: depressed      Level of consciousness:  Alert and Attentive      Response to Learning: Able to verbalize current knowledge/experience, Able to verbalize/acknowledge new learning, Able to retain information, Capable of insight, Able to change behavior, and Progressing to goal      Endings: None Reported    Modes of Intervention: Education,  Support, Socialization, Exploration, Clarifying, Problem-solving, and Activity      Discipline Responsible: /Counselor      Signature:  Socorro Ríos LCSW

## 2025-06-09 NOTE — PROGRESS NOTES
INTERVAL Hx: (Records and clinical information from the weekend were reviewed)    CC: \"not so good\"     States she's feeling a little worse this AM, but mostly due to some knee pain that just started to flare up. She's becoming more resigned to the need for LTC placement, and we discussed this including some possible place to consider. She feels that her mood has been mostly better since PTA, and that appears to be the case objectively. She's more social and she's a bit more animated most of the time as well. She hasn't had any fever or other symptoms in the past several days--taking Ceftin for a possible mild cystitis--C+S only showed collection contamination. The chronic cough may be slightly better having stopped the Lisinopril, but it's too early to know for sure. She's tolerating the meds well including the Effexor XR. Says the diarrhea is mostly gone now. Slept 8 hours last night. Glucs: 104--201 the past 72 hours.    MEDS:  Current Facility-Administered Medications   Medication Dose Route Frequency    benzonatate (TESSALON) capsule 200 mg  200 mg Oral TID PRN    glucose chewable tablet 16 g  4 tablet Oral PRN    dextrose bolus 10% 125 mL  125 mL IntraVENous PRN    Or    dextrose bolus 10% 250 mL  250 mL IntraVENous PRN    glucagon injection 1 mg  1 mg SubCUTAneous PRN    dextrose 10 % infusion   IntraVENous Continuous PRN    losartan (COZAAR) tablet 50 mg  50 mg Oral QHS    cetirizine (ZYRTEC) tablet 5 mg  5 mg Oral Daily    alogliptin (NESINA) tablet 12.5 mg  12.5 mg Oral BID WC    atorvastatin (LIPITOR) tablet 80 mg  80 mg Oral Daily    clopidogrel (PLAVIX) tablet 75 mg  75 mg Oral Daily    letrozole (FEMARA) tablet 2.5 mg  2.5 mg Oral Daily    metFORMIN (GLUCOPHAGE) tablet 500 mg  500 mg Oral BID WC    metoprolol succinate (TOPROL XL) extended release tablet 50 mg  50 mg Oral Daily    venlafaxine (EFFEXOR XR) extended release capsule 150 mg  150 mg Oral Daily with breakfast    hydrOXYzine pamoate

## 2025-06-09 NOTE — PLAN OF CARE
Problem: Discharge Planning  Goal: Discharge to home or other facility with appropriate resources  Outcome: Progressing  Flowsheets  Taken 6/8/2025 2158 by Jolynn Carson RN  Discharge to home or other facility with appropriate resources: Identify barriers to discharge with patient and caregiver  Taken 6/8/2025 0825 by Cuca Nguyen RN  Discharge to home or other facility with appropriate resources: Identify barriers to discharge with patient and caregiver     Problem: Pain  Goal: Verbalizes/displays adequate comfort level or baseline comfort level  Outcome: Progressing     Problem: Safety - Adult  Goal: Free from fall injury  Outcome: Progressing     Problem: ABCDS Injury Assessment  Goal: Absence of physical injury  6/8/2025 2200 by Jolynn Carson RN  Outcome: Progressing     Problem: Self Harm/Suicidality  Goal: Will have no self-injury during hospital stay  Description: INTERVENTIONS:1.  Ensure constant observer at bedside with Q15M safety checks2.  Maintain a safe environment3.  Secure patient belongings4.  Ensure family/visitors adhere to safety recommendations5.  Ensure safety tray has been added to patient's diet order6.  Every shift and PRN: Re-assess suicidal risk via Frequent Screener  Outcome: Progressing  Flowsheets  Taken 6/8/2025 2158 by Jolynn Carson RN  Will have no self-injury during hospital stay: Maintain a safe environment  Taken 6/8/2025 0825 by Cuca Nguyen, RN  Will have no self-injury during hospital stay: Maintain a safe environment     Problem: Cherrie  Goal: Will exhibit normal sleep and speech and no impulsivity  Description: INTERVENTIONS:1. Administer medication as ordered2. Set limits on impulsive behavior3. Make attempts to decrease external stimuli as possible  6/8/2025 2200 by Jolynn Carson, RN  Outcome: Progressing     Problem: Sleep Disturbance  Goal: Will exhibit normal sleeping pattern  Description: INTERVENTIONS:1. Administer medication as ordered2.  Decrease environmental stimuli, including noise, as appropriate3. Discourage social isolation and naps during the day  Outcome: Progressing     Problem: Self Care Deficit  Goal: Return ADL status to a safe level of function  Description: INTERVENTIONS:1. Administer medication as ordered2. Assess ADL deficits and provide assistive devices as needed3. Obtain PT/OT consults as needed4. Assist and instruct patient to increase activity and self care as tolerated  Outcome: Progressing

## 2025-06-09 NOTE — BH NOTE
Affect blunt, mood depressed. Patient a & o x 4.  Denies pain. No SI, HI, AVH. Patient tolerated medications well, cooperative. Continues to be friendly towards staff and peers. Blood glucose as been wnl.  Currently sleeping during the time of this note.

## 2025-06-09 NOTE — PROGRESS NOTES
Spiritual Health History and Assessment/Progress Note  [facility]    Loneliness/Social Isolation,  , Life Adjustments (The patient will possibly going to [facility] care facility when she leaves the hospital.), Follow up     Name: Dannielle Hall MRN: [id number]    Age: 89 y.o.     Sex: female   Language: English   Mandaen: Religious   Severe recurrent major depression without psychotic features (HCC)     Date: 6/9/2025            Total Time Calculated: 51 min              Spiritual Assessment continued in [facility]        Referral/Consult From: Rounding   Encounter Overview/Reason: Loneliness/Social Isolation  Service Provided For: Patient    Zuri, Belief, Meaning:   Patient identifies as spiritual, is connected with a zuri tradition or spiritual practice, and has beliefs or practices that help with coping during difficult times  Family/Friends No family/friends present      Importance and Influence:  Patient has spiritual/personal beliefs that influence decisions regarding their health  Family/Friends No family/friends present    Community:  Patient is connected with a spiritual community  Family/Friends No family/friends present    Assessment and Plan of Care:     Patient Interventions include: Facilitated expression of thoughts and feelings  Family/Friends Interventions include: No family/friends present    Patient Plan of Care: Spiritual Care available upon further referral  Family/Friends Plan of Care: No family/friends present    Electronically signed by Chaplain Mary on 6/9/2025 at 3:10 PM

## 2025-06-10 LAB
GLUCOSE BLD STRIP.AUTO-MCNC: 130 MG/DL (ref 65–117)
GLUCOSE BLD STRIP.AUTO-MCNC: 135 MG/DL (ref 65–117)
GLUCOSE BLD STRIP.AUTO-MCNC: 136 MG/DL (ref 65–117)
GLUCOSE BLD STRIP.AUTO-MCNC: 149 MG/DL (ref 65–117)
GLUCOSE BLD STRIP.AUTO-MCNC: 208 MG/DL (ref 65–117)
SERVICE CMNT-IMP: ABNORMAL

## 2025-06-10 PROCEDURE — 6370000000 HC RX 637 (ALT 250 FOR IP): Performed by: PSYCHIATRY & NEUROLOGY

## 2025-06-10 PROCEDURE — 6370000000 HC RX 637 (ALT 250 FOR IP): Performed by: HOSPITALIST

## 2025-06-10 PROCEDURE — 6370000000 HC RX 637 (ALT 250 FOR IP): Performed by: INTERNAL MEDICINE

## 2025-06-10 PROCEDURE — 82962 GLUCOSE BLOOD TEST: CPT

## 2025-06-10 PROCEDURE — 1240000000 HC EMOTIONAL WELLNESS R&B

## 2025-06-10 RX ORDER — CLONIDINE HYDROCHLORIDE 0.1 MG/1
0.2 TABLET ORAL EVERY 4 HOURS PRN
Status: DISCONTINUED | OUTPATIENT
Start: 2025-06-10 | End: 2025-06-19 | Stop reason: HOSPADM

## 2025-06-10 RX ADMIN — BENZONATATE 200 MG: 100 CAPSULE ORAL at 04:56

## 2025-06-10 RX ADMIN — ALOGLIPTIN 12.5 MG: 12.5 TABLET, FILM COATED ORAL at 08:10

## 2025-06-10 RX ADMIN — LOSARTAN POTASSIUM 50 MG: 25 TABLET, FILM COATED ORAL at 19:54

## 2025-06-10 RX ADMIN — HYDROXYZINE PAMOATE 25 MG: 25 CAPSULE ORAL at 19:53

## 2025-06-10 RX ADMIN — ATORVASTATIN CALCIUM 80 MG: 40 TABLET, FILM COATED ORAL at 08:10

## 2025-06-10 RX ADMIN — INSULIN LISPRO 2 UNITS: 100 INJECTION, SOLUTION INTRAVENOUS; SUBCUTANEOUS at 11:10

## 2025-06-10 RX ADMIN — CLOPIDOGREL BISULFATE 75 MG: 75 TABLET, FILM COATED ORAL at 08:10

## 2025-06-10 RX ADMIN — BENZONATATE 200 MG: 100 CAPSULE ORAL at 11:32

## 2025-06-10 RX ADMIN — METOPROLOL SUCCINATE 50 MG: 50 TABLET, EXTENDED RELEASE ORAL at 08:10

## 2025-06-10 RX ADMIN — CETIRIZINE HYDROCHLORIDE 5 MG: 10 TABLET, FILM COATED ORAL at 08:10

## 2025-06-10 RX ADMIN — ALOGLIPTIN 12.5 MG: 12.5 TABLET, FILM COATED ORAL at 17:18

## 2025-06-10 RX ADMIN — LETROZOLE 2.5 MG: 2.5 TABLET ORAL at 08:10

## 2025-06-10 RX ADMIN — VENLAFAXINE HYDROCHLORIDE 150 MG: 150 CAPSULE, EXTENDED RELEASE ORAL at 08:10

## 2025-06-10 RX ADMIN — METFORMIN HYDROCHLORIDE 500 MG: 500 TABLET ORAL at 17:18

## 2025-06-10 RX ADMIN — METFORMIN HYDROCHLORIDE 500 MG: 500 TABLET ORAL at 08:10

## 2025-06-10 ASSESSMENT — PAIN SCALES - GENERAL
PAINLEVEL_OUTOF10: 0
PAINLEVEL_OUTOF10: 0

## 2025-06-10 NOTE — PLAN OF CARE
Problem: Chronic Conditions and Co-morbidities  Goal: Patient's chronic conditions and co-morbidity symptoms are monitored and maintained or improved  6/10/2025 0735 by Hayden Sosa RN  Outcome: Progressing  6/9/2025 2048 by Nicki Escamilla RN  Outcome: Progressing     Problem: Discharge Planning  Goal: Discharge to home or other facility with appropriate resources  Outcome: Progressing     Problem: Pain  Goal: Verbalizes/displays adequate comfort level or baseline comfort level  Outcome: Progressing     Problem: Safety - Adult  Goal: Free from fall injury  6/10/2025 0735 by Hayden Sosa RN  Outcome: Progressing  6/9/2025 2048 by Nicki Escamilla RN  Outcome: Progressing     Problem: ABCDS Injury Assessment  Goal: Absence of physical injury  Outcome: Progressing     Problem: Anxiety  Goal: Will report anxiety at manageable levels  Description: INTERVENTIONS:1. Administer medication as ordered2. Teach and rehearse alternative coping skills3. Provide emotional support with 1:1 interaction with staff  INTERVENTIONS:1. Administer medication as ordered2. Teach and rehearse alternative coping skills3. Provide emotional support with 1:1 interaction with staff  Outcome: Progressing  Flowsheets (Taken 6/9/2025 2041 by Nicki Escamilla RN)  Will report anxiety at manageable levels: Administer medication as ordered     Problem: Coping  Goal: Pt/Family able to verbalize concerns and demonstrate effective coping strategies  Description: INTERVENTIONS:1. Assist patient/family to identify coping skills, available support systems and cultural and spiritual values2. Provide emotional support, including active listening and acknowledgement of concerns of patient and caregivers3. Reduce environmental stimuli, as able4. Instruct patient/family in relaxation techniques, as appropriate5. Assess for spiritual pain/suffering and initiate Spiritual Care, Psychosocial Clinical Specialist consults as needed  6/10/2025 0735 by Ian

## 2025-06-10 NOTE — BH NOTE
Affect blunt, mood depressed. Patient a & o x 4.  Denies pain. No SI, HI, AVH. Patient tolerated medications well, cooperative. Continues to be friendly towards staff and peer. Concerned about placement after discharge.   Currently in the day room during the time of this note.    PRN Medication Documentation    Specific patient behavior that led to the need for PRN medication: Coughing  Staff interventions attempted prior to PRN being given: Assessment  PRN medication given: Tessalon 200mg  Patient responsiveness/effectiveness of PRN medication: Tolerated Well, Effective

## 2025-06-10 NOTE — PLAN OF CARE
Problem: Chronic Conditions and Co-morbidities  Goal: Patient's chronic conditions and co-morbidity symptoms are monitored and maintained or improved  6/10/2025 1714 by Hayden Sosa RN  Outcome: Progressing  6/10/2025 0735 by Hayden Sosa RN  Outcome: Progressing     Problem: Discharge Planning  Goal: Discharge to home or other facility with appropriate resources  6/10/2025 1714 by Hayden Sosa RN  Outcome: Progressing  Flowsheets (Taken 6/10/2025 0739)  Discharge to home or other facility with appropriate resources: Identify barriers to discharge with patient and caregiver  6/10/2025 0735 by Hayden Sosa RN  Outcome: Progressing     Problem: Pain  Goal: Verbalizes/displays adequate comfort level or baseline comfort level  6/10/2025 1714 by Hayden Sosa RN  Outcome: Progressing  6/10/2025 0735 by Hayden Sosa RN  Outcome: Progressing     Problem: Safety - Adult  Goal: Free from fall injury  6/10/2025 1714 by Hayden Sosa RN  Outcome: Progressing  6/10/2025 0735 by Hayden Sosa RN  Outcome: Progressing     Problem: ABCDS Injury Assessment  Goal: Absence of physical injury  6/10/2025 1714 by Hayden Sosa RN  Outcome: Progressing  6/10/2025 0735 by Hayden Sosa RN  Outcome: Progressing     Problem: Anxiety  Goal: Will report anxiety at manageable levels  Description: INTERVENTIONS:1. Administer medication as ordered2. Teach and rehearse alternative coping skills3. Provide emotional support with 1:1 interaction with staff  INTERVENTIONS:1. Administer medication as ordered2. Teach and rehearse alternative coping skills3. Provide emotional support with 1:1 interaction with staff  6/10/2025 1714 by Hayden Sosa RN  Outcome: Progressing  Flowsheets (Taken 6/10/2025 0739)  Will report anxiety at manageable levels: Administer medication as ordered  6/10/2025 0735 by Hayden Sosa RN  Outcome: Progressing  Flowsheets (Taken 6/9/2025 2041 by Nicki Escamilla, ELI)  Will

## 2025-06-10 NOTE — GROUP NOTE
Group Therapy Note    Date: 6/10/2025    Group Start Time: 0900  Group End Time: 0950  Group Topic: Psychotherapy    [facility] BEHAVIORAL [facility] OP    Socorro Ríos LCSW        Group Therapy Note    Attendees: 7 scheduled    Focus of session was on developing a healthy stress management plan for dealing with triggers to stress, anxiety, and worry.  We spoke about the importance of focusing on life experiences that have strengthened me and has taught us how to manage stress.  We also spoke about the importance of having a positive support network of people who can help nurture us and encourage us, we spoke about attitudes and beliefs that have helped to protect us and help us view things differently, we addressed physical self care habits and that prepare us or help us relieve tension, as well as action skills that we can use to change the situation we are in right at the moment of experiencing stress.            Patient's Goal:  ***    Notes:  ***    Status After Intervention:  {Status After Intervention:239143622}    Participation Level: {Participation Level:325846198}    Participation Quality: {[facility] PARTICIPATION QUALITY:829816841}      Speech:  {ED [facility]_SPEECH:90227}      Thought Process/Content: {Thought Process/Content:458132017}      Affective Functioning: {Affective Functionin}      Mood: {Mood:895792129}      Level of consciousness:  {Level of consciousness:010721882}      Response to Learning: {[facility] Responses to Learnin}      Endings: {[facility] Endings:66914}    Modes of Intervention: {[facility] Modes of Intervention:079831261}      Discipline Responsible: {[facility] Multidisciplinary:344771695}      Signature:  Socorro Ríos LCSW

## 2025-06-10 NOTE — BH NOTE
Affect restricted, Mood depressed. Patient alert and oriented x4. Patient denies SI/HI/AVH/pain. She has been coughing in this shift, PRN med given and correct positioning with a wedge and pillows done. Patient is meds compliant, cooperative and interactive. She makes her needs known.   PRN Medication Documentation    Specific patient behavior that led to need for PRN medication: Cough  Staff interventions attempted prior to PRN being given: Assessment  PRN medication given: Tessalon 200mg @2052 , and @0456  Patient response/effectiveness of PRN medication: effective

## 2025-06-10 NOTE — PLAN OF CARE
Problem: Chronic Conditions and Co-morbidities  Goal: Patient's chronic conditions and co-morbidity symptoms are monitored and maintained or improved  6/9/2025 2048 by Nicki Escamilla RN  Outcome: Progressing  6/9/2025 1002 by Hayden Sosa RN  Outcome: Progressing     Problem: Discharge Planning  Goal: Discharge to home or other facility with appropriate resources  Recent Flowsheet Documentation  Taken 6/9/2025 1007 by Hayden Sosa RN  Discharge to home or other facility with appropriate resources: Identify barriers to discharge with patient and caregiver  6/9/2025 1002 by Hayden Sosa RN  Outcome: Progressing     Problem: Pain  Goal: Verbalizes/displays adequate comfort level or baseline comfort level  6/9/2025 1002 by Hayden Sosa RN  Outcome: Progressing     Problem: Safety - Adult  Goal: Free from fall injury  6/9/2025 2048 by Nicki Escamilla RN  Outcome: Progressing  6/9/2025 1002 by Hayden Sosa RN  Outcome: Progressing     Problem: ABCDS Injury Assessment  Goal: Absence of physical injury  6/9/2025 1002 by Hayden Sosa RN  Outcome: Progressing     Problem: Anxiety  Goal: Will report anxiety at manageable levels  Description: INTERVENTIONS:1. Administer medication as ordered2. Teach and rehearse alternative coping skills3. Provide emotional support with 1:1 interaction with staff  INTERVENTIONS:1. Administer medication as ordered2. Teach and rehearse alternative coping skills3. Provide emotional support with 1:1 interaction with staff  Recent Flowsheet Documentation  Taken 6/9/2025 2041 by Nicki Escamilla RN  Will report anxiety at manageable levels: Administer medication as ordered  Taken 6/9/2025 1007 by Hayden Sosa RN  Will report anxiety at manageable levels: Administer medication as ordered  6/9/2025 1002 by Hayden Sosa RN  Outcome: Progressing  Flowsheets (Taken 6/8/2025 2158 by Jolynn Carson RN)  Will report anxiety at manageable levels:   Administer

## 2025-06-10 NOTE — PROGRESS NOTES
INTERVAL Hx: (Records and clinical information were reviewed)    CC: \"not so good\"     States she's still feeling poorly much of the time, although \"it comes and goes\". She's focused more on her possible transition into an MIS or NH, and what that may entail. She agrees that she'll like the increased socialization and not having to worry or be fearful of living alone. Reports that she's heard and smelled people outside of her window at night--smelled a cigar several times so she knows a man is there. She believes they're trying to get her to move out. Objectively, she doesn't appear to be quite as dysphoric or motorically slowed like she was PTA. Tolerating the Effexor XR easily, and she's pleased with her progress so far. Still coughing but she's only been off the Lisinopril 3 days. Slept 8 hours last night. Glucs: 106--155 the past 24 hours.    MEDS:  Current Facility-Administered Medications   Medication Dose Route Frequency    benzonatate (TESSALON) capsule 200 mg  200 mg Oral TID PRN    glucose chewable tablet 16 g  4 tablet Oral PRN    dextrose bolus 10% 125 mL  125 mL IntraVENous PRN    Or    dextrose bolus 10% 250 mL  250 mL IntraVENous PRN    glucagon injection 1 mg  1 mg SubCUTAneous PRN    dextrose 10 % infusion   IntraVENous Continuous PRN    losartan (COZAAR) tablet 50 mg  50 mg Oral QHS    cetirizine (ZYRTEC) tablet 5 mg  5 mg Oral Daily    alogliptin (NESINA) tablet 12.5 mg  12.5 mg Oral BID WC    atorvastatin (LIPITOR) tablet 80 mg  80 mg Oral Daily    clopidogrel (PLAVIX) tablet 75 mg  75 mg Oral Daily    letrozole (FEMARA) tablet 2.5 mg  2.5 mg Oral Daily    metFORMIN (GLUCOPHAGE) tablet 500 mg  500 mg Oral BID WC    metoprolol succinate (TOPROL XL) extended release tablet 50 mg  50 mg Oral Daily    venlafaxine (EFFEXOR XR) extended release capsule 150 mg  150 mg Oral Daily with breakfast    hydrOXYzine pamoate (VISTARIL) capsule 25 mg  25 mg Oral TID PRN    acetaminophen (TYLENOL) tablet 650 mg

## 2025-06-10 NOTE — BH NOTE
Discussed placement options with patient. When discussing the NH which she brought up this writer inquired about her home. She stated that she gave deeded her home to her son 3 years ago. Explained to patient that the look back period if 5 years for Medicaid. We discussed correction's and depending on her SS amount maybe she could go to a facility. Patient feels she would like to be closer. Will discuss this with her daughter.    This writer spoke with Le, she is against her going to a facility because she is capable of doing for herself. She stated she wants attention and for someone to do everything for her. She has encouraged her to attend [facility], and the community center up the road from her.

## 2025-06-11 LAB
GLUCOSE BLD STRIP.AUTO-MCNC: 102 MG/DL (ref 65–117)
GLUCOSE BLD STRIP.AUTO-MCNC: 160 MG/DL (ref 65–117)
GLUCOSE BLD STRIP.AUTO-MCNC: 176 MG/DL (ref 65–117)
GLUCOSE BLD STRIP.AUTO-MCNC: 198 MG/DL (ref 65–117)
SERVICE CMNT-IMP: ABNORMAL
SERVICE CMNT-IMP: NORMAL

## 2025-06-11 PROCEDURE — 6370000000 HC RX 637 (ALT 250 FOR IP): Performed by: INTERNAL MEDICINE

## 2025-06-11 PROCEDURE — 6370000000 HC RX 637 (ALT 250 FOR IP): Performed by: HOSPITALIST

## 2025-06-11 PROCEDURE — 6370000000 HC RX 637 (ALT 250 FOR IP): Performed by: PSYCHIATRY & NEUROLOGY

## 2025-06-11 PROCEDURE — 82962 GLUCOSE BLOOD TEST: CPT

## 2025-06-11 PROCEDURE — 1240000000 HC EMOTIONAL WELLNESS R&B

## 2025-06-11 RX ORDER — LOSARTAN POTASSIUM 25 MG/1
100 TABLET ORAL
Status: DISCONTINUED | OUTPATIENT
Start: 2025-06-11 | End: 2025-06-19 | Stop reason: HOSPADM

## 2025-06-11 RX ADMIN — METOPROLOL SUCCINATE 50 MG: 50 TABLET, EXTENDED RELEASE ORAL at 09:06

## 2025-06-11 RX ADMIN — ALOGLIPTIN 12.5 MG: 12.5 TABLET, FILM COATED ORAL at 09:06

## 2025-06-11 RX ADMIN — BENZONATATE 200 MG: 100 CAPSULE ORAL at 20:07

## 2025-06-11 RX ADMIN — CLOPIDOGREL BISULFATE 75 MG: 75 TABLET, FILM COATED ORAL at 09:06

## 2025-06-11 RX ADMIN — VENLAFAXINE HYDROCHLORIDE 150 MG: 150 CAPSULE, EXTENDED RELEASE ORAL at 09:06

## 2025-06-11 RX ADMIN — BENZONATATE 200 MG: 100 CAPSULE ORAL at 10:20

## 2025-06-11 RX ADMIN — ATORVASTATIN CALCIUM 80 MG: 40 TABLET, FILM COATED ORAL at 09:06

## 2025-06-11 RX ADMIN — ALOGLIPTIN 12.5 MG: 12.5 TABLET, FILM COATED ORAL at 17:35

## 2025-06-11 RX ADMIN — METFORMIN HYDROCHLORIDE 500 MG: 500 TABLET ORAL at 17:35

## 2025-06-11 RX ADMIN — HYDROXYZINE PAMOATE 25 MG: 25 CAPSULE ORAL at 20:08

## 2025-06-11 RX ADMIN — INSULIN LISPRO 2 UNITS: 100 INJECTION, SOLUTION INTRAVENOUS; SUBCUTANEOUS at 20:13

## 2025-06-11 RX ADMIN — CETIRIZINE HYDROCHLORIDE 5 MG: 10 TABLET, FILM COATED ORAL at 09:05

## 2025-06-11 RX ADMIN — LOSARTAN POTASSIUM 100 MG: 25 TABLET, FILM COATED ORAL at 20:07

## 2025-06-11 RX ADMIN — METFORMIN HYDROCHLORIDE 500 MG: 500 TABLET ORAL at 09:06

## 2025-06-11 NOTE — BH NOTE
Affect blunted, Mood depressed/ anxious. Alert and oriented x4. Denies SI/HI/AVH/Pain. Patient is meds compliant. Blood pressure was high- 234/107 at the start of the shift, perfect served the hospitalist, he ordered PRN clonidine 0.2mg 4hrly for BP above 180/110, after an hour, repeat BP was 174/94 mmhg. Other VS are WNL.  PRN Medication Documentation    Specific patient behavior that led to need for PRN medication: anxiety  Staff interventions attempted prior to PRN being given: assessment  PRN medication given: vistaril 25mg @1953  Patient response/effectiveness of PRN medication: effective

## 2025-06-11 NOTE — PLAN OF CARE
Problem: Chronic Conditions and Co-morbidities  Goal: Patient's chronic conditions and co-morbidity symptoms are monitored and maintained or improved  6/10/2025 2236 by Nicki Escamilla RN  Outcome: Progressing  6/10/2025 1714 by Hayden Sosa RN  Outcome: Progressing     Problem: Discharge Planning  Goal: Discharge to home or other facility with appropriate resources  Recent Flowsheet Documentation  Taken 6/10/2025 2217 by Nicki Escamilla RN  Discharge to home or other facility with appropriate resources: Identify barriers to discharge with patient and caregiver  6/10/2025 1714 by Hayden Sosa RN  Outcome: Progressing  Flowsheets (Taken 6/10/2025 0739)  Discharge to home or other facility with appropriate resources: Identify barriers to discharge with patient and caregiver     Problem: Pain  Goal: Verbalizes/displays adequate comfort level or baseline comfort level  6/10/2025 2236 by Nicki Escamilla RN  Outcome: Progressing  6/10/2025 1714 by Hayden Sosa RN  Outcome: Progressing     Problem: Safety - Adult  Goal: Free from fall injury  6/10/2025 1714 by Hayden Sosa RN  Outcome: Progressing     Problem: ABCDS Injury Assessment  Goal: Absence of physical injury  6/10/2025 1714 by Hayden Sosa RN  Outcome: Progressing     Problem: Anxiety  Goal: Will report anxiety at manageable levels  Description: INTERVENTIONS:1. Administer medication as ordered2. Teach and rehearse alternative coping skills3. Provide emotional support with 1:1 interaction with staff  INTERVENTIONS:1. Administer medication as ordered2. Teach and rehearse alternative coping skills3. Provide emotional support with 1:1 interaction with staff  Recent Flowsheet Documentation  Taken 6/10/2025 2217 by Nicki Escamilla RN  Will report anxiety at manageable levels: Administer medication as ordered  6/10/2025 1714 by Hayden Sosa RN  Outcome: Progressing  Flowsheets (Taken 6/10/2025 0739)  Will report anxiety at manageable levels:

## 2025-06-11 NOTE — PLAN OF CARE
Problem: Chronic Conditions and Co-morbidities  Goal: Patient's chronic conditions and co-morbidity symptoms are monitored and maintained or improved  6/11/2025 1156 by Sulma Ta RN  Outcome: Progressing  6/10/2025 2236 by Nicki Escamilla RN  Outcome: Progressing     Problem: Discharge Planning  Goal: Discharge to home or other facility with appropriate resources  Outcome: Progressing  Flowsheets  Taken 6/11/2025 0800 by Sulma Ta RN  Discharge to home or other facility with appropriate resources: Identify barriers to discharge with patient and caregiver  Taken 6/10/2025 2217 by Nicki Escamilla RN  Discharge to home or other facility with appropriate resources: Identify barriers to discharge with patient and caregiver     Problem: Safety - Adult  Goal: Free from fall injury  Outcome: Progressing

## 2025-06-11 NOTE — PROGRESS NOTES
INTERVAL Hx: (Records and clinical information were reviewed)    CC: \"not good\"     Appears to be more depressed this AM. Skipping breakfast and staying in bed, very slowed motorically, and very flat emotionally. Says she's feeling hopeless again, and I suspect it may be related to her daughter stating that she thinks Dannielle should go back home and that she's fully capable of taking care of herself, etc. I discussed this with her, but she doesn't seem to fully understand that connection. I also explained that she needs to decide based on what's best for her, and not her children. Tolerating the Effexor XR easily, and she had been pleased with her progress so far, but she's still having these strong periods of depression and hopelessness. BP is still running high at times--will increase the Losartan to 100 mg. Slept 8 hours last night. Glucs: 135--208 the past 24 hours.    MEDS:  Current Facility-Administered Medications   Medication Dose Route Frequency    losartan (COZAAR) tablet 100 mg  100 mg Oral QHS    cloNIDine (CATAPRES) tablet 0.2 mg  0.2 mg Oral Q4H PRN    benzonatate (TESSALON) capsule 200 mg  200 mg Oral TID PRN    glucose chewable tablet 16 g  4 tablet Oral PRN    dextrose bolus 10% 125 mL  125 mL IntraVENous PRN    Or    dextrose bolus 10% 250 mL  250 mL IntraVENous PRN    glucagon injection 1 mg  1 mg SubCUTAneous PRN    dextrose 10 % infusion   IntraVENous Continuous PRN    cetirizine (ZYRTEC) tablet 5 mg  5 mg Oral Daily    alogliptin (NESINA) tablet 12.5 mg  12.5 mg Oral BID WC    atorvastatin (LIPITOR) tablet 80 mg  80 mg Oral Daily    clopidogrel (PLAVIX) tablet 75 mg  75 mg Oral Daily    letrozole (FEMARA) tablet 2.5 mg  2.5 mg Oral Daily    metFORMIN (GLUCOPHAGE) tablet 500 mg  500 mg Oral BID WC    metoprolol succinate (TOPROL XL) extended release tablet 50 mg  50 mg Oral Daily    venlafaxine (EFFEXOR XR) extended release capsule 150 mg  150 mg Oral Daily with breakfast    hydrOXYzine

## 2025-06-11 NOTE — BH NOTE
Group Therapy Note    Date: 6/11/2025    Group Start Time: 0900  Group End Time: 0950  Group Topic: Psychotherapy    [facility] BEHAVIORAL TH OP    Socorro Ríos LCSW        Group Therapy Note    Attendees: 7 scheduled     Focus of session was based on the handout “Self-Compassion in Daily Life.”  We spoke about the basis of this comes from the [employer] program (mindful self-compassion.)  We spoke about the two components are to be mindful  and aware when you are under stress and suffering and the other component is to respond to yourself with care and kindness, to be compassionate.  We spoke about different aspects of life which include physical, mental, emotional, relational, and spiritual.  We spoke about how we can focus on self-compassion through all of these aspects of life and how we already take care of ourselves and new ways we can take care of ourselves.         Patient's Goal: Will report anxiety at manageable levels      Notes:  Pt listened to the group activity and discussion. She engaged when prompted but stated that she was not feeling well today.  She has been coughing and is wondering if she is getting a cold or just allergies.  Pt's affect was flat and she stated she just did not feel good all over.     Status After Intervention:  Unchanged    Participation Level: Active Listener and Minimal    Participation Quality: Appropriate, Attentive, and Sharing      Speech:  normal      Thought Process/Content: Logical      Affective Functioning: Flat      Mood: depressed      Level of consciousness:  Preoccupied      Response to Learning: Able to verbalize current knowledge/experience      Endings: None Reported    Modes of Intervention: Education, Support, Socialization, Exploration, Clarifying, Problem-solving, and Activity      Discipline Responsible: /Counselor      Signature:  Socorro Ríos LCSW

## 2025-06-11 NOTE — BH NOTE
Behavioral Health Interdisciplinary Rounds     Patient Name: Dannielle Hall  Age: 89 y.o.  Room/Bed:  232/01  Primary Diagnosis: Severe recurrent major depression without psychotic features (HCC)   Admission Status: Voluntary     Readmission within 30 days: No  Power of  in place: No  Patient requires a blocked bed: No14}          Reason for blocked bed:     Sleep hours: 8       Participation in Care/Groups:  Yes  Medication Compliant?: Yes  PRNS (last 24 hours): Antianxiety    Restraints (last 24 hours):  No  Substance Abuse:  No    24 hour chart check complete: Yes    Patient goal(s) for today: to feel like herself  Treatment team focus/goals: Will report anxiety at manageable levels   Progress note: patient feels down, hopeless today    LOS:  14  Expected LOS: 4-5    Financial concerns/prescription coverage:  No  Family contact: yes      Family requesting physician contact today:  No  Discharge plan: home with outpatient services  Access to weapons: No       Outpatient provider(s): Dr. Langley  Patient's preferred phone number for follow up call: [phone]     Participating treatment team members: Dannielle Hall,Kita Knox, Michaela Ríos, Dr. Langley, Marci Knox (assigned SW), Sally Quiroga

## 2025-06-11 NOTE — BH NOTE
Affect sad and depressed, mood depressed/anxious. Alert and oriented X 4. Cooperative and pleasant. Attended and participated in group. Interacted with staff and peers. Makes needs known. Ate all meals and snacks. Denied SI/HI/AVH and pain. Medication compliant. Stable with no s/s of distress.  Pt resting in bed a lot.  Coughing frequently this am.     PRN Medication Documentation    Specific patient behavior that led to need for PRN medication: coughing excessively.  Staff interventions attempted prior to PRN being given: discussed with patient.  PRN medication given: 10:20- Tessalon capsule 200 mg po given for cough.  Patient response/effectiveness of PRN medication:  Coughing decreased.

## 2025-06-12 LAB
GLUCOSE BLD STRIP.AUTO-MCNC: 140 MG/DL (ref 65–117)
GLUCOSE BLD STRIP.AUTO-MCNC: 145 MG/DL (ref 65–117)
GLUCOSE BLD STRIP.AUTO-MCNC: 165 MG/DL (ref 65–117)
GLUCOSE BLD STRIP.AUTO-MCNC: 90 MG/DL (ref 65–117)
SERVICE CMNT-IMP: ABNORMAL
SERVICE CMNT-IMP: NORMAL

## 2025-06-12 PROCEDURE — 1240000000 HC EMOTIONAL WELLNESS R&B

## 2025-06-12 PROCEDURE — 6370000000 HC RX 637 (ALT 250 FOR IP): Performed by: HOSPITALIST

## 2025-06-12 PROCEDURE — 6370000000 HC RX 637 (ALT 250 FOR IP): Performed by: PSYCHIATRY & NEUROLOGY

## 2025-06-12 PROCEDURE — 82962 GLUCOSE BLOOD TEST: CPT

## 2025-06-12 RX ADMIN — LOSARTAN POTASSIUM 100 MG: 25 TABLET, FILM COATED ORAL at 20:24

## 2025-06-12 RX ADMIN — METFORMIN HYDROCHLORIDE 500 MG: 500 TABLET ORAL at 17:15

## 2025-06-12 RX ADMIN — LETROZOLE 2.5 MG: 2.5 TABLET ORAL at 08:28

## 2025-06-12 RX ADMIN — ALOGLIPTIN 12.5 MG: 12.5 TABLET, FILM COATED ORAL at 08:22

## 2025-06-12 RX ADMIN — METFORMIN HYDROCHLORIDE 500 MG: 500 TABLET ORAL at 08:23

## 2025-06-12 RX ADMIN — CETIRIZINE HYDROCHLORIDE 5 MG: 10 TABLET, FILM COATED ORAL at 08:22

## 2025-06-12 RX ADMIN — ATORVASTATIN CALCIUM 80 MG: 40 TABLET, FILM COATED ORAL at 08:23

## 2025-06-12 RX ADMIN — METOPROLOL SUCCINATE 50 MG: 50 TABLET, EXTENDED RELEASE ORAL at 08:22

## 2025-06-12 RX ADMIN — HYDROXYZINE PAMOATE 25 MG: 25 CAPSULE ORAL at 20:25

## 2025-06-12 RX ADMIN — CLOPIDOGREL BISULFATE 75 MG: 75 TABLET, FILM COATED ORAL at 08:22

## 2025-06-12 RX ADMIN — BENZONATATE 200 MG: 100 CAPSULE ORAL at 20:25

## 2025-06-12 RX ADMIN — VENLAFAXINE HYDROCHLORIDE 150 MG: 150 CAPSULE, EXTENDED RELEASE ORAL at 08:22

## 2025-06-12 RX ADMIN — BENZONATATE 200 MG: 100 CAPSULE ORAL at 12:27

## 2025-06-12 RX ADMIN — ALOGLIPTIN 12.5 MG: 12.5 TABLET, FILM COATED ORAL at 17:15

## 2025-06-12 ASSESSMENT — PAIN SCALES - GENERAL
PAINLEVEL_OUTOF10: 0
PAINLEVEL_OUTOF10: 0

## 2025-06-12 NOTE — PLAN OF CARE
Problem: Chronic Conditions and Co-morbidities  Goal: Patient's chronic conditions and co-morbidity symptoms are monitored and maintained or improved  6/11/2025 2156 by Mellisa Moyer RN  Outcome: Progressing  6/11/2025 1156 by Sulma Ta RN  Outcome: Progressing     Problem: Discharge Planning  Goal: Discharge to home or other facility with appropriate resources  Recent Flowsheet Documentation  Taken 6/11/2025 2112 by Mellisa Moyer RN  Discharge to home or other facility with appropriate resources: Identify barriers to discharge with patient and caregiver  6/11/2025 1156 by Sulma Ta RN  Outcome: Progressing  Flowsheets  Taken 6/11/2025 0800 by Sulma Ta RN  Discharge to home or other facility with appropriate resources: Identify barriers to discharge with patient and caregiver  Taken 6/10/2025 2217 by Nicki Escamilla RN  Discharge to home or other facility with appropriate resources: Identify barriers to discharge with patient and caregiver     Problem: Safety - Adult  Goal: Free from fall injury  6/11/2025 2156 by Mellisa Moyer RN  Outcome: Progressing  6/11/2025 1156 by Sulma Ta RN  Outcome: Progressing     Problem: Anxiety  Goal: Will report anxiety at manageable levels  Description: INTERVENTIONS:1. Administer medication as ordered2. Teach and rehearse alternative coping skills3. Provide emotional support with 1:1 interaction with staff  INTERVENTIONS:1. Administer medication as ordered2. Teach and rehearse alternative coping skills3. Provide emotional support with 1:1 interaction with staff  6/11/2025 2156 by Mellisa Moyer RN  Outcome: Progressing  Flowsheets (Taken 6/11/2025 2112)  Will report anxiety at manageable levels: Administer medication as ordered  6/11/2025 1156 by Sulma Ta RN  Outcome: Progressing  Flowsheets  Taken 6/11/2025 0800 by Sulma Ta RN  Will report anxiety at manageable levels:   Administer medication as ordered

## 2025-06-12 NOTE — BH NOTE
Group Therapy Note    Date: 6/12/2025    I spoke with pt in her room as she was in bed.  Pt stated that she did not feel well today and was not going to group.  Pt did not attend even with multple attempts to encpourage her particiaption  Signature:  Socorro Ríos LCSW

## 2025-06-12 NOTE — PROGRESS NOTES
INTERVAL Hx: (Records and clinical information were reviewed)    CC: \"little better\"     States she's not quite as hopelessly depressed as she was yesterday, but she still appears to be very flat, withdrawn, and dysphoric. States she's not sure what she needs to do going forward because of her daughter's belief that she shouldn't go into a NH. We discussed this further and she still thinks she'd adapt to a NH \"fine\", but she also knows it can be a more depressing environment where she'd be functioning much better than most everyone else. I again explained that she needs to decide what to do based on what's best for her, and not her children, but that her opinion may change once she's no longer so depressed. Tolerating the Effexor XR easily, and she's mostly been pleased with her progress so far, but she's still having these strong periods of depression and hopelessness. BP is still running high--Losartan increased last night. Slept 6.5 hours last night. Glucs: 102--198 the past 24 hours.    MEDS:  Current Facility-Administered Medications   Medication Dose Route Frequency    losartan (COZAAR) tablet 100 mg  100 mg Oral QHS    cloNIDine (CATAPRES) tablet 0.2 mg  0.2 mg Oral Q4H PRN    benzonatate (TESSALON) capsule 200 mg  200 mg Oral TID PRN    glucose chewable tablet 16 g  4 tablet Oral PRN    dextrose bolus 10% 125 mL  125 mL IntraVENous PRN    Or    dextrose bolus 10% 250 mL  250 mL IntraVENous PRN    glucagon injection 1 mg  1 mg SubCUTAneous PRN    dextrose 10 % infusion   IntraVENous Continuous PRN    cetirizine (ZYRTEC) tablet 5 mg  5 mg Oral Daily    alogliptin (NESINA) tablet 12.5 mg  12.5 mg Oral BID WC    atorvastatin (LIPITOR) tablet 80 mg  80 mg Oral Daily    clopidogrel (PLAVIX) tablet 75 mg  75 mg Oral Daily    letrozole (FEMARA) tablet 2.5 mg  2.5 mg Oral Daily    metFORMIN (GLUCOPHAGE) tablet 500 mg  500 mg Oral BID WC    metoprolol succinate (TOPROL XL) extended release tablet 50 mg  50 mg Oral  Daily    venlafaxine (EFFEXOR XR) extended release capsule 150 mg  150 mg Oral Daily with breakfast    hydrOXYzine pamoate (VISTARIL) capsule 25 mg  25 mg Oral TID PRN    acetaminophen (TYLENOL) tablet 650 mg  650 mg Oral Q4H PRN    aluminum & magnesium hydroxide-simethicone (MAALOX PLUS) 200-200-20 MG/5ML suspension 30 mL  30 mL Oral Q6H PRN    insulin lispro (HUMALOG,ADMELOG) injection vial 0-8 Units  0-8 Units SubCUTAneous 4x Daily AC & HS    glucose chewable tablet 16 g  4 tablet Oral PRN    dextrose bolus 10% 125 mL  125 mL IntraVENous PRN    Or    dextrose bolus 10% 250 mL  250 mL IntraVENous PRN    dextrose 10 % infusion   IntraVENous Continuous PRN    traZODone (DESYREL) tablet 50 mg  50 mg Oral Nightly PRN    simethicone (MYLICON) chewable tablet 80 mg  80 mg Oral Q6H PRN       VITALS: Patient Vitals for the past 12 hrs:   Temp Pulse Resp BP SpO2   06/12/25 0747 99.1 °F (37.3 °C) 94 17 (!) 175/95 96 %   06/11/25 2112 -- 94 -- -- --         LABS: .  Recent Results (from the past 24 hours)   POCT Glucose    Collection Time: 06/11/25 12:40 PM   Result Value Ref Range    POC Glucose 176 (H) 65 - 117 mg/dL    Performed by: Cely Ozuna RN    POCT Glucose    Collection Time: 06/11/25  4:53 PM   Result Value Ref Range    POC Glucose 102 65 - 117 mg/dL    Performed by: Carlee Priest    POCT Glucose    Collection Time: 06/11/25  8:06 PM   Result Value Ref Range    POC Glucose 198 (H) 65 - 117 mg/dL    Performed by: Yamileth Neil RN    POCT Glucose    Collection Time: 06/12/25  6:22 AM   Result Value Ref Range    POC Glucose 145 (H) 65 - 117 mg/dL    Performed by: Yamileth Neil RN        MSE:   Appearance: casually dressed; adequately groomed  Behavior: calm; no agitation  Motor: still slowed  Mood/Affect: still dysphoric and restricted  Thought Process: coherent; linear  Thought Content: no overt delusions; denies SI/HI  Perceptions: denies AH's  Insight/Judgment: limited    ASSESSMENT:   1.  Major Depression,

## 2025-06-12 NOTE — PLAN OF CARE
Problem: Chronic Conditions and Co-morbidities  Goal: Patient's chronic conditions and co-morbidity symptoms are monitored and maintained or improved  6/12/2025 0803 by Hayden Sosa RN  Outcome: Progressing  6/11/2025 2156 by Mellisa Moyer RN  Outcome: Progressing     Problem: Discharge Planning  Goal: Discharge to home or other facility with appropriate resources  Outcome: Progressing  Flowsheets (Taken 6/11/2025 2112 by Mellisa Moyer, RN)  Discharge to home or other facility with appropriate resources: Identify barriers to discharge with patient and caregiver     Problem: Pain  Goal: Verbalizes/displays adequate comfort level or baseline comfort level  Outcome: Progressing     Problem: Safety - Adult  Goal: Free from fall injury  6/12/2025 0803 by Hayden Sosa RN  Outcome: Progressing  6/11/2025 2156 by Mellisa Moyer RN  Outcome: Progressing     Problem: ABCDS Injury Assessment  Goal: Absence of physical injury  Outcome: Progressing     Problem: Anxiety  Goal: Will report anxiety at manageable levels  Description: INTERVENTIONS:1. Administer medication as ordered2. Teach and rehearse alternative coping skills3. Provide emotional support with 1:1 interaction with staff  INTERVENTIONS:1. Administer medication as ordered2. Teach and rehearse alternative coping skills3. Provide emotional support with 1:1 interaction with staff  6/12/2025 0803 by Hayden Sosa RN  Outcome: Progressing  6/11/2025 2156 by Mellisa Moyer RN  Outcome: Progressing  Flowsheets (Taken 6/11/2025 2112)  Will report anxiety at manageable levels: Administer medication as ordered     Problem: Coping  Goal: Pt/Family able to verbalize concerns and demonstrate effective coping strategies  Description: INTERVENTIONS:1. Assist patient/family to identify coping skills, available support systems and cultural and spiritual values2. Provide emotional support, including active listening and acknowledgement of concerns of patient and

## 2025-06-12 NOTE — BH NOTE
Affect:  Dysphoric.   Mood:  Anxious / Worried.  Patient disclosed that there is talk of her being discharged to a long term care facility.  She initially stated that she is ready to be discharged, go home, back her clothes and then go to the facility.  She then went on to say that she's not really sure about all of that because her youngest daughter works as a dialysis nurse and might just retire so she can take her home to live with her.  She seemed sad when talking about the entire situation.  She stated that she knows she cannot live alone anymore because she cannot even cook anymore.  Her thoughts were clear and thought out well.  She is alert and oriented x's 4.  She has been pleasant and cooperative. Denies SI/HI/AVH/pain.  Med compliant. Denies SE's from meds.  Currently resting comfortably without signs of distress.      PRN Medication Documentation    Specific patient behavior that led to the need for PRN medication: cough  Staff interventions attempted prior to PRN being given: assess  PRN medication given: Benzonatate 200 mg administered PO on 6/11/2025 @ 2007.  Patient responsiveness/effectiveness of PRN medication: Patients cough decreased significantly.        PRN Medication Documentation    Specific patient behavior that led to the need for PRN medication: anxiousness  Staff interventions attempted prior to PRN being given: assess / support emotionally.  PRN medication given: Vistaril 25 mg administered PO on 6/11/2025 @ 2008.  Patient responsiveness/effectiveness of PRN medication: Patient currently resting comfortable without signs of distress or disturbance.    Mellisa Moyer RN

## 2025-06-12 NOTE — BH NOTE
Patient remains on the unit and continues with depressed mood. Her mood tends to fluctuate and when its down she stays in bed and feels hopeless at times. She is uncertain what she needs or wants to do regarding long term plans. She would like to be around people but does not want to be far from her community which limits options. She has been encouraged to go to a senior center in her area and transportation and lunch is provided. Barriers to discharge at this time is patient's mood and if she returns home she will continue to lay in bed and lack the motivation to care for her basic needs. Her family is supportive and believe she is capable of staying in her home. They feel she lacks motivation and has many opportunities daily to get out or attend a program locally. She will be able to follow up with Dr. Langley for medication management. She also has the option to attend the IOP program 3 days per week which she has done this in the past.

## 2025-06-12 NOTE — BH NOTE
Affect blunt, mood sad/depressed. Patient a & o x 4.  Denies pain. No SI, HI, AVH. Patient tolerated medications well, cooperative. Can be attention seeking at times. Blood glucose has been wnl.  Currently eating dinner during the time of this note.    PRN Medication Documentation    Specific patient behavior that led to the need for PRN medication: Cough  Staff interventions attempted prior to PRN being given: Patient Requested  PRN medication given: Tessalon 200mg at 1227  Patient responsiveness/effectiveness of PRN medication: Tolerated Well, Effective

## 2025-06-13 LAB
GLUCOSE BLD STRIP.AUTO-MCNC: 123 MG/DL (ref 65–117)
GLUCOSE BLD STRIP.AUTO-MCNC: 131 MG/DL (ref 65–117)
GLUCOSE BLD STRIP.AUTO-MCNC: 179 MG/DL (ref 65–117)
GLUCOSE BLD STRIP.AUTO-MCNC: 249 MG/DL (ref 65–117)
SERVICE CMNT-IMP: ABNORMAL

## 2025-06-13 PROCEDURE — 82962 GLUCOSE BLOOD TEST: CPT

## 2025-06-13 PROCEDURE — 6370000000 HC RX 637 (ALT 250 FOR IP): Performed by: HOSPITALIST

## 2025-06-13 PROCEDURE — 1240000000 HC EMOTIONAL WELLNESS R&B

## 2025-06-13 PROCEDURE — 6370000000 HC RX 637 (ALT 250 FOR IP): Performed by: INTERNAL MEDICINE

## 2025-06-13 PROCEDURE — 6370000000 HC RX 637 (ALT 250 FOR IP): Performed by: PSYCHIATRY & NEUROLOGY

## 2025-06-13 RX ORDER — CETIRIZINE HYDROCHLORIDE 10 MG/1
10 TABLET ORAL DAILY
Status: DISCONTINUED | OUTPATIENT
Start: 2025-06-13 | End: 2025-06-19 | Stop reason: HOSPADM

## 2025-06-13 RX ADMIN — LOSARTAN POTASSIUM 100 MG: 25 TABLET, FILM COATED ORAL at 20:43

## 2025-06-13 RX ADMIN — INSULIN LISPRO 2 UNITS: 100 INJECTION, SOLUTION INTRAVENOUS; SUBCUTANEOUS at 20:41

## 2025-06-13 RX ADMIN — CLOPIDOGREL BISULFATE 75 MG: 75 TABLET, FILM COATED ORAL at 08:48

## 2025-06-13 RX ADMIN — METFORMIN HYDROCHLORIDE 500 MG: 500 TABLET ORAL at 08:47

## 2025-06-13 RX ADMIN — BENZONATATE 200 MG: 100 CAPSULE ORAL at 14:37

## 2025-06-13 RX ADMIN — HYDROXYZINE PAMOATE 25 MG: 25 CAPSULE ORAL at 20:44

## 2025-06-13 RX ADMIN — ALOGLIPTIN 12.5 MG: 12.5 TABLET, FILM COATED ORAL at 08:46

## 2025-06-13 RX ADMIN — METOPROLOL SUCCINATE 50 MG: 50 TABLET, EXTENDED RELEASE ORAL at 08:47

## 2025-06-13 RX ADMIN — BENZONATATE 200 MG: 100 CAPSULE ORAL at 21:27

## 2025-06-13 RX ADMIN — LETROZOLE 2.5 MG: 2.5 TABLET ORAL at 08:46

## 2025-06-13 RX ADMIN — ALOGLIPTIN 12.5 MG: 12.5 TABLET, FILM COATED ORAL at 17:15

## 2025-06-13 RX ADMIN — VENLAFAXINE HYDROCHLORIDE 150 MG: 150 CAPSULE, EXTENDED RELEASE ORAL at 08:46

## 2025-06-13 RX ADMIN — METFORMIN HYDROCHLORIDE 500 MG: 500 TABLET ORAL at 17:16

## 2025-06-13 RX ADMIN — ATORVASTATIN CALCIUM 80 MG: 40 TABLET, FILM COATED ORAL at 08:47

## 2025-06-13 RX ADMIN — CETIRIZINE HYDROCHLORIDE 10 MG: 10 TABLET, FILM COATED ORAL at 08:48

## 2025-06-13 ASSESSMENT — PAIN SCALES - GENERAL
PAINLEVEL_OUTOF10: 0
PAINLEVEL_OUTOF10: 0

## 2025-06-13 NOTE — PLAN OF CARE
Problem: Chronic Conditions and Co-morbidities  Goal: Patient's chronic conditions and co-morbidity symptoms are monitored and maintained or improved  Outcome: Progressing     Problem: Discharge Planning  Goal: Discharge to home or other facility with appropriate resources  Outcome: Progressing     Problem: Safety - Adult  Goal: Free from fall injury  Outcome: Progressing     Problem: Anxiety  Goal: Will report anxiety at manageable levels  Description: INTERVENTIONS:1. Administer medication as ordered2. Teach and rehearse alternative coping skills3. Provide emotional support with 1:1 interaction with staff  INTERVENTIONS:1. Administer medication as ordered2. Teach and rehearse alternative coping skills3. Provide emotional support with 1:1 interaction with staff  Outcome: Progressing

## 2025-06-13 NOTE — BH NOTE
Affect flat. Mood depressed. Alert and oriented x 4. Isolative to room in between meals. Anergic and dysphoric. \"I can't decide what I need to do\" (referring to dc planning). Supportive expression employed. Denies SI/HI/AVH/pain. Ate 75% today. Attended group as active participant. Tires easily. Ambulates well. Med compliant.    PRN Medication Documentation    Specific patient behavior that led to need for PRN medication: coughing while lying down  Staff interventions attempted prior to PRN being given: HOB wedge on bed and pulled up in bed, assess  PRN medication given: Tessalon Perles 200mg po given at 1437  Patient response/effectiveness of PRN medication: cough decreased within a few min of administering

## 2025-06-13 NOTE — BH NOTE
Affect: Flat .   Mood: Dysphoric / Withdrawn.  Patient again spent the entire shift in her room.  She continues to say that she does not feel well.  \"I was in the bed all day but I'm feeling a little better now\".  Patient stated she was feeling a little better however her activity did not reflect that.  She remained isolated in her room and did not go far from her bed.  She still has an extremely consistent barky cough which clearly is exacerbated when she is laying down.  From an assessment point of view her O2 sat  was 97 and she is still able to speak in full fluent sentences without difficulty.  She is no longer talking about going to a rehab / convalescent center, in fact she is no longer talking about discharge at all. Patient did receive a phone call however she even seemed quiet then.  Patient has always been med compliant, pleasant, cooperative and makes her needs known to staff.  Currently patient is resting comfortably with no obvious signs of distress.          PRN Medication Documentation    Specific patient behavior that led to the need for PRN medication: cough  Staff interventions attempted prior to PRN being given: assess  PRN medication given: Benzonatate 200 mg administered PO on 6/12/2025 @ 2025.  Patient responsiveness/effectiveness of PRN medication: Very limited affect on cough.          PRN Medication Documentation    Specific patient behavior that led to the need for PRN medication: restless /anxious  Staff interventions attempted prior to PRN being given: assess / support  PRN medication given: Vistaril 25 mg administered PO on 6/12/2025 @ 2025  Patient responsiveness/effectiveness of PRN medication: Patient currently resting comfortably with no signs of disturbance or distress.      Mellisa Moyer RN

## 2025-06-13 NOTE — BH NOTE
Group Therapy Note    Date: 6/13/2025    Group Start Time: 0900  Group End Time: 0950  Group Topic: Psychotherapy    [facility]    Socorro Ríos LCSW        Group Therapy Note    Attendees: 7 scheduled      Focus of session was on the struggles we face emotionally when we hang on to hurts from the past and when we cannot forgive ourselves for our own past mistakes.  We spoke about how lack of forgiveness for self and others can easily lead to depression, anger, low self esteem, isolation, impaired relationships, and unhealthy impulsive behavior.  We spoke about how forgiveness is not amnesia and we do not necessarily have to forgive and forget but we can forgive and remember the mistakes we make or the people who hurt us are often our greatest teachers.  We spoke about how forgiveness is not a one time decision but a process of letting go and it is not pardoning or condoning what we have done or others have done and it does not require the other person to apologize to you or agree with us.  We spoke about focusing on one issue that this would be a better day than any to forgive and let go and we spoke about using the quotes of either “I forgive others for the past.  Today I forgive _______for_________” and “I am still mad at myself for _______ but today I am forgiving and I am letting go.”       Patient's Goal:   Will report anxiety at manageable levels     Notes:  Pt participated in the group activity and engaged with others.  Pt continues to share that she is still thinking about going to some type to placement after discharge.  She wants to do what is best for her children and also herself.  She would like to make a decision after talking with all of her children on Monday.  We discussed her concerns and what she wants them to know about her feelings and thoughts.  Pt is open to having a phone consult with them and another

## 2025-06-13 NOTE — BH NOTE
Patient remains on the unit. She has been having ups and downs in her mood. She is experiencing a great deal of anxiety, she's not quite as hopelessly depressed today, although she's still quite flat and depressed. She is conflicted about what she wants to do facility vs home. She continues to have strong periods of depression and hopelessness. Medications remain the same. She denies SI/HI/AVH. She will return home until, she can follow up with [facility] and/or attend the [facility] in her area that is close to her. Both programs offer transportation and lunch.

## 2025-06-13 NOTE — PROGRESS NOTES
INTERVAL Hx: (Records and clinical information were reviewed)    CC: \"little better\"     States she's not quite as hopelessly depressed today, although she's still quite flat and depressed. She's not sure what she needs to do going forward because of her daughter's belief that she shouldn't go into a NH or MCC. I discussed this with her and she clearly believes she would be happier and less anxious in a facility, although it would need to be local, and there aren't any options close to home. I went through the 3 possibilities and the limitations and benefits of each--NH (but have to sell home), GH/MIS (but not in the area), and returning home (but being alone). I again explained that she needs to decide what to do based on what's best for her, and not her children, and if in doubt, just return home and continue to consider those options. Tolerating the Effexor XR easily, and she's mostly been pleased with her progress so far, but she's still having some strong periods of depression and hopelessness. BP is still running high--Losartan increased 2 nights ago. Slept 5 hours last night. Glucs: 90--165 the past 24 hours.    MEDS:  Current Facility-Administered Medications   Medication Dose Route Frequency    cetirizine (ZYRTEC) tablet 10 mg  10 mg Oral Daily    losartan (COZAAR) tablet 100 mg  100 mg Oral QHS    cloNIDine (CATAPRES) tablet 0.2 mg  0.2 mg Oral Q4H PRN    benzonatate (TESSALON) capsule 200 mg  200 mg Oral TID PRN    glucose chewable tablet 16 g  4 tablet Oral PRN    dextrose bolus 10% 125 mL  125 mL IntraVENous PRN    Or    dextrose bolus 10% 250 mL  250 mL IntraVENous PRN    glucagon injection 1 mg  1 mg SubCUTAneous PRN    dextrose 10 % infusion   IntraVENous Continuous PRN    alogliptin (NESINA) tablet 12.5 mg  12.5 mg Oral BID WC    atorvastatin (LIPITOR) tablet 80 mg  80 mg Oral Daily    clopidogrel (PLAVIX) tablet 75 mg  75 mg Oral Daily    letrozole (FEMARA) tablet 2.5 mg  2.5 mg Oral Daily

## 2025-06-13 NOTE — PLAN OF CARE
Problem: Discharge Planning  Goal: Discharge to home or other facility with appropriate resources  Recent Flowsheet Documentation  Taken 6/13/2025 0815 by Cuca Nguyen RN  Discharge to home or other facility with appropriate resources: Identify barriers to discharge with patient and caregiver  6/13/2025 0055 by Mellisa Moyer RN  Outcome: Progressing  Flowsheets (Taken 6/12/2025 2025)  Discharge to home or other facility with appropriate resources: Identify discharge learning needs (meds, wound care, etc)     Problem: Discharge Planning  Goal: Discharge to home or other facility with appropriate resources  Recent Flowsheet Documentation  Taken 6/13/2025 0815 by Cuca Nguyen RN  Discharge to home or other facility with appropriate resources: Identify barriers to discharge with patient and caregiver  6/13/2025 0055 by Mellisa Moyer RN  Outcome: Progressing  Flowsheets (Taken 6/12/2025 2025)  Discharge to home or other facility with appropriate resources: Identify discharge learning needs (meds, wound care, etc)     Problem: Chronic Conditions and Co-morbidities  Goal: Patient's chronic conditions and co-morbidity symptoms are monitored and maintained or improved  Recent Flowsheet Documentation  Taken 6/13/2025 0815 by Cuca Nguyen RN  Care Plan - Patient's Chronic Conditions and Co-Morbidity Symptoms are Monitored and Maintained or Improved: Monitor and assess patient's chronic conditions and comorbid symptoms for stability, deterioration, or improvement  6/13/2025 0055 by Mellisa Moyer RN  Outcome: Progressing     Problem: Anxiety  Goal: Will report anxiety at manageable levels  Description: INTERVENTIONS:1. Administer medication as ordered2. Teach and rehearse alternative coping skills3. Provide emotional support with 1:1 interaction with staff  INTERVENTIONS:1. Administer medication as ordered2. Teach and rehearse alternative coping skills3. Provide emotional support with 1:1

## 2025-06-13 NOTE — BH NOTE
Behavioral Health Interdisciplinary Rounds     Patient Name: Dannielle Hall  Age: 89 y.o.  Room/Bed:  232/01  Primary Diagnosis: Severe recurrent major depression without psychotic features (HCC)   Admission Status: Voluntary     Readmission within 30 days: No  Power of  in place: No  Patient requires a blocked bed: No14}          Reason for blocked bed:     Sleep hours: 5       Participation in Care/Groups:  Yes  Medication Compliant?: Yes  PRNS (last 24 hours): Antianxiety    Restraints (last 24 hours):  No  Substance Abuse:  No    24 hour chart check complete: Yes    Patient goal(s) for today: to take a day at a time  Treatment team focus/goals: Will report anxiety at manageable levels   Progress note: she's not quite as hopelessly depressed today, although she's still quite flat and depressed, she is conflicted about what she wants to do facility vs home. She continues to have strong periods of depression and hopelessness. Medications remain the same. She denies SI/HI/AVH.    LOS:  16  Expected LOS: 5-7    Financial concerns/prescription coverage:  No  Family contact: yes      Family requesting physician contact today:  No  Discharge plan: home  Access to weapons: No       Outpatient provider(s): Dr. Langley  Patient's preferred phone number for follow up call: [phone]     Participating treatment team members: Dannielle Hall,Kita Knox, Michaela Ríos, Dr. Langley,  (assigned SW), Sally Quiroga

## 2025-06-14 LAB
GLUCOSE BLD STRIP.AUTO-MCNC: 125 MG/DL (ref 65–117)
GLUCOSE BLD STRIP.AUTO-MCNC: 129 MG/DL (ref 65–117)
GLUCOSE BLD STRIP.AUTO-MCNC: 157 MG/DL (ref 65–117)
GLUCOSE BLD STRIP.AUTO-MCNC: 161 MG/DL (ref 65–117)
GLUCOSE BLD STRIP.AUTO-MCNC: 177 MG/DL (ref 65–117)
SERVICE CMNT-IMP: ABNORMAL

## 2025-06-14 PROCEDURE — 6370000000 HC RX 637 (ALT 250 FOR IP): Performed by: HOSPITALIST

## 2025-06-14 PROCEDURE — 82962 GLUCOSE BLOOD TEST: CPT

## 2025-06-14 PROCEDURE — 6370000000 HC RX 637 (ALT 250 FOR IP): Performed by: PSYCHIATRY & NEUROLOGY

## 2025-06-14 PROCEDURE — 1240000000 HC EMOTIONAL WELLNESS R&B

## 2025-06-14 RX ADMIN — BENZONATATE 200 MG: 100 CAPSULE ORAL at 20:12

## 2025-06-14 RX ADMIN — METOPROLOL SUCCINATE 50 MG: 50 TABLET, EXTENDED RELEASE ORAL at 09:29

## 2025-06-14 RX ADMIN — LETROZOLE 2.5 MG: 2.5 TABLET ORAL at 09:29

## 2025-06-14 RX ADMIN — LOSARTAN POTASSIUM 100 MG: 25 TABLET, FILM COATED ORAL at 20:12

## 2025-06-14 RX ADMIN — ALOGLIPTIN 12.5 MG: 12.5 TABLET, FILM COATED ORAL at 09:29

## 2025-06-14 RX ADMIN — METFORMIN HYDROCHLORIDE 500 MG: 500 TABLET ORAL at 17:38

## 2025-06-14 RX ADMIN — METFORMIN HYDROCHLORIDE 500 MG: 500 TABLET ORAL at 09:29

## 2025-06-14 RX ADMIN — TRAZODONE HYDROCHLORIDE 50 MG: 50 TABLET ORAL at 23:46

## 2025-06-14 RX ADMIN — ATORVASTATIN CALCIUM 80 MG: 40 TABLET, FILM COATED ORAL at 09:29

## 2025-06-14 RX ADMIN — CLOPIDOGREL BISULFATE 75 MG: 75 TABLET, FILM COATED ORAL at 09:29

## 2025-06-14 RX ADMIN — VENLAFAXINE HYDROCHLORIDE 150 MG: 150 CAPSULE, EXTENDED RELEASE ORAL at 09:29

## 2025-06-14 RX ADMIN — ALOGLIPTIN 12.5 MG: 12.5 TABLET, FILM COATED ORAL at 17:38

## 2025-06-14 RX ADMIN — CETIRIZINE HYDROCHLORIDE 10 MG: 10 TABLET, FILM COATED ORAL at 09:29

## 2025-06-14 ASSESSMENT — PAIN SCALES - GENERAL
PAINLEVEL_OUTOF10: 0
PAINLEVEL_OUTOF10: 0

## 2025-06-14 NOTE — BH NOTE
Affect: Flat .   Mood: Depressed / Sad.  Patient continues to isolate in her room.  She does communicate well with staff.  She does state that she feels a little better tonight than she did earlier in the day.  She continues to cough while lying down despite taking Benzonatate.  No cough noted at all when patient is upright and out of bed.  She is alert and oriented x's 4.  She is always medication compliant and always makes her needs known to staff.  She denies SI/HI/AVH/pain.  Patient is currently resting in her bed.        PRN Medication Documentation    Specific patient behavior that led to the need for PRN medication: Cough  Staff interventions attempted prior to PRN being given: Assess  PRN medication given: Benzonatate 200 mg administered PO on 6/13/2025 @ 2127.  Patient responsiveness/effectiveness of PRN medication: Patient found little relief.  Patient continued to cough throughout the night.          PRN Medication Documentation    Specific patient behavior that led to the need for PRN medication: restless/anxious  Staff interventions attempted prior to PRN being given: Assess / support emotionally.  PRN medication given: Vistaril 25 mg administered PO on 6/13/2025 @ 2044.  Patient responsiveness/effectiveness of PRN medication: Patient currently resting.        Mellisa Moyer RN

## 2025-06-14 NOTE — PLAN OF CARE
Problem: Chronic Conditions and Co-morbidities  Goal: Patient's chronic conditions and co-morbidity symptoms are monitored and maintained or improved  Recent Flowsheet Documentation  Taken 6/14/2025 0800 by Cuca Nguyen RN  Care Plan - Patient's Chronic Conditions and Co-Morbidity Symptoms are Monitored and Maintained or Improved: Monitor and assess patient's chronic conditions and comorbid symptoms for stability, deterioration, or improvement  6/14/2025 0123 by Mellisa Moyer RN  Outcome: Progressing  Flowsheets (Taken 6/13/2025 2010)  Care Plan - Patient's Chronic Conditions and Co-Morbidity Symptoms are Monitored and Maintained or Improved: Monitor and assess patient's chronic conditions and comorbid symptoms for stability, deterioration, or improvement     Problem: Anxiety  Goal: Will report anxiety at manageable levels  Description: INTERVENTIONS:1. Administer medication as ordered2. Teach and rehearse alternative coping skills3. Provide emotional support with 1:1 interaction with staff  INTERVENTIONS:1. Administer medication as ordered2. Teach and rehearse alternative coping skills3. Provide emotional support with 1:1 interaction with staff  6/14/2025 1104 by Ccua Nguyen RN  Outcome: Progressing  Flowsheets (Taken 6/14/2025 0800)  Will report anxiety at manageable levels: Administer medication as ordered  6/14/2025 0123 by Mellisa Moyer RN  Outcome: Progressing  Flowsheets (Taken 6/13/2025 2010)  Will report anxiety at manageable levels: Administer medication as ordered     Problem: ABCDS Injury Assessment  Goal: Absence of physical injury  Outcome: Progressing     Problem: Decision Making  Goal: Pt/Family able to effectively weigh alternatives and participate in decision making related to treatment and care  Description: INTERVENTIONS:1. Determine when there are differences between patient's view, family's view, and healthcare provider's view of condition2. Facilitate patient and family  articulation of goals for care3. Help patient and family identify pros/cons of alternative solutions4. Provide information as requested by patient/family5. Respect patient/family right to receive or not to receive information6. Serve as a liaison between patient and family and health care team7. Initiate Consults from Ethics, Palliative Care or initiate Family Care Conference as is appropriate  Outcome: Not Progressing     Problem: Decision Making  Goal: Pt/Family able to effectively weigh alternatives and participate in decision making related to treatment and care  Description: INTERVENTIONS:1. Determine when there are differences between patient's view, family's view, and healthcare provider's view of condition2. Facilitate patient and family articulation of goals for care3. Help patient and family identify pros/cons of alternative solutions4. Provide information as requested by patient/family5. Respect patient/family right to receive or not to receive information6. Serve as a liaison between patient and family and health care team7. Initiate Consults from Ethics, Palliative Care or initiate Family Care Conference as is appropriate  Outcome: Not Progressing

## 2025-06-14 NOTE — PLAN OF CARE
Problem: Chronic Conditions and Co-morbidities  Goal: Patient's chronic conditions and co-morbidity symptoms are monitored and maintained or improved  Outcome: Progressing     Problem: Discharge Planning  Goal: Discharge to home or other facility with appropriate resources  Outcome: Progressing     Problem: Pain  Goal: Verbalizes/displays adequate comfort level or baseline comfort level  Outcome: Progressing     Problem: Anxiety  Goal: Will report anxiety at manageable levels  Description: INTERVENTIONS:1. Administer medication as ordered2. Teach and rehearse alternative coping skills3. Provide emotional support with 1:1 interaction with staff  INTERVENTIONS:1. Administer medication as ordered2. Teach and rehearse alternative coping skills3. Provide emotional support with 1:1 interaction with staff  6/14/2025 0123 by Mellisa Moyer, RN  Outcome: Progressing  6/13/2025 1141 by Cuca Nguyen, RN  Outcome: Progressing  Flowsheets (Taken 6/13/2025 0815)  Will report anxiety at manageable levels: Teach and rehearse alternative coping skills     Problem: Coping  Goal: Pt/Family able to verbalize concerns and demonstrate effective coping strategies  Description: INTERVENTIONS:1. Assist patient/family to identify coping skills, available support systems and cultural and spiritual values2. Provide emotional support, including active listening and acknowledgement of concerns of patient and caregivers3. Reduce environmental stimuli, as able4. Instruct patient/family in relaxation techniques, as appropriate5. Assess for spiritual pain/suffering and initiate Spiritual Care, Psychosocial Clinical Specialist consults as needed  Outcome: Progressing     Problem: Decision Making  Goal: Pt/Family able to effectively weigh alternatives and participate in decision making related to treatment and care  Description: INTERVENTIONS:1. Determine when there are differences between patient's view, family's view, and healthcare

## 2025-06-14 NOTE — BH NOTE
Precautions: - q 15 min safety checks, high fall risk    SI/HI - denies    AVH - denies    Pain denies    Medication Compliance full    PRN Meds:    Behaviors - isolative, sad, fatigues easily, speech logical and linear, calm, able to ask for what she needs      Group Attendance no    Intake 75% breakfast, 50 %, 50%.    BS - 177 at 1139  BS - 157 @ 7752

## 2025-06-14 NOTE — PLAN OF CARE
Problem: Decision Making  Goal: Pt/Family able to effectively weigh alternatives and participate in decision making related to treatment and care  Description: INTERVENTIONS:1. Determine when there are differences between patient's view, family's view, and healthcare provider's view of condition2. Facilitate patient and family articulation of goals for care3. Help patient and family identify pros/cons of alternative solutions4. Provide information as requested by patient/family5. Respect patient/family right to receive or not to receive information6. Serve as a liaison between patient and family and health care team7. Initiate Consults from Ethics, Palliative Care or initiate Family Care Conference as is appropriate  6/14/2025 1105 by Cuca Nguyen, RN  Outcome: Progressing  6/14/2025 1104 by Cuca Nguyen, RN  Outcome: Not Progressing     Problem: Coping  Goal: Pt/Family able to verbalize concerns and demonstrate effective coping strategies  Description: INTERVENTIONS:1. Assist patient/family to identify coping skills, available support systems and cultural and spiritual values2. Provide emotional support, including active listening and acknowledgement of concerns of patient and caregivers3. Reduce environmental stimuli, as able4. Instruct patient/family in relaxation techniques, as appropriate5. Assess for spiritual pain/suffering and initiate Spiritual Care, Psychosocial Clinical Specialist consults as needed  Recent Flowsheet Documentation  Taken 6/14/2025 0800 by Cuca Nguyen, RN  Patient/family able to verbalize anxieties, fears, and concerns, and demonstrate effective coping: Provide emotional support, including active listening and acknowledgement of concerns of patient and caregivers  6/14/2025 0123 by Mellisa Moyer, RN  Outcome: Progressing  Flowsheets (Taken 6/13/2025 2010)  Patient/family able to verbalize anxieties, fears, and concerns, and demonstrate effective coping: Provide  emotional support, including active listening and acknowledgement of concerns of patient and caregivers     Problem: Anxiety  Goal: Will report anxiety at manageable levels  Description: INTERVENTIONS:1. Administer medication as ordered2. Teach and rehearse alternative coping skills3. Provide emotional support with 1:1 interaction with staff  INTERVENTIONS:1. Administer medication as ordered2. Teach and rehearse alternative coping skills3. Provide emotional support with 1:1 interaction with staff  6/14/2025 1104 by Cuca Nguyen, RN  Outcome: Progressing  Flowsheets (Taken 6/14/2025 0800)  Will report anxiety at manageable levels: Administer medication as ordered  6/14/2025 0123 by Mellisa Moyer RN  Outcome: Progressing  Flowsheets (Taken 6/13/2025 2010)  Will report anxiety at manageable levels: Administer medication as ordered     Problem: Decision Making  Goal: Pt/Family able to effectively weigh alternatives and participate in decision making related to treatment and care  Description: INTERVENTIONS:1. Determine when there are differences between patient's view, family's view, and healthcare provider's view of condition2. Facilitate patient and family articulation of goals for care3. Help patient and family identify pros/cons of alternative solutions4. Provide information as requested by patient/family5. Respect patient/family right to receive or not to receive information6. Serve as a liaison between patient and family and health care team7. Initiate Consults from Ethics, Palliative Care or initiate Family Care Conference as is appropriate  6/14/2025 1105 by Cuca Nguyen, RN  Outcome: Progressing  6/14/2025 1104 by Cuac Nguyen, RN  Outcome: Not Progressing

## 2025-06-14 NOTE — PROGRESS NOTES
Psychiatric Progress Note    Patient: Dannielle Hall MRN: [id number]  SSN: [id number]    YOB: 1935  Age: 89 y.o.  Sex: female      Admit Date: 5/28/2025    LOS: 17 days   INTERVAL Hx: (Records and clinical information were reviewed)     CC: \"little better\"     States she's not quite as hopelessly depressed today, although she's still quite flat and depressed. She's not sure what she needs to do going forward because of her daughter's belief that she shouldn't go into a NH or MIS. I discussed this with her and she clearly believes she would be happier and less anxious in a facility, although it would need to be local, and there aren't any options close to home. I went through the 3 possibilities and the limitations and benefits of each--NH (but have to sell home), GH/MIS (but not in the area), and returning home (but being alone). I again explained that she needs to decide what to do based on what's best for her, and not her children, and if in doubt, just return home and continue to consider those options. Tolerating the Effexor XR easily, and she's mostly been pleased with her progress so far, but she's still having some strong periods of depression and hopelessness. BP is still running high--Losartan increased 2 nights ago. Slept 5 hours last night. Glucs: 90--165 the past 24 hours.    6/14/2025 - Dannielle Hall reports that her mood fluctuates, sometimes low and sometimes better, and describes her mood as \"on the way to better.\" She denies suicidal or homicidal ideation, as well as auditory or visual hallucinations. No aggression or violent behavior reported. She is appropriately interactive and aware. She is tolerating her medications well and denies any side effects. She reports that her appetite has improved. She slept for 5 hours and 30 minutes.    Objective:     Vitals:    06/12/25 2025 06/13/25 0749 06/1935 06/13/25 2010   BP:  (!) 193/95 (!) 148/80    Pulse: 83 94 95 95

## 2025-06-15 LAB
GLUCOSE BLD STRIP.AUTO-MCNC: 117 MG/DL (ref 65–117)
GLUCOSE BLD STRIP.AUTO-MCNC: 135 MG/DL (ref 65–117)
GLUCOSE BLD STRIP.AUTO-MCNC: 176 MG/DL (ref 65–117)
GLUCOSE BLD STRIP.AUTO-MCNC: 185 MG/DL (ref 65–117)
SERVICE CMNT-IMP: ABNORMAL
SERVICE CMNT-IMP: NORMAL

## 2025-06-15 PROCEDURE — 1240000000 HC EMOTIONAL WELLNESS R&B

## 2025-06-15 PROCEDURE — 82962 GLUCOSE BLOOD TEST: CPT

## 2025-06-15 PROCEDURE — 6370000000 HC RX 637 (ALT 250 FOR IP): Performed by: HOSPITALIST

## 2025-06-15 PROCEDURE — 6370000000 HC RX 637 (ALT 250 FOR IP): Performed by: INTERNAL MEDICINE

## 2025-06-15 PROCEDURE — 6370000000 HC RX 637 (ALT 250 FOR IP): Performed by: PSYCHIATRY & NEUROLOGY

## 2025-06-15 RX ADMIN — METFORMIN HYDROCHLORIDE 500 MG: 500 TABLET ORAL at 17:23

## 2025-06-15 RX ADMIN — TRAZODONE HYDROCHLORIDE 50 MG: 50 TABLET ORAL at 20:24

## 2025-06-15 RX ADMIN — METFORMIN HYDROCHLORIDE 500 MG: 500 TABLET ORAL at 09:32

## 2025-06-15 RX ADMIN — METOPROLOL SUCCINATE 50 MG: 50 TABLET, EXTENDED RELEASE ORAL at 09:32

## 2025-06-15 RX ADMIN — BENZONATATE 200 MG: 100 CAPSULE ORAL at 10:59

## 2025-06-15 RX ADMIN — ALOGLIPTIN 12.5 MG: 12.5 TABLET, FILM COATED ORAL at 09:32

## 2025-06-15 RX ADMIN — CETIRIZINE HYDROCHLORIDE 10 MG: 10 TABLET, FILM COATED ORAL at 09:32

## 2025-06-15 RX ADMIN — LOSARTAN POTASSIUM 100 MG: 25 TABLET, FILM COATED ORAL at 20:24

## 2025-06-15 RX ADMIN — ALOGLIPTIN 12.5 MG: 12.5 TABLET, FILM COATED ORAL at 17:23

## 2025-06-15 RX ADMIN — BENZONATATE 200 MG: 100 CAPSULE ORAL at 22:45

## 2025-06-15 RX ADMIN — LETROZOLE 2.5 MG: 2.5 TABLET ORAL at 10:59

## 2025-06-15 RX ADMIN — ATORVASTATIN CALCIUM 80 MG: 40 TABLET, FILM COATED ORAL at 09:33

## 2025-06-15 RX ADMIN — VENLAFAXINE HYDROCHLORIDE 150 MG: 150 CAPSULE, EXTENDED RELEASE ORAL at 09:33

## 2025-06-15 RX ADMIN — INSULIN LISPRO 2 UNITS: 100 INJECTION, SOLUTION INTRAVENOUS; SUBCUTANEOUS at 20:24

## 2025-06-15 RX ADMIN — CLOPIDOGREL BISULFATE 75 MG: 75 TABLET, FILM COATED ORAL at 09:32

## 2025-06-15 ASSESSMENT — PAIN SCALES - GENERAL: PAINLEVEL_OUTOF10: 0

## 2025-06-15 NOTE — PLAN OF CARE
Problem: Discharge Planning  Goal: Discharge to home or other facility with appropriate resources  Recent Flowsheet Documentation  Taken 6/15/2025 0905 by Cuca Nguyen RN  Discharge to home or other facility with appropriate resources: Identify barriers to discharge with patient and caregiver     Problem: Pain  Goal: Verbalizes/displays adequate comfort level or baseline comfort level  Outcome: Progressing     Problem: Anxiety  Goal: Will report anxiety at manageable levels  Description: INTERVENTIONS:1. Administer medication as ordered2. Teach and rehearse alternative coping skills3. Provide emotional support with 1:1 interaction with staff  INTERVENTIONS:1. Administer medication as ordered2. Teach and rehearse alternative coping skills3. Provide emotional support with 1:1 interaction with staff  Recent Flowsheet Documentation  Taken 6/15/2025 0905 by Cuca Nguyen RN  Will report anxiety at manageable levels: Administer medication as ordered     Problem: Decision Making  Goal: Pt/Family able to effectively weigh alternatives and participate in decision making related to treatment and care  Description: INTERVENTIONS:1. Determine when there are differences between patient's view, family's view, and healthcare provider's view of condition2. Facilitate patient and family articulation of goals for care3. Help patient and family identify pros/cons of alternative solutions4. Provide information as requested by patient/family5. Respect patient/family right to receive or not to receive information6. Serve as a liaison between patient and family and health care team7. Initiate Consults from Ethics, Palliative Care or initiate Family Care Conference as is appropriate  Outcome: Progressing   Wants to have a family meeting to decide whether she should go to a nursing home. She states she \"doesn't want to be a burden on my family.\"

## 2025-06-15 NOTE — PROGRESS NOTES
Psychiatric Progress Note    Patient: Dannielle Hall MRN: [id number]  SSN: [id number]    YOB: 1935  Age: 89 y.o.  Sex: female      Admit Date: 5/28/2025    LOS: 18 days   INTERVAL Hx: (Records and clinical information were reviewed)     CC: \"little better\"     States she's not quite as hopelessly depressed today, although she's still quite flat and depressed. She's not sure what she needs to do going forward because of her daughter's belief that she shouldn't go into a NH or MIS. I discussed this with her and she clearly believes she would be happier and less anxious in a facility, although it would need to be local, and there aren't any options close to home. I went through the 3 possibilities and the limitations and benefits of each--NH (but have to sell home), GH/MIS (but not in the area), and returning home (but being alone). I again explained that she needs to decide what to do based on what's best for her, and not her children, and if in doubt, just return home and continue to consider those options. Tolerating the Effexor XR easily, and she's mostly been pleased with her progress so far, but she's still having some strong periods of depression and hopelessness. BP is still running high--Losartan increased 2 nights ago. Slept 5 hours last night. Glucs: 90--165 the past 24 hours.     6/14/2025 - Dannielle Hall reports that her mood fluctuates, sometimes low and sometimes better, and describes her mood as \"on the way to better.\" She denies suicidal or homicidal ideation, as well as auditory or visual hallucinations. No aggression or violent behavior reported. She is appropriately interactive and aware. She is tolerating her medications well and denies any side effects. She reports that her appetite has improved. She slept for 5 hours and 30 minutes.    6/15/2025 - Dannielle is discharge-focused and reports feeling better. She denies suicidal ideation, homicidal ideation, auditory

## 2025-06-15 NOTE — PLAN OF CARE
Problem: Chronic Conditions and Co-morbidities  Goal: Patient's chronic conditions and co-morbidity symptoms are monitored and maintained or improved  Outcome: Progressing  Flowsheets  Taken 6/14/2025 2040 by Nicki Escamilla RN  Care Plan - Patient's Chronic Conditions and Co-Morbidity Symptoms are Monitored and Maintained or Improved: Monitor and assess patient's chronic conditions and comorbid symptoms for stability, deterioration, or improvement  Taken 6/14/2025 0800 by Cuca Nguyen RN  Care Plan - Patient's Chronic Conditions and Co-Morbidity Symptoms are Monitored and Maintained or Improved: Monitor and assess patient's chronic conditions and comorbid symptoms for stability, deterioration, or improvement     Problem: Safety - Adult  Goal: Free from fall injury  Outcome: Progressing     Problem: ABCDS Injury Assessment  Goal: Absence of physical injury  6/14/2025 2045 by Nicki Escamilla RN  Outcome: Progressing  6/14/2025 1105 by Cuca Nguyen RN  Outcome: Progressing  6/14/2025 1104 by Cuca Nguyen RN  Outcome: Progressing     Problem: Anxiety  Goal: Will report anxiety at manageable levels  Description: INTERVENTIONS:1. Administer medication as ordered2. Teach and rehearse alternative coping skills3. Provide emotional support with 1:1 interaction with staff  INTERVENTIONS:1. Administer medication as ordered2. Teach and rehearse alternative coping skills3. Provide emotional support with 1:1 interaction with staff  Recent Flowsheet Documentation  Taken 6/14/2025 2040 by Nicki Escamilla RN  Will report anxiety at manageable levels: Administer medication as ordered  6/14/2025 1104 by Cuca Nguyen RN  Outcome: Progressing  Flowsheets (Taken 6/14/2025 0800)  Will report anxiety at manageable levels: Administer medication as ordered     Problem: Coping  Goal: Pt/Family able to verbalize concerns and demonstrate effective coping strategies  Description: INTERVENTIONS:1. Assist

## 2025-06-15 NOTE — BH NOTE
Precautions: q 15 min safety checks, high fall risk    SI/HI - denies    AVH - denies    Pain - denies    Medication Compliance full    PRN Meds:PRN Medication Documentation    Specific patient behavior that led to need for PRN medication: dry cough while lying down  Staff interventions attempted prior to PRN being given: assess, hob wedge in and has taken allergy pill  PRN medication given: Tessalon 200mg given at 1059  Patient response/effectiveness of PRN medication: decreased incidence of cough    Behaviors - isolative to sleep, easily fatigued, indecisive, wants to go home soon, interacts well with others    Group Attendance - no    Intake -40%

## 2025-06-15 NOTE — BH NOTE
Patient has been coughing through the night which has affected the quality of her sleep. Her head of the bed has been raised using a wedge and 3 pillows. I gave her tessalon which gave her some relief for a little while. She is meds compliant, cooperative and interactive. She denies SI/HI/AVH/Pain. Patient is resting during the time this note  PRN Medication Documentation    Specific patient behavior that led to need for PRN medication: Cough  Staff interventions attempted prior to PRN being given: Assess  PRN medication given: Tessalon 200mg @2012  Patient response/effectiveness of PRN medication: some relief  PRN Medication Documentation    Specific patient behavior that led to need for PRN medication: Sleep  Staff interventions attempted prior to PRN being given: Assessment  PRN medication given: Trazodone 50mg @2346  Patient response/effectiveness of PRN medication: effective

## 2025-06-16 LAB
GLUCOSE BLD STRIP.AUTO-MCNC: 121 MG/DL (ref 65–117)
GLUCOSE BLD STRIP.AUTO-MCNC: 128 MG/DL (ref 65–117)
GLUCOSE BLD STRIP.AUTO-MCNC: 178 MG/DL (ref 65–117)
GLUCOSE BLD STRIP.AUTO-MCNC: 213 MG/DL (ref 65–117)
SERVICE CMNT-IMP: ABNORMAL

## 2025-06-16 PROCEDURE — 6370000000 HC RX 637 (ALT 250 FOR IP): Performed by: HOSPITALIST

## 2025-06-16 PROCEDURE — 6370000000 HC RX 637 (ALT 250 FOR IP): Performed by: INTERNAL MEDICINE

## 2025-06-16 PROCEDURE — 6370000000 HC RX 637 (ALT 250 FOR IP): Performed by: PSYCHIATRY & NEUROLOGY

## 2025-06-16 PROCEDURE — 82962 GLUCOSE BLOOD TEST: CPT

## 2025-06-16 PROCEDURE — 1240000000 HC EMOTIONAL WELLNESS R&B

## 2025-06-16 RX ORDER — ARIPIPRAZOLE 2 MG/1
2 TABLET ORAL DAILY
Status: DISCONTINUED | OUTPATIENT
Start: 2025-06-16 | End: 2025-06-19 | Stop reason: HOSPADM

## 2025-06-16 RX ADMIN — VENLAFAXINE HYDROCHLORIDE 150 MG: 150 CAPSULE, EXTENDED RELEASE ORAL at 08:41

## 2025-06-16 RX ADMIN — CETIRIZINE HYDROCHLORIDE 10 MG: 10 TABLET, FILM COATED ORAL at 08:40

## 2025-06-16 RX ADMIN — INSULIN LISPRO 2 UNITS: 100 INJECTION, SOLUTION INTRAVENOUS; SUBCUTANEOUS at 10:54

## 2025-06-16 RX ADMIN — METFORMIN HYDROCHLORIDE 500 MG: 500 TABLET ORAL at 17:22

## 2025-06-16 RX ADMIN — ALOGLIPTIN 12.5 MG: 12.5 TABLET, FILM COATED ORAL at 17:22

## 2025-06-16 RX ADMIN — METOPROLOL SUCCINATE 50 MG: 50 TABLET, EXTENDED RELEASE ORAL at 08:41

## 2025-06-16 RX ADMIN — ARIPIPRAZOLE 2 MG: 2 TABLET ORAL at 10:47

## 2025-06-16 RX ADMIN — ATORVASTATIN CALCIUM 80 MG: 40 TABLET, FILM COATED ORAL at 08:41

## 2025-06-16 RX ADMIN — BENZONATATE 200 MG: 100 CAPSULE ORAL at 20:01

## 2025-06-16 RX ADMIN — BENZONATATE 200 MG: 100 CAPSULE ORAL at 10:47

## 2025-06-16 RX ADMIN — METFORMIN HYDROCHLORIDE 500 MG: 500 TABLET ORAL at 08:40

## 2025-06-16 RX ADMIN — CLOPIDOGREL BISULFATE 75 MG: 75 TABLET, FILM COATED ORAL at 08:41

## 2025-06-16 RX ADMIN — TRAZODONE HYDROCHLORIDE 50 MG: 50 TABLET ORAL at 20:01

## 2025-06-16 RX ADMIN — LETROZOLE 2.5 MG: 2.5 TABLET ORAL at 08:41

## 2025-06-16 RX ADMIN — ALOGLIPTIN 12.5 MG: 12.5 TABLET, FILM COATED ORAL at 08:41

## 2025-06-16 RX ADMIN — LOSARTAN POTASSIUM 100 MG: 25 TABLET, FILM COATED ORAL at 20:01

## 2025-06-16 ASSESSMENT — PAIN SCALES - GENERAL: PAINLEVEL_OUTOF10: 0

## 2025-06-16 NOTE — GROUP NOTE
Group Therapy Note    Date: 6/16/2025    Group Start Time: 0900  Group End Time: 0950  Group Topic: Process Group - Inpatient    [facility]    Fela Quiroga, LCSW        Group Therapy Note    Attendees: 6 scheduled    Focus of session was on the benefits of gratitude and how a daily focus of this can benefit our emotional well-being and our mental health.  We spoke about the impact that this can have on life satisfaction.  We spoke about the different impacts it can have on people including our personality, emotional, social life, our health, and our careers.  We spoke about how group members can start to practice this and how they can work to understand and appreciate the impact.         Patient's Goal:  Patient's chronic conditions and co-morbidity symptoms are monitored and maintained or improved     Notes:  Dannielle participated in group with prompting.  She was difficult to engage but I asked her questions about what is preventing her from attending IOP after discharge.  She spoke about how she does not think she can be ready and she does \"not have the energy.\"  We spoke about how the best support she can get is from other people face to face and we spoke about some solutions to what she sees are the obstacles to attending.      Status After Intervention:  Unchanged    Participation Level: Active Listener and Interactive    Participation Quality: Appropriate, Attentive, Sharing, and Supportive      Speech:  normal      Thought Process/Content: Logical  Linear      Affective Functioning: Congruent      Mood: anxious and depressed      Level of consciousness:  Alert, Oriented x4, Attentive, and Preoccupied      Response to Learning: Able to verbalize current knowledge/experience and Resistant      Endings: None Reported    Modes of Intervention: Education, Support, Socialization, Exploration, Clarifying, and

## 2025-06-16 NOTE — BH NOTE
Behavioral Health Interdisciplinary Rounds     Patient Name: Dannielle Hall  Age: 89 y.o.  Room/Bed:  232/01  Primary Diagnosis: Severe recurrent major depression without psychotic features (HCC)   Admission Status: Voluntary     Readmission within 30 days: No  Power of  in place: No  Patient requires a blocked bed: No14}          Reason for blocked bed:     Sleep hours: 4       Participation in Care/Groups:  Yes  Medication Compliant?: Yes  PRNS (last 24 hours): Sleep Aid    Restraints (last 24 hours):  No  Substance Abuse:  No    24 hour chart check complete: Yes    Patient goal(s) for today: to make a decision   Treatment team focus/goals:  Will report anxiety at manageable levels   Progress note: she indicates she's still feeling poorly--tired, dysphoric and flat, close to hopeless, and essentially depressed. Patient was put on Abilify    LOS:  19  Expected LOS: 3-4    Financial concerns/prescription coverage:  No  Family contact: yes      Family requesting physician contact today:  No  Discharge plan: home with outpatient treatment  Access to weapons: No       Outpatient provider(s): Dr. Langley  Patient's preferred phone number for follow up call: [phone]     Participating treatment team members: Dannielle Hall,Kita Knox, Marci Knox, Dr. Langley, Sally Quiroga (assigned SW), Sally Quiroga

## 2025-06-16 NOTE — PROGRESS NOTES
INTERVAL Hx: (Records and clinical information from the weekend were reviewed)    CC: \"not good\"     Has been more steadily flat and dysphoric the past few days, and she indicates she's still feeling poorly--tired, dysphoric and flat, close to hopeless, and essentially depressed. She's struggling with the lack of any definitive plan long-term as she emotionally would do better in a facility, yet her children feel she's more physically capable than that and she doesn't want to disappoint them. She also has unrealistic expectations that there should be a GH facility that she can afford that's also close to home and family/friends. I again went through the 3 possibilities and the limitations and benefits of each--NH (but have to sell home), GH/detention (but not in the area), and returning home (but being alone). I again explained that she needs to decide what to do based on what's best for her, and not her children, and if in doubt, just return home and continue to consider those options. Tolerating the Effexor XR easily, but she's still having some strong periods of depression and hopelessness. I will augment with Abilify as expected, as she's done well with it previously. BP has not been as high with the increased Losartan. Slept 4 hours last night. Glucs: 117--249 the past 72 hours--averaging ~150-160.    MEDS:  Current Facility-Administered Medications   Medication Dose Route Frequency    ARIPiprazole (ABILIFY) tablet 2 mg  2 mg Oral Daily    cetirizine (ZYRTEC) tablet 10 mg  10 mg Oral Daily    losartan (COZAAR) tablet 100 mg  100 mg Oral QHS    cloNIDine (CATAPRES) tablet 0.2 mg  0.2 mg Oral Q4H PRN    benzonatate (TESSALON) capsule 200 mg  200 mg Oral TID PRN    glucose chewable tablet 16 g  4 tablet Oral PRN    dextrose bolus 10% 125 mL  125 mL IntraVENous PRN    Or    dextrose bolus 10% 250 mL  250 mL IntraVENous PRN    glucagon injection 1 mg  1 mg SubCUTAneous PRN    dextrose 10 % infusion   IntraVENous  Continuous PRN    alogliptin (NESINA) tablet 12.5 mg  12.5 mg Oral BID WC    atorvastatin (LIPITOR) tablet 80 mg  80 mg Oral Daily    clopidogrel (PLAVIX) tablet 75 mg  75 mg Oral Daily    letrozole (FEMARA) tablet 2.5 mg  2.5 mg Oral Daily    metFORMIN (GLUCOPHAGE) tablet 500 mg  500 mg Oral BID WC    metoprolol succinate (TOPROL XL) extended release tablet 50 mg  50 mg Oral Daily    venlafaxine (EFFEXOR XR) extended release capsule 150 mg  150 mg Oral Daily with breakfast    hydrOXYzine pamoate (VISTARIL) capsule 25 mg  25 mg Oral TID PRN    acetaminophen (TYLENOL) tablet 650 mg  650 mg Oral Q4H PRN    aluminum & magnesium hydroxide-simethicone (MAALOX PLUS) 200-200-20 MG/5ML suspension 30 mL  30 mL Oral Q6H PRN    insulin lispro (HUMALOG,ADMELOG) injection vial 0-8 Units  0-8 Units SubCUTAneous 4x Daily AC & HS    glucose chewable tablet 16 g  4 tablet Oral PRN    dextrose bolus 10% 125 mL  125 mL IntraVENous PRN    Or    dextrose bolus 10% 250 mL  250 mL IntraVENous PRN    dextrose 10 % infusion   IntraVENous Continuous PRN    traZODone (DESYREL) tablet 50 mg  50 mg Oral Nightly PRN    simethicone (MYLICON) chewable tablet 80 mg  80 mg Oral Q6H PRN       VITALS: Patient Vitals for the past 12 hrs:   Temp Pulse Resp BP SpO2   06/16/25 0741 98.4 °F (36.9 °C) 77 19 (!) 172/77 94 %         LABS: .  Recent Results (from the past 24 hours)   POCT Glucose    Collection Time: 06/15/25 11:03 AM   Result Value Ref Range    POC Glucose 176 (H) 65 - 117 mg/dL    Performed by: Cuca Locke    POCT Glucose    Collection Time: 06/15/25  4:04 PM   Result Value Ref Range    POC Glucose 135 (H) 65 - 117 mg/dL    Performed by: Cuca Locke    POCT Glucose    Collection Time: 06/15/25  7:45 PM   Result Value Ref Range    POC Glucose 185 (H) 65 - 117 mg/dL    Performed by: Francine Lewis    POCT Glucose    Collection Time: 06/16/25  6:12 AM   Result Value Ref Range    POC Glucose 128 (H) 65 - 117 mg/dL    Performed by:

## 2025-06-16 NOTE — PLAN OF CARE
Problem: Chronic Conditions and Co-morbidities  Goal: Patient's chronic conditions and co-morbidity symptoms are monitored and maintained or improved  6/16/2025 0744 by Hayden Sosa RN  Outcome: Progressing  6/15/2025 2234 by Nicki Escamilla RN  Outcome: Progressing  Flowsheets  Taken 6/15/2025 2227 by Nicki Escamilla RN  Care Plan - Patient's Chronic Conditions and Co-Morbidity Symptoms are Monitored and Maintained or Improved: Monitor and assess patient's chronic conditions and comorbid symptoms for stability, deterioration, or improvement  Taken 6/15/2025 0905 by Cuca Nguyen RN  Care Plan - Patient's Chronic Conditions and Co-Morbidity Symptoms are Monitored and Maintained or Improved: Monitor and assess patient's chronic conditions and comorbid symptoms for stability, deterioration, or improvement     Problem: Discharge Planning  Goal: Discharge to home or other facility with appropriate resources  Outcome: Progressing  Flowsheets (Taken 6/15/2025 2227 by Nicki Escamilla RN)  Discharge to home or other facility with appropriate resources: Identify barriers to discharge with patient and caregiver     Problem: Pain  Goal: Verbalizes/displays adequate comfort level or baseline comfort level  Outcome: Progressing     Problem: Safety - Adult  Goal: Free from fall injury  6/16/2025 0744 by Hayden Sosa RN  Outcome: Progressing  6/15/2025 2234 by Nicki Escamilla RN  Outcome: Progressing     Problem: ABCDS Injury Assessment  Goal: Absence of physical injury  Outcome: Progressing     Problem: Anxiety  Goal: Will report anxiety at manageable levels  Description: INTERVENTIONS:1. Administer medication as ordered2. Teach and rehearse alternative coping skills3. Provide emotional support with 1:1 interaction with staff  INTERVENTIONS:1. Administer medication as ordered2. Teach and rehearse alternative coping skills3. Provide emotional support with 1:1 interaction with staff  Outcome: Progressing  Flowsheets

## 2025-06-16 NOTE — BH NOTE
Patient remains on the unit and reports continued feelings of depression. She stays in the bed the majority of the day. She is conflicted about future care, facilities vs home. She states she has difficulty getting up and moving early to attend [facility]. She is able to do her ADL care. She denies SI/HI/AVH. Abilify was started today to help with her mood.

## 2025-06-16 NOTE — PLAN OF CARE
Problem: Chronic Conditions and Co-morbidities  Goal: Patient's chronic conditions and co-morbidity symptoms are monitored and maintained or improved  Outcome: Progressing  Flowsheets  Taken 6/15/2025 2227 by Nicki Escamilla RN  Care Plan - Patient's Chronic Conditions and Co-Morbidity Symptoms are Monitored and Maintained or Improved: Monitor and assess patient's chronic conditions and comorbid symptoms for stability, deterioration, or improvement  Taken 6/15/2025 0905 by Cuca Nguyen RN  Care Plan - Patient's Chronic Conditions and Co-Morbidity Symptoms are Monitored and Maintained or Improved: Monitor and assess patient's chronic conditions and comorbid symptoms for stability, deterioration, or improvement     Problem: Pain  Goal: Verbalizes/displays adequate comfort level or baseline comfort level  6/15/2025 1117 by Cuca Nguyen RN  Outcome: Progressing     Problem: Safety - Adult  Goal: Free from fall injury  Outcome: Progressing     Problem: ABCDS Injury Assessment  Goal: Absence of physical injury  6/15/2025 1117 by Cuca Nguyen RN  Outcome: Progressing     Problem: Coping  Goal: Pt/Family able to verbalize concerns and demonstrate effective coping strategies  Description: INTERVENTIONS:1. Assist patient/family to identify coping skills, available support systems and cultural and spiritual values2. Provide emotional support, including active listening and acknowledgement of concerns of patient and caregivers3. Reduce environmental stimuli, as able4. Instruct patient/family in relaxation techniques, as appropriate5. Assess for spiritual pain/suffering and initiate Spiritual Care, Psychosocial Clinical Specialist consults as needed  Outcome: Progressing  Flowsheets  Taken 6/15/2025 2227 by Nicki Escamilla RN  Patient/family able to verbalize anxieties, fears, and concerns, and demonstrate effective coping: Provide emotional support, including active listening and acknowledgement of  concerns of patient and caregivers  Taken 6/15/2025 0905 by Cuca Nguyen, RN  Patient/family able to verbalize anxieties, fears, and concerns, and demonstrate effective coping: Provide emotional support, including active listening and acknowledgement of concerns of patient and caregivers     Problem: Decision Making  Goal: Pt/Family able to effectively weigh alternatives and participate in decision making related to treatment and care  Description: INTERVENTIONS:1. Determine when there are differences between patient's view, family's view, and healthcare provider's view of condition2. Facilitate patient and family articulation of goals for care3. Help patient and family identify pros/cons of alternative solutions4. Provide information as requested by patient/family5. Respect patient/family right to receive or not to receive information6. Serve as a liaison between patient and family and health care team7. Initiate Consults from Ethics, Palliative Care or initiate Family Care Conference as is appropriate  6/15/2025 1117 by Cuca Nguyen, RN  Outcome: Progressing     Problem: Sleep Disturbance  Goal: Will exhibit normal sleeping pattern  Description: INTERVENTIONS:1. Administer medication as ordered2. Decrease environmental stimuli, including noise, as appropriate3. Discourage social isolation and naps during the day  Outcome: Progressing     Problem: Self Care Deficit  Goal: Return ADL status to a safe level of function  Description: INTERVENTIONS:1. Administer medication as ordered2. Assess ADL deficits and provide assistive devices as needed3. Obtain PT/OT consults as needed4. Assist and instruct patient to increase activity and self care as tolerated  Outcome: Progressing

## 2025-06-16 NOTE — BH NOTE
Affect flat, Mood depressed, alert and oriented x4. Patient is isolative, sad and worried. She is meds compliant, cooperative and interactive. She always makes her needs known.   PRN Medication Documentation    Specific patient behavior that led to need for PRN medication: Sleep  Staff interventions attempted prior to PRN being given: Assessment  PRN medication given: Trazodone 50mg @2024  Patient response/effectiveness of PRN medication: Effective  PRN Medication Documentation    Specific patient behavior that led to need for PRN medication: Cough  Staff interventions attempted prior to PRN being given: Assessment  PRN medication given: Tessalone 200mg @2245  Patient response/effectiveness of PRN medication: Effective

## 2025-06-16 NOTE — BH NOTE
Affect blunt, mood depressed. Patient a & o x 4.  Denies pain. No SI, HI, AVH. Patient tolerated medications well, cooperative.  Currently sleeping during the time of this note.

## 2025-06-17 LAB
GLUCOSE BLD STRIP.AUTO-MCNC: 110 MG/DL (ref 65–117)
GLUCOSE BLD STRIP.AUTO-MCNC: 125 MG/DL (ref 65–117)
GLUCOSE BLD STRIP.AUTO-MCNC: 131 MG/DL (ref 65–117)
GLUCOSE BLD STRIP.AUTO-MCNC: 258 MG/DL (ref 65–117)
SERVICE CMNT-IMP: ABNORMAL
SERVICE CMNT-IMP: NORMAL

## 2025-06-17 PROCEDURE — 6370000000 HC RX 637 (ALT 250 FOR IP): Performed by: INTERNAL MEDICINE

## 2025-06-17 PROCEDURE — 6370000000 HC RX 637 (ALT 250 FOR IP): Performed by: PSYCHIATRY & NEUROLOGY

## 2025-06-17 PROCEDURE — 1240000000 HC EMOTIONAL WELLNESS R&B

## 2025-06-17 PROCEDURE — 82962 GLUCOSE BLOOD TEST: CPT

## 2025-06-17 PROCEDURE — 6370000000 HC RX 637 (ALT 250 FOR IP): Performed by: HOSPITALIST

## 2025-06-17 RX ADMIN — METFORMIN HYDROCHLORIDE 500 MG: 500 TABLET ORAL at 17:10

## 2025-06-17 RX ADMIN — ALOGLIPTIN 12.5 MG: 12.5 TABLET, FILM COATED ORAL at 09:19

## 2025-06-17 RX ADMIN — LOSARTAN POTASSIUM 100 MG: 25 TABLET, FILM COATED ORAL at 20:03

## 2025-06-17 RX ADMIN — BENZONATATE 200 MG: 100 CAPSULE ORAL at 16:39

## 2025-06-17 RX ADMIN — ARIPIPRAZOLE 2 MG: 2 TABLET ORAL at 09:19

## 2025-06-17 RX ADMIN — INSULIN LISPRO 4 UNITS: 100 INJECTION, SOLUTION INTRAVENOUS; SUBCUTANEOUS at 11:27

## 2025-06-17 RX ADMIN — CETIRIZINE HYDROCHLORIDE 10 MG: 10 TABLET, FILM COATED ORAL at 09:19

## 2025-06-17 RX ADMIN — ATORVASTATIN CALCIUM 80 MG: 40 TABLET, FILM COATED ORAL at 09:19

## 2025-06-17 RX ADMIN — VENLAFAXINE HYDROCHLORIDE 150 MG: 150 CAPSULE, EXTENDED RELEASE ORAL at 09:19

## 2025-06-17 RX ADMIN — CLOPIDOGREL BISULFATE 75 MG: 75 TABLET, FILM COATED ORAL at 09:19

## 2025-06-17 RX ADMIN — METOPROLOL SUCCINATE 50 MG: 50 TABLET, EXTENDED RELEASE ORAL at 09:19

## 2025-06-17 RX ADMIN — LETROZOLE 2.5 MG: 2.5 TABLET ORAL at 09:19

## 2025-06-17 RX ADMIN — ALOGLIPTIN 12.5 MG: 12.5 TABLET, FILM COATED ORAL at 17:10

## 2025-06-17 RX ADMIN — HYDROXYZINE PAMOATE 25 MG: 25 CAPSULE ORAL at 20:04

## 2025-06-17 RX ADMIN — METFORMIN HYDROCHLORIDE 500 MG: 500 TABLET ORAL at 09:19

## 2025-06-17 ASSESSMENT — PAIN SCALES - GENERAL
PAINLEVEL_OUTOF10: 0
PAINLEVEL_OUTOF10: 0

## 2025-06-17 NOTE — BH NOTE
Affect blunt, mood depressed. Patient a & o x 4.  Denies pain. No SI, HI, AVH. Patient tolerated medications well, cooperative. Patient had to receive 4 units prior to lunch. Otherwise patient's blood glucose has been wnl. Currently eating dinner during the time of this note.    PRN Medication Documentation    Specific patient behavior that led to the need for PRN medication: Cough  Staff interventions attempted prior to PRN being given: Patient Requested  PRN medication given: Tessalon 200 at 1639  Patient responsiveness/effectiveness of PRN medication: Tolerated Well, Effective

## 2025-06-17 NOTE — PROGRESS NOTES
INTERVAL Hx: (Records and clinical information were reviewed)    CC: \"alright\"     Has still been flat and dysphoric the past few days, although she is now feeling a little better. She indicates she's still feeling somewhat poorly but not hopeless now. She's struggling with the lack of any definitive plan long-term as she emotionally would do better in a facility, yet her children feel she's more physically capable than that and she doesn't want to disappoint them. She also still has unrealistic expectations that there should be a  facility that she can afford that's also close to home and family/friends. I again reinforced that she needs to decide what to do based on what's best for her, and not her children, and if in doubt, just return home and continue to consider those options. Tolerating the Abilify and the Effexor XR easily, but she's still having some strong periods of depression and hopelessness. BP has not been as high with the increased Losartan until this AM. Slept 5.5 hours last night. Glucs: 121--213 the past 24 hours.    MEDS:  Current Facility-Administered Medications   Medication Dose Route Frequency    ARIPiprazole (ABILIFY) tablet 2 mg  2 mg Oral Daily    cetirizine (ZYRTEC) tablet 10 mg  10 mg Oral Daily    losartan (COZAAR) tablet 100 mg  100 mg Oral QHS    cloNIDine (CATAPRES) tablet 0.2 mg  0.2 mg Oral Q4H PRN    benzonatate (TESSALON) capsule 200 mg  200 mg Oral TID PRN    glucose chewable tablet 16 g  4 tablet Oral PRN    dextrose bolus 10% 125 mL  125 mL IntraVENous PRN    Or    dextrose bolus 10% 250 mL  250 mL IntraVENous PRN    glucagon injection 1 mg  1 mg SubCUTAneous PRN    dextrose 10 % infusion   IntraVENous Continuous PRN    alogliptin (NESINA) tablet 12.5 mg  12.5 mg Oral BID WC    atorvastatin (LIPITOR) tablet 80 mg  80 mg Oral Daily    clopidogrel (PLAVIX) tablet 75 mg  75 mg Oral Daily    letrozole (FEMARA) tablet 2.5 mg  2.5 mg Oral Daily    metFORMIN (GLUCOPHAGE) tablet 500

## 2025-06-17 NOTE — PLAN OF CARE
Problem: Chronic Conditions and Co-morbidities  Goal: Patient's chronic conditions and co-morbidity symptoms are monitored and maintained or improved  Outcome: Progressing     Problem: Discharge Planning  Goal: Discharge to home or other facility with appropriate resources  Outcome: Progressing     Problem: Pain  Goal: Verbalizes/displays adequate comfort level or baseline comfort level  Outcome: Progressing     Problem: Safety - Adult  Goal: Free from fall injury  Outcome: Progressing     Problem: ABCDS Injury Assessment  Goal: Absence of physical injury  Outcome: Progressing     Problem: Anxiety  Goal: Will report anxiety at manageable levels  Description: INTERVENTIONS:1. Administer medication as ordered2. Teach and rehearse alternative coping skills3. Provide emotional support with 1:1 interaction with staff  INTERVENTIONS:1. Administer medication as ordered2. Teach and rehearse alternative coping skills3. Provide emotional support with 1:1 interaction with staff  Outcome: Progressing     Problem: Coping  Goal: Pt/Family able to verbalize concerns and demonstrate effective coping strategies  Description: INTERVENTIONS:1. Assist patient/family to identify coping skills, available support systems and cultural and spiritual values2. Provide emotional support, including active listening and acknowledgement of concerns of patient and caregivers3. Reduce environmental stimuli, as able4. Instruct patient/family in relaxation techniques, as appropriate5. Assess for spiritual pain/suffering and initiate Spiritual Care, Psychosocial Clinical Specialist consults as needed  Outcome: Progressing     Problem: Decision Making  Goal: Pt/Family able to effectively weigh alternatives and participate in decision making related to treatment and care  Description: INTERVENTIONS:1. Determine when there are differences between patient's view, family's view, and healthcare provider's view of condition2. Facilitate patient and family

## 2025-06-17 NOTE — BH NOTE
Affect: Flat .   Mood:  Depressed / Dysphoric.  Patient continues to state, \"I just don't feel good, I'm just old\".  Patient spent entire shift in her room, most of which was in bed.  She still has cough however the Tessalon seemed to help keep that at bay tonight.  Blood pressure was still elevated, she was given her Losartan as prescribed and she remained asymptomatic.  She continues to be A&O x's 4, medication compliant, and makes her needs known to staff.  She denies SI/HI/AVH/pain.  She is currently resting without signs of distress.      PRN Medication Documentation    Specific patient behavior that led to the need for PRN medication: Cough  Staff interventions attempted prior to PRN being given: Assess  PRN medication given: Tessalon 200 mg administered PO on 6/16/2025 @ 2001.  Patient responsiveness/effectiveness of PRN medication: Patient was able to rest much easier with minimal coughing.      Mellisa Moyer RN

## 2025-06-17 NOTE — BH NOTE
Patient has been up today smiling, talking with others. She reports feeling better, not as hopeless but continues to feel she would enjoy being placed somewhere. This place must be in the [place] and/or be affordable. She denies SI/HI/AVH. She will be returning back to her home once discharged. She will follow up with Dr. Langley for medication management. She feels she cannot get herself ready in time to attend the [facility].

## 2025-06-17 NOTE — BH NOTE
Group Therapy Note    Date: 6/17/2025    Group Start Time: 0900  Group End Time: 0950  Group Topic: Psychotherapy    [facility] [place] [facility]    Socorro Ríos LCSW        Group Therapy Note    Attendees: 7 scheduled    Focus of session was based on information from the book “Crucial Conversations” and how we can work to stay in healthy dialogue when we are feeling angry, scared, or hurt.  We spoke about how it starts with taking control of our emotions and not putting the blame on others, such as, “he made me mad.”  We spoke about how emotions don't just happen, and they don't come out of the blue and that once we have upset emotions, we only have two options: you can act on them or be acted on by them.  We spoke about the group members goals and ability to master their emotions and the next steps they can focus on to work on their goals in this regard.        Patient's Goal:  Will be euthymic at discharge     Notes:  Pt attended group and engaged when prompted.  Pt's affect today was flat and she remained quiet throughout most the group.  Pt stated that she thinks she will return home and pray that things get better.  She did state that she is thinking about coming to [facility] two days a week.     Status After Intervention:  Unchanged    Participation Level: Active Listener, Interactive, and Minimal    Participation Quality: Appropriate and Attentive      Speech:  normal      Thought Process/Content: Logical      Affective Functioning: Flat      Mood: depressed      Level of consciousness:  Attentive      Response to Learning: Able to verbalize current knowledge/experience, Able to verbalize/acknowledge new learning, and Able to retain information      Endings: None Reported    Modes of Intervention: Education, Support, Socialization, Exploration, Clarifying, Problem-solving, and Activity      Discipline Responsible: Social  Worker/Counselor      Signature:  Socorro R[facility]os [facility]

## 2025-06-18 LAB
GLUCOSE BLD STRIP.AUTO-MCNC: 128 MG/DL (ref 65–117)
GLUCOSE BLD STRIP.AUTO-MCNC: 157 MG/DL (ref 65–117)
GLUCOSE BLD STRIP.AUTO-MCNC: 187 MG/DL (ref 65–117)
GLUCOSE BLD STRIP.AUTO-MCNC: 207 MG/DL (ref 65–117)
SERVICE CMNT-IMP: ABNORMAL

## 2025-06-18 PROCEDURE — 6370000000 HC RX 637 (ALT 250 FOR IP): Performed by: HOSPITALIST

## 2025-06-18 PROCEDURE — 6370000000 HC RX 637 (ALT 250 FOR IP): Performed by: INTERNAL MEDICINE

## 2025-06-18 PROCEDURE — 6370000000 HC RX 637 (ALT 250 FOR IP): Performed by: PSYCHIATRY & NEUROLOGY

## 2025-06-18 PROCEDURE — 82962 GLUCOSE BLOOD TEST: CPT

## 2025-06-18 PROCEDURE — 1240000000 HC EMOTIONAL WELLNESS R&B

## 2025-06-18 RX ADMIN — ATORVASTATIN CALCIUM 80 MG: 40 TABLET, FILM COATED ORAL at 08:33

## 2025-06-18 RX ADMIN — ALOGLIPTIN 12.5 MG: 12.5 TABLET, FILM COATED ORAL at 17:35

## 2025-06-18 RX ADMIN — INSULIN LISPRO 2 UNITS: 100 INJECTION, SOLUTION INTRAVENOUS; SUBCUTANEOUS at 17:11

## 2025-06-18 RX ADMIN — ALOGLIPTIN 12.5 MG: 12.5 TABLET, FILM COATED ORAL at 08:33

## 2025-06-18 RX ADMIN — CLOPIDOGREL BISULFATE 75 MG: 75 TABLET, FILM COATED ORAL at 08:33

## 2025-06-18 RX ADMIN — ARIPIPRAZOLE 2 MG: 2 TABLET ORAL at 08:33

## 2025-06-18 RX ADMIN — METOPROLOL SUCCINATE 50 MG: 50 TABLET, EXTENDED RELEASE ORAL at 08:33

## 2025-06-18 RX ADMIN — METFORMIN HYDROCHLORIDE 500 MG: 500 TABLET ORAL at 17:35

## 2025-06-18 RX ADMIN — BENZONATATE 200 MG: 100 CAPSULE ORAL at 23:00

## 2025-06-18 RX ADMIN — METFORMIN HYDROCHLORIDE 500 MG: 500 TABLET ORAL at 08:33

## 2025-06-18 RX ADMIN — LOSARTAN POTASSIUM 100 MG: 25 TABLET, FILM COATED ORAL at 20:08

## 2025-06-18 RX ADMIN — INSULIN LISPRO 2 UNITS: 100 INJECTION, SOLUTION INTRAVENOUS; SUBCUTANEOUS at 12:00

## 2025-06-18 RX ADMIN — VENLAFAXINE HYDROCHLORIDE 150 MG: 150 CAPSULE, EXTENDED RELEASE ORAL at 08:33

## 2025-06-18 RX ADMIN — LETROZOLE 2.5 MG: 2.5 TABLET ORAL at 08:35

## 2025-06-18 RX ADMIN — CETIRIZINE HYDROCHLORIDE 10 MG: 10 TABLET, FILM COATED ORAL at 08:33

## 2025-06-18 ASSESSMENT — PAIN SCALES - GENERAL
PAINLEVEL_OUTOF10: 0
PAINLEVEL_OUTOF10: 0

## 2025-06-18 NOTE — PROGRESS NOTES
INTERVAL Hx: (Records and clinical information were reviewed)    CC: \"not good\"     Has still been flat and dysphoric, but with a few moments of feeling a little better. She indicates she's still feeling somewhat poorly but not hopeless now. She's trying to adjust to the plan of returning home and not to a facility, and I continued to strongly encourage her to restart in the IOP program. She still has unrealistic expectations that there should be a GH facility that she can afford that's also close to home and to family/friends. I again reinforced that she needs to decide what to do based on what's best for her, and not her children, but she doesn't want to be too far away. Tolerating the Abilify and the Effexor XR easily, but she's still having some strong periods of depression and hopelessness. BP has been high lately, likely due to her anxiety. Slept 8 hours last night. Glucs: 110--258 the past 24 hours.    MEDS:  Current Facility-Administered Medications   Medication Dose Route Frequency    ARIPiprazole (ABILIFY) tablet 2 mg  2 mg Oral Daily    cetirizine (ZYRTEC) tablet 10 mg  10 mg Oral Daily    losartan (COZAAR) tablet 100 mg  100 mg Oral QHS    cloNIDine (CATAPRES) tablet 0.2 mg  0.2 mg Oral Q4H PRN    benzonatate (TESSALON) capsule 200 mg  200 mg Oral TID PRN    glucose chewable tablet 16 g  4 tablet Oral PRN    dextrose bolus 10% 125 mL  125 mL IntraVENous PRN    Or    dextrose bolus 10% 250 mL  250 mL IntraVENous PRN    glucagon injection 1 mg  1 mg SubCUTAneous PRN    dextrose 10 % infusion   IntraVENous Continuous PRN    alogliptin (NESINA) tablet 12.5 mg  12.5 mg Oral BID WC    atorvastatin (LIPITOR) tablet 80 mg  80 mg Oral Daily    clopidogrel (PLAVIX) tablet 75 mg  75 mg Oral Daily    letrozole (FEMARA) tablet 2.5 mg  2.5 mg Oral Daily    metFORMIN (GLUCOPHAGE) tablet 500 mg  500 mg Oral BID WC    metoprolol succinate (TOPROL XL) extended release tablet 50 mg  50 mg Oral Daily    venlafaxine (EFFEXOR  XR) extended release capsule 150 mg  150 mg Oral Daily with breakfast    hydrOXYzine pamoate (VISTARIL) capsule 25 mg  25 mg Oral TID PRN    acetaminophen (TYLENOL) tablet 650 mg  650 mg Oral Q4H PRN    aluminum & magnesium hydroxide-simethicone (MAALOX PLUS) 200-200-20 MG/5ML suspension 30 mL  30 mL Oral Q6H PRN    insulin lispro (HUMALOG,ADMELOG) injection vial 0-8 Units  0-8 Units SubCUTAneous 4x Daily AC & HS    glucose chewable tablet 16 g  4 tablet Oral PRN    dextrose bolus 10% 125 mL  125 mL IntraVENous PRN    Or    dextrose bolus 10% 250 mL  250 mL IntraVENous PRN    dextrose 10 % infusion   IntraVENous Continuous PRN    traZODone (DESYREL) tablet 50 mg  50 mg Oral Nightly PRN    simethicone (MYLICON) chewable tablet 80 mg  80 mg Oral Q6H PRN       VITALS: Patient Vitals for the past 12 hrs:   Temp Pulse Resp BP SpO2   06/18/25 0844 97.7 °F (36.5 °C) 86 17 (!) 175/113 95 %         LABS: .  Recent Results (from the past 24 hours)   POCT Glucose    Collection Time: 06/17/25 11:25 AM   Result Value Ref Range    POC Glucose 258 (H) 65 - 117 mg/dL    Performed by: Ian Blake RN    POCT Glucose    Collection Time: 06/17/25  4:43 PM   Result Value Ref Range    POC Glucose 110 65 - 117 mg/dL    Performed by: Kailash Palmer    POCT Glucose    Collection Time: 06/17/25  7:58 PM   Result Value Ref Range    POC Glucose 125 (H) 65 - 117 mg/dL    Performed by: Francine Lewis    POCT Glucose    Collection Time: 06/18/25  6:08 AM   Result Value Ref Range    POC Glucose 128 (H) 65 - 117 mg/dL    Performed by: Francine Lewis        MSE:   Appearance: casually dressed; adequately groomed  Behavior: calm; no agitation  Motor: still slowed  Mood/Affect: still dysphoric and restricted  Thought Process: coherent; linear  Thought Content: no overt delusions; denies SI/HI  Perceptions: denies AH's  Insight/Judgment: limited    ASSESSMENT:   1.  Major Depression, recurrent, severe (F33.2)    PLAN:  Continue the Abilify at 2 mg

## 2025-06-18 NOTE — PLAN OF CARE
Problem: Chronic Conditions and Co-morbidities  Goal: Patient's chronic conditions and co-morbidity symptoms are monitored and maintained or improved  Outcome: Progressing     Problem: Safety - Adult  Goal: Free from fall injury  Outcome: Progressing     Problem: Coping  Goal: Pt/Family able to verbalize concerns and demonstrate effective coping strategies  Description: INTERVENTIONS:1. Assist patient/family to identify coping skills, available support systems and cultural and spiritual values2. Provide emotional support, including active listening and acknowledgement of concerns of patient and caregivers3. Reduce environmental stimuli, as able4. Instruct patient/family in relaxation techniques, as appropriate5. Assess for spiritual pain/suffering and initiate Spiritual Care, Psychosocial Clinical Specialist consults as needed  Outcome: Progressing     Problem: Depression  Goal: Will be euthymic at discharge  Description: INTERVENTIONS:1. Administer medication as ordered2. Provide emotional support via 1:1 interaction with staff3. Encourage involvement in milieu/groups/activities4. Monitor for social isolation  Outcome: Progressing

## 2025-06-18 NOTE — PROGRESS NOTES
Spiritual Health History and Assessment/Progress Note  [facility]    Attempted Encounter,  , Life Adjustments (The patient will possibly going to [facility] care facility when she leaves the hospital.), Follow up     Name: Dannielle Hall MRN: [id number]    Age: 89 y.o.     Sex: female   Language: English   Yazidism: Sabianism   Severe recurrent major depression without psychotic features (HCC)     Date: 6/18/2025            Total Time Calculated: 51 min              Spiritual Assessment continued in [facility]        Referral/Consult From: Rounding   Encounter Overview/Reason: Attempted Encounter  Service Provided For: Patient    Zuri, Belief, Meaning:   Patient unable to assess at this time  Family/Friends No family/friends present      Importance and Influence:  Patient unable to assess at this time  Family/Friends No family/friends present    Community:  Patient is connected with a spiritual community and feels well-supported. Support system includes: Children and Friends  Family/Friends No family/friends present    Assessment and Plan of Care:     Patient Interventions include: Provided sacramental/Latter day ritual  Family/Friends Interventions include: No family/friends present    Patient Plan of Care: Spiritual Care available upon further referral  Family/Friends Plan of Care: No family/friends present    Electronically signed by Chaplain Mary on 6/18/2025 at 2:56 PM

## 2025-06-18 NOTE — PLAN OF CARE
Problem: Chronic Conditions and Co-morbidities  Goal: Patient's chronic conditions and co-morbidity symptoms are monitored and maintained or improved  6/18/2025 1104 by Sulma Ta RN  Outcome: Progressing  6/18/2025 0013 by iNcki Escamilla RN  Outcome: Progressing     Problem: Pain  Goal: Verbalizes/displays adequate comfort level or baseline comfort level  Outcome: Progressing     Problem: Safety - Adult  Goal: Free from fall injury  6/18/2025 1104 by Sulma Ta RN  Outcome: Progressing  6/18/2025 0013 by Nicki Escamilla RN  Outcome: Progressing     Problem: Anxiety  Goal: Will report anxiety at manageable levels  Description: INTERVENTIONS:1. Administer medication as ordered2. Teach and rehearse alternative coping skills3. Provide emotional support with 1:1 interaction with staff  INTERVENTIONS:1. Administer medication as ordered2. Teach and rehearse alternative coping skills3. Provide emotional support with 1:1 interaction with staff  Outcome: Progressing  Flowsheets  Taken 6/18/2025 0844 by Sulma Ta RN  Will report anxiety at manageable levels: Administer medication as ordered  Taken 6/17/2025 2344 by Nicki Escamilla RN  Will report anxiety at manageable levels: Administer medication as ordered

## 2025-06-18 NOTE — BH NOTE
Behavioral Health Interdisciplinary Rounds     Patient Name: Dannielle Hall  Age: 89 y.o.  Room/Bed:  232/01  Primary Diagnosis: Severe recurrent major depression without psychotic features (HCC)   Admission Status: Voluntary     Readmission within 30 days: No  Power of  in place: No  Patient requires a blocked bed: No14}          Reason for blocked bed:     Sleep hours: 8       Participation in Care/Groups:  Yes  Medication Compliant?: Yes  PRNS (last 24 hours): Antianxiety    Restraints (last 24 hours):  No  Substance Abuse:  No    24 hour chart check complete: Yes    Patient goal(s) for today: to do the best she can  Treatment team focus/goals:  Will report anxiety at manageable levels   Progress note: patient will be discharged to home tomorrow and IOP will be set up for patient    LOS:  21  Expected LOS: 1    Financial concerns/prescription coverage:  No  Family contact: yes      Family requesting physician contact today:  No  Discharge plan: return home and follow up with [facility]  Access to weapons: No       Outpatient provider(s): [facility]  Patient's preferred phone number for follow up call: [phone]     Participating treatment team members: Dannielle JEFFERSON Hall,,Marci Hilton Dr. Scott, Sally Quiroga  (assigned SW), Sally Quiroga

## 2025-06-18 NOTE — GROUP NOTE
Group Therapy Note    Date: 6/18/2025    Group Start Time: 0900  Group End Time: 0950  Group Topic: Process Group - Inpatient    [facility]    Fela Quiroga, LCSW        Group Therapy Note    Attendees: 7 scheduled    Focus of session was on the useful tool of creating a written crisis plan when experiencing negative and powerful emotions.  We discussed the basic plan of “if I feel blank, I will…” and discussing specifically what each person can do in response to the emotions that we are feeling.  We spoke about when we are experiencing hopelessness, what can be done and who we can call if we are experiencing hopelessness or suicidal thoughts.  We discussed that the 24 hour COPE line is available as well as they can always call here and report to us, whether over the phone or leaving a message to return the call, to report that they are not doing well and need some additional help. We spoke about the idea of “ratting ourselves out” and being the one responsible to express our feelings and ask for help when needed.       Patient's Goal:  Will report anxiety at manageable levels     Notes:  Dannielle was resistant to come to group but she did. She said she is not feeling well physically or mentally.  She spoke about how she is worried about going home.  She said she has a niece who \"comes by everyday and brings me breakfast.\"  We spoke about how this can help in that she is stating she has to \"eat something in order to come to group.\"  She was agreeable for the CM to call this niece and see if this can be established as part of her discharge plan.      Status After Intervention:  Unchanged    Participation Level: Minimal    Participation Quality: Attentive, Sharing, and Resistant      Speech:  very soft      Thought Process/Content: Perseverating      Affective Functioning: Flat      Mood: anxious and depressed      Level of

## 2025-06-18 NOTE — BH NOTE
Affect:  Congruence.   Mood:  Worried.  Patient appears concerned/ worried.  She reports that she will be discharged soon and that she thought she might go to a \"nursing home\" but she reports that her daughter said it probably won't be everything she thinks it will be.  She then went on to say, \"what if I get in there and then I don't like it and don't want to stay, I'm stuck\".  We discussed whether or not she would go live with her daughter and she said \"no, she works all the time so it would be like living at home by myself\".  She then stated that she has a friend who promised her that if she goes back home that she will call her every morning to wake her to remind her to eat breakfast so she can take her medicine.  Patients tone, and body language fall in line with the worry and concern she has over her living situation after being discharged. We discussed all options at length and I also encouraged her to share her concern with Kita ( at [facility]). She did seem interested in the possibility of an adult day program as well. Patient continues to deny SI/HI/AVH/.  She is currently resting in her bed, chest rise and fall noted.         PRN Medication Documentation    Specific patient behavior that led to the need for PRN medication: Anxious   Staff interventions attempted prior to PRN being given: Assess, listen and support emotionally  PRN medication given: Vistaril 25 mg administered PO on 6/17/2025 @ 2004.  Patient responsiveness/effectiveness of PRN medication: Resting comfortably with no signs of distress.      Mellisa Moyer RN

## 2025-06-18 NOTE — BH NOTE
Patient will discharge to home tomorrow. She is agreeable to start IOP. She will start next week and will attend Tuesday (6/24) Wednesday (6/25) and Friday (6/27). Spoke with her daughter to let her know she will be discharging tomorrow and provided her with her days. She will pick patient up tomorrow. Confirmed with her daughter that patient's niece comes by in the morning to bring/fix breakfast. She states she is very willing to do so but patient does not commit to plans. She will get in touch with her to give her the days that she will be needed in the mornings.

## 2025-06-18 NOTE — BH NOTE
Affect constricted, mood depressed/anxious. Alert and oriented X 4. Cooperative and pleasant. Attended and participated in group. Interacted with staff and peers. Makes needs known. Ate all meals and snacks. Denied SI/HI/AVH and pain. Medication compliant. Stable lying in bed resting.  At this time patient is aware she is scheduled for discharge tomorrow for home.  11:25 am  -Insulin Lispro 2 units given s/q in right lower quadrant abdomen.  1600-  - Insulin Lispro 2 units s/q given in left lower quadrant abdomen.     came to speak with patient this afternoon and pt declined to talk with her. She was in bed and coughing.  Instructed pt to rise and walk or go to day room.  She has not coughed since being up and upright. Engaging with other patient and staff members.

## 2025-06-19 VITALS
SYSTOLIC BLOOD PRESSURE: 180 MMHG | TEMPERATURE: 97.9 F | HEART RATE: 90 BPM | WEIGHT: 161.6 LBS | HEIGHT: 66 IN | DIASTOLIC BLOOD PRESSURE: 95 MMHG | OXYGEN SATURATION: 95 % | BODY MASS INDEX: 25.97 KG/M2 | RESPIRATION RATE: 17 BRPM

## 2025-06-19 PROBLEM — R45.851 SUICIDAL IDEATION: Status: RESOLVED | Noted: 2025-06-08 | Resolved: 2025-06-19

## 2025-06-19 LAB
GLUCOSE BLD STRIP.AUTO-MCNC: 122 MG/DL (ref 65–117)
SERVICE CMNT-IMP: ABNORMAL

## 2025-06-19 PROCEDURE — 82962 GLUCOSE BLOOD TEST: CPT

## 2025-06-19 PROCEDURE — 6370000000 HC RX 637 (ALT 250 FOR IP): Performed by: PSYCHIATRY & NEUROLOGY

## 2025-06-19 RX ORDER — LOSARTAN POTASSIUM 100 MG/1
100 TABLET ORAL
Qty: 30 TABLET | Refills: 5 | Status: SHIPPED | OUTPATIENT
Start: 2025-06-19

## 2025-06-19 RX ORDER — VENLAFAXINE HYDROCHLORIDE 150 MG/1
150 CAPSULE, EXTENDED RELEASE ORAL
Qty: 30 CAPSULE | Refills: 3 | Status: SHIPPED | OUTPATIENT
Start: 2025-06-19

## 2025-06-19 RX ORDER — ARIPIPRAZOLE 2 MG/1
2 TABLET ORAL DAILY
Qty: 30 TABLET | Refills: 3 | Status: SHIPPED | OUTPATIENT
Start: 2025-06-20 | End: 2025-06-25 | Stop reason: HOSPADM

## 2025-06-19 RX ORDER — TRAZODONE HYDROCHLORIDE 50 MG/1
50 TABLET ORAL
Qty: 30 TABLET | Refills: 3 | Status: SHIPPED | OUTPATIENT
Start: 2025-06-19

## 2025-06-19 RX ADMIN — ARIPIPRAZOLE 2 MG: 2 TABLET ORAL at 08:25

## 2025-06-19 RX ADMIN — VENLAFAXINE HYDROCHLORIDE 150 MG: 150 CAPSULE, EXTENDED RELEASE ORAL at 08:25

## 2025-06-19 RX ADMIN — CLOPIDOGREL BISULFATE 75 MG: 75 TABLET, FILM COATED ORAL at 08:25

## 2025-06-19 RX ADMIN — CETIRIZINE HYDROCHLORIDE 10 MG: 10 TABLET, FILM COATED ORAL at 08:25

## 2025-06-19 RX ADMIN — LETROZOLE 2.5 MG: 2.5 TABLET ORAL at 08:25

## 2025-06-19 RX ADMIN — ATORVASTATIN CALCIUM 80 MG: 40 TABLET, FILM COATED ORAL at 08:25

## 2025-06-19 RX ADMIN — METFORMIN HYDROCHLORIDE 500 MG: 500 TABLET ORAL at 08:25

## 2025-06-19 RX ADMIN — ALOGLIPTIN 12.5 MG: 12.5 TABLET, FILM COATED ORAL at 08:25

## 2025-06-19 RX ADMIN — METOPROLOL SUCCINATE 50 MG: 50 TABLET, EXTENDED RELEASE ORAL at 08:33

## 2025-06-19 NOTE — DISCHARGE SUMMARY
As noted in the admission note, the patient is an 89-year-old  female, who has been a patient of mine for many years due to recurrent bouts of major depression.  She was admitted through the [facility] Emergency room voluntarily after presenting herself there with increasing depressive symptoms for number of weeks.  It built up to the point where she was having suicidal thoughts, mostly an impulse to lay down in the road and get hit by a car, possibly taking an overdose.  Her neurovegetative functioning had been declining to where she was not sleeping, is excessively tired, was not eating well, and was feeling increasingly dysphoric and anhedonic.  She also felt hopeless and helpless that led to the some of the suicidal thoughts.  There was some vague psychotic like symptoms such as hearing vague voices telling her to overdose but that may have been more consistent with her thoughts as there was no other evidence of clear psychotic symptoms.    The big reason for the depression had been the fact that she has been off her antidepressant (Effexor XR) for a number of months.  We restarted the Effexor and titrated up to 150 mg daily, which she tolerated well.  Her response was fairly limited for the most part and very slow, consistent with her history.  Once the Effexor was on board for at least a couple weeks or more, I augmented with Abilify at 2 mg daily, which she also tolerated well.    Throughout her stay, she would generally have 1 or 2 days where she seemed to feel better and was slightly brighter followed by a day or so where she felt more dysphoric, was slightly more withdrawn, etc.  A lot of times these were not triggered by any particular issue or reason, however.  She denied having any suicidal thoughts, however, and as the hospitalization progressed, it became clear that she would need to try to transition home, but because the other placement options were either not possible or she was not willing to move  10:52:12  JOB #:  [id number]

## 2025-06-19 NOTE — BH NOTE
Patient was discharged to home this date. Her daughter transported her home. She will be started IOP at [facility] on June 24 and will attend three days per week. Patient and her daughter were involved and agreeable in her discharge plan. The  transition of care was reviewed with patient and she verbalized understanding and agreement. A copy was routed to her PCP, Dr. Langley and she was provided a copy to take with her.

## 2025-06-19 NOTE — BH NOTE
Group Therapy Note    Date: 6/19/2025    I spoke with pt this morning in her room.  She is being discharged today and I was able to remind her she would be attending [facility] next week.  Pt's affect was flat and she had limited engagement.        Signature:  Socorro Ríos LCSW

## 2025-06-19 NOTE — PLAN OF CARE
Problem: Depression  Goal: Will be euthymic at discharge  Description: INTERVENTIONS:1. Administer medication as ordered2. Provide emotional support via 1:1 interaction with staff3. Encourage involvement in milieu/groups/activities4. Monitor for social isolation  Outcome: Not Progressing     Problem: Self Care Deficit  Goal: Return ADL status to a safe level of function  Description: INTERVENTIONS:1. Administer medication as ordered2. Assess ADL deficits and provide assistive devices as needed3. Obtain PT/OT consults as needed4. Assist and instruct patient to increase activity and self care as tolerated  Outcome: Not Progressing

## 2025-06-19 NOTE — BH NOTE
Discharge Note:    Patient discharged home today @ 10:47 am. Patient alert and oriented x 4. Patient cooperative and pleasant. Denies SI/HI/AVH and pain. No delusional statements made Patient is compliant with medications. Adequate appetite, ate 100% of all meals. RN reviewed discharge instructions and orders with patient including: medication drug name, purpose, doses, administration times, and side effects. Patient verbalized understanding and provided written copies of discharge orders and instructions. Dr. Langley  called prescriptions into [facility] pharmacy. Patient stable and no signs or symptoms of distress. Patient left the unit ambulatory @ 10:47 am accompanied by staff to transport vehicle with her daughter. All personal valuables and belongings sent with patient.

## 2025-06-19 NOTE — PLAN OF CARE
Problem: Depression  Goal: Will be euthymic at discharge  Description: INTERVENTIONS:1. Administer medication as ordered2. Provide emotional support via 1:1 interaction with staff3. Encourage involvement in milieu/groups/activities4. Monitor for social isolation  6/18/2025 2041 by Marly Marshall RN  Outcome: Not Progressing     Problem: Self Care Deficit  Goal: Return ADL status to a safe level of function  Description: INTERVENTIONS:1. Administer medication as ordered2. Assess ADL deficits and provide assistive devices as needed3. Obtain PT/OT consults as needed4. Assist and instruct patient to increase activity and self care as tolerated  6/18/2025 2041 by Marly Marshall RN  Outcome: Not Progressing     Problem: Chronic Conditions and Co-morbidities  Goal: Patient's chronic conditions and co-morbidity symptoms are monitored and maintained or improved  6/18/2025 2041 by Marly Marshall RN  Outcome: Progressing     Problem: Discharge Planning  Goal: Discharge to home or other facility with appropriate resources  6/19/2025 0939 by Sulma Ta RN  Outcome: Progressing  Flowsheets (Taken 6/19/2025 0756)  Discharge to home or other facility with appropriate resources:   Identify barriers to discharge with patient and caregiver   Arrange for needed discharge resources and transportation as appropriate   Identify discharge learning needs (meds, wound care, etc)  6/18/2025 2041 by Marly Marshall RN  Outcome: Progressing  Flowsheets  Taken 6/18/2025 2038 by Marly Marshall RN  Discharge to home or other facility with appropriate resources: Identify barriers to discharge with patient and caregiver  Taken 6/18/2025 0844 by Sulma Ta RN  Discharge to home or other facility with appropriate resources: Identify barriers to discharge with patient and caregiver     Problem: Pain  Goal: Verbalizes/displays adequate comfort level or baseline comfort level  6/18/2025 2041 by Marly Marshall RN  Outcome: Progressing    behavioral management agreement, if implemented: Encourage verbalization of thoughts and concerns in a socially appropriate manner     Problem: Self Harm/Suicidality  Goal: Will have no self-injury during hospital stay  Description: INTERVENTIONS:1.  Ensure constant observer at bedside with Q15M safety checks2.  Maintain a safe environment3.  Secure patient belongings4.  Ensure family/visitors adhere to safety recommendations5.  Ensure safety tray has been added to patient's diet order6.  Every shift and PRN: Re-assess suicidal risk via Frequent Screener  Recent Flowsheet Documentation  Taken 6/19/2025 0756 by Sulma Ta RN  Will have no self-injury during hospital stay: Maintain a safe environment  6/18/2025 2041 by Marly Marshall RN  Outcome: Progressing  Flowsheets  Taken 6/18/2025 2038 by Marly Marshall RN  Will have no self-injury during hospital stay: Maintain a safe environment  Taken 6/18/2025 0844 by Sulma Ta RN  Will have no self-injury during hospital stay: Maintain a safe environment     Problem: Cherrie  Goal: Will exhibit normal sleep and speech and no impulsivity  Description: INTERVENTIONS:1. Administer medication as ordered2. Set limits on impulsive behavior3. Make attempts to decrease external stimuli as possible  6/18/2025 2041 by Marly Marshall RN  Outcome: Progressing     Problem: Sleep Disturbance  Goal: Will exhibit normal sleeping pattern  Description: INTERVENTIONS:1. Administer medication as ordered2. Decrease environmental stimuli, including noise, as appropriate3. Discourage social isolation and naps during the day  6/18/2025 2041 by Marly Marshall RN  Outcome: Progressing     Problem: Spiritual Care  Goal: Pt/Family able to move forward in process of forgiving self, others, and/or higher power  Description: INTERVENTIONS:1. Assist patient/family to overcome blocks to healing by use of spiritual practices (prayer, meditation, guided imagery, reiki, breath work, etc).2.

## 2025-06-19 NOTE — BH NOTE
Affect blunt, mood sad. Patient a & o x 4.  Denies pain. No SI, HI, AVH. Patient tolerated medications well, cooperative. Patient isolative in room. Patient received prn Tessalon @ 2300 for coughing. Patient slept 5 1/2 hours. 0600 blood sugar 122.

## 2025-06-19 NOTE — BH NOTE
Behavioral Health Transition Record    Patient Name: Dannielle Hall  YOB: 1935   Medical Record Number: [id number]  Date of Admission: 5/28/2025 10:33 AM   Date of Discharge: 6/19/2025    Attending Provider: Sundeep Langley MD   Discharging Provider: Sundeep Langley  To contact this individual call [phone] and ask the  to page.  If unavailable, ask to be transferred to Behavioral Health Provider on call.  A Behavioral Health Provider will be available on call 24/7 and during holidays.    Primary Care Provider: Tracy Banuelos APRN - NP    No Known Allergies    Reason for Admission: The patient is an 89-year-old  female, who I am very familiar with secondary to prior psychiatric treatment over many years due to recurrent bouts of major depression.  She was admitted voluntarily through the [facility] Emergency room after presenting herself there with worsening depression for a number of weeks, which had built up to the point of having suicidal thoughts.  She states she was having impulse to lay down on the road and get hit by a car, or possibly to take an overdose.  Her neurovegetative functioning had been deteriorating to where she was struggling with sleep, yet often dozing during the day, and she was not eating well too.  Her energy level is quite low, she is increasingly dysphoric and significantly anhedonic.  She was feeling hopeless as well, and beginning to have the suicidal thoughts as noted above.  She also had started to have some possible psychotic symptoms of hearing vague voices telling her to overdose.  Her description of this was a bit unclear, but it appears as though these were voices that she hears outside of her head and that she does not think that they were her own thoughts, and they may in fact be consistent with some psychosis.  There was no other evidence of psychotic symptoms, however.     A significant issue may be her medication compliance.  She has  ABILIFY  Take 1 tablet by mouth daily  Start taking on: June 20, 2025     losartan 100 MG tablet  Commonly known as: COZAAR  Take 1 tablet by mouth nightly            CHANGE how you take these medications      traZODone 50 MG tablet  Commonly known as: DESYREL  Take 1 tablet by mouth nightly as needed for Sleep  What changed:   when to take this  reasons to take this            CONTINUE taking these medications      albuterol sulfate  (90 Base) MCG/ACT inhaler  Commonly known as: PROVENTIL;VENTOLIN;PROAIR  Inhale 2 puffs into the lungs every 6 hours as needed for Wheezing     atorvastatin 80 MG tablet  Commonly known as: LIPITOR  TAKE 1 TABLET BY MOUTH EVERY DAY     clopidogrel 75 MG tablet  Commonly known as: PLAVIX  Take 1 tablet by mouth daily     letrozole 2.5 MG tablet  Commonly known as: FEMARA  Take 1 tablet by mouth daily     levocetirizine 5 MG tablet  Commonly known as: XYZAL  Take 1 tablet by mouth nightly     metFORMIN 500 MG tablet  Commonly known as: GLUCOPHAGE  Take 1 tablet by mouth 2 times daily (with meals)     metoprolol succinate 50 MG extended release tablet  Commonly known as: TOPROL XL  TAKE 1 TABLET BY MOUTH EVERY DAY     venlafaxine 150 MG extended release capsule  Commonly known as: EFFEXOR XR  Take 1 capsule by mouth daily (with breakfast)            STOP taking these medications      aspirin 81 MG EC tablet     buPROPion 150 MG extended release tablet  Commonly known as: WELLBUTRIN XL     lisinopril 20 MG tablet  Commonly known as: PRINIVIL;ZESTRIL            ASK your doctor about these medications      alogliptin 12.5 MG Tabs tablet  Commonly known as: NESINA  Take 1 tablet by mouth 2 times daily (with meals)               Where to Get Your Medications        These medications were sent to [facility]/pharmacy #[id number] - [place], [place] - [address] [facility] - P [phone] - F [phone]  [address] [facility] [address]      Phone: [phone]

## 2025-06-24 ENCOUNTER — HOSPITAL ENCOUNTER (OUTPATIENT)
Facility: HOSPITAL | Age: 89
Setting detail: RECURRING SERIES
Discharge: HOME | End: 2025-06-27
Payer: MEDICARE

## 2025-06-24 NOTE — BH NOTE
[facility]  Group Therapy  Absence Note      Date of Service: 6/24/2025    The  went to get pt today and she stated that she was not coming today as she die not feel well.  I attempted to call pt but could not leave a message.  Pt is scheduled 6/25/2025 for her next treatment day

## 2025-06-25 ENCOUNTER — HOSPITAL ENCOUNTER (OUTPATIENT)
Facility: HOSPITAL | Age: 89
Setting detail: RECURRING SERIES
Discharge: HOME | End: 2025-06-28
Payer: MEDICARE

## 2025-06-25 PROCEDURE — 99214 OFFICE O/P EST MOD 30 MIN: CPT | Performed by: PSYCHIATRY & NEUROLOGY

## 2025-06-25 PROCEDURE — 90853 GROUP PSYCHOTHERAPY: CPT

## 2025-06-25 NOTE — BH NOTE
Group Therapy Note    Date: 6/25/2025    Group Start Time: 12:00 PM  Group End Time: 12:50 PM  Group Topic: Psychotherapy    [facility] BEHAVIORAL TH OP    Socorro Ríos LCSW        Group Therapy Note    Attendees: 10 scheduled    ocus of session was on identifying what we are doing well and what we can identify that we will benefit from continuing to do in regards to coping with our emotions and responding to them.  We also spoke about what we are doing that we know is not helping in our process of recovery and is even setting us back.  We spoke how we feel when we are successful in implementing a change and how we can tap into that to try and increase our motivation to continue to change.             Patient's Goal:   1 Dep. F 33.2 Pt will develop expectations from treatment   Notes:  Pt followed along with the reading of the activity related to change.  Pt feels she is now in the action stage of wanting to change her isolative behavior.  She was in the preparation stage while in the hospital and had the time to think through her possible options.  Pt chose to return home and agreed to try coming to [facility] again     Status After Intervention:  Improved    Participation Level: Active Listener and Interactive    Participation Quality: Appropriate, Attentive, and Sharing      Speech:  normal      Thought Process/Content: Logical      Affective Functioning: Congruent      Mood: euthymic      Level of consciousness:  Attentive      Response to Learning: Able to verbalize current knowledge/experience and Able to verbalize/acknowledge new learning      Endings: None Reported    Modes of Intervention: Education, Support, Socialization, Exploration, Clarifying, and Problem-solving      Discipline Responsible: /Counselor      Signature:  Socorro Ríos LCSW

## 2025-06-25 NOTE — BH NOTE
Group Therapy Note    Date: 6/25/2025    Group Start Time: 11:00 AM  Group End Time: 11:50 AM  Group Topic: Psychotherapy    [facility] BEHAVIORAL [facility] OP    Socorro Ríos LCSW        Group Therapy Note    Attendees: 10 scheduled    Focus of session was on understanding the difference of the impact that “I want” versus “I need” has on our energy and our focus.  We spoke about the different energy we can feel if we talk about a goal or a desired change as “I want to.”   I asked group members to think about the strategy of changing their “I need” thoughts into “I want to” thoughts.  Each group member shared what they can say they want to work towards.         Patient's Goal:  1Dep. F 33.2  Pt will establish therapeutic rapport     Notes:  Pt listened to the group discussion and concerns of others.  Pt freely shared her past experiences and what strategies she feels have been beneficial to her.  Pt acknowledges that she is depressed and at times it make her not want to socialize, go anywhere and has thoughts \"of wanting it to be her time\"  is now trying to change those thoughts and behaviors and is hoping group will help       Status After Intervention:  Improved    Participation Level: Active Listener and Interactive    Participation Quality: Appropriate, Attentive, and Sharing      Speech:  normal      Thought Process/Content: Logical      Affective Functioning: Congruent      Mood: anxious and depressed      Level of consciousness:  Alert and Attentive      Response to Learning: Able to verbalize current knowledge/experience and Able to verbalize/acknowledge new learning      Endings: None Reported    Modes of Intervention: Education, Support, Socialization, Exploration, Clarifying, Problem-solving, and Activity      Discipline Responsible: /Counselor      Signature:  Socorro Ríos LCSW

## 2025-06-25 NOTE — PROGRESS NOTES
SOP EVALUATION ADDENDUM    CC: \"better this morning\"    PRESENTING PROBLEMS/INTERVAL HISTORY: Just hospitalized at [facility] (5/28--6/19/25) due to another exacerbation of severe Major Depression. She'd been off any antidepressants for some time (likely months), and her mood was very slow to respond to restarting the Effexor XR. I had also restarted Abilify but her daughter recommended stopping it due to some SE's she may have had with it in the past. She states that she wasn't feeling good yesterday AM, and therefore cancelled coming to the IOP, but today she felt a little better and was willing to come. She states she's still been depressed, but not as badly as in the recent past. She's been compliant with the Effexor XR and she takes the Trazodone each night as well. Says she's sleeping fairly well. Still slowed and slightly flat compared to her baseline, and she continues to need a higher level of outpatient care to prevent further decompensation and to help improve her mood further.      MEDICATIONS:   Current Outpatient Medications   Medication Sig    ARIPiprazole (ABILIFY) 2 MG tablet Take 1 tablet by mouth daily    losartan (COZAAR) 100 MG tablet Take 1 tablet by mouth nightly    venlafaxine (EFFEXOR XR) 150 MG extended release capsule Take 1 capsule by mouth daily (with breakfast)    traZODone (DESYREL) 50 MG tablet Take 1 tablet by mouth nightly as needed for Sleep    metoprolol succinate (TOPROL XL) 50 MG extended release tablet TAKE 1 TABLET BY MOUTH EVERY DAY    atorvastatin (LIPITOR) 80 MG tablet TAKE 1 TABLET BY MOUTH EVERY DAY    letrozole (FEMARA) 2.5 MG tablet Take 1 tablet by mouth daily    metFORMIN (GLUCOPHAGE) 500 MG tablet Take 1 tablet by mouth 2 times daily (with meals)    levocetirizine (XYZAL) 5 MG tablet Take 1 tablet by mouth nightly    clopidogrel (PLAVIX) 75 MG tablet Take 1 tablet by mouth daily    albuterol sulfate HFA (PROVENTIL;VENTOLIN;PROAIR) 108 (90 Base) MCG/ACT inhaler Inhale 2

## 2025-06-25 NOTE — BH NOTE
Group Therapy Note    Date: 6/25/2025    Group Start Time: 10:00 AM  Group End Time: 10:50 AM  Group Topic: Psychotherapy    [facility] BEHAVIORAL HLTH OP    Socorro Ríos LCSW        Group Therapy Note    Attendees: 10 scheduled    Focus of session was on identifying what we are doing well and what we can identify that we will benefit from continuing to do in regards to coping with our emotions and responding to them.  We also spoke about what we are doing that we know is not helping in our process of recovery and is even setting us back.  We spoke how we feel when we are successful in implementing a change and how we can tap into that to try and increase our motivation to continue to change.        Behavioral Health Daily Check In form revealed that patient is not experiencing SI/HI thoughts or plans, remaining abstinent from drugs and alcohol, and report's goal today to work through her nerves   Patient's Goal:  1Dep. F33.2 Pt will establish treatment goals     Notes:  Pt attended group today after being discharged last week from [facility].  Pt introduced herself to the group members and they were supportive. Pt's mood fluctuated throughout the day but  she remained   engaged.  Pt was tearful when she arrived but in group ,shared her jac and that she was glad to be back around people.    Status After Intervention:  Improved    Participation Level: Active Listener and Interactive    Participation Quality: Appropriate, Attentive, and Sharing      Speech:  normal      Thought Process/Content: Logical      Affective Functioning: Congruent      Mood: euthymic      Level of consciousness:  Alert, Oriented x4, and Attentive      Response to Learning: Able to verbalize current knowledge/experience, Able to verbalize/acknowledge new learning, and Able to retain information      Endings: None Reported    Modes of Intervention: Education, Support,

## 2025-06-26 NOTE — BH NOTE
[facility]  Group Therapy  Initial Treatment Plan     PROBLEM  NUMBER 1.  PROBLEM: Depression     LONG TERM GOALS (LTG) / DISCHARGE CRITERIA:  Develop the ability to recognize, accept, & cope with feelings of depression.  Alleviate depressed mood & return to previous level of effective functioning   Date Established Date Met, Continued, Revised or Discontinued STG  Letter SHORT TERM GOALS (STG)  (What Client Will Do or Accomplish) By   Target Date   6/25/2025  A Pt will identify and share with group thoughts that contribute to depressed mood and outlook and identify a counter thought 7/25/2025 6/25/2025  B     Pt will identify and share with group behaviors that contribute to depressed mood and identify an alternative behavior. 7/23/2025 6/25/2025  C  “Pt will identify triggers to recent setback and identify strategies that can help her manage setbacks like this in the future.” 7/14/2025 6/25/2025  D “Pt will identify times when she has overreacted emotionally and the consequences of those reactions and reasons why she wants to learn to slow down emotionally.” 7/17/2025 6/25/2025  E   “Pt will verbalize acceptance of herself and her abilities and work to build on new strengths she can develop.”     7/16/2025 6/25/2025  F Pt will practice stopping and checking for facts when she can identify her current thoughts are creating anger, anxiety, of fear.'     7/23/2025 6/25/2025  G “Pt will increase statements of acceptance of her illness and the work that needs to be done to manage it and identify how acceptance impacts her stress level.”     7/24/2025 6/25/2025  H    “Pt will begin to participate in social contacts & initiate communication of needs & desires and report to group her progress.”   7/25/2025 6/25/2025  I “Pt will verbalize belief in herself that she has the ability to make thought out decisions and it is a goal she is willing to work towards.”     7/22/2025

## 2025-06-26 NOTE — BH NOTE
INTAKE SCREENING TOOL    Dannielle JEFFERSON Hall  2025, 1500    Patient Information   Phone: [phone]    Address: [address] [facility] [address]    : 1935   Social Security #:    Age: 89   Sex: Female   Living Arrangements: alone   Date of Last Inpatient Admission: 2025   Legal Guardian/POA:    Phone:    Communicates Verbally: Yes       Chief Complaint/Symptoms (Describe reason for seeking help and describe symptoms as per DSM V.   Pt was Just hospitalized at [facility] (--25) due to another exacerbation of severe Major Depression. She'd been off any antidepressants for some time (likely months), and her mood was very slow to respond to restarting the Effexor XR. I had also restarted Abilify but her daughter recommended stopping it due to some SE's she may have had with it in the past. She states that she wasn't feeling good yesterday AM, and therefore cancelled coming to the IOP, but today she felt a little better and was willing to come. She states she's still been depressed, but not as badly as in the recent past. She's been compliant with the Effexor XR and she takes the Trazodone each night as well. Says she's sleeping fairly well. Still slowed and slightly flat compared to her baseline, and she continues to need a higher level of outpatient care to prevent further decompensation and to help improve her mood further.           Current Stressors Due to Mental Illness and/or Substance Use   financial concern, loneliness     Explain all that is marked:  Pt currently lives alone but has family that lives nearby            Previous Mental Health Treatment     Last inpatient admission: 2025   Type: IP  Where: [facility] Inpatient   When: 2025-2025  Reason: Depression  LOS:20 days  Outcome:Stabilized and referred to [facility]   Type:   Where:   When:   Reason:   LOS:  Outcome:   Type:   Where:   When:   Reason:   LOS:  Outcome:   *Key: IP- Inpatient, OP- Outpatient,

## 2025-06-26 NOTE — BH NOTE
PSYCHOSOCIAL ASSESSMENT  :Patient identifying info:   Dannielle Hall is a 89 y.o., female admitted 6/25/2025  7:33 AM     Presenting problem and precipitating factors: Pt was just hospitalized at [facility] (5/28--6/19/25) due to another exacerbation of severe Major Depression. She'd been off any antidepressants for some time (likely months), and her mood was very slow to respond to restarting the Effexor XR. I had also restarted Abilify but her daughter recommended stopping it due to some SE's she may have had with it in the past. She states that she wasn't feeling good yesterday AM, and therefore cancelled coming to the IOP, but today she felt a little better and was willing to come. She states she's still been depressed, but not as badly as in the recent past. She's been compliant with the Effexor XR and she takes the Trazodone each night as well. Says she's sleeping fairly well. Still slowed and slightly flat compared to her baseline, and she continues to need a higher level of outpatient care to prevent further decompensation and to help improve her mood further.      Mental status assessment: Appearance:   Neat     Orientation:   Time  Place  Person  Speech:   Coherent   Memory:   Intact    Mood:   Euthymic    Affect:    Appropriate   Thought Content:   Logical/Appropriate    Thought Process:   Coherent/linear    Hallucinations:   None  Insight:    Fair      Judgment:     Fair    Strengths/Recreation/Coping Skills:Pt has stable housing ad support     Collateral information: Pt    Current psychiatric /substance abuse providers and contact info: Dr. Sundeep Langley    Previous psychiatric/substance abuse providers and response to treatment:     Family history of mental illness or substance abuse:   was an alcoholic     Substance abuse history:  No  Social History     Tobacco Use    Smoking status: Former     Current packs/day: 0.00     Average packs/day: 0.4 packs/day for 25.0 years (8.8 ttl pk-yrs)     Types:

## 2025-06-27 ENCOUNTER — HOSPITAL ENCOUNTER (OUTPATIENT)
Facility: HOSPITAL | Age: 89
Setting detail: RECURRING SERIES
Discharge: HOME | End: 2025-06-30
Payer: MEDICARE

## 2025-06-27 PROCEDURE — 90853 GROUP PSYCHOTHERAPY: CPT

## 2025-06-27 NOTE — BH NOTE
Group Therapy Note    Date: 6/27/2025    Group Start Time: 10:00 AM  Group End Time: 10:50 AM  Group Topic: Psychotherapy    [facility] BEHAVIORAL TH OP    Socorro Ríos LCSW        Group Therapy Note    Attendees: 10 scheduled    Focus of session was on the types of people that we come across in our lives, be it strangers or family, which drains us of our energy and our positive thoughts.  We spoke about the importance of learning how to combat and manage these difficult people and that when we are struggling with low self esteem, depression, fears of speaking up and protecting ourselves, and having a need to please others, we are much more vulnerable to letting these people drag us down.  We spoke about how we have controllers, self absorbers, chronic victims, and criticizers.  We spoke about effective ways to cope with these emotionally toxic people in our lives to be able to maintain our progress.   Behavioral Health Daily Check In form revealed that patient is not experiencing SI/HI thoughts or plans, remaining abstinent from drugs and alcohol, and report's goal today to  go home and straighten up     Patient's Goal:  1 Dep. F33.2 G “Pt will increase statements of acceptance of her illness and the work that needs to be done to manage it and identify how acceptance impacts her stress level.”      Notes:  Pt listened to the group discussion and engaged with others.  She shared that she was glad to be in group today.  She stated it is helpful for her to be called first thing in the morning to help her be able to get up and be ready.  Pt's affect is bright and attentive     Status After Intervention:  Improved    Participation Level: Active Listener and Interactive    Participation Quality: Appropriate, Attentive, and Sharing      Speech:  normal      Thought Process/Content: Logical      Affective Functioning: Congruent      Mood:

## 2025-06-27 NOTE — BH NOTE
Group Therapy Note    Date: 6/27/2025    Group Start Time: 12:00 PM  Group End Time: 12:50 PM  Group Topic: Psychotherapy    [facility] BEHAVIORAL TH OP    Socorro Ríos, HENRYW        Group Therapy Note    Attendees: 10 scheduled    Focus of session was on mindfulness of others and how this can improve relationships in our lives.  We spoke about how this can mean that we really work to pay attention to what others are saying and how to clear up any conflicts with others as peacefully but effectively as we can.  We spoke about how we can offer validation of others and how this can improve relationships and can be the encouragement others need to validate us when we need to.  We spoke about the importance of not making assumptions and to be our own person in any relationship.  We then spoke about how we can practice being mindful of others such as letting go of focus on my own internal thoughts and be curious about others around me.       Patient's Goal:  1 Dep. F33.2 E   “Pt will verbalize acceptance of herself and her abilities and work to build on new strengths she can develop.”      Notes:  Pt participated in the group activity and discussion.  Pt shared that at times she does not feel that her children listen to her or think what she is saying is real.  She wants to be respected and states she tries to also to respect their opinions even when she does not agree.     Status After Intervention:  Improved    Participation Level: Active Listener and Interactive    Participation Quality: Appropriate, Attentive, Sharing, and Supportive      Speech:  normal      Thought Process/Content: Logical      Affective Functioning: Congruent      Mood: euthymic      Level of consciousness:  Alert, Oriented x4, and Attentive      Response to Learning: Able to verbalize current knowledge/experience, Able to verbalize/acknowledge new learning, Able to retain

## 2025-06-27 NOTE — SUICIDE SAFETY PLAN
SAFETY PLAN    A suicide Safety Plan is a document that supports someone when they are having thoughts of suicide.    Warning Signs that indicate a suicidal crisis may be developing: What (situations, thoughts, feelings, body sensations, behaviors, etc.) do you experience that lets you know you are beginning to think about suicide?  1. Don't anymore   2.   3.     Internal Coping Strategies:  What things can I do (relaxation techniques, hobbies, physical activities, etc.) to take my mind off my problems without contacting another person?  1. [place]  2.   3.     People and social settings that provide distraction: Who can I call or where can I go to distract me?  1. Name: God  Phone:   2. Name: Best Friends  Phone:    3. Place:   Schoolmates          4. Place:     People whom I can ask for help: Who can I call when I need help - for example, friends, family, clergy, someone else?  1. Name:     [facility] [phone]            Phone:   2. Name:   Phone:   3. Name:   Phone:     Professionals or Behavioral Health agencies I can contact during a crisis: Who can I call for help - for example, my doctor, my psychiatrist, my psychologist, a mental health provider, a suicide hotline?  1. Clinician Name: Dr. Sundeep Langley Phone: [phone]      Clinician Pager or Emergency Contact #:     2. Clinician Name:    Phone:       Clinician Pager or Emergency Contact #:     3. Suicide Prevention Lifeline: 8-550-149-ZAHI (2412)    4. Local Behavioral Health Emergency Services -  for example, [facility]         [facility] Hot[facility]: 1-132.947.3407, 988      Emergency Services Address:       Emergency Services Phone: [phone]    Making the environment safe: How can I make my environment (house/apartment/living space) safer? For example, can I remove guns, medications, and other items?  1. NA  2.

## 2025-06-27 NOTE — BH NOTE
Group Therapy Note    Date: 6/27/2025    Group Start Time: 11:00 AM  Group End Time: 11:50 AM  Group Topic: Psychotherapy    [facility] BEHAVIORAL TH OP    Fela Quiroga, \[facility] [place] [facility]\""W        Group Therapy Note    Attendees: 10 scheduled    Focus of session was on identifying what we are doing well and what we can identify that we will benefit from continuing to do in regards to coping with our emotions and responding to them.  We also spoke about what we are doing that we know is not helping in our process of recovery and is even setting us back.  We spoke how we feel when we are successful in implementing a change and how we can tap into that to try and increase our motivation to continue to change.          Patient's Goal:  1. Dep F 33.2  F.  Pt will practice stopping and checking for facts when she can identify her current thoughts are creating anger, anxiety, of fear    Notes:  Dannielle participated in group with prompting. She spoke about how she is struggling with her children and their frustration with her.  \"I think they believe I can do so much more. I want to do more but I really see it as impossible right now.\"  She became tearful talking about it but she was able to take in support and feedback of group as to how she can communicate and take care of herself.      Status After Intervention:  Improved    Participation Level: Active Listener and Interactive    Participation Quality: Appropriate, Attentive, Sharing, and Supportive      Speech:  normal      Thought Process/Content: Logical  Linear      Affective Functioning: Congruent and Flat      Mood: anxious and depressed      Level of consciousness:  Alert, Oriented x4, Attentive, and Preoccupied      Response to Learning: Able to verbalize current knowledge/experience, Able to verbalize/acknowledge new learning, Able to retain information, and Capable of insight      Endings: None

## 2025-06-30 ENCOUNTER — HOSPITAL ENCOUNTER (OUTPATIENT)
Facility: HOSPITAL | Age: 89
Setting detail: RECURRING SERIES
Discharge: HOME | End: 2025-07-03
Payer: MEDICARE

## 2025-06-30 PROCEDURE — 90853 GROUP PSYCHOTHERAPY: CPT

## 2025-06-30 NOTE — BH NOTE
Group Therapy Note    Date: 6/30/2025    Group Start Time: 10:00 AM  Group End Time: 10:50 AM  Group Topic: Psychotherapy    [facility] BEHAVIORAL [facility] OP    eFla Quiroga, LCSW        Group Therapy Note    Attendees: 12 scheduled    Focus of session was a review of the weekend and helping group members see their wins that they can celebrate from the past several days.  We spoke about the things that occurred and choices that they had in front of them and helping everyone to see the positive impact that they had on their well being.  We also spoke about what may have been the setbacks and how they coped and what they can plan to do for the future to help build on relapse prevention.     Behavioral Health Daily Check In form revealed that patient is not experiencing SI/HI thoughts or plans, remaining abstinent from drugs and alcohol, and report's goal today of  \"clean my house up.\"       Patient's Goal:  1. Dep F 33.2  C.  Pt will identify triggers to recent setback and identify strategies that can help her manage setbacks like this in the future.”    Notes:  Dannielle participated well in group and shared how she is trying to focus on getting things done in her house and she has had more energy to do that.  She spoke about how she will continue to work on challenging her negative thinking patterns and the thoughts that have impacting her motivation and functioning over the past several months.      Status After Intervention:  Improved    Participation Level: Active Listener and Interactive    Participation Quality: Appropriate, Attentive, Sharing, and Supportive      Speech:  normal      Thought Process/Content: Logical  Linear      Affective Functioning: Congruent and Flat      Mood: anxious      Level of consciousness:  Alert, Oriented x4, Attentive, and Preoccupied      Response to Learning: Able to verbalize current knowledge/experience, Able to

## 2025-06-30 NOTE — BH NOTE
Group Therapy Note    Date: 6/30/2025    Group Start Time: 11:00 AM  Group End Time: 11:50 AM  Group Topic: Psychotherapy    [facility] BEHAVIORAL [facility] OP    Junaid, Fela [facility]UELLAR, [facility]        Group Therapy Note    Attendees: 12 scheduled    Focus of session was based on information from the book “The Stress Prescription” by Dr. Darlyn Salinas.  I shared the tool of “taking inventory” and understanding and identifying when we are focusing and using our energy on events and things that we have no control over.  We spoke about writing out a list of “what I have the power to change” and “what I cannot change.”  We spoke about how we can then look for things to delete or let go of.  We spoke about what patient can see they can delete at this point and we spoke about a plan if those issues come up again to cause increased stress.        Patient's Goal:  1. Dep F 33.2  G.  Pt will increase statements of acceptance of her illness and the work that needs to be done to manage it and identify how acceptance impacts her stress level.”      Notes:  Dannielle was active in the discussion and she spoke about how she is able to see, with help, that she is coping with things she can control and focus on.  She spoke about how it is benefiting her to get support and feedback about what she can benefit from giving her focus to.      Status After Intervention:  Improved    Participation Level: Active Listener and Interactive    Participation Quality: Appropriate, Attentive, Sharing, and Supportive      Speech:  normal      Thought Process/Content: Logical  Linear      Affective Functioning: Congruent and Flat      Mood: anxious and depressed      Level of consciousness:  Alert, Oriented x4, Attentive, and Preoccupied      Response to Learning: Able to verbalize current knowledge/experience, Able to retain information, and Capable of insight      Endings: None Reported    Modes of

## 2025-07-02 ENCOUNTER — HOSPITAL ENCOUNTER (OUTPATIENT)
Facility: HOSPITAL | Age: 89
Setting detail: RECURRING SERIES
Discharge: HOME | End: 2025-07-05
Payer: MEDICARE

## 2025-07-02 PROCEDURE — 90853 GROUP PSYCHOTHERAPY: CPT

## 2025-07-02 NOTE — BH NOTE
Group Therapy Note    Date: 7/2/2025    Group Start Time: 11:00 AM  Group End Time: 11:50 AM  Group Topic: Psychotherapy    [facility] BEHAVIORAL TH OP    Fela Quiroga, HENRYW        Group Therapy Note    Attendees: 10 scheduled    Focus of session was based on information from “The CBT Toolbox” by Dr. Rashi Ames.  I shared the information on why it is important to have a balanced lifestyle for mental health recovery.  We spoke about the fours areas of general well-being on which we focus to balance our lives: physical, emotional, relational, and spiritual.  Group members used the tool in terms of assessing their current state of action in all these areas of their lives.  We spoke about goals they can set to increase their balance and help in their recovery.         Patient's Goal:  1. Dep F 33.2  B.      Pt will identify and share with group behaviors that contribute to depressed mood and identify an alternative behavior.    Notes:  Dannielle participated in group with prompting.  She spoke about how she knows that she needs to be supportive of others as well as receiving support.  She continued to talk about how it helped her to come today and \"be pushed to come.\"      Status After Intervention:  Improved    Participation Level: Active Listener and Interactive    Participation Quality: Appropriate, Attentive, Sharing, and Supportive      Speech:  normal      Thought Process/Content: Logical  Linear      Affective Functioning: Congruent and Flat      Mood: anxious and depressed      Level of consciousness:  Alert, Oriented x4, and Attentive      Response to Learning: Able to verbalize current knowledge/experience and Able to retain information      Endings: None Reported    Modes of Intervention: Education, Support, Exploration, Clarifying, and Reality-testing      Discipline Responsible: /Counselor      Signature:  Fela Quiroga

## 2025-07-02 NOTE — BH NOTE
Group Therapy Note    Date: 7/2/2025    Group Start Time: 10:00 AM  Group End Time: 10:50 AM  Group Topic: Psychotherapy    [facility] BEHAVIORAL [facility] OP    Junaid, Fela CUELLAR, [facility] [place] [facility]        Group Therapy Note    Attendees: 10 scheduled    Focus of session was based on information from website [employer] which created a useful list of lessons from cognitive behavioral therapy.  I reviewed the lessons covered which are situations cannot create feelings, only your thoughts and the situation can, notice unhelpful thoughts and replace them with helpful thoughts, take note of which behaviors make you feel better or worse, turn your focus to the task or the environment, avoidance makes anxiety worse, act the way you want to be, no matter how you feel, and let go of expectations.  Patient was asked to share their thoughts on which lesson they can learn to apply and why that one may be able to help them.    Behavioral Health Daily Check In form revealed that patient is not experiencing SI/HI thoughts or plans, remaining abstinent from drugs and alcohol, and report's goal today of \"clean my home.\"        Patient's Goal:  1. Dep F 33.2  A.  Pt will identify and share with group thoughts that contribute to depressed mood and outlook and identify a counter thought    Notes:  Patient was engaged in discussion about what thoughts are impacting day to day functioning and well being right now and what are the possibilities of making changes to thoughts and behaviors to help progress towards recovery.  She spoke about how she did not want to push herself to come to group.  We discussed how she was able to take in feedback about the need to come to group even when she does not feel like it. She agreed she needed that push and is glad she was able to make it here today.      Status After Intervention:  Improved    Participation Level: Active Listener and

## 2025-07-02 NOTE — TELEPHONE ENCOUNTER
Patient requesting refill on     Requested Prescriptions     Pending Prescriptions Disp Refills    metFORMIN (GLUCOPHAGE) 500 MG tablet [Pharmacy Med Name: METFORMIN  MG TABLET] 180 tablet 1     Sig: TAKE 1 TABLET BY MOUTH TWICE A DAY WITH MEALS        Last OV 6/4/2024

## 2025-07-02 NOTE — PATIENT INSTRUCTIONS
Problem: Safety - Adult  Goal: Free from fall injury  Outcome: Completed      Abdominal Pain: Care Instructions  Your Care Instructions    Abdominal pain has many possible causes. Some aren't serious and get better on their own in a few days. Others need more testing and treatment. If your pain continues or gets worse, you need to be rechecked and may need more tests to find out what is wrong. You may need surgery to correct the problem. Don't ignore new symptoms, such as fever, nausea and vomiting, urination problems, pain that gets worse, and dizziness. These may be signs of a more serious problem. Your doctor may have recommended a follow-up visit in the next 8 to 12 hours. If you are not getting better, you may need more tests or treatment. The doctor has checked you carefully, but problems can develop later. If you notice any problems or new symptoms, get medical treatment right away. Follow-up care is a key part of your treatment and safety. Be sure to make and go to all appointments, and call your doctor if you are having problems. It's also a good idea to know your test results and keep a list of the medicines you take. How can you care for yourself at home? · Rest until you feel better. · To prevent dehydration, drink plenty of fluids, enough so that your urine is light yellow or clear like water. Choose water and other caffeine-free clear liquids until you feel better. If you have kidney, heart, or liver disease and have to limit fluids, talk with your doctor before you increase the amount of fluids you drink. · If your stomach is upset, eat mild foods, such as rice, dry toast or crackers, bananas, and applesauce. Try eating several small meals instead of two or three large ones. · Wait until 48 hours after all symptoms have gone away before you have spicy foods, alcohol, and drinks that contain caffeine. · Do not eat foods that are high in fat. · Avoid anti-inflammatory medicines such as aspirin, ibuprofen (Advil, Motrin), and naproxen (Aleve).  These can cause stomach upset. Talk to your doctor if you take daily aspirin for another health problem. When should you call for help? Call 911 anytime you think you may need emergency care. For example, call if:    · You passed out (lost consciousness).     · You pass maroon or very bloody stools.     · You vomit blood or what looks like coffee grounds.     · You have new, severe belly pain.    Call your doctor now or seek immediate medical care if:    · Your pain gets worse, especially if it becomes focused in one area of your belly.     · You have a new or higher fever.     · Your stools are black and look like tar, or they have streaks of blood.     · You have unexpected vaginal bleeding.     · You have symptoms of a urinary tract infection. These may include:  ¨ Pain when you urinate. ¨ Urinating more often than usual.  ¨ Blood in your urine.     · You are dizzy or lightheaded, or you feel like you may faint.    Watch closely for changes in your health, and be sure to contact your doctor if:    · You are not getting better after 1 day (24 hours). Where can you learn more? Go to http://giovanni-lakisha.info/. Enter R500 in the search box to learn more about \"Abdominal Pain: Care Instructions. \"  Current as of: November 20, 2017  Content Version: 11.7  © 0643-3963 [employer]. Care instructions adapted under license by [employer] (which disclaims liability or warranty for this information). If you have questions about a medical condition or this instruction, always ask your healthcare professional. [place] [address] any warranty or liability for your use of this information.

## 2025-07-03 ENCOUNTER — HOSPITAL ENCOUNTER (OUTPATIENT)
Facility: HOSPITAL | Age: 89
Setting detail: RECURRING SERIES
Discharge: HOME | End: 2025-07-06
Payer: MEDICARE

## 2025-07-03 PROCEDURE — 90853 GROUP PSYCHOTHERAPY: CPT

## 2025-07-03 NOTE — BH NOTE
Group Therapy Note    Date: 7/3/2025    Group Start Time: 11:00 AM  Group End Time: 11:50 AM  Group Topic: Psychotherapy    [facility] BEHAVIORAL TH OP    Junaid[facility]ela [facility]        Group Therapy Note    Attendees: 10 scheduled    Focus of session was based on information from “The CBT Toolbox” by Dr. Rashi Ames.  I shared the information on why it is important to have a balanced lifestyle for mental health recovery.  We spoke about the fours areas of general well-being on which we focus to balance our lives: physical, emotional, relational, and spiritual.  Group members used the tool in terms of assessing their current state of action in all these areas of their lives.  We spoke about goals they can set to increase their balance and help in their recovery.         Patient's Goal:  1. Dep F 33.2  E.  Pt will verbalize acceptance of herself and her abilities and work to build on new strengths she can develop.”      Notes:  Dannielle participated in group with prompting.  She was open to ideas and feedback about some ways she can be more balanced. She was able to see that she relies a lot on interactions with other people and that there are few things she does on her own to help keep her occupied and/or content.      Status After Intervention:  Improved    Participation Level: Active Listener and Interactive    Participation Quality: Appropriate, Attentive, Sharing, and Supportive      Speech:  normal      Thought Process/Content: Logical  Linear      Affective Functioning: Congruent and Flat      Mood: anxious and depressed      Level of consciousness:  Alert, Oriented x4, Attentive, and Preoccupied      Response to Learning: Able to verbalize current knowledge/experience, Able to retain information, and Capable of insight      Endings: None Reported    Modes of Intervention: Education, Support, Socialization, Exploration, Clarifying, and 
Interactive    Participation Quality: Appropriate, Attentive, Sharing, and Supportive      Speech:  normal      Thought Process/Content: Logical  Linear      Affective Functioning: Congruent and Flat      Mood: anxious and depressed      Level of consciousness:  Alert, Oriented x4, and Attentive      Response to Learning: Able to verbalize current knowledge/experience, Able to retain information, and Capable of insight      Endings: None Reported    Modes of Intervention: Education, Support, Socialization, Exploration, Clarifying, and Problem-solving      Discipline Responsible: /Counselor      Signature:  Fela Quiroga LCSW

## 2025-07-06 PROBLEM — N39.0 UTI (URINARY TRACT INFECTION): Status: RESOLVED | Noted: 2025-06-06 | Resolved: 2025-07-06

## 2025-07-07 ENCOUNTER — HOSPITAL ENCOUNTER (OUTPATIENT)
Facility: HOSPITAL | Age: 89
Setting detail: RECURRING SERIES
Discharge: HOME | End: 2025-07-10
Payer: MEDICARE

## 2025-07-07 PROCEDURE — 90853 GROUP PSYCHOTHERAPY: CPT

## 2025-07-07 NOTE — BH NOTE
Group Therapy Note    Date: 7/7/2025    Group Start Time: 10:00 AM  Group End Time: 10:50 AM  Group Topic: Psychotherapy    [facility] BEHAVIORAL TH OP    Socorro Ríos LCSW        Group Therapy Note    Attendees: 11 scheduled    Focus of session was a review of the weekend and helping group members see their wins that they can celebrate from the past several days.  We spoke about the things that occurred and choices that they had in front of them and helping everyone to see the positive impact that they had on their well being.  We also spoke about what may have been the setbacks and how they coped and what they can plan to do for the future to help build on relapse prevention.       Behavioral Health Daily Check In form revealed that patient is not experiencing SI/HI thoughts or plans, remaining abstinent from drugs and alcohol, and report's goal today to resting  Patient's Goal:  1Dep. F33.2 H Pt will begin to participate in social contacts & initiate communication of needs & desires and report to group her progress.”    Notes:  Pt shared with the group that she had mixed feelings about coming this morning.  She stated it is hard for her to stay motivated but once she is up and ready she likes to be able to come to group.  It helps her fight her depression and she enjoys the topics and people in group     Status After Intervention:  Improved    Participation Level: Active Listener and Interactive    Participation Quality: Appropriate, Attentive, Sharing, and Supportive      Speech:  normal      Thought Process/Content: Logical      Affective Functioning: Congruent      Mood: euthymic      Level of consciousness:  Alert, Oriented x4, and Attentive      Response to Learning: Able to verbalize current knowledge/experience, Able to verbalize/acknowledge new learning, Able to retain information, Capable of insight, Able to change behavior, and 
                                                                      Group Therapy Note    Date: 7/7/2025    Group Start Time: 11:00 AM  Group End Time: 11:50 AM  Group Topic: Psychotherapy    [facility] BEHAVIORAL TH OP    Fela Quiroga, LCSW        Group Therapy Note    Attendees: 11 scheduled    Focus of session was based on handouts about “Window of Tolerance.”  We spoke about the hyperarousal states and dysregulation and how that impacts functioning.  We discussed how the window of tolerance is where we are in a relaxed state, and we are calm and present and able to function well.  We discussed how we can help ourselves return to the window of tolerance by certain activities such as breathing exercises, grounding techniques, and being positive towards ourselves and encouraging.         Patient's Goal:  1. Dep F 33.2  A.  Pt will identify and share with group thoughts that contribute to depressed mood and outlook and identify a counter thought    Notes:  Dannielle participated in group well with prompting.  She spoke about how she knows more about how she thinks and feels when depressed and continues to state that \"I understand that I need to be pushed when down. I will just stay there and not push myself.\"  We continue to talk about what can pull her out from her hypoarousal state.      Status After Intervention:  Improved    Participation Level: Active Listener and Interactive    Participation Quality: Appropriate, Attentive, Sharing, and Supportive      Speech:  normal      Thought Process/Content: Logical  Linear      Affective Functioning: Congruent and Flat      Mood: anxious and depressed      Level of consciousness:  Alert, Oriented x4, Attentive, and Preoccupied      Response to Learning: Able to verbalize current knowledge/experience, Able to retain information, and Capable of insight      Endings: None Reported    Modes of Intervention: Education, Support, Socialization, Exploration, Clarifying, and 
verbalize/acknowledge new learning, Able to retain information, Capable of insight, Able to change behavior, and Progressing to goal      Endings: None Reported    Modes of Intervention: Education, support,       Discipline Responsible: /Counselor      Signature:  Socorro Ríos LCSW

## 2025-07-09 ENCOUNTER — HOSPITAL ENCOUNTER (OUTPATIENT)
Facility: HOSPITAL | Age: 89
Setting detail: RECURRING SERIES
Discharge: HOME | End: 2025-07-12
Payer: MEDICARE

## 2025-07-09 PROCEDURE — 90853 GROUP PSYCHOTHERAPY: CPT

## 2025-07-09 NOTE — BH NOTE
Group Therapy Note    Date: 7/9/2025    Group Start Time: 11:00 AM  Group End Time: 11:50 AM  Group Topic: Psychotherapy    [facility] BEHAVIORAL TH OP    Socorro Ríos, WILIAN        Group Therapy Note    Attendees: 10 scheduled    Focus of session was on the concept of codependency in relationships and how to break free from it.   We identified and addressed the self-defeating and unhealthy behaviors we can engage in relationships and what we are willing to work on ending to help create improved and even high quality and supportive relationships.  We discussed common” hooks” in relationships such as caretaking, controlling, discounting ourselves, and overt and covert manipulation.  We discussed guilt and what we need to do to get rid of it when it is unnecessary and damaging.  Group members addressed the need to let go of being right.  I also addressed with group the importance of setting our own expectations for ourselves rather than live for other people's expectations.  Group members discussed what they can identify as problems in relationships and what would the benefit be of improving those relationships.         Patient's Goal: 1Dep. F33.2  B    Pt will identify and share with group behaviors that contribute to depressed mood and identify an alternative behavior    Notes:  Pt followed along with the group activity and reading of the worksheet. She shared that at times she felt like she was walking on egg shells with her  as she didn't always know what mood her would be in . She has tried since to state effectively how she feels about things with her children and friends.  Sometime she feels this is complicated as her children might not agree with her opinions.     Status After Intervention:  Improved    Participation Level: Active Listener and Interactive    Participation Quality: Appropriate, Attentive, Sharing, and 
                                                                      Group Therapy Note    Date: 7/9/2025    Group Start Time: 12:00 PM  Group End Time: 12:50 PM  Group Topic: Psychotherapy    [facility] ([place], [place] BEHAVIORAL TH OP    Socorro Ríos, HENRYW        Group Therapy Note    Attendees: 10 scheduled    Focus of session was on negative self evaluation and how it impacts self esteem and then impacts what we do and how we behave.  We spoke about the common ways we are negative towards ourselves is when we tell our selves we should or should not have done things, chastising ourselves for not meeting the standards that we have set for ourselves.  Negative self talk also affects us in we start to call ourselves hurtful names and start to believe them, and we also make sweeping generalizations about who and what we are.  We spoke about how we can challenge these thoughts by using the skill of looking for facts in what we are saying and looking for alternative views of the situation.  We discussed the value in starting a thought diary because it is easier to process our thoughts out on paper than in our heads.        Patient's Goal:1Dep. F33.2  I “Pt will verbalize belief in herself that she has the ability to make thought out decisions and it is a goal she is willing to work towards.”      Notes:  Pt participated in the group activity and discussion.  Pt stated that she is trying very hard not to be negative in her thinking and her behaviors.  She shared a conflict she is having with a cousin and how much it bothers her at this time.  We discussed possible strategies for her to cope with her feelings and possible actions she could taketo assist with resolving the conflict or letting her cousin know how she feels     Status After Intervention:  Improved    Participation Level: Active Listener and Interactive    Participation Quality: Appropriate, Attentive, and Sharing      Speech:  normal      Thought Process/Content: 
Congruent      Mood: euthymic      Level of consciousness:  Alert, Oriented x4, and Attentive      Response to Learning: Able to verbalize current knowledge/experience, Able to verbalize/acknowledge new learning, Able to retain information, Capable of insight, Able to change behavior, and Progressing to goal      Endings: None Reported    Modes of Intervention: Education, Support, Socialization, Exploration, Clarifying, and Problem-solving      Discipline Responsible: /Counselor      Signature:  Socorro Ríos LCSW

## 2025-07-10 ENCOUNTER — HOSPITAL ENCOUNTER (OUTPATIENT)
Facility: HOSPITAL | Age: 89
Setting detail: RECURRING SERIES
Discharge: HOME | End: 2025-07-13
Payer: MEDICARE

## 2025-07-10 PROCEDURE — 90853 GROUP PSYCHOTHERAPY: CPT

## 2025-07-10 NOTE — BH NOTE
Group Therapy Note    Date: 7/10/2025    Group Start Time: 10:00 AM  Group End Time: 10:50 AM  Group Topic: Psychotherapy    [facility] BEHAVIORAL TH OP    Socorro Ríos, WILIAN        Group Therapy Note    Attendees: 12 scheduled    Focus of session was on self esteem and identifying what we believe about ourselves and where those messages and beliefs may have come from.  We spoke about how what we learned from people in our lives when we were young and if and how these messages may dominate our lives today.  We discussed how these messages distract from our current confidence, happiness, and satisfaction.  Group members worked to identify if these messages were true or simply a belief, which can be defined as a thought that has been thought so often, we believe it is true.  We spoke about how to challenge these beliefs if they are not true.    Behavioral Health Daily Check In form revealed that patient is not experiencing SI/HI thoughts or plans, remaining abstinent from drugs and alcohol, and report's no goal today     Patient's Goal:  1Dep. F33.2 F Pt will practice stopping and checking for facts when she can identify her current thoughts are creating anger, anxiety, of fear.'    Notes:  Pt introduced herself to the new patient and was very supportive of her attendance.,  Pt shared her experiences over the years with coming to group and how it had benefited her.  Pt again stated that she  is glad that she decided to return to group after being inpatient this past month     Status After Intervention:  Improved    Participation Level: Active Listener and Interactive    Participation Quality: Appropriate, Attentive, Sharing, and Supportive      Speech:  normal      Thought Process/Content: Logical      Affective Functioning: Congruent      Mood: euthymic      Level of consciousness:  Alert, Oriented x4, and Attentive      Response to Learning: Able 
                                                                      Group Therapy Note    Date: 7/10/2025    Group Start Time: 12:00 PM  Group End Time: 12:50 PM  Group Topic: Psychotherapy    [facility] BEHAVIORAL TH OP    Socorro Ríos LCSW        Group Therapy Note    Attendees: 12 scheduled    Focus of session was on ways to manage dysfunctional people in our lives.  The information came from Alba Macedo LCSW.  We spoke about the ways to “detach with love” and I shared the strategies which includes focusing on what we can control, respond don't react, respond in a new way, allow others to make their own good or bad decisions, don't give advice or tell people what to do, and don't obsess about other people's problems.   I shared as well the need to give our own expectations a reality check, walk away from an unproductive argument, and choose to not spend time with those who really trigger us. We spoke about strategies that group members can use today to help improve functioning.         Patient's Goal:  1Dep. F33.2 C “Pt will identify triggers to recent setback and identify strategies that can help her manage setbacks like this in the future.”    Notes:  Pt participated in the group activity and discussion. She shared with the group her experiences of growing up with her parents and siblings. She was always close with them and has tried to continue over the years to keep communicating with her family and friends.  This is an important part of her self care process      Status After Intervention:  Improved    Participation Level: Active Listener and Interactive    Participation Quality: Appropriate, Attentive, and Sharing      Speech:  normal      Thought Process/Content: Logical      Affective Functioning: Congruent      Mood: euthymic      Level of consciousness:  Alert, Oriented x4, and Attentive      Response to Learning: Able to verbalize current knowledge/experience, Able to verbalize/acknowledge new 
Preoccupied      Response to Learning: Able to verbalize current knowledge/experience      Endings: None Reported    Modes of Intervention: Education, Support, Socialization, Exploration, Clarifying, and Problem-solving      Discipline Responsible: /Counselor      Signature:  Fela Quiroga LCSW

## 2025-07-14 ENCOUNTER — HOSPITAL ENCOUNTER (OUTPATIENT)
Facility: HOSPITAL | Age: 89
Setting detail: RECURRING SERIES
Discharge: HOME | End: 2025-07-17
Payer: MEDICARE

## 2025-07-14 PROCEDURE — 90853 GROUP PSYCHOTHERAPY: CPT

## 2025-07-14 NOTE — BH NOTE
Group Therapy Note    Date: 7/14/2025    Group Start Time: 10:00 AM  Group End Time: 10:50 AM  Group Topic: Psychotherapy    [facility] BEHAVIORAL TH OP    Socorro Ríos LCSW        Group Therapy Note    Attendees: 12 scheduled    Focus of session was a review of the weekend and helping group members see their wins that they can celebrate from the past several days.  We spoke about the things that occurred and choices that they had in front of them and helping everyone to see the positive impact that they had on their well being.  We also spoke about what may have been the setbacks and how they coped and what they can plan to do for the future to help build on relapse prevention.     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 F33.2 H    “Pt will begin to participate in social contacts & initiate communication of needs & desires and report to group her progress.”    Notes:  Pt's affect was bright today as she engaged and socialized with other members.  She continues to state that she is feeling better and believes that coming to group has been helpful.  Pt has ridden the van each day and is complaint with this process and is on time     Status After Intervention:  Improved    Participation Level: Active Listener and Interactive    Participation Quality: Appropriate, Attentive, Sharing, and Supportive      Speech:  normal      Thought Process/Content: Logical      Affective Functioning: Congruent      Mood: euphoric      Level of consciousness:  Alert, Oriented x4, and Attentive      Response to Learning: Able to verbalize current knowledge/experience, Able to verbalize/acknowledge new learning, Able to retain information, Capable of insight, Able to change behavior, and Progressing to

## 2025-07-14 NOTE — BH NOTE
Group Therapy Note    Date: 7/14/2025    Group Start Time: 1200 pm  Group End Time: 1250 pm  Group Topic: Psychotherapy    [facility] BEHAVIORAL [facility]    Fela Quiroga, LCSW        Group Therapy Note    Attendees: 12 scheduled    Focus of session was focused on developing understanding of the cognitive distortion of catastrophizing.  We spoke about how this is the irrational thought that we believe that something is far worse than it actually is.  I shared how we tend to make a catastrophe out of a current situation or we can do it to future oriented events that may or may not even happen.  We identified what this pattern of thinking leads us to both emotionally and behaviorally.  Patient was asked to share recent thoughts that they can label as catastrophic thinking and we discussed challenges to those thoughts.         Patient's Goal:  1. Dep F 33.2  G.  Pt will increase statements of acceptance of her illness and the work that needs to be done to manage it and identify how acceptance impacts her stress level.”      Notes:  Dannielle participated in group with prompting.  She was quiet but she was engaged.  She was supportive of others in the session.      Status After Intervention:  Unchanged    Participation Level: Active Listener and Interactive    Participation Quality: Appropriate, Attentive, Sharing, and Supportive      Speech:  normal      Thought Process/Content: Logical  Linear      Affective Functioning: Congruent and Flat      Mood: anxious and depressed      Level of consciousness:  Alert, Oriented x4, Attentive, and Preoccupied      Response to Learning: Able to verbalize current knowledge/experience, Able to verbalize/acknowledge new learning, and Capable of insight      Endings: None Reported    Modes of Intervention: Education, Support, Socialization, Exploration, Clarifying, and Problem-solving      Discipline Responsible: Social

## 2025-07-14 NOTE — BH NOTE
Group Therapy Note    Date: 7/14/2025    Group Start Time: 11:00 AM  Group End Time: 11:50 AM  Group Topic: Psychotherapy    [facility] BEHAVIORAL TH OP    Socorro Ríos, WILIAN        Group Therapy Note    Attendees: 12 scheduled      Focus of session was on conflict resolution and strategies to help us keep calm in a conflict with others.  I shared how conflicts can increase the production of cortisol and adrenaline and how that prepares us to either take flight or fight.  We spoke about how conflicts can be over something pretty minor to values and very important issues in our lives.  I shared how the increase in hormones affects us when we need to be clear minded and aware but those hormones can create a disorientation and confusion.  I shared with group some strategies to help us stay calm in a conflict and they included taking deep breaths which actually interferes with the production of these hormones, focusing on body and where we can work to relax it, listen carefully and ask open ended questions, lower our voices, and work to let go and agree to disagree with others.   We spoke about with whom group members can practice these strategies.        Patient's Goal:  1Dep. F33.2 F Pt will practice stopping and checking for facts when she can identify her current thoughts are creating anger, anxiety, of fear.'    Notes:  Pt participated in the group activity and discussion.  She was supportive of another member that shared recent conflict that she had with a family member.  She feels that she and her children have differing views still but are trying to work through their issues     Status After Intervention:  Improved    Participation Level: Active Listener and Interactive    Participation Quality: Appropriate, Attentive, and Sharing      Speech:  normal      Thought Process/Content: Logical      Affective Functioning: Congruent      Mood:

## 2025-07-16 ENCOUNTER — HOSPITAL ENCOUNTER (OUTPATIENT)
Facility: HOSPITAL | Age: 89
Setting detail: RECURRING SERIES
Discharge: HOME | End: 2025-07-19
Payer: MEDICARE

## 2025-07-16 PROCEDURE — 90853 GROUP PSYCHOTHERAPY: CPT

## 2025-07-16 RX ORDER — TRAZODONE HYDROCHLORIDE 50 MG/1
50 TABLET ORAL
Qty: 90 TABLET | Refills: 1 | Status: SHIPPED | OUTPATIENT
Start: 2025-07-16

## 2025-07-16 RX ORDER — VENLAFAXINE HYDROCHLORIDE 150 MG/1
150 CAPSULE, EXTENDED RELEASE ORAL
Qty: 90 CAPSULE | Refills: 1 | Status: SHIPPED | OUTPATIENT
Start: 2025-07-16

## 2025-07-16 NOTE — BH NOTE
Group Therapy Note    Date: 7/16/2025    Group Start Time: 10:00 AM  Group End Time: 10:50 AM  Group Topic: Psychotherapy    [facility] BE[facility]AVIOR[facility] TH OP    Junaid[facility] F[facility]        Group Therapy Note    Attendees: 11 scheduled    Focus of session was based on the concept of assertiveness and group members took a quiz titled “Am I Assertive?”  We spoke about ideas about how to develop assertiveness and what are the appropriate times to be assertive.  We spoke about what it is and what it isn't.  We spoke about the benefits of being assertive and how we can be more assertive and how it can contribute and pay off in our day to day life.    Behavioral Health Daily Check In form revealed that patient is experiencing SI thoughts but plans, remaining abstinent from drugs and alcohol, and report's goal today of \"don't know what I need to focus on.\"      Patient's Goal:  1. Dep F33.2 A.  Pt will identify and share with group thoughts that contribute to depressed mood and outlook and identify a counter thought    Notes:  Dannielle participated in group with prompting. She was talking to other group members actively before group began but she did not share too much during session.  Spoke with her at the end of session and she said she has had \"five deaths in my family.\"  She spoke about how she has three funerals for Saturday and \"I just don't know what I should do.\"  She became tearful but she was able to take in feedback and support from group and therapist.      Status After Intervention:  Unchanged    Participation Level: Active Listener and Interactive    Participation Quality: Appropriate, Attentive, Sharing, and Supportive      Speech:  normal      Thought Process/Content: Logical  Linear      Affective Functioning: Congruent and Flat      Mood: anxious and depressed      Level of consciousness:  Alert, Oriented x4, Attentive, and

## 2025-07-16 NOTE — BH NOTE
Group Therapy Note    Date: 7/16/2025    Group Start Time: 12:00 PM  Group End Time: 12:50 PM  Group Topic: Psychotherapy    [facility] BEHAVIORAL [facility]    Junaid, Fela CUELLAR, LCSW        Group Therapy Note    Attendees: 11 scheduled  Focus of session was based on information about strategies to take care of and protect the brain.  We spoke about the strategies which are easy to target and build a plan around to be able to increase the positive impact on brain functioning and strengths.  I shared the strategies which include stress management tools, resistance/weight training, self-hypnosis, gratitude, omega 3, positive self-talk, nature, limiting screen time, sun, hobbies, vitamin D, meditation, quality sleep, delayed gratification, quality social connectors, hydration, breathwork, movement, and aerobic training.         Patient's Goal:  1. Dep F 33.2 B      Pt will identify and share with group behaviors that contribute to depressed mood and identify an alternative behavior.    Notes:  Dannielle participated in group with prompting.  She spoke about how she is struggling with taking care of herself but she is working to make changes.  She spoke about how she continues to push herself to come and she does recognize that \"staying home and isolating\" has not been helpful to her.  She will continue to work on talking about what she is feeling and what is on her mind.      Status After Intervention:  Unchanged    Participation Level: Active Listener and Interactive    Participation Quality: Appropriate, Attentive, Sharing, and Supportive      Speech:  normal      Thought Process/Content: Logical  Linear      Affective Functioning: Congruent and Flat      Mood: anxious and depressed      Level of consciousness:  Alert, Oriented x4, Attentive, and Preoccupied      Response to Learning: Able to verbalize current knowledge/experience, Able to retain information,

## 2025-07-16 NOTE — BH NOTE
Group Therapy Note    Date: 7/16/2025    Group Start Time: 11:00 AM  Group End Time: 11:50 AM  Group Topic: Psychotherapy    [facility] BEHAVIORAL TH OP    Socorro Ríos LCSW        Group Therapy Note    Attendees: 11 scheduled     Focus of session was on information from [employer] about the 6 different types of self care.  I shared with group the types which include physical, emotional, mental, spiritual, social, and practical.  We spoke about what happens when we don't take care of ourselves in some, or all, of these ways and how impacts our health, well-being, and functioning.  I asked group members to look at these areas and identify where they are doing great and which areas need some improvement.  We spoke about the many different ways in which we can improve and practice self care skills.         Patient's Goal:  1Dep. F33.2 I Pt will verbalize belief in herself that she has the ability to make thought out decisions and it is a goal she is willing to work towards.”      Notes:  Pt listened to the group activity and concerns of others.  Pt was cooperative and attentive as she observed the group and followed along with the reading of the group worksheet.     Status After Intervention:  Improved    Participation Level: Active Listener and Interactive    Participation Quality: Appropriate, Attentive, and Sharing      Speech:  normal      Thought Process/Content: Logical      Affective Functioning: Congruent      Mood: euthymic      Level of consciousness:  Alert, Oriented x4, and Attentive      Response to Learning: Able to verbalize current knowledge/experience, Able to verbalize/acknowledge new learning, Able to retain information, Capable of insight, Able to change behavior, and Progressing to goal      Endings: None Reported    Modes of Intervention: Education, Support, Socialization, Exploration, Clarifying, Problem-solving, and

## 2025-07-17 ENCOUNTER — HOSPITAL ENCOUNTER (OUTPATIENT)
Facility: HOSPITAL | Age: 89
Setting detail: RECURRING SERIES
Discharge: HOME | End: 2025-07-20
Payer: MEDICARE

## 2025-07-17 PROCEDURE — 90853 GROUP PSYCHOTHERAPY: CPT

## 2025-07-17 NOTE — BH NOTE
Group Therapy Note    Date: 7/17/2025    Group Start Time: 10:00 AM  Group End Time: 10:50 AM  Group Topic: Psychotherapy    [facility] BEHAVIORAL TH OP    Socorro Ríos, WILIAN        Group Therapy Note    Attendees: 12 scheduled     Focus of session was on the article \"The Emotion Chart My Therapist Gave Me That I Didn't Know I Needed.\"  We spoke about how we often are not willing to identify and process emotions as they come up and we tend to suppress or avoid them. We discussed how this then leads to overreacting later to little things that occur and we are then overly emotional. We spoke about how the Wheel of Emotions can then lead to identifying broader emotions and then can help us specify the underlying emotions.     Behavioral Health Daily Check In form revealed that patient is not experiencing SI/HI thoughts or plans, remaining abstinent from drugs and alcohol, and report's no goal today     Patient's Goal:  1Dep. F33.2 E “Pt will verbalize acceptance of herself and her abilities and work to build on new strengths she can develop.”      Notes:  I was able to check with pt regarding her comment about self harm on the Behavior Health Check in sheet.  She stated that she had checked the wrong box and appreciated that I followed up with her.  Pt introduced herself to the new group members and expressed support for everyone being there     Status After Intervention:  Improved    Participation Level: Active Listener and Interactive    Participation Quality: Appropriate, Attentive, and Sharing      Speech:  normal      Thought Process/Content: Logical      Affective Functioning: Congruent      Mood: euthymic      Level of consciousness:  Alert, Oriented x4, and Attentive      Response to Learning: Able to verbalize current knowledge/experience, Able to verbalize/acknowledge new learning, Able to retain information, Capable of insight, Able to

## 2025-07-17 NOTE — BH NOTE
Group Therapy Note    Date: 7/17/2025    Group Start Time: 12:00 PM  Group End Time: 12:50 PM  Group Topic: Psychotherapy    [facility] BEHAVIORAL TH OP    Socorro Ríos, WILIAN        Group Therapy Note    Attendees: 12 scheduled   Focus of session was on information from [employer] about the 6 different types of self care.  I shared with group the types which include physical, emotional, mental, spiritual, social, and practical.  We spoke about what happens when we don't take care of ourselves in some, or all, of these ways and how impacts our health, well-being, and functioning.  I asked group members to look at these areas and identify where they are doing great and which areas need some improvement.  We spoke about the many different ways in which we can improve and practice self care skills.            Patient's Goal:  1Dep. F33.2 C  “Pt will identify triggers to recent setback and identify strategies that can help her manage setbacks like this in the future.”    Notes:  Pt participated in the group activity and interacted with the group discussion.  Pt stated that she realizes that when she is in a negative thought pattern it is easy for her to overlook the positives.  Pt shared that she is feeling better after being in the hospital and tries to think of something to be grateful for each day     Status After Intervention:  Improved    Participation Level: Active Listener and Interactive    Participation Quality: Appropriate, Attentive, and Sharing      Speech:  normal      Thought Process/Content: Logical      Affective Functioning: Congruent      Mood: euthymic      Level of consciousness:  Alert, Oriented x4, and Attentive      Response to Learning: Able to verbalize current knowledge/experience, Able to verbalize/acknowledge new learning, Able to retain information, Capable of insight, Able to change behavior, and Progressing to

## 2025-07-17 NOTE — BH NOTE
Group Therapy Note    Date: 7/17/2025    Group Start Time: 11:00 AM  Group End Time: 11:50 AM  Group Topic: Psychotherapy    [facility] BEHAVIORAL TH OP    Socorro Ríos LCSW        Group Therapy Note    Attendees: 12 scheduled     Focus of session was on identifying and challenging unhelpful and negative thinking patterns.  We discussed the importance of accepting the facts of situations and not what we are interpreting from them. I discussed with group members thinking processes that include self-blame which is accepting responsibility for all that is wrong, whether it is our fault or not.  We also reviewed the thinking patterns of all or nothing thinking, jumping to conclusions, overgeneralizing, making assumptions, and labeling.  Group members worked to identify the thought pattern that they get caught up in and how the thinking can be disputed and challenge what is not true and replace it with truthful and rational thoughts.        Patient's Goal:  1Dep. F33.2 D Pt will identify times when she has overreacted emotionally and the consequences of those reactions and reasons why she wants to learn to slow down emotionally.”    Notes:  Pt listened to the group discussion and concerns of others. Pt was attentive and cooperative as she observed the activity.  Pt would nod in support at times but remained quiet for most of this group.      Status After Intervention:  Unchanged    Participation Level: Active Listener and Interactive    Participation Quality: Appropriate and Attentive      Speech:  normal      Thought Process/Content: Logical      Affective Functioning: Congruent      Mood: euthymic      Level of consciousness:  Alert, Oriented x4, and Attentive      Response to Learning: Able to verbalize current knowledge/experience, Able to verbalize/acknowledge new learning, Able to retain information, Capable of insight, Able to change behavior,

## 2025-07-21 ENCOUNTER — HOSPITAL ENCOUNTER (OUTPATIENT)
Facility: HOSPITAL | Age: 89
Setting detail: RECURRING SERIES
Discharge: HOME | End: 2025-07-24
Payer: MEDICARE

## 2025-07-21 PROCEDURE — 90853 GROUP PSYCHOTHERAPY: CPT

## 2025-07-21 NOTE — BH NOTE
Group Therapy Note    Date: 7/21/2025    Group Start Time: 12:00 PM  Group End Time: 12:50 PM  Group Topic: Psychotherapy    [facility] BEHAVIORAL TH OP    Fela Quiroga, \[facility] [place] [facility]\""W        Group Therapy Note    Attendees: 12 scheduled    Focus of session was on the importance of learning how to tolerate and manage internal tension.  We spoke about how this is often where we get into trouble when we are trying to make a change.  A remedy to resolving internal tension is to find a “quick fix” which works in the short term but has long term effects and consequences.  We spoke about our quick fixes and their consequences and what are we not tolerating internally.  We discussed how we are programmed to believe we should not feel uncomfortable or in pain and we spoke how we can find the right balance of tension that can drive us towards positive change but not be too much that we are incapacitated because of it.         Patient's Goal:  1. Dep F 33.2  E.  Pt will verbalize acceptance of herself and her abilities and work to build on new strengths she can develop.”      Notes:  Patient spoke about insights about all types of quick fixes and the impact those fixes have ended up having on life.  Patient was able to express some understanding of the need to make a choice to try and take steps to break habits and routines like the ones they identified.      Status After Intervention:  Unchanged    Participation Level: Active Listener and Interactive    Participation Quality: Appropriate, Attentive, Sharing, and Supportive      Speech:  normal      Thought Process/Content: Logical  Linear      Affective Functioning: Congruent and Flat      Mood: anxious and depressed      Level of consciousness:  Alert, Oriented x4, and Attentive      Response to Learning: Able to verbalize current knowledge/experience      Endings: None Reported    Modes of Intervention: Education,

## 2025-07-21 NOTE — BH NOTE
Group Therapy Note    Date: 7/21/2025    Group Start Time: 10:00 AM  Group End Time: 10:50 AM  Group Topic: Psychotherapy    [facility] BEHAVIOR[facility]    Fela Quiroga, LCSW        Group Therapy Note    Attendees: 12 scheduled      Focus of session was a review of the weekend and helping group members see their wins that they can celebrate from the past several days.  We spoke about the things that occurred and choices that they had in front of them and helping everyone to see the positive impact that they had on their well being.  We also spoke about what may have been the setbacks and how they coped and what they can plan to do for the future to help build on relapse prevention.     Behavioral Health Daily Check In form revealed that patient is not experiencing SI/HI thoughts or plans, remaining abstinent from drugs and alcohol, and report's goal today of   \"patient did not state a goal.\"    Patient's Goal:  1. Dep F 33.2  A.  Pt will identify and share with group thoughts that contribute to depressed mood and outlook and identify a counter thought    Notes:  Patient processed thoughts about own struggles over the weekend and was encouraged to think about small successes they experienced.  Patient was able to be encouraged to think about a goal for the week that can help improve their functioning and/or increase their self esteem to build up confidence in their abilities.  Dannielle participated in group with prompting. She spoke about how she made the decision to not go to the funerals of her family members on Saturday.  She spoke about how she felt about that choice and how she is coping today with the recent multiple losses in her family.      Status After Intervention:  Unchanged    Participation Level: Active Listener and Interactive    Participation Quality: Appropriate, Attentive, Sharing, and Supportive      Speech:  normal      Thought

## 2025-07-21 NOTE — BH NOTE
Group Therapy Note    Date: 7/21/2025    Group Start Time: 11:00 AM  Group End Time: 11:50 AM  Group Topic: Psychotherapy    [facility] BEHAVIOR[facility]    Fela Quiroga LCSW        Group Therapy Note    Attendees: 12 scheduled    Focus of session was focused on avoidance and how it makes life harder.  We spoke about the actions and choices that we avoid making or habits we avoid starting because of avoidance.  We discussed the impact of what we avoid and how it ends up making life more challenging and what steps patient can take to act on things to do, habits to start, or conversations to have in order to eventually make their life easier.         Patient's Goal:  1. Dep F 33.2  B.   Pt will identify and share with group behaviors that contribute to depressed mood and identify an alternative behavior.    Notes:  Patient was able to respond to questions about avoidance and the impact it has had on life for them.  Patient was able to discuss some ideas talked about in group to be able to improve their habits, relationships, and daily routine to improve their overall mental health.      Status After Intervention:  Improved    Participation Level: Active Listener and Interactive    Participation Quality: Appropriate, Attentive, Sharing, and Supportive      Speech:  normal      Thought Process/Content: Logical  Linear      Affective Functioning: Congruent      Mood: anxious and depressed      Level of consciousness:  Alert, Oriented x4, Attentive, and Preoccupied      Response to Learning: Able to verbalize current knowledge/experience, Capable of insight, and Able to change behavior      Endings: None Reported    Modes of Intervention: Education, Support, Socialization, Exploration, and Clarifying      Discipline Responsible: /Counselor      Signature:  Fela Quiroga LCSW

## 2025-07-23 ENCOUNTER — HOSPITAL ENCOUNTER (OUTPATIENT)
Facility: HOSPITAL | Age: 89
Setting detail: RECURRING SERIES
Discharge: HOME | End: 2025-07-26
Payer: MEDICARE

## 2025-07-23 PROCEDURE — 90853 GROUP PSYCHOTHERAPY: CPT

## 2025-07-23 NOTE — BH NOTE
Group Therapy Note    Date: 7/23/2025    Group Start Time: 10:00 AM  Group End Time: 10:50 AM  Group Topic: Psychotherapy    [facility] BEHAVIORAL TH OP    Socorro Ríos LCSW        Group Therapy Note    Attendees: 12 scheduled    Focus of session was on information from [employer] on questions to ask yourself when you are trying to stay positive in a negative situation.  We spoke about recent challenges in trying to stay positive in a difficult situation and we reviewed the following questions: is this worth getting upset over, what is the lesson here, am I overreacting or overthinking here, what is the positive I can take away from this, can I control the situation, how can I see the situation with love and care?  We spoke about how these questions can help pull things quickly into perspective and help us cope and manage our emotions.     Behavioral Health Daily Check In form revealed that patient is not experiencing SI/HI thoughts or plans, remaining abstinent from drugs and alcohol, and report's no goal today     Patient's Goal: 1Dep. F33.2 I Pt will verbalize belief in herself that she has the ability to make thought out decisions and it is a goal she is willing to work towards.”       Notes:  Pt introduced herself to  the new member of group today.  She shared some of her mental health struggles with depression and coming to group over the years.  Pt continues to state she is grateful that she did not give up and decided to come to group again.     Status After Intervention:  Improved    Participation Level: Active Listener and Interactive    Participation Quality: Appropriate, Attentive, and Sharing      Speech:  normal      Thought Process/Content: Logical      Affective Functioning: Congruent      Mood: euthymic      Level of consciousness:  Alert and Attentive      Response to Learning: Able to verbalize current

## 2025-07-23 NOTE — BH NOTE
Group Therapy Note    Date: 7/23/2025    Group Start Time: 11:00 AM  Group End Time: 11:50 AM  Group Topic: Psychotherapy    [facility] BEHAVIOR[facility] TH OP    Socorro Ríos LCSW        Group Therapy Note    Attendees: 12 scheduled       Focus of session was on identifying tips that can help us avoid relapse into destructive habits and addictions.  We discussed how this information is coming from substance abuse treatment but it is easily applied to any problem behavior that continues to be something we need to actively manage in our lives.  I shared how it is important to understand your triggers to that behavior since that is the most important moment, not the moment to engage in the behavior.  I shared the skills provided by [employer] and they include waiting out urges, replacing these old patterns with new healthy ones, having a plan for when things get back.    We spoke about which tips group members will be willing to use.         Patient's Goal: 1Dep. F33.2 H “Pt will begin to participate in social contacts & initiate communication of needs & desires and report to group her progress.”     Notes:  Pt followed along with the reading of the worksheet related to triggers to relapse.  Pt shared that at one time she chose to drink alcohol but does not anymore.  She recognized that it was not healthy for her and is glad she quit.  She stated that by doing so it enabled her to be around now for her children and grandchildren     Status After Intervention:  Improved    Participation Level: Active Listener and Interactive    Participation Quality: Appropriate, Attentive, and Sharing      Speech:  normal      Thought Process/Content: Logical      Affective Functioning: Congruent      Mood: euthymic      Level of consciousness:  Alert, Oriented x4, and Attentive      Response to Learning: Able to verbalize current knowledge/experience, Able to
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
## 2025-07-24 ENCOUNTER — HOSPITAL ENCOUNTER (OUTPATIENT)
Facility: HOSPITAL | Age: 89
Setting detail: RECURRING SERIES
Discharge: HOME | End: 2025-07-27
Payer: MEDICARE

## 2025-07-24 PROCEDURE — 90853 GROUP PSYCHOTHERAPY: CPT

## 2025-07-24 NOTE — BH NOTE
Group Therapy Note    Date: 7/24/2025    Group Start Time: 12:00 PM  Group End Time: 12:50 PM  Group Topic: Psychotherapy    [facility] BEHAVIORAL [facility] OP    Socorro Ríos LCSW        Group Therapy Note    Attendees: 12 scheduled    Focus of session was on identifying tips that can help us avoid relapse into destructive habits and addictions.  We discussed how this information is coming from substance abuse treatment but it is easily applied to any problem behavior that continues to be something we need to actively manage in our lives.  I shared how it is important to understand your triggers to that behavior since that is the most important moment, not the moment to engage in the behavior.  I shared the skills provided by [employer] and they include waiting out urges, replacing these old patterns with new healthy ones, having a plan for when things get back.    We spoke about which tips group members will be willing to use.         Patient's Goal:  1Dep. F33.2 C “Pt will identify triggers to recent setback and identify strategies that can help her manage setbacks like this in the future.”    Notes:  Pt participated in the group activity and discussion.  She shared with the group her struggles in dealing with her husbands drinking and her own. Pt acknowledged that she realized that her drinking was harmful to her and her family and she decided to stop.  However, her  continued to drink and pt feels it effected her children growing up     Status After Intervention:  Improved    Participation Level: Active Listener and Interactive    Participation Quality: Appropriate, Attentive, and Sharing      Speech:  normal      Thought Process/Content: Logical      Affective Functioning: Congruent      Mood: euthymic      Level of consciousness:  Alert, Oriented x4, and Attentive      Response to Learning: Able to verbalize current

## 2025-07-24 NOTE — BH NOTE
Group Therapy Note    Date: 7/24/2025    Group Start Time: 11:00 AM  Group End Time: 11:50 AM  Group Topic: Psychotherapy    [facility] BEHAVIORAL TH OP    Socorro Ríos LCSW        Group Therapy Note    Attendees: 12 scheduled    Focus of session was on information from [employer] on questions to ask yourself when you are trying to stay positive in a negative situation.  We spoke about recent challenges in trying to stay positive in a difficult situation and we reviewed the following questions: is this worth getting upset over, what is the lesson here, am I overreacting or overthinking here, what is the positive I can take away from this, can I control the situation, how can I see the situation with love and care?  We spoke about how these questions can help pull things quickly into perspective and help us cope and manage our emotions.          Behavioral Health Daily Check In form revealed that patient is not experiencing SI/HI thoughts or plans, remaining abstinent from drugs and alcohol, and report's goal no today    Patient's Goal:  1Dep. F33.2 D Pt will identify times when she has overreacted emotionally and the consequences of those reactions and reasons why she wants to learn to slow down emotionally.”    Notes:  Pt followed along with the group discussion and engaged with others.  Pt introduced herself to the new member and shared with him her experiences with group and strategies she has learned to assist her with her depression      Status After Intervention:  Improved    Participation Level: Active Listener. Participated     Participation Quality: Appropriate and Attentive      Speech:  normal      Thought Process/Content: Logical      Affective Functioning: Congruent      Mood: euthymic      Level of consciousness:  Alert, Oriented x4, and Attentive      Response to Learning: Able to verbalize current knowledge/experience, Able

## 2025-07-30 ENCOUNTER — HOSPITAL ENCOUNTER (OUTPATIENT)
Facility: HOSPITAL | Age: 89
Setting detail: RECURRING SERIES
Discharge: HOME | End: 2025-08-02
Payer: MEDICARE

## 2025-07-30 PROCEDURE — 90853 GROUP PSYCHOTHERAPY: CPT

## 2025-07-30 NOTE — BH NOTE
Group Therapy Note    Date: 7/30/2025    Group Start Time: 10:00 AM  Group End Time: 10:50 AM  Group Topic: Psychotherapy    [facility] BEHAVIORAL TH OP    Socorro Ríos, WILIAN        Group Therapy Note    Attendees: 12 scheduled    Focus of session was based on information from Acceptance and Commitment Therapy and Rolando Joshi. We spoke about the skills and strategies for what to do in an emotional crisis.  We spoke about the types of major crises we can face as well as the painful emotions that come along with these crises.  We spoke about the strategy of STOP (slow your breathing, take note, open up, and pursue your values.  We spoke about the outcomes that using this process can bring when we are feeling overwhelmed emotionally.  We discussed other coping skills such as identifying who we can talk to when in crisis, taking note and being aware of emotions, allowing the feelings to happen.  We spoke about how this skill can help them now and how group members will be willing to use these skills to improve their functioning and stability.    Behavioral Health Daily Check In form revealed that patient is not experiencing SI/HI thoughts or plans, remaining abstinent from drugs and alcohol, and report's goal today no       Patient's Goal:  1Dep. F33.2 I Pt will verbalize belief in herself that she has the ability to make thought out decisions and it is a goal she is willing to work towards.”      Notes:  Pt listened the group activity and discussion.  She introduced herself to another group member.  She was supportive as she shared her group experience and strategies she has learned to help her cope     Status After Intervention:  Improved    Participation Level: Active Listener and Interactive    Participation Quality: Appropriate, Attentive, and Sharing      Speech:  normal      Thought Process/Content: Logical      Affective Functioning:

## 2025-07-30 NOTE — BH NOTE
Group Therapy Note    Date: 7/30/2025    Group Start Time: 12:00 PM  Group End Time: 12:50 PM  Group Topic: Psychotherapy    [facility] BEHAVIORAL TH OP    Socorro Ríos, WILIAN        Group Therapy Note    Attendees: 12 scheduled    Focus of session was on identifying coping skills and what categories they may fall under.  We spoke about the different categories of distraction skills, grounding skills, emotional release skills, self care skills, thought challenge skills, and accessing our higher self skills.  We spoke about what each of these mean and the pros and cons of each category.  I asked group members to brainstorm skills for each category and how it can benefit them to use them.  We also identified cons of each category and how that can lead them to choose another way to cope with certain situations.          Patient's Goal:  1Dep. F33.2 F Pt will practice stopping and checking for facts when she can identify her current thoughts are creating anger, anxiety, of fear.'    Notes:  Pt participated in the group activity and discussion.  She shared that she feels she is doing much better these days and continues to try and take care of herself.  She feels not staying home by herself as much has helped with lessoning her depression.  Pt's appearance is neat and stylish as it has been in the past     Status After Intervention:  Improved    Participation Level: Active Listener and Interactive    Participation Quality: Appropriate, Attentive, and Sharing      Speech:  normal      Thought Process/Content: Logical      Affective Functioning: Congruent      Mood: euthymic      Level of consciousness:  Alert, Oriented x4, and Attentive      Response to Learning: Able to verbalize current knowledge/experience, Able to verbalize/acknowledge new learning, Able to retain information, Capable of insight, Able to change behavior, and Progressing to

## 2025-07-30 NOTE — BH NOTE
Group Therapy Note    Date: 7/30/2025    Group Start Time: 11:00 AM  Group End Time: 11:50 AM  Group Topic: Psychotherapy    [facility]    Socorro Ríos LCSW        Group Therapy Note    Attendees: 12 scheduled    Focus of session was based on the quote: I choose to live by choice, not by chance, to make changes, not excuses, to be motivated, not manipulated, to be useful, not used, to excel, not to compete.  I choose self esteem, not self pity, I choose to listen to my inner voice, not the random opinion of others.  I choose to be me, I choose to be authentic.”  I asked group members to talk about which part of the quote stood out for them and is something that they can benefit from focusing on for their own self care.         Patient's Goal:   1Dep. F33.2 C “Pt will identify triggers to recent setback and identify strategies that can help her manage setbacks like this in the future    Notes:  Pt engaged with the other group members and listened to their concerns.  Her affect was bright today and she was attentive and cooperative     Status After Intervention:  Improved    Participation Level: Active Listener and Interactive    Participation Quality: Appropriate, Attentive, and Sharing      Speech:  normal      Thought Process/Content: Logical      Affective Functioning: Congruent      Mood: euthymic      Level of consciousness:  Alert, Oriented x4, and Attentive      Response to Learning: Able to verbalize current knowledge/experience, Able to verbalize/acknowledge new learning, Able to retain information, Capable of insight, Able to change behavior, and Progressing to goal      Endings: None Reported    Modes of Intervention: Education, Support, Socialization, Exploration, Clarifying, and Problem-solving      Discipline Responsible: /Counselor      Signature:  Socorro Ríos LCSW

## 2025-07-31 ENCOUNTER — HOSPITAL ENCOUNTER (OUTPATIENT)
Facility: HOSPITAL | Age: 89
Setting detail: RECURRING SERIES
End: 2025-07-31
Payer: MEDICARE

## 2025-07-31 NOTE — BH NOTE
[facility]  Group Therapy  Absence Note      Date of Service: 7/31/2025    Pt did not attend her scheduled treatment day.  I called to check on her and she stated she was not feeling well today   3

## 2025-08-04 ENCOUNTER — HOSPITAL ENCOUNTER (OUTPATIENT)
Facility: HOSPITAL | Age: 89
Setting detail: RECURRING SERIES
Discharge: HOME | End: 2025-08-07
Payer: MEDICARE

## 2025-08-04 PROCEDURE — 90853 GROUP PSYCHOTHERAPY: CPT

## 2025-08-07 ENCOUNTER — HOSPITAL ENCOUNTER (OUTPATIENT)
Facility: HOSPITAL | Age: 89
Setting detail: RECURRING SERIES
Discharge: HOME | End: 2025-08-10
Payer: MEDICARE

## 2025-08-07 PROCEDURE — 90853 GROUP PSYCHOTHERAPY: CPT

## 2025-08-11 ENCOUNTER — HOSPITAL ENCOUNTER (OUTPATIENT)
Facility: HOSPITAL | Age: 89
Setting detail: RECURRING SERIES
Discharge: HOME | End: 2025-08-14
Payer: MEDICARE

## 2025-08-11 PROBLEM — F33.1 MAJOR DEPRESSIVE DISORDER, RECURRENT EPISODE, MODERATE (HCC): Status: ACTIVE | Noted: 2025-08-11

## 2025-08-11 PROCEDURE — 90853 GROUP PSYCHOTHERAPY: CPT

## 2025-08-11 PROCEDURE — 99214 OFFICE O/P EST MOD 30 MIN: CPT | Performed by: PSYCHIATRY & NEUROLOGY

## 2025-08-14 ENCOUNTER — HOSPITAL ENCOUNTER (OUTPATIENT)
Facility: HOSPITAL | Age: 89
Setting detail: RECURRING SERIES
Discharge: HOME | End: 2025-08-17
Payer: MEDICARE

## 2025-08-14 PROCEDURE — 90853 GROUP PSYCHOTHERAPY: CPT

## 2025-08-17 ENCOUNTER — HOSPITAL ENCOUNTER (EMERGENCY)
Facility: HOSPITAL | Age: 89
Discharge: HOME | End: 2025-08-17
Attending: EMERGENCY MEDICINE
Payer: MEDICARE

## 2025-08-17 VITALS
TEMPERATURE: 98.2 F | OXYGEN SATURATION: 96 % | HEART RATE: 66 BPM | SYSTOLIC BLOOD PRESSURE: 168 MMHG | DIASTOLIC BLOOD PRESSURE: 103 MMHG | RESPIRATION RATE: 16 BRPM

## 2025-08-17 DIAGNOSIS — I10 ESSENTIAL HYPERTENSION: Primary | ICD-10-CM

## 2025-08-17 LAB
APPEARANCE UR: CLEAR
BACTERIA URNS QL MICRO: ABNORMAL /HPF
BILIRUB UR QL: NEGATIVE
COLOR UR: ABNORMAL
EPITH CASTS URNS QL MICRO: ABNORMAL /LPF
GLUCOSE UR STRIP.AUTO-MCNC: NEGATIVE MG/DL
HGB UR QL STRIP: NEGATIVE
KETONES UR QL STRIP.AUTO: NEGATIVE MG/DL
LEUKOCYTE ESTERASE UR QL STRIP.AUTO: ABNORMAL
NITRITE UR QL STRIP.AUTO: NEGATIVE
PH UR STRIP: 6 (ref 5–8)
PROT UR STRIP-MCNC: NEGATIVE MG/DL
RBC #/AREA URNS HPF: ABNORMAL /HPF (ref 0–5)
SP GR UR REFRACTOMETRY: 1.01 (ref 1–1.03)
URINE CULTURE IF INDICATED: ABNORMAL
UROBILINOGEN UR QL STRIP.AUTO: 0.2 EU/DL (ref 0.2–1)
WBC URNS QL MICRO: ABNORMAL /HPF (ref 0–4)

## 2025-08-17 PROCEDURE — 6370000000 HC RX 637 (ALT 250 FOR IP): Performed by: EMERGENCY MEDICINE

## 2025-08-17 PROCEDURE — 81001 URINALYSIS AUTO W/SCOPE: CPT

## 2025-08-17 PROCEDURE — 99283 EMERGENCY DEPT VISIT LOW MDM: CPT

## 2025-08-17 RX ORDER — METOPROLOL SUCCINATE 50 MG/1
50 TABLET, EXTENDED RELEASE ORAL
Status: COMPLETED | OUTPATIENT
Start: 2025-08-17 | End: 2025-08-17

## 2025-08-17 RX ORDER — LOSARTAN POTASSIUM 25 MG/1
100 TABLET ORAL ONCE
Status: COMPLETED | OUTPATIENT
Start: 2025-08-17 | End: 2025-08-17

## 2025-08-17 RX ADMIN — METOPROLOL SUCCINATE 50 MG: 50 TABLET, EXTENDED RELEASE ORAL at 08:41

## 2025-08-17 RX ADMIN — LOSARTAN POTASSIUM 100 MG: 25 TABLET, FILM COATED ORAL at 08:40

## 2025-08-17 ASSESSMENT — PAIN - FUNCTIONAL ASSESSMENT: PAIN_FUNCTIONAL_ASSESSMENT: 0-10

## 2025-08-17 ASSESSMENT — PAIN SCALES - GENERAL: PAINLEVEL_OUTOF10: 0

## 2025-08-18 ENCOUNTER — HOSPITAL ENCOUNTER (OUTPATIENT)
Facility: HOSPITAL | Age: 89
Setting detail: RECURRING SERIES
Discharge: HOME | End: 2025-08-21
Payer: MEDICARE

## 2025-08-18 PROCEDURE — 90853 GROUP PSYCHOTHERAPY: CPT

## 2025-08-20 ENCOUNTER — HOSPITAL ENCOUNTER (OUTPATIENT)
Facility: HOSPITAL | Age: 89
Setting detail: RECURRING SERIES
Discharge: HOME | End: 2025-08-23
Payer: MEDICARE

## 2025-08-21 ENCOUNTER — HOSPITAL ENCOUNTER (OUTPATIENT)
Facility: HOSPITAL | Age: 89
Setting detail: RECURRING SERIES
Discharge: HOME | End: 2025-08-24
Payer: MEDICARE

## 2025-08-21 PROCEDURE — 90853 GROUP PSYCHOTHERAPY: CPT

## 2025-08-25 ENCOUNTER — HOSPITAL ENCOUNTER (OUTPATIENT)
Facility: HOSPITAL | Age: 89
Setting detail: RECURRING SERIES
Discharge: HOME | End: 2025-08-28
Payer: MEDICARE

## 2025-08-25 PROCEDURE — 90853 GROUP PSYCHOTHERAPY: CPT

## 2025-08-27 ENCOUNTER — HOSPITAL ENCOUNTER (OUTPATIENT)
Facility: HOSPITAL | Age: 89
Setting detail: RECURRING SERIES
Discharge: HOME | End: 2025-08-30
Payer: MEDICARE

## 2025-08-28 ENCOUNTER — HOSPITAL ENCOUNTER (OUTPATIENT)
Facility: HOSPITAL | Age: 89
Setting detail: RECURRING SERIES
Discharge: HOME | End: 2025-08-31
Payer: MEDICARE

## 2025-08-28 PROCEDURE — 90853 GROUP PSYCHOTHERAPY: CPT

## 2025-09-03 ENCOUNTER — HOSPITAL ENCOUNTER (OUTPATIENT)
Facility: HOSPITAL | Age: 89
Setting detail: RECURRING SERIES
Discharge: HOME | End: 2025-09-06
Payer: MEDICARE

## 2025-09-03 PROCEDURE — 90853 GROUP PSYCHOTHERAPY: CPT

## (undated) DEVICE — NEEDLE HYPO 18GA L1.5IN PNK S STL HUB POLYPR SHLD REG BVL

## (undated) DEVICE — YANKAUER,TAPERED BULBOUS TIP,W/O VENT: Brand: MEDLINE

## (undated) DEVICE — CONTAINER SPEC 20 ML LID NEUT BUFF FORMALIN 10 % POLYPR STS

## (undated) DEVICE — IV START KIT: Brand: MEDLINE

## (undated) DEVICE — 1200 GUARD II KIT W/5MM TUBE W/O VAC TUBE: Brand: GUARDIAN

## (undated) DEVICE — LIQUIBAND RAPID ADHESIVE 36/CS 0.8ML: Brand: MEDLINE

## (undated) DEVICE — CHEST/BREAST-MRMCASU: Brand: MEDLINE INDUSTRIES, INC.

## (undated) DEVICE — NEONATAL-ADULT SPO2 SENSOR: Brand: NELLCOR

## (undated) DEVICE — SYR 10ML LUER LOK 1/5ML GRAD --

## (undated) DEVICE — SOLUTION IRRIG 1000ML 0.9% SOD CHL USP POUR PLAS BTL

## (undated) DEVICE — BASIN EMSIS 16OZ GRAPHITE PLAS KID SHP MOLD GRAD FOR ORAL

## (undated) DEVICE — SET GRAV CK VLV NEEDLESS ST 3 GANGED 4WAY STPCOCK HI FLO 10

## (undated) DEVICE — CONTAINER,SPEC,PNEUM TUBE,3OZ,STRL PATH: Brand: MEDLINE

## (undated) DEVICE — SUTURE VCRL SZ 2-0 L27IN ABSRB UD L26MM SH 1/2 CIR J417H

## (undated) DEVICE — Device

## (undated) DEVICE — GLOVE SURG SZ 6 L12IN FNGR THK79MIL GRN LTX FREE

## (undated) DEVICE — BLOCK BITE ENDOSCP AD 21 MM W/ DIL BLU LF DISP

## (undated) DEVICE — SNARE ENDOSCP M L240CM W27MM SHTH DIA2.4MM CHN 2.8MM OVL

## (undated) DEVICE — SYR 3ML LL TIP 1/10ML GRAD --

## (undated) DEVICE — SUTURE VCRL SZ 3-0 L27IN ABSRB UD L26MM SH 1/2 CIR J416H

## (undated) DEVICE — PENCIL SMK EVAC ALL IN 1 DSGN ENH VISIBILITY IMPROVED AIR

## (undated) DEVICE — BLADE ES ELASTOMERIC COAT INSUL DURABLE BEND UPTO 90DEG

## (undated) DEVICE — NEEDLE HYPO 22GA L1.5IN BLK S STL HUB POLYPR SHLD REG BVL

## (undated) DEVICE — HYPODERMIC SAFETY NEEDLE: Brand: MONOJECT

## (undated) DEVICE — SYRINGE MED 10ML LUERLOCK TIP W/O SFTY DISP

## (undated) DEVICE — TOWEL 4 PLY TISS 19X30 SUE WHT

## (undated) DEVICE — SNARE ENDOSCP L240CM LOOP W27MM SHTH DIA2.4MM WRK CHN 2.8MM

## (undated) DEVICE — TRAP,MUCUS SPECIMEN, 80CC: Brand: MEDLINE

## (undated) DEVICE — SUTURE PERMA-HAND SZ 2-0 L30IN NONABSORBABLE BLK L26MM SH K833H

## (undated) DEVICE — NON-REM POLYHESIVE PATIENT RETURN ELECTRODE: Brand: VALLEYLAB

## (undated) DEVICE — SUTURE MCRYL SZ 4-0 L27IN ABSRB UD L19MM PS-2 1/2 CIR PRIM Y426H

## (undated) DEVICE — Z DISCONTINUED PER MEDLINE LINE GAS SAMPLING O2/CO2 LNG AD 13 FT NSL W/ TBNG FILTERLINE

## (undated) DEVICE — ELECTRODE,RADIOTRANSLUCENT,FOAM,5PK: Brand: MEDLINE

## (undated) DEVICE — SOLIDIFIER MEDC 1200ML -- CONVERT TO 356117

## (undated) DEVICE — CUFF BLD PRSS AD CLTH SGL TB W/ BAYNT CONN ROUNDED CORNER

## (undated) DEVICE — TRAP FLUID BUFFALO FLTR

## (undated) DEVICE — ENDOSCOPIC KIT COMPLIANCE ENDOKIT

## (undated) DEVICE — SEALER LAP SM L18.8CM OPN JAW HAND/FOOT SWCH FORCETRIAD

## (undated) DEVICE — STRAINER URIN CALC RNL MSH -- CONVERT TO ITEM 357634

## (undated) DEVICE — SET ADMIN 16ML TBNG L100IN 2 Y INJ SITE IV PIGGY BK DISP

## (undated) DEVICE — SPONGE GZ W4XL4IN COT 12 PLY TYP VII WVN C FLD DSGN STERILE

## (undated) DEVICE — PROVE COVER: Brand: UNBRANDED

## (undated) DEVICE — GLOVE SURG SZ 65 L12IN FNGR THK79MIL GRN LTX FREE

## (undated) DEVICE — TRAP ENDOSCP POLYP 2 CHMBR DRAWER TYP